# Patient Record
Sex: FEMALE | Race: WHITE | Employment: OTHER | URBAN - NONMETROPOLITAN AREA
[De-identification: names, ages, dates, MRNs, and addresses within clinical notes are randomized per-mention and may not be internally consistent; named-entity substitution may affect disease eponyms.]

---

## 2019-05-14 RX ORDER — LANOLIN ALCOHOL/MO/W.PET/CERES
400 CREAM (GRAM) TOPICAL DAILY
COMMUNITY
End: 2019-05-22

## 2019-05-14 RX ORDER — CLONAZEPAM 1 MG/1
1 TABLET ORAL 2 TIMES DAILY PRN
COMMUNITY
End: 2019-05-22

## 2019-05-14 RX ORDER — PHENOL 1.4 %
600 AEROSOL, SPRAY (ML) MUCOUS MEMBRANE DAILY
COMMUNITY
End: 2019-05-23

## 2019-05-14 RX ORDER — GABAPENTIN 600 MG/1
600 TABLET ORAL 3 TIMES DAILY
COMMUNITY
End: 2019-05-22

## 2019-05-14 RX ORDER — ASPIRIN 81 MG/1
81 TABLET ORAL DAILY
COMMUNITY

## 2019-05-14 RX ORDER — FLUOXETINE HYDROCHLORIDE 20 MG/1
20 CAPSULE ORAL DAILY
COMMUNITY
End: 2019-05-22

## 2019-05-14 RX ORDER — OMEPRAZOLE/SODIUM BICARBONATE 20MG-1.1G
CAPSULE ORAL
COMMUNITY
End: 2019-05-23

## 2019-05-15 ENCOUNTER — HOSPITAL ENCOUNTER (OUTPATIENT)
Dept: NUCLEAR MEDICINE | Age: 64
Discharge: HOME | End: 2019-05-17
Payer: MEDICARE

## 2019-05-15 ENCOUNTER — HOSPITAL ENCOUNTER (OUTPATIENT)
Dept: CT IMAGING | Age: 64
Discharge: HOME | End: 2019-05-15
Payer: MEDICARE

## 2019-05-15 DIAGNOSIS — R91.1 SOLITARY LUNG NODULE: ICD-10-CM

## 2019-05-15 LAB
GFR NON-AFRICAN AMERICAN: >60
PERFORMED ON: NORMAL
POC CREATININE: 0.6 MG/DL (ref 0.3–1.3)
POC SAMPLE TYPE: NORMAL

## 2019-05-15 PROCEDURE — A9561 TC99M OXIDRONATE: HCPCS | Performed by: INTERNAL MEDICINE

## 2019-05-15 PROCEDURE — 78306 BONE IMAGING WHOLE BODY: CPT

## 2019-05-15 PROCEDURE — 6360000004 HC RX CONTRAST MEDICATION: Performed by: INTERNAL MEDICINE

## 2019-05-15 PROCEDURE — 82565 ASSAY OF CREATININE: CPT

## 2019-05-15 PROCEDURE — 74177 CT ABD & PELVIS W/CONTRAST: CPT

## 2019-05-15 PROCEDURE — 3430000000 HC RX DIAGNOSTIC RADIOPHARMACEUTICAL: Performed by: INTERNAL MEDICINE

## 2019-05-15 RX ADMIN — IOPAMIDOL 90 ML: 755 INJECTION, SOLUTION INTRAVENOUS at 09:49

## 2019-05-15 RX ADMIN — TECHNETIUM TC 99M OXIDRONATE 20 MILLICURIE: 3.15 INJECTION, POWDER, LYOPHILIZED, FOR SOLUTION INTRAVENOUS at 13:37

## 2019-05-16 ENCOUNTER — HOSPITAL ENCOUNTER (OUTPATIENT)
Dept: GENERAL RADIOLOGY | Age: 64
Discharge: HOME | End: 2019-05-16
Payer: MEDICARE

## 2019-05-16 ENCOUNTER — HOSPITAL ENCOUNTER (OUTPATIENT)
Dept: CT IMAGING | Age: 64
Discharge: HOME | End: 2019-05-16
Payer: MEDICARE

## 2019-05-16 VITALS
HEART RATE: 78 BPM | TEMPERATURE: 98.3 F | SYSTOLIC BLOOD PRESSURE: 120 MMHG | HEIGHT: 64 IN | RESPIRATION RATE: 18 BRPM | BODY MASS INDEX: 24.24 KG/M2 | OXYGEN SATURATION: 94 % | DIASTOLIC BLOOD PRESSURE: 65 MMHG | WEIGHT: 142 LBS

## 2019-05-16 DIAGNOSIS — R91.8 LUNG MASS: ICD-10-CM

## 2019-05-16 LAB
INR BLD: 0.96 (ref 0.88–1.18)
PLATELET # BLD: 174 K/UL (ref 130–400)
PROTHROMBIN TIME: 12.2 SEC (ref 12–14.6)

## 2019-05-16 PROCEDURE — 71045 X-RAY EXAM CHEST 1 VIEW: CPT

## 2019-05-16 PROCEDURE — 88305 TISSUE EXAM BY PATHOLOGIST: CPT

## 2019-05-16 PROCEDURE — 85049 AUTOMATED PLATELET COUNT: CPT

## 2019-05-16 PROCEDURE — 88334 PATH CONSLTJ SURG CYTO XM EA: CPT

## 2019-05-16 PROCEDURE — 85610 PROTHROMBIN TIME: CPT

## 2019-05-16 PROCEDURE — 88329 PATH CONSLTJ DRG SURG: CPT

## 2019-05-16 PROCEDURE — 88341 IMHCHEM/IMCYTCHM EA ADD ANTB: CPT

## 2019-05-16 PROCEDURE — 2720000000 CT GUIDED NEEDLE PLACEMENT

## 2019-05-16 PROCEDURE — 88333 PATH CONSLTJ SURG CYTO XM 1: CPT

## 2019-05-16 PROCEDURE — 88342 IMHCHEM/IMCYTCHM 1ST ANTB: CPT

## 2019-05-16 RX ORDER — ASPIRIN 81 MG/1
81 TABLET ORAL DAILY
COMMUNITY

## 2019-05-16 RX ORDER — CITALOPRAM 20 MG/1
TABLET ORAL
COMMUNITY

## 2019-05-16 RX ORDER — ALBUTEROL SULFATE 90 UG/1
AEROSOL, METERED RESPIRATORY (INHALATION)
Status: ON HOLD | COMMUNITY
End: 2022-01-01 | Stop reason: HOSPADM

## 2019-05-16 RX ORDER — BUDESONIDE AND FORMOTEROL FUMARATE DIHYDRATE 160; 4.5 UG/1; UG/1
AEROSOL RESPIRATORY (INHALATION)
COMMUNITY

## 2019-05-16 RX ORDER — OXYCODONE AND ACETAMINOPHEN 7.5; 325 MG/1; MG/1
TABLET ORAL
COMMUNITY
End: 2019-09-12 | Stop reason: CLARIF

## 2019-05-16 RX ORDER — PHENOL 1.4 %
600 AEROSOL, SPRAY (ML) MUCOUS MEMBRANE
COMMUNITY

## 2019-05-16 RX ORDER — ERGOCALCIFEROL 1.25 MG/1
CAPSULE ORAL
COMMUNITY

## 2019-05-16 ASSESSMENT — PAIN - FUNCTIONAL ASSESSMENT: PAIN_FUNCTIONAL_ASSESSMENT: 0-10

## 2019-05-16 NOTE — FLOWSHEET NOTE
Pt has done well. No distress, has been visiting with family, eaten and been up to the [place]. Site is clear, lungs clear. DC instructions reviewed and questions answered. Escorted pt and family/friend to front entrance and dc home.

## 2019-05-16 NOTE — PROGRESS NOTES
Pt in waiting room with son and best friend, pt here for a RUL lung biopsy. Escorted pt to [facility], no complaints/distress voiced or noted. POC reviewed and questions answered, oriented to area. Hx/assessment complete and noted. Meds reviewed, no asa in over 5 days. All other meds reviewed and noted. Labs drawn and sent. Will await results and notify CT.

## 2019-05-17 ENCOUNTER — LAB REQUISITION (OUTPATIENT)
Dept: LAB | Facility: HOSPITAL | Age: 64
End: 2019-05-17

## 2019-05-17 DIAGNOSIS — Z00.00 ROUTINE GENERAL MEDICAL EXAMINATION AT A HEALTH CARE FACILITY: ICD-10-CM

## 2019-05-17 PROCEDURE — 88342 IMHCHEM/IMCYTCHM 1ST ANTB: CPT

## 2019-05-17 PROCEDURE — 88341 IMHCHEM/IMCYTCHM EA ADD ANTB: CPT

## 2019-05-20 PROBLEM — C34.91 MALIGNANT NEOPLASM OF RIGHT LUNG (HCC): Status: ACTIVE | Noted: 2019-05-20

## 2019-05-21 PROBLEM — F17.200 CURRENT EVERY DAY SMOKER: Status: ACTIVE | Noted: 2019-05-21

## 2019-05-21 PROBLEM — Z71.9 ENCOUNTER FOR CONSULTATION: Status: ACTIVE | Noted: 2019-05-21

## 2019-05-21 NOTE — PROGRESS NOTES
[facility]  MD Melissa Keyes BSN, PA-C  ____________________________________________________________               [facility]  Department of Radiation Oncology  [address] [place] [id number]  Office:  [phone]  Fax: [phone]    DATE:  05/22/2019    PATIENT:   ORVILLE Camilo 1955                                   MEDICAL RECORD #:  [id number]                                                       REASON FOR CONSULTATION:  ORVILLE Camilo is a very pleasant 64 y.o. female that has been referred to our clinic by Gold Coronel MD to discuss radiotherapy recommendations for newly diagnosed squamous Cell Carcinoma of the Lung, RUL with regional lymph node metastasis.  She reports pain in right lateral rib cage area, taking pain medication, clear productive cough.           HISTORY OF PRESENT ILLNESS  Diagnosed in March 2019 with at least stage IIIB (T4, N2, Mx, G2) Squamous Cell Carcinoma of the Lung, RUL 6.5 x 2.8 x 5.1cm located posteriorly and medially with invasion and destruction noted in the right lateral cortex/lateral aspect of the T4 vertebral body; also with mediastinal adenopathy, 1.9 cm pretracheal lymph node, 2.1 cm precarinal lymph node and a 1.9 cm subcarinal lymph node as well as right hilar adenopathy.  She follows Dr. Gold Coronel who plans for concurrent chemoradiation pending staging studies including a PET scan and brain MRI.    03/2019 - Complained of chest wall pain and after initial abnormalities were seen on a chest x-ray, a chest CT was ordered for further evaluation.     04/09/2019 - CT Chest in [facility]:  • Malignant mass in the right upper love posteriorly which crosses the azygos fissure into the azygous lobe. The mass measures 6.5 x 3.8 x 5.1 cm. The mass invades and destroys the right lateral cortex and lateral aspect of the T4 vertebral body. The mass either crosses the oblique fissure extending  into the right lower lobe or displaces the oblique fissure.   • 1.9 cm pretracheal lymph node  • 2.1 cm precarinal lymph node   • 1.9 cm subcarinal lymph node  • Mediastinal and Right hilar lymphadenopathy.    05/15/2019 - Bone scan:  • No evidence of osteoblastic disease.    05/15/2019 - CT Abdomen/Pelvis:  · No lung nodules seen.  • No acute abnormality of the abdomen or pelvis.  • Large amount of stool in the colon. No evidence of obstruction.  • A prior cholecystectomy. Dilatation of common bile duct probably due to prior cholecystectomy.  • Postsurgical changes of the lumbar spine. No focal complications.    05/16/2019 - Right lung mass, CT-guided core biopsies per :  • Moderately Differentiated Squamous Cell Carcinoma.    05/16/2019 - Appointment with :  • Referral to  for consideration of concurrent chemoradiaiton   • Ordered Morphine SR for pain   • Request PET scan and MRI Brain  • Follow up on 05/24/2019.     Patient and her son came in for initial consultation today.  Patient has been a lifelong smoker and currently continues to smoke in excess of 2 packs/day.  She denies headaches, dizziness (other than occasionally when standing up quickly), change in vision or speech, altered mental status, or focal neurological deficits.  She denies new unexplained bone pain aside from mild to moderate radiating right mid lateral rib cage area pain that waxes and wanes.  He denies knowledge of nodules, masses, worsening shortness of breath, or hemoptysis.  He does have chronic wet sounding cough.           History obtained from  PATIENT, FAMILY and CHART    PAST MEDICAL HISTORY   Past Medical History:   Diagnosis Date   • Arthritis    • Asthma    • Back pain, chronic    • Cataracts, bilateral    • Colon polyps    • COPD (chronic obstructive pulmonary disease) (CMS/HCC)    • Emphysema of lung (CMS/HCC)    • GERD (gastroesophageal reflux disease)    • Glaucoma    • Malignant neoplasm of  right lung (CMS/HCC)    • Pneumonia    • Syncope    • TIA (transient ischemic attack)    *Patient denies previous history of malignancies, chemotherapy, or radiation therapy.    PAST SURGICAL HISTORY  Past Surgical History:   Procedure Laterality Date   • APPENDECTOMY     • BACK SURGERY      x 2   • CHOLECYSTECTOMY     • HYSTERECTOMY     • INCONTINENCE SURGERY     • LUNG BIOPSY Right 05/16/2019      FAMILY HISTORY  family history includes Cancer in her brother; Colon cancer in her mother; Diabetes in her brother; Hypertension in her brother.    SOCIAL HISTORY   Social History     Tobacco Use   • Smoking status: Current Every Day Smoker     Packs/day: 2.50     Years: 20.00     Pack years: 50.00     Types: Cigarettes   • Smokeless tobacco: Never Used   Substance Use Topics   • Alcohol use: Yes     Comment: SOCIAL DRINKER.    • Drug use: Defer      ALLERGIES  Patient has no known allergies.     MEDICATIONS   Current Outpatient Medications   Medication Sig Dispense Refill   • albuterol sulfate  (90 Base) MCG/ACT inhaler Inhale 2 puffs Every 4 (Four) Hours As Needed for Wheezing.     • aspirin 81 MG EC tablet Take 81 mg by mouth Daily.     • budesonide-formoterol (SYMBICORT) 80-4.5 MCG/ACT inhaler Inhale 2 puffs 2 (Two) Times a Day.     • calcium carbonate (OS-ANABELL) 600 MG tablet Take 600 mg by mouth Daily.     • Cholecalciferol (VITAMIN D3) 91543 units capsule Take 50,000 Units by mouth Every 7 (Seven) Days.     • citalopram (CeleXA) 20 MG tablet Take 20 mg by mouth Daily.     • Morphine (MS CONTIN) 15 MG 12 hr tablet Take 15 mg by mouth 2 (Two) Times a Day.     • O2 (OXYGEN) Inhale 2 L/min 1 (One) Time.     • Omeprazole-Sodium Bicarbonate (ZEGERID)  MG capsule Take  by mouth.     • tiZANidine (ZANAFLEX) 4 MG tablet Take 4 mg by mouth Every 8 (Eight) Hours As Needed for Muscle Spasms.     • oxyCODONE-acetaminophen (PERCOCET) 7.5-325 MG per tablet Take 1 tablet by mouth Every 4 (Four) Hours As Needed.    "    No current facility-administered medications for this visit.       The following portions of the patient's history were reviewed and updated as appropriate: allergies, current medications, past family history, past medical history, past social history, past surgical history and problem list.    REVIEW OF SYSTEMS  Review of Systems   Constitutional: Positive for fatigue and unexpected weight change (decrease of 20 lbs over 6-8 months). Negative for appetite change.   HENT: Negative.    Eyes: Negative.    Respiratory: Positive for cough (productive with yellow sputum) and shortness of breath.         02@2L at HS     Cardiovascular: Positive for chest pain (right-sided).   Gastrointestinal: Positive for nausea (d/t uncontrolled pain) and vomiting.   Endocrine: Negative.    Genitourinary: Negative.    Musculoskeletal: Positive for back pain (chronic).   Skin: Negative.    Allergic/Immunologic: Negative.    Neurological: Positive for light-headedness (when standing too quickly; stable for patient). Negative for dizziness and headaches.   Hematological: Negative.    Psychiatric/Behavioral: Positive for sleep disturbance.       PHYSICAL EXAM  VITAL SIGNS:   Vitals:    05/22/19 0945   BP: 122/74   Weight: 63 kg (139 lb)   Height: 165.1 cm (65\")   PainSc:   5   PainLoc: Chest  Comment: right     General Appearance:  awake, alert, oriented, in no acute distress, cooperative. Appears stated age.   Head: Normocephalic  Eyes: Conjunctiva pink, pupils equal and reactive.   Ears:  Normal externally.  Hearing- normal to conversation  Nose/Sinuses:  Mucosa normal. No drainage or sinus tenderness.  Mouth/Throat:  Mucosa moist, no lesions; pharynx without erythema, edema or exudate.  Neck: Supple, no mass, non-tender  Back:  Symmetric, no curvature, ROM normal, no CVA tenderness   Lungs:  Normal expansion.  Mostly clear to auscultation with occasional rales.  Heart:  Heart sounds are normal.  Regular rate and rhythm without " murmur, gallop or rub.  Abdomen:  Soft, non-tender, normal bowel sounds; no bruits, organomegaly or masses.  Extremities: Warm to touch, pink, with no edema. Pulses 2+ bilaterally  Musculoskeletal: strength and sensation grossly normal; no brisk point tenderness noted with palpation of the spinous processes, however mild tenderness noted in the posterior right rib cage area around the third through sixth ribs distribution.  No masses, deformities or overlying skin changes noted.  Neurologic:  Alert and oriented, gait normal.  Cranial nerves II through XII grossly intact.  No motor or sensory deficits appreciated.  Psych exam: normal situational behavior      Skin:  Warm and moist. No suspicious lesions or rashes of concern     Performance Status: ECOG (1) Restricted in physically strenuous activity, ambulatory and able to do work of light nature    Clinical Quality Measures  -Pain Documented by Standardized Tool, FPS  Pain severity quantified; pain present, followup plan documented.5 Plan: presently taking medication.  RX prescribed from another MD, states it is ineffective but will be ok until she sees him again on Friday. (stated she signed a pain contract with them to manage her pain) but will let us know if her pain is uncontrolled and we can facilitate communication with her prescribing physician  -Advanced Care Planning Care plan discussed, no care plan provided  -Body Mass Index Screening and Follow-Up Plan Patient's Body mass index is 23.13 kg/m². BMI is above normal parameters. Recommendations include: educational material.  -Tobacco Use: Screening and Cessation Intervention Social History    Tobacco Use      Smoking status: Current Every Day Smoker        Packs/day: 2.50        Years: 20.00        Pack years: 50        Types: Cigarettes      Smokeless tobacco: Never Used   Smoking cessation information given in after visit summary.    ASSESSMENT AND PLAN  1. Primary squamous cell carcinoma of right lung  (CMS/[facility])    2. Current every day smoker    3. Cancer related pain    4. Encounter for consultation      Orders Placed This Encounter   Procedures   • Pulmonary Function Test     Standing Status:   Future     Standing Expiration Date:   5/22/2020     Scheduling Instructions:      Need prior to starting radiation     Order Specific Question:   PFT Procedures to Be Performed     Answer:   Lung Volumes     Order Specific Question:   PFT Procedures to Be Performed     Answer:   Spirometry     Order Specific Question:   PFT Procedures to Be Performed     Answer:   Spirometry Pre & Post Bronchodilator     Order Specific Question:   With:     Answer:   DLCO     RECOMMENDATIONS: ORVILLE Camilo has been referred to our office today to discuss radiotherapy recommendations. She was diagnosed in March 2019 with at least Stage IIIB (T4, N2, Mx, G2) Squamous Cell Carcinoma of the Lung, RUL 6.5 x 2.8 x 5.1 cm with invasion and destruction noted in right lateral cortex/lateral aspect of the T4 vertebral body, mediastinal adenopathy, 1.9 cm pretracheal lymph node, 2.1 cm precarinal lymph node and a 1.9 cm subcarinal lymph node as well as right hilar adenopathy.  She follows Dr. Gold Coronel who plans for concurrent chemoradiation pending staging studies.  She is being scheduled to undergo PET scan in brain MRI completion of staging and will follow-up with Dr. Mcadams on Friday.    The indications and rationale of radiation therapy according to the NCCN Guidelines to the lung has been discussed with the patient and her son today. I have extensively reviewed the risks, benefits, course, precaution and alternatives of therapy with them.  Thoracic radiation therapy side effects include but are not limited to acute skin reactions, radiation induced pulmonary fibrosis, patient induced to cardiac injury, radiation-induced nerve and spinal cord injury including catastrophic spinal cord complications, decreased pulmonary  functions/pulmonary reserve leading to worsening breathing capacity, and possible progression of disease in spite of therapy with either local or systemic failure. The last pulmonary function tests were obtained last year at another facility, we will obtain another set since she is a 2-3 ppd smoker.     I have examined the patient, reviewed the records, images and pathology reports.  I discussed with the patient today that a course of definitive treatment would include chemotherapy under the direction of Dr. Coronel and thoracic radiation therapy with a total of 6000-6600cGy if staging studies did not upstage her disease; versus palliative radiation therapy to the spine-abutting lung tumor for pain management and to maximize local tumor control in hopes of averting a spinal cord threatening event as her lung tumor has already invaded into the T4 vertebral body. Final plan will be pending completion of staging.  We will schedule follow-up after the PET scan and brain MRI to discuss results and establish a treatment plan.       The patient and her family verbalize understanding of this discussion, voice no further questions and wished to proceed with recommendations as noted. Thank you for allowing me to assist in this patients care.     I advised VI of the risks of continuing to use tobacco, and I provided her with tobacco cessation educational materials in the After Visit Summary. During this visit, I spent 3-10 minutes counseling the patient regarding tobacco cessation.    Todays appointment time was spent in counseling and coordination of care as follows: diagnosis, intent of treatment discussing radiation therapy specifics: logistics, possible and probable side effects and after effects, staging of cancer, standard of care in for this stage of this cancer and treatment options.  I saw this patient in consultation with Melissa Huynh PA-C while covering for Dr. Taco Nevarez, radiation oncologist.  Thierno  MD Brendan  05/22/2019

## 2019-05-22 ENCOUNTER — CONSULT (OUTPATIENT)
Dept: RADIATION ONCOLOGY | Facility: HOSPITAL | Age: 64
End: 2019-05-22

## 2019-05-22 ENCOUNTER — HOSPITAL ENCOUNTER (OUTPATIENT)
Dept: RADIATION ONCOLOGY | Facility: HOSPITAL | Age: 64
Setting detail: RADIATION/ONCOLOGY SERIES
End: 2019-05-22

## 2019-05-22 VITALS
SYSTOLIC BLOOD PRESSURE: 122 MMHG | WEIGHT: 139 LBS | BODY MASS INDEX: 23.16 KG/M2 | HEIGHT: 65 IN | DIASTOLIC BLOOD PRESSURE: 74 MMHG

## 2019-05-22 DIAGNOSIS — C34.91 PRIMARY SQUAMOUS CELL CARCINOMA OF RIGHT LUNG (HCC): Primary | ICD-10-CM

## 2019-05-22 DIAGNOSIS — Z71.9 ENCOUNTER FOR CONSULTATION: ICD-10-CM

## 2019-05-22 DIAGNOSIS — G89.3 CANCER RELATED PAIN: ICD-10-CM

## 2019-05-22 DIAGNOSIS — F17.200 CURRENT EVERY DAY SMOKER: ICD-10-CM

## 2019-05-22 PROCEDURE — G0463 HOSPITAL OUTPT CLINIC VISIT: HCPCS | Performed by: RADIOLOGY

## 2019-05-22 RX ORDER — TIZANIDINE 4 MG/1
4 TABLET ORAL EVERY 8 HOURS PRN
COMMUNITY

## 2019-05-22 RX ORDER — ALBUTEROL SULFATE 90 UG/1
2 AEROSOL, METERED RESPIRATORY (INHALATION) EVERY 4 HOURS PRN
COMMUNITY

## 2019-05-22 RX ORDER — CITALOPRAM 20 MG/1
20 TABLET ORAL DAILY
COMMUNITY

## 2019-05-22 RX ORDER — OXYCODONE AND ACETAMINOPHEN 7.5; 325 MG/1; MG/1
1 TABLET ORAL EVERY 4 HOURS PRN
COMMUNITY

## 2019-05-22 RX ORDER — BUDESONIDE AND FORMOTEROL FUMARATE DIHYDRATE 80; 4.5 UG/1; UG/1
2 AEROSOL RESPIRATORY (INHALATION)
COMMUNITY

## 2019-05-22 RX ORDER — MORPHINE SULFATE 15 MG/1
15 TABLET, FILM COATED, EXTENDED RELEASE ORAL 2 TIMES DAILY
COMMUNITY

## 2019-05-22 RX ORDER — CHOLECALCIFEROL (VITAMIN D3) 1250 MCG
50000 CAPSULE ORAL
COMMUNITY

## 2019-05-22 NOTE — PATIENT INSTRUCTIONS
Lung Cancer  Lung cancer occurs when abnormal cells in the lung grow out of control and form a mass (tumor). There are several types of lung cancer. The two most common types are:  · Non-small cell. In this type of lung cancer, abnormal cells are larger and grow more slowly than those of small cell lung cancer.  · Small cell. In this type of lung cancer, abnormal cells are smaller than those of non-small cell lung cancer. Small cell lung cancer gets worse faster than non-small cell lung cancer.    What are the causes?  The leading cause of lung cancer is smoking tobacco. The second leading cause is radon exposure.  What increases the risk?  · Smoking tobacco.  · Exposure to secondhand tobacco smoke.  · Exposure to radon gas.  · Exposure to asbestos.  · Exposure to arsenic in drinking water.  · Air pollution.  · Family or personal history of lung cancer.  · Lung radiation therapy.  · Being older than 65 years.  What are the signs or symptoms?  In the early stages, symptoms may not be present. As the cancer progresses, symptoms may include:  · A lasting cough, possibly with blood.  · Fatigue.  · Unexplained weight loss.  · Shortness of breath.  · Wheezing.  · Chest pain.  · Loss of appetite.    Symptoms of advanced lung cancer include:  · Hoarseness.  · Bone or joint pain.  · Weakness.  · Nail problems.  · Face or arm swelling.  · Paralysis of the face.  · Drooping eyelids.    How is this diagnosed?  Lung cancer can be identified with a physical exam and with tests such as:  · A chest X-ray.  · A CT scan.  · Blood tests.  · A biopsy.    After a diagnosis is made, you will have more tests to determine the stage of the cancer. The stages of non-small cell lung cancer are:  · Stage 0, also called carcinoma in situ. At this stage, abnormal cells are found in the inner lining of your lung or lungs.  · Stage I. At this stage, abnormal cells have grown into a tumor that is no larger than 5 cm across. The cancer has entered  the deeper lung tissue but has not yet entered the lymph nodes or other parts of the body.  · Stage II. At this stage, the tumor is 7 cm across or smaller and has entered nearby lymph nodes. Or, the tumor is 5 cm across or smaller and has invaded surrounding tissue but is not found in nearby lymph nodes. There may be more than one tumor present.  · Stage III. At this stage, the tumor may be any size. There may be more than one tumor in the lungs. The cancer cells have spread to the lymph nodes and possibly to other organs.  · Stage IV. At this stage, there are tumors in both lungs and the cancer has spread to other areas of the body.    The stages of small cell lung cancer are:  · Limited. At this stage, the cancer is found only on one side of the chest.  · Extensive. At this stage, the cancer is in the lungs and in tissues on the other side of the chest. The cancer has spread to other organs or is found in the fluid between the layers of your lungs.    How is this treated?  Depending on the type and stage of your lung cancer, you may be treated with:  · Surgery. This is done to remove a tumor.  · Radiation therapy. This treatment destroys cancer cells using X-rays or other types of radiation.  · Chemotherapy. This treatment uses medicines to destroy cancer cells.  · Targeted therapy. This treatment aims to destroy only cancer cells instead of all cells as other therapies do.    You may also have a combination of treatments.  Follow these instructions at home:  · Do not use any tobacco products. This includes cigarettes, chewing tobacco, and electronic cigarettes. If you need help quitting, ask your health care provider.  · Take medicines only as directed by your health care provider.  · Eat a healthy diet. Work with a dietitian to make sure you are getting the nutrition you need.  · Consider joining a support group or seeking counseling to help you cope with the stress of having lung cancer.  · Let your cancer  specialist (oncologist) know if you are admitted to the hospital.  · Keep all follow-up visits as directed by your health care provider. This is important.  Contact a health care provider if:  · You lose weight without trying.  · You have a persistent cough and wheezing.  · You feel short of breath.  · You tire easily.  · You experience bone or joint pain.  · You have difficulty swallowing.  · You feel hoarse or notice your voice changing.  · Your pain medicine is not helping.  Get help right away if:  · You cough up blood.  · You have new breathing problems.  · You develop chest pain.  · You develop swelling in:  ? One or both ankles or legs.  ? Your face, neck, or arms.  · You are confused.  · You experience paralysis in your face or a drooping eyelid.  This information is not intended to replace advice given to you by your health care provider. Make sure you discuss any questions you have with your health care provider.  Document Released: 03/26/2002 Document Revised: 05/25/2017 Document Reviewed: 04/23/2015  "[employer] Interactive Patient Education © 2018 "[employer] Inc.  External Beam Radiation Therapy  External beam radiation therapy is a type of radiation treatment. This type of radiation therapy can deliver radiation to a fairly large area. This is the most common type of radiation therapy for cancer. This therapy may be done to:  · Treat cancer by:  ? Destroying cancer cells. Radiation delivered during the treatment damages cancer cells. It may also damage normal cells, but normal cells have the DNA to repair themselves while cancer cells do not.  ? Helping with symptoms of your cancer.  ? Stopping the growth of any remaining cancer cells after surgery.  ? Preventing cancer cells from growing in areas that do not have cancer (prophylactic radiation therapy).  · Treat or shrink a tumor.  · Reduce pain (palliative therapy).    The amount of radiation you will receive and the length of therapy depend on your  medical condition. You should not feel the radiation being delivered or any pain during your therapy.  Let your health care provider know about:  · Any allergies you have.  · All medicines you are taking, including vitamins, herbs, eye drops, creams, and over-the-counter medicines.  · Any problems you or family members have had with anesthetic medicines.  · Any blood disorders you have.  · Any surgeries you have had.  · Any medical conditions you have.  · Whether you are pregnant or may be pregnant.  What are the risks?  Generally, this is a safe procedure. However, radiation therapy can place a person at a higher risk for a second cancer later in life.  Most people experience side effects from the therapy. Side effects depend on the amount of radiation and the part of the body that was exposed to radiation. The most common side effects include:  · Skin changes.  · Hair loss.  · Fatigue.  · Nausea and vomiting.    What happens before the procedure?  · There will be a planning session (simulation). During the session:  ? Your health care provider will plan exactly where the radiation will be delivered (treatment field).  ? You will be positioned for your therapy. The goal is to have a position that can be reproduced for each therapy session.  ? Temporary marks may be drawn on your body. Permanent marks may also be drawn on your body in order for you to be positioned the same way for each therapy session.  ? A tool that holds a body part in place (immobilization device) may be used to keep the area of treatment in the correct position.  · Follow instructions from your health care provider about eating or drinking restrictions.  · Ask your health care provider about changing or stopping your regular medicines. This is especially important if you are taking diabetes medicines or blood thinners.  What happens during the procedure?  · You will either lie on a table or sit in a chair in the position determined for your  therapy.  · You may have a heavy shield placed on you to protect tissues and organs that are not being treated.  · The radiation machine (linear accelerator) will move around you to deliver the radiation in exact doses from different angles. The machine will not touch you.  The procedure may vary among health care providers and hospitals.  What happens after the procedure?  · You may return to your normal routine including diet, activities, and medicines as told by your health care provider.  · You may want to have someone take you home from the hospital or clinic.  Summary  · External beam radiation therapy is a type of radiation treatment for cancer.  · The amount of radiation you will receive and the length of therapy depend on your medical condition.  · Most people experience side effects from the therapy. Side effects depend on the amount of radiation and the part of the body that was exposed to radiation.  This information is not intended to replace advice given to you by your health care provider. Make sure you discuss any questions you have with your health care provider.  Document Released: 05/06/2010 Document Revised: 12/18/2017 Document Reviewed: 12/18/2017  [employer] Interactive Patient Education © 2019 [employer]

## 2019-05-23 ENCOUNTER — OFFICE VISIT (OUTPATIENT)
Dept: PULMONOLOGY | Facility: CLINIC | Age: 64
End: 2019-05-23

## 2019-05-23 VITALS
HEART RATE: 93 BPM | DIASTOLIC BLOOD PRESSURE: 80 MMHG | SYSTOLIC BLOOD PRESSURE: 130 MMHG | OXYGEN SATURATION: 85 % | WEIGHT: 140.4 LBS | HEIGHT: 65 IN | BODY MASS INDEX: 23.39 KG/M2

## 2019-05-23 DIAGNOSIS — R06.02 SHORTNESS OF BREATH: ICD-10-CM

## 2019-05-23 DIAGNOSIS — C34.11 MALIGNANT NEOPLASM OF UPPER LOBE OF RIGHT LUNG (HCC): ICD-10-CM

## 2019-05-23 DIAGNOSIS — T48.6X5A: ICD-10-CM

## 2019-05-23 DIAGNOSIS — R91.8 LUNG MASS: Primary | ICD-10-CM

## 2019-05-23 PROCEDURE — 99204 OFFICE O/P NEW MOD 45 MIN: CPT | Performed by: INTERNAL MEDICINE

## 2019-05-23 NOTE — PATIENT INSTRUCTIONS
Glycopyrrolate inhalation powder  What is this medicine?  GLYCOPYRROLATE (glye koe ROXANNE anna late) is a bronchodilator. It helps open up the airways in your lungs to make it easier to breathe. This medicine is used to treat chronic obstructive pulmonary disease (COPD). Do NOT use for an acute COPD attack.  This medicine may be used for other purposes; ask your health care provider or pharmacist if you have questions.  COMMON BRAND NAME(S): George Gresham  What should I tell my health care provider before I take this medicine?  They need to know if you have any of these conditions:  -bladder problems or difficulty passing urine  -glaucoma  -heart disease or irregular heartbeat  -kidney disease  -prostate trouble  -an unusual or allergic reaction to glycopyrrolate, milk, other medicines, foods, dyes, or preservatives  -pregnant or trying to get pregnant  -breast-feeding  How should I use this medicine?  This medicine is used in a special inhaler. Do NOT swallow the capsules. Do NOT use a spacer device. Follow the directions on the prescription label. Take your medicine at regular intervals. Do not take it more often than directed. Do not stop taking except on your doctor's advice. Make sure that you are using your inhaler correctly. Ask your doctor or health care provider if you have any questions. Small pieces of the capsule may get in your mouth or throat when you breathe in your medicine. This is normal and should not hurt you.  Talk to your pediatrician regarding the use of this medicine in children. Special care may be needed.  Overdosage: If you think you have taken too much of this medicine contact a poison control center or emergency room at once.  NOTE: This medicine is only for you. Do not share this medicine with others.  What if I miss a dose?  If you miss a dose, use it as soon as you can. If it is almost time for your next dose, use only that dose and continue with your regular schedule. Do not use double  or extra doses.  What may interact with this medicine?  -aclidinium  -atropine  -antihistamines for allergy, cough and cold  -certain medicines for bladder problems like oxybutynin, tolterodine  -certain medicines for stomach problems like dicyclomine, hyoscyamine  -certain medicines for travel sickness like scopolamine  -certain medicines for Parkinson's disease like benztropine, trihexyphenidyl  -ipratropium  -tiotropium  -umeclidinium  This list may not describe all possible interactions. Give your health care provider a list of all the medicines, herbs, non-prescription drugs, or dietary supplements you use. Also tell them if you smoke, drink alcohol, or use illegal drugs. Some items may interact with your medicine.  What should I watch for while using this medicine?  Visit your doctor or health care professional for regular checks on your progress. Tell your doctor if your symptoms do not improve. Do not use more medicine than directed. If your symptoms get worse while you are using this medicine, call your doctor right away.  Do not get the this medicine in your eyes. It can cause irritation, pain, or blurred vision.  You may get dizzy. Do not drive, use machinery, or do anything that needs mental alertness until you know how this medicine affects you. Do not stand or sit up quickly, especially if you are an older patient. This reduces the risk of dizzy or fainting spells.  Clean the inhaler as directed in the patient information sheet that comes with this medicine.  What side effects may I notice from receiving this medicine?  Side effects that you should report to your doctor or health care professional as soon as possible:  -allergic reactions like skin rash or hives, swelling of the face, lips, or tongue  -breathing problems  -changes in vision  -chest pain  -fast heartbeat  -infection or flu-like symptoms  -trouble passing urine or change in the amount of urine  Side effects that usually do not require  medical attention (report to your doctor or health care professional if they continue or are bothersome):  -cough  -nausea  -sore throat  -trouble sleeping  This list may not describe all possible side effects. Call your doctor for medical advice about side effects. You may report side effects to FDA at 9-590-FDA-1189.  Where should I keep my medicine?  Keep out of the reach of children.  Store at room temperature between 20 and 25 degrees C (68 and 77 degrees F). Keep in a dry place. Protect from moisture. Keep capsules in the foil blister pack until you are ready to use in the inhaler. Do not store the capsules in the inhaler. Always use the new inhaler that comes with your new medication pack with each refill. Throw away any unused medicine after the expiration date.  NOTE: This sheet is a summary. It may not cover all possible information. If you have questions about this medicine, talk to your doctor, pharmacist, or health care provider.  © 2019 [employer]/Gold Standard (2017-01-19 10:24:45)

## 2019-05-29 ENCOUNTER — HOSPITAL ENCOUNTER (OUTPATIENT)
Dept: NUCLEAR MEDICINE | Age: 64
Discharge: HOME | End: 2019-05-31
Payer: MEDICARE

## 2019-05-29 DIAGNOSIS — C34.01 ADENOCARCINOMA OF RIGHT MAIN BRONCHUS (HCC): ICD-10-CM

## 2019-05-29 LAB
GLUCOSE BLD-MCNC: 94 MG/DL (ref 70–99)
PERFORMED ON: NORMAL

## 2019-05-29 PROCEDURE — 78815 PET IMAGE W/CT SKULL-THIGH: CPT

## 2019-05-29 PROCEDURE — A9552 F18 FDG: HCPCS | Performed by: INTERNAL MEDICINE

## 2019-05-29 PROCEDURE — 3430000000 HC RX DIAGNOSTIC RADIOPHARMACEUTICAL: Performed by: INTERNAL MEDICINE

## 2019-05-29 PROCEDURE — 82948 REAGENT STRIP/BLOOD GLUCOSE: CPT

## 2019-05-29 RX ORDER — FLUDEOXYGLUCOSE F 18 200 MCI/ML
10 INJECTION, SOLUTION INTRAVENOUS
Status: COMPLETED | OUTPATIENT
Start: 2019-05-29 | End: 2019-05-29

## 2019-05-29 RX ADMIN — FLUDEOXYGLUCOSE F 18 10 MILLICURIE: 200 INJECTION, SOLUTION INTRAVENOUS at 14:26

## 2019-05-31 ENCOUNTER — TRANSCRIBE ORDERS (OUTPATIENT)
Dept: PULMONOLOGY | Facility: CLINIC | Age: 64
End: 2019-05-31

## 2019-05-31 DIAGNOSIS — C34.91 PRIMARY SQUAMOUS CELL CARCINOMA OF RIGHT LUNG (HCC): Primary | ICD-10-CM

## 2019-05-31 DIAGNOSIS — F17.200 CURRENT EVERY DAY SMOKER: ICD-10-CM

## 2019-05-31 DIAGNOSIS — C34.11 MALIGNANT NEOPLASM OF UPPER LOBE OF RIGHT LUNG (HCC): ICD-10-CM

## 2019-05-31 DIAGNOSIS — R91.8 LUNG MASS: ICD-10-CM

## 2019-05-31 LAB
LAB AP CASE REPORT: NORMAL
PATH REPORT.FINAL DX SPEC: NORMAL

## 2019-06-07 ENCOUNTER — OFFICE VISIT (OUTPATIENT)
Dept: RADIATION ONCOLOGY | Facility: HOSPITAL | Age: 64
End: 2019-06-07

## 2019-06-07 ENCOUNTER — HOSPITAL ENCOUNTER (OUTPATIENT)
Dept: RADIATION ONCOLOGY | Facility: HOSPITAL | Age: 64
Discharge: HOME | End: 2019-06-07

## 2019-06-07 ENCOUNTER — HOSPITAL ENCOUNTER (OUTPATIENT)
Dept: INFUSION THERAPY | Age: 64
Discharge: HOME | End: 2019-06-07
Payer: MEDICARE

## 2019-06-07 VITALS
SYSTOLIC BLOOD PRESSURE: 95 MMHG | WEIGHT: 140 LBS | DIASTOLIC BLOOD PRESSURE: 57 MMHG | HEIGHT: 65 IN | BODY MASS INDEX: 23.32 KG/M2

## 2019-06-07 DIAGNOSIS — C34.11 MALIGNANT NEOPLASM OF UPPER LOBE OF RIGHT LUNG (HCC): Primary | ICD-10-CM

## 2019-06-07 DIAGNOSIS — F17.200 CURRENT EVERY DAY SMOKER: ICD-10-CM

## 2019-06-07 PROCEDURE — 77334 RADIATION TREATMENT AID(S): CPT | Performed by: RADIOLOGY

## 2019-06-07 PROCEDURE — 85025 COMPLETE CBC W/AUTO DIFF WBC: CPT

## 2019-06-07 PROCEDURE — 77290 THER RAD SIMULAJ FIELD CPLX: CPT | Performed by: RADIOLOGY

## 2019-06-07 NOTE — PATIENT INSTRUCTIONS
We will start radiation in approximately 3 weeks (about 07/01/2019)  We will call you when to start radiation  Radiation M-F, 5 days weekly for about 7 weeks

## 2019-06-07 NOTE — PROGRESS NOTES
[facility]CMD Melissa Rizzo BSN, PA-C  ____________________________________________________________  [facility]  Department of Radiation Oncology  [address] [place] [id number]  Office:  [phone]  Fax: [phone]    DATE:   06/07/2019    PATIENT:   ORVILLE Camilo   1955                                   MEDICAL RECORD #:  [id number]    Reason for Follow up Visit:  ORVILLE Camilo is a very pleasant 64 y.o. patient that returns to the clinic today for reevaluation and possible CT simulation regarding Squamous Cell Carcinoma of the Lung, RUL with regional lymph node metastasis. Patient was seen in clinic on 05/22/2019 where definitive treatment would include chemotherapy under the direction of Dr. Coronel and thoracic radiation therapy with a total of 6000-6600cGy. Final plan will be pending completion of staging.  PET Scan was ordered to establish treatment plan. PET scan completed on 05/29/2019 revealed intense abnormal metabolic activity associated with the pleural-based/right paraspinal mass within the right upper lobe, metabolically active right hilar, subcarinal and pretracheal lymph nodes suggesting metastatic lymphadenopathy given the SUV measurements, and some metabolic activity associated with the palatine tonsils bilaterally (right greater than left). Pulmonary function tests are scheduled on 06/12/2019. She continues to follow .     History of Present Illness:  Diagnosed in March 2019 with Stage III (T3, N2, Mx, G2) Squamous Cell Carcinoma of the Right Lung, RUL 6.5 x 2.8 x 5.1 cm invasion and destruction noted in right lateral cortex lateral aspect of the T4 vertebral body, mediastinal adenopathy, 1.9 cm pretracheal lymph node, 2.1 cm precarinal lymph node and a 1.9 cm subcarinal lymph node as well as right hilar adenopathy.  She follows Dr. Gold Coronel who plans for concurrent chemoradiation    03/2019 - Complaints  of chest wall pain. Chest CT was ordered for further evaluation.     04/09/2019 - CT Chest in [facility]:  • Malignant mass in the right upper lobe posteriorly which crosses the azygos fissure into the azygous lobe. The mass measures 6.5 x 3.8 x 5.1 cm. The mass invades and destroys the right lateral cortex and lateral aspect of the T4 vertebral body. The mass either crosses the oblique fissure extending into the right lower lobe or displaces the oblique fissure.   • 1.9 cm pretracheal lymph node  • 2.1 cm precarinal lymph node   • 1.9 cm subcarinal lymph node  • Mediastinal and Right hilar lymphadenopathy.    05/15/2019 - Bone scan:  • No evidence of osteoblastic disease.    05/15/2019 - CT Abdomen/Pelvis:  • No lung nodules seen.  • No acute abnormality of the abdomen or pelvis.  • Large amount of stool in the colon. No evidence of obstruction.  • A prior cholecystectomy. Dilatation of common bile duct probably due to prior cholecystectomy.  • Postsurgical changes of the lumbar spine. No focal complications.    05/16/2019 - Right lung mass, core biopsies per :  • Moderately Differentiated Squamous Cell Carcinoma.    05/16/2019 - Appointment with :  • Referral to  for consideration of concurrent chemoradiaiton   • Ordered Morphine SR for pain   • Request PET scan and MRI Brain  • Follow up on 05/24/2019.     05/22/2019 - Consult with :  • I have examined the patient, reviewed the records, images and pathology reports.  I discussed with the patient today that a course of definitive treatment would include chemotherapy under the direction of Dr. Coronel and thoracic radiation therapy with a total of 6000-6600cGy if staging studies did not upstage her disease; versus palliative radiation therapy to the spine-abutting lung tumor for pain management and to maximize local tumor control in hopes of averting a spinal cord threatening event as her lung tumor has already  invaded into the T4 vertebral body.   • Final plan will be pending completion of staging.    • We will schedule follow-up after the PET scan and brain MRI to discuss results and establish a treatment plan.      05/29/2019 - PET Scan:  • There is noted to be uptake of the radiopharmaceutical within the palatine tonsils bilaterally. The maximal uptake within the right tonsil is 6.0 SUVs.   • There is also uptake within the left palatine tonsil although less intense. This could be related to tonsillitis but direct visualization of the tonsils would be recommended, particularly given the history of tobacco abuse. The tonsils do appear to be mildly enlarged.  • The right upper lobe paraspinal mass measuring 7.2 x 4.1 cm in size demonstrates some central necrosis and intense abnormal metabolic activity. This is eroding into the adjacent vertebral body felt to be the T5 vertebral body. This measures a maximum SUV of 19.0.   • 1 cm short axis right hilar node demonstrating a maximum SUV of 8.1.   • A 13 mm short axis pretracheal node measuring a maximum SUV of 4.3 and an additional 12 mm short axis right hilar node measuring a maximum SUV of 12.3.   • A 12 mm subcarinal node measures a maximum SUV of 3.1.  • Some mild uptake is noted associated with some parenchymal scarring within the extreme right lung base measuring a maximum SUV of 1.9 and suspected to be inflammatory in nature.     History obtained from  PATIENT, FAMILY and CHART    PAST MEDICAL HISTORY   Past Medical History:   Diagnosis Date   • Arthritis    • Asthma    • Back pain, chronic    • Cataracts, bilateral    • Colon polyps    • COPD (chronic obstructive pulmonary disease) (CMS/HCC)    • Emphysema of lung (CMS/HCC)    • GERD (gastroesophageal reflux disease)    • Glaucoma    • Malignant neoplasm of right lung (CMS/HCC)    • Pneumonia    • Syncope    • TIA (transient ischemic attack)       PAST SURGICAL HISTORY  Past Surgical History:   Procedure Laterality  Date   • APPENDECTOMY     • BACK SURGERY      x 2   • CHOLECYSTECTOMY     • HYSTERECTOMY     • INCONTINENCE SURGERY     • LUNG BIOPSY Right 05/16/2019      FAMILY HISTORY  family history includes Cancer in her brother; Colon cancer in her mother; Diabetes in her brother; Hypertension in her brother.     SOCIAL HISTORY   Social History     Tobacco Use   • Smoking status: Current Every Day Smoker     Packs/day: 2.50     Years: 20.00     Pack years: 50.00     Types: Cigarettes   • Smokeless tobacco: Never Used   Substance Use Topics   • Alcohol use: Yes     Comment: SOCIAL DRINKER.    • Drug use: Defer      ALLERGIES  Patient has no known allergies.     MEDICATIONS   Current Outpatient Medications   Medication Sig Dispense Refill   • albuterol sulfate  (90 Base) MCG/ACT inhaler Inhale 2 puffs Every 4 (Four) Hours As Needed for Wheezing.     • aspirin 81 MG EC tablet Take 81 mg by mouth Daily.     • budesonide-formoterol (SYMBICORT) 80-4.5 MCG/ACT inhaler Inhale 2 puffs 2 (Two) Times a Day.     • Cholecalciferol (VITAMIN D3) 61189 units capsule Take 50,000 Units by mouth Every 7 (Seven) Days.     • citalopram (CeleXA) 20 MG tablet Take 20 mg by mouth Daily.     • Morphine (MS CONTIN) 15 MG 12 hr tablet Take 15 mg by mouth 2 (Two) Times a Day.     • oxyCODONE-acetaminophen (PERCOCET) 7.5-325 MG per tablet Take 1 tablet by mouth Every 4 (Four) Hours As Needed.     • tiZANidine (ZANAFLEX) 4 MG tablet Take 4 mg by mouth Every 8 (Eight) Hours As Needed for Muscle Spasms.     • Glycopyrrolate (SEEBRI NEOHALER) 15.6 MCG capsule Place 1 capsule into inhaler and inhale 2 (Two) Times a Day. 48 capsule 0   • O2 (OXYGEN) Inhale 2 L/min 1 (One) Time.       No current facility-administered medications for this visit.       The following portions of the patient's history were reviewed and updated as appropriate: allergies, current medications, past family history, past medical history, past social history, past surgical history  "and problem list.    REVIEW OF SYSTEMS  Review of Systems    PHYSICAL EXAM  VITAL SIGNS:   Vitals:    06/07/19 1419   BP: 95/57   Weight: 63.5 kg (140 lb)   Height: 165.1 cm (65\")   PainSc: 0-No pain      General:  Alert and oriented, no acute distress, well developed, Vitals reviewed.  Head/Neck:  Normocephalic, without obvious abnormality, atraumatic.    Nose/Sinuses:  Nares normal. Septum midline. Mucosa normal.   Mouth/Throat:  Mucosa moist, no lesions; pharynx without erythema, edema or exudate.  Neck:  supple, no mass, non-tender  Eyes: No gross abnormalities,  no scleral jaundice or erythema.    Ears: Ears appear intact with no abnormalities noted, hearing grossly normal  Chest:  Respiratory efforts are normal and unlabored, chest is clear to auscultation.   Cardiovascular: Regular rate and rhythm without murmurs, rubs, or gallops.   Abdomen:  Soft, non-tender, normal bowel sounds; no noted organomegaly or masses. no CVA tenderness   Extremities:  CORTÉS well, warm to touch, no evidence of cyanosis or edema.  Lymphatics:  No evidence of adenopathy in the cervical or supraclavicular areas.  Neurologic:  Alert and oriented, gait normal, strength and sensation grossly normal  Psych: Mood and affect are appropriate for circumstance.     Performance Status: ECOG (0) Fully active, able to carry on all predisease performance without restriction    Clinical Quality Measures  -Pain Documented  Pain severity quantified; no pain present, no followup plan required.0   -Advanced Care Planning Care plan discussed, no care plan provided  -Body Mass Index Screening and Follow-Up Plan  Patient's Body mass index is 23.3 kg/m². BMI is within normal parameters. No follow-up required..  -Tobacco Use: Screening and Cessation Intervention Social History    Tobacco Use      Smoking status: Current Every Day Smoker        Packs/day: 2.50        Years: 20.00        Pack years: 50        Types: Cigarettes      Smokeless tobacco: Never " Used   Smoking cessation information given in after visit summary.    ASSESSMENT AND PLAN   1. Malignant neoplasm of upper lobe of right lung (CMS/HCC)    2. Current every day smoker      RECOMMENDATIONS: ORVILLE Camilo returns to the clinic today for reevaluation and possible CT simulation regarding squamous Cell Carcinoma of the Lung, RUL with regional lymph node metastasis. Patient was seen in clinic on 05/22/2019 where definitive treatment would include chemotherapy under the direction of Dr. Coronel and thoracic radiation therapy with a total of 6000-6600cGy. Final plan will be pending completion of staging.  PET Scan was ordered to establish treatment plan. PET scan completed on 05/29/2019 revealed intense abnormal metabolic activity associated with the pleural-based/right paraspinal mass within the right upper lobe, metabolically active right hilar, subcarinal and pretracheal lymph nodes suggesting metastatic lymphadenopathy given the SUV measurements, and some metabolic activity associated with the palatine tonsils bilaterally (right greater than left). Pulmonary function tests are scheduled on 06/12/2019.     I anticipate a dose of 6000 - 6600 cGy in 30 - 33 treatment fractions. Side effects of radiation therapy have once again been discussed, patient verbalized understanding and she elects to proceed. We will notify her once plan is completed to begin radiation.     I advised VI of the risks of continuing to use tobacco, and I provided her with tobacco cessation educational materials in the After Visit Summary. During this visit, I spent 3-10 minutes counseling the patient regarding tobacco cessation.    Patient Instructions   We will start radiation in approximately 3 weeks (about 07/01/2019)  We will call you when to start radiation  Radiation M-F, 5 days weekly for about 7 weeks      Todays appointment time was spent in counseling and coordination of care as follows: diagnosis, intent of treatment  discussing radiation therapy specifics: logistics, possible and probable side effects and after effects, staging of cancer, standard of care in for this stage of this cancer and treatment options  Taco Nevarez III, MD  06/07/2019

## 2019-06-10 ENCOUNTER — APPOINTMENT (OUTPATIENT)
Dept: LAB | Facility: HOSPITAL | Age: 64
End: 2019-06-10

## 2019-06-10 ENCOUNTER — INFUSION (OUTPATIENT)
Dept: ONCOLOGY | Facility: HOSPITAL | Age: 64
End: 2019-06-10

## 2019-06-10 VITALS
TEMPERATURE: 98.3 F | OXYGEN SATURATION: 93 % | WEIGHT: 135 LBS | HEIGHT: 64 IN | SYSTOLIC BLOOD PRESSURE: 132 MMHG | BODY MASS INDEX: 23.05 KG/M2 | DIASTOLIC BLOOD PRESSURE: 67 MMHG | RESPIRATION RATE: 16 BRPM | HEART RATE: 92 BPM

## 2019-06-10 DIAGNOSIS — C34.11 MALIGNANT NEOPLASM OF UPPER LOBE OF RIGHT LUNG (HCC): Primary | ICD-10-CM

## 2019-06-10 LAB
ALBUMIN SERPL-MCNC: 3.8 G/DL (ref 3.5–5)
ALBUMIN/GLOB SERPL: 1.2 G/DL (ref 1.1–2.5)
ALP SERPL-CCNC: 137 U/L (ref 24–120)
ALT SERPL W P-5'-P-CCNC: 15 U/L (ref 0–54)
ANION GAP SERPL CALCULATED.3IONS-SCNC: 4 MMOL/L (ref 4–13)
AST SERPL-CCNC: 17 U/L (ref 7–45)
BILIRUB SERPL-MCNC: 0.5 MG/DL (ref 0.1–1)
BUN BLD-MCNC: 11 MG/DL (ref 5–21)
BUN/CREAT SERPL: 28.2 (ref 7–25)
CALCIUM SPEC-SCNC: 9.1 MG/DL (ref 8.4–10.4)
CHLORIDE SERPL-SCNC: 104 MMOL/L (ref 98–110)
CO2 SERPL-SCNC: 33 MMOL/L (ref 24–31)
CREAT BLD-MCNC: 0.39 MG/DL (ref 0.5–1.4)
GFR SERPL CREATININE-BSD FRML MDRD: >150 ML/MIN/1.73
GLOBULIN UR ELPH-MCNC: 3.3 GM/DL
GLUCOSE BLD-MCNC: 102 MG/DL (ref 70–100)
POTASSIUM BLD-SCNC: 3.4 MMOL/L (ref 3.5–5.3)
PROT SERPL-MCNC: 7.1 G/DL (ref 6.3–8.7)
SODIUM BLD-SCNC: 141 MMOL/L (ref 135–145)

## 2019-06-10 PROCEDURE — 25010000002 PACLITAXEL PER 30 MG: Performed by: INTERNAL MEDICINE

## 2019-06-10 PROCEDURE — 96417 CHEMO IV INFUS EACH ADDL SEQ: CPT

## 2019-06-10 PROCEDURE — 25010000002 DEXAMETHASONE SODIUM PHOSPHATE 100 MG/10ML SOLUTION: Performed by: INTERNAL MEDICINE

## 2019-06-10 PROCEDURE — 80053 COMPREHEN METABOLIC PANEL: CPT | Performed by: INTERNAL MEDICINE

## 2019-06-10 PROCEDURE — 96375 TX/PRO/DX INJ NEW DRUG ADDON: CPT

## 2019-06-10 PROCEDURE — 25010000002 DIPHENHYDRAMINE PER 50 MG: Performed by: INTERNAL MEDICINE

## 2019-06-10 PROCEDURE — 96413 CHEMO IV INFUSION 1 HR: CPT

## 2019-06-10 PROCEDURE — 96367 TX/PROPH/DG ADDL SEQ IV INF: CPT

## 2019-06-10 PROCEDURE — 25010000002 PALONOSETRON PER 25 MCG: Performed by: INTERNAL MEDICINE

## 2019-06-10 PROCEDURE — 25010000002 CARBOPLATIN PER 50 MG: Performed by: INTERNAL MEDICINE

## 2019-06-10 RX ORDER — EPINEPHRINE 0.3 MG/.3ML
0.3 INJECTION SUBCUTANEOUS AS NEEDED
Status: DISCONTINUED | OUTPATIENT
Start: 2019-06-10 | End: 2019-06-10 | Stop reason: HOSPADM

## 2019-06-10 RX ORDER — SODIUM CHLORIDE 0.9 % (FLUSH) 0.9 %
10 SYRINGE (ML) INJECTION AS NEEDED
Status: DISCONTINUED | OUTPATIENT
Start: 2019-06-10 | End: 2019-06-10 | Stop reason: HOSPADM

## 2019-06-10 RX ORDER — SODIUM CHLORIDE 9 MG/ML
250 INJECTION, SOLUTION INTRAVENOUS ONCE
Status: COMPLETED | OUTPATIENT
Start: 2019-06-10 | End: 2019-06-10

## 2019-06-10 RX ORDER — DIPHENHYDRAMINE HYDROCHLORIDE 50 MG/ML
50 INJECTION INTRAMUSCULAR; INTRAVENOUS ONCE AS NEEDED
Status: DISCONTINUED | OUTPATIENT
Start: 2019-06-10 | End: 2019-06-10 | Stop reason: HOSPADM

## 2019-06-10 RX ORDER — METHYLPREDNISOLONE SODIUM SUCCINATE 125 MG/2ML
125 INJECTION, POWDER, LYOPHILIZED, FOR SOLUTION INTRAMUSCULAR; INTRAVENOUS ONCE AS NEEDED
Status: DISCONTINUED | OUTPATIENT
Start: 2019-06-10 | End: 2019-06-10 | Stop reason: HOSPADM

## 2019-06-10 RX ORDER — SODIUM CHLORIDE 0.9 % (FLUSH) 0.9 %
10 SYRINGE (ML) INJECTION AS NEEDED
Status: CANCELLED | OUTPATIENT
Start: 2019-06-10

## 2019-06-10 RX ORDER — FAMOTIDINE 10 MG/ML
20 INJECTION, SOLUTION INTRAVENOUS ONCE
Status: COMPLETED | OUTPATIENT
Start: 2019-06-10 | End: 2019-06-10

## 2019-06-10 RX ORDER — PALONOSETRON 0.05 MG/ML
0.25 INJECTION, SOLUTION INTRAVENOUS ONCE
Status: COMPLETED | OUTPATIENT
Start: 2019-06-10 | End: 2019-06-10

## 2019-06-10 RX ADMIN — SODIUM CHLORIDE 250 ML: 9 INJECTION, SOLUTION INTRAVENOUS at 13:15

## 2019-06-10 RX ADMIN — SODIUM CHLORIDE, PRESERVATIVE FREE 10 ML: 5 INJECTION INTRAVENOUS at 16:29

## 2019-06-10 RX ADMIN — CARBOPLATIN 260 MG: 10 INJECTION, SOLUTION INTRAVENOUS at 15:30

## 2019-06-10 RX ADMIN — DIPHENHYDRAMINE HYDROCHLORIDE 25 MG: 50 INJECTION, SOLUTION INTRAMUSCULAR; INTRAVENOUS at 13:53

## 2019-06-10 RX ADMIN — PACLITAXEL 85 MG: 6 INJECTION, SOLUTION INTRAVENOUS at 14:23

## 2019-06-10 RX ADMIN — Medication 500 UNITS: at 16:30

## 2019-06-10 RX ADMIN — PALONOSETRON HYDROCHLORIDE 0.25 MG: 0.25 INJECTION, SOLUTION INTRAVENOUS at 13:31

## 2019-06-10 RX ADMIN — DEXAMETHASONE SODIUM PHOSPHATE 12 MG: 10 INJECTION, SOLUTION INTRAMUSCULAR; INTRAVENOUS at 13:33

## 2019-06-10 RX ADMIN — FAMOTIDINE 20 MG: 10 INJECTION, SOLUTION INTRAVENOUS at 13:54

## 2019-06-12 ENCOUNTER — HOSPITAL ENCOUNTER (OUTPATIENT)
Dept: PULMONOLOGY | Facility: HOSPITAL | Age: 64
Discharge: HOME | End: 2019-06-12
Admitting: RADIOLOGY

## 2019-06-12 DIAGNOSIS — C34.11 MALIGNANT NEOPLASM OF UPPER LOBE OF RIGHT LUNG (HCC): ICD-10-CM

## 2019-06-12 DIAGNOSIS — C34.91 PRIMARY SQUAMOUS CELL CARCINOMA OF RIGHT LUNG (HCC): ICD-10-CM

## 2019-06-12 DIAGNOSIS — R91.8 LUNG MASS: ICD-10-CM

## 2019-06-12 DIAGNOSIS — F17.200 CURRENT EVERY DAY SMOKER: ICD-10-CM

## 2019-06-12 PROCEDURE — 94010 BREATHING CAPACITY TEST: CPT

## 2019-06-12 PROCEDURE — 94010 BREATHING CAPACITY TEST: CPT | Performed by: INTERNAL MEDICINE

## 2019-06-14 ENCOUNTER — HOSPITAL ENCOUNTER (OUTPATIENT)
Dept: INFUSION THERAPY | Age: 64
Discharge: HOME | End: 2019-06-14
Payer: MEDICARE

## 2019-06-14 PROCEDURE — 85025 COMPLETE CBC W/AUTO DIFF WBC: CPT

## 2019-06-14 PROCEDURE — 80053 COMPREHEN METABOLIC PANEL: CPT

## 2019-06-14 PROCEDURE — 36415 COLL VENOUS BLD VENIPUNCTURE: CPT

## 2019-06-17 ENCOUNTER — INFUSION (OUTPATIENT)
Dept: ONCOLOGY | Facility: HOSPITAL | Age: 64
End: 2019-06-17

## 2019-06-17 VITALS
OXYGEN SATURATION: 94 % | DIASTOLIC BLOOD PRESSURE: 57 MMHG | WEIGHT: 136 LBS | BODY MASS INDEX: 23.22 KG/M2 | HEIGHT: 64 IN | TEMPERATURE: 98.1 F | RESPIRATION RATE: 18 BRPM | SYSTOLIC BLOOD PRESSURE: 112 MMHG | HEART RATE: 86 BPM

## 2019-06-17 DIAGNOSIS — C34.11 MALIGNANT NEOPLASM OF UPPER LOBE OF RIGHT LUNG (HCC): Primary | ICD-10-CM

## 2019-06-17 LAB
CREAT SERPL-MCNC: 0.6 MG/DL (ref 0.57–1)
CREAT SERPL-MCNC: 0.8 MG/DL (ref 0.57–1)

## 2019-06-17 PROCEDURE — 96367 TX/PROPH/DG ADDL SEQ IV INF: CPT

## 2019-06-17 PROCEDURE — 96375 TX/PRO/DX INJ NEW DRUG ADDON: CPT

## 2019-06-17 PROCEDURE — 96417 CHEMO IV INFUS EACH ADDL SEQ: CPT

## 2019-06-17 PROCEDURE — 25010000002 DEXAMETHASONE SODIUM PHOSPHATE 100 MG/10ML SOLUTION: Performed by: INTERNAL MEDICINE

## 2019-06-17 PROCEDURE — 25010000002 PALONOSETRON PER 25 MCG: Performed by: INTERNAL MEDICINE

## 2019-06-17 PROCEDURE — 25010000002 DIPHENHYDRAMINE PER 50 MG: Performed by: INTERNAL MEDICINE

## 2019-06-17 PROCEDURE — 96413 CHEMO IV INFUSION 1 HR: CPT

## 2019-06-17 PROCEDURE — 25010000002 PACLITAXEL PER 30 MG: Performed by: INTERNAL MEDICINE

## 2019-06-17 PROCEDURE — 25010000002 CARBOPLATIN PER 50 MG: Performed by: INTERNAL MEDICINE

## 2019-06-17 RX ORDER — DIPHENHYDRAMINE HYDROCHLORIDE 50 MG/ML
50 INJECTION INTRAMUSCULAR; INTRAVENOUS ONCE AS NEEDED
Status: DISCONTINUED | OUTPATIENT
Start: 2019-06-17 | End: 2019-06-18 | Stop reason: HOSPADM

## 2019-06-17 RX ORDER — SODIUM CHLORIDE 0.9 % (FLUSH) 0.9 %
10 SYRINGE (ML) INJECTION AS NEEDED
Status: CANCELLED | OUTPATIENT
Start: 2019-06-17

## 2019-06-17 RX ORDER — FAMOTIDINE 10 MG/ML
20 INJECTION, SOLUTION INTRAVENOUS ONCE
Status: COMPLETED | OUTPATIENT
Start: 2019-06-17 | End: 2019-06-17

## 2019-06-17 RX ORDER — SODIUM CHLORIDE 0.9 % (FLUSH) 0.9 %
10 SYRINGE (ML) INJECTION AS NEEDED
Status: DISCONTINUED | OUTPATIENT
Start: 2019-06-17 | End: 2019-06-18 | Stop reason: HOSPADM

## 2019-06-17 RX ORDER — PALONOSETRON 0.05 MG/ML
0.25 INJECTION, SOLUTION INTRAVENOUS ONCE
Status: COMPLETED | OUTPATIENT
Start: 2019-06-17 | End: 2019-06-17

## 2019-06-17 RX ORDER — METHYLPREDNISOLONE SODIUM SUCCINATE 125 MG/2ML
125 INJECTION, POWDER, LYOPHILIZED, FOR SOLUTION INTRAMUSCULAR; INTRAVENOUS ONCE AS NEEDED
Status: DISCONTINUED | OUTPATIENT
Start: 2019-06-17 | End: 2019-06-18 | Stop reason: HOSPADM

## 2019-06-17 RX ORDER — SODIUM CHLORIDE 9 MG/ML
250 INJECTION, SOLUTION INTRAVENOUS ONCE
Status: COMPLETED | OUTPATIENT
Start: 2019-06-17 | End: 2019-06-17

## 2019-06-17 RX ORDER — EPINEPHRINE 0.3 MG/.3ML
0.3 INJECTION SUBCUTANEOUS AS NEEDED
Status: DISCONTINUED | OUTPATIENT
Start: 2019-06-17 | End: 2019-06-18 | Stop reason: HOSPADM

## 2019-06-17 RX ADMIN — SODIUM CHLORIDE 250 ML: 9 INJECTION, SOLUTION INTRAVENOUS at 13:27

## 2019-06-17 RX ADMIN — PACLITAXEL 85 MG: 6 INJECTION, SOLUTION INTRAVENOUS at 14:25

## 2019-06-17 RX ADMIN — CARBOPLATIN 260 MG: 10 INJECTION, SOLUTION INTRAVENOUS at 15:42

## 2019-06-17 RX ADMIN — PALONOSETRON HYDROCHLORIDE 0.25 MG: 0.25 INJECTION, SOLUTION INTRAVENOUS at 13:28

## 2019-06-17 RX ADMIN — SODIUM CHLORIDE, PRESERVATIVE FREE 10 ML: 5 INJECTION INTRAVENOUS at 16:22

## 2019-06-17 RX ADMIN — DIPHENHYDRAMINE HYDROCHLORIDE 25 MG: 50 INJECTION, SOLUTION INTRAMUSCULAR; INTRAVENOUS at 13:31

## 2019-06-17 RX ADMIN — Medication 500 UNITS: at 16:22

## 2019-06-17 RX ADMIN — DEXAMETHASONE SODIUM PHOSPHATE 12 MG: 10 INJECTION, SOLUTION INTRAMUSCULAR; INTRAVENOUS at 13:53

## 2019-06-17 RX ADMIN — FAMOTIDINE 20 MG: 10 INJECTION INTRAVENOUS at 13:28

## 2019-06-17 NOTE — PROGRESS NOTES
Called to hiren creatinine level, 0.60 per IGNACIO Roth.  Told her there was a significant difference (approx. 40 mg) difference in Carbo dose; she said keep at 260 mg.  Spoke with Cindy in our pharmacy, told her creatinine level is  0.6, not 0.8 that was entered; she voiced understanding and will mix dose as 260 mg as order states.

## 2019-06-17 NOTE — PROGRESS NOTES
9543-Spoke with Gabriele PIERRE, Dr. Coronel's office, asked if patient usually runs elevated WBC/ANC; she checked and said yes.  She will fax most recent CMP, but pt. Is okay for treatment.

## 2019-06-21 ENCOUNTER — HOSPITAL ENCOUNTER (OUTPATIENT)
Dept: INFUSION THERAPY | Age: 64
Discharge: HOME | End: 2019-06-21
Payer: MEDICARE

## 2019-06-21 PROCEDURE — 85025 COMPLETE CBC W/AUTO DIFF WBC: CPT

## 2019-06-21 RX ORDER — SODIUM CHLORIDE 9 MG/ML
250 INJECTION, SOLUTION INTRAVENOUS ONCE
Status: CANCELLED | OUTPATIENT
Start: 2019-06-24

## 2019-06-21 RX ORDER — METHYLPREDNISOLONE SODIUM SUCCINATE 125 MG/2ML
125 INJECTION, POWDER, LYOPHILIZED, FOR SOLUTION INTRAMUSCULAR; INTRAVENOUS ONCE AS NEEDED
Status: CANCELLED
Start: 2019-06-24

## 2019-06-21 RX ORDER — EPINEPHRINE 0.3 MG/.3ML
0.3 INJECTION SUBCUTANEOUS AS NEEDED
Status: CANCELLED
Start: 2019-06-24

## 2019-06-21 RX ORDER — FAMOTIDINE 10 MG/ML
20 INJECTION, SOLUTION INTRAVENOUS ONCE
Status: CANCELLED | OUTPATIENT
Start: 2019-06-24

## 2019-06-21 RX ORDER — PALONOSETRON 0.05 MG/ML
0.25 INJECTION, SOLUTION INTRAVENOUS ONCE
Status: CANCELLED | OUTPATIENT
Start: 2019-06-24

## 2019-06-21 RX ORDER — DIPHENHYDRAMINE HYDROCHLORIDE 50 MG/ML
50 INJECTION INTRAMUSCULAR; INTRAVENOUS ONCE AS NEEDED
Status: CANCELLED | OUTPATIENT
Start: 2019-06-24

## 2019-06-24 ENCOUNTER — INFUSION (OUTPATIENT)
Dept: ONCOLOGY | Facility: HOSPITAL | Age: 64
End: 2019-06-24

## 2019-06-24 VITALS
HEIGHT: 64 IN | RESPIRATION RATE: 16 BRPM | BODY MASS INDEX: 23.39 KG/M2 | SYSTOLIC BLOOD PRESSURE: 92 MMHG | OXYGEN SATURATION: 93 % | DIASTOLIC BLOOD PRESSURE: 46 MMHG | HEART RATE: 85 BPM | TEMPERATURE: 98.3 F | WEIGHT: 137 LBS

## 2019-06-24 DIAGNOSIS — C34.11 MALIGNANT NEOPLASM OF UPPER LOBE OF RIGHT LUNG (HCC): Primary | ICD-10-CM

## 2019-06-24 LAB — CREAT SERPL-MCNC: 0.6 MG/DL (ref 0.57–1)

## 2019-06-24 PROCEDURE — 25010000002 PALONOSETRON PER 25 MCG: Performed by: INTERNAL MEDICINE

## 2019-06-24 PROCEDURE — 96367 TX/PROPH/DG ADDL SEQ IV INF: CPT

## 2019-06-24 PROCEDURE — 96417 CHEMO IV INFUS EACH ADDL SEQ: CPT

## 2019-06-24 PROCEDURE — 96375 TX/PRO/DX INJ NEW DRUG ADDON: CPT

## 2019-06-24 PROCEDURE — 25010000002 PACLITAXEL PER 30 MG: Performed by: INTERNAL MEDICINE

## 2019-06-24 PROCEDURE — 96413 CHEMO IV INFUSION 1 HR: CPT

## 2019-06-24 PROCEDURE — 25010000002 DIPHENHYDRAMINE PER 50 MG: Performed by: INTERNAL MEDICINE

## 2019-06-24 PROCEDURE — 25010000002 CARBOPLATIN PER 50 MG: Performed by: INTERNAL MEDICINE

## 2019-06-24 PROCEDURE — 25010000002 DEXAMETHASONE SODIUM PHOSPHATE 100 MG/10ML SOLUTION: Performed by: INTERNAL MEDICINE

## 2019-06-24 RX ORDER — SODIUM CHLORIDE 0.9 % (FLUSH) 0.9 %
10 SYRINGE (ML) INJECTION AS NEEDED
Status: CANCELLED | OUTPATIENT
Start: 2019-06-24

## 2019-06-24 RX ORDER — SODIUM CHLORIDE 9 MG/ML
250 INJECTION, SOLUTION INTRAVENOUS ONCE
Status: COMPLETED | OUTPATIENT
Start: 2019-06-24 | End: 2019-06-24

## 2019-06-24 RX ORDER — SODIUM CHLORIDE 0.9 % (FLUSH) 0.9 %
10 SYRINGE (ML) INJECTION AS NEEDED
Status: DISCONTINUED | OUTPATIENT
Start: 2019-06-24 | End: 2019-06-24 | Stop reason: HOSPADM

## 2019-06-24 RX ORDER — FAMOTIDINE 10 MG/ML
20 INJECTION, SOLUTION INTRAVENOUS ONCE
Status: COMPLETED | OUTPATIENT
Start: 2019-06-24 | End: 2019-06-24

## 2019-06-24 RX ORDER — EPINEPHRINE 0.3 MG/.3ML
0.3 INJECTION SUBCUTANEOUS AS NEEDED
Status: DISCONTINUED | OUTPATIENT
Start: 2019-06-24 | End: 2019-06-24 | Stop reason: HOSPADM

## 2019-06-24 RX ORDER — METHYLPREDNISOLONE SODIUM SUCCINATE 125 MG/2ML
125 INJECTION, POWDER, LYOPHILIZED, FOR SOLUTION INTRAMUSCULAR; INTRAVENOUS ONCE AS NEEDED
Status: DISCONTINUED | OUTPATIENT
Start: 2019-06-24 | End: 2019-06-24 | Stop reason: HOSPADM

## 2019-06-24 RX ORDER — DIPHENHYDRAMINE HYDROCHLORIDE 50 MG/ML
50 INJECTION INTRAMUSCULAR; INTRAVENOUS ONCE AS NEEDED
Status: DISCONTINUED | OUTPATIENT
Start: 2019-06-24 | End: 2019-06-24 | Stop reason: HOSPADM

## 2019-06-24 RX ORDER — PALONOSETRON 0.05 MG/ML
0.25 INJECTION, SOLUTION INTRAVENOUS ONCE
Status: COMPLETED | OUTPATIENT
Start: 2019-06-24 | End: 2019-06-24

## 2019-06-24 RX ADMIN — PACLITAXEL 85 MG: 6 INJECTION, SOLUTION INTRAVENOUS at 12:54

## 2019-06-24 RX ADMIN — SODIUM CHLORIDE, PRESERVATIVE FREE 10 ML: 5 INJECTION INTRAVENOUS at 14:43

## 2019-06-24 RX ADMIN — PALONOSETRON HYDROCHLORIDE 0.25 MG: 0.25 INJECTION, SOLUTION INTRAVENOUS at 12:31

## 2019-06-24 RX ADMIN — DEXAMETHASONE SODIUM PHOSPHATE 12 MG: 10 INJECTION, SOLUTION INTRAMUSCULAR; INTRAVENOUS at 12:31

## 2019-06-24 RX ADMIN — Medication 500 UNITS: at 14:43

## 2019-06-24 RX ADMIN — CARBOPLATIN 260 MG: 10 INJECTION, SOLUTION INTRAVENOUS at 14:00

## 2019-06-24 RX ADMIN — SODIUM CHLORIDE 250 ML: 9 INJECTION, SOLUTION INTRAVENOUS at 12:07

## 2019-06-24 RX ADMIN — FAMOTIDINE 20 MG: 10 INJECTION, SOLUTION INTRAVENOUS at 12:09

## 2019-06-24 RX ADMIN — DIPHENHYDRAMINE HYDROCHLORIDE 25 MG: 50 INJECTION, SOLUTION INTRAMUSCULAR; INTRAVENOUS at 12:07

## 2019-06-26 ENCOUNTER — HOSPITAL ENCOUNTER (OUTPATIENT)
Dept: RADIATION ONCOLOGY | Facility: HOSPITAL | Age: 64
Setting detail: RADIATION/ONCOLOGY SERIES
End: 2019-06-26

## 2019-06-27 PROCEDURE — 77300 RADIATION THERAPY DOSE PLAN: CPT | Performed by: RADIOLOGY

## 2019-06-27 PROCEDURE — 77295 3-D RADIOTHERAPY PLAN: CPT | Performed by: RADIOLOGY

## 2019-06-27 PROCEDURE — 77470 SPECIAL RADIATION TREATMENT: CPT | Performed by: RADIOLOGY

## 2019-06-27 PROCEDURE — 77334 RADIATION TREATMENT AID(S): CPT | Performed by: RADIOLOGY

## 2019-06-28 ENCOUNTER — HOSPITAL ENCOUNTER (OUTPATIENT)
Dept: INFUSION THERAPY | Age: 64
Discharge: HOME | End: 2019-06-28
Payer: MEDICARE

## 2019-06-28 PROCEDURE — 85025 COMPLETE CBC W/AUTO DIFF WBC: CPT

## 2019-07-01 ENCOUNTER — APPOINTMENT (OUTPATIENT)
Dept: ONCOLOGY | Facility: HOSPITAL | Age: 64
End: 2019-07-01

## 2019-07-01 ENCOUNTER — HOSPITAL ENCOUNTER (OUTPATIENT)
Dept: RADIATION ONCOLOGY | Facility: HOSPITAL | Age: 64
Setting detail: RADIATION/ONCOLOGY SERIES
End: 2019-07-01

## 2019-07-05 ENCOUNTER — HOSPITAL ENCOUNTER (OUTPATIENT)
Dept: INFUSION THERAPY | Age: 64
Discharge: HOME | End: 2019-07-05
Payer: MEDICARE

## 2019-07-05 PROCEDURE — 36415 COLL VENOUS BLD VENIPUNCTURE: CPT

## 2019-07-05 PROCEDURE — 85025 COMPLETE CBC W/AUTO DIFF WBC: CPT

## 2019-07-05 PROCEDURE — 80053 COMPREHEN METABOLIC PANEL: CPT

## 2019-07-05 RX ORDER — FAMOTIDINE 10 MG/ML
20 INJECTION, SOLUTION INTRAVENOUS ONCE
Status: CANCELLED | OUTPATIENT
Start: 2019-07-08

## 2019-07-05 RX ORDER — METHYLPREDNISOLONE SODIUM SUCCINATE 125 MG/2ML
125 INJECTION, POWDER, LYOPHILIZED, FOR SOLUTION INTRAMUSCULAR; INTRAVENOUS ONCE AS NEEDED
Status: CANCELLED
Start: 2019-07-08

## 2019-07-05 RX ORDER — EPINEPHRINE 0.3 MG/.3ML
0.3 INJECTION SUBCUTANEOUS AS NEEDED
Status: CANCELLED
Start: 2019-07-08

## 2019-07-05 RX ORDER — SODIUM CHLORIDE 9 MG/ML
250 INJECTION, SOLUTION INTRAVENOUS ONCE
Status: CANCELLED | OUTPATIENT
Start: 2019-07-08

## 2019-07-05 RX ORDER — PALONOSETRON 0.05 MG/ML
0.25 INJECTION, SOLUTION INTRAVENOUS ONCE
Status: CANCELLED | OUTPATIENT
Start: 2019-07-08

## 2019-07-05 RX ORDER — DIPHENHYDRAMINE HYDROCHLORIDE 50 MG/ML
50 INJECTION INTRAMUSCULAR; INTRAVENOUS ONCE AS NEEDED
Status: CANCELLED | OUTPATIENT
Start: 2019-07-08

## 2019-07-08 ENCOUNTER — HOSPITAL ENCOUNTER (OUTPATIENT)
Dept: RADIATION ONCOLOGY | Facility: HOSPITAL | Age: 64
Discharge: HOME | End: 2019-07-08

## 2019-07-08 ENCOUNTER — INFUSION (OUTPATIENT)
Dept: ONCOLOGY | Facility: HOSPITAL | Age: 64
End: 2019-07-08

## 2019-07-08 VITALS
HEIGHT: 64 IN | OXYGEN SATURATION: 90 % | TEMPERATURE: 98.1 F | RESPIRATION RATE: 18 BRPM | BODY MASS INDEX: 22.53 KG/M2 | HEART RATE: 87 BPM | SYSTOLIC BLOOD PRESSURE: 105 MMHG | WEIGHT: 132 LBS | DIASTOLIC BLOOD PRESSURE: 57 MMHG

## 2019-07-08 DIAGNOSIS — C34.11 MALIGNANT NEOPLASM OF UPPER LOBE OF RIGHT LUNG (HCC): Primary | ICD-10-CM

## 2019-07-08 LAB — CREAT SERPL-MCNC: 0.6 MG/DL (ref 0.57–1)

## 2019-07-08 PROCEDURE — 96417 CHEMO IV INFUS EACH ADDL SEQ: CPT

## 2019-07-08 PROCEDURE — 25010000002 DEXAMETHASONE SODIUM PHOSPHATE 100 MG/10ML SOLUTION: Performed by: PHYSICIAN ASSISTANT

## 2019-07-08 PROCEDURE — 25010000002 PACLITAXEL PER 30 MG: Performed by: PHYSICIAN ASSISTANT

## 2019-07-08 PROCEDURE — 96375 TX/PRO/DX INJ NEW DRUG ADDON: CPT

## 2019-07-08 PROCEDURE — 96413 CHEMO IV INFUSION 1 HR: CPT

## 2019-07-08 PROCEDURE — 25010000002 PALONOSETRON PER 25 MCG: Performed by: PHYSICIAN ASSISTANT

## 2019-07-08 PROCEDURE — 96367 TX/PROPH/DG ADDL SEQ IV INF: CPT

## 2019-07-08 PROCEDURE — 96415 CHEMO IV INFUSION ADDL HR: CPT

## 2019-07-08 PROCEDURE — 77412 RADIATION TX DELIVERY LVL 3: CPT | Performed by: RADIOLOGY

## 2019-07-08 PROCEDURE — 77280 THER RAD SIMULAJ FIELD SMPL: CPT | Performed by: RADIOLOGY

## 2019-07-08 PROCEDURE — 25010000002 CARBOPLATIN PER 50 MG: Performed by: PHYSICIAN ASSISTANT

## 2019-07-08 PROCEDURE — 25010000002 DIPHENHYDRAMINE PER 50 MG: Performed by: PHYSICIAN ASSISTANT

## 2019-07-08 RX ORDER — PALONOSETRON 0.05 MG/ML
0.25 INJECTION, SOLUTION INTRAVENOUS ONCE
Status: COMPLETED | OUTPATIENT
Start: 2019-07-08 | End: 2019-07-08

## 2019-07-08 RX ORDER — METHYLPREDNISOLONE SODIUM SUCCINATE 125 MG/2ML
125 INJECTION, POWDER, LYOPHILIZED, FOR SOLUTION INTRAMUSCULAR; INTRAVENOUS ONCE AS NEEDED
Status: DISCONTINUED | OUTPATIENT
Start: 2019-07-08 | End: 2019-07-08 | Stop reason: HOSPADM

## 2019-07-08 RX ORDER — SODIUM CHLORIDE 0.9 % (FLUSH) 0.9 %
10 SYRINGE (ML) INJECTION AS NEEDED
Status: DISCONTINUED | OUTPATIENT
Start: 2019-07-08 | End: 2019-07-08 | Stop reason: HOSPADM

## 2019-07-08 RX ORDER — EPINEPHRINE 0.3 MG/.3ML
0.3 INJECTION SUBCUTANEOUS AS NEEDED
Status: DISCONTINUED | OUTPATIENT
Start: 2019-07-08 | End: 2019-07-08 | Stop reason: HOSPADM

## 2019-07-08 RX ORDER — DIPHENHYDRAMINE HYDROCHLORIDE 50 MG/ML
50 INJECTION INTRAMUSCULAR; INTRAVENOUS ONCE AS NEEDED
Status: DISCONTINUED | OUTPATIENT
Start: 2019-07-08 | End: 2019-07-08 | Stop reason: HOSPADM

## 2019-07-08 RX ORDER — SODIUM CHLORIDE 9 MG/ML
250 INJECTION, SOLUTION INTRAVENOUS ONCE
Status: COMPLETED | OUTPATIENT
Start: 2019-07-08 | End: 2019-07-08

## 2019-07-08 RX ORDER — SODIUM CHLORIDE 0.9 % (FLUSH) 0.9 %
10 SYRINGE (ML) INJECTION AS NEEDED
Status: CANCELLED | OUTPATIENT
Start: 2019-07-08

## 2019-07-08 RX ORDER — FAMOTIDINE 10 MG/ML
20 INJECTION, SOLUTION INTRAVENOUS ONCE
Status: COMPLETED | OUTPATIENT
Start: 2019-07-08 | End: 2019-07-08

## 2019-07-08 RX ADMIN — DIPHENHYDRAMINE HYDROCHLORIDE 25 MG: 50 INJECTION, SOLUTION INTRAMUSCULAR; INTRAVENOUS at 12:58

## 2019-07-08 RX ADMIN — PACLITAXEL 85 MG: 6 INJECTION, SOLUTION INTRAVENOUS at 13:36

## 2019-07-08 RX ADMIN — PALONOSETRON HYDROCHLORIDE 0.25 MG: 0.25 INJECTION, SOLUTION INTRAVENOUS at 12:23

## 2019-07-08 RX ADMIN — FAMOTIDINE 20 MG: 10 INJECTION INTRAVENOUS at 12:58

## 2019-07-08 RX ADMIN — DEXAMETHASONE SODIUM PHOSPHATE 12 MG: 10 INJECTION, SOLUTION INTRAMUSCULAR; INTRAVENOUS at 12:18

## 2019-07-08 RX ADMIN — SODIUM CHLORIDE 250 ML: 9 INJECTION, SOLUTION INTRAVENOUS at 12:18

## 2019-07-08 RX ADMIN — Medication 500 UNITS: at 16:27

## 2019-07-08 RX ADMIN — CARBOPLATIN 260 MG: 10 INJECTION, SOLUTION INTRAVENOUS at 15:39

## 2019-07-09 ENCOUNTER — HOSPITAL ENCOUNTER (OUTPATIENT)
Dept: RADIATION ONCOLOGY | Facility: HOSPITAL | Age: 64
Discharge: HOME | End: 2019-07-09

## 2019-07-09 ENCOUNTER — DOCUMENTATION (OUTPATIENT)
Dept: ONCOLOGY | Facility: HOSPITAL | Age: 64
End: 2019-07-09

## 2019-07-09 PROCEDURE — 77412 RADIATION TX DELIVERY LVL 3: CPT | Performed by: RADIOLOGY

## 2019-07-09 PROCEDURE — 77417 THER RADIOLOGY PORT IMAGE(S): CPT | Performed by: RADIOLOGY

## 2019-07-09 NOTE — PROGRESS NOTES
Social work consult requested due to high distress level.  Patient is present for radiation and is being treated for lung cancer.  She reports that some days are better than others and she is coping the best she can.  She is also receiving chemo as well.  Her son has recently moved in with her to help with caretaking and he is here on this date.  She reports that she has difficulty sleeping in which she takes something over the counter for.  She has chronic pain and a history of back surgery.  She went to pain management prior to starting treatment.  Dr Coronel her oncologist now prescribes percocet and morphine which helps with this.  She reports that she does not have much of an appetite and has to make myself eat.  She reports that she has a good support system.  She has recently been concerned about medical bills.  Somehow her disability pay got messed up for 2 months in a row which affected her insurance coverage.  She feels that it is straightened out but she has recently gotten some medical bills as a result.  Discussed the financial counseling program available here and other foundations that are able to assist.   Patient to contact this worker with further questions or needs if necessary.

## 2019-07-10 ENCOUNTER — HOSPITAL ENCOUNTER (OUTPATIENT)
Dept: RADIATION ONCOLOGY | Facility: HOSPITAL | Age: 64
Discharge: HOME | End: 2019-07-10

## 2019-07-10 PROCEDURE — 77412 RADIATION TX DELIVERY LVL 3: CPT | Performed by: RADIOLOGY

## 2019-07-10 PROCEDURE — 77336 RADIATION PHYSICS CONSULT: CPT | Performed by: RADIOLOGY

## 2019-07-11 ENCOUNTER — HOSPITAL ENCOUNTER (OUTPATIENT)
Dept: RADIATION ONCOLOGY | Facility: HOSPITAL | Age: 64
Discharge: HOME | End: 2019-07-11

## 2019-07-11 PROCEDURE — 77412 RADIATION TX DELIVERY LVL 3: CPT | Performed by: RADIOLOGY

## 2019-07-12 ENCOUNTER — HOSPITAL ENCOUNTER (OUTPATIENT)
Dept: INFUSION THERAPY | Age: 64
Discharge: HOME | End: 2019-07-12
Payer: MEDICARE

## 2019-07-12 ENCOUNTER — HOSPITAL ENCOUNTER (OUTPATIENT)
Dept: RADIATION ONCOLOGY | Facility: HOSPITAL | Age: 64
Discharge: HOME | End: 2019-07-12

## 2019-07-12 PROCEDURE — 77412 RADIATION TX DELIVERY LVL 3: CPT | Performed by: RADIOLOGY

## 2019-07-12 PROCEDURE — 85025 COMPLETE CBC W/AUTO DIFF WBC: CPT

## 2019-07-15 ENCOUNTER — HOSPITAL ENCOUNTER (OUTPATIENT)
Dept: RADIATION ONCOLOGY | Facility: HOSPITAL | Age: 64
Discharge: HOME | End: 2019-07-15

## 2019-07-15 ENCOUNTER — APPOINTMENT (OUTPATIENT)
Dept: ONCOLOGY | Facility: HOSPITAL | Age: 64
End: 2019-07-15

## 2019-07-15 PROCEDURE — 77412 RADIATION TX DELIVERY LVL 3: CPT | Performed by: RADIOLOGY

## 2019-07-16 ENCOUNTER — HOSPITAL ENCOUNTER (OUTPATIENT)
Dept: RADIATION ONCOLOGY | Facility: HOSPITAL | Age: 64
Discharge: HOME | End: 2019-07-16

## 2019-07-16 PROCEDURE — 77417 THER RADIOLOGY PORT IMAGE(S): CPT | Performed by: RADIOLOGY

## 2019-07-16 PROCEDURE — 77412 RADIATION TX DELIVERY LVL 3: CPT | Performed by: RADIOLOGY

## 2019-07-17 ENCOUNTER — HOSPITAL ENCOUNTER (OUTPATIENT)
Dept: RADIATION ONCOLOGY | Facility: HOSPITAL | Age: 64
Discharge: HOME | End: 2019-07-17

## 2019-07-17 PROCEDURE — 77336 RADIATION PHYSICS CONSULT: CPT | Performed by: RADIOLOGY

## 2019-07-17 PROCEDURE — 77412 RADIATION TX DELIVERY LVL 3: CPT | Performed by: RADIOLOGY

## 2019-07-18 ENCOUNTER — HOSPITAL ENCOUNTER (OUTPATIENT)
Dept: RADIATION ONCOLOGY | Facility: HOSPITAL | Age: 64
Discharge: HOME | End: 2019-07-18

## 2019-07-18 PROCEDURE — 77412 RADIATION TX DELIVERY LVL 3: CPT | Performed by: RADIOLOGY

## 2019-07-19 ENCOUNTER — APPOINTMENT (OUTPATIENT)
Dept: LAB | Facility: HOSPITAL | Age: 64
End: 2019-07-19

## 2019-07-19 ENCOUNTER — HOSPITAL ENCOUNTER (OUTPATIENT)
Dept: RADIATION ONCOLOGY | Facility: HOSPITAL | Age: 64
Discharge: HOME | End: 2019-07-19

## 2019-07-19 PROCEDURE — 77412 RADIATION TX DELIVERY LVL 3: CPT | Performed by: RADIOLOGY

## 2019-07-22 ENCOUNTER — APPOINTMENT (OUTPATIENT)
Dept: LAB | Facility: HOSPITAL | Age: 64
End: 2019-07-22

## 2019-07-22 ENCOUNTER — HOSPITAL ENCOUNTER (OUTPATIENT)
Dept: RADIATION ONCOLOGY | Facility: HOSPITAL | Age: 64
Discharge: HOME | End: 2019-07-22

## 2019-07-22 PROCEDURE — 77412 RADIATION TX DELIVERY LVL 3: CPT | Performed by: RADIOLOGY

## 2019-07-23 ENCOUNTER — INFUSION (OUTPATIENT)
Dept: ONCOLOGY | Facility: HOSPITAL | Age: 64
End: 2019-07-23

## 2019-07-23 ENCOUNTER — APPOINTMENT (OUTPATIENT)
Dept: LAB | Facility: HOSPITAL | Age: 64
End: 2019-07-23

## 2019-07-23 ENCOUNTER — HOSPITAL ENCOUNTER (OUTPATIENT)
Dept: RADIATION ONCOLOGY | Facility: HOSPITAL | Age: 64
Discharge: HOME | End: 2019-07-23

## 2019-07-23 ENCOUNTER — TRANSCRIBE ORDERS (OUTPATIENT)
Dept: ADMINISTRATIVE | Facility: HOSPITAL | Age: 64
End: 2019-07-23

## 2019-07-23 VITALS
BODY MASS INDEX: 22.88 KG/M2 | WEIGHT: 134 LBS | SYSTOLIC BLOOD PRESSURE: 103 MMHG | DIASTOLIC BLOOD PRESSURE: 53 MMHG | HEART RATE: 74 BPM | HEIGHT: 64 IN | RESPIRATION RATE: 18 BRPM | OXYGEN SATURATION: 92 % | TEMPERATURE: 98.6 F

## 2019-07-23 DIAGNOSIS — C34.11 MALIGNANT NEOPLASM OF UPPER LOBE OF RIGHT LUNG (HCC): Primary | ICD-10-CM

## 2019-07-23 DIAGNOSIS — C34.90 NON-SMALL CELL LUNG CANCER, UNSPECIFIED LATERALITY (HCC): Primary | ICD-10-CM

## 2019-07-23 LAB
ALBUMIN SERPL-MCNC: 3.7 G/DL (ref 3.5–5)
ALBUMIN/GLOB SERPL: 1.2 G/DL (ref 1.1–2.5)
ALP SERPL-CCNC: 95 U/L (ref 24–120)
ALT SERPL W P-5'-P-CCNC: <15 U/L (ref 0–54)
ANION GAP SERPL CALCULATED.3IONS-SCNC: 4 MMOL/L (ref 4–13)
AST SERPL-CCNC: 17 U/L (ref 7–45)
BASOPHILS # BLD AUTO: 0 10*3/MM3 (ref 0–0.2)
BASOPHILS NFR BLD AUTO: 0 % (ref 0–2)
BILIRUB SERPL-MCNC: 0.3 MG/DL (ref 0.1–1)
BUN BLD-MCNC: 16 MG/DL (ref 5–21)
BUN/CREAT SERPL: 27.6 (ref 7–25)
CALCIUM SPEC-SCNC: 9.4 MG/DL (ref 8.4–10.4)
CHLORIDE SERPL-SCNC: 104 MMOL/L (ref 98–110)
CO2 SERPL-SCNC: 34 MMOL/L (ref 24–31)
CREAT BLD-MCNC: 0.58 MG/DL (ref 0.5–1.4)
DEPRECATED RDW RBC AUTO: 56 FL (ref 40–54)
EOSINOPHIL # BLD AUTO: 0.07 10*3/MM3 (ref 0–0.7)
EOSINOPHIL NFR BLD AUTO: 1.9 % (ref 0–4)
ERYTHROCYTE [DISTWIDTH] IN BLOOD BY AUTOMATED COUNT: 15.1 % (ref 12–15)
GFR SERPL CREATININE-BSD FRML MDRD: 104 ML/MIN/1.73
GLOBULIN UR ELPH-MCNC: 3 GM/DL
GLUCOSE BLD-MCNC: 97 MG/DL (ref 70–100)
HCT VFR BLD AUTO: 35.7 % (ref 37–47)
HGB BLD-MCNC: 11.2 G/DL (ref 12–16)
HOLD SPECIMEN: NORMAL
IMM GRANULOCYTES # BLD AUTO: 0.01 10*3/MM3 (ref 0–0.05)
IMM GRANULOCYTES NFR BLD AUTO: 0.3 % (ref 0–5)
LYMPHOCYTES # BLD AUTO: 0.99 10*3/MM3 (ref 0.72–4.86)
LYMPHOCYTES NFR BLD AUTO: 26.3 % (ref 15–45)
MCH RBC QN AUTO: 31.6 PG (ref 28–32)
MCHC RBC AUTO-ENTMCNC: 31.4 G/DL (ref 33–36)
MCV RBC AUTO: 100.8 FL (ref 82–98)
MONOCYTES # BLD AUTO: 0.3 10*3/MM3 (ref 0.19–1.3)
MONOCYTES NFR BLD AUTO: 8 % (ref 4–12)
NEUTROPHILS # BLD AUTO: 2.39 10*3/MM3 (ref 1.87–8.4)
NEUTROPHILS NFR BLD AUTO: 63.5 % (ref 39–78)
NRBC BLD AUTO-RTO: 0 /100 WBC (ref 0–0.2)
PLATELET # BLD AUTO: 145 10*3/MM3 (ref 130–400)
PMV BLD AUTO: 11.2 FL (ref 6–12)
POTASSIUM BLD-SCNC: 3.8 MMOL/L (ref 3.5–5.3)
PROT SERPL-MCNC: 6.7 G/DL (ref 6.3–8.7)
RBC # BLD AUTO: 3.54 10*6/MM3 (ref 4.2–5.4)
SODIUM BLD-SCNC: 142 MMOL/L (ref 135–145)
WBC NRBC COR # BLD: 3.76 10*3/MM3 (ref 4.8–10.8)

## 2019-07-23 PROCEDURE — 85025 COMPLETE CBC W/AUTO DIFF WBC: CPT | Performed by: INTERNAL MEDICINE

## 2019-07-23 PROCEDURE — 36415 COLL VENOUS BLD VENIPUNCTURE: CPT | Performed by: INTERNAL MEDICINE

## 2019-07-23 PROCEDURE — 80053 COMPREHEN METABOLIC PANEL: CPT | Performed by: INTERNAL MEDICINE

## 2019-07-23 PROCEDURE — 96367 TX/PROPH/DG ADDL SEQ IV INF: CPT

## 2019-07-23 PROCEDURE — 77412 RADIATION TX DELIVERY LVL 3: CPT | Performed by: RADIOLOGY

## 2019-07-23 PROCEDURE — 77417 THER RADIOLOGY PORT IMAGE(S): CPT | Performed by: RADIOLOGY

## 2019-07-23 PROCEDURE — 25010000002 CARBOPLATIN PER 50 MG: Performed by: INTERNAL MEDICINE

## 2019-07-23 PROCEDURE — 25010000002 PACLITAXEL PER 30 MG: Performed by: INTERNAL MEDICINE

## 2019-07-23 PROCEDURE — 96413 CHEMO IV INFUSION 1 HR: CPT

## 2019-07-23 PROCEDURE — 25010000002 PALONOSETRON PER 25 MCG: Performed by: INTERNAL MEDICINE

## 2019-07-23 PROCEDURE — 25010000002 DEXAMETHASONE SODIUM PHOSPHATE 100 MG/10ML SOLUTION: Performed by: INTERNAL MEDICINE

## 2019-07-23 PROCEDURE — 96375 TX/PRO/DX INJ NEW DRUG ADDON: CPT

## 2019-07-23 PROCEDURE — 25010000002 DIPHENHYDRAMINE PER 50 MG: Performed by: INTERNAL MEDICINE

## 2019-07-23 PROCEDURE — 96417 CHEMO IV INFUS EACH ADDL SEQ: CPT

## 2019-07-23 RX ORDER — METHYLPREDNISOLONE SODIUM SUCCINATE 125 MG/2ML
125 INJECTION, POWDER, LYOPHILIZED, FOR SOLUTION INTRAMUSCULAR; INTRAVENOUS ONCE AS NEEDED
Status: DISCONTINUED | OUTPATIENT
Start: 2019-07-23 | End: 2019-07-23 | Stop reason: HOSPADM

## 2019-07-23 RX ORDER — FAMOTIDINE 10 MG/ML
20 INJECTION, SOLUTION INTRAVENOUS ONCE
Status: COMPLETED | OUTPATIENT
Start: 2019-07-23 | End: 2019-07-23

## 2019-07-23 RX ORDER — SODIUM CHLORIDE 9 MG/ML
250 INJECTION, SOLUTION INTRAVENOUS ONCE
Status: COMPLETED | OUTPATIENT
Start: 2019-07-23 | End: 2019-07-23

## 2019-07-23 RX ORDER — SODIUM CHLORIDE 0.9 % (FLUSH) 0.9 %
10 SYRINGE (ML) INJECTION AS NEEDED
Status: DISCONTINUED | OUTPATIENT
Start: 2019-07-23 | End: 2019-07-23 | Stop reason: HOSPADM

## 2019-07-23 RX ORDER — PALONOSETRON 0.05 MG/ML
0.25 INJECTION, SOLUTION INTRAVENOUS ONCE
Status: COMPLETED | OUTPATIENT
Start: 2019-07-23 | End: 2019-07-23

## 2019-07-23 RX ORDER — DIPHENHYDRAMINE HYDROCHLORIDE 50 MG/ML
50 INJECTION INTRAMUSCULAR; INTRAVENOUS ONCE AS NEEDED
Status: DISCONTINUED | OUTPATIENT
Start: 2019-07-23 | End: 2019-07-23 | Stop reason: HOSPADM

## 2019-07-23 RX ORDER — EPINEPHRINE 0.3 MG/.3ML
0.3 INJECTION SUBCUTANEOUS AS NEEDED
Status: DISCONTINUED | OUTPATIENT
Start: 2019-07-23 | End: 2019-07-23 | Stop reason: HOSPADM

## 2019-07-23 RX ORDER — SODIUM CHLORIDE 0.9 % (FLUSH) 0.9 %
10 SYRINGE (ML) INJECTION AS NEEDED
Status: CANCELLED | OUTPATIENT
Start: 2019-07-23

## 2019-07-23 RX ADMIN — SODIUM CHLORIDE 250 ML: 9 INJECTION, SOLUTION INTRAVENOUS at 15:00

## 2019-07-23 RX ADMIN — CARBOPLATIN 260 MG: 10 INJECTION, SOLUTION INTRAVENOUS at 17:11

## 2019-07-23 RX ADMIN — SODIUM CHLORIDE, PRESERVATIVE FREE 10 ML: 5 INJECTION INTRAVENOUS at 17:49

## 2019-07-23 RX ADMIN — DEXAMETHASONE SODIUM PHOSPHATE 12 MG: 10 INJECTION, SOLUTION INTRAMUSCULAR; INTRAVENOUS at 15:42

## 2019-07-23 RX ADMIN — FAMOTIDINE 20 MG: 10 INJECTION INTRAVENOUS at 15:22

## 2019-07-23 RX ADMIN — PACLITAXEL 85 MG: 6 INJECTION, SOLUTION INTRAVENOUS at 16:01

## 2019-07-23 RX ADMIN — PALONOSETRON HYDROCHLORIDE 0.25 MG: 0.25 INJECTION, SOLUTION INTRAVENOUS at 15:20

## 2019-07-23 RX ADMIN — DIPHENHYDRAMINE HYDROCHLORIDE 25 MG: 50 INJECTION, SOLUTION INTRAMUSCULAR; INTRAVENOUS at 15:23

## 2019-07-23 RX ADMIN — Medication 500 UNITS: at 17:50

## 2019-07-23 NOTE — PROGRESS NOTES
[id number] Called labs to Dr. Coronel's office s/w Soila Haynes RN to fax chemo orders, stable for treatment.-Jhonny PIERRE

## 2019-07-24 ENCOUNTER — HOSPITAL ENCOUNTER (OUTPATIENT)
Dept: RADIATION ONCOLOGY | Facility: HOSPITAL | Age: 64
Discharge: HOME | End: 2019-07-24

## 2019-07-24 PROCEDURE — 77412 RADIATION TX DELIVERY LVL 3: CPT | Performed by: RADIOLOGY

## 2019-07-24 RX ORDER — LIDOCAINE HYDROCHLORIDE 20 MG/ML
5 SOLUTION OROPHARYNGEAL
Qty: 100 ML | Refills: 3 | Status: SHIPPED | OUTPATIENT
Start: 2019-07-24 | End: 2019-08-23

## 2019-07-25 ENCOUNTER — HOSPITAL ENCOUNTER (OUTPATIENT)
Dept: INFUSION THERAPY | Age: 64
Discharge: HOME | End: 2019-07-25
Payer: MEDICARE

## 2019-07-25 ENCOUNTER — HOSPITAL ENCOUNTER (OUTPATIENT)
Dept: RADIATION ONCOLOGY | Facility: HOSPITAL | Age: 64
Discharge: HOME | End: 2019-07-25

## 2019-07-25 PROCEDURE — 36415 COLL VENOUS BLD VENIPUNCTURE: CPT

## 2019-07-25 PROCEDURE — 85025 COMPLETE CBC W/AUTO DIFF WBC: CPT

## 2019-07-25 PROCEDURE — 77336 RADIATION PHYSICS CONSULT: CPT | Performed by: RADIOLOGY

## 2019-07-25 PROCEDURE — 80053 COMPREHEN METABOLIC PANEL: CPT

## 2019-07-25 PROCEDURE — 77412 RADIATION TX DELIVERY LVL 3: CPT | Performed by: RADIOLOGY

## 2019-07-27 VITALS
SYSTOLIC BLOOD PRESSURE: 120 MMHG | HEIGHT: 64 IN | WEIGHT: 134 LBS | BODY MASS INDEX: 22.88 KG/M2 | HEART RATE: 88 BPM | DIASTOLIC BLOOD PRESSURE: 60 MMHG

## 2019-07-27 DIAGNOSIS — C34.01 MALIGNANT NEOPLASM OF RIGHT MAIN BRONCHUS (HCC): ICD-10-CM

## 2019-07-29 ENCOUNTER — APPOINTMENT (OUTPATIENT)
Dept: ONCOLOGY | Facility: HOSPITAL | Age: 64
End: 2019-07-29

## 2019-07-29 ENCOUNTER — HOSPITAL ENCOUNTER (OUTPATIENT)
Dept: RADIATION ONCOLOGY | Facility: HOSPITAL | Age: 64
Discharge: HOME | End: 2019-07-29

## 2019-07-29 PROCEDURE — 77412 RADIATION TX DELIVERY LVL 3: CPT | Performed by: RADIOLOGY

## 2019-07-30 ENCOUNTER — HOSPITAL ENCOUNTER (OUTPATIENT)
Dept: RADIATION ONCOLOGY | Facility: HOSPITAL | Age: 64
Discharge: HOME | End: 2019-07-30

## 2019-07-30 ENCOUNTER — INFUSION (OUTPATIENT)
Dept: ONCOLOGY | Facility: HOSPITAL | Age: 64
End: 2019-07-30

## 2019-07-30 VITALS
DIASTOLIC BLOOD PRESSURE: 70 MMHG | RESPIRATION RATE: 18 BRPM | HEIGHT: 64 IN | BODY MASS INDEX: 22.64 KG/M2 | HEART RATE: 90 BPM | TEMPERATURE: 97.7 F | OXYGEN SATURATION: 91 % | WEIGHT: 132.6 LBS | SYSTOLIC BLOOD PRESSURE: 98 MMHG

## 2019-07-30 DIAGNOSIS — C34.11 MALIGNANT NEOPLASM OF UPPER LOBE OF RIGHT LUNG (HCC): Primary | ICD-10-CM

## 2019-07-30 PROCEDURE — 25010000002 PACLITAXEL PER 30 MG: Performed by: INTERNAL MEDICINE

## 2019-07-30 PROCEDURE — 25010000002 CARBOPLATIN PER 50 MG: Performed by: INTERNAL MEDICINE

## 2019-07-30 PROCEDURE — 25010000002 DIPHENHYDRAMINE PER 50 MG: Performed by: INTERNAL MEDICINE

## 2019-07-30 PROCEDURE — 96375 TX/PRO/DX INJ NEW DRUG ADDON: CPT

## 2019-07-30 PROCEDURE — 77412 RADIATION TX DELIVERY LVL 3: CPT | Performed by: RADIOLOGY

## 2019-07-30 PROCEDURE — 25010000002 PALONOSETRON PER 25 MCG: Performed by: INTERNAL MEDICINE

## 2019-07-30 PROCEDURE — 25010000002 DEXAMETHASONE SODIUM PHOSPHATE 100 MG/10ML SOLUTION: Performed by: INTERNAL MEDICINE

## 2019-07-30 PROCEDURE — 96417 CHEMO IV INFUS EACH ADDL SEQ: CPT

## 2019-07-30 PROCEDURE — 96413 CHEMO IV INFUSION 1 HR: CPT

## 2019-07-30 PROCEDURE — 96367 TX/PROPH/DG ADDL SEQ IV INF: CPT

## 2019-07-30 RX ORDER — PALONOSETRON 0.05 MG/ML
0.25 INJECTION, SOLUTION INTRAVENOUS ONCE
Status: COMPLETED | OUTPATIENT
Start: 2019-07-30 | End: 2019-07-30

## 2019-07-30 RX ORDER — DIPHENHYDRAMINE HYDROCHLORIDE 50 MG/ML
50 INJECTION INTRAMUSCULAR; INTRAVENOUS ONCE AS NEEDED
Status: DISCONTINUED | OUTPATIENT
Start: 2019-07-30 | End: 2019-07-30 | Stop reason: HOSPADM

## 2019-07-30 RX ORDER — FAMOTIDINE 10 MG/ML
20 INJECTION, SOLUTION INTRAVENOUS ONCE
Status: COMPLETED | OUTPATIENT
Start: 2019-07-30 | End: 2019-07-30

## 2019-07-30 RX ORDER — SODIUM CHLORIDE 9 MG/ML
250 INJECTION, SOLUTION INTRAVENOUS ONCE
Status: COMPLETED | OUTPATIENT
Start: 2019-07-30 | End: 2019-07-30

## 2019-07-30 RX ORDER — METHYLPREDNISOLONE SODIUM SUCCINATE 125 MG/2ML
125 INJECTION, POWDER, LYOPHILIZED, FOR SOLUTION INTRAMUSCULAR; INTRAVENOUS ONCE AS NEEDED
Status: DISCONTINUED | OUTPATIENT
Start: 2019-07-30 | End: 2019-07-30 | Stop reason: HOSPADM

## 2019-07-30 RX ORDER — EPINEPHRINE 0.3 MG/.3ML
0.3 INJECTION SUBCUTANEOUS AS NEEDED
Status: DISCONTINUED | OUTPATIENT
Start: 2019-07-30 | End: 2019-07-30 | Stop reason: HOSPADM

## 2019-07-30 RX ORDER — SODIUM CHLORIDE 0.9 % (FLUSH) 0.9 %
10 SYRINGE (ML) INJECTION AS NEEDED
Status: DISCONTINUED | OUTPATIENT
Start: 2019-07-30 | End: 2019-07-30 | Stop reason: HOSPADM

## 2019-07-30 RX ORDER — SODIUM CHLORIDE 0.9 % (FLUSH) 0.9 %
10 SYRINGE (ML) INJECTION AS NEEDED
Status: CANCELLED | OUTPATIENT
Start: 2019-07-30

## 2019-07-30 RX ADMIN — SODIUM CHLORIDE 250 ML: 9 INJECTION, SOLUTION INTRAVENOUS at 13:29

## 2019-07-30 RX ADMIN — DEXAMETHASONE SODIUM PHOSPHATE 12 MG: 10 INJECTION, SOLUTION INTRAMUSCULAR; INTRAVENOUS at 13:53

## 2019-07-30 RX ADMIN — Medication 500 UNITS: at 16:07

## 2019-07-30 RX ADMIN — DIPHENHYDRAMINE HYDROCHLORIDE 25 MG: 50 INJECTION, SOLUTION INTRAMUSCULAR; INTRAVENOUS at 13:30

## 2019-07-30 RX ADMIN — CARBOPLATIN 260 MG: 10 INJECTION, SOLUTION INTRAVENOUS at 15:31

## 2019-07-30 RX ADMIN — SODIUM CHLORIDE, PRESERVATIVE FREE 10 ML: 5 INJECTION INTRAVENOUS at 16:07

## 2019-07-30 RX ADMIN — PACLITAXEL 85 MG: 6 INJECTION, SOLUTION INTRAVENOUS at 14:25

## 2019-07-30 RX ADMIN — PALONOSETRON HYDROCHLORIDE 0.25 MG: 0.25 INJECTION, SOLUTION INTRAVENOUS at 13:52

## 2019-07-30 RX ADMIN — FAMOTIDINE 20 MG: 10 INJECTION INTRAVENOUS at 13:49

## 2019-07-30 NOTE — PROGRESS NOTES
1300 Called Dr. Coronel's office and spoke with Zandra PIERRE that labs with orders were drawn 7/25/19 is it ok to use those labs. Zandra report ok to use those labs and give treatment. Rosy Tanner RN

## 2019-07-31 ENCOUNTER — HOSPITAL ENCOUNTER (OUTPATIENT)
Dept: RADIATION ONCOLOGY | Facility: HOSPITAL | Age: 64
Discharge: HOME | End: 2019-07-31

## 2019-07-31 PROCEDURE — 77417 THER RADIOLOGY PORT IMAGE(S): CPT | Performed by: RADIOLOGY

## 2019-07-31 PROCEDURE — 77412 RADIATION TX DELIVERY LVL 3: CPT | Performed by: RADIOLOGY

## 2019-08-01 ENCOUNTER — HOSPITAL ENCOUNTER (OUTPATIENT)
Dept: RADIATION ONCOLOGY | Facility: HOSPITAL | Age: 64
Discharge: HOME | End: 2019-08-01

## 2019-08-01 ENCOUNTER — HOSPITAL ENCOUNTER (OUTPATIENT)
Dept: RADIATION ONCOLOGY | Facility: HOSPITAL | Age: 64
Setting detail: RADIATION/ONCOLOGY SERIES
End: 2019-08-01

## 2019-08-01 PROCEDURE — 77412 RADIATION TX DELIVERY LVL 3: CPT | Performed by: RADIOLOGY

## 2019-08-06 ENCOUNTER — INFUSION (OUTPATIENT)
Dept: ONCOLOGY | Facility: HOSPITAL | Age: 64
End: 2019-08-06

## 2019-08-06 ENCOUNTER — HOSPITAL ENCOUNTER (OUTPATIENT)
Dept: INFUSION THERAPY | Age: 64
Discharge: HOME | End: 2019-08-06
Payer: MEDICARE

## 2019-08-06 VITALS
SYSTOLIC BLOOD PRESSURE: 89 MMHG | TEMPERATURE: 98.4 F | BODY MASS INDEX: 22.2 KG/M2 | WEIGHT: 130 LBS | OXYGEN SATURATION: 96 % | DIASTOLIC BLOOD PRESSURE: 43 MMHG | HEART RATE: 90 BPM | HEIGHT: 64 IN | RESPIRATION RATE: 18 BRPM

## 2019-08-06 DIAGNOSIS — C34.01 MALIGNANT NEOPLASM OF RIGHT MAIN BRONCHUS (HCC): Primary | ICD-10-CM

## 2019-08-06 DIAGNOSIS — C34.01 MALIGNANT NEOPLASM OF RIGHT MAIN BRONCHUS (HCC): ICD-10-CM

## 2019-08-06 DIAGNOSIS — C34.11 MALIGNANT NEOPLASM OF UPPER LOBE OF RIGHT LUNG (HCC): Primary | ICD-10-CM

## 2019-08-06 LAB — CREAT SERPL-MCNC: 0.4 MG/DL (ref 0.57–1)

## 2019-08-06 PROCEDURE — 96367 TX/PROPH/DG ADDL SEQ IV INF: CPT

## 2019-08-06 PROCEDURE — 25010000002 PACLITAXEL PER 30 MG: Performed by: INTERNAL MEDICINE

## 2019-08-06 PROCEDURE — 96375 TX/PRO/DX INJ NEW DRUG ADDON: CPT

## 2019-08-06 PROCEDURE — 25010000002 DEXAMETHASONE SODIUM PHOSPHATE 100 MG/10ML SOLUTION: Performed by: INTERNAL MEDICINE

## 2019-08-06 PROCEDURE — 25010000002 DIPHENHYDRAMINE PER 50 MG: Performed by: INTERNAL MEDICINE

## 2019-08-06 PROCEDURE — 96413 CHEMO IV INFUSION 1 HR: CPT

## 2019-08-06 PROCEDURE — 25010000002 CARBOPLATIN PER 50 MG: Performed by: INTERNAL MEDICINE

## 2019-08-06 PROCEDURE — 96417 CHEMO IV INFUS EACH ADDL SEQ: CPT

## 2019-08-06 PROCEDURE — 25010000002 PALONOSETRON PER 25 MCG: Performed by: INTERNAL MEDICINE

## 2019-08-06 PROCEDURE — 85025 COMPLETE CBC W/AUTO DIFF WBC: CPT

## 2019-08-06 RX ORDER — SODIUM CHLORIDE 0.9 % (FLUSH) 0.9 %
10 SYRINGE (ML) INJECTION AS NEEDED
Status: CANCELLED | OUTPATIENT
Start: 2019-08-06

## 2019-08-06 RX ORDER — PALONOSETRON 0.05 MG/ML
0.25 INJECTION, SOLUTION INTRAVENOUS ONCE
Status: COMPLETED | OUTPATIENT
Start: 2019-08-06 | End: 2019-08-06

## 2019-08-06 RX ORDER — SODIUM CHLORIDE 0.9 % (FLUSH) 0.9 %
10 SYRINGE (ML) INJECTION AS NEEDED
Status: DISCONTINUED | OUTPATIENT
Start: 2019-08-06 | End: 2019-08-06 | Stop reason: HOSPADM

## 2019-08-06 RX ORDER — FAMOTIDINE 10 MG/ML
20 INJECTION, SOLUTION INTRAVENOUS ONCE
Status: COMPLETED | OUTPATIENT
Start: 2019-08-06 | End: 2019-08-06

## 2019-08-06 RX ORDER — METHYLPREDNISOLONE SODIUM SUCCINATE 125 MG/2ML
125 INJECTION, POWDER, LYOPHILIZED, FOR SOLUTION INTRAMUSCULAR; INTRAVENOUS ONCE AS NEEDED
Status: DISCONTINUED | OUTPATIENT
Start: 2019-08-06 | End: 2019-08-06 | Stop reason: HOSPADM

## 2019-08-06 RX ORDER — EPINEPHRINE 0.3 MG/.3ML
0.3 INJECTION SUBCUTANEOUS AS NEEDED
Status: DISCONTINUED | OUTPATIENT
Start: 2019-08-06 | End: 2019-08-06 | Stop reason: HOSPADM

## 2019-08-06 RX ORDER — DIPHENHYDRAMINE HYDROCHLORIDE 50 MG/ML
50 INJECTION INTRAMUSCULAR; INTRAVENOUS ONCE AS NEEDED
Status: DISCONTINUED | OUTPATIENT
Start: 2019-08-06 | End: 2019-08-06 | Stop reason: HOSPADM

## 2019-08-06 RX ORDER — SODIUM CHLORIDE 9 MG/ML
250 INJECTION, SOLUTION INTRAVENOUS ONCE
Status: COMPLETED | OUTPATIENT
Start: 2019-08-06 | End: 2019-08-06

## 2019-08-06 RX ADMIN — CARBOPLATIN 260 MG: 10 INJECTION, SOLUTION INTRAVENOUS at 15:06

## 2019-08-06 RX ADMIN — DIPHENHYDRAMINE HYDROCHLORIDE 25 MG: 50 INJECTION, SOLUTION INTRAMUSCULAR; INTRAVENOUS at 13:32

## 2019-08-06 RX ADMIN — SODIUM CHLORIDE, PRESERVATIVE FREE 10 ML: 5 INJECTION INTRAVENOUS at 15:41

## 2019-08-06 RX ADMIN — PALONOSETRON HYDROCHLORIDE 0.25 MG: 0.25 INJECTION, SOLUTION INTRAVENOUS at 12:53

## 2019-08-06 RX ADMIN — FAMOTIDINE 20 MG: 10 INJECTION INTRAVENOUS at 12:56

## 2019-08-06 RX ADMIN — SODIUM CHLORIDE 250 ML: 9 INJECTION, SOLUTION INTRAVENOUS at 12:50

## 2019-08-06 RX ADMIN — Medication 500 UNITS: at 15:41

## 2019-08-06 RX ADMIN — DEXAMETHASONE SODIUM PHOSPHATE 12 MG: 10 INJECTION, SOLUTION INTRAMUSCULAR; INTRAVENOUS at 13:07

## 2019-08-06 RX ADMIN — PACLITAXEL 80 MG: 6 INJECTION, SOLUTION INTRAVENOUS at 13:54

## 2019-08-06 NOTE — PROGRESS NOTES
1248 Called  Dr. King's office regarding CMP dated 07-25-19 and that with the creatinine from that date the computed Carboplatin dose was 300 mg s/w Soila Haynes RN. OK to use CMP dated 07-25-19 and use Carboplatin dose per order at 260 mg per Dr. King's orders.-Jhonny PIERRE

## 2019-08-13 ENCOUNTER — APPOINTMENT (OUTPATIENT)
Dept: ONCOLOGY | Facility: HOSPITAL | Age: 64
End: 2019-08-13

## 2019-08-13 ENCOUNTER — HOSPITAL ENCOUNTER (OUTPATIENT)
Dept: INFUSION THERAPY | Age: 64
Discharge: HOME | End: 2019-08-13
Payer: MEDICARE

## 2019-08-13 ENCOUNTER — HOSPITAL ENCOUNTER (OUTPATIENT)
Dept: RADIATION ONCOLOGY | Facility: HOSPITAL | Age: 64
Discharge: HOME | End: 2019-08-13

## 2019-08-13 DIAGNOSIS — C34.01 MALIGNANT NEOPLASM OF RIGHT MAIN BRONCHUS (HCC): ICD-10-CM

## 2019-08-13 DIAGNOSIS — C34.01 MALIGNANT NEOPLASM OF RIGHT MAIN BRONCHUS (HCC): Primary | ICD-10-CM

## 2019-08-13 PROCEDURE — 77280 THER RAD SIMULAJ FIELD SMPL: CPT | Performed by: RADIOLOGY

## 2019-08-13 PROCEDURE — 85025 COMPLETE CBC W/AUTO DIFF WBC: CPT

## 2019-08-13 PROCEDURE — 77290 THER RAD SIMULAJ FIELD CPLX: CPT | Performed by: RADIOLOGY

## 2019-08-14 ENCOUNTER — INFUSION (OUTPATIENT)
Dept: ONCOLOGY | Facility: HOSPITAL | Age: 64
End: 2019-08-14

## 2019-08-14 VITALS
DIASTOLIC BLOOD PRESSURE: 44 MMHG | TEMPERATURE: 99 F | HEIGHT: 64 IN | HEART RATE: 91 BPM | BODY MASS INDEX: 21.85 KG/M2 | SYSTOLIC BLOOD PRESSURE: 115 MMHG | RESPIRATION RATE: 16 BRPM | OXYGEN SATURATION: 91 % | WEIGHT: 128 LBS

## 2019-08-14 DIAGNOSIS — C34.11 MALIGNANT NEOPLASM OF UPPER LOBE OF RIGHT LUNG (HCC): Primary | ICD-10-CM

## 2019-08-14 LAB — CREAT SERPL-MCNC: 0.4 MG/DL (ref 0.57–1)

## 2019-08-14 PROCEDURE — 25010000002 PACLITAXEL PER 30 MG: Performed by: INTERNAL MEDICINE

## 2019-08-14 PROCEDURE — 25010000002 PALONOSETRON PER 25 MCG: Performed by: INTERNAL MEDICINE

## 2019-08-14 PROCEDURE — 96375 TX/PRO/DX INJ NEW DRUG ADDON: CPT

## 2019-08-14 PROCEDURE — 96413 CHEMO IV INFUSION 1 HR: CPT

## 2019-08-14 PROCEDURE — 96417 CHEMO IV INFUS EACH ADDL SEQ: CPT

## 2019-08-14 PROCEDURE — 25010000002 DEXAMETHASONE SODIUM PHOSPHATE 100 MG/10ML SOLUTION: Performed by: INTERNAL MEDICINE

## 2019-08-14 PROCEDURE — 96367 TX/PROPH/DG ADDL SEQ IV INF: CPT

## 2019-08-14 PROCEDURE — 25010000002 CARBOPLATIN PER 50 MG: Performed by: INTERNAL MEDICINE

## 2019-08-14 PROCEDURE — 25010000002 DIPHENHYDRAMINE PER 50 MG: Performed by: INTERNAL MEDICINE

## 2019-08-14 RX ORDER — SODIUM CHLORIDE 0.9 % (FLUSH) 0.9 %
10 SYRINGE (ML) INJECTION AS NEEDED
Status: CANCELLED | OUTPATIENT
Start: 2019-08-14

## 2019-08-14 RX ORDER — SODIUM CHLORIDE 0.9 % (FLUSH) 0.9 %
10 SYRINGE (ML) INJECTION AS NEEDED
Status: DISCONTINUED | OUTPATIENT
Start: 2019-08-14 | End: 2019-08-14 | Stop reason: HOSPADM

## 2019-08-14 RX ORDER — DIPHENHYDRAMINE HYDROCHLORIDE 50 MG/ML
50 INJECTION INTRAMUSCULAR; INTRAVENOUS ONCE AS NEEDED
Status: DISCONTINUED | OUTPATIENT
Start: 2019-08-14 | End: 2019-08-14 | Stop reason: HOSPADM

## 2019-08-14 RX ORDER — FAMOTIDINE 10 MG/ML
20 INJECTION, SOLUTION INTRAVENOUS ONCE
Status: COMPLETED | OUTPATIENT
Start: 2019-08-14 | End: 2019-08-14

## 2019-08-14 RX ORDER — SODIUM CHLORIDE 9 MG/ML
250 INJECTION, SOLUTION INTRAVENOUS ONCE
Status: COMPLETED | OUTPATIENT
Start: 2019-08-14 | End: 2019-08-14

## 2019-08-14 RX ORDER — PALONOSETRON 0.05 MG/ML
0.25 INJECTION, SOLUTION INTRAVENOUS ONCE
Status: COMPLETED | OUTPATIENT
Start: 2019-08-14 | End: 2019-08-14

## 2019-08-14 RX ORDER — METHYLPREDNISOLONE SODIUM SUCCINATE 125 MG/2ML
125 INJECTION, POWDER, LYOPHILIZED, FOR SOLUTION INTRAMUSCULAR; INTRAVENOUS ONCE AS NEEDED
Status: DISCONTINUED | OUTPATIENT
Start: 2019-08-14 | End: 2019-08-14 | Stop reason: HOSPADM

## 2019-08-14 RX ORDER — EPINEPHRINE 0.3 MG/.3ML
0.3 INJECTION SUBCUTANEOUS AS NEEDED
Status: DISCONTINUED | OUTPATIENT
Start: 2019-08-14 | End: 2019-08-14 | Stop reason: HOSPADM

## 2019-08-14 RX ADMIN — PALONOSETRON HYDROCHLORIDE 0.25 MG: 0.25 INJECTION, SOLUTION INTRAVENOUS at 11:03

## 2019-08-14 RX ADMIN — SODIUM CHLORIDE, PRESERVATIVE FREE 10 ML: 5 INJECTION INTRAVENOUS at 13:09

## 2019-08-14 RX ADMIN — PACLITAXEL 80 MG: 6 INJECTION, SOLUTION INTRAVENOUS at 11:28

## 2019-08-14 RX ADMIN — CARBOPLATIN 260 MG: 10 INJECTION, SOLUTION INTRAVENOUS at 12:31

## 2019-08-14 RX ADMIN — DIPHENHYDRAMINE HYDROCHLORIDE 25 MG: 50 INJECTION, SOLUTION INTRAMUSCULAR; INTRAVENOUS at 10:40

## 2019-08-14 RX ADMIN — Medication 500 UNITS: at 13:09

## 2019-08-14 RX ADMIN — FAMOTIDINE 20 MG: 10 INJECTION INTRAVENOUS at 11:07

## 2019-08-14 RX ADMIN — DEXAMETHASONE SODIUM PHOSPHATE 12 MG: 10 INJECTION, SOLUTION INTRAMUSCULAR; INTRAVENOUS at 11:09

## 2019-08-14 RX ADMIN — SODIUM CHLORIDE 250 ML: 9 INJECTION, SOLUTION INTRAVENOUS at 10:40

## 2019-08-14 NOTE — PROGRESS NOTES
[id number] Called and spoke with Dr. Coronel's office requesting order to be sent for treatment. They acknowledged and informed me they would send them right over.    1000 Called and spoke to Dr. Coronel's office after repeated attempts to get through on the phone line. Informed that Cecily was working on them and waiting for Dr. Coronel to sign the orders.  1013 Orders were received.  Sent to pharmacy and HIM to input and confirm the order.

## 2019-08-16 PROCEDURE — 77300 RADIATION THERAPY DOSE PLAN: CPT | Performed by: RADIOLOGY

## 2019-08-16 PROCEDURE — 77334 RADIATION TREATMENT AID(S): CPT | Performed by: RADIOLOGY

## 2019-08-16 NOTE — PROGRESS NOTES
disorder; no peripheral adenopathy. Dermatology: no skin rash, no eczema, no pruritis;   Psychiatry: no depression, no anxiety,no panic attacks, no suicide ideation; Neurology: no syncope, no seizures, no numbness or tingling of hands, no numbness or tingling of feet, no paresis;     PHYSICAL EXAM:    Vitals signs:    /60   Pulse 104   Ht 5' 4\" (1.626 m)   Wt 128 lb 12.8 oz (58.4 kg)   SpO2 (!) 81%   BMI 22.11 kg/m²     Pain scale:2  Pain Score:   6     CONSTITUTIONAL: Alert, appropriate, no acute distress, chronically ill-appearing  EYES: Non icteric, EOM intact, pupils equal round and reactive to light and accommodation. ENT: Oral mucus membranes moist, no oral pharyngeal lesions. External inspection of ears and nose are normal.   NECK: Supple, no masses. No palpable thyroid mass    CHEST/LUNGS: CTA bilaterally, normal respiratory effort   CARDIOVASCULAR: RRR, no murmurs. No lower extremity edema   ABDOMEN: soft non-tender, active bowel sounds, no hepatosplenomegaly. No palpable masses. EXTREMITIES: warm, Full ROM of all fours extremities. No focal weakness. SKIN: warm, dry with no rashes or lesions  LYMPH: No cervical, clavicular, axillary, or inguinal lymphadenopathy  NEUROLOGIC: follows commands, non focal.   PSYCH: mood and affect appropriate. Alert and oriented to time and place and person. Relevant Lab findings: Reviewed by me      Relevant Imaging studies:n/a      ASSESSMENT:    Orders Placed This Encounter   Procedures    CBC Auto Differential     Standing Status:   Future     Number of Occurrences:   1     Standing Expiration Date:   8/19/2020      VI was seen today for lung cancer.     Diagnoses and all orders for this visit:    Malignant neoplasm of right main bronchus (HCC)  -     CBC Auto Differential; Future    Chemotherapy management, encounter for    Care plan discussed with patient    Adverse effect of chemotherapy, subsequent encounter       NSCLC-SCC stage IIIB   Bone

## 2019-08-19 ENCOUNTER — HOSPITAL ENCOUNTER (OUTPATIENT)
Dept: INFUSION THERAPY | Age: 64
Discharge: HOME | End: 2019-08-19
Payer: MEDICARE

## 2019-08-19 ENCOUNTER — OFFICE VISIT (OUTPATIENT)
Dept: HEMATOLOGY | Age: 64
End: 2019-08-19
Payer: MEDICARE

## 2019-08-19 ENCOUNTER — HOSPITAL ENCOUNTER (OUTPATIENT)
Dept: RADIATION ONCOLOGY | Facility: HOSPITAL | Age: 64
Discharge: HOME | End: 2019-08-19

## 2019-08-19 VITALS
WEIGHT: 128.8 LBS | HEART RATE: 104 BPM | SYSTOLIC BLOOD PRESSURE: 104 MMHG | BODY MASS INDEX: 21.99 KG/M2 | HEIGHT: 64 IN | DIASTOLIC BLOOD PRESSURE: 60 MMHG | OXYGEN SATURATION: 81 %

## 2019-08-19 DIAGNOSIS — C34.01 MALIGNANT NEOPLASM OF RIGHT MAIN BRONCHUS (HCC): ICD-10-CM

## 2019-08-19 DIAGNOSIS — C34.01 MALIGNANT NEOPLASM OF RIGHT MAIN BRONCHUS (HCC): Primary | ICD-10-CM

## 2019-08-19 DIAGNOSIS — Z71.89 CARE PLAN DISCUSSED WITH PATIENT: ICD-10-CM

## 2019-08-19 DIAGNOSIS — Z51.11 CHEMOTHERAPY MANAGEMENT, ENCOUNTER FOR: ICD-10-CM

## 2019-08-19 DIAGNOSIS — T45.1X5D ADVERSE EFFECT OF CHEMOTHERAPY, SUBSEQUENT ENCOUNTER: ICD-10-CM

## 2019-08-19 PROCEDURE — 99211 OFF/OP EST MAY X REQ PHY/QHP: CPT

## 2019-08-19 PROCEDURE — 85025 COMPLETE CBC W/AUTO DIFF WBC: CPT

## 2019-08-19 PROCEDURE — 77412 RADIATION TX DELIVERY LVL 3: CPT | Performed by: RADIOLOGY

## 2019-08-19 PROCEDURE — 99214 OFFICE O/P EST MOD 30 MIN: CPT | Performed by: INTERNAL MEDICINE

## 2019-08-19 RX ORDER — MORPHINE SULFATE 15 MG/1
15 TABLET ORAL EVERY 6 HOURS PRN
COMMUNITY
End: 2019-09-12 | Stop reason: SDUPTHER

## 2019-08-20 ENCOUNTER — HOSPITAL ENCOUNTER (OUTPATIENT)
Dept: RADIATION ONCOLOGY | Facility: HOSPITAL | Age: 64
Discharge: HOME | End: 2019-08-20

## 2019-08-20 ENCOUNTER — APPOINTMENT (OUTPATIENT)
Dept: LAB | Facility: HOSPITAL | Age: 64
End: 2019-08-20

## 2019-08-20 ENCOUNTER — INFUSION (OUTPATIENT)
Dept: ONCOLOGY | Facility: HOSPITAL | Age: 64
End: 2019-08-20

## 2019-08-20 VITALS
HEART RATE: 90 BPM | OXYGEN SATURATION: 92 % | TEMPERATURE: 98.2 F | RESPIRATION RATE: 18 BRPM | HEIGHT: 64 IN | BODY MASS INDEX: 21.68 KG/M2 | DIASTOLIC BLOOD PRESSURE: 57 MMHG | WEIGHT: 127 LBS | SYSTOLIC BLOOD PRESSURE: 108 MMHG

## 2019-08-20 DIAGNOSIS — C34.11 MALIGNANT NEOPLASM OF UPPER LOBE OF RIGHT LUNG (HCC): Primary | ICD-10-CM

## 2019-08-20 LAB
ALBUMIN SERPL-MCNC: 3.4 G/DL
ALBUMIN SERPL-MCNC: 3.4 G/DL (ref 3.5–5)
ALBUMIN/GLOB SERPL: 1.1 G/DL (ref 1.1–2.5)
ALP BLD-CCNC: 96 U/L
ALP SERPL-CCNC: 96 U/L (ref 24–120)
ALT SERPL W P-5'-P-CCNC: <15 U/L (ref 0–54)
ALT SERPL-CCNC: 15 U/L
ANION GAP SERPL CALCULATED.3IONS-SCNC: 4 MMOL/L (ref 4–13)
ANION GAP SERPL CALCULATED.3IONS-SCNC: ABNORMAL MMOL/L
AST SERPL-CCNC: 18 U/L
AST SERPL-CCNC: 18 U/L (ref 7–45)
BILIRUB SERPL-MCNC: 0.3 MG/DL (ref 0.1–1)
BILIRUB SERPL-MCNC: 0.3 MG/DL (ref 0.1–1.4)
BUN BLD-MCNC: 11 MG/DL (ref 5–21)
BUN BLDV-MCNC: 11 MG/DL
BUN/CREAT SERPL: 26.8 (ref 7–25)
CALCIUM SERPL-MCNC: 9.1 MG/DL
CALCIUM SPEC-SCNC: 9.1 MG/DL (ref 8.4–10.4)
CHLORIDE BLD-SCNC: 102 MMOL/L
CHLORIDE SERPL-SCNC: 102 MMOL/L (ref 98–110)
CO2 SERPL-SCNC: 34 MMOL/L (ref 24–31)
CO2: 34 MMOL/L
CREAT BLD-MCNC: 0.41 MG/DL (ref 0.5–1.4)
CREAT SERPL-MCNC: 0.41 MG/DL
GFR CALCULATED: >150
GFR SERPL CREATININE-BSD FRML MDRD: >150 ML/MIN/1.73
GLOBULIN UR ELPH-MCNC: 3.1 GM/DL
GLUCOSE BLD-MCNC: 115 MG/DL
GLUCOSE BLD-MCNC: 115 MG/DL (ref 70–100)
POTASSIUM BLD-SCNC: 3.7 MMOL/L (ref 3.5–5.3)
POTASSIUM SERPL-SCNC: 3.7 MMOL/L
PROT SERPL-MCNC: 6.5 G/DL (ref 6.3–8.7)
SODIUM BLD-SCNC: 140 MMOL/L
SODIUM BLD-SCNC: 140 MMOL/L (ref 135–145)
TOTAL PROTEIN: 6.5

## 2019-08-20 PROCEDURE — 96415 CHEMO IV INFUSION ADDL HR: CPT

## 2019-08-20 PROCEDURE — 96367 TX/PROPH/DG ADDL SEQ IV INF: CPT

## 2019-08-20 PROCEDURE — 25010000002 DIPHENHYDRAMINE PER 50 MG: Performed by: INTERNAL MEDICINE

## 2019-08-20 PROCEDURE — 96375 TX/PRO/DX INJ NEW DRUG ADDON: CPT

## 2019-08-20 PROCEDURE — 25010000002 PACLITAXEL PER 30 MG: Performed by: INTERNAL MEDICINE

## 2019-08-20 PROCEDURE — 25010000002 PALONOSETRON PER 25 MCG: Performed by: INTERNAL MEDICINE

## 2019-08-20 PROCEDURE — 80053 COMPREHEN METABOLIC PANEL: CPT | Performed by: INTERNAL MEDICINE

## 2019-08-20 PROCEDURE — 25010000002 CARBOPLATIN PER 50 MG: Performed by: INTERNAL MEDICINE

## 2019-08-20 PROCEDURE — 25010000002 DEXAMETHASONE SODIUM PHOSPHATE 100 MG/10ML SOLUTION: Performed by: INTERNAL MEDICINE

## 2019-08-20 PROCEDURE — 96413 CHEMO IV INFUSION 1 HR: CPT

## 2019-08-20 PROCEDURE — 77412 RADIATION TX DELIVERY LVL 3: CPT | Performed by: RADIOLOGY

## 2019-08-20 RX ORDER — EPINEPHRINE 0.3 MG/.3ML
0.3 INJECTION SUBCUTANEOUS AS NEEDED
Status: DISCONTINUED | OUTPATIENT
Start: 2019-08-20 | End: 2019-08-20 | Stop reason: HOSPADM

## 2019-08-20 RX ORDER — HEPARIN SODIUM (PORCINE) LOCK FLUSH IV SOLN 100 UNIT/ML 100 UNIT/ML
500 SOLUTION INTRAVENOUS AS NEEDED
Status: CANCELLED | OUTPATIENT
Start: 2019-08-20

## 2019-08-20 RX ORDER — METHYLPREDNISOLONE SODIUM SUCCINATE 125 MG/2ML
125 INJECTION, POWDER, LYOPHILIZED, FOR SOLUTION INTRAMUSCULAR; INTRAVENOUS ONCE AS NEEDED
Status: DISCONTINUED | OUTPATIENT
Start: 2019-08-20 | End: 2019-08-20 | Stop reason: HOSPADM

## 2019-08-20 RX ORDER — DIPHENHYDRAMINE HYDROCHLORIDE 50 MG/ML
50 INJECTION INTRAMUSCULAR; INTRAVENOUS ONCE AS NEEDED
Status: DISCONTINUED | OUTPATIENT
Start: 2019-08-20 | End: 2019-08-20 | Stop reason: HOSPADM

## 2019-08-20 RX ORDER — SODIUM CHLORIDE 0.9 % (FLUSH) 0.9 %
10 SYRINGE (ML) INJECTION AS NEEDED
Status: DISCONTINUED | OUTPATIENT
Start: 2019-08-20 | End: 2019-08-20 | Stop reason: HOSPADM

## 2019-08-20 RX ORDER — FAMOTIDINE 10 MG/ML
20 INJECTION, SOLUTION INTRAVENOUS ONCE
Status: COMPLETED | OUTPATIENT
Start: 2019-08-20 | End: 2019-08-20

## 2019-08-20 RX ORDER — SODIUM CHLORIDE 0.9 % (FLUSH) 0.9 %
10 SYRINGE (ML) INJECTION AS NEEDED
Status: CANCELLED | OUTPATIENT
Start: 2019-08-20

## 2019-08-20 RX ORDER — PALONOSETRON 0.05 MG/ML
0.25 INJECTION, SOLUTION INTRAVENOUS ONCE
Status: COMPLETED | OUTPATIENT
Start: 2019-08-20 | End: 2019-08-20

## 2019-08-20 RX ORDER — SODIUM CHLORIDE 9 MG/ML
250 INJECTION, SOLUTION INTRAVENOUS ONCE
Status: COMPLETED | OUTPATIENT
Start: 2019-08-20 | End: 2019-08-20

## 2019-08-20 RX ADMIN — FAMOTIDINE 20 MG: 10 INJECTION INTRAVENOUS at 13:13

## 2019-08-20 RX ADMIN — DIPHENHYDRAMINE HYDROCHLORIDE 25 MG: 50 INJECTION, SOLUTION INTRAMUSCULAR; INTRAVENOUS at 13:13

## 2019-08-20 RX ADMIN — Medication 500 UNITS: at 15:16

## 2019-08-20 RX ADMIN — PACLITAXEL 80 MG: 6 INJECTION, SOLUTION INTRAVENOUS at 13:31

## 2019-08-20 RX ADMIN — SODIUM CHLORIDE 250 ML: 9 INJECTION, SOLUTION INTRAVENOUS at 12:10

## 2019-08-20 RX ADMIN — SODIUM CHLORIDE, PRESERVATIVE FREE 10 ML: 5 INJECTION INTRAVENOUS at 15:16

## 2019-08-20 RX ADMIN — DEXAMETHASONE SODIUM PHOSPHATE 12 MG: 10 INJECTION, SOLUTION INTRAMUSCULAR; INTRAVENOUS at 12:22

## 2019-08-20 RX ADMIN — PALONOSETRON HYDROCHLORIDE 0.25 MG: 0.25 INJECTION, SOLUTION INTRAVENOUS at 12:23

## 2019-08-20 RX ADMIN — CARBOPLATIN 260 MG: 10 INJECTION, SOLUTION INTRAVENOUS at 14:34

## 2019-08-20 NOTE — PROGRESS NOTES
Dose verified to be administered per IGNACIO Suazo for Carboplatin/pharmacy aware.  ROBERT Schwab RN

## 2019-08-21 ENCOUNTER — HOSPITAL ENCOUNTER (OUTPATIENT)
Dept: RADIATION ONCOLOGY | Facility: HOSPITAL | Age: 64
Discharge: HOME | End: 2019-08-21

## 2019-08-21 PROCEDURE — 77412 RADIATION TX DELIVERY LVL 3: CPT | Performed by: RADIOLOGY

## 2019-08-21 PROCEDURE — 77336 RADIATION PHYSICS CONSULT: CPT | Performed by: RADIOLOGY

## 2019-08-22 ENCOUNTER — HOSPITAL ENCOUNTER (OUTPATIENT)
Dept: RADIATION ONCOLOGY | Facility: HOSPITAL | Age: 64
Discharge: HOME | End: 2019-08-22

## 2019-08-22 PROCEDURE — 77412 RADIATION TX DELIVERY LVL 3: CPT | Performed by: RADIOLOGY

## 2019-08-22 PROCEDURE — 77417 THER RADIOLOGY PORT IMAGE(S): CPT | Performed by: RADIOLOGY

## 2019-08-23 ENCOUNTER — HOSPITAL ENCOUNTER (OUTPATIENT)
Dept: RADIATION ONCOLOGY | Facility: HOSPITAL | Age: 64
Discharge: HOME | End: 2019-08-23

## 2019-08-23 PROCEDURE — 77412 RADIATION TX DELIVERY LVL 3: CPT | Performed by: RADIOLOGY

## 2019-08-25 PROBLEM — C34.91 PRIMARY SQUAMOUS CELL CARCINOMA OF RIGHT LUNG (HCC): Status: ACTIVE | Noted: 2019-08-25

## 2019-08-25 PROBLEM — R06.02 SHORTNESS OF BREATH: Status: ACTIVE | Noted: 2019-08-25

## 2019-08-25 PROBLEM — J43.2 CENTRILOBULAR EMPHYSEMA (HCC): Status: ACTIVE | Noted: 2019-08-25

## 2019-08-25 PROBLEM — J44.9 STAGE 2 MODERATE COPD BY GOLD CLASSIFICATION (HCC): Status: ACTIVE | Noted: 2019-08-25

## 2019-08-26 ENCOUNTER — HOSPITAL ENCOUNTER (OUTPATIENT)
Dept: INFUSION THERAPY | Age: 64
Discharge: HOME | End: 2019-08-26
Payer: MEDICARE

## 2019-08-26 ENCOUNTER — HOSPITAL ENCOUNTER (OUTPATIENT)
Dept: RADIATION ONCOLOGY | Facility: HOSPITAL | Age: 64
Discharge: HOME | End: 2019-08-26

## 2019-08-26 DIAGNOSIS — C34.01 MALIGNANT NEOPLASM OF RIGHT MAIN BRONCHUS (HCC): ICD-10-CM

## 2019-08-26 DIAGNOSIS — C34.01 MALIGNANT NEOPLASM OF RIGHT MAIN BRONCHUS (HCC): Primary | ICD-10-CM

## 2019-08-26 PROCEDURE — 85025 COMPLETE CBC W/AUTO DIFF WBC: CPT

## 2019-08-26 PROCEDURE — 77412 RADIATION TX DELIVERY LVL 3: CPT | Performed by: RADIOLOGY

## 2019-08-27 ENCOUNTER — APPOINTMENT (OUTPATIENT)
Dept: ONCOLOGY | Facility: HOSPITAL | Age: 64
End: 2019-08-27

## 2019-08-28 ENCOUNTER — HOSPITAL ENCOUNTER (OUTPATIENT)
Dept: RADIATION ONCOLOGY | Facility: HOSPITAL | Age: 64
Discharge: HOME | End: 2019-08-28

## 2019-08-28 DIAGNOSIS — T45.1X5D ADVERSE EFFECT OF CHEMOTHERAPY, SUBSEQUENT ENCOUNTER: ICD-10-CM

## 2019-08-28 DIAGNOSIS — R11.0 NAUSEA: Primary | ICD-10-CM

## 2019-08-28 PROCEDURE — 77412 RADIATION TX DELIVERY LVL 3: CPT | Performed by: RADIOLOGY

## 2019-08-28 PROCEDURE — 77336 RADIATION PHYSICS CONSULT: CPT | Performed by: RADIOLOGY

## 2019-08-28 RX ORDER — PROMETHAZINE HYDROCHLORIDE 25 MG/1
25 TABLET ORAL EVERY 6 HOURS PRN
Qty: 30 TABLET | Refills: 3 | Status: SHIPPED | OUTPATIENT
Start: 2019-08-28 | End: 2021-02-25 | Stop reason: SDUPTHER

## 2019-09-03 ENCOUNTER — HOSPITAL ENCOUNTER (OUTPATIENT)
Dept: RADIATION ONCOLOGY | Facility: HOSPITAL | Age: 64
Discharge: HOME | End: 2019-09-03

## 2019-09-03 ENCOUNTER — HOSPITAL ENCOUNTER (OUTPATIENT)
Dept: INFUSION THERAPY | Age: 64
Discharge: HOME | End: 2019-09-03
Payer: MEDICARE

## 2019-09-03 ENCOUNTER — HOSPITAL ENCOUNTER (OUTPATIENT)
Dept: RADIATION ONCOLOGY | Facility: HOSPITAL | Age: 64
Setting detail: RADIATION/ONCOLOGY SERIES
End: 2019-09-03

## 2019-09-03 DIAGNOSIS — C34.01 MALIGNANT NEOPLASM OF RIGHT MAIN BRONCHUS (HCC): Primary | ICD-10-CM

## 2019-09-03 DIAGNOSIS — C34.01 MALIGNANT NEOPLASM OF RIGHT MAIN BRONCHUS (HCC): ICD-10-CM

## 2019-09-03 PROCEDURE — 85025 COMPLETE CBC W/AUTO DIFF WBC: CPT

## 2019-09-03 PROCEDURE — 77412 RADIATION TX DELIVERY LVL 3: CPT | Performed by: RADIOLOGY

## 2019-09-04 ENCOUNTER — HOSPITAL ENCOUNTER (OUTPATIENT)
Dept: RADIATION ONCOLOGY | Facility: HOSPITAL | Age: 64
Discharge: HOME | End: 2019-09-04

## 2019-09-04 PROCEDURE — 77412 RADIATION TX DELIVERY LVL 3: CPT | Performed by: RADIOLOGY

## 2019-09-04 PROCEDURE — 77417 THER RADIOLOGY PORT IMAGE(S): CPT | Performed by: RADIOLOGY

## 2019-09-05 ENCOUNTER — HOSPITAL ENCOUNTER (OUTPATIENT)
Dept: RADIATION ONCOLOGY | Facility: HOSPITAL | Age: 64
Discharge: HOME | End: 2019-09-05

## 2019-09-05 PROCEDURE — 77336 RADIATION PHYSICS CONSULT: CPT | Performed by: RADIOLOGY

## 2019-09-05 PROCEDURE — 77412 RADIATION TX DELIVERY LVL 3: CPT | Performed by: RADIOLOGY

## 2019-09-06 NOTE — PROGRESS NOTES
stiffness;   Endocrine: no polyuria, polydypsia, no cold or heat intolerence; Hematology/lymphatic: no easy brusing or bleeding, no hx of clotting disorder; no peripheral adenopathy. Dermatology: no skin rash, no eczema, no pruritis;   Psychiatry: no depression, no anxiety,no panic attacks, no suicide ideation; Neurology: no syncope, no seizures, no numbness or tingling of hands, no numbness or tingling of feet, no paresis;     PHYSICAL EXAM:    Vitals signs:    /65   Pulse 86   Ht 5' 4\" (1.626 m)   Wt 123 lb 8 oz (56 kg)   SpO2 (!) 89%   BMI 21.20 kg/m²     Pain scale:2  Pain Score:   4     CONSTITUTIONAL: Alert, appropriate, no acute distress, chronically ill-appearing  EYES: Non icteric, EOM intact, pupils equal round and reactive to light and accommodation. ENT: Oral mucus membranes moist, no oral pharyngeal lesions. External inspection of ears and nose are normal.   NECK: Supple, no masses. No palpable thyroid mass    CHEST/LUNGS: CTA bilaterally, normal respiratory effort   CARDIOVASCULAR: RRR, no murmurs. No lower extremity edema   ABDOMEN: soft non-tender, active bowel sounds, no hepatosplenomegaly. No palpable masses. EXTREMITIES: warm, Full ROM of all fours extremities. No focal weakness. SKIN: warm, dry with no rashes or lesions  LYMPH: No cervical, clavicular, axillary, or inguinal lymphadenopathy  NEUROLOGIC: follows commands, non focal.   PSYCH: mood and affect appropriate. Alert and oriented to time and place and person. Relevant Lab findings: Reviewed by me  Platelets 44,809, hemoglobin 11.9, WBC 3.4    Relevant Imaging studies:n/a      ASSESSMENT:    Orders Placed This Encounter   Procedures    CBC Auto Differential     5 part     Standing Status:   Future     Number of Occurrences:   1     Standing Expiration Date:   9/9/2020      VI was seen today for lung cancer.     Diagnoses and all orders for this visit:    Malignant neoplasm of right main bronchus (HCC)  -     CBC Auto Differential; Future    Care plan discussed with patient    Cancer-related pain    Adverse effect of chemotherapy, subsequent encounter    Chemotherapy management, encounter for       NSCLC-SCC stage IIIB   Bone scan, CT abdomen pelvis unremarkable metastatic disease. PET scan showed disease limited to the chest and mediastinum only. Therefore, stage IIIB. Regimen: Hold chemo today  Carboplatin AUC 2  Taxol 50mg/m2  concurrent RT  The patient was counseled today about diagnosis, staging, prognosis, diagnostic tests, medications, side effects and disease management. The method of counseling included verbal explanation. The patient verbalized understanding. Hold chemo today due to thrombus cytopenia. Monitoring of toxicity-she denies any neuropathy. CBC reviewed. severe thrombocytopenia   Smoke cessation-she was strongly encouraged to quit. Continue nicotine replacement. Cancer related pain-  opioid analgesics have potential for abuse and risk of fatal overdose due to respiratory depression. The use of opioid exposes individual to have the risk of addiction, abuse and misuse. Addiction can occur in individuals appropriately prescribed and at a recommended dose. Addiction also occurs if the drug is being used or abused. An individual is also at increased risk of opioid addiction if a personal or family history of substance abuse or mental illness are present. [id number] mg SR every 8 hours. Refill Percocet   Plan:  Hold chemotherapy today due to thrombocytopenia  Follow-up Dr. Adam Calabrese for RT  Continue Morphine SR 15 mg every 8 hours   Continue Percocet   RTC 3 weeks   Follow-up arranged weekly for orders  RTC in the 3 weeks  I have seen, examined and reviewed this patient medication list, appropriate labs and imaging studies. I reviewed relevant medical records and others physicians notes. I discussed the plans of care with the patient.  I answered all the questions to the patients satisfaction    The selection, dosing administration of anticancer agents in the management of associated toxicities are complex. Modifications of drug dose and schedule and the initiation of supportive care interventions are often necessary because of expected toxicities individual patient variability, prior treatment and comorbidity. The optimal delivery of anticancer agents therefore requires a healthcare delivery team experienced in the use of anticancer agents and the management of associated toxicities in patients with cancer. Follow Up:     Return in about 5 weeks (around 10/14/2019) for an josh with Dr. Bev Alcantar. 1 wk with nurse for orders     IColton am scribing for Shrvaan Méndez MD. Electronically signed by Colton Kramer on 9/9/2019 at 5:34 PM.     I, Dr Aaron Solis, personally performed the services described in this documentation as scribed by Colton Kramer MA in my presence and is both accurate and complete. Over 50% of the total visit time of 25 minutes was spent on counseling and/or coordination of care of history of lung cancer.   I personally discussed her care with Dr. J Luis Enriquez over the phone today

## 2019-09-09 ENCOUNTER — HOSPITAL ENCOUNTER (OUTPATIENT)
Dept: RADIATION ONCOLOGY | Facility: HOSPITAL | Age: 64
Discharge: HOME | End: 2019-09-09

## 2019-09-09 ENCOUNTER — HOSPITAL ENCOUNTER (OUTPATIENT)
Dept: INFUSION THERAPY | Age: 64
Discharge: HOME | End: 2019-09-09
Payer: MEDICARE

## 2019-09-09 ENCOUNTER — OFFICE VISIT (OUTPATIENT)
Dept: HEMATOLOGY | Age: 64
End: 2019-09-09
Payer: MEDICARE

## 2019-09-09 VITALS
DIASTOLIC BLOOD PRESSURE: 65 MMHG | HEIGHT: 64 IN | SYSTOLIC BLOOD PRESSURE: 110 MMHG | BODY MASS INDEX: 21.08 KG/M2 | WEIGHT: 123.5 LBS | OXYGEN SATURATION: 89 % | HEART RATE: 86 BPM

## 2019-09-09 DIAGNOSIS — C34.01 MALIGNANT NEOPLASM OF RIGHT MAIN BRONCHUS (HCC): Primary | ICD-10-CM

## 2019-09-09 DIAGNOSIS — Z51.11 CHEMOTHERAPY MANAGEMENT, ENCOUNTER FOR: ICD-10-CM

## 2019-09-09 DIAGNOSIS — G89.3 CANCER-RELATED PAIN: ICD-10-CM

## 2019-09-09 DIAGNOSIS — T45.1X5D ADVERSE EFFECT OF CHEMOTHERAPY, SUBSEQUENT ENCOUNTER: ICD-10-CM

## 2019-09-09 DIAGNOSIS — Z71.89 CARE PLAN DISCUSSED WITH PATIENT: ICD-10-CM

## 2019-09-09 DIAGNOSIS — C34.01 MALIGNANT NEOPLASM OF RIGHT MAIN BRONCHUS (HCC): ICD-10-CM

## 2019-09-09 PROCEDURE — 99214 OFFICE O/P EST MOD 30 MIN: CPT | Performed by: INTERNAL MEDICINE

## 2019-09-09 PROCEDURE — 99211 OFF/OP EST MAY X REQ PHY/QHP: CPT

## 2019-09-09 PROCEDURE — 85025 COMPLETE CBC W/AUTO DIFF WBC: CPT

## 2019-09-09 PROCEDURE — 77412 RADIATION TX DELIVERY LVL 3: CPT | Performed by: RADIOLOGY

## 2019-09-10 ENCOUNTER — APPOINTMENT (OUTPATIENT)
Dept: ONCOLOGY | Facility: HOSPITAL | Age: 64
End: 2019-09-10

## 2019-09-10 ENCOUNTER — HOSPITAL ENCOUNTER (OUTPATIENT)
Dept: RADIATION ONCOLOGY | Facility: HOSPITAL | Age: 64
Discharge: HOME | End: 2019-09-10

## 2019-09-10 PROCEDURE — 77412 RADIATION TX DELIVERY LVL 3: CPT | Performed by: RADIOLOGY

## 2019-09-11 ENCOUNTER — HOSPITAL ENCOUNTER (OUTPATIENT)
Dept: RADIATION ONCOLOGY | Facility: HOSPITAL | Age: 64
Discharge: HOME | End: 2019-09-11

## 2019-09-11 PROCEDURE — 77412 RADIATION TX DELIVERY LVL 3: CPT | Performed by: RADIOLOGY

## 2019-09-12 ENCOUNTER — HOSPITAL ENCOUNTER (OUTPATIENT)
Dept: RADIATION ONCOLOGY | Facility: HOSPITAL | Age: 64
Discharge: HOME | End: 2019-09-12

## 2019-09-12 DIAGNOSIS — C34.01 MALIGNANT NEOPLASM OF RIGHT MAIN BRONCHUS (HCC): Primary | ICD-10-CM

## 2019-09-12 PROCEDURE — 77412 RADIATION TX DELIVERY LVL 3: CPT | Performed by: RADIOLOGY

## 2019-09-12 RX ORDER — OXYCODONE AND ACETAMINOPHEN 10; 325 MG/1; MG/1
1 TABLET ORAL EVERY 6 HOURS PRN
Qty: 120 TABLET | Refills: 0 | Status: SHIPPED | OUTPATIENT
Start: 2019-09-12 | End: 2019-10-14 | Stop reason: SDUPTHER

## 2019-09-12 RX ORDER — MORPHINE SULFATE 15 MG/1
15 TABLET ORAL EVERY 8 HOURS
Qty: 90 TABLET | Refills: 0 | Status: SHIPPED | OUTPATIENT
Start: 2019-09-12 | End: 2019-10-14 | Stop reason: SDUPTHER

## 2019-09-13 ENCOUNTER — HOSPITAL ENCOUNTER (OUTPATIENT)
Dept: RADIATION ONCOLOGY | Facility: HOSPITAL | Age: 64
Discharge: HOME | End: 2019-09-13

## 2019-09-13 PROCEDURE — 77336 RADIATION PHYSICS CONSULT: CPT | Performed by: RADIOLOGY

## 2019-09-13 PROCEDURE — 77412 RADIATION TX DELIVERY LVL 3: CPT | Performed by: RADIOLOGY

## 2019-09-17 ENCOUNTER — APPOINTMENT (OUTPATIENT)
Dept: ONCOLOGY | Facility: HOSPITAL | Age: 64
End: 2019-09-17

## 2019-09-24 ENCOUNTER — APPOINTMENT (OUTPATIENT)
Dept: ONCOLOGY | Facility: HOSPITAL | Age: 64
End: 2019-09-24

## 2019-10-01 ENCOUNTER — APPOINTMENT (OUTPATIENT)
Dept: ONCOLOGY | Facility: HOSPITAL | Age: 64
End: 2019-10-01

## 2019-10-08 ENCOUNTER — APPOINTMENT (OUTPATIENT)
Dept: ONCOLOGY | Facility: HOSPITAL | Age: 64
End: 2019-10-08

## 2019-10-14 ENCOUNTER — OFFICE VISIT (OUTPATIENT)
Dept: HEMATOLOGY | Age: 64
End: 2019-10-14
Payer: MEDICARE

## 2019-10-14 ENCOUNTER — HOSPITAL ENCOUNTER (OUTPATIENT)
Dept: INFUSION THERAPY | Age: 64
Discharge: HOME | End: 2019-10-14
Payer: MEDICARE

## 2019-10-14 VITALS
SYSTOLIC BLOOD PRESSURE: 108 MMHG | HEIGHT: 64 IN | BODY MASS INDEX: 20.51 KG/M2 | OXYGEN SATURATION: 90 % | WEIGHT: 120.1 LBS | HEART RATE: 87 BPM | DIASTOLIC BLOOD PRESSURE: 66 MMHG

## 2019-10-14 DIAGNOSIS — Z71.89 CARE PLAN DISCUSSED WITH PATIENT: ICD-10-CM

## 2019-10-14 DIAGNOSIS — C34.01 MALIGNANT NEOPLASM OF RIGHT MAIN BRONCHUS (HCC): Primary | ICD-10-CM

## 2019-10-14 DIAGNOSIS — C34.01 MALIGNANT NEOPLASM OF RIGHT MAIN BRONCHUS (HCC): ICD-10-CM

## 2019-10-14 DIAGNOSIS — G89.3 CANCER-RELATED PAIN: ICD-10-CM

## 2019-10-14 DIAGNOSIS — T45.1X5D ADVERSE EFFECT OF CHEMOTHERAPY, SUBSEQUENT ENCOUNTER: ICD-10-CM

## 2019-10-14 PROCEDURE — G8400 PT W/DXA NO RESULTS DOC: HCPCS | Performed by: INTERNAL MEDICINE

## 2019-10-14 PROCEDURE — 96523 IRRIG DRUG DELIVERY DEVICE: CPT

## 2019-10-14 PROCEDURE — 6360000002 HC RX W HCPCS: Performed by: INTERNAL MEDICINE

## 2019-10-14 PROCEDURE — 4040F PNEUMOC VAC/ADMIN/RCVD: CPT | Performed by: INTERNAL MEDICINE

## 2019-10-14 PROCEDURE — 3017F COLORECTAL CA SCREEN DOC REV: CPT | Performed by: INTERNAL MEDICINE

## 2019-10-14 PROCEDURE — 1090F PRES/ABSN URINE INCON ASSESS: CPT | Performed by: INTERNAL MEDICINE

## 2019-10-14 PROCEDURE — G8484 FLU IMMUNIZE NO ADMIN: HCPCS | Performed by: INTERNAL MEDICINE

## 2019-10-14 PROCEDURE — 85025 COMPLETE CBC W/AUTO DIFF WBC: CPT

## 2019-10-14 PROCEDURE — G8427 DOCREV CUR MEDS BY ELIG CLIN: HCPCS | Performed by: INTERNAL MEDICINE

## 2019-10-14 PROCEDURE — 36415 COLL VENOUS BLD VENIPUNCTURE: CPT

## 2019-10-14 PROCEDURE — G8420 CALC BMI NORM PARAMETERS: HCPCS | Performed by: INTERNAL MEDICINE

## 2019-10-14 PROCEDURE — 4004F PT TOBACCO SCREEN RCVD TLK: CPT | Performed by: INTERNAL MEDICINE

## 2019-10-14 PROCEDURE — 80053 COMPREHEN METABOLIC PANEL: CPT

## 2019-10-14 PROCEDURE — 99214 OFFICE O/P EST MOD 30 MIN: CPT | Performed by: INTERNAL MEDICINE

## 2019-10-14 PROCEDURE — 99211 OFF/OP EST MAY X REQ PHY/QHP: CPT | Performed by: INTERNAL MEDICINE

## 2019-10-14 PROCEDURE — 1123F ACP DISCUSS/DSCN MKR DOCD: CPT | Performed by: INTERNAL MEDICINE

## 2019-10-14 RX ORDER — OXYCODONE AND ACETAMINOPHEN 7.5; 325 MG/1; MG/1
1 TABLET ORAL
COMMUNITY
End: 2019-10-14

## 2019-10-14 RX ORDER — HEPARIN SODIUM (PORCINE) LOCK FLUSH IV SOLN 100 UNIT/ML 100 UNIT/ML
500 SOLUTION INTRAVENOUS ONCE
Status: DISCONTINUED | OUTPATIENT
Start: 2019-10-14 | End: 2019-10-16 | Stop reason: HOSPADM

## 2019-10-14 RX ORDER — OXYCODONE AND ACETAMINOPHEN 10; 325 MG/1; MG/1
1 TABLET ORAL EVERY 6 HOURS PRN
Qty: 120 TABLET | Refills: 0 | Status: SHIPPED | OUTPATIENT
Start: 2019-10-14 | End: 2019-11-11 | Stop reason: SDUPTHER

## 2019-10-14 RX ORDER — MORPHINE SULFATE 15 MG/1
15 TABLET ORAL EVERY 8 HOURS
Qty: 90 TABLET | Refills: 0 | Status: SHIPPED | OUTPATIENT
Start: 2019-10-14 | End: 2019-11-11 | Stop reason: SDUPTHER

## 2019-10-14 RX ORDER — KETOROLAC TROMETHAMINE 30 MG/ML
60 INJECTION, SOLUTION INTRAMUSCULAR; INTRAVENOUS
Status: ON HOLD | COMMUNITY
End: 2021-01-01 | Stop reason: HOSPADM

## 2019-10-14 RX ORDER — MORPHINE SULFATE 15 MG/1
15 TABLET, FILM COATED, EXTENDED RELEASE ORAL
COMMUNITY
End: 2019-10-14

## 2019-10-14 RX ORDER — LIDOCAINE HYDROCHLORIDE 20 MG/ML
SOLUTION OROPHARYNGEAL
Status: ON HOLD | COMMUNITY
Start: 2019-08-07 | End: 2022-01-01 | Stop reason: HOSPADM

## 2019-10-14 RX ORDER — CHOLECALCIFEROL (VITAMIN D3) 1250 MCG
50000 CAPSULE ORAL
Status: ON HOLD | COMMUNITY
End: 2022-01-01

## 2019-10-14 RX ORDER — GLYCOPYRROLATE 15.6 UG/1
1 CAPSULE RESPIRATORY (INHALATION)
Status: ON HOLD | COMMUNITY
Start: 2019-05-23 | End: 2022-01-01 | Stop reason: HOSPADM

## 2019-10-14 RX ORDER — TIZANIDINE 4 MG/1
TABLET ORAL
Status: ON HOLD | COMMUNITY
Start: 2019-07-12 | End: 2021-01-01 | Stop reason: HOSPADM

## 2019-10-18 ENCOUNTER — HOSPITAL ENCOUNTER (OUTPATIENT)
Dept: CT IMAGING | Age: 64
Discharge: HOME | End: 2019-10-18
Payer: MEDICARE

## 2019-10-18 DIAGNOSIS — C34.01 MALIGNANT NEOPLASM OF RIGHT MAIN BRONCHUS (HCC): ICD-10-CM

## 2019-10-18 PROCEDURE — 6360000004 HC RX CONTRAST MEDICATION: Performed by: INTERNAL MEDICINE

## 2019-10-18 PROCEDURE — 74177 CT ABD & PELVIS W/CONTRAST: CPT

## 2019-10-18 PROCEDURE — 71260 CT THORAX DX C+: CPT

## 2019-10-18 RX ADMIN — IOPAMIDOL 75 ML: 755 INJECTION, SOLUTION INTRAVENOUS at 11:32

## 2019-10-30 PROBLEM — Z92.3 HISTORY OF RADIATION THERAPY: Status: ACTIVE | Noted: 2019-10-30

## 2019-10-31 ENCOUNTER — OFFICE VISIT (OUTPATIENT)
Dept: RADIATION ONCOLOGY | Facility: HOSPITAL | Age: 64
End: 2019-10-31

## 2019-10-31 ENCOUNTER — HOSPITAL ENCOUNTER (OUTPATIENT)
Dept: RADIATION ONCOLOGY | Facility: HOSPITAL | Age: 64
Setting detail: RADIATION/ONCOLOGY SERIES
End: 2019-10-31

## 2019-10-31 VITALS
WEIGHT: 124.1 LBS | SYSTOLIC BLOOD PRESSURE: 111 MMHG | DIASTOLIC BLOOD PRESSURE: 53 MMHG | BODY MASS INDEX: 21.19 KG/M2 | HEIGHT: 64 IN

## 2019-10-31 DIAGNOSIS — C34.11 MALIGNANT NEOPLASM OF UPPER LOBE OF RIGHT LUNG (HCC): Primary | ICD-10-CM

## 2019-10-31 DIAGNOSIS — Z92.3 HISTORY OF RADIATION THERAPY: ICD-10-CM

## 2019-10-31 DIAGNOSIS — J44.9 STAGE 2 MODERATE COPD BY GOLD CLASSIFICATION (HCC): ICD-10-CM

## 2019-10-31 DIAGNOSIS — F17.200 CURRENT EVERY DAY SMOKER: ICD-10-CM

## 2019-10-31 PROCEDURE — G0463 HOSPITAL OUTPT CLINIC VISIT: HCPCS | Performed by: RADIOLOGY

## 2019-11-11 DIAGNOSIS — C34.01 MALIGNANT NEOPLASM OF RIGHT MAIN BRONCHUS (HCC): ICD-10-CM

## 2019-11-11 RX ORDER — OXYCODONE AND ACETAMINOPHEN 10; 325 MG/1; MG/1
1 TABLET ORAL EVERY 6 HOURS PRN
Qty: 120 TABLET | Refills: 0 | Status: SHIPPED | OUTPATIENT
Start: 2019-11-11 | End: 2019-12-11 | Stop reason: SDUPTHER

## 2019-11-11 RX ORDER — MORPHINE SULFATE 15 MG/1
15 TABLET ORAL EVERY 8 HOURS
Qty: 90 TABLET | Refills: 0 | Status: SHIPPED | OUTPATIENT
Start: 2019-11-11 | End: 2019-12-11 | Stop reason: SDUPTHER

## 2019-11-20 ENCOUNTER — OFFICE VISIT (OUTPATIENT)
Dept: HEMATOLOGY | Age: 64
End: 2019-11-20
Payer: MEDICARE

## 2019-11-20 ENCOUNTER — HOSPITAL ENCOUNTER (OUTPATIENT)
Dept: INFUSION THERAPY | Age: 64
Discharge: HOME | End: 2019-11-20
Payer: MEDICARE

## 2019-11-20 VITALS
HEIGHT: 64 IN | HEART RATE: 84 BPM | WEIGHT: 121.5 LBS | OXYGEN SATURATION: 90 % | BODY MASS INDEX: 20.74 KG/M2 | SYSTOLIC BLOOD PRESSURE: 98 MMHG | DIASTOLIC BLOOD PRESSURE: 64 MMHG

## 2019-11-20 DIAGNOSIS — C34.91 STAGE III SQUAMOUS CELL CARCINOMA OF LUNG, RIGHT (HCC): ICD-10-CM

## 2019-11-20 DIAGNOSIS — T45.1X5D ADVERSE EFFECT OF CHEMOTHERAPY, SUBSEQUENT ENCOUNTER: ICD-10-CM

## 2019-11-20 DIAGNOSIS — T50.905S TOXICITY, LATE EFFECT, DUE TO DRUG, MEDICINE, OR BIOLOGICAL SUBSTANCE: ICD-10-CM

## 2019-11-20 DIAGNOSIS — Z71.89 CARE PLAN DISCUSSED WITH PATIENT: ICD-10-CM

## 2019-11-20 DIAGNOSIS — C34.01 MALIGNANT NEOPLASM OF RIGHT MAIN BRONCHUS (HCC): ICD-10-CM

## 2019-11-20 DIAGNOSIS — C34.01 MALIGNANT NEOPLASM OF RIGHT MAIN BRONCHUS (HCC): Primary | ICD-10-CM

## 2019-11-20 PROCEDURE — 4040F PNEUMOC VAC/ADMIN/RCVD: CPT | Performed by: INTERNAL MEDICINE

## 2019-11-20 PROCEDURE — 85025 COMPLETE CBC W/AUTO DIFF WBC: CPT

## 2019-11-20 PROCEDURE — 1123F ACP DISCUSS/DSCN MKR DOCD: CPT | Performed by: INTERNAL MEDICINE

## 2019-11-20 PROCEDURE — 1090F PRES/ABSN URINE INCON ASSESS: CPT | Performed by: INTERNAL MEDICINE

## 2019-11-20 PROCEDURE — G8420 CALC BMI NORM PARAMETERS: HCPCS | Performed by: INTERNAL MEDICINE

## 2019-11-20 PROCEDURE — 4004F PT TOBACCO SCREEN RCVD TLK: CPT | Performed by: INTERNAL MEDICINE

## 2019-11-20 PROCEDURE — 3017F COLORECTAL CA SCREEN DOC REV: CPT | Performed by: INTERNAL MEDICINE

## 2019-11-20 PROCEDURE — G8400 PT W/DXA NO RESULTS DOC: HCPCS | Performed by: INTERNAL MEDICINE

## 2019-11-20 PROCEDURE — G8484 FLU IMMUNIZE NO ADMIN: HCPCS | Performed by: INTERNAL MEDICINE

## 2019-11-20 PROCEDURE — 99211 OFF/OP EST MAY X REQ PHY/QHP: CPT

## 2019-11-20 PROCEDURE — 99214 OFFICE O/P EST MOD 30 MIN: CPT | Performed by: INTERNAL MEDICINE

## 2019-11-20 PROCEDURE — G8427 DOCREV CUR MEDS BY ELIG CLIN: HCPCS | Performed by: INTERNAL MEDICINE

## 2019-11-26 RX ORDER — EPINEPHRINE 1 MG/ML
0.3 INJECTION, SOLUTION, CONCENTRATE INTRAVENOUS PRN
Status: CANCELLED | OUTPATIENT
Start: 2019-12-04

## 2019-11-26 RX ORDER — DIPHENHYDRAMINE HYDROCHLORIDE 50 MG/ML
50 INJECTION INTRAMUSCULAR; INTRAVENOUS ONCE
Status: CANCELLED | OUTPATIENT
Start: 2019-12-04

## 2019-11-26 RX ORDER — METHYLPREDNISOLONE SODIUM SUCCINATE 125 MG/2ML
125 INJECTION, POWDER, LYOPHILIZED, FOR SOLUTION INTRAMUSCULAR; INTRAVENOUS ONCE
Status: CANCELLED | OUTPATIENT
Start: 2019-12-04

## 2019-11-26 RX ORDER — SODIUM CHLORIDE 0.9 % (FLUSH) 0.9 %
5 SYRINGE (ML) INJECTION PRN
Status: CANCELLED | OUTPATIENT
Start: 2019-12-04

## 2019-11-26 RX ORDER — HEPARIN SODIUM (PORCINE) LOCK FLUSH IV SOLN 100 UNIT/ML 100 UNIT/ML
500 SOLUTION INTRAVENOUS PRN
Status: CANCELLED | OUTPATIENT
Start: 2019-12-04

## 2019-11-26 RX ORDER — SODIUM CHLORIDE 9 MG/ML
20 INJECTION, SOLUTION INTRAVENOUS ONCE
Status: CANCELLED | OUTPATIENT
Start: 2019-12-04

## 2019-11-26 RX ORDER — SODIUM CHLORIDE 0.9 % (FLUSH) 0.9 %
10 SYRINGE (ML) INJECTION PRN
Status: CANCELLED | OUTPATIENT
Start: 2019-12-04

## 2019-11-26 RX ORDER — SODIUM CHLORIDE 9 MG/ML
INJECTION, SOLUTION INTRAVENOUS CONTINUOUS
Status: CANCELLED | OUTPATIENT
Start: 2019-12-04

## 2019-12-04 ENCOUNTER — HOSPITAL ENCOUNTER (OUTPATIENT)
Dept: INFUSION THERAPY | Age: 64
Discharge: HOME | End: 2019-12-04
Payer: MEDICARE

## 2019-12-04 VITALS
WEIGHT: 122 LBS | HEIGHT: 64 IN | BODY MASS INDEX: 20.83 KG/M2 | HEART RATE: 77 BPM | DIASTOLIC BLOOD PRESSURE: 58 MMHG | TEMPERATURE: 97.9 F | SYSTOLIC BLOOD PRESSURE: 94 MMHG | OXYGEN SATURATION: 91 %

## 2019-12-04 DIAGNOSIS — C34.01 MALIGNANT NEOPLASM OF RIGHT MAIN BRONCHUS (HCC): Primary | ICD-10-CM

## 2019-12-04 DIAGNOSIS — R29.898 FATIGUE OF LOWER EXTREMITY: ICD-10-CM

## 2019-12-04 DIAGNOSIS — R53.83 FATIGUE DUE TO TREATMENT: ICD-10-CM

## 2019-12-04 PROCEDURE — 36415 COLL VENOUS BLD VENIPUNCTURE: CPT

## 2019-12-04 PROCEDURE — 96413 CHEMO IV INFUSION 1 HR: CPT

## 2019-12-04 PROCEDURE — 85025 COMPLETE CBC W/AUTO DIFF WBC: CPT

## 2019-12-04 PROCEDURE — 2580000003 HC RX 258: Performed by: INTERNAL MEDICINE

## 2019-12-04 PROCEDURE — 84443 ASSAY THYROID STIM HORMONE: CPT

## 2019-12-04 PROCEDURE — 80053 COMPREHEN METABOLIC PANEL: CPT

## 2019-12-04 PROCEDURE — 6360000002 HC RX W HCPCS: Performed by: INTERNAL MEDICINE

## 2019-12-04 RX ORDER — HEPARIN SODIUM (PORCINE) LOCK FLUSH IV SOLN 100 UNIT/ML 100 UNIT/ML
300 SOLUTION INTRAVENOUS PRN
Status: DISCONTINUED | OUTPATIENT
Start: 2019-12-04 | End: 2019-12-05 | Stop reason: HOSPADM

## 2019-12-04 RX ORDER — SODIUM CHLORIDE 0.9 % (FLUSH) 0.9 %
10 SYRINGE (ML) INJECTION PRN
Status: DISCONTINUED | OUTPATIENT
Start: 2019-12-04 | End: 2019-12-05 | Stop reason: HOSPADM

## 2019-12-04 RX ADMIN — SODIUM CHLORIDE 550 MG: 9 INJECTION, SOLUTION INTRAVENOUS at 10:40

## 2019-12-04 RX ADMIN — HEPARIN 300 UNITS: 100 SYRINGE at 11:41

## 2019-12-04 RX ADMIN — Medication 10 ML: at 11:41

## 2019-12-11 DIAGNOSIS — G89.3 CANCER RELATED PAIN: Primary | ICD-10-CM

## 2019-12-11 DIAGNOSIS — C34.01 MALIGNANT NEOPLASM OF RIGHT MAIN BRONCHUS (HCC): ICD-10-CM

## 2019-12-11 RX ORDER — OXYCODONE AND ACETAMINOPHEN 10; 325 MG/1; MG/1
1 TABLET ORAL EVERY 6 HOURS PRN
Qty: 120 TABLET | Refills: 0 | Status: SHIPPED | OUTPATIENT
Start: 2019-12-11 | End: 2019-12-12 | Stop reason: SDUPTHER

## 2019-12-11 RX ORDER — MORPHINE SULFATE 15 MG/1
15 TABLET ORAL EVERY 8 HOURS
Qty: 90 TABLET | Refills: 0 | Status: SHIPPED | OUTPATIENT
Start: 2019-12-11 | End: 2019-12-12 | Stop reason: SDUPTHER

## 2019-12-12 DIAGNOSIS — C34.01 MALIGNANT NEOPLASM OF RIGHT MAIN BRONCHUS (HCC): ICD-10-CM

## 2019-12-12 DIAGNOSIS — G89.3 CANCER RELATED PAIN: ICD-10-CM

## 2019-12-12 RX ORDER — MORPHINE SULFATE 15 MG/1
15 TABLET ORAL EVERY 8 HOURS
Qty: 90 TABLET | Refills: 0 | Status: SHIPPED | OUTPATIENT
Start: 2019-12-12 | End: 2020-01-09 | Stop reason: CLARIF

## 2019-12-12 RX ORDER — OXYCODONE AND ACETAMINOPHEN 10; 325 MG/1; MG/1
1 TABLET ORAL EVERY 6 HOURS PRN
Qty: 120 TABLET | Refills: 0 | Status: SHIPPED | OUTPATIENT
Start: 2019-12-12 | End: 2020-01-09 | Stop reason: SDUPTHER

## 2019-12-17 RX ORDER — SODIUM CHLORIDE 9 MG/ML
20 INJECTION, SOLUTION INTRAVENOUS ONCE
Status: CANCELLED | OUTPATIENT
Start: 2019-12-18

## 2019-12-17 RX ORDER — DIPHENHYDRAMINE HYDROCHLORIDE 50 MG/ML
50 INJECTION INTRAMUSCULAR; INTRAVENOUS ONCE
Status: CANCELLED | OUTPATIENT
Start: 2019-12-18

## 2019-12-17 RX ORDER — SODIUM CHLORIDE 0.9 % (FLUSH) 0.9 %
5 SYRINGE (ML) INJECTION PRN
Status: CANCELLED | OUTPATIENT
Start: 2019-12-18

## 2019-12-17 RX ORDER — SODIUM CHLORIDE 0.9 % (FLUSH) 0.9 %
10 SYRINGE (ML) INJECTION PRN
Status: CANCELLED | OUTPATIENT
Start: 2019-12-18

## 2019-12-17 RX ORDER — SODIUM CHLORIDE 9 MG/ML
INJECTION, SOLUTION INTRAVENOUS CONTINUOUS
Status: CANCELLED | OUTPATIENT
Start: 2019-12-18

## 2019-12-17 RX ORDER — HEPARIN SODIUM (PORCINE) LOCK FLUSH IV SOLN 100 UNIT/ML 100 UNIT/ML
300 SOLUTION INTRAVENOUS PRN
Status: CANCELLED | OUTPATIENT
Start: 2019-12-18

## 2019-12-17 RX ORDER — METHYLPREDNISOLONE SODIUM SUCCINATE 125 MG/2ML
125 INJECTION, POWDER, LYOPHILIZED, FOR SOLUTION INTRAMUSCULAR; INTRAVENOUS ONCE
Status: CANCELLED | OUTPATIENT
Start: 2019-12-18

## 2019-12-17 RX ORDER — EPINEPHRINE 1 MG/ML
0.3 INJECTION, SOLUTION, CONCENTRATE INTRAVENOUS PRN
Status: CANCELLED | OUTPATIENT
Start: 2019-12-18

## 2019-12-18 ENCOUNTER — HOSPITAL ENCOUNTER (OUTPATIENT)
Dept: INFUSION THERAPY | Age: 64
Discharge: HOME | End: 2019-12-18
Payer: MEDICARE

## 2019-12-18 ENCOUNTER — OFFICE VISIT (OUTPATIENT)
Dept: HEMATOLOGY | Age: 64
End: 2019-12-18
Payer: MEDICARE

## 2019-12-18 VITALS
SYSTOLIC BLOOD PRESSURE: 108 MMHG | HEIGHT: 64 IN | BODY MASS INDEX: 20.61 KG/M2 | DIASTOLIC BLOOD PRESSURE: 60 MMHG | HEART RATE: 85 BPM | TEMPERATURE: 98.3 F | WEIGHT: 120.7 LBS

## 2019-12-18 DIAGNOSIS — R53.83 FATIGUE DUE TO TREATMENT: ICD-10-CM

## 2019-12-18 DIAGNOSIS — C34.01 MALIGNANT NEOPLASM OF RIGHT MAIN BRONCHUS (HCC): Primary | ICD-10-CM

## 2019-12-18 DIAGNOSIS — C34.01 MALIGNANT NEOPLASM OF RIGHT MAIN BRONCHUS (HCC): ICD-10-CM

## 2019-12-18 DIAGNOSIS — Z71.89 CARE PLAN DISCUSSED WITH PATIENT: ICD-10-CM

## 2019-12-18 DIAGNOSIS — Z51.12 ENCOUNTER FOR ANTINEOPLASTIC IMMUNOTHERAPY: ICD-10-CM

## 2019-12-18 PROCEDURE — 1090F PRES/ABSN URINE INCON ASSESS: CPT | Performed by: INTERNAL MEDICINE

## 2019-12-18 PROCEDURE — 2580000003 HC RX 258: Performed by: INTERNAL MEDICINE

## 2019-12-18 PROCEDURE — 1123F ACP DISCUSS/DSCN MKR DOCD: CPT | Performed by: INTERNAL MEDICINE

## 2019-12-18 PROCEDURE — G8484 FLU IMMUNIZE NO ADMIN: HCPCS | Performed by: INTERNAL MEDICINE

## 2019-12-18 PROCEDURE — 6360000002 HC RX W HCPCS: Performed by: INTERNAL MEDICINE

## 2019-12-18 PROCEDURE — G8400 PT W/DXA NO RESULTS DOC: HCPCS | Performed by: INTERNAL MEDICINE

## 2019-12-18 PROCEDURE — 96413 CHEMO IV INFUSION 1 HR: CPT

## 2019-12-18 PROCEDURE — 3017F COLORECTAL CA SCREEN DOC REV: CPT | Performed by: INTERNAL MEDICINE

## 2019-12-18 PROCEDURE — 4040F PNEUMOC VAC/ADMIN/RCVD: CPT | Performed by: INTERNAL MEDICINE

## 2019-12-18 PROCEDURE — G8420 CALC BMI NORM PARAMETERS: HCPCS | Performed by: INTERNAL MEDICINE

## 2019-12-18 PROCEDURE — 85025 COMPLETE CBC W/AUTO DIFF WBC: CPT

## 2019-12-18 PROCEDURE — 99214 OFFICE O/P EST MOD 30 MIN: CPT | Performed by: INTERNAL MEDICINE

## 2019-12-18 PROCEDURE — 4004F PT TOBACCO SCREEN RCVD TLK: CPT | Performed by: INTERNAL MEDICINE

## 2019-12-18 PROCEDURE — G8427 DOCREV CUR MEDS BY ELIG CLIN: HCPCS | Performed by: INTERNAL MEDICINE

## 2019-12-18 RX ORDER — HEPARIN SODIUM (PORCINE) LOCK FLUSH IV SOLN 100 UNIT/ML 100 UNIT/ML
300 SOLUTION INTRAVENOUS PRN
Status: DISCONTINUED | OUTPATIENT
Start: 2019-12-18 | End: 2019-12-19 | Stop reason: HOSPADM

## 2019-12-18 RX ORDER — SODIUM CHLORIDE 0.9 % (FLUSH) 0.9 %
10 SYRINGE (ML) INJECTION PRN
Status: DISCONTINUED | OUTPATIENT
Start: 2019-12-18 | End: 2019-12-19 | Stop reason: HOSPADM

## 2019-12-18 RX ADMIN — HEPARIN 300 UNITS: 100 SYRINGE at 11:22

## 2019-12-18 RX ADMIN — SODIUM CHLORIDE, PRESERVATIVE FREE 10 ML: 5 INJECTION INTRAVENOUS at 11:22

## 2019-12-18 RX ADMIN — SODIUM CHLORIDE 550 MG: 9 INJECTION, SOLUTION INTRAVENOUS at 10:16

## 2020-01-09 RX ORDER — MORPHINE SULFATE 15 MG/1
15 TABLET ORAL EVERY 8 HOURS
Qty: 90 TABLET | Refills: 0 | OUTPATIENT
Start: 2020-01-09 | End: 2020-02-08

## 2020-01-09 RX ORDER — OXYCODONE AND ACETAMINOPHEN 10; 325 MG/1; MG/1
1 TABLET ORAL EVERY 6 HOURS PRN
Qty: 120 TABLET | Refills: 0 | Status: SHIPPED | OUTPATIENT
Start: 2020-01-09 | End: 2020-02-05 | Stop reason: SDUPTHER

## 2020-01-09 RX ORDER — OXYCODONE AND ACETAMINOPHEN 10; 325 MG/1; MG/1
1 TABLET ORAL EVERY 6 HOURS PRN
Qty: 120 TABLET | Refills: 0 | OUTPATIENT
Start: 2020-01-09 | End: 2020-02-08

## 2020-01-15 ENCOUNTER — OFFICE VISIT (OUTPATIENT)
Dept: HEMATOLOGY | Age: 65
End: 2020-01-15
Payer: MEDICARE

## 2020-01-15 ENCOUNTER — HOSPITAL ENCOUNTER (OUTPATIENT)
Dept: INFUSION THERAPY | Age: 65
Discharge: HOME | End: 2020-01-15
Payer: MEDICARE

## 2020-01-15 VITALS
DIASTOLIC BLOOD PRESSURE: 66 MMHG | OXYGEN SATURATION: 95 % | SYSTOLIC BLOOD PRESSURE: 102 MMHG | WEIGHT: 121.5 LBS | TEMPERATURE: 98.2 F | HEIGHT: 64 IN | HEART RATE: 79 BPM | BODY MASS INDEX: 20.74 KG/M2 | RESPIRATION RATE: 16 BRPM

## 2020-01-15 DIAGNOSIS — C34.01 MALIGNANT NEOPLASM OF RIGHT MAIN BRONCHUS (HCC): Primary | ICD-10-CM

## 2020-01-15 DIAGNOSIS — R53.83 FATIGUE DUE TO TREATMENT: ICD-10-CM

## 2020-01-15 PROCEDURE — 85025 COMPLETE CBC W/AUTO DIFF WBC: CPT

## 2020-01-15 PROCEDURE — 4040F PNEUMOC VAC/ADMIN/RCVD: CPT | Performed by: INTERNAL MEDICINE

## 2020-01-15 PROCEDURE — G8484 FLU IMMUNIZE NO ADMIN: HCPCS | Performed by: INTERNAL MEDICINE

## 2020-01-15 PROCEDURE — G8427 DOCREV CUR MEDS BY ELIG CLIN: HCPCS | Performed by: INTERNAL MEDICINE

## 2020-01-15 PROCEDURE — G8420 CALC BMI NORM PARAMETERS: HCPCS | Performed by: INTERNAL MEDICINE

## 2020-01-15 PROCEDURE — 4004F PT TOBACCO SCREEN RCVD TLK: CPT | Performed by: INTERNAL MEDICINE

## 2020-01-15 PROCEDURE — G8400 PT W/DXA NO RESULTS DOC: HCPCS | Performed by: INTERNAL MEDICINE

## 2020-01-15 PROCEDURE — 6360000002 HC RX W HCPCS: Performed by: INTERNAL MEDICINE

## 2020-01-15 PROCEDURE — 1090F PRES/ABSN URINE INCON ASSESS: CPT | Performed by: INTERNAL MEDICINE

## 2020-01-15 PROCEDURE — 99214 OFFICE O/P EST MOD 30 MIN: CPT | Performed by: INTERNAL MEDICINE

## 2020-01-15 PROCEDURE — 96413 CHEMO IV INFUSION 1 HR: CPT

## 2020-01-15 PROCEDURE — 1123F ACP DISCUSS/DSCN MKR DOCD: CPT | Performed by: INTERNAL MEDICINE

## 2020-01-15 PROCEDURE — 2580000003 HC RX 258: Performed by: INTERNAL MEDICINE

## 2020-01-15 PROCEDURE — 3017F COLORECTAL CA SCREEN DOC REV: CPT | Performed by: INTERNAL MEDICINE

## 2020-01-15 RX ORDER — SODIUM CHLORIDE 0.9 % (FLUSH) 0.9 %
10 SYRINGE (ML) INJECTION PRN
Status: DISCONTINUED | OUTPATIENT
Start: 2020-01-15 | End: 2020-01-16 | Stop reason: HOSPADM

## 2020-01-15 RX ORDER — METHYLPREDNISOLONE SODIUM SUCCINATE 125 MG/2ML
125 INJECTION, POWDER, LYOPHILIZED, FOR SOLUTION INTRAMUSCULAR; INTRAVENOUS ONCE
Status: CANCELLED | OUTPATIENT
Start: 2020-01-15

## 2020-01-15 RX ORDER — SODIUM CHLORIDE 9 MG/ML
20 INJECTION, SOLUTION INTRAVENOUS ONCE
Status: CANCELLED | OUTPATIENT
Start: 2020-01-15

## 2020-01-15 RX ORDER — SODIUM CHLORIDE 9 MG/ML
INJECTION, SOLUTION INTRAVENOUS CONTINUOUS
Status: CANCELLED | OUTPATIENT
Start: 2020-01-15

## 2020-01-15 RX ORDER — DIPHENHYDRAMINE HYDROCHLORIDE 50 MG/ML
50 INJECTION INTRAMUSCULAR; INTRAVENOUS ONCE
Status: CANCELLED | OUTPATIENT
Start: 2020-01-15

## 2020-01-15 RX ORDER — HEPARIN SODIUM (PORCINE) LOCK FLUSH IV SOLN 100 UNIT/ML 100 UNIT/ML
300 SOLUTION INTRAVENOUS PRN
Status: DISCONTINUED | OUTPATIENT
Start: 2020-01-15 | End: 2020-01-16 | Stop reason: HOSPADM

## 2020-01-15 RX ORDER — SODIUM CHLORIDE 0.9 % (FLUSH) 0.9 %
5 SYRINGE (ML) INJECTION PRN
Status: CANCELLED | OUTPATIENT
Start: 2020-01-15

## 2020-01-15 RX ORDER — EPINEPHRINE 1 MG/ML
0.3 INJECTION, SOLUTION, CONCENTRATE INTRAVENOUS PRN
Status: CANCELLED | OUTPATIENT
Start: 2020-01-15

## 2020-01-15 RX ORDER — SODIUM CHLORIDE 0.9 % (FLUSH) 0.9 %
10 SYRINGE (ML) INJECTION PRN
Status: CANCELLED | OUTPATIENT
Start: 2020-01-15

## 2020-01-15 RX ORDER — HEPARIN SODIUM (PORCINE) LOCK FLUSH IV SOLN 100 UNIT/ML 100 UNIT/ML
300 SOLUTION INTRAVENOUS PRN
Status: CANCELLED | OUTPATIENT
Start: 2020-01-15

## 2020-01-15 RX ADMIN — SODIUM CHLORIDE, PRESERVATIVE FREE 300 UNITS: 5 INJECTION INTRAVENOUS at 11:01

## 2020-01-15 RX ADMIN — SODIUM CHLORIDE 550 MG: 9 INJECTION, SOLUTION INTRAVENOUS at 09:49

## 2020-01-15 RX ADMIN — SODIUM CHLORIDE, PRESERVATIVE FREE 10 ML: 5 INJECTION INTRAVENOUS at 11:00

## 2020-01-15 NOTE — PROGRESS NOTES
(SEEBRI NEOHALER) 15.6 MCG CAPS Inhale 1 capsule into the lungs      ketorolac (TORADOL) 60 MG/2ML injection 60 mg      lidocaine viscous hcl (XYLOCAINE) 2 % SOLN solution       vitamin D (ERGOCALCIFEROL) 42777 units CAPS capsule cholecalciferol (vitamin D3) 50,000 unit capsule   Take 1 capsule every week by oral route.  calcium carbonate 600 MG TABS tablet Take 600 mg by mouth      citalopram (CELEXA) 20 MG tablet citalopram 20 mg tablet   Take 1 tablet every day by oral route. No current facility-administered medications for this visit. Facility-Administered Medications Ordered in Other Visits   Medication Dose Route Frequency Provider Last Rate Last Dose    sodium chloride flush 0.9 % injection 10 mL  10 mL Intravenous PRN Lucille Brooks MD   10 mL at 01/15/20 1100    heparin flush 100 UNIT/ML injection 300 Units  300 Units Intracatheter PRN Lucille Brooks MD   300 Units at 01/15/20 1101        REVIEW OF SYSTEMS:    Constitutional: no fever, no night sweats,  fatigue;   HEENT: no blurring of vision, no double vision, no hearing difficulty, no tinnitus,no ulceration, no dental caries, no dysphagia  Lungs: no cough, no shortness of breath, no wheeze;   CVS: no palpitation, no chest pain, no shortness of breath;  GI: no abdominal pain, no nausea , no vomiting, no constipation;   MARIA VICTORIA: no dysuria, frequency and urgency, no hematuria, no kidney stones;   Musculoskeletal: no joint pain, swelling , stiffness;   Endocrine: no polyuria, polydypsia, no cold or heat intolerence; Hematology/lymphatic: no easy brusing or bleeding, no hx of clotting disorder; no peripheral adenopathy. Dermatology: no skin rash, no eczema, no pruritis;   Psychiatry: no depression, no anxiety,no panic attacks, no suicide ideation;    Neurology: no syncope, no seizures, no numbness or tingling of hands, no numbness or tingling of feet, no paresis;     PHYSICAL EXAM:    Vitals signs:    /66   Pulse 79 Temp 98.2 °F (36.8 °C)   Resp 16   Ht 5' 4\" (1.626 m)   Wt 121 lb 8 oz (55.1 kg)   SpO2 95%   BMI 20.86 kg/m²     Pain scale:2        CONSTITUTIONAL: Alert, appropriate, no acute distress, chronically ill-appearing  EYES: Non icteric, EOM intact, pupils equal round and reactive to light and accommodation. ENT: Oral mucus membranes moist, no oral pharyngeal lesions. External inspection of ears and nose are normal.   NECK: Supple, no masses. No palpable thyroid mass    CHEST/LUNGS: CTA bilaterally, normal respiratory effort   CARDIOVASCULAR: RRR, no murmurs. No lower extremity edema   ABDOMEN: soft non-tender, active bowel sounds, no hepatosplenomegaly. No palpable masses. EXTREMITIES: warm, Full ROM of all fours extremities. No focal weakness. SKIN: warm, dry with no rashes or lesions  LYMPH: No cervical, clavicular, axillary, or inguinal lymphadenopathy  NEUROLOGIC: follows commands, non focal.   PSYCH: mood and affect appropriate. Alert and oriented to time and place and person. Relevant Lab findings: Reviewed by me  GT  WBC-6.54  HGB-12.1  PLT-181,000  Neut-4.88      Relevant Imaging studies:  No results found. ASSESSMENT:    Orders Placed This Encounter   Procedures    Comprehensive Metabolic Panel     Standing Status:   Future     Standing Expiration Date:   1/15/2021    TSH without Reflex     Standing Status:   Future     Standing Expiration Date:   1/15/2021      Vi was seen today for lung cancer. 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   PET scan showed disease limited to the chest and mediastinum only. Therefore, stage IIIB. Status post completion of chemoradiation on 8/20/2019. CT chest abdomen pelvis showed stable to improved right lung lesion. Therefore recommended durvalumab every 2 weeks x1 year. The patient was counseled today about diagnosis, staging, prognosis, diagnostic tests, medications, side effects and disease management. The method of counseling included verbal explanation. The patient verbalized understanding. Repeat CT chest with contrast February 2020. Monitoring of toxicity-she denies any neuropathy. Smoke cessation-she was again strongly encouraged to quit. Cancer related pain-  opioid analgesics have potential for abuse and risk of fatal overdose due to respiratory depression. The use of opioid exposes individual to have the risk of addiction, abuse and misuse. Addiction can occur in individuals appropriately prescribed and at a recommended dose. Addiction also occurs if the drug is being used or abused. An individual is also at increased risk of opioid addiction if a personal or family history of substance abuse or mental illness are present. [id number] mg SR every 8 hours. Plan:  Continue Morphine SR 15 mg every 8 hours   Continue Percocet   CMP and TSH  Continue durvalumab in 2 weeks every 2 weeks x 1 year treatment  RTC in the 4 weeks with MD    I have seen, examined and reviewed this patient medication list, appropriate labs and imaging studies. I reviewed relevant medical records and others physicians notes. I discussed the plans of care with the patient. I answered all the questions to the patients satisfaction    The selection, dosing administration of anticancer agents in the management of associated toxicities are complex. Modifications of drug dose and schedule and the initiation of supportive care interventions are often necessary because of expected toxicities individual patient variability, prior treatment and comorbidity.  The optimal delivery of anticancer agents therefore requires a healthcare delivery team experienced in the use of anticancer agents and the management of associated toxicities in patients with cancer. Follow Up:     Return in about 3 weeks (around 2/5/2020) for Treatment Visit and see Dr. Christine Leonard in [address] [place] [address]. Data Michael Portillo am scribing for Toyin Dennis MD. Electronically signed by Hadley Portillo on 1/15/2020 at 5:34 PM.     I, Dr Vanessa Doyle, personally performed the services described in this documentation as scribed by Hadley Portillo MA in my presence and is both accurate and complete. Over 50% of the total visit time of 25 minutes was spent on counseling and/or coordination of care of history of lung cancer.   I personally discussed her care with Dr. Tien Zuniga over the phone today

## 2020-01-31 NOTE — PROGRESS NOTES
Vi [facility]   1955 2/5/2020     Chief Complaint   Patient presents with    Lung Cancer      Interval history/history of present illness:  Diagnosis   NSCLC, [facility] March 2019   [id number], stage IIIIB  Treatment summary  6/10/2019- 8/20/2019-carboplatin/Taxol weekly with concurrent radiation   12/4/2019- durvalumab x1 year      Interval history  The patient is a pleasant 75years old female who has a history of stage IIIb non-small cell lung cancer, squamous cell subtype. She has received induction chemoradiation. She had stable scans after completion of treatment. Therefore she was recommended 1 year of durvalumab. Unfortunately, continues to smoke. She denies any new respiratory symptoms but does report stable shortness of breath at exertion which is stable. She denies hemoptysis. She has ongoing fatigue. She denies any weight loss. She denies any skin rash. She denies any diarrhea. Cancer history  Ms Devorah Jones was first seen by me on 5/3/2019 for further workup of a lung mass. She has complains of chest wall pain. Further workup revealed a lung mass. 4/9/2019-CT of the chest with contrast showed a malignant mass in the right upper lobe measures 6.5 x 2.8 x 5.1 cm. The mass invades and destroys the right lateral cortex and lateral aspect of the T4 vertebral body. Mediastinal adenopathy. Specifically, 1.9 cm pretracheal lymph node. 2.1 cm precarinal lymph node. 1.9 cm subcarina lymph node. Right hilar adenopathy. 5/3/2019-she was first seen by me.   5/15/2019-bone scan and CT abdomen pelvis was unremarkable metastatic disease. 5/16/[id number] biopsy consistent non-small cell lung cancer, squamous cell carcinoma subtype. 5/29/2019-PET scan showed intense and metabolic activity associated with pleural-based right paraspinal mass within the right upper lobe. It invades into the adjacent thoracic vertebral body. Right hilar, subcarina and pretracheal lymphadenopathy.  No evidence of muscle Day Smoker    Smokeless tobacco: Never Used   Substance and Sexual Activity    Alcohol use: Not Currently    Drug use: Never    Sexual activity: Not on file   Lifestyle    Physical activity:     Days per week: Not on file     Minutes per session: Not on file    Stress: Not on file   Relationships    Social connections:     Talks on phone: Not on file     Gets together: Not on file     Attends [employer] service: Not on file     Active member of club or organization: Not on file     Attends meetings of clubs or organizations: Not on file     Relationship status: Not on file    Intimate partner violence:     Fear of current or ex partner: Not on file     Emotionally abused: Not on file     Physically abused: Not on file     Forced sexual activity: Not on file   Other Topics Concern    Not on file   Social History Narrative    Not on file        Family history:   Family History   Problem Relation Age of Onset    Colon Cancer Mother     High Blood Pressure Brother     Diabetes Brother         Current Outpatient Medications   Medication Sig Dispense Refill    Morphine Sulfate ER 15 MG TBEA Take 15 mg by mouth every 8 hours for 30 days. 90 each 0    Morphine Sulfate ER 15 MG TBEA Take 15 mg by mouth every 8 hours for 30 days. 90 each 0    oxyCODONE-acetaminophen (PERCOCET)  MG per tablet Take 1 tablet by mouth every 6 hours as needed for Pain for up to 30 days. Intended supply: 30 days 120 tablet 0    tiZANidine (ZANAFLEX) 4 MG tablet       Cholecalciferol (VITAMIN D3) 19416 units CAPS Take 50,000 Units by mouth      Glycopyrrolate (SEEBRI NEOHALER) 15.6 MCG CAPS Inhale 1 capsule into the lungs      ketorolac (TORADOL) 60 MG/2ML injection 60 mg      lidocaine viscous hcl (XYLOCAINE) 2 % SOLN solution       vitamin D (ERGOCALCIFEROL) 87480 units CAPS capsule cholecalciferol (vitamin D3) 50,000 unit capsule   Take 1 capsule every week by oral route.       OXYGEN Inhale 2 L/min into the lungs      potential for abuse and risk of fatal overdose due to respiratory depression. The use of opioid exposes individual to have the risk of addiction, abuse and misuse. Addiction can occur in individuals appropriately prescribed and at a recommended dose. Addiction also occurs if the drug is being used or abused. An individual is also at increased risk of opioid addiction if a personal or family history of substance abuse or mental illness are present. [id number] mg SR every 8 hours. Plan:  Continue Morphine SR 15 mg every 8 hours   Continue Percocet   CMP and TSH  Continue durvalumab in 2 weeks every 2 weeks x 1 year treatment  RTC in the 4 weeks with MD    I have seen, examined and reviewed this patient medication list, appropriate labs and imaging studies. I reviewed relevant medical records and others physicians notes. I discussed the plans of care with the patient. I answered all the questions to the patients satisfaction    The selection, dosing administration of anticancer agents in the management of associated toxicities are complex. Modifications of drug dose and schedule and the initiation of supportive care interventions are often necessary because of expected toxicities individual patient variability, prior treatment and comorbidity. The optimal delivery of anticancer agents therefore requires a healthcare delivery team experienced in the use of anticancer agents and the management of associated toxicities in patients with cancer. Follow Up:     Return in about 2 weeks (around 2/19/2020) for Treatment Visit and see Dr. Shell Mccarthy in [address] [place] [address]. Data Kofi Yee am scribing for Anna Porras MD. Electronically signed by Dylon Yee on 2/5/2020 at 5:34 PM.     I, Dr Isela Crooks, personally performed the services described in this documentation as scribed by Dylon Yee MA in my presence and is both accurate and complete.     Over 50% of the total visit time of 25 minutes was spent on counseling and/or coordination of care of history of lung cancer.   I personally discussed her care with Dr. Alexandra Condon over the phone today

## 2020-02-05 ENCOUNTER — OFFICE VISIT (OUTPATIENT)
Dept: HEMATOLOGY | Age: 65
End: 2020-02-05
Payer: MEDICARE

## 2020-02-05 ENCOUNTER — HOSPITAL ENCOUNTER (OUTPATIENT)
Dept: INFUSION THERAPY | Age: 65
Discharge: HOME | End: 2020-02-05
Payer: MEDICARE

## 2020-02-05 VITALS
DIASTOLIC BLOOD PRESSURE: 68 MMHG | SYSTOLIC BLOOD PRESSURE: 114 MMHG | OXYGEN SATURATION: 91 % | HEIGHT: 64 IN | TEMPERATURE: 96.9 F | BODY MASS INDEX: 20.66 KG/M2 | HEART RATE: 96 BPM | WEIGHT: 121 LBS

## 2020-02-05 DIAGNOSIS — R53.83 FATIGUE DUE TO TREATMENT: ICD-10-CM

## 2020-02-05 DIAGNOSIS — C34.01 MALIGNANT NEOPLASM OF RIGHT MAIN BRONCHUS (HCC): Primary | ICD-10-CM

## 2020-02-05 PROCEDURE — 99214 OFFICE O/P EST MOD 30 MIN: CPT | Performed by: INTERNAL MEDICINE

## 2020-02-05 PROCEDURE — 4004F PT TOBACCO SCREEN RCVD TLK: CPT | Performed by: INTERNAL MEDICINE

## 2020-02-05 PROCEDURE — G8400 PT W/DXA NO RESULTS DOC: HCPCS | Performed by: INTERNAL MEDICINE

## 2020-02-05 PROCEDURE — 3017F COLORECTAL CA SCREEN DOC REV: CPT | Performed by: INTERNAL MEDICINE

## 2020-02-05 PROCEDURE — 84443 ASSAY THYROID STIM HORMONE: CPT

## 2020-02-05 PROCEDURE — 85025 COMPLETE CBC W/AUTO DIFF WBC: CPT

## 2020-02-05 PROCEDURE — 80053 COMPREHEN METABOLIC PANEL: CPT

## 2020-02-05 PROCEDURE — G8420 CALC BMI NORM PARAMETERS: HCPCS | Performed by: INTERNAL MEDICINE

## 2020-02-05 PROCEDURE — 4040F PNEUMOC VAC/ADMIN/RCVD: CPT | Performed by: INTERNAL MEDICINE

## 2020-02-05 PROCEDURE — 36415 COLL VENOUS BLD VENIPUNCTURE: CPT

## 2020-02-05 PROCEDURE — 1090F PRES/ABSN URINE INCON ASSESS: CPT | Performed by: INTERNAL MEDICINE

## 2020-02-05 PROCEDURE — 1123F ACP DISCUSS/DSCN MKR DOCD: CPT | Performed by: INTERNAL MEDICINE

## 2020-02-05 PROCEDURE — 6360000002 HC RX W HCPCS: Performed by: INTERNAL MEDICINE

## 2020-02-05 PROCEDURE — G8484 FLU IMMUNIZE NO ADMIN: HCPCS | Performed by: INTERNAL MEDICINE

## 2020-02-05 PROCEDURE — 2580000003 HC RX 258: Performed by: INTERNAL MEDICINE

## 2020-02-05 PROCEDURE — 96413 CHEMO IV INFUSION 1 HR: CPT

## 2020-02-05 PROCEDURE — G8427 DOCREV CUR MEDS BY ELIG CLIN: HCPCS | Performed by: INTERNAL MEDICINE

## 2020-02-05 RX ORDER — HEPARIN SODIUM (PORCINE) LOCK FLUSH IV SOLN 100 UNIT/ML 100 UNIT/ML
300 SOLUTION INTRAVENOUS PRN
Status: DISCONTINUED | OUTPATIENT
Start: 2020-02-05 | End: 2020-02-07 | Stop reason: HOSPADM

## 2020-02-05 RX ORDER — SODIUM CHLORIDE 0.9 % (FLUSH) 0.9 %
5 SYRINGE (ML) INJECTION PRN
Status: CANCELLED | OUTPATIENT
Start: 2020-02-05

## 2020-02-05 RX ORDER — DIPHENHYDRAMINE HYDROCHLORIDE 50 MG/ML
50 INJECTION INTRAMUSCULAR; INTRAVENOUS ONCE
Status: CANCELLED | OUTPATIENT
Start: 2020-02-05

## 2020-02-05 RX ORDER — SODIUM CHLORIDE 9 MG/ML
INJECTION, SOLUTION INTRAVENOUS CONTINUOUS
Status: CANCELLED | OUTPATIENT
Start: 2020-02-05

## 2020-02-05 RX ORDER — OXYCODONE AND ACETAMINOPHEN 10; 325 MG/1; MG/1
1 TABLET ORAL EVERY 6 HOURS PRN
Qty: 120 TABLET | Refills: 0 | Status: SHIPPED | OUTPATIENT
Start: 2020-02-05 | End: 2020-03-10 | Stop reason: SDUPTHER

## 2020-02-05 RX ORDER — METHYLPREDNISOLONE SODIUM SUCCINATE 125 MG/2ML
125 INJECTION, POWDER, LYOPHILIZED, FOR SOLUTION INTRAMUSCULAR; INTRAVENOUS ONCE
Status: CANCELLED | OUTPATIENT
Start: 2020-02-05

## 2020-02-05 RX ORDER — HEPARIN SODIUM (PORCINE) LOCK FLUSH IV SOLN 100 UNIT/ML 100 UNIT/ML
300 SOLUTION INTRAVENOUS PRN
Status: CANCELLED
Start: 2020-02-05

## 2020-02-05 RX ORDER — EPINEPHRINE 1 MG/ML
0.3 INJECTION, SOLUTION, CONCENTRATE INTRAVENOUS PRN
Status: CANCELLED | OUTPATIENT
Start: 2020-02-05

## 2020-02-05 RX ORDER — SODIUM CHLORIDE 0.9 % (FLUSH) 0.9 %
10 SYRINGE (ML) INJECTION PRN
Status: CANCELLED | OUTPATIENT
Start: 2020-02-05

## 2020-02-05 RX ORDER — SODIUM CHLORIDE 9 MG/ML
20 INJECTION, SOLUTION INTRAVENOUS ONCE
Status: CANCELLED | OUTPATIENT
Start: 2020-02-05

## 2020-02-05 RX ORDER — SODIUM CHLORIDE 0.9 % (FLUSH) 0.9 %
10 SYRINGE (ML) INJECTION PRN
Status: DISCONTINUED | OUTPATIENT
Start: 2020-02-05 | End: 2020-02-06 | Stop reason: HOSPADM

## 2020-02-05 RX ORDER — OXYCODONE AND ACETAMINOPHEN 10; 325 MG/1; MG/1
1 TABLET ORAL EVERY 6 HOURS PRN
Qty: 120 TABLET | Refills: 0 | Status: SHIPPED | OUTPATIENT
Start: 2020-02-05 | End: 2020-02-05

## 2020-02-05 RX ADMIN — HEPARIN 300 UNITS: 100 SYRINGE at 12:33

## 2020-02-05 RX ADMIN — SODIUM CHLORIDE 550 MG: 9 INJECTION, SOLUTION INTRAVENOUS at 11:19

## 2020-02-05 RX ADMIN — SODIUM CHLORIDE, PRESERVATIVE FREE 10 ML: 5 INJECTION INTRAVENOUS at 12:33

## 2020-02-19 ENCOUNTER — HOSPITAL ENCOUNTER (OUTPATIENT)
Dept: CT IMAGING | Age: 65
Discharge: HOME | End: 2020-02-19
Payer: MEDICARE

## 2020-02-19 ENCOUNTER — OFFICE VISIT (OUTPATIENT)
Dept: HEMATOLOGY | Age: 65
End: 2020-02-19
Payer: MEDICARE

## 2020-02-19 ENCOUNTER — HOSPITAL ENCOUNTER (OUTPATIENT)
Dept: INFUSION THERAPY | Age: 65
Discharge: HOME | End: 2020-02-19
Payer: MEDICARE

## 2020-02-19 VITALS
HEART RATE: 77 BPM | SYSTOLIC BLOOD PRESSURE: 110 MMHG | HEIGHT: 64 IN | WEIGHT: 124 LBS | DIASTOLIC BLOOD PRESSURE: 78 MMHG | BODY MASS INDEX: 21.17 KG/M2 | TEMPERATURE: 98.3 F | OXYGEN SATURATION: 92 %

## 2020-02-19 DIAGNOSIS — Z51.11 CHEMOTHERAPY MANAGEMENT, ENCOUNTER FOR: ICD-10-CM

## 2020-02-19 DIAGNOSIS — C34.01 MALIGNANT NEOPLASM OF RIGHT MAIN BRONCHUS (HCC): Primary | ICD-10-CM

## 2020-02-19 DIAGNOSIS — R68.89 OTHER GENERAL SYMPTOMS AND SIGNS: ICD-10-CM

## 2020-02-19 DIAGNOSIS — Z51.12 ENCOUNTER FOR ANTINEOPLASTIC IMMUNOTHERAPY: ICD-10-CM

## 2020-02-19 DIAGNOSIS — T45.1X5D ADVERSE EFFECT OF CHEMOTHERAPY, SUBSEQUENT ENCOUNTER: ICD-10-CM

## 2020-02-19 PROCEDURE — 80053 COMPREHEN METABOLIC PANEL: CPT

## 2020-02-19 PROCEDURE — 96401 CHEMO ANTI-NEOPL SQ/IM: CPT

## 2020-02-19 PROCEDURE — G8427 DOCREV CUR MEDS BY ELIG CLIN: HCPCS | Performed by: INTERNAL MEDICINE

## 2020-02-19 PROCEDURE — 6360000002 HC RX W HCPCS: Performed by: INTERNAL MEDICINE

## 2020-02-19 PROCEDURE — 3017F COLORECTAL CA SCREEN DOC REV: CPT | Performed by: INTERNAL MEDICINE

## 2020-02-19 PROCEDURE — G8484 FLU IMMUNIZE NO ADMIN: HCPCS | Performed by: INTERNAL MEDICINE

## 2020-02-19 PROCEDURE — 74177 CT ABD & PELVIS W/CONTRAST: CPT

## 2020-02-19 PROCEDURE — 84443 ASSAY THYROID STIM HORMONE: CPT

## 2020-02-19 PROCEDURE — 6360000004 HC RX CONTRAST MEDICATION: Performed by: INTERNAL MEDICINE

## 2020-02-19 PROCEDURE — 99214 OFFICE O/P EST MOD 30 MIN: CPT | Performed by: INTERNAL MEDICINE

## 2020-02-19 PROCEDURE — G8420 CALC BMI NORM PARAMETERS: HCPCS | Performed by: INTERNAL MEDICINE

## 2020-02-19 PROCEDURE — 4040F PNEUMOC VAC/ADMIN/RCVD: CPT | Performed by: INTERNAL MEDICINE

## 2020-02-19 PROCEDURE — 1090F PRES/ABSN URINE INCON ASSESS: CPT | Performed by: INTERNAL MEDICINE

## 2020-02-19 PROCEDURE — G8400 PT W/DXA NO RESULTS DOC: HCPCS | Performed by: INTERNAL MEDICINE

## 2020-02-19 PROCEDURE — 36415 COLL VENOUS BLD VENIPUNCTURE: CPT

## 2020-02-19 PROCEDURE — 71260 CT THORAX DX C+: CPT

## 2020-02-19 PROCEDURE — 4004F PT TOBACCO SCREEN RCVD TLK: CPT | Performed by: INTERNAL MEDICINE

## 2020-02-19 PROCEDURE — 1123F ACP DISCUSS/DSCN MKR DOCD: CPT | Performed by: INTERNAL MEDICINE

## 2020-02-19 PROCEDURE — 2580000003 HC RX 258: Performed by: INTERNAL MEDICINE

## 2020-02-19 PROCEDURE — 85025 COMPLETE CBC W/AUTO DIFF WBC: CPT

## 2020-02-19 RX ORDER — SODIUM CHLORIDE 0.9 % (FLUSH) 0.9 %
10 SYRINGE (ML) INJECTION PRN
Status: DISCONTINUED | OUTPATIENT
Start: 2020-02-19 | End: 2020-02-20 | Stop reason: HOSPADM

## 2020-02-19 RX ORDER — METHYLPREDNISOLONE SODIUM SUCCINATE 125 MG/2ML
125 INJECTION, POWDER, LYOPHILIZED, FOR SOLUTION INTRAMUSCULAR; INTRAVENOUS ONCE
Status: CANCELLED | OUTPATIENT
Start: 2020-02-19

## 2020-02-19 RX ORDER — HEPARIN SODIUM (PORCINE) LOCK FLUSH IV SOLN 100 UNIT/ML 100 UNIT/ML
500 SOLUTION INTRAVENOUS PRN
Status: DISCONTINUED | OUTPATIENT
Start: 2020-02-19 | End: 2020-02-21 | Stop reason: HOSPADM

## 2020-02-19 RX ORDER — HEPARIN SODIUM (PORCINE) LOCK FLUSH IV SOLN 100 UNIT/ML 100 UNIT/ML
500 SOLUTION INTRAVENOUS PRN
Status: CANCELLED
Start: 2020-02-19

## 2020-02-19 RX ORDER — SODIUM CHLORIDE 9 MG/ML
INJECTION, SOLUTION INTRAVENOUS CONTINUOUS
Status: CANCELLED | OUTPATIENT
Start: 2020-02-19

## 2020-02-19 RX ORDER — DIPHENHYDRAMINE HYDROCHLORIDE 50 MG/ML
50 INJECTION INTRAMUSCULAR; INTRAVENOUS ONCE
Status: CANCELLED | OUTPATIENT
Start: 2020-02-19

## 2020-02-19 RX ORDER — SODIUM CHLORIDE 9 MG/ML
20 INJECTION, SOLUTION INTRAVENOUS ONCE
Status: CANCELLED | OUTPATIENT
Start: 2020-02-19

## 2020-02-19 RX ORDER — EPINEPHRINE 1 MG/ML
0.3 INJECTION, SOLUTION, CONCENTRATE INTRAVENOUS PRN
Status: CANCELLED | OUTPATIENT
Start: 2020-02-19

## 2020-02-19 RX ORDER — SODIUM CHLORIDE 0.9 % (FLUSH) 0.9 %
5 SYRINGE (ML) INJECTION PRN
Status: CANCELLED | OUTPATIENT
Start: 2020-02-19

## 2020-02-19 RX ORDER — SODIUM CHLORIDE 0.9 % (FLUSH) 0.9 %
10 SYRINGE (ML) INJECTION PRN
Status: CANCELLED | OUTPATIENT
Start: 2020-02-19

## 2020-02-19 RX ADMIN — IOPAMIDOL 75 ML: 755 INJECTION, SOLUTION INTRAVENOUS at 09:34

## 2020-02-19 RX ADMIN — SODIUM CHLORIDE, PRESERVATIVE FREE 10 ML: 5 INJECTION INTRAVENOUS at 13:16

## 2020-02-19 RX ADMIN — HEPARIN 500 UNITS: 100 SYRINGE at 13:16

## 2020-02-19 RX ADMIN — SODIUM CHLORIDE 550 MG: 9 INJECTION, SOLUTION INTRAVENOUS at 12:09

## 2020-02-19 NOTE — PROGRESS NOTES
Aidee Henderson Probus   1955 2/19/2020     Chief Complaint   Patient presents with    Lung Cancer      Interval history/history of present illness:  Diagnosis   NSCLC, [facility] March 2019   [id number], stage IIIIB  Treatment summary  6/10/2019- 8/20/2019-carboplatin/Taxol weekly with concurrent radiation   12/4/2019- durvalumab x1 year      Interval history  The patient is a pleasant 77years old female who has a history of stage IIIb non-small cell lung cancer, squamous cell subtype. She has received a combined modality therapy with chemoradiation with carboplatin Taxol weekly. She had CT scans performed after completion of treatment that showed stable disease. Therefore she was recommended 1 year of adjuvant durvalumab. Unfortunately, the patient continues to smoke. She is not interested in quitting at this time. She reports short of breath at exertion and chronic fatigue. She does report that she feels more fatigued after her treatment. She denies any diarrhea. She denies any weight loss. She denies any skin rash. Cancer history  Ms Tien Kennedy was first seen by me on 5/3/2019 for further workup of a lung mass. She has complains of chest wall pain. Further workup revealed a lung mass. 4/9/2019-CT of the chest with contrast showed a malignant mass in the right upper lobe measures 6.5 x 2.8 x 5.1 cm. The mass invades and destroys the right lateral cortex and lateral aspect of the T4 vertebral body. Mediastinal adenopathy. Specifically, 1.9 cm pretracheal lymph node. 2.1 cm precarinal lymph node. 1.9 cm subcarina lymph node. Right hilar adenopathy. 5/3/2019-she was first seen by me.   5/15/2019-bone scan and CT abdomen pelvis was unremarkable metastatic disease. 5/16/[id number] biopsy consistent non-small cell lung cancer, squamous cell carcinoma subtype. 5/29/2019-PET scan showed intense and metabolic activity associated with pleural-based right paraspinal mass within the right upper lobe.  It invades into the adjacent thoracic vertebral body. Right hilar, subcarina and pretracheal lymphadenopathy. No evidence of muscle skeletal or intra-abdominal disease. 2019-recommended chemoradiation with carboplatin/Taxol weekly with concurrent radiation. 10/14/2019- CT chest abdomen pelvis showed a cavitary lesion measuring 3.3 x 4.6 x 3.4 cm which appears to be decreased in size. Underlying lung emphysema. There is a new notable sclerosis within the right lateral aspect of T4 and T5 vertebral bodies at the site of the posterior mediastinal invasion by the right lung malignancy. CT of the abdomen pelvis showed tiny hypodense in the liver which are too small to characterize but similar to prior exam.  Attention to follow-up. Stable nodule in the left adrenal gland too small to characterize. 2020-CT chest abdomen pelvis showed slight decrease in size of the right upper lobe cavitary lesion. The lesion now measures 2.6 x 4.5 x 3.9 cm (previously 3.3 x 4.6 x 3.4 cm). Subtle cortical erosion of the right side of the centrum at T4/T5. No evidence of metastatic disease in the abdomen. Stable left adrenal nodule.       ECO    Treatment related toxicity: Fatigue    Past medical history:  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     COPD (chronic obstructive pulmonary disease) (HCC)     Depression     Emphysema (subcutaneous) (surgical) resulting from a procedure     History of lung biopsy 2019    Malignant neoplasm of right main bronchus ([facility] [address] 2019    NSCLC, SCC [id number] stage IIIb    Syncope     TIA (transient ischemic attack)         Past surgical history:  Past Surgical History:   Procedure Laterality Date    APPENDECTOMY      BACK SURGERY      times two    BLADDER SUSPENSION      CHOLECYSTECTOMY      HYSTERECTOMY      INCONTINENCE SURGERY          Social history:  Social History     Socioeconomic History    Marital status:      Spouse name: Not on file    Number of children: Not on file    Years of education: Not on file    Highest education level: Not on file   Occupational History    Not on file   Social Needs    Financial resource strain: Not on file    Food insecurity:     Worry: Not on file     Inability: Not on file    Transportation needs:     Medical: Not on file     Non-medical: Not on file   Tobacco Use    Smoking status: Current Every Day Smoker    Smokeless tobacco: Never Used   Substance and Sexual Activity    Alcohol use: Not Currently    Drug use: Never    Sexual activity: Not on file   Lifestyle    Physical activity:     Days per week: Not on file     Minutes per session: Not on file    Stress: Not on file   Relationships    Social connections:     Talks on phone: Not on file     Gets together: Not on file     Attends Druze service: Not on file     Active member of club or organization: Not on file     Attends meetings of clubs or organizations: Not on file     Relationship status: Not on file    Intimate partner violence:     Fear of current or ex partner: Not on file     Emotionally abused: Not on file     Physically abused: Not on file     Forced sexual activity: Not on file   Other Topics Concern    Not on file   Social History Narrative    Not on file        Family history:   Family History   Problem Relation Age of Onset    Colon Cancer Mother     High Blood Pressure Brother     Diabetes Brother         Current Outpatient Medications   Medication Sig Dispense Refill    Morphine Sulfate ER 15 MG TBEA Take 15 mg by mouth every 8 hours for 30 days. 90 each 0    Morphine Sulfate ER 15 MG TBEA Take 15 mg by mouth every 8 hours for 30 days. 90 each 0    oxyCODONE-acetaminophen (PERCOCET)  MG per tablet Take 1 tablet by mouth every 6 hours as needed for Pain for up to 30 days.  Intended supply: 30 days 120 tablet 0    tiZANidine (ZANAFLEX) 4 MG tablet       Cholecalciferol (VITAMIN D3) 17043 units CAPS Take 50,000 Units by mouth      Glycopyrrolate (SEEBRI NEOHALER) 15.6 MCG CAPS Inhale 1 capsule into the lungs      ketorolac (TORADOL) 60 MG/2ML injection 60 mg      lidocaine viscous hcl (XYLOCAINE) 2 % SOLN solution       vitamin D (ERGOCALCIFEROL) 67293 units CAPS capsule cholecalciferol (vitamin D3) 50,000 unit capsule   Take 1 capsule every week by oral route.  OXYGEN Inhale 2 L/min into the lungs      budesonide-formoterol (SYMBICORT) 160-4.5 MCG/ACT AERO Symbicort 160 mcg-4.5 mcg/actuation HFA aerosol inhaler   Inhale 2 puffs twice a day by inhalation route.  albuterol sulfate HFA (PROAIR HFA) 108 (90 Base) MCG/ACT inhaler ProAir HFA 90 mcg/actuation aerosol inhaler   Inhale 2 puffs every 4 hours by inhalation route as needed.  aspirin EC 81 MG EC tablet Take 81 mg by mouth      calcium carbonate 600 MG TABS tablet Take 600 mg by mouth      citalopram (CELEXA) 20 MG tablet citalopram 20 mg tablet   Take 1 tablet every day by oral route. No current facility-administered medications for this visit.       Facility-Administered Medications Ordered in Other Visits   Medication Dose Route Frequency Provider Last Rate Last Dose    heparin flush 100 UNIT/ML injection 500 Units  500 Units Intracatheter PRN Jena Rudd MD   500 Units at 02/19/20 1316    sodium chloride flush 0.9 % injection 10 mL  10 mL Intravenous PRN Jena Rudd MD   10 mL at 02/19/20 1316        REVIEW OF SYSTEMS:    Constitutional: no fever, no night sweats,  fatigue;   HEENT: no blurring of vision, no double vision, no hearing difficulty, no tinnitus,no ulceration, no dental caries, no dysphagia  Lungs: no cough,  shortness of breath, no wheeze;   CVS: no palpitation, no chest pain, shortness of breath;  GI: no abdominal pain, no nausea , no vomiting, no constipation;   MARIA VICTORIA: no dysuria, frequency and urgency, no hematuria, no kidney stones; Musculoskeletal: no joint pain, swelling , stiffness;   Endocrine: no polyuria, polydypsia, no cold or heat intolerence; Hematology/lymphatic: no easy brusing or bleeding, no hx of clotting disorder; no peripheral adenopathy. Dermatology: no skin rash, no eczema, no pruritis;   Psychiatry: no depression, no anxiety,no panic attacks, no suicide ideation; Neurology: no syncope, no seizures, no numbness or tingling of hands, no numbness or tingling of feet, no paresis;     PHYSICAL EXAM:    Vitals signs:    /78   Pulse 77   Temp 98.3 °F (36.8 °C)   Ht 5' 4\" (1.626 m)   Wt 124 lb (56.2 kg)   SpO2 92%   BMI 21.28 kg/m²     Pain scale:2        CONSTITUTIONAL: Alert, appropriate, no acute distress, chronically ill-appearing  EYES: Non icteric, EOM intact, pupils equal round and reactive to light and accommodation. ENT: Oral mucus membranes moist, no oral pharyngeal lesions. External inspection of ears and nose are normal.  O2 nasal cannula 2 L/min  NECK: Supple, no masses. No palpable thyroid mass    CHEST/LUNGS: CTA bilaterally, normal respiratory effort   CARDIOVASCULAR: RRR, no murmurs. No lower extremity edema   ABDOMEN: soft non-tender, active bowel sounds, no hepatosplenomegaly. No palpable masses. EXTREMITIES: warm, Full ROM of all fours extremities. No focal weakness. SKIN: warm, dry with no rashes or lesions  LYMPH: No cervical, clavicular, axillary, or inguinal lymphadenopathy  NEUROLOGIC: follows commands, non focal.   PSYCH: mood and affect appropriate. Alert and oriented to time and place and person.     Relevant Lab findings: Reviewed by me  CBC: 2/19/2020  WBC- 6.53  HGB- 11.5  PLT- 115  Neut- 5.30        Relevant Imaging studies:  Ct Chest W Contrast    Result Date: 2/19/2020  EXAMINATION: CT CHEST W CONTRAST 2/19/2020 9:43 AM HISTORY: CT CHEST W CONTRAST 2/19/2020 7:40 AM HISTORY: C34.01 COMPARISON: October 18, 2019 DLP: 192 mGy cm TECHNIQUE: Serial helical tomographic images of the instrumented fusion L3-S1 4. Vascular calcifications present in the abdominal aorta and iliac arteries. Signed by Dr Awais Adame on 2/19/2020 10:01 AM      ASSESSMENT:    Orders Placed This Encounter   Procedures    TSH without Reflex     Standing Status:   Future     Number of Occurrences:   1     Standing Expiration Date:   2/19/2021    Comprehensive Metabolic Panel     Standing Status:   Future     Number of Occurrences:   1     Standing Expiration Date:   2/19/2021      Vi was seen today for lung cancer. Diagnoses and all orders for this visit:    Malignant neoplasm of right main bronchus (HCC)  -     TSH without Reflex; Future  -     Comprehensive Metabolic Panel; Future    Adverse effect of chemotherapy, subsequent encounter  -     TSH without Reflex; Future  -     Comprehensive Metabolic Panel; Future    Encounter for antineoplastic immunotherapy  -     TSH without Reflex; Future  -     Comprehensive Metabolic Panel; Future    Chemotherapy management, encounter for  -     TSH without Reflex; Future  -     Comprehensive Metabolic Panel; Future    Care plan discussed with patient    Other general symptoms and signs   -     TSH without Reflex; Future       NSCLC-SCC stage IIIB   Bone scan, CT abdomen pelvis unremarkable metastatic disease. PET scan showed disease limited to the chest and mediastinum only. Therefore, stage IIIB. Status post completion of chemoradiation on 8/20/2019. The patient was counseled today about diagnosis, staging, prognosis, diagnostic tests, medications, side effects and disease management. The method of counseling included verbal explanation. The patient verbalized understanding. Repeat CT chest with contrast February 2020 showed overall stable to improved disease. Continue treatment with durvalumab to complete 1 year. Monitoring of toxicity-she denies any neuropathy. Chronic fatigue. Smoke cessation-she was again strongly encouraged to quit.    Cancer related pain-  opioid analgesics have potential for abuse and risk of fatal overdose due to respiratory depression. The use of opioid exposes individual to have the risk of addiction, abuse and misuse. Addiction can occur in individuals appropriately prescribed and at a recommended dose. Addiction also occurs if the drug is being used or abused. An individual is also at increased risk of opioid addiction if a personal or family history of substance abuse or mental illness are present. [id number] mg SR every 8 hours. Plan:  Continue Morphine SR 15 mg every 8 hours   Continue Percocet   CMP and TSH  Continue durvalumab in 2 weeks every 2 weeks x 1 year treatment  RTC in the 6 weeks with MD    I have seen, examined and reviewed this patient medication list, appropriate labs and imaging studies. I reviewed relevant medical records and others physicians notes. I discussed the plans of care with the patient. I answered all the questions to the patients satisfaction    The selection, dosing administration of anticancer agents in the management of associated toxicities are complex. Modifications of drug dose and schedule and the initiation of supportive care interventions are often necessary because of expected toxicities individual patient variability, prior treatment and comorbidity. The optimal delivery of anticancer agents therefore requires a healthcare delivery team experienced in the use of anticancer agents and the management of associated toxicities in patients with cancer. Follow Up:     Return in about 6 weeks (around 4/1/2020) for treatment and see Dr. Pierre Jones. Return every 2 weeks for treatment      ISadaf am scribing for Eladio Trevino MD. Electronically signed by Remedios Sagastume LPN on 5/22/3346 at 0:21 PM    I, Dr Darin Almonte, personally performed the services described in this documentation as scribed by Celia Madden MA in my presence and is both accurate and complete.     Over

## 2020-02-27 ENCOUNTER — HOSPITAL ENCOUNTER (OUTPATIENT)
Dept: RADIATION ONCOLOGY | Facility: HOSPITAL | Age: 65
Setting detail: RADIATION/ONCOLOGY SERIES
End: 2020-02-27

## 2020-03-04 ENCOUNTER — HOSPITAL ENCOUNTER (OUTPATIENT)
Dept: INFUSION THERAPY | Age: 65
Discharge: HOME | End: 2020-03-04
Payer: MEDICARE

## 2020-03-04 VITALS
DIASTOLIC BLOOD PRESSURE: 58 MMHG | SYSTOLIC BLOOD PRESSURE: 88 MMHG | HEART RATE: 83 BPM | HEIGHT: 64 IN | WEIGHT: 123.2 LBS | BODY MASS INDEX: 21.03 KG/M2 | OXYGEN SATURATION: 91 %

## 2020-03-04 DIAGNOSIS — C34.01 MALIGNANT NEOPLASM OF RIGHT MAIN BRONCHUS (HCC): Primary | ICD-10-CM

## 2020-03-04 PROCEDURE — 6360000002 HC RX W HCPCS: Performed by: INTERNAL MEDICINE

## 2020-03-04 PROCEDURE — 96413 CHEMO IV INFUSION 1 HR: CPT

## 2020-03-04 PROCEDURE — 85025 COMPLETE CBC W/AUTO DIFF WBC: CPT

## 2020-03-04 PROCEDURE — 2580000003 HC RX 258: Performed by: INTERNAL MEDICINE

## 2020-03-04 RX ORDER — SODIUM CHLORIDE 9 MG/ML
20 INJECTION, SOLUTION INTRAVENOUS ONCE
Status: CANCELLED | OUTPATIENT
Start: 2020-03-04

## 2020-03-04 RX ORDER — DIPHENHYDRAMINE HYDROCHLORIDE 50 MG/ML
50 INJECTION INTRAMUSCULAR; INTRAVENOUS ONCE
Status: CANCELLED | OUTPATIENT
Start: 2020-03-04

## 2020-03-04 RX ORDER — SODIUM CHLORIDE 0.9 % (FLUSH) 0.9 %
5 SYRINGE (ML) INJECTION PRN
Status: CANCELLED | OUTPATIENT
Start: 2020-03-04

## 2020-03-04 RX ORDER — EPINEPHRINE 1 MG/ML
0.3 INJECTION, SOLUTION, CONCENTRATE INTRAVENOUS PRN
Status: CANCELLED | OUTPATIENT
Start: 2020-03-04

## 2020-03-04 RX ORDER — HEPARIN SODIUM (PORCINE) LOCK FLUSH IV SOLN 100 UNIT/ML 100 UNIT/ML
500 SOLUTION INTRAVENOUS PRN
Status: DISCONTINUED | OUTPATIENT
Start: 2020-03-04 | End: 2020-03-06 | Stop reason: HOSPADM

## 2020-03-04 RX ORDER — SODIUM CHLORIDE 0.9 % (FLUSH) 0.9 %
10 SYRINGE (ML) INJECTION PRN
Status: CANCELLED | OUTPATIENT
Start: 2020-03-04

## 2020-03-04 RX ORDER — SODIUM CHLORIDE 9 MG/ML
INJECTION, SOLUTION INTRAVENOUS CONTINUOUS
Status: CANCELLED | OUTPATIENT
Start: 2020-03-04

## 2020-03-04 RX ORDER — SODIUM CHLORIDE 0.9 % (FLUSH) 0.9 %
10 SYRINGE (ML) INJECTION PRN
Status: DISCONTINUED | OUTPATIENT
Start: 2020-03-04 | End: 2020-03-05 | Stop reason: HOSPADM

## 2020-03-04 RX ORDER — HEPARIN SODIUM (PORCINE) LOCK FLUSH IV SOLN 100 UNIT/ML 100 UNIT/ML
500 SOLUTION INTRAVENOUS PRN
Status: CANCELLED
Start: 2020-03-04

## 2020-03-04 RX ORDER — METHYLPREDNISOLONE SODIUM SUCCINATE 125 MG/2ML
125 INJECTION, POWDER, LYOPHILIZED, FOR SOLUTION INTRAMUSCULAR; INTRAVENOUS ONCE
Status: CANCELLED | OUTPATIENT
Start: 2020-03-04

## 2020-03-04 RX ADMIN — SODIUM CHLORIDE 550 MG: 9 INJECTION, SOLUTION INTRAVENOUS at 12:35

## 2020-03-04 RX ADMIN — SODIUM CHLORIDE, PRESERVATIVE FREE 10 ML: 5 INJECTION INTRAVENOUS at 13:48

## 2020-03-04 RX ADMIN — HEPARIN 500 UNITS: 100 SYRINGE at 13:48

## 2020-03-10 RX ORDER — OXYCODONE AND ACETAMINOPHEN 10; 325 MG/1; MG/1
1 TABLET ORAL EVERY 6 HOURS PRN
Qty: 120 TABLET | Refills: 0 | Status: SHIPPED | OUTPATIENT
Start: 2020-03-10 | End: 2020-04-07 | Stop reason: SDUPTHER

## 2020-04-01 ENCOUNTER — OFFICE VISIT (OUTPATIENT)
Dept: HEMATOLOGY | Age: 65
End: 2020-04-01
Payer: MEDICARE

## 2020-04-01 ENCOUNTER — HOSPITAL ENCOUNTER (OUTPATIENT)
Dept: INFUSION THERAPY | Age: 65
Discharge: HOME | End: 2020-04-01
Payer: MEDICARE

## 2020-04-01 VITALS
SYSTOLIC BLOOD PRESSURE: 116 MMHG | BODY MASS INDEX: 20.32 KG/M2 | OXYGEN SATURATION: 94 % | DIASTOLIC BLOOD PRESSURE: 82 MMHG | WEIGHT: 119 LBS | HEART RATE: 94 BPM | TEMPERATURE: 97.7 F | HEIGHT: 64 IN

## 2020-04-01 DIAGNOSIS — R53.83 FATIGUE DUE TO TREATMENT: ICD-10-CM

## 2020-04-01 DIAGNOSIS — C34.01 MALIGNANT NEOPLASM OF RIGHT MAIN BRONCHUS (HCC): Primary | ICD-10-CM

## 2020-04-01 PROCEDURE — 4040F PNEUMOC VAC/ADMIN/RCVD: CPT | Performed by: INTERNAL MEDICINE

## 2020-04-01 PROCEDURE — 1090F PRES/ABSN URINE INCON ASSESS: CPT | Performed by: INTERNAL MEDICINE

## 2020-04-01 PROCEDURE — 85025 COMPLETE CBC W/AUTO DIFF WBC: CPT

## 2020-04-01 PROCEDURE — 3017F COLORECTAL CA SCREEN DOC REV: CPT | Performed by: INTERNAL MEDICINE

## 2020-04-01 PROCEDURE — 84443 ASSAY THYROID STIM HORMONE: CPT

## 2020-04-01 PROCEDURE — 2580000003 HC RX 258: Performed by: INTERNAL MEDICINE

## 2020-04-01 PROCEDURE — 80053 COMPREHEN METABOLIC PANEL: CPT

## 2020-04-01 PROCEDURE — 96413 CHEMO IV INFUSION 1 HR: CPT

## 2020-04-01 PROCEDURE — G8428 CUR MEDS NOT DOCUMENT: HCPCS | Performed by: INTERNAL MEDICINE

## 2020-04-01 PROCEDURE — 1123F ACP DISCUSS/DSCN MKR DOCD: CPT | Performed by: INTERNAL MEDICINE

## 2020-04-01 PROCEDURE — 36415 COLL VENOUS BLD VENIPUNCTURE: CPT

## 2020-04-01 PROCEDURE — 99214 OFFICE O/P EST MOD 30 MIN: CPT | Performed by: INTERNAL MEDICINE

## 2020-04-01 PROCEDURE — G8400 PT W/DXA NO RESULTS DOC: HCPCS | Performed by: INTERNAL MEDICINE

## 2020-04-01 PROCEDURE — 4004F PT TOBACCO SCREEN RCVD TLK: CPT | Performed by: INTERNAL MEDICINE

## 2020-04-01 PROCEDURE — 6360000002 HC RX W HCPCS: Performed by: INTERNAL MEDICINE

## 2020-04-01 PROCEDURE — G8420 CALC BMI NORM PARAMETERS: HCPCS | Performed by: INTERNAL MEDICINE

## 2020-04-01 RX ORDER — HEPARIN SODIUM (PORCINE) LOCK FLUSH IV SOLN 100 UNIT/ML 100 UNIT/ML
500 SOLUTION INTRAVENOUS PRN
Status: CANCELLED
Start: 2020-04-01

## 2020-04-01 RX ORDER — METHYLPREDNISOLONE SODIUM SUCCINATE 125 MG/2ML
125 INJECTION, POWDER, LYOPHILIZED, FOR SOLUTION INTRAMUSCULAR; INTRAVENOUS ONCE
Status: CANCELLED | OUTPATIENT
Start: 2020-04-01

## 2020-04-01 RX ORDER — SODIUM CHLORIDE 0.9 % (FLUSH) 0.9 %
10 SYRINGE (ML) INJECTION PRN
Status: DISCONTINUED | OUTPATIENT
Start: 2020-04-01 | End: 2020-04-02 | Stop reason: HOSPADM

## 2020-04-01 RX ORDER — SODIUM CHLORIDE 9 MG/ML
20 INJECTION, SOLUTION INTRAVENOUS ONCE
Status: CANCELLED | OUTPATIENT
Start: 2020-04-01

## 2020-04-01 RX ORDER — HEPARIN SODIUM (PORCINE) LOCK FLUSH IV SOLN 100 UNIT/ML 100 UNIT/ML
500 SOLUTION INTRAVENOUS PRN
Status: DISCONTINUED | OUTPATIENT
Start: 2020-04-01 | End: 2020-04-03 | Stop reason: HOSPADM

## 2020-04-01 RX ORDER — EPINEPHRINE 1 MG/ML
0.3 INJECTION, SOLUTION, CONCENTRATE INTRAVENOUS PRN
Status: CANCELLED | OUTPATIENT
Start: 2020-04-01

## 2020-04-01 RX ORDER — SODIUM CHLORIDE 0.9 % (FLUSH) 0.9 %
5 SYRINGE (ML) INJECTION PRN
Status: CANCELLED | OUTPATIENT
Start: 2020-04-01

## 2020-04-01 RX ORDER — SODIUM CHLORIDE 9 MG/ML
20 INJECTION, SOLUTION INTRAVENOUS ONCE
Status: DISCONTINUED | OUTPATIENT
Start: 2020-04-01 | End: 2020-04-03 | Stop reason: HOSPADM

## 2020-04-01 RX ORDER — SODIUM CHLORIDE 0.9 % (FLUSH) 0.9 %
10 SYRINGE (ML) INJECTION PRN
Status: CANCELLED | OUTPATIENT
Start: 2020-04-01

## 2020-04-01 RX ORDER — DIPHENHYDRAMINE HYDROCHLORIDE 50 MG/ML
50 INJECTION INTRAMUSCULAR; INTRAVENOUS ONCE
Status: CANCELLED | OUTPATIENT
Start: 2020-04-01

## 2020-04-01 RX ORDER — SODIUM CHLORIDE 9 MG/ML
INJECTION, SOLUTION INTRAVENOUS CONTINUOUS
Status: CANCELLED | OUTPATIENT
Start: 2020-04-01

## 2020-04-01 RX ORDER — SODIUM CHLORIDE 0.9 % (FLUSH) 0.9 %
5 SYRINGE (ML) INJECTION PRN
Status: DISCONTINUED | OUTPATIENT
Start: 2020-04-01 | End: 2020-04-02 | Stop reason: HOSPADM

## 2020-04-01 RX ADMIN — SODIUM CHLORIDE 550 MG: 9 INJECTION, SOLUTION INTRAVENOUS at 12:50

## 2020-04-01 RX ADMIN — HEPARIN 500 UNITS: 100 SYRINGE at 14:50

## 2020-04-01 RX ADMIN — SODIUM CHLORIDE, PRESERVATIVE FREE 10 ML: 5 INJECTION INTRAVENOUS at 13:55

## 2020-04-01 ASSESSMENT — PAIN DESCRIPTION - LOCATION: LOCATION: BACK

## 2020-04-01 ASSESSMENT — PAIN SCALES - GENERAL: PAINLEVEL_OUTOF10: 5

## 2020-04-01 ASSESSMENT — PAIN DESCRIPTION - DIRECTION: RADIATING_TOWARDS: RIGHT SIDE

## 2020-04-07 RX ORDER — OXYCODONE AND ACETAMINOPHEN 10; 325 MG/1; MG/1
1 TABLET ORAL EVERY 6 HOURS PRN
Qty: 120 TABLET | Refills: 0 | Status: SHIPPED | OUTPATIENT
Start: 2020-04-07 | End: 2020-05-11 | Stop reason: SDUPTHER

## 2020-04-14 ENCOUNTER — TELEPHONE (OUTPATIENT)
Dept: INFUSION THERAPY | Age: 65
End: 2020-04-14

## 2020-04-14 NOTE — TELEPHONE ENCOUNTER
Vi called requesting to cancel chemo apt for tomorrow (04/05/2020) due to Covid-19,  She wishes to resume at the end of April. Marnell Sever.  Nestor Rai

## 2020-04-29 ENCOUNTER — HOSPITAL ENCOUNTER (OUTPATIENT)
Dept: GENERAL RADIOLOGY | Age: 65
Discharge: HOME | End: 2020-04-29
Payer: MEDICARE

## 2020-04-29 ENCOUNTER — HOSPITAL ENCOUNTER (OUTPATIENT)
Dept: INFUSION THERAPY | Age: 65
Discharge: HOME | End: 2020-04-29
Payer: MEDICARE

## 2020-04-29 VITALS
WEIGHT: 125 LBS | OXYGEN SATURATION: 92 % | BODY MASS INDEX: 21.34 KG/M2 | HEIGHT: 64 IN | DIASTOLIC BLOOD PRESSURE: 58 MMHG | SYSTOLIC BLOOD PRESSURE: 88 MMHG | TEMPERATURE: 99.1 F | HEART RATE: 75 BPM

## 2020-04-29 DIAGNOSIS — C34.01 MALIGNANT NEOPLASM OF RIGHT MAIN BRONCHUS (HCC): Primary | ICD-10-CM

## 2020-04-29 PROCEDURE — 6360000002 HC RX W HCPCS: Performed by: INTERNAL MEDICINE

## 2020-04-29 PROCEDURE — 2580000003 HC RX 258: Performed by: INTERNAL MEDICINE

## 2020-04-29 PROCEDURE — 85025 COMPLETE CBC W/AUTO DIFF WBC: CPT

## 2020-04-29 PROCEDURE — 96413 CHEMO IV INFUSION 1 HR: CPT

## 2020-04-29 PROCEDURE — 71046 X-RAY EXAM CHEST 2 VIEWS: CPT

## 2020-04-29 RX ORDER — EPINEPHRINE 1 MG/ML
0.3 INJECTION, SOLUTION, CONCENTRATE INTRAVENOUS PRN
Status: CANCELLED | OUTPATIENT
Start: 2020-04-29

## 2020-04-29 RX ORDER — HEPARIN SODIUM (PORCINE) LOCK FLUSH IV SOLN 100 UNIT/ML 100 UNIT/ML
500 SOLUTION INTRAVENOUS PRN
Status: CANCELLED
Start: 2020-04-29

## 2020-04-29 RX ORDER — SODIUM CHLORIDE 9 MG/ML
INJECTION, SOLUTION INTRAVENOUS CONTINUOUS
Status: CANCELLED | OUTPATIENT
Start: 2020-04-29

## 2020-04-29 RX ORDER — SODIUM CHLORIDE 0.9 % (FLUSH) 0.9 %
5 SYRINGE (ML) INJECTION PRN
Status: CANCELLED | OUTPATIENT
Start: 2020-04-29

## 2020-04-29 RX ORDER — DIPHENHYDRAMINE HYDROCHLORIDE 50 MG/ML
50 INJECTION INTRAMUSCULAR; INTRAVENOUS ONCE
Status: CANCELLED | OUTPATIENT
Start: 2020-04-29

## 2020-04-29 RX ORDER — HEPARIN SODIUM (PORCINE) LOCK FLUSH IV SOLN 100 UNIT/ML 100 UNIT/ML
500 SOLUTION INTRAVENOUS PRN
Status: DISCONTINUED | OUTPATIENT
Start: 2020-04-29 | End: 2020-05-01 | Stop reason: HOSPADM

## 2020-04-29 RX ORDER — METHYLPREDNISOLONE SODIUM SUCCINATE 125 MG/2ML
125 INJECTION, POWDER, LYOPHILIZED, FOR SOLUTION INTRAMUSCULAR; INTRAVENOUS ONCE
Status: CANCELLED | OUTPATIENT
Start: 2020-04-29

## 2020-04-29 RX ORDER — SODIUM CHLORIDE 0.9 % (FLUSH) 0.9 %
10 SYRINGE (ML) INJECTION PRN
Status: DISCONTINUED | OUTPATIENT
Start: 2020-04-29 | End: 2020-04-30 | Stop reason: HOSPADM

## 2020-04-29 RX ORDER — SODIUM CHLORIDE 9 MG/ML
20 INJECTION, SOLUTION INTRAVENOUS ONCE
Status: CANCELLED | OUTPATIENT
Start: 2020-04-29

## 2020-04-29 RX ORDER — SODIUM CHLORIDE 0.9 % (FLUSH) 0.9 %
10 SYRINGE (ML) INJECTION PRN
Status: CANCELLED | OUTPATIENT
Start: 2020-04-29

## 2020-04-29 RX ADMIN — SODIUM CHLORIDE, PRESERVATIVE FREE 10 ML: 5 INJECTION INTRAVENOUS at 11:52

## 2020-04-29 RX ADMIN — SODIUM CHLORIDE 550 MG: 9 INJECTION, SOLUTION INTRAVENOUS at 10:45

## 2020-04-29 RX ADMIN — HEPARIN 500 UNITS: 100 SYRINGE at 11:52

## 2020-04-29 ASSESSMENT — PAIN SCALES - GENERAL: PAINLEVEL_OUTOF10: 0

## 2020-04-29 NOTE — PROGRESS NOTES
Discussed hypoxia and hypotension with Dr. Josefa Allen. Ok to administer chemotherapy and will order CXR on her way home.

## 2020-05-11 RX ORDER — OXYCODONE AND ACETAMINOPHEN 10; 325 MG/1; MG/1
1 TABLET ORAL EVERY 6 HOURS PRN
Qty: 120 TABLET | Refills: 0 | Status: SHIPPED | OUTPATIENT
Start: 2020-05-11 | End: 2020-06-10 | Stop reason: SDUPTHER

## 2020-05-12 NOTE — PROGRESS NOTES
Vi Trudi [facility] Probus   1955 5/13/2020     Chief Complaint   Patient presents with    Treatment     Malignant neoplasm of right main bronchus      Interval history/history of present illness:  Diagnosis   NSCLC, [facility] March 2019   [id number], stage IIIIB  Treatment summary  6/10/2019- 8/20/2019-carboplatin/Taxol weekly with concurrent radiation   12/4/2019- durvalumab x1 year    Interval history  Patient is a very pleasant 77years old female who has a diagnosis of stage IIIb non-small cell lung cancer, squamous cell subtype. She received initial treatment with carboplatin/Taxol weekly followed by concurrent radiation. She completed concurrent chemoradiation and is currently receiving durvalumab to complete 1 year of treatment. She has been doing well. Latest scan showed continued disease response. She has been tolerated with complaints of mild fatigue and generalized itching that has improved somewhat with skin care. She has dyspnea on exertion. Unfortunately, she continues to smoke. No other new complaints. Cancer history  Ms Ekaterina Porras was first seen by me on 5/3/2019 for further workup of a lung mass. She has complains of chest wall pain. Further workup revealed a lung mass. 4/9/2019-CT of the chest with contrast showed a malignant mass in the right upper lobe measures 6.5 x 2.8 x 5.1 cm. The mass invades and destroys the right lateral cortex and lateral aspect of the T4 vertebral body. Mediastinal adenopathy. Specifically, 1.9 cm pretracheal lymph node. 2.1 cm precarinal lymph node. 1.9 cm subcarina lymph node. Right hilar adenopathy. 5/3/2019-she was first seen by me.   5/15/2019-bone scan and CT abdomen pelvis was unremarkable metastatic disease. 5/16/[id number] biopsy consistent non-small cell lung cancer, squamous cell carcinoma subtype. 5/29/2019-PET scan showed intense and metabolic activity associated with pleural-based right paraspinal mass within the right upper lobe.  It invades into the adjacent thoracic vertebral body. Right hilar, subcarina and pretracheal lymphadenopathy. No evidence of muscle skeletal or intra-abdominal disease. 2019-recommended chemoradiation with carboplatin/Taxol weekly with concurrent radiation. 10/14/2019- CT chest abdomen pelvis showed a cavitary lesion measuring 3.3 x 4.6 x 3.4 cm which appears to be decreased in size. Underlying lung emphysema. There is a new notable sclerosis within the right lateral aspect of T4 and T5 vertebral bodies at the site of the posterior mediastinal invasion by the right lung malignancy. CT of the abdomen pelvis showed tiny hypodense in the liver which are too small to characterize but similar to prior exam.  Attention to follow-up. Stable nodule in the left adrenal gland too small to characterize. 2019- started on treatment durvalumab every 2 weeks to complete 1 year. 2020-CT chest abdomen pelvis showed slight decrease in size of the right upper lobe cavitary lesion. The lesion now measures 2.6 x 4.5 x 3.9 cm (previously 3.3 x 4.6 x 3.4 cm). Subtle cortical erosion of the right side of the centrum at T4/T5. No evidence of metastatic disease in the abdomen. Stable left adrenal nodule.     ECO    Treatment related toxicity: Fatigue    Past medical history:  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     COPD (chronic obstructive pulmonary disease) (HCC)     Depression     Emphysema (subcutaneous) (surgical) resulting from a procedure     History of lung biopsy 2019    Malignant neoplasm of right main bronchus ([address] 2019    NSCLC, SCC [id number] stage IIIb    Syncope     TIA (transient ischemic attack)         Past surgical history:  Past Surgical History:   Procedure Laterality Date    APPENDECTOMY      BACK SURGERY      times two    BLADDER SUSPENSION      CHOLECYSTECTOMY      HYSTERECTOMY      INCONTINENCE SURGERY Social history:  Social History     Socioeconomic History    Marital status:      Spouse name: Not on file    Number of children: Not on file    Years of education: Not on file    Highest education level: Not on file   Occupational History    Not on file   Social Needs    Financial resource strain: Not on file    Food insecurity     Worry: Not on file     Inability: Not on file    Transportation needs     Medical: Not on file     Non-medical: Not on file   Tobacco Use    Smoking status: Current Every Day Smoker    Smokeless tobacco: Never Used   Substance and Sexual Activity    Alcohol use: Not Currently    Drug use: Never    Sexual activity: Not on file   Lifestyle    Physical activity     Days per week: Not on file     Minutes per session: Not on file    Stress: Not on file   Relationships    Social connections     Talks on phone: Not on file     Gets together: Not on file     Attends [employer] service: Not on file     Active member of club or organization: Not on file     Attends meetings of clubs or organizations: Not on file     Relationship status: Not on file    Intimate partner violence     Fear of current or ex partner: Not on file     Emotionally abused: Not on file     Physically abused: Not on file     Forced sexual activity: Not on file   Other Topics Concern    Not on file   Social History Narrative    Not on file        Family history:   Family History   Problem Relation Age of Onset    Colon Cancer Mother     High Blood Pressure Brother     Diabetes Brother         Current Outpatient Medications   Medication Sig Dispense Refill    Morphine Sulfate ER 15 MG TBEA Take 15 mg by mouth every 8 hours for 30 days. 90 each 0    oxyCODONE-acetaminophen (PERCOCET)  MG per tablet Take 1 tablet by mouth every 6 hours as needed for Pain for up to 30 days.  Intended supply: 30 days 120 tablet 0    tiZANidine (ZANAFLEX) 4 MG tablet       Cholecalciferol (VITAMIN D3) 04965 units CAPS Take 50,000 Units by mouth      Glycopyrrolate (SEEBRI NEOHALER) 15.6 MCG CAPS Inhale 1 capsule into the lungs      ketorolac (TORADOL) 60 MG/2ML injection 60 mg      lidocaine viscous hcl (XYLOCAINE) 2 % SOLN solution       vitamin D (ERGOCALCIFEROL) 41885 units CAPS capsule cholecalciferol (vitamin D3) 50,000 unit capsule   Take 1 capsule every week by oral route.  OXYGEN Inhale 2 L/min into the lungs      budesonide-formoterol (SYMBICORT) 160-4.5 MCG/ACT AERO Symbicort 160 mcg-4.5 mcg/actuation HFA aerosol inhaler   Inhale 2 puffs twice a day by inhalation route.  albuterol sulfate HFA (PROAIR HFA) 108 (90 Base) MCG/ACT inhaler ProAir HFA 90 mcg/actuation aerosol inhaler   Inhale 2 puffs every 4 hours by inhalation route as needed.  aspirin EC 81 MG EC tablet Take 81 mg by mouth      calcium carbonate 600 MG TABS tablet Take 600 mg by mouth      citalopram (CELEXA) 20 MG tablet citalopram 20 mg tablet   Take 1 tablet every day by oral route. No current facility-administered medications for this visit.       Facility-Administered Medications Ordered in Other Visits   Medication Dose Route Frequency Provider Last Rate Last Dose    0.9 % sodium chloride infusion  20 mL/hr Intravenous Once Eulogio Ibrahim MD 20 mL/hr at 05/13/20 1028 20 mL/hr at 05/13/20 1028    durvalumab (IMFINZI) 550 mg in sodium chloride 0.9 % 250 mL chemo IVPB  10 mg/kg (Treatment Plan Recorded) Intravenous Once Eulogio Ibrahim  mL/hr at 05/13/20 1101 550 mg at 05/13/20 1101    heparin flush 100 UNIT/ML injection 500 Units  500 Units Intracatheter PRN Eulogio Ibrahim MD        sodium chloride flush 0.9 % injection 10 mL  10 mL Intravenous PRN Eulogio Ibrahim MD            REVIEW OF SYSTEMS:    Constitutional: no fever, no night sweats,  fatigue;   HEENT: no blurring of vision, no double vision, no hearing difficulty, no tinnitus,no ulceration, no dental caries, no dysphagia  Lungs: no cough,  shortness of breath, no wheeze;   CVS: no palpitation, no chest pain, shortness of breath;  GI: no abdominal pain, no nausea , no vomiting, no constipation;   MARIA VICTORIA: no dysuria, frequency and urgency, no hematuria, no kidney stones;   Musculoskeletal: no joint pain, swelling , stiffness;   Endocrine: no polyuria, polydypsia, no cold or heat intolerence; Hematology/lymphatic: no easy brusing or bleeding, no hx of clotting disorder; no peripheral adenopathy. Dermatology: no skin rash, no eczema, generalized pruritis;   Psychiatry: no depression, no anxiety,no panic attacks, no suicide ideation; Neurology: no syncope, no seizures, no numbness or tingling of hands, no numbness or tingling of feet, no paresis;     PHYSICAL EXAM:    Vitals signs:    BP 98/62   Pulse 79   Temp 98.6 °F (37 °C)   Ht 5' 4\" (1.626 m)   Wt 121 lb (54.9 kg)   SpO2 93%   BMI 20.77 kg/m²     Pain scale:2      CONSTITUTIONAL: Alert, appropriate, no acute distress, chronically ill-appearing  EYES: Non icteric, EOM intact, pupils equal round and reactive to light and accommodation. ENT: Oral mucus membranes moist, no oral pharyngeal lesions. External inspection of ears and nose are normal.  O2 nasal cannula 2 L/min  NECK: Supple, no masses. No palpable thyroid mass    CHEST/LUNGS: CTA bilaterally, normal respiratory effort   CARDIOVASCULAR: RRR, no murmurs. No lower extremity edema   ABDOMEN: soft non-tender, active bowel sounds, no hepatosplenomegaly. No palpable masses. EXTREMITIES: warm, Full ROM of all fours extremities. No focal weakness. SKIN: warm, dry, with no rashes or lesions  LYMPH: No cervical, clavicular, axillary, or inguinal lymphadenopathy  NEUROLOGIC: follows commands, non focal.   PSYCH: mood and affect appropriate. Alert and oriented to time and place and person.     Relevant Lab findings: Reviewed by me  CBC:5/13/2020  WBC-6.19  HGB-12.1  PLT-167,000  Neut-4.47      Relevant Imaging

## 2020-05-13 ENCOUNTER — OFFICE VISIT (OUTPATIENT)
Dept: HEMATOLOGY | Age: 65
End: 2020-05-13
Payer: MEDICARE

## 2020-05-13 ENCOUNTER — HOSPITAL ENCOUNTER (OUTPATIENT)
Dept: INFUSION THERAPY | Age: 65
Discharge: HOME | End: 2020-05-13
Payer: MEDICARE

## 2020-05-13 VITALS
WEIGHT: 121 LBS | BODY MASS INDEX: 20.66 KG/M2 | OXYGEN SATURATION: 93 % | SYSTOLIC BLOOD PRESSURE: 98 MMHG | HEART RATE: 79 BPM | DIASTOLIC BLOOD PRESSURE: 62 MMHG | TEMPERATURE: 98.6 F | HEIGHT: 64 IN

## 2020-05-13 DIAGNOSIS — Z51.12 ENCOUNTER FOR ANTINEOPLASTIC IMMUNOTHERAPY: ICD-10-CM

## 2020-05-13 DIAGNOSIS — R53.83 FATIGUE DUE TO TREATMENT: ICD-10-CM

## 2020-05-13 DIAGNOSIS — C34.01 MALIGNANT NEOPLASM OF RIGHT MAIN BRONCHUS (HCC): Primary | ICD-10-CM

## 2020-05-13 LAB
ALBUMIN SERPL-MCNC: 3.5 G/DL (ref 3.5–5.2)
ALP BLD-CCNC: 118 U/L (ref 35–104)
ALT SERPL-CCNC: 11 U/L (ref 9–52)
ANION GAP SERPL CALCULATED.3IONS-SCNC: 9 MMOL/L (ref 7–19)
AST SERPL-CCNC: 17 U/L (ref 14–36)
BASOPHILS ABSOLUTE: 0.02 K/UL (ref 0.01–0.08)
BASOPHILS RELATIVE PERCENT: 0.3 % (ref 0.1–1.2)
BILIRUB SERPL-MCNC: 0.3 MG/DL (ref 0.2–1.3)
BUN BLDV-MCNC: 20 MG/DL (ref 7–17)
CALCIUM SERPL-MCNC: 8.8 MG/DL (ref 8.4–10.2)
CHLORIDE BLD-SCNC: 102 MMOL/L (ref 98–111)
CO2: 30 MMOL/L (ref 22–29)
CREAT SERPL-MCNC: 0.5 MG/DL (ref 0.5–1)
EOSINOPHILS ABSOLUTE: 0.16 K/UL (ref 0.04–0.54)
EOSINOPHILS RELATIVE PERCENT: 2.6 % (ref 0.7–7)
GFR NON-AFRICAN AMERICAN: >60
GLOBULIN: 2.9 G/DL
GLUCOSE BLD-MCNC: 113 MG/DL (ref 74–106)
HCT VFR BLD CALC: 38.6 % (ref 34.1–44.9)
HEMOGLOBIN: 12.1 G/DL (ref 11.2–15.7)
LYMPHOCYTES ABSOLUTE: 1.11 K/UL (ref 1.18–3.74)
LYMPHOCYTES RELATIVE PERCENT: 17.9 % (ref 19.3–53.1)
MCH RBC QN AUTO: 31 PG (ref 25.6–32.2)
MCHC RBC AUTO-ENTMCNC: 31.3 G/DL (ref 32.3–35.5)
MCV RBC AUTO: 99 FL (ref 79.4–94.8)
MONOCYTES ABSOLUTE: 0.43 K/UL (ref 0.24–0.82)
MONOCYTES RELATIVE PERCENT: 6.9 % (ref 4.7–12.5)
NEUTROPHILS ABSOLUTE: 4.47 K/UL (ref 1.56–6.13)
NEUTROPHILS RELATIVE PERCENT: 72.3 % (ref 34–71.1)
PDW BLD-RTO: 15 % (ref 11.7–14.4)
PLATELET # BLD: 167 K/UL (ref 182–369)
PMV BLD AUTO: 10.4 FL (ref 7.4–10.4)
POTASSIUM SERPL-SCNC: 4.4 MMOL/L (ref 3.5–5.1)
RBC # BLD: 3.9 M/UL (ref 3.93–5.22)
SODIUM BLD-SCNC: 141 MMOL/L (ref 137–145)
TOTAL PROTEIN: 6.5 G/DL (ref 6.3–8.2)
TSH SERPL DL<=0.05 MIU/L-ACNC: 1.64 UIU/ML (ref 0.47–4.68)
WBC # BLD: 6.19 K/UL (ref 3.98–10.04)

## 2020-05-13 PROCEDURE — 80053 COMPREHEN METABOLIC PANEL: CPT

## 2020-05-13 PROCEDURE — G8427 DOCREV CUR MEDS BY ELIG CLIN: HCPCS | Performed by: INTERNAL MEDICINE

## 2020-05-13 PROCEDURE — G8400 PT W/DXA NO RESULTS DOC: HCPCS | Performed by: INTERNAL MEDICINE

## 2020-05-13 PROCEDURE — G8420 CALC BMI NORM PARAMETERS: HCPCS | Performed by: INTERNAL MEDICINE

## 2020-05-13 PROCEDURE — 96413 CHEMO IV INFUSION 1 HR: CPT

## 2020-05-13 PROCEDURE — 2580000003 HC RX 258: Performed by: INTERNAL MEDICINE

## 2020-05-13 PROCEDURE — 1123F ACP DISCUSS/DSCN MKR DOCD: CPT | Performed by: INTERNAL MEDICINE

## 2020-05-13 PROCEDURE — 84443 ASSAY THYROID STIM HORMONE: CPT

## 2020-05-13 PROCEDURE — 1090F PRES/ABSN URINE INCON ASSESS: CPT | Performed by: INTERNAL MEDICINE

## 2020-05-13 PROCEDURE — 4004F PT TOBACCO SCREEN RCVD TLK: CPT | Performed by: INTERNAL MEDICINE

## 2020-05-13 PROCEDURE — 99214 OFFICE O/P EST MOD 30 MIN: CPT | Performed by: INTERNAL MEDICINE

## 2020-05-13 PROCEDURE — 3017F COLORECTAL CA SCREEN DOC REV: CPT | Performed by: INTERNAL MEDICINE

## 2020-05-13 PROCEDURE — 4040F PNEUMOC VAC/ADMIN/RCVD: CPT | Performed by: INTERNAL MEDICINE

## 2020-05-13 PROCEDURE — 85025 COMPLETE CBC W/AUTO DIFF WBC: CPT

## 2020-05-13 PROCEDURE — 36415 COLL VENOUS BLD VENIPUNCTURE: CPT

## 2020-05-13 PROCEDURE — 6360000002 HC RX W HCPCS: Performed by: INTERNAL MEDICINE

## 2020-05-13 RX ORDER — SODIUM CHLORIDE 0.9 % (FLUSH) 0.9 %
5 SYRINGE (ML) INJECTION PRN
Status: CANCELLED | OUTPATIENT
Start: 2020-05-13

## 2020-05-13 RX ORDER — METHYLPREDNISOLONE SODIUM SUCCINATE 125 MG/2ML
125 INJECTION, POWDER, LYOPHILIZED, FOR SOLUTION INTRAMUSCULAR; INTRAVENOUS ONCE
Status: CANCELLED | OUTPATIENT
Start: 2020-05-13

## 2020-05-13 RX ORDER — EPINEPHRINE 1 MG/ML
0.3 INJECTION, SOLUTION, CONCENTRATE INTRAVENOUS PRN
Status: CANCELLED | OUTPATIENT
Start: 2020-05-13

## 2020-05-13 RX ORDER — SODIUM CHLORIDE 0.9 % (FLUSH) 0.9 %
10 SYRINGE (ML) INJECTION PRN
Status: CANCELLED | OUTPATIENT
Start: 2020-05-13

## 2020-05-13 RX ORDER — DIPHENHYDRAMINE HYDROCHLORIDE 50 MG/ML
50 INJECTION INTRAMUSCULAR; INTRAVENOUS ONCE
Status: CANCELLED | OUTPATIENT
Start: 2020-05-13

## 2020-05-13 RX ORDER — SODIUM CHLORIDE 9 MG/ML
20 INJECTION, SOLUTION INTRAVENOUS ONCE
Status: COMPLETED | OUTPATIENT
Start: 2020-05-13 | End: 2020-05-13

## 2020-05-13 RX ORDER — SODIUM CHLORIDE 9 MG/ML
INJECTION, SOLUTION INTRAVENOUS CONTINUOUS
Status: CANCELLED | OUTPATIENT
Start: 2020-05-13

## 2020-05-13 RX ORDER — HEPARIN SODIUM (PORCINE) LOCK FLUSH IV SOLN 100 UNIT/ML 100 UNIT/ML
500 SOLUTION INTRAVENOUS PRN
Status: DISCONTINUED | OUTPATIENT
Start: 2020-05-13 | End: 2020-05-15 | Stop reason: HOSPADM

## 2020-05-13 RX ORDER — HEPARIN SODIUM (PORCINE) LOCK FLUSH IV SOLN 100 UNIT/ML 100 UNIT/ML
500 SOLUTION INTRAVENOUS PRN
Status: CANCELLED
Start: 2020-05-13

## 2020-05-13 RX ORDER — SODIUM CHLORIDE 0.9 % (FLUSH) 0.9 %
10 SYRINGE (ML) INJECTION PRN
Status: DISCONTINUED | OUTPATIENT
Start: 2020-05-13 | End: 2020-05-14 | Stop reason: HOSPADM

## 2020-05-13 RX ADMIN — SODIUM CHLORIDE 20 ML/HR: 9 INJECTION, SOLUTION INTRAVENOUS at 10:28

## 2020-05-13 RX ADMIN — SODIUM CHLORIDE 550 MG: 9 INJECTION, SOLUTION INTRAVENOUS at 11:01

## 2020-05-13 RX ADMIN — SODIUM CHLORIDE, PRESERVATIVE FREE 10 ML: 5 INJECTION INTRAVENOUS at 12:06

## 2020-05-13 RX ADMIN — HEPARIN 500 UNITS: 100 SYRINGE at 12:06

## 2020-05-13 ASSESSMENT — PAIN DESCRIPTION - LOCATION: LOCATION: BACK

## 2020-05-13 ASSESSMENT — PAIN SCALES - GENERAL: PAINLEVEL_OUTOF10: 5

## 2020-05-27 ENCOUNTER — OFFICE VISIT (OUTPATIENT)
Dept: HEMATOLOGY | Age: 65
End: 2020-05-27
Payer: MEDICARE

## 2020-05-27 ENCOUNTER — HOSPITAL ENCOUNTER (OUTPATIENT)
Dept: INFUSION THERAPY | Age: 65
Discharge: HOME | End: 2020-05-27
Payer: MEDICARE

## 2020-05-27 VITALS
WEIGHT: 125 LBS | DIASTOLIC BLOOD PRESSURE: 60 MMHG | SYSTOLIC BLOOD PRESSURE: 95 MMHG | BODY MASS INDEX: 21.34 KG/M2 | HEART RATE: 92 BPM | HEIGHT: 64 IN | OXYGEN SATURATION: 90 % | TEMPERATURE: 98.4 F

## 2020-05-27 DIAGNOSIS — C34.01 MALIGNANT NEOPLASM OF RIGHT MAIN BRONCHUS (HCC): Primary | ICD-10-CM

## 2020-05-27 LAB
ALBUMIN SERPL-MCNC: 3.6 G/DL (ref 3.5–5.2)
ALP BLD-CCNC: 103 U/L (ref 35–104)
ALT SERPL-CCNC: 10 U/L (ref 9–52)
ANION GAP SERPL CALCULATED.3IONS-SCNC: 8 MMOL/L (ref 7–19)
AST SERPL-CCNC: 20 U/L (ref 14–36)
BASOPHILS ABSOLUTE: 0.01 K/UL (ref 0.01–0.08)
BASOPHILS RELATIVE PERCENT: 0.2 % (ref 0.1–1.2)
BILIRUB SERPL-MCNC: 0.3 MG/DL (ref 0.2–1.3)
BUN BLDV-MCNC: 13 MG/DL (ref 7–17)
CALCIUM SERPL-MCNC: 8.7 MG/DL (ref 8.4–10.2)
CHLORIDE BLD-SCNC: 102 MMOL/L (ref 98–111)
CO2: 32 MMOL/L (ref 22–29)
CREAT SERPL-MCNC: <0.5 MG/DL (ref 0.5–1)
EOSINOPHILS ABSOLUTE: 0.13 K/UL (ref 0.04–0.54)
EOSINOPHILS RELATIVE PERCENT: 2.4 % (ref 0.7–7)
GFR NON-AFRICAN AMERICAN: >60
GLOBULIN: 2.7 G/DL
GLUCOSE BLD-MCNC: 98 MG/DL (ref 74–106)
HCT VFR BLD CALC: 37 % (ref 34.1–44.9)
HEMOGLOBIN: 11.9 G/DL (ref 11.2–15.7)
LYMPHOCYTES ABSOLUTE: 0.83 K/UL (ref 1.18–3.74)
LYMPHOCYTES RELATIVE PERCENT: 15.3 % (ref 19.3–53.1)
MCH RBC QN AUTO: 32.2 PG (ref 25.6–32.2)
MCHC RBC AUTO-ENTMCNC: 32.2 G/DL (ref 32.3–35.5)
MCV RBC AUTO: 100 FL (ref 79.4–94.8)
MONOCYTES ABSOLUTE: 0.41 K/UL (ref 0.24–0.82)
MONOCYTES RELATIVE PERCENT: 7.6 % (ref 4.7–12.5)
NEUTROPHILS ABSOLUTE: 4.05 K/UL (ref 1.56–6.13)
NEUTROPHILS RELATIVE PERCENT: 74.5 % (ref 34–71.1)
PDW BLD-RTO: 15.7 % (ref 11.7–14.4)
PLATELET # BLD: 134 K/UL (ref 182–369)
PMV BLD AUTO: 10.3 FL (ref 7.4–10.4)
POTASSIUM SERPL-SCNC: 4.4 MMOL/L (ref 3.5–5.1)
RBC # BLD: 3.7 M/UL (ref 3.93–5.22)
SODIUM BLD-SCNC: 142 MMOL/L (ref 137–145)
TOTAL PROTEIN: 6.3 G/DL (ref 6.3–8.2)
WBC # BLD: 5.43 K/UL (ref 3.98–10.04)

## 2020-05-27 PROCEDURE — 2580000003 HC RX 258: Performed by: INTERNAL MEDICINE

## 2020-05-27 PROCEDURE — 99214 OFFICE O/P EST MOD 30 MIN: CPT | Performed by: INTERNAL MEDICINE

## 2020-05-27 PROCEDURE — 96413 CHEMO IV INFUSION 1 HR: CPT

## 2020-05-27 PROCEDURE — 4004F PT TOBACCO SCREEN RCVD TLK: CPT | Performed by: INTERNAL MEDICINE

## 2020-05-27 PROCEDURE — G8400 PT W/DXA NO RESULTS DOC: HCPCS | Performed by: INTERNAL MEDICINE

## 2020-05-27 PROCEDURE — 4040F PNEUMOC VAC/ADMIN/RCVD: CPT | Performed by: INTERNAL MEDICINE

## 2020-05-27 PROCEDURE — 6360000002 HC RX W HCPCS: Performed by: INTERNAL MEDICINE

## 2020-05-27 PROCEDURE — 3023F SPIROM DOC REV: CPT | Performed by: INTERNAL MEDICINE

## 2020-05-27 PROCEDURE — G8420 CALC BMI NORM PARAMETERS: HCPCS | Performed by: INTERNAL MEDICINE

## 2020-05-27 PROCEDURE — 1090F PRES/ABSN URINE INCON ASSESS: CPT | Performed by: INTERNAL MEDICINE

## 2020-05-27 PROCEDURE — 80053 COMPREHEN METABOLIC PANEL: CPT

## 2020-05-27 PROCEDURE — G8926 SPIRO NO PERF OR DOC: HCPCS | Performed by: INTERNAL MEDICINE

## 2020-05-27 PROCEDURE — 1123F ACP DISCUSS/DSCN MKR DOCD: CPT | Performed by: INTERNAL MEDICINE

## 2020-05-27 PROCEDURE — G8427 DOCREV CUR MEDS BY ELIG CLIN: HCPCS | Performed by: INTERNAL MEDICINE

## 2020-05-27 PROCEDURE — 85025 COMPLETE CBC W/AUTO DIFF WBC: CPT

## 2020-05-27 PROCEDURE — 3017F COLORECTAL CA SCREEN DOC REV: CPT | Performed by: INTERNAL MEDICINE

## 2020-05-27 RX ORDER — SODIUM CHLORIDE 9 MG/ML
20 INJECTION, SOLUTION INTRAVENOUS ONCE
Status: CANCELLED | OUTPATIENT
Start: 2020-05-27

## 2020-05-27 RX ORDER — HEPARIN SODIUM (PORCINE) LOCK FLUSH IV SOLN 100 UNIT/ML 100 UNIT/ML
500 SOLUTION INTRAVENOUS PRN
Status: DISCONTINUED | OUTPATIENT
Start: 2020-05-27 | End: 2020-05-29 | Stop reason: HOSPADM

## 2020-05-27 RX ORDER — SODIUM CHLORIDE 9 MG/ML
INJECTION, SOLUTION INTRAVENOUS CONTINUOUS
Status: CANCELLED | OUTPATIENT
Start: 2020-05-27

## 2020-05-27 RX ORDER — HEPARIN SODIUM (PORCINE) LOCK FLUSH IV SOLN 100 UNIT/ML 100 UNIT/ML
500 SOLUTION INTRAVENOUS PRN
Status: CANCELLED
Start: 2020-05-27

## 2020-05-27 RX ORDER — SODIUM CHLORIDE 0.9 % (FLUSH) 0.9 %
10 SYRINGE (ML) INJECTION PRN
Status: CANCELLED | OUTPATIENT
Start: 2020-05-27

## 2020-05-27 RX ORDER — DIPHENHYDRAMINE HYDROCHLORIDE 50 MG/ML
50 INJECTION INTRAMUSCULAR; INTRAVENOUS ONCE
Status: CANCELLED | OUTPATIENT
Start: 2020-05-27

## 2020-05-27 RX ORDER — EPINEPHRINE 1 MG/ML
0.3 INJECTION, SOLUTION, CONCENTRATE INTRAVENOUS PRN
Status: CANCELLED | OUTPATIENT
Start: 2020-05-27

## 2020-05-27 RX ORDER — METHYLPREDNISOLONE SODIUM SUCCINATE 125 MG/2ML
125 INJECTION, POWDER, LYOPHILIZED, FOR SOLUTION INTRAMUSCULAR; INTRAVENOUS ONCE
Status: CANCELLED | OUTPATIENT
Start: 2020-05-27

## 2020-05-27 RX ORDER — SODIUM CHLORIDE 0.9 % (FLUSH) 0.9 %
10 SYRINGE (ML) INJECTION PRN
Status: DISCONTINUED | OUTPATIENT
Start: 2020-05-27 | End: 2020-05-28 | Stop reason: HOSPADM

## 2020-05-27 RX ORDER — SODIUM CHLORIDE 0.9 % (FLUSH) 0.9 %
5 SYRINGE (ML) INJECTION PRN
Status: CANCELLED | OUTPATIENT
Start: 2020-05-27

## 2020-05-27 RX ADMIN — SODIUM CHLORIDE 550 MG: 9 INJECTION, SOLUTION INTRAVENOUS at 12:53

## 2020-05-27 RX ADMIN — HEPARIN 500 UNITS: 100 SYRINGE at 14:05

## 2020-05-27 RX ADMIN — SODIUM CHLORIDE, PRESERVATIVE FREE 10 ML: 5 INJECTION INTRAVENOUS at 14:05

## 2020-05-27 ASSESSMENT — PAIN SCALES - GENERAL: PAINLEVEL_OUTOF10: 5

## 2020-05-27 ASSESSMENT — PAIN DESCRIPTION - LOCATION: LOCATION: BACK

## 2020-05-27 NOTE — PROGRESS NOTES
resulting from a procedure     History of lung biopsy 05/16/2019    Malignant neoplasm of right main bronchus ([facility] [address] 03/2019    NSCLC, SCC [id number] stage IIIb    Syncope     TIA (transient ischemic attack)         Past surgical history:  Past Surgical History:   Procedure Laterality Date    APPENDECTOMY      BACK SURGERY      times two    BLADDER SUSPENSION      CHOLECYSTECTOMY      HYSTERECTOMY      INCONTINENCE SURGERY          Social history:  Social History     Socioeconomic History    Marital status:       Spouse name: Not on file    Number of children: Not on file    Years of education: Not on file    Highest education level: Not on file   Occupational History    Not on file   Social Needs    Financial resource strain: Not on file    Food insecurity     Worry: Not on file     Inability: Not on file    Transportation needs     Medical: Not on file     Non-medical: Not on file   Tobacco Use    Smoking status: Current Every Day Smoker    Smokeless tobacco: Never Used   Substance and Sexual Activity    Alcohol use: Not Currently    Drug use: Never    Sexual activity: Not on file   Lifestyle    Physical activity     Days per week: Not on file     Minutes per session: Not on file    Stress: Not on file   Relationships    Social connections     Talks on phone: Not on file     Gets together: Not on file     Attends Yazdanism service: Not on file     Active member of club or organization: Not on file     Attends meetings of clubs or organizations: Not on file     Relationship status: Not on file    Intimate partner violence     Fear of current or ex partner: Not on file     Emotionally abused: Not on file     Physically abused: Not on file     Forced sexual activity: Not on file   Other Topics Concern    Not on file   Social History Narrative    Not on file        Family history:   Family History   Problem Relation Age of Onset    Colon Cancer Mother     High Blood Pressure Brother axillary, or inguinal lymphadenopathy  NEUROLOGIC: follows commands, non focal.   PSYCH: mood and affect appropriate. Alert and oriented to time and place and person. Relevant Lab findings: Reviewed by me  QDG:3/78/6145  WBC-5.43  HGB-11.9  PLT-134,000  Neut-4.05    5/13/2020  BUN-20  Creatinine-0.5  Alk Phos-118  ALT-11  AST-17  TSH-1.640      Relevant Imaging studies:  Xr Chest Standard     Result Date: 4/29/2020  A cavitation or a necrotic mass in the right upper lobe. This was noted in the previous CT scan of the chest dated 2/19/2020. An area of consolidation in the right upper lobe may represent inflammatory or infectious process. Ill-defined nodule in the left lower lobe may represent a nipple shadow or a pulmonary nodule. This could be further evaluated. Chronic inflammatory changes in the remaining lungs bilaterally. Signed by Dr John Seen on 4/29/2020 12:33 PM      ASSESSMENT:    Orders Placed This Encounter   Procedures    CT Chest W Contrast     Standing Status:   Future     Standing Expiration Date:   5/27/2021    Comprehensive Metabolic Panel     Standing Status:   Future     Standing Expiration Date:   5/27/2021      Vi was seen today for treatment. Diagnoses and all orders for this visit:    Malignant neoplasm of right main bronchus ([address]  -     Comprehensive Metabolic Panel; Future  -     CT Chest W Contrast; Future    Cancer related pain    Care plan discussed with patient    Encounter for antineoplastic immunotherapy    Other fatigue    Pulmonary emphysema, unspecified emphysema type ([address]    Dependence on supplemental oxygen       NSCLC-SCC stage IIIB   Bone scan, CT abdomen pelvis unremarkable metastatic disease. PET scan showed disease limited to the chest and mediastinum only. Therefore, stage IIIB. Status post completion of chemoradiation on 8/20/2019.   The patient was counseled today about diagnosis, staging, prognosis, diagnostic tests, medications, side effects and disease management. The method of counseling included verbal explanation. The patient verbalized understanding. Repeat CT chest with contrast February 2020 showed overall stable to improved disease. Continue treatment with durvalumab to complete 1 year through December 2020. Repeat CT chest June 2020. Monitoring of toxicity-she denies any neuropathy. Chronic fatigue. Smoke cessation-she was again strongly encouraged to quit. Cancer related pain-opioid analgesics have potential for abuse and risk of fatal overdose due to respiratory depression. The use of opioid exposes individual to have the risk of addiction, abuse and misuse. Addiction can occur in individuals appropriately prescribed and at a recommended dose. Addiction also occurs if the drug is being used or abused. An individual is also at increased risk of opioid addiction if a personal or family history of substance abuse or mental illness are present. [id number] mg SR every 8 hours. Generalized pruritus-secondary to dry skin and likely immunotherapy. Continue Benadryl. Plan:  Continue Morphine SR 15 mg every 8 hours   Continue Percocet   CMP and TSH  CT chest before next visit  OTC Benadryl for pruritus. Continue durvalumab in 2 weeks every 2 weeks x 1 year treatment through December 2020  RTC in the 2 weeks with MD      I have seen, examined and reviewed this patient medication list, appropriate labs and imaging studies. I reviewed relevant medical records and others physicians notes. I discussed the plans of care with the patient. I answered all the questions to the patients satisfaction. The selection, dosing administration of anticancer agents in the management of associated toxicities are complex. Modifications of drug dose and schedule and the initiation of supportive care interventions are often necessary because of expected toxicities individual patient variability, prior treatment and comorbidity.  The optimal delivery of anticancer

## 2020-06-10 ENCOUNTER — HOSPITAL ENCOUNTER (OUTPATIENT)
Dept: INFUSION THERAPY | Age: 65
End: 2020-06-10
Payer: MEDICARE

## 2020-06-10 RX ORDER — OXYCODONE AND ACETAMINOPHEN 10; 325 MG/1; MG/1
1 TABLET ORAL EVERY 6 HOURS PRN
Qty: 120 TABLET | Refills: 0 | Status: SHIPPED | OUTPATIENT
Start: 2020-06-10 | End: 2020-07-08 | Stop reason: SDUPTHER

## 2020-06-16 NOTE — PROGRESS NOTES
lung biopsy 05/16/2019    Malignant neoplasm of right main bronchus ([address] 03/2019    NSCLC, SCC [id number] stage IIIb    Syncope     TIA (transient ischemic attack)         Past surgical history:  Past Surgical History:   Procedure Laterality Date    APPENDECTOMY      BACK SURGERY      times two    BLADDER SUSPENSION      CHOLECYSTECTOMY      HYSTERECTOMY      INCONTINENCE SURGERY          Social history:  Social History     Socioeconomic History    Marital status:       Spouse name: Not on file    Number of children: Not on file    Years of education: Not on file    Highest education level: Not on file   Occupational History    Not on file   Social Needs    Financial resource strain: Not on file    Food insecurity     Worry: Not on file     Inability: Not on file    Transportation needs     Medical: Not on file     Non-medical: Not on file   Tobacco Use    Smoking status: Current Every Day Smoker    Smokeless tobacco: Never Used   Substance and Sexual Activity    Alcohol use: Not Currently    Drug use: Never    Sexual activity: Not on file   Lifestyle    Physical activity     Days per week: Not on file     Minutes per session: Not on file    Stress: Not on file   Relationships    Social connections     Talks on phone: Not on file     Gets together: Not on file     Attends [employer] service: Not on file     Active member of club or organization: Not on file     Attends meetings of clubs or organizations: Not on file     Relationship status: Not on file    Intimate partner violence     Fear of current or ex partner: Not on file     Emotionally abused: Not on file     Physically abused: Not on file     Forced sexual activity: Not on file   Other Topics Concern    Not on file   Social History Narrative    Not on file        Family history:   Family History   Problem Relation Age of Onset    Colon Cancer Mother     High Blood Pressure Brother     Diabetes Brother         Current Outpatient Medications   Medication Sig Dispense Refill    Morphine Sulfate ER 15 MG TBEA Take 15 mg by mouth every 8 hours for 30 days. 90 each 0    oxyCODONE-acetaminophen (PERCOCET)  MG per tablet Take 1 tablet by mouth every 6 hours as needed for Pain for up to 30 days. Intended supply: 30 days 120 tablet 0    tiZANidine (ZANAFLEX) 4 MG tablet       Cholecalciferol (VITAMIN D3) 63966 units CAPS Take 50,000 Units by mouth      Glycopyrrolate (SEEBRI NEOHALER) 15.6 MCG CAPS Inhale 1 capsule into the lungs      ketorolac (TORADOL) 60 MG/2ML injection 60 mg      lidocaine viscous hcl (XYLOCAINE) 2 % SOLN solution       vitamin D (ERGOCALCIFEROL) 85234 units CAPS capsule cholecalciferol (vitamin D3) 50,000 unit capsule   Take 1 capsule every week by oral route.  OXYGEN Inhale 2 L/min into the lungs      budesonide-formoterol (SYMBICORT) 160-4.5 MCG/ACT AERO Symbicort 160 mcg-4.5 mcg/actuation HFA aerosol inhaler   Inhale 2 puffs twice a day by inhalation route.  albuterol sulfate HFA (PROAIR HFA) 108 (90 Base) MCG/ACT inhaler ProAir HFA 90 mcg/actuation aerosol inhaler   Inhale 2 puffs every 4 hours by inhalation route as needed.  aspirin EC 81 MG EC tablet Take 81 mg by mouth      calcium carbonate 600 MG TABS tablet Take 600 mg by mouth      citalopram (CELEXA) 20 MG tablet citalopram 20 mg tablet   Take 1 tablet every day by oral route. No current facility-administered medications for this visit.          REVIEW OF SYSTEMS:    Constitutional: no fever, no night sweats,  fatigue;   HEENT: no blurring of vision, no double vision, no hearing difficulty, no tinnitus,no ulceration, no dental caries, no dysphagia  Lungs: no cough,  shortness of breath, no wheeze;   CVS: no palpitation, no chest pain, shortness of breath;  GI: no abdominal pain, no nausea , no vomiting, no constipation;   MARIA VICTORIA: no dysuria, frequency and urgency, no hematuria, no kidney stones;   Musculoskeletal: no medication list, appropriate labs and imaging studies. I reviewed relevant medical records and others physicians notes. I discussed the plans of care with the patient. I answered all the questions to the patients satisfaction. The selection, dosing administration of anticancer agents in the management of associated toxicities are complex. Modifications of drug dose and schedule and the initiation of supportive care interventions are often necessary because of expected toxicities individual patient variability, prior treatment and comorbidity. The optimal delivery of anticancer agents therefore requires a healthcare delivery team experienced in the use of anticancer agents and the management of associated toxicities in patients with cancer. Follow Up:     No follow-ups on file. Data Gemma Herrera MA am scribing for Mor Terry MD. Electronically signed by Leonor Wise 6/17/2020 at 2:16 PM    I, Dr Julia Gay, personally performed the services described in this documentation as scribed by Ching Schrader MA in my presence and is both accurate and complete. Over 50% of the total visit time of 25 minutes was spent on counseling and/or coordination of care of history of lung cancer.  I personally discussed her care with Dr. Sourav Whitehead over the phone today

## 2020-06-17 ENCOUNTER — HOSPITAL ENCOUNTER (OUTPATIENT)
Dept: INFUSION THERAPY | Age: 65
Discharge: HOME | End: 2020-06-17
Payer: MEDICARE

## 2020-06-17 ENCOUNTER — OFFICE VISIT (OUTPATIENT)
Dept: HEMATOLOGY | Age: 65
End: 2020-06-17
Payer: MEDICARE

## 2020-06-17 VITALS
TEMPERATURE: 98.1 F | OXYGEN SATURATION: 93 % | WEIGHT: 122.5 LBS | RESPIRATION RATE: 20 BRPM | DIASTOLIC BLOOD PRESSURE: 60 MMHG | BODY MASS INDEX: 21.03 KG/M2 | SYSTOLIC BLOOD PRESSURE: 120 MMHG | HEART RATE: 88 BPM

## 2020-06-17 DIAGNOSIS — C34.01 MALIGNANT NEOPLASM OF RIGHT MAIN BRONCHUS (HCC): Primary | ICD-10-CM

## 2020-06-17 LAB
ALBUMIN SERPL-MCNC: 3.7 G/DL (ref 3.5–5.2)
ALP BLD-CCNC: 137 U/L (ref 35–104)
ALT SERPL-CCNC: 12 U/L (ref 9–52)
ANION GAP SERPL CALCULATED.3IONS-SCNC: 5 MMOL/L (ref 7–19)
AST SERPL-CCNC: 21 U/L (ref 14–36)
BASOPHILS ABSOLUTE: 0.01 K/UL (ref 0.01–0.08)
BASOPHILS RELATIVE PERCENT: 0.2 % (ref 0.1–1.2)
BILIRUB SERPL-MCNC: 0.4 MG/DL (ref 0.2–1.3)
BUN BLDV-MCNC: 12 MG/DL (ref 7–17)
CALCIUM SERPL-MCNC: 8.9 MG/DL (ref 8.4–10.2)
CHLORIDE BLD-SCNC: 101 MMOL/L (ref 98–111)
CO2: 35 MMOL/L (ref 22–29)
CREAT SERPL-MCNC: <0.5 MG/DL (ref 0.5–1)
EOSINOPHILS ABSOLUTE: 0.14 K/UL (ref 0.04–0.54)
EOSINOPHILS RELATIVE PERCENT: 2.2 % (ref 0.7–7)
GFR NON-AFRICAN AMERICAN: >60
GLOBULIN: 3 G/DL
GLUCOSE BLD-MCNC: 89 MG/DL (ref 74–106)
HCT VFR BLD CALC: 39.2 % (ref 34.1–44.9)
HEMOGLOBIN: 12.5 G/DL (ref 11.2–15.7)
LYMPHOCYTES ABSOLUTE: 1.4 K/UL (ref 1.18–3.74)
LYMPHOCYTES RELATIVE PERCENT: 22.4 % (ref 19.3–53.1)
MCH RBC QN AUTO: 31.8 PG (ref 25.6–32.2)
MCHC RBC AUTO-ENTMCNC: 31.9 G/DL (ref 32.3–35.5)
MCV RBC AUTO: 99.7 FL (ref 79.4–94.8)
MONOCYTES ABSOLUTE: 0.51 K/UL (ref 0.24–0.82)
MONOCYTES RELATIVE PERCENT: 8.2 % (ref 4.7–12.5)
NEUTROPHILS ABSOLUTE: 4.18 K/UL (ref 1.56–6.13)
NEUTROPHILS RELATIVE PERCENT: 67 % (ref 34–71.1)
PDW BLD-RTO: 16.5 % (ref 11.7–14.4)
PLATELET # BLD: 189 K/UL (ref 182–369)
PMV BLD AUTO: 10.6 FL (ref 7.4–10.4)
POTASSIUM SERPL-SCNC: 4.2 MMOL/L (ref 3.5–5.1)
RBC # BLD: 3.93 M/UL (ref 3.93–5.22)
SODIUM BLD-SCNC: 141 MMOL/L (ref 137–145)
TOTAL PROTEIN: 6.7 G/DL (ref 6.3–8.2)
WBC # BLD: 6.24 K/UL (ref 3.98–10.04)

## 2020-06-17 PROCEDURE — 96413 CHEMO IV INFUSION 1 HR: CPT

## 2020-06-17 PROCEDURE — 4040F PNEUMOC VAC/ADMIN/RCVD: CPT | Performed by: INTERNAL MEDICINE

## 2020-06-17 PROCEDURE — 4004F PT TOBACCO SCREEN RCVD TLK: CPT | Performed by: INTERNAL MEDICINE

## 2020-06-17 PROCEDURE — 3017F COLORECTAL CA SCREEN DOC REV: CPT | Performed by: INTERNAL MEDICINE

## 2020-06-17 PROCEDURE — 3023F SPIROM DOC REV: CPT | Performed by: INTERNAL MEDICINE

## 2020-06-17 PROCEDURE — 2580000003 HC RX 258: Performed by: INTERNAL MEDICINE

## 2020-06-17 PROCEDURE — 99214 OFFICE O/P EST MOD 30 MIN: CPT | Performed by: INTERNAL MEDICINE

## 2020-06-17 PROCEDURE — 6360000002 HC RX W HCPCS: Performed by: INTERNAL MEDICINE

## 2020-06-17 PROCEDURE — G8926 SPIRO NO PERF OR DOC: HCPCS | Performed by: INTERNAL MEDICINE

## 2020-06-17 PROCEDURE — G8420 CALC BMI NORM PARAMETERS: HCPCS | Performed by: INTERNAL MEDICINE

## 2020-06-17 PROCEDURE — 85025 COMPLETE CBC W/AUTO DIFF WBC: CPT

## 2020-06-17 PROCEDURE — 1090F PRES/ABSN URINE INCON ASSESS: CPT | Performed by: INTERNAL MEDICINE

## 2020-06-17 PROCEDURE — G8400 PT W/DXA NO RESULTS DOC: HCPCS | Performed by: INTERNAL MEDICINE

## 2020-06-17 PROCEDURE — 1123F ACP DISCUSS/DSCN MKR DOCD: CPT | Performed by: INTERNAL MEDICINE

## 2020-06-17 PROCEDURE — 80053 COMPREHEN METABOLIC PANEL: CPT

## 2020-06-17 PROCEDURE — G8427 DOCREV CUR MEDS BY ELIG CLIN: HCPCS | Performed by: INTERNAL MEDICINE

## 2020-06-17 RX ORDER — HEPARIN SODIUM (PORCINE) LOCK FLUSH IV SOLN 100 UNIT/ML 100 UNIT/ML
500 SOLUTION INTRAVENOUS PRN
Status: DISCONTINUED | OUTPATIENT
Start: 2020-06-17 | End: 2020-06-19 | Stop reason: HOSPADM

## 2020-06-17 RX ORDER — DIPHENHYDRAMINE HYDROCHLORIDE 50 MG/ML
50 INJECTION INTRAMUSCULAR; INTRAVENOUS ONCE
Status: CANCELLED | OUTPATIENT
Start: 2020-06-17

## 2020-06-17 RX ORDER — SODIUM CHLORIDE 0.9 % (FLUSH) 0.9 %
5 SYRINGE (ML) INJECTION PRN
Status: CANCELLED | OUTPATIENT
Start: 2020-06-17

## 2020-06-17 RX ORDER — HEPARIN SODIUM (PORCINE) LOCK FLUSH IV SOLN 100 UNIT/ML 100 UNIT/ML
500 SOLUTION INTRAVENOUS PRN
Status: CANCELLED
Start: 2020-06-17

## 2020-06-17 RX ORDER — SODIUM CHLORIDE 9 MG/ML
INJECTION, SOLUTION INTRAVENOUS CONTINUOUS
Status: CANCELLED | OUTPATIENT
Start: 2020-06-17

## 2020-06-17 RX ORDER — SODIUM CHLORIDE 0.9 % (FLUSH) 0.9 %
10 SYRINGE (ML) INJECTION PRN
Status: DISCONTINUED | OUTPATIENT
Start: 2020-06-17 | End: 2020-06-18 | Stop reason: HOSPADM

## 2020-06-17 RX ORDER — EPINEPHRINE 1 MG/ML
0.3 INJECTION, SOLUTION, CONCENTRATE INTRAVENOUS PRN
Status: CANCELLED | OUTPATIENT
Start: 2020-06-17

## 2020-06-17 RX ORDER — METHYLPREDNISOLONE SODIUM SUCCINATE 125 MG/2ML
125 INJECTION, POWDER, LYOPHILIZED, FOR SOLUTION INTRAMUSCULAR; INTRAVENOUS ONCE
Status: CANCELLED | OUTPATIENT
Start: 2020-06-17

## 2020-06-17 RX ORDER — SODIUM CHLORIDE 0.9 % (FLUSH) 0.9 %
10 SYRINGE (ML) INJECTION PRN
Status: CANCELLED | OUTPATIENT
Start: 2020-06-17

## 2020-06-17 RX ORDER — SODIUM CHLORIDE 9 MG/ML
20 INJECTION, SOLUTION INTRAVENOUS ONCE
Status: CANCELLED | OUTPATIENT
Start: 2020-06-17

## 2020-06-17 RX ADMIN — SODIUM CHLORIDE 550 MG: 9 INJECTION, SOLUTION INTRAVENOUS at 15:13

## 2020-06-26 ENCOUNTER — HOSPITAL ENCOUNTER (OUTPATIENT)
Dept: CT IMAGING | Age: 65
Discharge: HOME | End: 2020-06-26
Payer: MEDICARE

## 2020-06-26 PROCEDURE — 6360000004 HC RX CONTRAST MEDICATION: Performed by: INTERNAL MEDICINE

## 2020-06-26 PROCEDURE — 74177 CT ABD & PELVIS W/CONTRAST: CPT

## 2020-06-26 PROCEDURE — 71260 CT THORAX DX C+: CPT

## 2020-06-26 RX ADMIN — IOPAMIDOL 75 ML: 755 INJECTION, SOLUTION INTRAVENOUS at 13:16

## 2020-06-30 NOTE — PROGRESS NOTES
Aidee Conner Probus   1955 7/1/2020     Chief Complaint   Patient presents with    Lung Cancer      Interval history/history of present illness:  Diagnosis   NSCLC, [facility] March 2019   [id number], stage IIIIB  Treatment summary  6/10/2019- 8/20/2019-carboplatin/Taxol weekly with concurrent radiation   12/4/2019- durvalumab x1 year    Interval history  Patient is a very pleasant 77years old female who has a diagnosis of stage IIIb non-small cell lung cancer, squamous cell subtype. She received initial treatment with carboplatin/Taxol weekly followed by concurrent radiation. She completed concurrent chemoradiation and is currently receiving durvalumab to complete 1 year of treatment. She has been doing well, except for complains of persistent fatigue. She has severe COPD and needs O2 supplement. She had a repeat CT scan and is here to discuss results. She denies any skin rash, diarrhea or worsening shortness of breath. She does have complaints of shortness of breath on mild exertion and persistent fatigue. Cancer history  Ms Raul Strickland was first seen by me on 5/3/2019 for further workup of a lung mass. She has complains of chest wall pain. Further workup revealed a lung mass. 4/9/2019-CT of the chest with contrast showed a malignant mass in the right upper lobe measures 6.5 x 2.8 x 5.1 cm. The mass invades and destroys the right lateral cortex and lateral aspect of the T4 vertebral body. Mediastinal adenopathy. Specifically, 1.9 cm pretracheal lymph node. 2.1 cm precarinal lymph node. 1.9 cm subcarina lymph node. Right hilar adenopathy. 5/3/2019-she was first seen by me.   5/15/2019-bone scan and CT abdomen pelvis was unremarkable metastatic disease. 5/16/[id number] biopsy consistent non-small cell lung cancer, squamous cell carcinoma subtype. 5/29/2019-PET scan showed intense and metabolic activity associated with pleural-based right paraspinal mass within the right upper lobe.  It invades into the adjacent thoracic vertebral body. Right hilar, subcarina and pretracheal lymphadenopathy. No evidence of muscle skeletal or intra-abdominal disease. 6/7/2019-recommended chemoradiation with carboplatin/Taxol weekly with concurrent radiation. 10/14/2019- CT chest abdomen pelvis showed a cavitary lesion measuring 3.3 x 4.6 x 3.4 cm which appears to be decreased in size. Underlying lung emphysema. There is a new notable sclerosis within the right lateral aspect of T4 and T5 vertebral bodies at the site of the posterior mediastinal invasion by the right lung malignancy. CT of the abdomen pelvis showed tiny hypodense in the liver which are too small to characterize but similar to prior exam.  Attention to follow-up. Stable nodule in the left adrenal gland too small to characterize. 12/4/2019- started on treatment durvalumab every 2 weeks to complete 1 year. 2/19/2020-CT chest abdomen pelvis showed slight decrease in size of the right upper lobe cavitary lesion. The lesion now measures 2.6 x 4.5 x 3.9 cm (previously 3.3 x 4.6 x 3.4 cm). Subtle cortical erosion of the right side of the centrum at T4/T5. No evidence of metastatic disease in the abdomen. Stable left adrenal nodule. 4/29/2020 Xr Chest Standard A cavitation or a necrotic mass in the right upper lobe. This was noted in the previous CT scan of the chest dated 2/19/2020. An area of consolidation in the right upper lobe may represent inflammatory or infectious process. Ill-defined nodule in the left lower lobe may represent a nipple shadow or a pulmonary nodule. This could be further evaluated. Chronic inflammatory changes in the remaining lungs bilaterally. 6/26/2020 Ct Chest W Contrast There is a pleural-based cavitary lesion within the right upper lobe posteriorly and medially. When measuring the lesion in the same manner as the previous study I do not feel there has been any significant progression from a previous CT of 2/19/2020.  There is some increased consolidation within the medial right upper lobe likely representing post therapy change. There is a new small nodule more inferiorly within the right upper lobe as described above. This may also represent consolidation post therapy but as it is a more isolated finding I feel it could potentially represent an early recurrence. This would warrant follow-up on subsequent imaging. These findings are superimposed upon changes of centrilobular emphysema. There is stable scarring within the right posterior lung base. There is bronchial wall thickening within both lungs suggesting chronic bronchitis. Coronary calcifications are present. Borderline enlargement of the pulmonary arteries suggesting pulmonary arterial hypertension. Mild constipation within the upper abdomen. Ct Abdomen Pelvis W Iv Contrast   No evidence of metastatic disease in the abdomen or pelvis. Stable biliary dilatation status post cholecystectomy which may represent reservoir phenomenon. Moderate to large volume stool in the colon, correlate for constipation. 2020- discussed at length the CAT scans with the patient and radiology. No clear-cut evidence of disease progression. Therefore will continue treatment.       ECO    Treatment related toxicity: Fatigue    Past medical history:  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     COPD (chronic obstructive pulmonary disease) (HCC)     Depression     Emphysema (subcutaneous) (surgical) resulting from a procedure     History of lung biopsy 2019    Malignant neoplasm of right main bronchus ([facility] [address] 2019    NSCLC, SCC [id number] stage IIIb    Syncope     TIA (transient ischemic attack)         Past surgical history:  Past Surgical History:   Procedure Laterality Date    APPENDECTOMY      BACK SURGERY      times two    BLADDER SUSPENSION      CHOLECYSTECTOMY      HYSTERECTOMY      INCONTINENCE SURGERY 09431 units CAPS Take 50,000 Units by mouth      Glycopyrrolate (SEEBRI NEOHALER) 15.6 MCG CAPS Inhale 1 capsule into the lungs      ketorolac (TORADOL) 60 MG/2ML injection 60 mg      lidocaine viscous hcl (XYLOCAINE) 2 % SOLN solution       vitamin D (ERGOCALCIFEROL) 80464 units CAPS capsule cholecalciferol (vitamin D3) 50,000 unit capsule   Take 1 capsule every week by oral route.  OXYGEN Inhale 2 L/min into the lungs      budesonide-formoterol (SYMBICORT) 160-4.5 MCG/ACT AERO Symbicort 160 mcg-4.5 mcg/actuation HFA aerosol inhaler   Inhale 2 puffs twice a day by inhalation route.  albuterol sulfate HFA (PROAIR HFA) 108 (90 Base) MCG/ACT inhaler ProAir HFA 90 mcg/actuation aerosol inhaler   Inhale 2 puffs every 4 hours by inhalation route as needed.  aspirin EC 81 MG EC tablet Take 81 mg by mouth      calcium carbonate 600 MG TABS tablet Take 600 mg by mouth      citalopram (CELEXA) 20 MG tablet citalopram 20 mg tablet   Take 1 tablet every day by oral route. No current facility-administered medications for this visit.       Facility-Administered Medications Ordered in Other Visits   Medication Dose Route Frequency Provider Last Rate Last Dose    sodium chloride flush 0.9 % injection 5 mL  5 mL Intravenous PRN Silvia Pfeiffer MD        sodium chloride flush 0.9 % injection 10 mL  10 mL Intravenous PRN Silvia Pfeiffer MD   10 mL at 07/01/20 1459    heparin flush 100 UNIT/ML injection 500 Units  500 Units Intracatheter PRN Silvia Pfeiffer MD   500 Units at 07/01/20 1459          REVIEW OF SYSTEMS:    Constitutional: no fever, no night sweats, fatigue;   HEENT: no blurring of vision, no double vision, no hearing difficulty, no tinnitus,no ulceration, no dysphagia  Lungs: no cough, shortness of breath, wheeze;   CVS: no palpitation, no chest pain, shortness of breath;  GI: no abdominal pain, no nausea , no vomiting, no constipation;   MARIA VICTORIA: no dysuria, frequency and urgency, no hematuria, no kidney stones;   Musculoskeletal: no joint pain, swelling , stiffness;   Endocrine: no polyuria, polydypsia, no cold or heat intolerence; Hematology/lymphatic: no easy brusing or bleeding, no hx of clotting disorder; no peripheral adenopathy. Dermatology: no skin rash, no eczema, no pruritis;   Psychiatry: no depression, no anxiety,no panic attacks, no suicide ideation; Neurology: no syncope, no seizures, no numbness or tingling of hands, no numbness or tingling of feet, no paresis;     PHYSICAL EXAM:    Vitals signs:    /62   Pulse 77   Temp 97.4 °F (36.3 °C)   Resp 18   Wt 126 lb 11.2 oz (57.5 kg)   SpO2 90%   BMI 21.75 kg/m²     Pain scale:2      CONSTITUTIONAL: Alert, appropriate, no acute distress, chronically ill-appearing  EYES: Non icteric, EOM intact, pupils equal round and reactive to light and accommodation. ENT: Oral mucus membranes moist, no oral pharyngeal lesions. External inspection of ears and nose are normal.  O2 nasal cannula 2 L/min  NECK: Supple, no masses. No palpable thyroid mass    CHEST/LUNGS: CTA bilaterally, normal respiratory effort   CARDIOVASCULAR: RRR, no murmurs. No lower extremity edema   ABDOMEN: soft non-tender, active bowel sounds, no hepatosplenomegaly. No palpable masses. EXTREMITIES: warm, Full ROM of all fours extremities. No focal weakness. SKIN: warm, dry, with no rashes or lesions  LYMPH: No cervical, clavicular, axillary, or inguinal lymphadenopathy  NEUROLOGIC: follows commands, non focal.   PSYCH: mood and affect appropriate. Alert and oriented to time and place and person. Relevant Lab findings: Reviewed by me  LBP:7/4/2491  WBC-5.55  HGB-12.4  PLT-148,000  Neut-3.98    6/17/2020  Alk Phos-137      Relevant Imaging studies:  Ct Chest W Contrast    Result Date: 6/26/2020  1. There is a pleural-based cavitary lesion within the right upper lobe posteriorly and medially.  When measuring the lesion in the same manner as the previous study I do not feel there has been any significant progression from a previous CT of 2/19/2020. There is some increased consolidation within the medial right upper lobe likely representing post therapy change. There is a new small nodule more inferiorly within the right upper lobe as described above. This may also represent consolidation post therapy but as it is a more isolated finding I feel it could potentially represent an early recurrence. This would warrant follow-up on subsequent imaging. These findings are superimposed upon changes of centrilobular emphysema. There is stable scarring within the right posterior lung base. There is bronchial wall thickening within both lungs suggesting chronic bronchitis. . 2. Coronary calcifications are present. 3. Borderline enlargement of the pulmonary arteries suggesting pulmonary arterial hypertension. 4. Mild constipation within the upper abdomen. Signed by Dr Senthil Hubbard on 6/26/2020 1:37 PM    Ct Abdomen Pelvis W Iv Contrast     Result Date: 6/26/2020  1. No evidence of metastatic disease in the abdomen or pelvis. 2.  Stable biliary dilatation status post cholecystectomy which may represent reservoir phenomenon. 3.  Moderate to large volume stool in the colon, correlate for constipation. Signed by Dr John Simpson on 6/26/2020 1:24 PM    ASSESSMENT:    Orders Placed This Encounter   Procedures    TSH without Reflex     Standing Status:   Future     Standing Expiration Date:   7/1/2021      Vi was seen today for lung cancer. Diagnoses and all orders for this visit:    Malignant neoplasm of right main bronchus [facility])    Care plan discussed with patient    Chemotherapy management, encounter for    Cancer-related pain    Encounter for antineoplastic immunotherapy    Fatigue due to treatment  -     TSH without Reflex; Future       NSCLC-SCC stage IIIB   Bone scan, CT abdomen pelvis unremarkable metastatic disease.   PET scan showed disease limited to the chest and mediastinum only. Therefore, stage IIIB. Status post completion of chemoradiation on 8/20/2019. The patient was counseled today about diagnosis, staging, prognosis, diagnostic tests, medications, side effects and disease management. The method of counseling included verbal explanation. The patient verbalized understanding. Repeat CT chest with contrast February 2020 showed overall stable to improved disease. Continue treatment with durvalumab to complete 1 year through December 2020. Repeat CT chest June 2020 showed overall stable disease. Monitoring of toxicity-she denies any neuropathy. Chronic fatigue. Smoke cessation-she was again strongly encouraged to quit. Cancer related pain-opioid analgesics have potential for abuse and risk of fatal overdose due to respiratory depression. The use of opioid exposes individual to have the risk of addiction, abuse and misuse. Addiction can occur in individuals appropriately prescribed and at a recommended dose. Addiction also occurs if the drug is being used or abused. An individual is also at increased risk of opioid addiction if a personal or family history of substance abuse or mental illness are present. Thyhsdon35 mg SR every 8 hours. Generalized pruritus-secondary to dry skin and likely immunotherapy. Continue Benadryl. Plan:  Continue Morphine SR 15 mg every 8 hours   Continue Percocet   CMP and TSH  CT chest September 2020  Continue durvalumab in 2 weeks every 2 weeks x 1 year treatment through December 2020. RTC in the 4 weeks with MD      I have seen, examined and reviewed this patient medication list, appropriate labs and imaging studies. I reviewed relevant medical records and others physicians notes. I discussed the plans of care with the patient. I answered all the questions to the patients satisfaction. The selection, dosing administration of anticancer agents in the management of associated toxicities are complex. Modifications of drug dose and schedule and the initiation of supportive care interventions are often necessary because of expected toxicities individual patient variability, prior treatment and comorbidity. The optimal delivery of anticancer agents therefore requires a healthcare delivery team experienced in the use of anticancer agents and the management of associated toxicities in patients with cancer. Follow Up:     Return in about 6 weeks (around 8/12/2020) for Treatment Visit and see Dr. Zay Jiang in [address] [place] [address].   [facility] for [address] [place] [address] every 2 wks     IMariana MA am scribing for Harry Roa MD. Electronically signed by Leonor Salas 7/1/2020 at 3:08 PM    I, Dr Guerline Moreno, personally performed the services described in this documentation as scribed by Mariana Chu MA in my presence and is both accurate and complete. Over 50% of the total visit time of 25 minutes was spent on counseling and/or coordination of care of history of lung cancer.  I personally discussed her care with Dr. Rosita Choudhary over the phone today

## 2020-07-01 ENCOUNTER — HOSPITAL ENCOUNTER (OUTPATIENT)
Dept: INFUSION THERAPY | Age: 65
Discharge: HOME | End: 2020-07-01
Payer: MEDICARE

## 2020-07-01 ENCOUNTER — OFFICE VISIT (OUTPATIENT)
Dept: HEMATOLOGY | Age: 65
End: 2020-07-01
Payer: MEDICARE

## 2020-07-01 VITALS
BODY MASS INDEX: 21.75 KG/M2 | OXYGEN SATURATION: 90 % | SYSTOLIC BLOOD PRESSURE: 114 MMHG | RESPIRATION RATE: 18 BRPM | WEIGHT: 126.7 LBS | TEMPERATURE: 97.4 F | DIASTOLIC BLOOD PRESSURE: 62 MMHG | HEART RATE: 77 BPM

## 2020-07-01 DIAGNOSIS — C34.01 MALIGNANT NEOPLASM OF RIGHT MAIN BRONCHUS (HCC): Primary | ICD-10-CM

## 2020-07-01 DIAGNOSIS — R53.83 FATIGUE DUE TO TREATMENT: ICD-10-CM

## 2020-07-01 DIAGNOSIS — Z51.11 CHEMOTHERAPY MANAGEMENT, ENCOUNTER FOR: ICD-10-CM

## 2020-07-01 LAB
ALBUMIN SERPL-MCNC: 3.6 G/DL (ref 3.5–5.2)
ALP BLD-CCNC: 107 U/L (ref 35–104)
ALT SERPL-CCNC: 10 U/L (ref 9–52)
ANION GAP SERPL CALCULATED.3IONS-SCNC: 4 MMOL/L (ref 7–19)
AST SERPL-CCNC: 23 U/L (ref 14–36)
BASOPHILS ABSOLUTE: 0.01 K/UL (ref 0.01–0.08)
BASOPHILS RELATIVE PERCENT: 0.2 % (ref 0.1–1.2)
BILIRUB SERPL-MCNC: 0.4 MG/DL (ref 0.2–1.3)
BUN BLDV-MCNC: 14 MG/DL (ref 7–17)
CALCIUM SERPL-MCNC: 9.3 MG/DL (ref 8.4–10.2)
CHLORIDE BLD-SCNC: 104 MMOL/L (ref 98–111)
CO2: 34 MMOL/L (ref 22–29)
CREAT SERPL-MCNC: <0.5 MG/DL (ref 0.5–1)
EOSINOPHILS ABSOLUTE: 0.08 K/UL (ref 0.04–0.54)
EOSINOPHILS RELATIVE PERCENT: 1.4 % (ref 0.7–7)
GFR NON-AFRICAN AMERICAN: >60
GLOBULIN: 3.4 G/DL
GLUCOSE BLD-MCNC: 95 MG/DL (ref 74–106)
HCT VFR BLD CALC: 37.3 % (ref 34.1–44.9)
HEMOGLOBIN: 12.4 G/DL (ref 11.2–15.7)
LYMPHOCYTES ABSOLUTE: 1.13 K/UL (ref 1.18–3.74)
LYMPHOCYTES RELATIVE PERCENT: 20.4 % (ref 19.3–53.1)
MCH RBC QN AUTO: 33.2 PG (ref 25.6–32.2)
MCHC RBC AUTO-ENTMCNC: 33.2 G/DL (ref 32.3–35.5)
MCV RBC AUTO: 100 FL (ref 79.4–94.8)
MONOCYTES ABSOLUTE: 0.35 K/UL (ref 0.24–0.82)
MONOCYTES RELATIVE PERCENT: 6.3 % (ref 4.7–12.5)
NEUTROPHILS ABSOLUTE: 3.98 K/UL (ref 1.56–6.13)
NEUTROPHILS RELATIVE PERCENT: 71.7 % (ref 34–71.1)
PDW BLD-RTO: 15.8 % (ref 11.7–14.4)
PLATELET # BLD: 148 K/UL (ref 182–369)
PMV BLD AUTO: 10 FL (ref 7.4–10.4)
POTASSIUM SERPL-SCNC: 3.9 MMOL/L (ref 3.5–5.1)
RBC # BLD: 3.73 M/UL (ref 3.93–5.22)
SODIUM BLD-SCNC: 142 MMOL/L (ref 137–145)
TOTAL PROTEIN: 7 G/DL (ref 6.3–8.2)
TSH SERPL DL<=0.05 MIU/L-ACNC: 1.13 UIU/ML (ref 0.47–4.68)
WBC # BLD: 5.55 K/UL (ref 3.98–10.04)

## 2020-07-01 PROCEDURE — 1090F PRES/ABSN URINE INCON ASSESS: CPT | Performed by: INTERNAL MEDICINE

## 2020-07-01 PROCEDURE — G8420 CALC BMI NORM PARAMETERS: HCPCS | Performed by: INTERNAL MEDICINE

## 2020-07-01 PROCEDURE — 1123F ACP DISCUSS/DSCN MKR DOCD: CPT | Performed by: INTERNAL MEDICINE

## 2020-07-01 PROCEDURE — 96417 CHEMO IV INFUS EACH ADDL SEQ: CPT

## 2020-07-01 PROCEDURE — 4040F PNEUMOC VAC/ADMIN/RCVD: CPT | Performed by: INTERNAL MEDICINE

## 2020-07-01 PROCEDURE — 3017F COLORECTAL CA SCREEN DOC REV: CPT | Performed by: INTERNAL MEDICINE

## 2020-07-01 PROCEDURE — 4004F PT TOBACCO SCREEN RCVD TLK: CPT | Performed by: INTERNAL MEDICINE

## 2020-07-01 PROCEDURE — 2580000003 HC RX 258: Performed by: INTERNAL MEDICINE

## 2020-07-01 PROCEDURE — G8428 CUR MEDS NOT DOCUMENT: HCPCS | Performed by: INTERNAL MEDICINE

## 2020-07-01 PROCEDURE — 96413 CHEMO IV INFUSION 1 HR: CPT

## 2020-07-01 PROCEDURE — 6360000002 HC RX W HCPCS: Performed by: INTERNAL MEDICINE

## 2020-07-01 PROCEDURE — 85025 COMPLETE CBC W/AUTO DIFF WBC: CPT

## 2020-07-01 PROCEDURE — 80053 COMPREHEN METABOLIC PANEL: CPT

## 2020-07-01 PROCEDURE — G8400 PT W/DXA NO RESULTS DOC: HCPCS | Performed by: INTERNAL MEDICINE

## 2020-07-01 PROCEDURE — 84443 ASSAY THYROID STIM HORMONE: CPT

## 2020-07-01 PROCEDURE — 99214 OFFICE O/P EST MOD 30 MIN: CPT | Performed by: INTERNAL MEDICINE

## 2020-07-01 RX ORDER — SODIUM CHLORIDE 0.9 % (FLUSH) 0.9 %
10 SYRINGE (ML) INJECTION PRN
Status: DISCONTINUED | OUTPATIENT
Start: 2020-07-01 | End: 2020-07-02 | Stop reason: HOSPADM

## 2020-07-01 RX ORDER — SODIUM CHLORIDE 9 MG/ML
INJECTION, SOLUTION INTRAVENOUS CONTINUOUS
Status: CANCELLED | OUTPATIENT
Start: 2020-07-01

## 2020-07-01 RX ORDER — SODIUM CHLORIDE 0.9 % (FLUSH) 0.9 %
10 SYRINGE (ML) INJECTION PRN
Status: CANCELLED | OUTPATIENT
Start: 2020-07-01

## 2020-07-01 RX ORDER — HEPARIN SODIUM (PORCINE) LOCK FLUSH IV SOLN 100 UNIT/ML 100 UNIT/ML
500 SOLUTION INTRAVENOUS PRN
Status: DISCONTINUED | OUTPATIENT
Start: 2020-07-01 | End: 2020-07-03 | Stop reason: HOSPADM

## 2020-07-01 RX ORDER — DIPHENHYDRAMINE HYDROCHLORIDE 50 MG/ML
50 INJECTION INTRAMUSCULAR; INTRAVENOUS ONCE
Status: CANCELLED | OUTPATIENT
Start: 2020-07-01

## 2020-07-01 RX ORDER — METHYLPREDNISOLONE SODIUM SUCCINATE 125 MG/2ML
125 INJECTION, POWDER, LYOPHILIZED, FOR SOLUTION INTRAMUSCULAR; INTRAVENOUS ONCE
Status: CANCELLED | OUTPATIENT
Start: 2020-07-01

## 2020-07-01 RX ORDER — SODIUM CHLORIDE 9 MG/ML
20 INJECTION, SOLUTION INTRAVENOUS ONCE
Status: CANCELLED | OUTPATIENT
Start: 2020-07-01

## 2020-07-01 RX ORDER — SODIUM CHLORIDE 0.9 % (FLUSH) 0.9 %
5 SYRINGE (ML) INJECTION PRN
Status: CANCELLED | OUTPATIENT
Start: 2020-07-01

## 2020-07-01 RX ORDER — HEPARIN SODIUM (PORCINE) LOCK FLUSH IV SOLN 100 UNIT/ML 100 UNIT/ML
500 SOLUTION INTRAVENOUS PRN
Status: CANCELLED
Start: 2020-07-01

## 2020-07-01 RX ORDER — EPINEPHRINE 1 MG/ML
0.3 INJECTION, SOLUTION, CONCENTRATE INTRAVENOUS PRN
Status: CANCELLED | OUTPATIENT
Start: 2020-07-01

## 2020-07-01 RX ORDER — SODIUM CHLORIDE 0.9 % (FLUSH) 0.9 %
5 SYRINGE (ML) INJECTION PRN
Status: DISCONTINUED | OUTPATIENT
Start: 2020-07-01 | End: 2020-07-02 | Stop reason: HOSPADM

## 2020-07-01 RX ADMIN — HEPARIN 500 UNITS: 100 SYRINGE at 14:59

## 2020-07-01 RX ADMIN — SODIUM CHLORIDE 550 MG: 9 INJECTION, SOLUTION INTRAVENOUS at 13:56

## 2020-07-01 RX ADMIN — SODIUM CHLORIDE, PRESERVATIVE FREE 10 ML: 5 INJECTION INTRAVENOUS at 14:59

## 2020-07-08 RX ORDER — OXYCODONE AND ACETAMINOPHEN 10; 325 MG/1; MG/1
1 TABLET ORAL EVERY 6 HOURS PRN
Qty: 120 TABLET | Refills: 0 | Status: SHIPPED | OUTPATIENT
Start: 2020-07-08 | End: 2020-08-03 | Stop reason: SDUPTHER

## 2020-07-28 RX ORDER — EPINEPHRINE 1 MG/ML
0.3 INJECTION, SOLUTION, CONCENTRATE INTRAVENOUS PRN
Status: CANCELLED | OUTPATIENT
Start: 2020-07-29

## 2020-07-28 RX ORDER — SODIUM CHLORIDE 9 MG/ML
20 INJECTION, SOLUTION INTRAVENOUS ONCE
Status: CANCELLED | OUTPATIENT
Start: 2020-07-29

## 2020-07-28 RX ORDER — SODIUM CHLORIDE 9 MG/ML
INJECTION, SOLUTION INTRAVENOUS CONTINUOUS
Status: CANCELLED | OUTPATIENT
Start: 2020-07-29

## 2020-07-28 RX ORDER — METHYLPREDNISOLONE SODIUM SUCCINATE 125 MG/2ML
125 INJECTION, POWDER, LYOPHILIZED, FOR SOLUTION INTRAMUSCULAR; INTRAVENOUS ONCE
Status: CANCELLED | OUTPATIENT
Start: 2020-07-29

## 2020-07-28 RX ORDER — SODIUM CHLORIDE 0.9 % (FLUSH) 0.9 %
10 SYRINGE (ML) INJECTION PRN
Status: CANCELLED | OUTPATIENT
Start: 2020-07-29

## 2020-07-28 RX ORDER — SODIUM CHLORIDE 0.9 % (FLUSH) 0.9 %
5 SYRINGE (ML) INJECTION PRN
Status: CANCELLED | OUTPATIENT
Start: 2020-07-29

## 2020-07-28 RX ORDER — HEPARIN SODIUM (PORCINE) LOCK FLUSH IV SOLN 100 UNIT/ML 100 UNIT/ML
500 SOLUTION INTRAVENOUS PRN
Status: CANCELLED
Start: 2020-07-29

## 2020-07-28 RX ORDER — DIPHENHYDRAMINE HYDROCHLORIDE 50 MG/ML
50 INJECTION INTRAMUSCULAR; INTRAVENOUS ONCE
Status: CANCELLED | OUTPATIENT
Start: 2020-07-29

## 2020-07-29 ENCOUNTER — HOSPITAL ENCOUNTER (OUTPATIENT)
Dept: INFUSION THERAPY | Age: 65
Discharge: HOME | End: 2020-07-29
Payer: MEDICARE

## 2020-07-29 VITALS
WEIGHT: 126 LBS | SYSTOLIC BLOOD PRESSURE: 100 MMHG | TEMPERATURE: 98.8 F | DIASTOLIC BLOOD PRESSURE: 70 MMHG | OXYGEN SATURATION: 90 % | HEART RATE: 83 BPM | HEIGHT: 64 IN | BODY MASS INDEX: 21.51 KG/M2

## 2020-07-29 DIAGNOSIS — C34.01 MALIGNANT NEOPLASM OF RIGHT MAIN BRONCHUS (HCC): Primary | ICD-10-CM

## 2020-07-29 DIAGNOSIS — R53.83 FATIGUE DUE TO TREATMENT: ICD-10-CM

## 2020-07-29 LAB
ALBUMIN SERPL-MCNC: 3.8 G/DL (ref 3.5–5.2)
ALP BLD-CCNC: 119 U/L (ref 35–104)
ALT SERPL-CCNC: 12 U/L (ref 9–52)
ANION GAP SERPL CALCULATED.3IONS-SCNC: 2 MMOL/L (ref 7–19)
AST SERPL-CCNC: 29 U/L (ref 14–36)
BASOPHILS ABSOLUTE: 0.01 K/UL (ref 0.01–0.08)
BASOPHILS RELATIVE PERCENT: 0.1 % (ref 0.1–1.2)
BILIRUB SERPL-MCNC: 0.3 MG/DL (ref 0.2–1.3)
BUN BLDV-MCNC: 15 MG/DL (ref 7–17)
CALCIUM SERPL-MCNC: 9.3 MG/DL (ref 8.4–10.2)
CHLORIDE BLD-SCNC: 103 MMOL/L (ref 98–111)
CO2: 35 MMOL/L (ref 22–29)
CREAT SERPL-MCNC: <0.5 MG/DL (ref 0.5–1)
EOSINOPHILS ABSOLUTE: 0.07 K/UL (ref 0.04–0.54)
EOSINOPHILS RELATIVE PERCENT: 1 % (ref 0.7–7)
GFR NON-AFRICAN AMERICAN: >60
GLOBULIN: 3.2 G/DL
GLUCOSE BLD-MCNC: 109 MG/DL (ref 74–106)
HCT VFR BLD CALC: 39.3 % (ref 34.1–44.9)
HEMOGLOBIN: 12.6 G/DL (ref 11.2–15.7)
LYMPHOCYTES ABSOLUTE: 1.24 K/UL (ref 1.18–3.74)
LYMPHOCYTES RELATIVE PERCENT: 18.5 % (ref 19.3–53.1)
MCH RBC QN AUTO: 32.6 PG (ref 25.6–32.2)
MCHC RBC AUTO-ENTMCNC: 32.1 G/DL (ref 32.3–35.5)
MCV RBC AUTO: 101.8 FL (ref 79.4–94.8)
MONOCYTES ABSOLUTE: 0.39 K/UL (ref 0.24–0.82)
MONOCYTES RELATIVE PERCENT: 5.8 % (ref 4.7–12.5)
NEUTROPHILS ABSOLUTE: 5 K/UL (ref 1.56–6.13)
NEUTROPHILS RELATIVE PERCENT: 74.6 % (ref 34–71.1)
PDW BLD-RTO: 14.9 % (ref 11.7–14.4)
PLATELET # BLD: 149 K/UL (ref 182–369)
PMV BLD AUTO: 11.2 FL (ref 7.4–10.4)
POTASSIUM SERPL-SCNC: 3.6 MMOL/L (ref 3.5–5.1)
RBC # BLD: 3.86 M/UL (ref 3.93–5.22)
SODIUM BLD-SCNC: 140 MMOL/L (ref 137–145)
TOTAL PROTEIN: 7 G/DL (ref 6.3–8.2)
TSH SERPL DL<=0.05 MIU/L-ACNC: 0.24 UIU/ML (ref 0.47–4.68)
WBC # BLD: 6.71 K/UL (ref 3.98–10.04)

## 2020-07-29 PROCEDURE — 6360000002 HC RX W HCPCS: Performed by: NURSE PRACTITIONER

## 2020-07-29 PROCEDURE — 96413 CHEMO IV INFUSION 1 HR: CPT

## 2020-07-29 PROCEDURE — 84443 ASSAY THYROID STIM HORMONE: CPT

## 2020-07-29 PROCEDURE — 2580000003 HC RX 258: Performed by: NURSE PRACTITIONER

## 2020-07-29 PROCEDURE — 85025 COMPLETE CBC W/AUTO DIFF WBC: CPT

## 2020-07-29 PROCEDURE — 80053 COMPREHEN METABOLIC PANEL: CPT

## 2020-07-29 RX ORDER — SODIUM CHLORIDE 0.9 % (FLUSH) 0.9 %
10 SYRINGE (ML) INJECTION PRN
Status: DISCONTINUED | OUTPATIENT
Start: 2020-07-29 | End: 2020-07-30 | Stop reason: HOSPADM

## 2020-07-29 RX ORDER — HEPARIN SODIUM (PORCINE) LOCK FLUSH IV SOLN 100 UNIT/ML 100 UNIT/ML
500 SOLUTION INTRAVENOUS PRN
Status: DISCONTINUED | OUTPATIENT
Start: 2020-07-29 | End: 2020-07-31 | Stop reason: HOSPADM

## 2020-07-29 RX ADMIN — SODIUM CHLORIDE, PRESERVATIVE FREE 10 ML: 5 INJECTION INTRAVENOUS at 12:15

## 2020-07-29 RX ADMIN — SODIUM CHLORIDE 550 MG: 9 INJECTION, SOLUTION INTRAVENOUS at 11:09

## 2020-07-29 RX ADMIN — HEPARIN 500 UNITS: 100 SYRINGE at 12:15

## 2020-07-29 ASSESSMENT — PAIN SCALES - GENERAL: PAINLEVEL_OUTOF10: 0

## 2020-07-29 NOTE — PROGRESS NOTES
Vi is c/o trouble swallowing at times and food gets caught in her throat. Per Dr. Bobo Rey, I will put in an order for a barium swallow to be done before her next tx.

## 2020-08-03 RX ORDER — OXYCODONE AND ACETAMINOPHEN 10; 325 MG/1; MG/1
1 TABLET ORAL EVERY 6 HOURS PRN
Qty: 120 TABLET | Refills: 0 | Status: SHIPPED | OUTPATIENT
Start: 2020-08-03 | End: 2020-09-01 | Stop reason: SDUPTHER

## 2020-08-04 ENCOUNTER — HOSPITAL ENCOUNTER (OUTPATIENT)
Dept: GENERAL RADIOLOGY | Age: 65
Discharge: HOME | End: 2020-08-04
Payer: MEDICARE

## 2020-08-04 PROCEDURE — 74220 X-RAY XM ESOPHAGUS 1CNTRST: CPT

## 2020-08-04 NOTE — PROGRESS NOTES
pleural-based right paraspinal mass within the right upper lobe. It invades into the adjacent thoracic vertebral body. Right hilar, subcarina and pretracheal lymphadenopathy. No evidence of muscle skeletal or intra-abdominal disease. 6/7/2019-recommended chemoradiation with carboplatin/Taxol weekly with concurrent radiation. 10/14/2019- CT chest abdomen pelvis showed a cavitary lesion measuring 3.3 x 4.6 x 3.4 cm which appears to be decreased in size. Underlying lung emphysema. There is a new notable sclerosis within the right lateral aspect of T4 and T5 vertebral bodies at the site of the posterior mediastinal invasion by the right lung malignancy. CT of the abdomen pelvis showed tiny hypodense in the liver which are too small to characterize but similar to prior exam.  Attention to follow-up. Stable nodule in the left adrenal gland too small to characterize. 12/4/2019- started on treatment durvalumab every 2 weeks to complete 1 year. 2/19/2020-CT chest abdomen pelvis showed slight decrease in size of the right upper lobe cavitary lesion. The lesion now measures 2.6 x 4.5 x 3.9 cm (previously 3.3 x 4.6 x 3.4 cm). Subtle cortical erosion of the right side of the centrum at T4/T5. No evidence of metastatic disease in the abdomen. Stable left adrenal nodule. 4/29/2020 Xr Chest Standard A cavitation or a necrotic mass in the right upper lobe. This was noted in the previous CT scan of the chest dated 2/19/2020. An area of consolidation in the right upper lobe may represent inflammatory or infectious process. Ill-defined nodule in the left lower lobe may represent a nipple shadow or a pulmonary nodule. This could be further evaluated. Chronic inflammatory changes in the remaining lungs bilaterally. 6/26/2020 Ct Chest W Contrast There is a pleural-based cavitary lesion within the right upper lobe posteriorly and medially.  When measuring the lesion in the same manner as the previous study I do not feel there SUSPENSION      CHOLECYSTECTOMY      HYSTERECTOMY      INCONTINENCE SURGERY          Social history:  Social History     Socioeconomic History    Marital status:      Spouse name: Not on file    Number of children: Not on file    Years of education: Not on file    Highest education level: Not on file   Occupational History    Not on file   Social Needs    Financial resource strain: Not on file    Food insecurity     Worry: Not on file     Inability: Not on file    Transportation needs     Medical: Not on file     Non-medical: Not on file   Tobacco Use    Smoking status: Current Every Day Smoker    Smokeless tobacco: Never Used   Substance and Sexual Activity    Alcohol use: Not Currently    Drug use: Never    Sexual activity: Not on file   Lifestyle    Physical activity     Days per week: Not on file     Minutes per session: Not on file    Stress: Not on file   Relationships    Social connections     Talks on phone: Not on file     Gets together: Not on file     Attends Protestant service: Not on file     Active member of club or organization: Not on file     Attends meetings of clubs or organizations: Not on file     Relationship status: Not on file    Intimate partner violence     Fear of current or ex partner: Not on file     Emotionally abused: Not on file     Physically abused: Not on file     Forced sexual activity: Not on file   Other Topics Concern    Not on file   Social History Narrative    Not on file        Family history:   Family History   Problem Relation Age of Onset    Colon Cancer Mother     High Blood Pressure Brother     Diabetes Brother         Current Outpatient Medications   Medication Sig Dispense Refill    Morphine Sulfate ER 15 MG TBEA Take 15 mg by mouth every 8 hours for 30 days. 90 each 0    oxyCODONE-acetaminophen (PERCOCET)  MG per tablet Take 1 tablet by mouth every 6 hours as needed for Pain for up to 30 days.  Intended supply: 30 days 120 tablet 0    tiZANidine (ZANAFLEX) 4 MG tablet       Cholecalciferol (VITAMIN D3) 75851 units CAPS Take 50,000 Units by mouth      Glycopyrrolate (SEEBRI NEOHALER) 15.6 MCG CAPS Inhale 1 capsule into the lungs      ketorolac (TORADOL) 60 MG/2ML injection 60 mg      lidocaine viscous hcl (XYLOCAINE) 2 % SOLN solution       vitamin D (ERGOCALCIFEROL) 60908 units CAPS capsule cholecalciferol (vitamin D3) 50,000 unit capsule   Take 1 capsule every week by oral route.  OXYGEN Inhale 2 L/min into the lungs      budesonide-formoterol (SYMBICORT) 160-4.5 MCG/ACT AERO Symbicort 160 mcg-4.5 mcg/actuation HFA aerosol inhaler   Inhale 2 puffs twice a day by inhalation route.  albuterol sulfate HFA (PROAIR HFA) 108 (90 Base) MCG/ACT inhaler ProAir HFA 90 mcg/actuation aerosol inhaler   Inhale 2 puffs every 4 hours by inhalation route as needed.  aspirin EC 81 MG EC tablet Take 81 mg by mouth      calcium carbonate 600 MG TABS tablet Take 600 mg by mouth      citalopram (CELEXA) 20 MG tablet citalopram 20 mg tablet   Take 1 tablet every day by oral route. No current facility-administered medications for this visit.       Facility-Administered Medications Ordered in Other Visits   Medication Dose Route Frequency Provider Last Rate Last Dose    durvalumab (IMFINZI) 550 mg in sodium chloride 0.9 % 250 mL chemo IVPB  10 mg/kg (Treatment Plan Recorded) Intravenous Once Crystal MD Janina        heparin flush 100 UNIT/ML injection 500 Units  500 Units Intracatheter PRN Harry MD Janina        sodium chloride flush 0.9 % injection 10 mL  10 mL Intravenous PRN Crystal MD Janina              REVIEW OF SYSTEMS:    Constitutional: no fever, no night sweats,  fatigue;   HEENT: no blurring of vision, no double vision, no hearing difficulty, no tinnitus,no ulceration, no dysphagia  Lungs: cough, shortness of breath, wheeze;   CVS: no palpitation, no chest pain, shortness of breath;  GI: no abdominal pain, no nausea , no vomiting, no constipation;   MARIA VICTORIA: no dysuria, frequency and urgency, no hematuria, no kidney stones;   Musculoskeletal: no joint pain, swelling , stiffness;   Endocrine: no polyuria, polydypsia, no cold or heat intolerence; Hematology/lymphatic: no easy brusing or bleeding, no hx of clotting disorder; no peripheral adenopathy. Dermatology: no skin rash, no eczema, no pruritis;   Psychiatry: no depression, no anxiety,no panic attacks, no suicide ideation; Neurology: no syncope, no seizures, no numbness or tingling of hands, no numbness or tingling of feet, no paresis;     PHYSICAL EXAM:    Vitals signs:    /60   Pulse 76   Temp 98.6 °F (37 °C)   Ht 5' 4\" (1.626 m)   Wt 127 lb (57.6 kg)   SpO2 91%   BMI 21.80 kg/m²     Pain scale:2  Pain Score:   0 - No pain   CONSTITUTIONAL: Alert, appropriate, no acute distress,   EYES: Non icteric, EOM intact, pupils equal round and reactive to light and accommodation. ENT: Oral mucus membranes moist, no oral pharyngeal lesions. External inspection of ears and nose are normal.  O2 nasal cannula 2 L/min  NECK: Supple, no masses. No palpable thyroid mass    CHEST/LUNGS: CTA bilaterally, normal respiratory effort   CARDIOVASCULAR: RRR, no murmurs. No lower extremity edema   ABDOMEN: soft non-tender, active bowel sounds, no hepatosplenomegaly. No palpable masses. EXTREMITIES: warm, Full ROM of all fours extremities. No focal weakness. SKIN: warm, dry, with no rashes or lesions  LYMPH: No cervical, clavicular, axillary, or inguinal lymphadenopathy  NEUROLOGIC: follows commands, non focal.   PSYCH: mood and affect appropriate. Alert and oriented to time and place and person.     Relevant Lab findings: Reviewed by me  CBC:8/12/2020  WBC-7.57  HGB-11.9  PLT-153,000  Neut-6.11    7/29/2020  CO2- 35 (H)  BUN- 15  Creat- <0.5  Alk Phos- 119 (H)  ALT- 12  AST- 29  TSH- 0.239 (L)    Relevant Imaging studies:  Fl Esophagram    Result Date: 8/4/2020  A normal esophagram. Fluoroscopy time: 2 minutes 19 seconds. Total dose: [id number]. Signed by Dr Stephanie Schilling on 8/4/2020 11:25 AM    ASSESSMENT:    No orders of the defined types were placed in this encounter. Vi was seen today for lung cancer. Diagnoses and all orders for this visit:    Malignant neoplasm of right main bronchus [facility])    Care plan discussed with patient    Fatigue due to treatment       NSCLC-SCC stage IIIB   Bone scan, CT abdomen pelvis unremarkable metastatic disease. PET scan showed disease limited to the chest and mediastinum only. Therefore, stage IIIB. Status post completion of chemoradiation on 8/20/2019. The patient was counseled today about diagnosis, staging, prognosis, diagnostic tests, medications, side effects and disease management. The method of counseling included verbal explanation. The patient verbalized understanding. Repeat CT chest with contrast February 2020 showed overall stable to improved disease. Continue treatment with durvalumab to complete 1 year through December 2020. Repeat CT chest June 2020 showed overall stable disease. Monitoring of toxicity-she denies any neuropathy. Chronic fatigue. Smoke cessation-she was again strongly encouraged to quit. Cancer related pain-opioid analgesics have potential for abuse and risk of fatal overdose due to respiratory depression. The use of opioid exposes individual to have the risk of addiction, abuse and misuse. Addiction can occur in individuals appropriately prescribed and at a recommended dose. Addiction also occurs if the drug is being used or abused. An individual is also at increased risk of opioid addiction if a personal or family history of substance abuse or mental illness are present. [id number] mg SR every 8 hours. Generalized pruritus-secondary to dry skin and likely immunotherapy. Continue Benadryl.     Plan:  Continue Morphine SR 15 mg every 8 hours   Continue Percocet   CMP, TSH today  CT chest September 2020  Continue durvalumab in 2 weeks every 2 weeks x 1 year treatment through December 2020. RTC in the 6 weeks with MD after scans       I have seen, examined and reviewed this patient medication list, appropriate labs and imaging studies. I reviewed relevant medical records and others physicians notes. I discussed the plans of care with the patient. I answered all the questions to the patients satisfaction. The selection, dosing administration of anticancer agents in the management of associated toxicities are complex. Modifications of drug dose and schedule and the initiation of supportive care interventions are often necessary because of expected toxicities individual patient variability, prior treatment and comorbidity. The optimal delivery of anticancer agents therefore requires a healthcare delivery team experienced in the use of anticancer agents and the management of associated toxicities in patients with cancer. Follow Up:     No follow-ups on file. Data Windthorstnenita Marcial MA am scribing for Reuben Perry MD. Electronically signed by Leonor Christian 8/12/2020 at 12:01 PM    I, Dr Michael Frey, personally performed the services described in this documentation as scribed by Derrick Jennings MA in my presence and is both accurate and complete. Over 50% of the total visit time of 25 minutes was spent on counseling and/or coordination of care of history of lung cancer.  I personally discussed her care with Dr. Aakash Rivas over the phone today

## 2020-08-12 ENCOUNTER — OFFICE VISIT (OUTPATIENT)
Dept: HEMATOLOGY | Age: 65
End: 2020-08-12
Payer: MEDICARE

## 2020-08-12 ENCOUNTER — HOSPITAL ENCOUNTER (OUTPATIENT)
Dept: INFUSION THERAPY | Age: 65
Discharge: HOME | End: 2020-08-12
Payer: MEDICARE

## 2020-08-12 VITALS
OXYGEN SATURATION: 91 % | HEIGHT: 64 IN | SYSTOLIC BLOOD PRESSURE: 104 MMHG | HEART RATE: 76 BPM | TEMPERATURE: 98.6 F | BODY MASS INDEX: 21.68 KG/M2 | DIASTOLIC BLOOD PRESSURE: 60 MMHG | WEIGHT: 127 LBS

## 2020-08-12 VITALS — OXYGEN SATURATION: 98 %

## 2020-08-12 DIAGNOSIS — C34.01 MALIGNANT NEOPLASM OF RIGHT MAIN BRONCHUS (HCC): Primary | ICD-10-CM

## 2020-08-12 DIAGNOSIS — R53.83 FATIGUE DUE TO TREATMENT: ICD-10-CM

## 2020-08-12 LAB
ALBUMIN SERPL-MCNC: 3.5 G/DL (ref 3.5–5.2)
ALP BLD-CCNC: 99 U/L (ref 35–104)
ALT SERPL-CCNC: 12 U/L (ref 9–52)
ANION GAP SERPL CALCULATED.3IONS-SCNC: 1 MMOL/L (ref 7–19)
AST SERPL-CCNC: 20 U/L (ref 14–36)
BASOPHILS ABSOLUTE: 0.01 K/UL (ref 0.01–0.08)
BASOPHILS RELATIVE PERCENT: 0.1 % (ref 0.1–1.2)
BILIRUB SERPL-MCNC: 0.3 MG/DL (ref 0.2–1.3)
BUN BLDV-MCNC: 12 MG/DL (ref 7–17)
CALCIUM SERPL-MCNC: 8.7 MG/DL (ref 8.4–10.2)
CHLORIDE BLD-SCNC: 102 MMOL/L (ref 98–111)
CO2: 37 MMOL/L (ref 22–29)
CREAT SERPL-MCNC: <0.5 MG/DL (ref 0.5–1)
EOSINOPHILS ABSOLUTE: 0.11 K/UL (ref 0.04–0.54)
EOSINOPHILS RELATIVE PERCENT: 1.5 % (ref 0.7–7)
GFR NON-AFRICAN AMERICAN: >60
GLOBULIN: 3 G/DL
GLUCOSE BLD-MCNC: 106 MG/DL (ref 74–106)
HCT VFR BLD CALC: 37.9 % (ref 34.1–44.9)
HEMOGLOBIN: 11.9 G/DL (ref 11.2–15.7)
LYMPHOCYTES ABSOLUTE: 0.96 K/UL (ref 1.18–3.74)
LYMPHOCYTES RELATIVE PERCENT: 12.7 % (ref 19.3–53.1)
MCH RBC QN AUTO: 32.8 PG (ref 25.6–32.2)
MCHC RBC AUTO-ENTMCNC: 31.4 G/DL (ref 32.3–35.5)
MCV RBC AUTO: 104.4 FL (ref 79.4–94.8)
MONOCYTES ABSOLUTE: 0.38 K/UL (ref 0.24–0.82)
MONOCYTES RELATIVE PERCENT: 5 % (ref 4.7–12.5)
NEUTROPHILS ABSOLUTE: 6.11 K/UL (ref 1.56–6.13)
NEUTROPHILS RELATIVE PERCENT: 80.7 % (ref 34–71.1)
PDW BLD-RTO: 14.3 % (ref 11.7–14.4)
PLATELET # BLD: 153 K/UL (ref 182–369)
PMV BLD AUTO: 11.1 FL (ref 7.4–10.4)
POTASSIUM SERPL-SCNC: 3.8 MMOL/L (ref 3.5–5.1)
RBC # BLD: 3.63 M/UL (ref 3.93–5.22)
SODIUM BLD-SCNC: 140 MMOL/L (ref 137–145)
TOTAL PROTEIN: 6.5 G/DL (ref 6.3–8.2)
TSH SERPL DL<=0.05 MIU/L-ACNC: 0.45 UIU/ML (ref 0.47–4.68)
WBC # BLD: 7.57 K/UL (ref 3.98–10.04)

## 2020-08-12 PROCEDURE — 3017F COLORECTAL CA SCREEN DOC REV: CPT | Performed by: INTERNAL MEDICINE

## 2020-08-12 PROCEDURE — 96413 CHEMO IV INFUSION 1 HR: CPT

## 2020-08-12 PROCEDURE — 99214 OFFICE O/P EST MOD 30 MIN: CPT | Performed by: INTERNAL MEDICINE

## 2020-08-12 PROCEDURE — 85025 COMPLETE CBC W/AUTO DIFF WBC: CPT

## 2020-08-12 PROCEDURE — G8427 DOCREV CUR MEDS BY ELIG CLIN: HCPCS | Performed by: INTERNAL MEDICINE

## 2020-08-12 PROCEDURE — 4040F PNEUMOC VAC/ADMIN/RCVD: CPT | Performed by: INTERNAL MEDICINE

## 2020-08-12 PROCEDURE — 80053 COMPREHEN METABOLIC PANEL: CPT

## 2020-08-12 PROCEDURE — 2580000003 HC RX 258: Performed by: INTERNAL MEDICINE

## 2020-08-12 PROCEDURE — 84443 ASSAY THYROID STIM HORMONE: CPT

## 2020-08-12 PROCEDURE — 6360000002 HC RX W HCPCS: Performed by: INTERNAL MEDICINE

## 2020-08-12 PROCEDURE — 1090F PRES/ABSN URINE INCON ASSESS: CPT | Performed by: INTERNAL MEDICINE

## 2020-08-12 PROCEDURE — G8400 PT W/DXA NO RESULTS DOC: HCPCS | Performed by: INTERNAL MEDICINE

## 2020-08-12 PROCEDURE — G8420 CALC BMI NORM PARAMETERS: HCPCS | Performed by: INTERNAL MEDICINE

## 2020-08-12 PROCEDURE — 4004F PT TOBACCO SCREEN RCVD TLK: CPT | Performed by: INTERNAL MEDICINE

## 2020-08-12 PROCEDURE — 1123F ACP DISCUSS/DSCN MKR DOCD: CPT | Performed by: INTERNAL MEDICINE

## 2020-08-12 RX ORDER — SODIUM CHLORIDE 9 MG/ML
INJECTION, SOLUTION INTRAVENOUS CONTINUOUS
Status: CANCELLED | OUTPATIENT
Start: 2020-08-12

## 2020-08-12 RX ORDER — SODIUM CHLORIDE 0.9 % (FLUSH) 0.9 %
10 SYRINGE (ML) INJECTION PRN
Status: CANCELLED | OUTPATIENT
Start: 2020-08-12

## 2020-08-12 RX ORDER — HEPARIN SODIUM (PORCINE) LOCK FLUSH IV SOLN 100 UNIT/ML 100 UNIT/ML
500 SOLUTION INTRAVENOUS PRN
Status: DISCONTINUED | OUTPATIENT
Start: 2020-08-12 | End: 2020-08-14 | Stop reason: HOSPADM

## 2020-08-12 RX ORDER — SODIUM CHLORIDE 0.9 % (FLUSH) 0.9 %
10 SYRINGE (ML) INJECTION PRN
Status: DISCONTINUED | OUTPATIENT
Start: 2020-08-12 | End: 2020-08-13 | Stop reason: HOSPADM

## 2020-08-12 RX ORDER — DIPHENHYDRAMINE HYDROCHLORIDE 50 MG/ML
50 INJECTION INTRAMUSCULAR; INTRAVENOUS ONCE
Status: CANCELLED | OUTPATIENT
Start: 2020-08-12

## 2020-08-12 RX ORDER — EPINEPHRINE 1 MG/ML
0.3 INJECTION, SOLUTION, CONCENTRATE INTRAVENOUS PRN
Status: CANCELLED | OUTPATIENT
Start: 2020-08-12

## 2020-08-12 RX ORDER — HEPARIN SODIUM (PORCINE) LOCK FLUSH IV SOLN 100 UNIT/ML 100 UNIT/ML
500 SOLUTION INTRAVENOUS PRN
Status: CANCELLED
Start: 2020-08-12

## 2020-08-12 RX ORDER — SODIUM CHLORIDE 9 MG/ML
20 INJECTION, SOLUTION INTRAVENOUS ONCE
Status: CANCELLED | OUTPATIENT
Start: 2020-08-12

## 2020-08-12 RX ORDER — SODIUM CHLORIDE 0.9 % (FLUSH) 0.9 %
5 SYRINGE (ML) INJECTION PRN
Status: CANCELLED | OUTPATIENT
Start: 2020-08-12

## 2020-08-12 RX ORDER — METHYLPREDNISOLONE SODIUM SUCCINATE 125 MG/2ML
125 INJECTION, POWDER, LYOPHILIZED, FOR SOLUTION INTRAMUSCULAR; INTRAVENOUS ONCE
Status: CANCELLED | OUTPATIENT
Start: 2020-08-12

## 2020-08-12 RX ADMIN — HEPARIN 500 UNITS: 100 SYRINGE at 13:06

## 2020-08-12 RX ADMIN — SODIUM CHLORIDE, PRESERVATIVE FREE 10 ML: 5 INJECTION INTRAVENOUS at 13:06

## 2020-08-12 RX ADMIN — SODIUM CHLORIDE 550 MG: 9 INJECTION, SOLUTION INTRAVENOUS at 12:02

## 2020-08-26 ENCOUNTER — HOSPITAL ENCOUNTER (OUTPATIENT)
Dept: INFUSION THERAPY | Age: 65
Discharge: HOME | End: 2020-08-26
Payer: MEDICARE

## 2020-08-26 VITALS
BODY MASS INDEX: 21.51 KG/M2 | OXYGEN SATURATION: 90 % | DIASTOLIC BLOOD PRESSURE: 72 MMHG | TEMPERATURE: 98.4 F | WEIGHT: 126 LBS | HEIGHT: 64 IN | HEART RATE: 81 BPM | SYSTOLIC BLOOD PRESSURE: 120 MMHG

## 2020-08-26 DIAGNOSIS — R53.83 FATIGUE DUE TO TREATMENT: ICD-10-CM

## 2020-08-26 DIAGNOSIS — C34.01 MALIGNANT NEOPLASM OF RIGHT MAIN BRONCHUS (HCC): Primary | ICD-10-CM

## 2020-08-26 LAB
ALBUMIN SERPL-MCNC: 3.6 G/DL (ref 3.5–5.2)
ALP BLD-CCNC: 107 U/L (ref 35–104)
ALT SERPL-CCNC: 12 U/L (ref 9–52)
ANION GAP SERPL CALCULATED.3IONS-SCNC: 7 MMOL/L (ref 7–19)
AST SERPL-CCNC: 20 U/L (ref 14–36)
BASOPHILS ABSOLUTE: 0.02 K/UL (ref 0.01–0.08)
BASOPHILS RELATIVE PERCENT: 0.3 % (ref 0.1–1.2)
BILIRUB SERPL-MCNC: 0.4 MG/DL (ref 0.2–1.3)
BUN BLDV-MCNC: 14 MG/DL (ref 7–17)
CALCIUM SERPL-MCNC: 8.8 MG/DL (ref 8.4–10.2)
CHLORIDE BLD-SCNC: 102 MMOL/L (ref 98–111)
CO2: 34 MMOL/L (ref 22–29)
CREAT SERPL-MCNC: <0.5 MG/DL (ref 0.5–1)
EOSINOPHILS ABSOLUTE: 0.1 K/UL (ref 0.04–0.54)
EOSINOPHILS RELATIVE PERCENT: 1.7 % (ref 0.7–7)
GFR NON-AFRICAN AMERICAN: >60
GLOBULIN: 3.1 G/DL
GLUCOSE BLD-MCNC: 121 MG/DL (ref 74–106)
HCT VFR BLD CALC: 38.3 % (ref 34.1–44.9)
HEMOGLOBIN: 12.2 G/DL (ref 11.2–15.7)
LYMPHOCYTES ABSOLUTE: 1.02 K/UL (ref 1.18–3.74)
LYMPHOCYTES RELATIVE PERCENT: 17.3 % (ref 19.3–53.1)
MCH RBC QN AUTO: 32.8 PG (ref 25.6–32.2)
MCHC RBC AUTO-ENTMCNC: 31.9 G/DL (ref 32.3–35.5)
MCV RBC AUTO: 103 FL (ref 79.4–94.8)
MONOCYTES ABSOLUTE: 0.41 K/UL (ref 0.24–0.82)
MONOCYTES RELATIVE PERCENT: 6.9 % (ref 4.7–12.5)
NEUTROPHILS ABSOLUTE: 4.35 K/UL (ref 1.56–6.13)
NEUTROPHILS RELATIVE PERCENT: 73.8 % (ref 34–71.1)
PDW BLD-RTO: 14.2 % (ref 11.7–14.4)
PLATELET # BLD: 146 K/UL (ref 182–369)
PMV BLD AUTO: 10.7 FL (ref 7.4–10.4)
POTASSIUM SERPL-SCNC: 3.8 MMOL/L (ref 3.5–5.1)
RBC # BLD: 3.72 M/UL (ref 3.93–5.22)
SODIUM BLD-SCNC: 143 MMOL/L (ref 137–145)
TOTAL PROTEIN: 6.6 G/DL (ref 6.3–8.2)
TSH SERPL DL<=0.05 MIU/L-ACNC: 0.35 UIU/ML (ref 0.47–4.68)
WBC # BLD: 5.9 K/UL (ref 3.98–10.04)

## 2020-08-26 PROCEDURE — 85025 COMPLETE CBC W/AUTO DIFF WBC: CPT

## 2020-08-26 PROCEDURE — 84443 ASSAY THYROID STIM HORMONE: CPT

## 2020-08-26 PROCEDURE — 96413 CHEMO IV INFUSION 1 HR: CPT

## 2020-08-26 PROCEDURE — 6360000002 HC RX W HCPCS: Performed by: INTERNAL MEDICINE

## 2020-08-26 PROCEDURE — 2580000003 HC RX 258: Performed by: INTERNAL MEDICINE

## 2020-08-26 PROCEDURE — 80053 COMPREHEN METABOLIC PANEL: CPT

## 2020-08-26 RX ORDER — SODIUM CHLORIDE 0.9 % (FLUSH) 0.9 %
5 SYRINGE (ML) INJECTION PRN
Status: CANCELLED | OUTPATIENT
Start: 2020-08-26

## 2020-08-26 RX ORDER — EPINEPHRINE 1 MG/ML
0.3 INJECTION, SOLUTION, CONCENTRATE INTRAVENOUS PRN
Status: CANCELLED | OUTPATIENT
Start: 2020-08-26

## 2020-08-26 RX ORDER — DIPHENHYDRAMINE HYDROCHLORIDE 50 MG/ML
50 INJECTION INTRAMUSCULAR; INTRAVENOUS ONCE
Status: CANCELLED | OUTPATIENT
Start: 2020-08-26

## 2020-08-26 RX ORDER — HEPARIN SODIUM (PORCINE) LOCK FLUSH IV SOLN 100 UNIT/ML 100 UNIT/ML
500 SOLUTION INTRAVENOUS PRN
Status: CANCELLED
Start: 2020-08-26

## 2020-08-26 RX ORDER — SODIUM CHLORIDE 9 MG/ML
20 INJECTION, SOLUTION INTRAVENOUS ONCE
Status: CANCELLED | OUTPATIENT
Start: 2020-08-26

## 2020-08-26 RX ORDER — HEPARIN SODIUM (PORCINE) LOCK FLUSH IV SOLN 100 UNIT/ML 100 UNIT/ML
500 SOLUTION INTRAVENOUS PRN
Status: DISCONTINUED | OUTPATIENT
Start: 2020-08-26 | End: 2020-08-28 | Stop reason: HOSPADM

## 2020-08-26 RX ORDER — SODIUM CHLORIDE 0.9 % (FLUSH) 0.9 %
10 SYRINGE (ML) INJECTION PRN
Status: CANCELLED | OUTPATIENT
Start: 2020-08-26

## 2020-08-26 RX ORDER — SODIUM CHLORIDE 0.9 % (FLUSH) 0.9 %
10 SYRINGE (ML) INJECTION PRN
Status: DISCONTINUED | OUTPATIENT
Start: 2020-08-26 | End: 2020-08-27 | Stop reason: HOSPADM

## 2020-08-26 RX ORDER — SODIUM CHLORIDE 9 MG/ML
INJECTION, SOLUTION INTRAVENOUS CONTINUOUS
Status: CANCELLED | OUTPATIENT
Start: 2020-08-26

## 2020-08-26 RX ORDER — METHYLPREDNISOLONE SODIUM SUCCINATE 125 MG/2ML
125 INJECTION, POWDER, LYOPHILIZED, FOR SOLUTION INTRAMUSCULAR; INTRAVENOUS ONCE
Status: CANCELLED | OUTPATIENT
Start: 2020-08-26

## 2020-08-26 RX ADMIN — HEPARIN 500 UNITS: 100 SYRINGE at 13:15

## 2020-08-26 RX ADMIN — SODIUM CHLORIDE 550 MG: 9 INJECTION, SOLUTION INTRAVENOUS at 12:09

## 2020-08-26 RX ADMIN — SODIUM CHLORIDE, PRESERVATIVE FREE 10 ML: 5 INJECTION INTRAVENOUS at 13:15

## 2020-08-26 ASSESSMENT — PAIN SCALES - GENERAL: PAINLEVEL_OUTOF10: 0

## 2020-09-01 RX ORDER — OXYCODONE AND ACETAMINOPHEN 10; 325 MG/1; MG/1
1 TABLET ORAL EVERY 6 HOURS PRN
Qty: 120 TABLET | Refills: 0 | Status: SHIPPED | OUTPATIENT
Start: 2020-09-01 | End: 2020-10-06 | Stop reason: SDUPTHER

## 2020-09-02 ENCOUNTER — CASE MANAGEMENT (OUTPATIENT)
Dept: HEMATOLOGY | Age: 65
End: 2020-09-02

## 2020-09-02 NOTE — PROGRESS NOTES
Paul with [facility] at [facility] and Me to schedule a shipment for the patients Imfinzi. She stated the patient had been conditionally approved and now they are needing the patients Medicaid denial letter. I am going to call the patient to see if she has this.

## 2020-09-04 ENCOUNTER — TELEPHONE (OUTPATIENT)
Dept: INFUSION THERAPY | Age: 65
End: 2020-09-04

## 2020-09-08 ENCOUNTER — TELEPHONE (OUTPATIENT)
Dept: INFUSION THERAPY | Age: 65
End: 2020-09-08

## 2020-09-08 NOTE — TELEPHONE ENCOUNTER
I called to see if Vi had taken a stool sample to [facility] for c-diff. She states that the diarrhea has subsided and she is feeling better. She will f/u next week for her next tx.

## 2020-09-16 ENCOUNTER — OFFICE VISIT (OUTPATIENT)
Dept: HEMATOLOGY | Age: 65
End: 2020-09-16
Payer: MEDICARE

## 2020-09-16 ENCOUNTER — HOSPITAL ENCOUNTER (OUTPATIENT)
Dept: CT IMAGING | Age: 65
Discharge: HOME | End: 2020-09-16
Payer: MEDICARE

## 2020-09-16 ENCOUNTER — HOSPITAL ENCOUNTER (OUTPATIENT)
Dept: INFUSION THERAPY | Age: 65
Discharge: HOME | End: 2020-09-16
Payer: MEDICARE

## 2020-09-16 VITALS
DIASTOLIC BLOOD PRESSURE: 50 MMHG | OXYGEN SATURATION: 93 % | TEMPERATURE: 97.8 F | HEART RATE: 99 BPM | SYSTOLIC BLOOD PRESSURE: 98 MMHG | BODY MASS INDEX: 21.75 KG/M2 | WEIGHT: 126.7 LBS

## 2020-09-16 VITALS
OXYGEN SATURATION: 93 % | SYSTOLIC BLOOD PRESSURE: 98 MMHG | WEIGHT: 126 LBS | BODY MASS INDEX: 21.63 KG/M2 | HEART RATE: 99 BPM | DIASTOLIC BLOOD PRESSURE: 50 MMHG | TEMPERATURE: 97.8 F

## 2020-09-16 DIAGNOSIS — C34.01 MALIGNANT NEOPLASM OF RIGHT MAIN BRONCHUS (HCC): Primary | ICD-10-CM

## 2020-09-16 DIAGNOSIS — R53.83 FATIGUE DUE TO TREATMENT: ICD-10-CM

## 2020-09-16 LAB
ALBUMIN SERPL-MCNC: 3.4 G/DL (ref 3.5–5.2)
ALP BLD-CCNC: 112 U/L (ref 35–104)
ALT SERPL-CCNC: 12 U/L (ref 9–52)
ANION GAP SERPL CALCULATED.3IONS-SCNC: 3 MMOL/L (ref 7–19)
AST SERPL-CCNC: 21 U/L (ref 14–36)
BASOPHILS ABSOLUTE: 0.01 K/UL (ref 0.01–0.08)
BASOPHILS RELATIVE PERCENT: 0.1 % (ref 0.1–1.2)
BILIRUB SERPL-MCNC: 0.4 MG/DL (ref 0.2–1.3)
BUN BLDV-MCNC: 13 MG/DL (ref 7–17)
CALCIUM SERPL-MCNC: 8.7 MG/DL (ref 8.4–10.2)
CHLORIDE BLD-SCNC: 102 MMOL/L (ref 98–111)
CO2: 38 MMOL/L (ref 22–29)
CREAT SERPL-MCNC: <0.5 MG/DL (ref 0.5–1)
EOSINOPHILS ABSOLUTE: 0.04 K/UL (ref 0.04–0.54)
EOSINOPHILS RELATIVE PERCENT: 0.6 % (ref 0.7–7)
GFR NON-AFRICAN AMERICAN: >60
GLOBULIN: 3.2 G/DL
GLUCOSE BLD-MCNC: 101 MG/DL (ref 74–106)
HCT VFR BLD CALC: 37.6 % (ref 34.1–44.9)
HEMOGLOBIN: 11.9 G/DL (ref 11.2–15.7)
LYMPHOCYTES ABSOLUTE: 0.65 K/UL (ref 1.18–3.74)
LYMPHOCYTES RELATIVE PERCENT: 9.2 % (ref 19.3–53.1)
MCH RBC QN AUTO: 33 PG (ref 25.6–32.2)
MCHC RBC AUTO-ENTMCNC: 31.6 G/DL (ref 32.3–35.5)
MCV RBC AUTO: 104.2 FL (ref 79.4–94.8)
MONOCYTES ABSOLUTE: 0.41 K/UL (ref 0.24–0.82)
MONOCYTES RELATIVE PERCENT: 5.8 % (ref 4.7–12.5)
NEUTROPHILS ABSOLUTE: 5.99 K/UL (ref 1.56–6.13)
NEUTROPHILS RELATIVE PERCENT: 84.3 % (ref 34–71.1)
PDW BLD-RTO: 14.2 % (ref 11.7–14.4)
PLATELET # BLD: 141 K/UL (ref 182–369)
PMV BLD AUTO: 10.9 FL (ref 7.4–10.4)
POTASSIUM SERPL-SCNC: 3.9 MMOL/L (ref 3.5–5.1)
RBC # BLD: 3.61 M/UL (ref 3.93–5.22)
SODIUM BLD-SCNC: 143 MMOL/L (ref 137–145)
TOTAL PROTEIN: 6.6 G/DL (ref 6.3–8.2)
TSH SERPL DL<=0.05 MIU/L-ACNC: 0.32 UIU/ML (ref 0.47–4.68)
WBC # BLD: 7.1 K/UL (ref 3.98–10.04)

## 2020-09-16 PROCEDURE — 96413 CHEMO IV INFUSION 1 HR: CPT

## 2020-09-16 PROCEDURE — 3017F COLORECTAL CA SCREEN DOC REV: CPT | Performed by: INTERNAL MEDICINE

## 2020-09-16 PROCEDURE — 99214 OFFICE O/P EST MOD 30 MIN: CPT | Performed by: INTERNAL MEDICINE

## 2020-09-16 PROCEDURE — 6360000004 HC RX CONTRAST MEDICATION: Performed by: INTERNAL MEDICINE

## 2020-09-16 PROCEDURE — 4004F PT TOBACCO SCREEN RCVD TLK: CPT | Performed by: INTERNAL MEDICINE

## 2020-09-16 PROCEDURE — G8926 SPIRO NO PERF OR DOC: HCPCS | Performed by: INTERNAL MEDICINE

## 2020-09-16 PROCEDURE — G8420 CALC BMI NORM PARAMETERS: HCPCS | Performed by: INTERNAL MEDICINE

## 2020-09-16 PROCEDURE — 85025 COMPLETE CBC W/AUTO DIFF WBC: CPT

## 2020-09-16 PROCEDURE — 6360000002 HC RX W HCPCS: Performed by: INTERNAL MEDICINE

## 2020-09-16 PROCEDURE — G8428 CUR MEDS NOT DOCUMENT: HCPCS | Performed by: INTERNAL MEDICINE

## 2020-09-16 PROCEDURE — 1090F PRES/ABSN URINE INCON ASSESS: CPT | Performed by: INTERNAL MEDICINE

## 2020-09-16 PROCEDURE — 3023F SPIROM DOC REV: CPT | Performed by: INTERNAL MEDICINE

## 2020-09-16 PROCEDURE — 71270 CT THORAX DX C-/C+: CPT

## 2020-09-16 PROCEDURE — 4040F PNEUMOC VAC/ADMIN/RCVD: CPT | Performed by: INTERNAL MEDICINE

## 2020-09-16 PROCEDURE — G8400 PT W/DXA NO RESULTS DOC: HCPCS | Performed by: INTERNAL MEDICINE

## 2020-09-16 PROCEDURE — 80053 COMPREHEN METABOLIC PANEL: CPT

## 2020-09-16 PROCEDURE — 2580000003 HC RX 258: Performed by: INTERNAL MEDICINE

## 2020-09-16 PROCEDURE — 84443 ASSAY THYROID STIM HORMONE: CPT

## 2020-09-16 PROCEDURE — 1123F ACP DISCUSS/DSCN MKR DOCD: CPT | Performed by: INTERNAL MEDICINE

## 2020-09-16 RX ORDER — DIPHENHYDRAMINE HYDROCHLORIDE 50 MG/ML
50 INJECTION INTRAMUSCULAR; INTRAVENOUS ONCE
Status: CANCELLED | OUTPATIENT
Start: 2020-09-16

## 2020-09-16 RX ORDER — SODIUM CHLORIDE 0.9 % (FLUSH) 0.9 %
5 SYRINGE (ML) INJECTION PRN
Status: CANCELLED | OUTPATIENT
Start: 2020-09-16

## 2020-09-16 RX ORDER — HEPARIN SODIUM (PORCINE) LOCK FLUSH IV SOLN 100 UNIT/ML 100 UNIT/ML
500 SOLUTION INTRAVENOUS PRN
Status: DISCONTINUED | OUTPATIENT
Start: 2020-09-16 | End: 2020-09-18 | Stop reason: HOSPADM

## 2020-09-16 RX ORDER — EPINEPHRINE 1 MG/ML
0.3 INJECTION, SOLUTION, CONCENTRATE INTRAVENOUS PRN
Status: CANCELLED | OUTPATIENT
Start: 2020-09-16

## 2020-09-16 RX ORDER — HEPARIN SODIUM (PORCINE) LOCK FLUSH IV SOLN 100 UNIT/ML 100 UNIT/ML
500 SOLUTION INTRAVENOUS PRN
Status: CANCELLED
Start: 2020-09-16

## 2020-09-16 RX ORDER — SODIUM CHLORIDE 9 MG/ML
20 INJECTION, SOLUTION INTRAVENOUS ONCE
Status: CANCELLED | OUTPATIENT
Start: 2020-09-16

## 2020-09-16 RX ORDER — SODIUM CHLORIDE 9 MG/ML
INJECTION, SOLUTION INTRAVENOUS CONTINUOUS
Status: CANCELLED | OUTPATIENT
Start: 2020-09-16

## 2020-09-16 RX ORDER — SODIUM CHLORIDE 0.9 % (FLUSH) 0.9 %
10 SYRINGE (ML) INJECTION PRN
Status: CANCELLED | OUTPATIENT
Start: 2020-09-16

## 2020-09-16 RX ORDER — METHYLPREDNISOLONE SODIUM SUCCINATE 125 MG/2ML
125 INJECTION, POWDER, LYOPHILIZED, FOR SOLUTION INTRAMUSCULAR; INTRAVENOUS ONCE
Status: CANCELLED | OUTPATIENT
Start: 2020-09-16

## 2020-09-16 RX ORDER — SODIUM CHLORIDE 0.9 % (FLUSH) 0.9 %
10 SYRINGE (ML) INJECTION PRN
Status: DISCONTINUED | OUTPATIENT
Start: 2020-09-16 | End: 2020-09-17 | Stop reason: HOSPADM

## 2020-09-16 RX ORDER — SODIUM CHLORIDE 0.9 % (FLUSH) 0.9 %
5 SYRINGE (ML) INJECTION PRN
Status: DISCONTINUED | OUTPATIENT
Start: 2020-09-16 | End: 2020-09-17 | Stop reason: HOSPADM

## 2020-09-16 RX ADMIN — SODIUM CHLORIDE 550 MG: 9 INJECTION, SOLUTION INTRAVENOUS at 14:13

## 2020-09-16 RX ADMIN — HEPARIN 500 UNITS: 100 SYRINGE at 15:21

## 2020-09-16 RX ADMIN — IOPAMIDOL 60 ML: 755 INJECTION, SOLUTION INTRAVENOUS at 10:58

## 2020-09-16 RX ADMIN — SODIUM CHLORIDE, PRESERVATIVE FREE 10 ML: 5 INJECTION INTRAVENOUS at 15:21

## 2020-09-16 ASSESSMENT — PAIN SCALES - GENERAL
PAINLEVEL_OUTOF10: 0
PAINLEVEL_OUTOF10: 0

## 2020-09-16 NOTE — PROGRESS NOTES
Vi Elle Cates   1955 9/16/2020     Chief Complaint   Patient presents with    Cancer      Interval history/history of present illness:  Diagnosis   NSCLC, [facility] March 2019   [id number], stage IIIIB  Treatment summary  6/10/2019- 8/20/2019-carboplatin/Taxol weekly with concurrent radiation   12/4/2019- durvalumab x1 year    Interval history  Patient is a very pleasant 77years old female who has a diagnosis of stage IIIb non-small cell lung cancer, squamous cell subtype. She received initial treatment with carboplatin/Taxol weekly followed by concurrent radiation. She completed concurrent chemoradiation and is currently receiving durvalumab to complete 1 year of treatment. She has been doing well, except for complains of persistent fatigue. She has severe COPD and needs O2 supplement. She denies any skin rash, diarrhea or worsening shortness of breath. She does have complaints of shortness of breath on mild exertion and persistent fatigue. She also had complaints of dysphasia on and off. She had a CT chest performed recently. She continues to struggle with COPD. Cancer history  Ms Chace Sanchez was first seen by me on 5/3/2019 for further workup of a lung mass. She has complains of chest wall pain. Further workup revealed a lung mass. 4/9/2019-CT of the chest with contrast showed a malignant mass in the right upper lobe measures 6.5 x 2.8 x 5.1 cm. The mass invades and destroys the right lateral cortex and lateral aspect of the T4 vertebral body. Mediastinal adenopathy. Specifically, 1.9 cm pretracheal lymph node. 2.1 cm precarinal lymph node. 1.9 cm subcarina lymph node. Right hilar adenopathy. 5/3/2019-she was first seen by me.   5/15/2019-bone scan and CT abdomen pelvis was unremarkable metastatic disease. 5/16/20193872-BC-efbtiw biopsy consistent non-small cell lung cancer, squamous cell carcinoma subtype.    5/29/2019-PET scan showed intense and metabolic activity associated with pleural-based right paraspinal mass within the right upper lobe. It invades into the adjacent thoracic vertebral body. Right hilar, subcarina and pretracheal lymphadenopathy. No evidence of muscle skeletal or intra-abdominal disease. 6/7/2019-recommended chemoradiation with carboplatin/Taxol weekly with concurrent radiation. 10/14/2019- CT chest abdomen pelvis showed a cavitary lesion measuring 3.3 x 4.6 x 3.4 cm which appears to be decreased in size. Underlying lung emphysema. There is a new notable sclerosis within the right lateral aspect of T4 and T5 vertebral bodies at the site of the posterior mediastinal invasion by the right lung malignancy. CT of the abdomen pelvis showed tiny hypodense in the liver which are too small to characterize but similar to prior exam.  Attention to follow-up. Stable nodule in the left adrenal gland too small to characterize. 12/4/2019- started on treatment durvalumab every 2 weeks to complete 1 year. 2/19/2020-CT chest abdomen pelvis showed slight decrease in size of the right upper lobe cavitary lesion. The lesion now measures 2.6 x 4.5 x 3.9 cm (previously 3.3 x 4.6 x 3.4 cm). Subtle cortical erosion of the right side of the centrum at T4/T5. No evidence of metastatic disease in the abdomen. Stable left adrenal nodule. 4/29/2020 Xr Chest Standard A cavitation or a necrotic mass in the right upper lobe. This was noted in the previous CT scan of the chest dated 2/19/2020. An area of consolidation in the right upper lobe may represent inflammatory or infectious process. Ill-defined nodule in the left lower lobe may represent a nipple shadow or a pulmonary nodule. This could be further evaluated. Chronic inflammatory changes in the remaining lungs bilaterally. 6/26/2020 Ct Chest W Contrast There is a pleural-based cavitary lesion within the right upper lobe posteriorly and medially.  When measuring the lesion in the same manner as the previous study I do not feel there has been any significant progression from a previous CT of 2020. There is some increased consolidation within the medial right upper lobe likely representing post therapy change. There is a new small nodule more inferiorly within the right upper lobe as described above. This may also represent consolidation post therapy but as it is a more isolated finding I feel it could potentially represent an early recurrence. This would warrant follow-up on subsequent imaging. These findings are superimposed upon changes of centrilobular emphysema. There is stable scarring within the right posterior lung base. There is bronchial wall thickening within both lungs suggesting chronic bronchitis. Coronary calcifications are present. Borderline enlargement of the pulmonary arteries suggesting pulmonary arterial hypertension. Mild constipation within the upper abdomen. Ct Abdomen Pelvis W Iv Contrast   No evidence of metastatic disease in the abdomen or pelvis. Stable biliary dilatation status post cholecystectomy which may represent reservoir phenomenon. Moderate to large volume stool in the colon, correlate for constipation. 2020- discussed at length the CAT scans with the patient and radiology. No clear-cut evidence of disease progression. Therefore will continue treatment. 20 CT Chest: Redemonstration of a cavitary lesion in the posterior right upper lobe with surrounding posttreatment changes, not significantly changed from the most recent comparison exam. New small noncalcified right lower lobe nodule for which attention on follow-up is recommended. Emphysema and pulmonary fibrosis. Pulmonary arterial hypertension.  Atherosclerosis of the aorta and coronary arteries      ECO    Treatment related toxicity: Fatigue    Past medical history:  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     COPD (chronic obstructive pulmonary disease) (HCC)     Depression Mother     High Blood Pressure Brother     Diabetes Brother         Current Outpatient Medications   Medication Sig Dispense Refill    Morphine Sulfate ER 15 MG TBEA Take 15 mg by mouth every 8 hours for 30 days. 90 each 0    oxyCODONE-acetaminophen (PERCOCET)  MG per tablet Take 1 tablet by mouth every 6 hours as needed for Pain for up to 30 days. Intended supply: 30 days 120 tablet 0    tiZANidine (ZANAFLEX) 4 MG tablet       Cholecalciferol (VITAMIN D3) 40853 units CAPS Take 50,000 Units by mouth      Glycopyrrolate (SEEBRI NEOHALER) 15.6 MCG CAPS Inhale 1 capsule into the lungs      ketorolac (TORADOL) 60 MG/2ML injection 60 mg      lidocaine viscous hcl (XYLOCAINE) 2 % SOLN solution       vitamin D (ERGOCALCIFEROL) 71395 units CAPS capsule cholecalciferol (vitamin D3) 50,000 unit capsule   Take 1 capsule every week by oral route.  OXYGEN Inhale 2 L/min into the lungs      budesonide-formoterol (SYMBICORT) 160-4.5 MCG/ACT AERO Symbicort 160 mcg-4.5 mcg/actuation HFA aerosol inhaler   Inhale 2 puffs twice a day by inhalation route.  albuterol sulfate HFA (PROAIR HFA) 108 (90 Base) MCG/ACT inhaler ProAir HFA 90 mcg/actuation aerosol inhaler   Inhale 2 puffs every 4 hours by inhalation route as needed.  aspirin EC 81 MG EC tablet Take 81 mg by mouth      calcium carbonate 600 MG TABS tablet Take 600 mg by mouth      citalopram (CELEXA) 20 MG tablet citalopram 20 mg tablet   Take 1 tablet every day by oral route. No current facility-administered medications for this visit.       Facility-Administered Medications Ordered in Other Visits   Medication Dose Route Frequency Provider Last Rate Last Dose    sodium chloride flush 0.9 % injection 5 mL  5 mL Intravenous PRN Aroldo Mcclendon MD        sodium chloride flush 0.9 % injection 10 mL  10 mL Intravenous PRN Aroldo Mcclendon MD   10 mL at 09/16/20 1521    heparin flush 100 UNIT/ML injection 500 Units  500 Units Intracatheter KELLI Pinto MD   500 Units at 09/16/20 1521          REVIEW OF SYSTEMS:    Constitutional: no fever, no night sweats,  fatigue;   HEENT: no blurring of vision, no double vision, no hearing difficulty, no tinnitus,no ulceration, no dysphagia  Lungs: Chronic productive cough, shortness of breath, wheeze;   CVS: no palpitation, no chest pain, chronically stable shortness of breath;  GI: no abdominal pain, no nausea , no vomiting, no constipation;   MARIA VICTORIA: no dysuria, frequency and urgency, no hematuria, no kidney stones;   Musculoskeletal: no joint pain, swelling , stiffness;   Endocrine: no polyuria, polydypsia, no cold or heat intolerence; Hematology/lymphatic: no easy brusing or bleeding, no hx of clotting disorder; no peripheral adenopathy. Dermatology: no skin rash, no eczema, no pruritis;   Psychiatry: no depression, no anxiety,no panic attacks, no suicide ideation; Neurology: no syncope, no seizures, no numbness or tingling of hands, no numbness or tingling of feet, no paresis;     PHYSICAL EXAM:    Vitals signs:    BP (!) 98/50   Pulse 99   Temp 97.8 °F (36.6 °C)   Wt 126 lb (57.2 kg)   SpO2 93%   BMI 21.63 kg/m²     Pain scale:2      CONSTITUTIONAL: Alert, appropriate, no acute distress, cachectic appearing  EYES: Non icteric, EOM intact, pupils equal round and reactive to light and accommodation. ENT: Oral mucus membranes moist, no oral pharyngeal lesions. External inspection of ears and nose are normal.  O2 nasal cannula 2 L/min  NECK: Supple, no masses. No palpable thyroid mass    CHEST/LUNGS: CTA bilaterally, normal respiratory effort   CARDIOVASCULAR: RRR, no murmurs. No lower extremity edema   ABDOMEN: soft non-tender, active bowel sounds, no hepatosplenomegaly. No palpable masses. EXTREMITIES: warm, Full ROM of all fours extremities. No focal weakness.   SKIN: warm, dry, with no rashes or lesions  LYMPH: No cervical, clavicular, axillary, or inguinal lymphadenopathy  NEUROLOGIC: follows commands, non focal.   PSYCH: mood and affect appropriate. Alert and oriented to time and place and person. Relevant Lab findings: Reviewed by me  Lab Results   Component Value Date    WBC 7.10 09/16/2020    HGB 11.9 09/16/2020    HCT 37.6 09/16/2020    .2 (H) 09/16/2020     (L) 09/16/2020     Lab Results   Component Value Date    NEUTROABS 5.99 09/16/2020     Lab Results   Component Value Date     09/16/2020    K 3.9 09/16/2020     09/16/2020    CO2 38 (H) 09/16/2020    BUN 13 09/16/2020    CREATININE <0.5 09/16/2020    GLUCOSE 101 09/16/2020    CALCIUM 8.7 09/16/2020    PROT 6.6 09/16/2020    LABALBU 3.4 (L) 09/16/2020    BILITOT 0.4 09/16/2020    ALKPHOS 112 (H) 09/16/2020    AST 21 09/16/2020    ALT 12 09/16/2020    LABGLOM >60 09/16/2020    GFRAA 114 05/01/2020    AGRATIO 1.4 05/01/2020    GLOB 3.2 09/16/2020       TSH 0.318 (L)      Relevant Imaging studies:  9/16/20 CT Chest: Redemonstration of a cavitary lesion in the posterior right upper lobe with surrounding posttreatment changes, not significantly changed from the most recent comparison exam. New small noncalcified right lower lobe nodule for which attention on follow-up is recommended. Emphysema and pulmonary fibrosis. Pulmonary arterial hypertension. Atherosclerosis of the aorta and coronary arteries    ASSESSMENT:    No orders of the defined types were placed in this encounter. Vi was seen today for cancer. Diagnoses and all orders for this visit:    Malignant neoplasm of right main bronchus [facility])    Care plan discussed with patient    Fatigue due to treatment    Chronic obstructive pulmonary disease, unspecified COPD type ([facility] [address]    Right lower lobe pulmonary nodule       NSCLC-SCC stage IIIB   Bone scan, CT abdomen pelvis unremarkable metastatic disease. PET scan showed disease limited to the chest and mediastinum only. Therefore, stage IIIB.   Status post completion of chemoradiation on 8/20/2019. The patient was counseled today about diagnosis, staging, prognosis, diagnostic tests, medications, side effects and disease management. The method of counseling included verbal explanation. The patient verbalized understanding. Repeat CT chest with contrast February 2020 showed overall stable to improved disease. Continue treatment with durvalumab to complete 1 year through December 2020. Repeat CT chest June 2020 showed overall stable disease. 9/16/20 CT Chest: Redemonstration of a cavitary lesion in the posterior right upper lobe with surrounding posttreatment changes, not significantly changed from the most recent comparison exam. New small noncalcified right lower lobe nodule for which attention on follow-up is recommended. Emphysema and pulmonary fibrosis. Pulmonary arterial hypertension. Atherosclerosis of the aorta and coronary arteries    Continue adjuvant Imfinzi. Monitoring of toxicity-she denies any neuropathy. Chronic fatigue. Smoke cessation-she was again strongly encouraged to quit. Cancer related pain-opioid analgesics have potential for abuse and risk of fatal overdose due to respiratory depression. The use of opioid exposes individual to have the risk of addiction, abuse and misuse. Addiction can occur in individuals appropriately prescribed and at a recommended dose. Addiction also occurs if the drug is being used or abused. An individual is also at increased risk of opioid addiction if a personal or family history of substance abuse or mental illness are present. [id number] mg SR every 8 hours. Generalized pruritus-secondary to dry skin and likely immunotherapy. Continue Benadryl. Right lower lobe noncalcified pulmonary nodule-attention to follow-up.     Plan:  Continue Morphine SR 15 mg every 8 hours   Continue Percocet   CMP, TSH today  CT chest January 2021  Continue durvalumab in 2 weeks every 2 weeks x 1 year treatment through December

## 2020-09-23 ENCOUNTER — HOSPITAL ENCOUNTER (OUTPATIENT)
Dept: INFUSION THERAPY | Age: 65
End: 2020-09-23
Payer: MEDICARE

## 2020-09-30 ENCOUNTER — HOSPITAL ENCOUNTER (OUTPATIENT)
Dept: INFUSION THERAPY | Age: 65
Discharge: HOME | End: 2020-09-30
Payer: MEDICARE

## 2020-09-30 VITALS
BODY MASS INDEX: 21.17 KG/M2 | SYSTOLIC BLOOD PRESSURE: 96 MMHG | HEART RATE: 96 BPM | DIASTOLIC BLOOD PRESSURE: 56 MMHG | HEIGHT: 64 IN | WEIGHT: 124 LBS | OXYGEN SATURATION: 96 % | TEMPERATURE: 98.4 F

## 2020-09-30 DIAGNOSIS — R53.83 FATIGUE DUE TO TREATMENT: ICD-10-CM

## 2020-09-30 DIAGNOSIS — C34.01 MALIGNANT NEOPLASM OF RIGHT MAIN BRONCHUS (HCC): Primary | ICD-10-CM

## 2020-09-30 LAB
ALBUMIN SERPL-MCNC: 3.9 G/DL (ref 3.5–5.2)
ALP BLD-CCNC: 105 U/L (ref 35–104)
ALT SERPL-CCNC: 11 U/L (ref 9–52)
ANION GAP SERPL CALCULATED.3IONS-SCNC: 5 MMOL/L (ref 7–19)
AST SERPL-CCNC: 33 U/L (ref 14–36)
BASOPHILS ABSOLUTE: 0.02 K/UL (ref 0.01–0.08)
BASOPHILS RELATIVE PERCENT: 0.3 % (ref 0.1–1.2)
BILIRUB SERPL-MCNC: 0.4 MG/DL (ref 0.2–1.3)
BUN BLDV-MCNC: 16 MG/DL (ref 7–17)
CALCIUM SERPL-MCNC: 9 MG/DL (ref 8.4–10.2)
CHLORIDE BLD-SCNC: 103 MMOL/L (ref 98–111)
CO2: 36 MMOL/L (ref 22–29)
CREAT SERPL-MCNC: <0.5 MG/DL (ref 0.5–1)
EOSINOPHILS ABSOLUTE: 0.16 K/UL (ref 0.04–0.54)
EOSINOPHILS RELATIVE PERCENT: 2.4 % (ref 0.7–7)
GFR NON-AFRICAN AMERICAN: >60
GLOBULIN: 3 G/DL
GLUCOSE BLD-MCNC: 97 MG/DL (ref 74–106)
HCT VFR BLD CALC: 40.2 % (ref 34.1–44.9)
HEMOGLOBIN: 12.5 G/DL (ref 11.2–15.7)
LYMPHOCYTES ABSOLUTE: 1.16 K/UL (ref 1.18–3.74)
LYMPHOCYTES RELATIVE PERCENT: 17.7 % (ref 19.3–53.1)
MCH RBC QN AUTO: 32.4 PG (ref 25.6–32.2)
MCHC RBC AUTO-ENTMCNC: 31.1 G/DL (ref 32.3–35.5)
MCV RBC AUTO: 104.1 FL (ref 79.4–94.8)
MONOCYTES ABSOLUTE: 0.43 K/UL (ref 0.24–0.82)
MONOCYTES RELATIVE PERCENT: 6.6 % (ref 4.7–12.5)
NEUTROPHILS ABSOLUTE: 4.79 K/UL (ref 1.56–6.13)
NEUTROPHILS RELATIVE PERCENT: 73 % (ref 34–71.1)
PDW BLD-RTO: 13.8 % (ref 11.7–14.4)
PLATELET # BLD: 134 K/UL (ref 182–369)
PMV BLD AUTO: 11 FL (ref 7.4–10.4)
POTASSIUM SERPL-SCNC: 4.2 MMOL/L (ref 3.5–5.1)
RBC # BLD: 3.86 M/UL (ref 3.93–5.22)
SODIUM BLD-SCNC: 144 MMOL/L (ref 137–145)
TOTAL PROTEIN: 6.9 G/DL (ref 6.3–8.2)
TSH SERPL DL<=0.05 MIU/L-ACNC: 1.5 UIU/ML (ref 0.47–4.68)
WBC # BLD: 6.56 K/UL (ref 3.98–10.04)

## 2020-09-30 PROCEDURE — 6360000002 HC RX W HCPCS: Performed by: INTERNAL MEDICINE

## 2020-09-30 PROCEDURE — 2580000003 HC RX 258: Performed by: INTERNAL MEDICINE

## 2020-09-30 PROCEDURE — 80053 COMPREHEN METABOLIC PANEL: CPT

## 2020-09-30 PROCEDURE — 85025 COMPLETE CBC W/AUTO DIFF WBC: CPT

## 2020-09-30 PROCEDURE — 84443 ASSAY THYROID STIM HORMONE: CPT

## 2020-09-30 PROCEDURE — 96413 CHEMO IV INFUSION 1 HR: CPT

## 2020-09-30 RX ORDER — DIPHENHYDRAMINE HYDROCHLORIDE 50 MG/ML
50 INJECTION INTRAMUSCULAR; INTRAVENOUS ONCE
Status: CANCELLED | OUTPATIENT
Start: 2020-09-30

## 2020-09-30 RX ORDER — METHYLPREDNISOLONE SODIUM SUCCINATE 125 MG/2ML
125 INJECTION, POWDER, LYOPHILIZED, FOR SOLUTION INTRAMUSCULAR; INTRAVENOUS ONCE
Status: CANCELLED | OUTPATIENT
Start: 2020-09-30

## 2020-09-30 RX ORDER — SODIUM CHLORIDE 9 MG/ML
INJECTION, SOLUTION INTRAVENOUS CONTINUOUS
Status: CANCELLED | OUTPATIENT
Start: 2020-09-30

## 2020-09-30 RX ORDER — SODIUM CHLORIDE 0.9 % (FLUSH) 0.9 %
10 SYRINGE (ML) INJECTION PRN
Status: CANCELLED | OUTPATIENT
Start: 2020-09-30

## 2020-09-30 RX ORDER — HEPARIN SODIUM (PORCINE) LOCK FLUSH IV SOLN 100 UNIT/ML 100 UNIT/ML
500 SOLUTION INTRAVENOUS PRN
Status: CANCELLED
Start: 2020-09-30

## 2020-09-30 RX ORDER — SODIUM CHLORIDE 0.9 % (FLUSH) 0.9 %
10 SYRINGE (ML) INJECTION PRN
Status: DISCONTINUED | OUTPATIENT
Start: 2020-09-30 | End: 2020-10-01 | Stop reason: HOSPADM

## 2020-09-30 RX ORDER — HEPARIN SODIUM (PORCINE) LOCK FLUSH IV SOLN 100 UNIT/ML 100 UNIT/ML
500 SOLUTION INTRAVENOUS PRN
Status: DISCONTINUED | OUTPATIENT
Start: 2020-09-30 | End: 2020-10-02 | Stop reason: HOSPADM

## 2020-09-30 RX ORDER — SODIUM CHLORIDE 0.9 % (FLUSH) 0.9 %
5 SYRINGE (ML) INJECTION PRN
Status: CANCELLED | OUTPATIENT
Start: 2020-09-30

## 2020-09-30 RX ORDER — SODIUM CHLORIDE 9 MG/ML
20 INJECTION, SOLUTION INTRAVENOUS ONCE
Status: CANCELLED | OUTPATIENT
Start: 2020-09-30

## 2020-09-30 RX ORDER — EPINEPHRINE 1 MG/ML
0.3 INJECTION, SOLUTION, CONCENTRATE INTRAVENOUS PRN
Status: CANCELLED | OUTPATIENT
Start: 2020-09-30

## 2020-09-30 RX ADMIN — SODIUM CHLORIDE, PRESERVATIVE FREE 10 ML: 5 INJECTION INTRAVENOUS at 12:22

## 2020-09-30 RX ADMIN — HEPARIN 500 UNITS: 100 SYRINGE at 12:22

## 2020-09-30 RX ADMIN — SODIUM CHLORIDE 550 MG: 9 INJECTION, SOLUTION INTRAVENOUS at 11:16

## 2020-09-30 ASSESSMENT — PAIN SCALES - GENERAL: PAINLEVEL_OUTOF10: 0

## 2020-10-06 RX ORDER — OXYCODONE AND ACETAMINOPHEN 10; 325 MG/1; MG/1
1 TABLET ORAL EVERY 6 HOURS PRN
Qty: 120 TABLET | Refills: 0 | Status: SHIPPED | OUTPATIENT
Start: 2020-10-06 | End: 2020-11-03 | Stop reason: SDUPTHER

## 2020-10-27 NOTE — PROGRESS NOTES
Vi Chester Cates   1955  10/28/2020     Chief Complaint   Patient presents with    Lung Cancer      Interval history/history of present illness:  Diagnosis   NSCLC, [facility] March 2019   [id number], stage IIIIB  Treatment summary  6/10/2019- 8/20/2019-carboplatin/Taxol weekly with concurrent radiation   12/4/2019- durvalumab x1 year    Interval history  Patient is a very pleasant 77years old female who has a diagnosis of stage IIIb non-small cell lung cancer, squamous cell subtype. She received initial treatment with carboplatin/Taxol weekly followed by concurrent radiation. She completed concurrent chemoradiation and is currently receiving durvalumab to complete 1 year of treatment. She has severe COPD and needs O2 supplement. She denies any skin rash, diarrhea or worsening shortness of breath. She has complaints of increased confusion and worsening headaches over the last few weeks. She also feels that her legs will give out sometimes. Cancer history  Ms Dang Friend was first seen by me on 5/3/2019 for further workup of a lung mass. She has complains of chest wall pain. Further workup revealed a lung mass. 4/9/2019-CT of the chest with contrast showed a malignant mass in the right upper lobe measures 6.5 x 2.8 x 5.1 cm. The mass invades and destroys the right lateral cortex and lateral aspect of the T4 vertebral body. Mediastinal adenopathy. Specifically, 1.9 cm pretracheal lymph node. 2.1 cm precarinal lymph node. 1.9 cm subcarina lymph node. Right hilar adenopathy. 5/3/2019-she was first seen by me.   5/15/2019-bone scan and CT abdomen pelvis was unremarkable metastatic disease. [id number] biopsy consistent non-small cell lung cancer, squamous cell carcinoma subtype. 5/29/2019-PET scan showed intense and metabolic activity associated with pleural-based right paraspinal mass within the right upper lobe. It invades into the adjacent thoracic vertebral body.  Right hilar, subcarina and likely representing post therapy change. There is a new small nodule more inferiorly within the right upper lobe as described above. This may also represent consolidation post therapy but as it is a more isolated finding I feel it could potentially represent an early recurrence. This would warrant follow-up on subsequent imaging. These findings are superimposed upon changes of centrilobular emphysema. There is stable scarring within the right posterior lung base. There is bronchial wall thickening within both lungs suggesting chronic bronchitis. Coronary calcifications are present. Borderline enlargement of the pulmonary arteries suggesting pulmonary arterial hypertension. Mild constipation within the upper abdomen. Ct Abdomen Pelvis W Iv Contrast   No evidence of metastatic disease in the abdomen or pelvis. Stable biliary dilatation status post cholecystectomy which may represent reservoir phenomenon. Moderate to large volume stool in the colon, correlate for constipation. 2020- discussed at length the CAT scans with the patient and radiology. No clear-cut evidence of disease progression. Therefore will continue treatment. 20 CT Chest: Redemonstration of a cavitary lesion in the posterior right upper lobe with surrounding posttreatment changes, not significantly changed from the most recent comparison exam. New small noncalcified right lower lobe nodule for which attention on follow-up is recommended. Emphysema and pulmonary fibrosis. Pulmonary arterial hypertension.  Atherosclerosis of the aorta and coronary arteries      ECO    Treatment related toxicity: Fatigue    Past medical history:  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     COPD (chronic obstructive pulmonary disease) (HCC)     Depression     Emphysema (subcutaneous) (surgical) resulting from a procedure     History of lung biopsy 2019    Malignant neoplasm Refill    Morphine Sulfate ER 15 MG TBEA Take 15 mg by mouth every 8 hours for 30 days. 90 each 0    oxyCODONE-acetaminophen (PERCOCET)  MG per tablet Take 1 tablet by mouth every 6 hours as needed for Pain for up to 30 days. Intended supply: 30 days 120 tablet 0    tiZANidine (ZANAFLEX) 4 MG tablet       Cholecalciferol (VITAMIN D3) 74592 units CAPS Take 50,000 Units by mouth      Glycopyrrolate (SEEBRI NEOHALER) 15.6 MCG CAPS Inhale 1 capsule into the lungs      ketorolac (TORADOL) 60 MG/2ML injection 60 mg      lidocaine viscous hcl (XYLOCAINE) 2 % SOLN solution       vitamin D (ERGOCALCIFEROL) 62033 units CAPS capsule cholecalciferol (vitamin D3) 50,000 unit capsule   Take 1 capsule every week by oral route.  OXYGEN Inhale 2 L/min into the lungs      budesonide-formoterol (SYMBICORT) 160-4.5 MCG/ACT AERO Symbicort 160 mcg-4.5 mcg/actuation HFA aerosol inhaler   Inhale 2 puffs twice a day by inhalation route.  albuterol sulfate HFA (PROAIR HFA) 108 (90 Base) MCG/ACT inhaler ProAir HFA 90 mcg/actuation aerosol inhaler   Inhale 2 puffs every 4 hours by inhalation route as needed.  aspirin EC 81 MG EC tablet Take 81 mg by mouth      calcium carbonate 600 MG TABS tablet Take 600 mg by mouth      citalopram (CELEXA) 20 MG tablet citalopram 20 mg tablet   Take 1 tablet every day by oral route. No current facility-administered medications for this visit.       Facility-Administered Medications Ordered in Other Visits   Medication Dose Route Frequency Provider Last Rate Last Dose    durvalumab (IMFINZI) 550 mg in sodium chloride 0.9 % 250 mL chemo IVPB  10 mg/kg (Treatment Plan Recorded) Intravenous Once Pilo Terrazas MD        sodium chloride flush 0.9 % injection 10 mL  10 mL Intravenous PRN Pilo Terrazas MD        heparin flush 100 UNIT/ML injection 500 Units  500 Units Intracatheter PRN Pilo Terrazas MD              REVIEW OF SYSTEMS:    Constitutional: no fever, no night sweats,  fatigue;   HEENT: no blurring of vision, no double vision, no hearing difficulty, no tinnitus,no ulceration, no dysphagia  Lungs: Chronic productive cough, shortness of breath, wheeze;   CVS: no palpitation, no chest pain, chronically stable shortness of breath;  GI: no abdominal pain, no nausea , no vomiting, no constipation;   MARIA VICTORIA: no dysuria, frequency and urgency, no hematuria, no kidney stones;   Musculoskeletal: Leg weakness, no joint pain, swelling , stiffness;   Endocrine: no polyuria, polydypsia, no cold or heat intolerence; Hematology/lymphatic: no easy brusing or bleeding, no hx of clotting disorder; no peripheral adenopathy. Dermatology: no skin rash, no eczema, no pruritis;   Psychiatry: no depression, no anxiety,no panic attacks, no suicide ideation; Neurology: no syncope, no seizures, no numbness or tingling of hands, no numbness or tingling of feet, no paresis; new onset/worsening headaches    PHYSICAL EXAM:    Vitals signs:    /84   Pulse 91   Temp 97.3 °F (36.3 °C)   Ht 5' 4\" (1.626 m)   Wt 126 lb (57.2 kg)   BMI 21.63 kg/m²     Pain scale:2  Pain Score:   0 - No pain   CONSTITUTIONAL: Alert, appropriate, no acute distress, cachectic appearing  EYES: Non icteric, EOM intact, pupils equal round and reactive to light and accommodation. ENT: Oral mucus membranes moist, no oral pharyngeal lesions. External inspection of ears and nose are normal.  O2 nasal cannula 2 L/min  NECK: Supple, no masses. No palpable thyroid mass    CHEST/LUNGS: CTA bilaterally, normal respiratory effort   CARDIOVASCULAR: RRR, no murmurs. No lower extremity edema   ABDOMEN: soft non-tender, active bowel sounds, no hepatosplenomegaly. No palpable masses. EXTREMITIES: warm, Full ROM of all fours extremities. No focal weakness.   SKIN: warm, dry, with no rashes or lesions  LYMPH: No cervical, clavicular, axillary, or inguinal lymphadenopathy  NEUROLOGIC: follows commands, non focal. PSYCH: mood and affect appropriate. Alert and oriented to time and place and person. Relevant Lab findings: Reviewed by me    9/30/20 TSH 1.5      Relevant Imaging studies:  9/16/20 CT Chest: Redemonstration of a cavitary lesion in the posterior right upper lobe with surrounding posttreatment changes, not significantly changed from the most recent comparison exam. New small noncalcified right lower lobe nodule for which attention on follow-up is recommended. Emphysema and pulmonary fibrosis. Pulmonary arterial hypertension. Atherosclerosis of the aorta and coronary arteries    ASSESSMENT:    Orders Placed This Encounter   Procedures    MRI BRAIN W WO CONTRAST     Standing Status:   Future     Standing Expiration Date:   10/28/2021      Vi was seen today for lung cancer. Diagnoses and all orders for this visit:    Nonintractable headache, unspecified chronicity pattern, unspecified headache type  -     MRI BRAIN W WO CONTRAST; Future    Confusion  -     MRI BRAIN W WO CONTRAST; Future    Malignant neoplasm of right main bronchus [facility])  -     MRI BRAIN W WO CONTRAST; Future    Bad headache    Encounter for antineoplastic immunotherapy    Care plan discussed with patient       NSCLC-SCC stage IIIB   Bone scan, CT abdomen pelvis unremarkable metastatic disease. PET scan showed disease limited to the chest and mediastinum only. Therefore, stage IIIB. Status post completion of chemoradiation on 8/20/2019.  9/16/20 CT Chest: Redemonstration of a cavitary lesion in the posterior right upper lobe with surrounding posttreatment changes, not significantly changed from the most recent comparison exam. New small noncalcified right lower lobe nodule for which attention on follow-up is recommended. Emphysema and pulmonary fibrosis. Pulmonary arterial hypertension. Atherosclerosis of the aorta and coronary arteries    Continue adjuvant Imfinzi. Monitoring of toxicity-she denies any neuropathy.   Chronic fatigue. Smoke cessation-she was again strongly encouraged to quit. Cancer related pain-opioid analgesics have potential for abuse and risk of fatal overdose due to respiratory depression. The use of opioid exposes individual to have the risk of addiction, abuse and misuse. Addiction can occur in individuals appropriately prescribed and at a recommended dose. Addiction also occurs if the drug is being used or abused. An individual is also at increased risk of opioid addiction if a personal or family history of substance abuse or mental illness are present. [id number] mg SR every 8 hours. Generalized pruritus-secondary to dry skin and likely immunotherapy. Continue Benadryl. Right lower lobe noncalcified pulmonary nodule-attention to follow-up. New onset/worsening headaches-MRI brain tomorrow. Plan:  Continue Morphine SR 15 mg every 8 hours   Continue Percocet   CMP, TSH today  CT chest January 2021  Continue durvalumab in 2 weeks every 2 weeks x 1 year treatment through December 2020. RTC in the 6 weeks with MD after scans   Brain MRI tomorrow. I have seen, examined and reviewed this patient medication list, appropriate labs and imaging studies. I reviewed relevant medical records and others physicians notes. I discussed the plans of care with the patient. I answered all the questions to the patients satisfaction. The selection, dosing administration of anticancer agents in the management of associated toxicities are complex. Modifications of drug dose and schedule and the initiation of supportive care interventions are often necessary because of expected toxicities individual patient variability, prior treatment and comorbidity. The optimal delivery of anticancer agents therefore requires a healthcare delivery team experienced in the use of anticancer agents and the management of associated toxicities in patients with cancer.     Follow Up:     Return in about 6 weeks (around

## 2020-10-28 ENCOUNTER — OFFICE VISIT (OUTPATIENT)
Dept: HEMATOLOGY | Age: 65
End: 2020-10-28
Payer: MEDICARE

## 2020-10-28 ENCOUNTER — HOSPITAL ENCOUNTER (OUTPATIENT)
Dept: INFUSION THERAPY | Age: 65
Discharge: HOME | End: 2020-10-28
Payer: MEDICARE

## 2020-10-28 VITALS
HEART RATE: 91 BPM | DIASTOLIC BLOOD PRESSURE: 84 MMHG | WEIGHT: 126 LBS | BODY MASS INDEX: 21.51 KG/M2 | HEIGHT: 64 IN | SYSTOLIC BLOOD PRESSURE: 118 MMHG | TEMPERATURE: 97.3 F

## 2020-10-28 DIAGNOSIS — C34.01 MALIGNANT NEOPLASM OF RIGHT MAIN BRONCHUS (HCC): Primary | ICD-10-CM

## 2020-10-28 DIAGNOSIS — R53.83 FATIGUE DUE TO TREATMENT: ICD-10-CM

## 2020-10-28 LAB
ALBUMIN SERPL-MCNC: 3.8 G/DL (ref 3.5–5.2)
ALP BLD-CCNC: 95 U/L (ref 35–104)
ALT SERPL-CCNC: 12 U/L (ref 9–52)
ANION GAP SERPL CALCULATED.3IONS-SCNC: 8 MMOL/L (ref 7–19)
AST SERPL-CCNC: 24 U/L (ref 14–36)
BASOPHILS ABSOLUTE: 0.02 K/UL (ref 0.01–0.08)
BASOPHILS RELATIVE PERCENT: 0.3 % (ref 0.1–1.2)
BILIRUB SERPL-MCNC: <0.2 MG/DL (ref 0.2–1.3)
BUN BLDV-MCNC: 13 MG/DL (ref 7–17)
CALCIUM SERPL-MCNC: 8.6 MG/DL (ref 8.4–10.2)
CHLORIDE BLD-SCNC: 103 MMOL/L (ref 98–111)
CO2: 34 MMOL/L (ref 22–29)
CREAT SERPL-MCNC: <0.5 MG/DL (ref 0.5–1)
EOSINOPHILS ABSOLUTE: 0.12 K/UL (ref 0.04–0.54)
EOSINOPHILS RELATIVE PERCENT: 1.7 % (ref 0.7–7)
GFR NON-AFRICAN AMERICAN: >60
GLOBULIN: 3 G/DL
GLUCOSE BLD-MCNC: 107 MG/DL (ref 74–106)
HCT VFR BLD CALC: 38.5 % (ref 34.1–44.9)
HEMOGLOBIN: 12.8 G/DL (ref 11.2–15.7)
LYMPHOCYTES ABSOLUTE: 1.31 K/UL (ref 1.18–3.74)
LYMPHOCYTES RELATIVE PERCENT: 18.7 % (ref 19.3–53.1)
MCH RBC QN AUTO: 33.6 PG (ref 25.6–32.2)
MCHC RBC AUTO-ENTMCNC: 33.2 G/DL (ref 32.3–35.5)
MCV RBC AUTO: 101 FL (ref 79.4–94.8)
MONOCYTES ABSOLUTE: 0.57 K/UL (ref 0.24–0.82)
MONOCYTES RELATIVE PERCENT: 8.2 % (ref 4.7–12.5)
NEUTROPHILS ABSOLUTE: 4.97 K/UL (ref 1.56–6.13)
NEUTROPHILS RELATIVE PERCENT: 71.1 % (ref 34–71.1)
PDW BLD-RTO: 14.5 % (ref 11.7–14.4)
PLATELET # BLD: 163 K/UL (ref 182–369)
PMV BLD AUTO: 10.8 FL (ref 7.4–10.4)
POTASSIUM SERPL-SCNC: 3.7 MMOL/L (ref 3.5–5.1)
RBC # BLD: 3.81 M/UL (ref 3.93–5.22)
SODIUM BLD-SCNC: 145 MMOL/L (ref 137–145)
TOTAL PROTEIN: 6.8 G/DL (ref 6.3–8.2)
TSH SERPL DL<=0.05 MIU/L-ACNC: 0.44 UIU/ML (ref 0.47–4.68)
WBC # BLD: 6.99 K/UL (ref 3.98–10.04)

## 2020-10-28 PROCEDURE — 4040F PNEUMOC VAC/ADMIN/RCVD: CPT | Performed by: INTERNAL MEDICINE

## 2020-10-28 PROCEDURE — 2580000003 HC RX 258: Performed by: INTERNAL MEDICINE

## 2020-10-28 PROCEDURE — G8420 CALC BMI NORM PARAMETERS: HCPCS | Performed by: INTERNAL MEDICINE

## 2020-10-28 PROCEDURE — 85025 COMPLETE CBC W/AUTO DIFF WBC: CPT

## 2020-10-28 PROCEDURE — 84443 ASSAY THYROID STIM HORMONE: CPT

## 2020-10-28 PROCEDURE — G8400 PT W/DXA NO RESULTS DOC: HCPCS | Performed by: INTERNAL MEDICINE

## 2020-10-28 PROCEDURE — G8427 DOCREV CUR MEDS BY ELIG CLIN: HCPCS | Performed by: INTERNAL MEDICINE

## 2020-10-28 PROCEDURE — G8484 FLU IMMUNIZE NO ADMIN: HCPCS | Performed by: INTERNAL MEDICINE

## 2020-10-28 PROCEDURE — 3017F COLORECTAL CA SCREEN DOC REV: CPT | Performed by: INTERNAL MEDICINE

## 2020-10-28 PROCEDURE — 96413 CHEMO IV INFUSION 1 HR: CPT

## 2020-10-28 PROCEDURE — 1090F PRES/ABSN URINE INCON ASSESS: CPT | Performed by: INTERNAL MEDICINE

## 2020-10-28 PROCEDURE — 4004F PT TOBACCO SCREEN RCVD TLK: CPT | Performed by: INTERNAL MEDICINE

## 2020-10-28 PROCEDURE — 99214 OFFICE O/P EST MOD 30 MIN: CPT | Performed by: INTERNAL MEDICINE

## 2020-10-28 PROCEDURE — 6360000002 HC RX W HCPCS: Performed by: INTERNAL MEDICINE

## 2020-10-28 PROCEDURE — 1123F ACP DISCUSS/DSCN MKR DOCD: CPT | Performed by: INTERNAL MEDICINE

## 2020-10-28 PROCEDURE — 80053 COMPREHEN METABOLIC PANEL: CPT

## 2020-10-28 RX ORDER — HEPARIN SODIUM (PORCINE) LOCK FLUSH IV SOLN 100 UNIT/ML 100 UNIT/ML
500 SOLUTION INTRAVENOUS PRN
Status: DISCONTINUED | OUTPATIENT
Start: 2020-10-28 | End: 2020-10-30 | Stop reason: HOSPADM

## 2020-10-28 RX ORDER — SODIUM CHLORIDE 9 MG/ML
INJECTION, SOLUTION INTRAVENOUS CONTINUOUS
Status: CANCELLED | OUTPATIENT
Start: 2020-10-28

## 2020-10-28 RX ORDER — HEPARIN SODIUM (PORCINE) LOCK FLUSH IV SOLN 100 UNIT/ML 100 UNIT/ML
500 SOLUTION INTRAVENOUS PRN
Status: CANCELLED
Start: 2020-10-28

## 2020-10-28 RX ORDER — SODIUM CHLORIDE 0.9 % (FLUSH) 0.9 %
10 SYRINGE (ML) INJECTION PRN
Status: CANCELLED | OUTPATIENT
Start: 2020-10-28

## 2020-10-28 RX ORDER — SODIUM CHLORIDE 0.9 % (FLUSH) 0.9 %
10 SYRINGE (ML) INJECTION PRN
Status: DISCONTINUED | OUTPATIENT
Start: 2020-10-28 | End: 2020-10-29 | Stop reason: HOSPADM

## 2020-10-28 RX ORDER — DIPHENHYDRAMINE HYDROCHLORIDE 50 MG/ML
50 INJECTION INTRAMUSCULAR; INTRAVENOUS ONCE
Status: CANCELLED | OUTPATIENT
Start: 2020-10-28

## 2020-10-28 RX ORDER — METHYLPREDNISOLONE SODIUM SUCCINATE 125 MG/2ML
125 INJECTION, POWDER, LYOPHILIZED, FOR SOLUTION INTRAMUSCULAR; INTRAVENOUS ONCE
Status: CANCELLED | OUTPATIENT
Start: 2020-10-28

## 2020-10-28 RX ORDER — EPINEPHRINE 1 MG/ML
0.3 INJECTION, SOLUTION, CONCENTRATE INTRAVENOUS PRN
Status: CANCELLED | OUTPATIENT
Start: 2020-10-28

## 2020-10-28 RX ORDER — SODIUM CHLORIDE 9 MG/ML
20 INJECTION, SOLUTION INTRAVENOUS ONCE
Status: CANCELLED | OUTPATIENT
Start: 2020-10-28

## 2020-10-28 RX ORDER — SODIUM CHLORIDE 0.9 % (FLUSH) 0.9 %
5 SYRINGE (ML) INJECTION PRN
Status: CANCELLED | OUTPATIENT
Start: 2020-10-28

## 2020-10-28 RX ADMIN — SODIUM CHLORIDE, PRESERVATIVE FREE 10 ML: 5 INJECTION INTRAVENOUS at 13:11

## 2020-10-28 RX ADMIN — HEPARIN 500 UNITS: 100 SYRINGE at 13:11

## 2020-10-28 RX ADMIN — SODIUM CHLORIDE 550 MG: 9 INJECTION, SOLUTION INTRAVENOUS at 12:05

## 2020-10-29 ENCOUNTER — HOSPITAL ENCOUNTER (OUTPATIENT)
Dept: MRI IMAGING | Age: 65
Discharge: HOME | End: 2020-10-29
Payer: MEDICARE

## 2020-10-29 PROCEDURE — 70553 MRI BRAIN STEM W/O & W/DYE: CPT

## 2020-10-29 PROCEDURE — 6360000004 HC RX CONTRAST MEDICATION: Performed by: INTERNAL MEDICINE

## 2020-10-29 PROCEDURE — A9577 INJ MULTIHANCE: HCPCS | Performed by: INTERNAL MEDICINE

## 2020-10-29 RX ADMIN — GADOBENATE DIMEGLUMINE 12 ML: 529 INJECTION, SOLUTION INTRAVENOUS at 11:32

## 2020-11-05 RX ORDER — OXYCODONE AND ACETAMINOPHEN 10; 325 MG/1; MG/1
1 TABLET ORAL EVERY 6 HOURS PRN
Qty: 120 TABLET | Refills: 0 | Status: SHIPPED | OUTPATIENT
Start: 2020-11-05 | End: 2020-12-02 | Stop reason: SDUPTHER

## 2020-11-11 ENCOUNTER — HOSPITAL ENCOUNTER (OUTPATIENT)
Dept: INFUSION THERAPY | Age: 65
Discharge: HOME | End: 2020-11-11
Payer: MEDICARE

## 2020-11-11 ENCOUNTER — TELEPHONE (OUTPATIENT)
Dept: HEMATOLOGY | Age: 65
End: 2020-11-11

## 2020-11-25 ENCOUNTER — HOSPITAL ENCOUNTER (OUTPATIENT)
Dept: INFUSION THERAPY | Age: 65
Discharge: HOME | End: 2020-11-25
Payer: MEDICARE

## 2020-11-25 VITALS
WEIGHT: 127.9 LBS | OXYGEN SATURATION: 98 % | SYSTOLIC BLOOD PRESSURE: 130 MMHG | TEMPERATURE: 98.4 F | BODY MASS INDEX: 21.95 KG/M2 | HEART RATE: 84 BPM | DIASTOLIC BLOOD PRESSURE: 70 MMHG

## 2020-11-25 DIAGNOSIS — C34.01 MALIGNANT NEOPLASM OF RIGHT MAIN BRONCHUS (HCC): Primary | ICD-10-CM

## 2020-11-25 DIAGNOSIS — R53.83 FATIGUE DUE TO TREATMENT: ICD-10-CM

## 2020-11-25 LAB
ALBUMIN SERPL-MCNC: 3.3 G/DL (ref 3.5–5.2)
ALP BLD-CCNC: 115 U/L (ref 35–104)
ALT SERPL-CCNC: 10 U/L (ref 9–52)
ANION GAP SERPL CALCULATED.3IONS-SCNC: 9 MMOL/L (ref 7–19)
AST SERPL-CCNC: 16 U/L (ref 14–36)
BASOPHILS ABSOLUTE: 0.01 K/UL (ref 0.01–0.08)
BASOPHILS RELATIVE PERCENT: 0.1 % (ref 0.1–1.2)
BILIRUB SERPL-MCNC: 0.4 MG/DL (ref 0.2–1.3)
BUN BLDV-MCNC: 9 MG/DL (ref 7–17)
CALCIUM SERPL-MCNC: 8.6 MG/DL (ref 8.4–10.2)
CHLORIDE BLD-SCNC: 100 MMOL/L (ref 98–111)
CO2: 34 MMOL/L (ref 22–29)
CREAT SERPL-MCNC: <0.5 MG/DL (ref 0.5–1)
EOSINOPHILS ABSOLUTE: 0.02 K/UL (ref 0.04–0.54)
EOSINOPHILS RELATIVE PERCENT: 0.3 % (ref 0.7–7)
GFR NON-AFRICAN AMERICAN: >60
GLOBULIN: 3 G/DL
GLUCOSE BLD-MCNC: 131 MG/DL (ref 74–106)
HCT VFR BLD CALC: 38.5 % (ref 34.1–44.9)
HEMOGLOBIN: 12.3 G/DL (ref 11.2–15.7)
LYMPHOCYTES ABSOLUTE: 0.59 K/UL (ref 1.18–3.74)
LYMPHOCYTES RELATIVE PERCENT: 7.6 % (ref 19.3–53.1)
MCH RBC QN AUTO: 33.2 PG (ref 25.6–32.2)
MCHC RBC AUTO-ENTMCNC: 31.9 G/DL (ref 32.3–35.5)
MCV RBC AUTO: 103.8 FL (ref 79.4–94.8)
MONOCYTES ABSOLUTE: 0.4 K/UL (ref 0.24–0.82)
MONOCYTES RELATIVE PERCENT: 5.1 % (ref 4.7–12.5)
NEUTROPHILS ABSOLUTE: 6.79 K/UL (ref 1.56–6.13)
NEUTROPHILS RELATIVE PERCENT: 86.9 % (ref 34–71.1)
PDW BLD-RTO: 14.3 % (ref 11.7–14.4)
PLATELET # BLD: 132 K/UL (ref 182–369)
PMV BLD AUTO: 11 FL (ref 7.4–10.4)
POTASSIUM SERPL-SCNC: 3.4 MMOL/L (ref 3.5–5.1)
RBC # BLD: 3.71 M/UL (ref 3.93–5.22)
SODIUM BLD-SCNC: 143 MMOL/L (ref 137–145)
TOTAL PROTEIN: 6.3 G/DL (ref 6.3–8.2)
TSH SERPL DL<=0.05 MIU/L-ACNC: 0.47 UIU/ML (ref 0.47–4.68)
WBC # BLD: 7.81 K/UL (ref 3.98–10.04)

## 2020-11-25 PROCEDURE — 80053 COMPREHEN METABOLIC PANEL: CPT

## 2020-11-25 PROCEDURE — 96413 CHEMO IV INFUSION 1 HR: CPT

## 2020-11-25 PROCEDURE — 85025 COMPLETE CBC W/AUTO DIFF WBC: CPT

## 2020-11-25 PROCEDURE — 2580000003 HC RX 258: Performed by: INTERNAL MEDICINE

## 2020-11-25 PROCEDURE — 6360000002 HC RX W HCPCS: Performed by: INTERNAL MEDICINE

## 2020-11-25 PROCEDURE — 84443 ASSAY THYROID STIM HORMONE: CPT

## 2020-11-25 RX ORDER — SODIUM CHLORIDE 9 MG/ML
20 INJECTION, SOLUTION INTRAVENOUS ONCE
Status: CANCELLED | OUTPATIENT
Start: 2020-11-25

## 2020-11-25 RX ORDER — HEPARIN SODIUM (PORCINE) LOCK FLUSH IV SOLN 100 UNIT/ML 100 UNIT/ML
500 SOLUTION INTRAVENOUS PRN
Status: CANCELLED
Start: 2020-11-25

## 2020-11-25 RX ORDER — EPINEPHRINE 1 MG/ML
0.3 INJECTION, SOLUTION, CONCENTRATE INTRAVENOUS PRN
Status: CANCELLED | OUTPATIENT
Start: 2020-11-25

## 2020-11-25 RX ORDER — SODIUM CHLORIDE 0.9 % (FLUSH) 0.9 %
10 SYRINGE (ML) INJECTION PRN
Status: CANCELLED | OUTPATIENT
Start: 2020-11-25

## 2020-11-25 RX ORDER — DIPHENHYDRAMINE HYDROCHLORIDE 50 MG/ML
50 INJECTION INTRAMUSCULAR; INTRAVENOUS ONCE
Status: CANCELLED | OUTPATIENT
Start: 2020-11-25

## 2020-11-25 RX ORDER — SODIUM CHLORIDE 0.9 % (FLUSH) 0.9 %
5 SYRINGE (ML) INJECTION PRN
Status: CANCELLED | OUTPATIENT
Start: 2020-11-25

## 2020-11-25 RX ORDER — HEPARIN SODIUM (PORCINE) LOCK FLUSH IV SOLN 100 UNIT/ML 100 UNIT/ML
500 SOLUTION INTRAVENOUS PRN
Status: DISCONTINUED | OUTPATIENT
Start: 2020-11-25 | End: 2020-11-27 | Stop reason: HOSPADM

## 2020-11-25 RX ORDER — METHYLPREDNISOLONE SODIUM SUCCINATE 125 MG/2ML
125 INJECTION, POWDER, LYOPHILIZED, FOR SOLUTION INTRAMUSCULAR; INTRAVENOUS ONCE
Status: CANCELLED | OUTPATIENT
Start: 2020-11-25

## 2020-11-25 RX ORDER — SODIUM CHLORIDE 9 MG/ML
INJECTION, SOLUTION INTRAVENOUS CONTINUOUS
Status: CANCELLED | OUTPATIENT
Start: 2020-11-25

## 2020-11-25 RX ORDER — SODIUM CHLORIDE 0.9 % (FLUSH) 0.9 %
10 SYRINGE (ML) INJECTION PRN
Status: DISCONTINUED | OUTPATIENT
Start: 2020-11-25 | End: 2020-11-26 | Stop reason: HOSPADM

## 2020-11-25 RX ADMIN — HEPARIN 500 UNITS: 100 SYRINGE at 12:53

## 2020-11-25 RX ADMIN — SODIUM CHLORIDE 550 MG: 9 INJECTION, SOLUTION INTRAVENOUS at 11:49

## 2020-11-25 RX ADMIN — SODIUM CHLORIDE, PRESERVATIVE FREE 10 ML: 5 INJECTION INTRAVENOUS at 12:53

## 2020-12-02 RX ORDER — OXYCODONE AND ACETAMINOPHEN 10; 325 MG/1; MG/1
1 TABLET ORAL EVERY 6 HOURS PRN
Qty: 120 TABLET | Refills: 0 | Status: SHIPPED | OUTPATIENT
Start: 2020-12-02 | End: 2020-12-30 | Stop reason: SDUPTHER

## 2020-12-08 NOTE — PROGRESS NOTES
Vi Jennifer Olmedo Probus   1955 12/9/2020     Chief Complaint   Patient presents with    Cancer      Interval history/history of present illness:  Diagnosis:  NSCLC, [facility] March 2019   [id number], stage IIIIB  Treatment summary   6/10/2019- 8/20/2019-carboplatin/Taxol weekly with concurrent radiation   12/4/2019- durvalumab x1 year    Interval history  Patient is a very pleasant 77years old female who has a diagnosis of stage IIIb non-small cell lung cancer, squamous cell subtype. She received initial treatment with carboplatin/Taxol weekly followed by concurrent radiation. She completed concurrent chemoradiation and is currently receiving durvalumab to complete 1 year of treatment. She has severe COPD and needs O2 supplement. She denies any skin rash, diarrhea or worsening shortness of breath. She complains of persistent fatigue. Cancer history  Ms Chanelle Mariee was first seen by me on 5/3/2019 for further workup of a lung mass. She has complains of chest wall pain. Further workup revealed a lung mass. 4/9/2019-CT of the chest with contrast showed a malignant mass in the right upper lobe measures 6.5 x 2.8 x 5.1 cm. The mass invades and destroys the right lateral cortex and lateral aspect of the T4 vertebral body. Mediastinal adenopathy. Specifically, 1.9 cm pretracheal lymph node. 2.1 cm precarinal lymph node. 1.9 cm subcarina lymph node. Right hilar adenopathy. 5/3/2019-she was first seen by me.   5/15/2019-bone scan and CT abdomen pelvis was unremarkable metastatic disease. 5/16/[id number]-TF-tpvnht biopsy consistent non-small cell lung cancer, squamous cell carcinoma subtype. 5/29/2019-PET scan showed intense and metabolic activity associated with pleural-based right paraspinal mass within the right upper lobe. It invades into the adjacent thoracic vertebral body. Right hilar, subcarina and pretracheal lymphadenopathy. No evidence of muscle skeletal or intra-abdominal disease.    2/4/5844-GERALD chemoradiation with carboplatin/Taxol weekly with concurrent radiation. 10/14/2019- CT chest abdomen pelvis showed a cavitary lesion measuring 3.3 x 4.6 x 3.4 cm which appears to be decreased in size. Underlying lung emphysema. There is a new notable sclerosis within the right lateral aspect of T4 and T5 vertebral bodies at the site of the posterior mediastinal invasion by the right lung malignancy. CT of the abdomen pelvis showed tiny hypodense in the liver which are too small to characterize but similar to prior exam.  Attention to follow-up. Stable nodule in the left adrenal gland too small to characterize. 12/4/2019- started on treatment durvalumab every 2 weeks to complete 1 year. 2/19/2020-CT chest abdomen pelvis showed slight decrease in size of the right upper lobe cavitary lesion. The lesion now measures 2.6 x 4.5 x 3.9 cm (previously 3.3 x 4.6 x 3.4 cm). Subtle cortical erosion of the right side of the centrum at T4/T5. No evidence of metastatic disease in the abdomen. Stable left adrenal nodule. 4/29/2020 Xr Chest Standard A cavitation or a necrotic mass in the right upper lobe. This was noted in the previous CT scan of the chest dated 2/19/2020. An area of consolidation in the right upper lobe may represent inflammatory or infectious process. Ill-defined nodule in the left lower lobe may represent a nipple shadow or a pulmonary nodule. This could be further evaluated. Chronic inflammatory changes in the remaining lungs bilaterally. 6/26/2020 Ct Chest W Contrast There is a pleural-based cavitary lesion within the right upper lobe posteriorly and medially. When measuring the lesion in the same manner as the previous study I do not feel there has been any significant progression from a previous CT of 2/19/2020. There is some increased consolidation within the medial right upper lobe likely representing post therapy change.  There is a new small nodule more inferiorly within the right upper lobe as described above. This may also represent consolidation post therapy but as it is a more isolated finding I feel it could potentially represent an early recurrence. This would warrant follow-up on subsequent imaging. These findings are superimposed upon changes of centrilobular emphysema. There is stable scarring within the right posterior lung base. There is bronchial wall thickening within both lungs suggesting chronic bronchitis. Coronary calcifications are present. Borderline enlargement of the pulmonary arteries suggesting pulmonary arterial hypertension. Mild constipation within the upper abdomen. Ct Abdomen Pelvis W Iv Contrast   No evidence of metastatic disease in the abdomen or pelvis. Stable biliary dilatation status post cholecystectomy which may represent reservoir phenomenon. Moderate to large volume stool in the colon, correlate for constipation. 2020- discussed at length the CAT scans with the patient and radiology. No clear-cut evidence of disease progression. Therefore will continue treatment. 20 CT Chest: Redemonstration of a cavitary lesion in the posterior right upper lobe with surrounding posttreatment changes, not significantly changed from the most recent comparison exam. New small noncalcified right lower lobe nodule for which attention on follow-up is recommended. Emphysema and pulmonary fibrosis. Pulmonary arterial hypertension. Atherosclerosis of the aorta and coronary arteries. 10/29/20 MRI brain:  No acute intracranial abnormality. No evidence of intracranial neoplastic/metastatic disease. Chronic white matter ischemic changes. Chronic maxillary and ethmoid sinusitis. ECO    Treatment related toxicity: Fatigue.      Past medical history:  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     COPD (chronic obstructive pulmonary disease) (HCC)     Depression     Emphysema (subcutaneous) (surgical) resulting from a procedure     History of lung biopsy 05/16/2019    Malignant neoplasm of right main bronchus ([facility] [address] 03/2019    NSCLC, SCC [id number] stage IIIb    Syncope     TIA (transient ischemic attack)         Past surgical history:  Past Surgical History:   Procedure Laterality Date    APPENDECTOMY      BACK SURGERY      times two    BLADDER SUSPENSION      CHOLECYSTECTOMY      HYSTERECTOMY      INCONTINENCE SURGERY          Social history:  Social History     Socioeconomic History    Marital status:       Spouse name: Not on file    Number of children: Not on file    Years of education: Not on file    Highest education level: Not on file   Occupational History    Not on file   Social Needs    Financial resource strain: Not on file    Food insecurity     Worry: Not on file     Inability: Not on file    Transportation needs     Medical: Not on file     Non-medical: Not on file   Tobacco Use    Smoking status: Current Every Day Smoker    Smokeless tobacco: Never Used   Substance and Sexual Activity    Alcohol use: Not Currently    Drug use: Never    Sexual activity: Not on file   Lifestyle    Physical activity     Days per week: Not on file     Minutes per session: Not on file    Stress: Not on file   Relationships    Social connections     Talks on phone: Not on file     Gets together: Not on file     Attends [employer] service: Not on file     Active member of club or organization: Not on file     Attends meetings of clubs or organizations: Not on file     Relationship status: Not on file    Intimate partner violence     Fear of current or ex partner: Not on file     Emotionally abused: Not on file     Physically abused: Not on file     Forced sexual activity: Not on file   Other Topics Concern    Not on file   Social History Narrative    Not on file        Family history:   Family History   Problem Relation Age of Onset    Colon Cancer Mother     High Blood Pressure Brother  Diabetes Brother         Current Outpatient Medications   Medication Sig Dispense Refill    Morphine Sulfate ER 15 MG TBEA Take 15 mg by mouth every 8 hours for 30 days. 90 each 0    oxyCODONE-acetaminophen (PERCOCET)  MG per tablet Take 1 tablet by mouth every 6 hours as needed for Pain for up to 30 days. Intended supply: 30 days 120 tablet 0    tiZANidine (ZANAFLEX) 4 MG tablet       Cholecalciferol (VITAMIN D3) 39666 units CAPS Take 50,000 Units by mouth      Glycopyrrolate (SEEBRI NEOHALER) 15.6 MCG CAPS Inhale 1 capsule into the lungs      ketorolac (TORADOL) 60 MG/2ML injection 60 mg      lidocaine viscous hcl (XYLOCAINE) 2 % SOLN solution       vitamin D (ERGOCALCIFEROL) 88823 units CAPS capsule cholecalciferol (vitamin D3) 50,000 unit capsule   Take 1 capsule every week by oral route.  OXYGEN Inhale 2 L/min into the lungs      budesonide-formoterol (SYMBICORT) 160-4.5 MCG/ACT AERO Symbicort 160 mcg-4.5 mcg/actuation HFA aerosol inhaler   Inhale 2 puffs twice a day by inhalation route.  albuterol sulfate HFA (PROAIR HFA) 108 (90 Base) MCG/ACT inhaler ProAir HFA 90 mcg/actuation aerosol inhaler   Inhale 2 puffs every 4 hours by inhalation route as needed.  aspirin EC 81 MG EC tablet Take 81 mg by mouth      calcium carbonate 600 MG TABS tablet Take 600 mg by mouth      citalopram (CELEXA) 20 MG tablet citalopram 20 mg tablet   Take 1 tablet every day by oral route. No current facility-administered medications for this visit.       Facility-Administered Medications Ordered in Other Visits   Medication Dose Route Frequency Provider Last Rate Last Dose    sodium chloride flush 0.9 % injection 10 mL  10 mL Intravenous PRN Dione Otoole MD   10 mL at 12/09/20 1311    heparin flush 100 UNIT/ML injection 500 Units  500 Units Intracatheter PRN Dione Otoole MD   500 Units at 12/09/20 1311          REVIEW OF SYSTEMS:    Constitutional: no fever, no night sweats, fatigue;   HEENT: no blurring of vision, no double vision, no hearing difficulty, no tinnitus,no ulceration, no dysphagia  Lungs: Chronic productive cough, shortness of breath, wheeze;   CVS: no palpitation, no chest pain, chronically stable shortness of breath;  GI: no abdominal pain, no nausea , no vomiting, no constipation;   MARIA VICTORIA: no dysuria, frequency and urgency, no hematuria, no kidney stones;   Musculoskeletal: Leg weakness, no joint pain, swelling , stiffness;   Endocrine: no polyuria, polydypsia, no cold or heat intolerence; Hematology/lymphatic: no easy brusing or bleeding, no hx of clotting disorder; no peripheral adenopathy. Dermatology: no skin rash, no eczema, no pruritis;   Psychiatry: no depression, no anxiety,no panic attacks, no suicide ideation; Neurology: no syncope, no seizures, no numbness or tingling of hands, no numbness or tingling of feet, no paresis; new onset/worsening headaches    PHYSICAL EXAM:    Vitals signs:  /64   Pulse 94   Temp 97.5 °F (36.4 °C)   Ht 5' 4\" (1.626 m)   Wt 123 lb (55.8 kg)   BMI 21.11 kg/m²     Pain scale:2  Pain Score:   0 - No pain   CONSTITUTIONAL: Alert, appropriate, no acute distress, cachectic appearing  EYES: Non icteric, EOM intact, pupils equal round and reactive to light and accommodation. ENT: Oral mucus membranes moist, no oral pharyngeal lesions. External inspection of ears and nose are normal.  O2 nasal cannula 2 L/min  NECK: Supple, no masses. No palpable thyroid mass    CHEST/LUNGS: CTA bilaterally, normal respiratory effort   CARDIOVASCULAR: RRR, no murmurs. No lower extremity edema   ABDOMEN: soft non-tender, active bowel sounds, no hepatosplenomegaly. No palpable masses. EXTREMITIES: warm, Full ROM of all fours extremities. No focal weakness.   SKIN: warm, dry, with no rashes or lesions  LYMPH: No cervical, clavicular, axillary, or inguinal lymphadenopathy  NEUROLOGIC: follows commands, non focal.   PSYCH: mood and affect appropriate. Alert and oriented to time and place and person. Relevant Lab findings: Reviewed by me  11/25/20 TSH 0.474    Relevant Imaging studies:    10/29/20 MRI brain:  No acute intracranial abnormality. No evidence of intracranial neoplastic/metastatic disease. Chronic white matter ischemic changes. Chronic maxillary and ethmoid sinusitis. ASSESSMENT:    Orders Placed This Encounter   Procedures    CT CHEST W CONTRAST     Standing Status:   Future     Standing Expiration Date:   12/9/2021     Scheduling Instructions:      Sched in March at [address]     Order Specific Question:   Reason for exam:     Answer:   completed tx for lung cancer      Vi was seen today for cancer. Diagnoses and all orders for this visit:    Malignant neoplasm of right main bronchus ([facility] [address]  -     CT CHEST W CONTRAST; Future    Care plan discussed with patient    Chronic obstructive pulmonary disease, unspecified COPD type ([address]    Cancer-related pain       NSCLC-SCC stage IIIB   Bone scan, CT abdomen pelvis unremarkable metastatic disease. PET scan showed disease limited to the chest and mediastinum only. Therefore, stage IIIB. Status post completion of chemoradiation on 8/20/2019.  10/29/2020-brain MRI was unremarkable  Continue adjuvant Imfinzi monthly. Monitoring of toxicity-she denies any neuropathy. Chronic fatigue. 11  Smoke cessation-she was again strongly encouraged to quit. Cancer related pain-opioid analgesics have potential for abuse and risk of fatal overdose due to respiratory depression. The use of opioid exposes individual to have the risk of addiction, abuse and misuse. Addiction can occur in individuals appropriately prescribed and at a recommended dose. Addiction also occurs if the drug is being used or abused. An individual is also at increased risk of opioid addiction if a personal or family history of substance abuse or mental illness are present. [id number] mg SR every 8 hours.     Generalized pruritus-secondary to dry skin and likely immunotherapy. Continue Benadryl. Right lower lobe noncalcified pulmonary nodule-attention to follow-up. New onset/worsening headaches-MRI brain negative Oct 2020    Plan:  Continue Morphine SR 15 mg every 8 hours   Continue Percocet   CMP, TSH today  CT chest March 2021  Continue durvalumab. RTC 2 week for fixed dose x 2 additional doses  RTC in the 6 weeks with MD        I have seen, examined and reviewed this patient medication list, appropriate labs and imaging studies. I reviewed relevant medical records and others physicians notes. I discussed the plans of care with the patient. I answered all the questions to the patients satisfaction. The selection, dosing administration of anticancer agents in the management of associated toxicities are complex. Modifications of drug dose and schedule and the initiation of supportive care interventions are often necessary because of expected toxicities individual patient variability, prior treatment and comorbidity. The optimal delivery of anticancer agents therefore requires a healthcare delivery team experienced in the use of anticancer agents and the management of associated toxicities in patients with cancer. Follow Up:     Return in about 3 months (around 3/9/2021) for Appointment with Dr. Tho [facility]. CT chest in March F/U after CT in [place]     I, Sukhwinder Mireles RN am scribing for Pilo Terrazas MD. Electronically signed by Sukhwinder Mireles RN on 12/9/2020 at 6:20 PM    I, Dr Nathanael Zhao, personally performed the services described in this documentation as scribed by Sukhwinder Mireles RN  in my presence and is both accurate and complete. Over 50% of the total visit time of 25 minutes was spent on counseling and/or coordination of care of history of lung cancer.  I

## 2020-12-09 ENCOUNTER — OFFICE VISIT (OUTPATIENT)
Dept: HEMATOLOGY | Age: 65
End: 2020-12-09
Payer: MEDICARE

## 2020-12-09 ENCOUNTER — HOSPITAL ENCOUNTER (OUTPATIENT)
Dept: INFUSION THERAPY | Age: 65
Discharge: HOME | End: 2020-12-09
Payer: MEDICARE

## 2020-12-09 VITALS
BODY MASS INDEX: 21 KG/M2 | WEIGHT: 123 LBS | HEART RATE: 94 BPM | TEMPERATURE: 97.5 F | DIASTOLIC BLOOD PRESSURE: 64 MMHG | HEIGHT: 64 IN | SYSTOLIC BLOOD PRESSURE: 112 MMHG

## 2020-12-09 DIAGNOSIS — R53.83 FATIGUE DUE TO TREATMENT: ICD-10-CM

## 2020-12-09 DIAGNOSIS — C34.01 MALIGNANT NEOPLASM OF RIGHT MAIN BRONCHUS (HCC): Primary | ICD-10-CM

## 2020-12-09 LAB
ALBUMIN SERPL-MCNC: 3.3 G/DL (ref 3.5–5.2)
ALP BLD-CCNC: 126 U/L (ref 35–104)
ALT SERPL-CCNC: 10 U/L (ref 9–52)
ANION GAP SERPL CALCULATED.3IONS-SCNC: 7 MMOL/L (ref 7–19)
AST SERPL-CCNC: 21 U/L (ref 14–36)
BASOPHILS ABSOLUTE: 0.02 K/UL (ref 0.01–0.08)
BASOPHILS RELATIVE PERCENT: 0.2 % (ref 0.1–1.2)
BILIRUB SERPL-MCNC: 0.3 MG/DL (ref 0.2–1.3)
BUN BLDV-MCNC: 18 MG/DL (ref 7–17)
CALCIUM SERPL-MCNC: 9.2 MG/DL (ref 8.4–10.2)
CHLORIDE BLD-SCNC: 101 MMOL/L (ref 98–111)
CO2: 36 MMOL/L (ref 22–29)
CREAT SERPL-MCNC: <0.5 MG/DL (ref 0.5–1)
EOSINOPHILS ABSOLUTE: 0.08 K/UL (ref 0.04–0.54)
EOSINOPHILS RELATIVE PERCENT: 1 % (ref 0.7–7)
GFR NON-AFRICAN AMERICAN: >60
GLOBULIN: 3.4 G/DL
GLUCOSE BLD-MCNC: 112 MG/DL (ref 74–106)
HCT VFR BLD CALC: 38.8 % (ref 34.1–44.9)
HEMOGLOBIN: 12.2 G/DL (ref 11.2–15.7)
LYMPHOCYTES ABSOLUTE: 0.98 K/UL (ref 1.18–3.74)
LYMPHOCYTES RELATIVE PERCENT: 12.1 % (ref 19.3–53.1)
MCH RBC QN AUTO: 32.3 PG (ref 25.6–32.2)
MCHC RBC AUTO-ENTMCNC: 31.4 G/DL (ref 32.3–35.5)
MCV RBC AUTO: 102.6 FL (ref 79.4–94.8)
MONOCYTES ABSOLUTE: 0.55 K/UL (ref 0.24–0.82)
MONOCYTES RELATIVE PERCENT: 6.8 % (ref 4.7–12.5)
NEUTROPHILS ABSOLUTE: 6.49 K/UL (ref 1.56–6.13)
NEUTROPHILS RELATIVE PERCENT: 79.9 % (ref 34–71.1)
PDW BLD-RTO: 14.1 % (ref 11.7–14.4)
PLATELET # BLD: 224 K/UL (ref 182–369)
PMV BLD AUTO: 10.6 FL (ref 7.4–10.4)
POTASSIUM SERPL-SCNC: 4 MMOL/L (ref 3.5–5.1)
RBC # BLD: 3.78 M/UL (ref 3.93–5.22)
SODIUM BLD-SCNC: 144 MMOL/L (ref 137–145)
TOTAL PROTEIN: 6.7 G/DL (ref 6.3–8.2)
TSH SERPL DL<=0.05 MIU/L-ACNC: 0.25 UIU/ML (ref 0.47–4.68)
WBC # BLD: 8.12 K/UL (ref 3.98–10.04)

## 2020-12-09 PROCEDURE — G8427 DOCREV CUR MEDS BY ELIG CLIN: HCPCS | Performed by: INTERNAL MEDICINE

## 2020-12-09 PROCEDURE — 96413 CHEMO IV INFUSION 1 HR: CPT

## 2020-12-09 PROCEDURE — 1123F ACP DISCUSS/DSCN MKR DOCD: CPT | Performed by: INTERNAL MEDICINE

## 2020-12-09 PROCEDURE — 99214 OFFICE O/P EST MOD 30 MIN: CPT | Performed by: INTERNAL MEDICINE

## 2020-12-09 PROCEDURE — G8484 FLU IMMUNIZE NO ADMIN: HCPCS | Performed by: INTERNAL MEDICINE

## 2020-12-09 PROCEDURE — G8420 CALC BMI NORM PARAMETERS: HCPCS | Performed by: INTERNAL MEDICINE

## 2020-12-09 PROCEDURE — 4004F PT TOBACCO SCREEN RCVD TLK: CPT | Performed by: INTERNAL MEDICINE

## 2020-12-09 PROCEDURE — 3017F COLORECTAL CA SCREEN DOC REV: CPT | Performed by: INTERNAL MEDICINE

## 2020-12-09 PROCEDURE — 80053 COMPREHEN METABOLIC PANEL: CPT

## 2020-12-09 PROCEDURE — G8926 SPIRO NO PERF OR DOC: HCPCS | Performed by: INTERNAL MEDICINE

## 2020-12-09 PROCEDURE — 84443 ASSAY THYROID STIM HORMONE: CPT

## 2020-12-09 PROCEDURE — 4040F PNEUMOC VAC/ADMIN/RCVD: CPT | Performed by: INTERNAL MEDICINE

## 2020-12-09 PROCEDURE — 1090F PRES/ABSN URINE INCON ASSESS: CPT | Performed by: INTERNAL MEDICINE

## 2020-12-09 PROCEDURE — 85025 COMPLETE CBC W/AUTO DIFF WBC: CPT

## 2020-12-09 PROCEDURE — 6360000002 HC RX W HCPCS: Performed by: INTERNAL MEDICINE

## 2020-12-09 PROCEDURE — 2580000003 HC RX 258: Performed by: INTERNAL MEDICINE

## 2020-12-09 PROCEDURE — G8400 PT W/DXA NO RESULTS DOC: HCPCS | Performed by: INTERNAL MEDICINE

## 2020-12-09 PROCEDURE — 3023F SPIROM DOC REV: CPT | Performed by: INTERNAL MEDICINE

## 2020-12-09 RX ORDER — HEPARIN SODIUM (PORCINE) LOCK FLUSH IV SOLN 100 UNIT/ML 100 UNIT/ML
500 SOLUTION INTRAVENOUS PRN
Status: CANCELLED
Start: 2020-12-09

## 2020-12-09 RX ORDER — SODIUM CHLORIDE 9 MG/ML
20 INJECTION, SOLUTION INTRAVENOUS ONCE
Status: CANCELLED | OUTPATIENT
Start: 2020-12-09

## 2020-12-09 RX ORDER — SODIUM CHLORIDE 0.9 % (FLUSH) 0.9 %
10 SYRINGE (ML) INJECTION PRN
Status: CANCELLED | OUTPATIENT
Start: 2020-12-09

## 2020-12-09 RX ORDER — SODIUM CHLORIDE 9 MG/ML
INJECTION, SOLUTION INTRAVENOUS CONTINUOUS
Status: CANCELLED | OUTPATIENT
Start: 2020-12-09

## 2020-12-09 RX ORDER — SODIUM CHLORIDE 0.9 % (FLUSH) 0.9 %
10 SYRINGE (ML) INJECTION PRN
Status: DISCONTINUED | OUTPATIENT
Start: 2020-12-09 | End: 2020-12-10 | Stop reason: HOSPADM

## 2020-12-09 RX ORDER — DIPHENHYDRAMINE HYDROCHLORIDE 50 MG/ML
50 INJECTION INTRAMUSCULAR; INTRAVENOUS ONCE
Status: CANCELLED | OUTPATIENT
Start: 2020-12-09

## 2020-12-09 RX ORDER — SODIUM CHLORIDE 0.9 % (FLUSH) 0.9 %
5 SYRINGE (ML) INJECTION PRN
Status: CANCELLED | OUTPATIENT
Start: 2020-12-09

## 2020-12-09 RX ORDER — METHYLPREDNISOLONE SODIUM SUCCINATE 125 MG/2ML
125 INJECTION, POWDER, LYOPHILIZED, FOR SOLUTION INTRAMUSCULAR; INTRAVENOUS ONCE
Status: CANCELLED | OUTPATIENT
Start: 2020-12-09

## 2020-12-09 RX ORDER — HEPARIN SODIUM (PORCINE) LOCK FLUSH IV SOLN 100 UNIT/ML 100 UNIT/ML
500 SOLUTION INTRAVENOUS PRN
Status: DISCONTINUED | OUTPATIENT
Start: 2020-12-09 | End: 2020-12-11 | Stop reason: HOSPADM

## 2020-12-09 RX ORDER — EPINEPHRINE 1 MG/ML
0.3 INJECTION, SOLUTION, CONCENTRATE INTRAVENOUS PRN
Status: CANCELLED | OUTPATIENT
Start: 2020-12-09

## 2020-12-09 RX ADMIN — HEPARIN 500 UNITS: 100 SYRINGE at 13:11

## 2020-12-09 RX ADMIN — SODIUM CHLORIDE, PRESERVATIVE FREE 10 ML: 5 INJECTION INTRAVENOUS at 13:11

## 2020-12-09 RX ADMIN — SODIUM CHLORIDE 550 MG: 9 INJECTION, SOLUTION INTRAVENOUS at 12:05

## 2020-12-21 ENCOUNTER — TELEPHONE (OUTPATIENT)
Dept: HEMATOLOGY | Age: 65
End: 2020-12-21

## 2020-12-21 NOTE — TELEPHONE ENCOUNTER
I called Vi and asked if she was able to apply for Medicaid. She said she hasnt. She says she has the number and will call today. If she doesn't qualify for Medicaid I requested that she bring me the denial letter so I can get her Durvalumab for free from [employer]. She agreed. I will check back with her next week.

## 2020-12-31 RX ORDER — OXYCODONE AND ACETAMINOPHEN 10; 325 MG/1; MG/1
1 TABLET ORAL EVERY 6 HOURS PRN
Qty: 120 TABLET | Refills: 0 | Status: SHIPPED | OUTPATIENT
Start: 2020-12-31 | End: 2021-02-02 | Stop reason: SDUPTHER

## 2021-01-01 ENCOUNTER — APPOINTMENT (OUTPATIENT)
Dept: MRI IMAGING | Age: 66
DRG: 064 | End: 2021-01-01
Payer: MEDICARE

## 2021-01-01 ENCOUNTER — HOSPITAL ENCOUNTER (INPATIENT)
Age: 66
LOS: 3 days | Discharge: HOME HEALTH | DRG: 193 | End: 2021-12-12
Attending: EMERGENCY MEDICINE | Admitting: INTERNAL MEDICINE
Payer: MEDICARE

## 2021-01-01 ENCOUNTER — CLINICAL DOCUMENTATION (OUTPATIENT)
Dept: HEMATOLOGY | Age: 66
End: 2021-01-01

## 2021-01-01 ENCOUNTER — APPOINTMENT (OUTPATIENT)
Dept: GENERAL RADIOLOGY | Age: 66
DRG: 193 | End: 2021-01-01
Payer: MEDICARE

## 2021-01-01 ENCOUNTER — OFFICE VISIT (OUTPATIENT)
Dept: HEMATOLOGY | Age: 66
End: 2021-01-01
Payer: MEDICARE

## 2021-01-01 ENCOUNTER — CARE COORDINATION (OUTPATIENT)
Dept: CASE MANAGEMENT | Age: 66
End: 2021-01-01

## 2021-01-01 ENCOUNTER — APPOINTMENT (OUTPATIENT)
Dept: CT IMAGING | Age: 66
DRG: 064 | End: 2021-01-01
Payer: MEDICARE

## 2021-01-01 ENCOUNTER — OFFICE VISIT (OUTPATIENT)
Dept: PULMONOLOGY | Age: 66
End: 2021-01-01
Payer: MEDICARE

## 2021-01-01 ENCOUNTER — TELEPHONE (OUTPATIENT)
Dept: HEMATOLOGY | Age: 66
End: 2021-01-01

## 2021-01-01 ENCOUNTER — APPOINTMENT (OUTPATIENT)
Dept: GENERAL RADIOLOGY | Age: 66
DRG: 064 | End: 2021-01-01
Payer: MEDICARE

## 2021-01-01 ENCOUNTER — HOSPITAL ENCOUNTER (INPATIENT)
Age: 66
LOS: 25 days | Discharge: HOME HEALTH | DRG: 064 | End: 2021-12-07
Attending: EMERGENCY MEDICINE | Admitting: INTERNAL MEDICINE
Payer: MEDICARE

## 2021-01-01 ENCOUNTER — HOSPITAL ENCOUNTER (OUTPATIENT)
Dept: CT IMAGING | Age: 66
Discharge: HOME | End: 2021-08-23
Payer: MEDICARE

## 2021-01-01 VITALS
HEART RATE: 93 BPM | SYSTOLIC BLOOD PRESSURE: 102 MMHG | DIASTOLIC BLOOD PRESSURE: 51 MMHG | OXYGEN SATURATION: 90 % | TEMPERATURE: 98.4 F | RESPIRATION RATE: 20 BRPM

## 2021-01-01 VITALS
TEMPERATURE: 97.9 F | DIASTOLIC BLOOD PRESSURE: 68 MMHG | SYSTOLIC BLOOD PRESSURE: 98 MMHG | HEIGHT: 65 IN | OXYGEN SATURATION: 95 % | HEART RATE: 87 BPM | BODY MASS INDEX: 18.13 KG/M2 | WEIGHT: 108.8 LBS

## 2021-01-01 VITALS
SYSTOLIC BLOOD PRESSURE: 98 MMHG | BODY MASS INDEX: 19.16 KG/M2 | OXYGEN SATURATION: 97 % | HEIGHT: 65 IN | WEIGHT: 115 LBS | TEMPERATURE: 97.4 F | DIASTOLIC BLOOD PRESSURE: 60 MMHG | HEART RATE: 66 BPM

## 2021-01-01 VITALS
DIASTOLIC BLOOD PRESSURE: 61 MMHG | OXYGEN SATURATION: 92 % | RESPIRATION RATE: 20 BRPM | TEMPERATURE: 98.7 F | HEIGHT: 65 IN | WEIGHT: 99 LBS | SYSTOLIC BLOOD PRESSURE: 91 MMHG | BODY MASS INDEX: 16.5 KG/M2 | HEART RATE: 80 BPM

## 2021-01-01 DIAGNOSIS — J96.02 ACUTE RESPIRATORY FAILURE WITH HYPOXIA AND HYPERCAPNIA (HCC): ICD-10-CM

## 2021-01-01 DIAGNOSIS — R06.2 WHEEZING: ICD-10-CM

## 2021-01-01 DIAGNOSIS — R41.82 ALTERED MENTAL STATUS, UNSPECIFIED ALTERED MENTAL STATUS TYPE: Primary | ICD-10-CM

## 2021-01-01 DIAGNOSIS — G89.3 CANCER RELATED PAIN: ICD-10-CM

## 2021-01-01 DIAGNOSIS — C34.01 MALIGNANT NEOPLASM OF RIGHT MAIN BRONCHUS (HCC): ICD-10-CM

## 2021-01-01 DIAGNOSIS — J96.01 ACUTE RESPIRATORY FAILURE WITH HYPOXIA AND HYPERCAPNIA (HCC): ICD-10-CM

## 2021-01-01 DIAGNOSIS — G89.3 CANCER RELATED PAIN: Primary | ICD-10-CM

## 2021-01-01 DIAGNOSIS — J18.9 MULTIFOCAL PNEUMONIA: ICD-10-CM

## 2021-01-01 DIAGNOSIS — J44.9 CHRONIC OBSTRUCTIVE PULMONARY DISEASE, UNSPECIFIED COPD TYPE (HCC): ICD-10-CM

## 2021-01-01 DIAGNOSIS — R53.1 ACUTE RIGHT-SIDED WEAKNESS: ICD-10-CM

## 2021-01-01 DIAGNOSIS — J43.2 CENTRILOBULAR EMPHYSEMA (HCC): ICD-10-CM

## 2021-01-01 DIAGNOSIS — J96.22 ACUTE ON CHRONIC RESPIRATORY FAILURE WITH HYPOXIA AND HYPERCAPNIA (HCC): Primary | ICD-10-CM

## 2021-01-01 DIAGNOSIS — B99.9 ENCEPHALOPATHY DUE TO INFECTION: ICD-10-CM

## 2021-01-01 DIAGNOSIS — J43.9 PULMONARY EMPHYSEMA, UNSPECIFIED EMPHYSEMA TYPE (HCC): ICD-10-CM

## 2021-01-01 DIAGNOSIS — J96.11 CHRONIC RESPIRATORY FAILURE WITH HYPOXIA (HCC): Primary | ICD-10-CM

## 2021-01-01 DIAGNOSIS — C34.91 PRIMARY SQUAMOUS CELL CARCINOMA OF RIGHT LUNG (HCC): ICD-10-CM

## 2021-01-01 DIAGNOSIS — R06.09 DOE (DYSPNEA ON EXERTION): ICD-10-CM

## 2021-01-01 DIAGNOSIS — Z71.89 CARE PLAN DISCUSSED WITH PATIENT: ICD-10-CM

## 2021-01-01 DIAGNOSIS — G93.49 ENCEPHALOPATHY DUE TO INFECTION: ICD-10-CM

## 2021-01-01 DIAGNOSIS — J96.21 ACUTE ON CHRONIC RESPIRATORY FAILURE WITH HYPOXIA AND HYPERCAPNIA (HCC): Primary | ICD-10-CM

## 2021-01-01 DIAGNOSIS — C34.01 MALIGNANT NEOPLASM OF RIGHT MAIN BRONCHUS (HCC): Primary | ICD-10-CM

## 2021-01-01 LAB
ADRENOCORTICOTROPIC HORMONE: 59.8 PG/ML (ref 7.2–63.3)
ALBUMIN SERPL-MCNC: 2.2 G/DL (ref 3.5–5.2)
ALBUMIN SERPL-MCNC: 2.3 G/DL (ref 3.5–5.2)
ALBUMIN SERPL-MCNC: 2.4 G/DL (ref 3.5–5.2)
ALBUMIN SERPL-MCNC: 2.4 G/DL (ref 3.5–5.2)
ALBUMIN SERPL-MCNC: 2.5 G/DL (ref 3.5–5.2)
ALBUMIN SERPL-MCNC: 2.6 G/DL (ref 3.5–5.2)
ALBUMIN SERPL-MCNC: 2.7 G/DL (ref 3.5–5.2)
ALBUMIN SERPL-MCNC: 2.8 G/DL (ref 3.5–5.2)
ALBUMIN SERPL-MCNC: 2.8 G/DL (ref 3.5–5.2)
ALBUMIN SERPL-MCNC: 2.9 G/DL (ref 3.5–5.2)
ALBUMIN SERPL-MCNC: 2.9 G/DL (ref 3.5–5.2)
ALBUMIN SERPL-MCNC: 3 G/DL (ref 3.5–5.2)
ALBUMIN SERPL-MCNC: 3.1 G/DL (ref 3.5–5.2)
ALBUMIN SERPL-MCNC: 3.2 G/DL (ref 3.5–5.2)
ALBUMIN SERPL-MCNC: 3.3 G/DL (ref 3.5–5.2)
ALP BLD-CCNC: 109 U/L (ref 35–104)
ALP BLD-CCNC: 120 U/L (ref 35–104)
ALP BLD-CCNC: 54 U/L (ref 35–104)
ALP BLD-CCNC: 56 U/L (ref 35–104)
ALP BLD-CCNC: 57 U/L (ref 35–104)
ALP BLD-CCNC: 58 U/L (ref 35–104)
ALP BLD-CCNC: 59 U/L (ref 35–104)
ALP BLD-CCNC: 60 U/L (ref 35–104)
ALP BLD-CCNC: 61 U/L (ref 35–104)
ALP BLD-CCNC: 63 U/L (ref 35–104)
ALP BLD-CCNC: 65 U/L (ref 35–104)
ALP BLD-CCNC: 66 U/L (ref 35–104)
ALP BLD-CCNC: 68 U/L (ref 35–104)
ALP BLD-CCNC: 69 U/L (ref 35–104)
ALP BLD-CCNC: 69 U/L (ref 35–104)
ALP BLD-CCNC: 70 U/L (ref 35–104)
ALP BLD-CCNC: 72 U/L (ref 35–104)
ALP BLD-CCNC: 72 U/L (ref 35–104)
ALP BLD-CCNC: 76 U/L (ref 35–104)
ALP BLD-CCNC: 77 U/L (ref 35–104)
ALP BLD-CCNC: 79 U/L (ref 35–104)
ALP BLD-CCNC: 80 U/L (ref 35–104)
ALP BLD-CCNC: 81 U/L (ref 35–104)
ALP BLD-CCNC: 81 U/L (ref 35–104)
ALP BLD-CCNC: 94 U/L (ref 35–104)
ALT SERPL-CCNC: 10 U/L (ref 5–33)
ALT SERPL-CCNC: 14 U/L (ref 5–33)
ALT SERPL-CCNC: 15 U/L (ref 5–33)
ALT SERPL-CCNC: 16 U/L (ref 5–33)
ALT SERPL-CCNC: 17 U/L (ref 5–33)
ALT SERPL-CCNC: 17 U/L (ref 5–33)
ALT SERPL-CCNC: 18 U/L (ref 5–33)
ALT SERPL-CCNC: 19 U/L (ref 5–33)
ALT SERPL-CCNC: 19 U/L (ref 5–33)
ALT SERPL-CCNC: 21 U/L (ref 5–33)
ALT SERPL-CCNC: 21 U/L (ref 5–33)
ALT SERPL-CCNC: 22 U/L (ref 5–33)
ALT SERPL-CCNC: 22 U/L (ref 5–33)
ALT SERPL-CCNC: 28 U/L (ref 5–33)
ALT SERPL-CCNC: 6 U/L (ref 5–33)
ALT SERPL-CCNC: 7 U/L (ref 5–33)
ALT SERPL-CCNC: 7 U/L (ref 5–33)
ALT SERPL-CCNC: 8 U/L (ref 5–33)
ALT SERPL-CCNC: 8 U/L (ref 5–33)
ALT SERPL-CCNC: 9 U/L (ref 5–33)
ANION GAP SERPL CALCULATED.3IONS-SCNC: 10 MMOL/L (ref 7–19)
ANION GAP SERPL CALCULATED.3IONS-SCNC: 12 MMOL/L (ref 7–19)
ANION GAP SERPL CALCULATED.3IONS-SCNC: 13 MMOL/L (ref 7–19)
ANION GAP SERPL CALCULATED.3IONS-SCNC: 13 MMOL/L (ref 7–19)
ANION GAP SERPL CALCULATED.3IONS-SCNC: 3 MMOL/L (ref 7–19)
ANION GAP SERPL CALCULATED.3IONS-SCNC: 4 MMOL/L (ref 7–19)
ANION GAP SERPL CALCULATED.3IONS-SCNC: 6 MMOL/L (ref 7–19)
ANION GAP SERPL CALCULATED.3IONS-SCNC: 7 MMOL/L (ref 7–19)
ANION GAP SERPL CALCULATED.3IONS-SCNC: 8 MMOL/L (ref 7–19)
ANION GAP SERPL CALCULATED.3IONS-SCNC: 9 MMOL/L (ref 7–19)
ANISOCYTOSIS: ABNORMAL
AST SERPL-CCNC: 11 U/L (ref 5–32)
AST SERPL-CCNC: 11 U/L (ref 5–32)
AST SERPL-CCNC: 13 U/L (ref 5–32)
AST SERPL-CCNC: 14 U/L (ref 5–32)
AST SERPL-CCNC: 15 U/L (ref 5–32)
AST SERPL-CCNC: 16 U/L (ref 5–32)
AST SERPL-CCNC: 17 U/L (ref 5–32)
AST SERPL-CCNC: 17 U/L (ref 5–32)
AST SERPL-CCNC: 20 U/L (ref 5–32)
AST SERPL-CCNC: 22 U/L (ref 5–32)
AST SERPL-CCNC: 22 U/L (ref 5–32)
AST SERPL-CCNC: 8 U/L (ref 5–32)
ATYPICAL LYMPHOCYTE RELATIVE PERCENT: 1 % (ref 0–8)
ATYPICAL LYMPHOCYTE RELATIVE PERCENT: 3 % (ref 0–8)
BACTERIA: NEGATIVE /HPF
BACTERIA: NEGATIVE /HPF
BANDED NEUTROPHILS RELATIVE PERCENT: 1 % (ref 0–5)
BANDED NEUTROPHILS RELATIVE PERCENT: 18 % (ref 0–5)
BANDED NEUTROPHILS RELATIVE PERCENT: 30 % (ref 0–5)
BANDED NEUTROPHILS RELATIVE PERCENT: 6 % (ref 0–5)
BASE EXCESS ARTERIAL: 15.6 MMOL/L (ref -2–2)
BASE EXCESS ARTERIAL: 16.8 MMOL/L (ref -2–2)
BASE EXCESS ARTERIAL: 19.5 MMOL/L (ref -2–2)
BASE EXCESS ARTERIAL: 8.1 MMOL/L (ref -2–2)
BASE EXCESS ARTERIAL: 9.4 MMOL/L (ref -2–2)
BASOPHILIC STIPPLING: ABNORMAL
BASOPHILIC STIPPLING: ABNORMAL
BASOPHILS ABSOLUTE: 0 K/UL (ref 0–0.2)
BASOPHILS ABSOLUTE: 0.1 K/UL (ref 0–0.2)
BASOPHILS RELATIVE PERCENT: 0 % (ref 0–1)
BASOPHILS RELATIVE PERCENT: 0.1 % (ref 0–1)
BASOPHILS RELATIVE PERCENT: 0.2 % (ref 0–1)
BASOPHILS RELATIVE PERCENT: 0.2 % (ref 0–1)
BASOPHILS RELATIVE PERCENT: 0.3 % (ref 0–1)
BASOPHILS RELATIVE PERCENT: 0.4 % (ref 0–1)
BASOPHILS RELATIVE PERCENT: 0.5 % (ref 0–1)
BASOPHILS RELATIVE PERCENT: 0.6 % (ref 0–1)
BASOPHILS RELATIVE PERCENT: 0.6 % (ref 0–1)
BILIRUB SERPL-MCNC: 0.3 MG/DL (ref 0.2–1.2)
BILIRUB SERPL-MCNC: 0.4 MG/DL (ref 0.2–1.2)
BILIRUB SERPL-MCNC: 0.5 MG/DL (ref 0.2–1.2)
BILIRUB SERPL-MCNC: 0.6 MG/DL (ref 0.2–1.2)
BILIRUB SERPL-MCNC: 0.6 MG/DL (ref 0.2–1.2)
BILIRUB SERPL-MCNC: <0.2 MG/DL (ref 0.2–1.2)
BILIRUBIN URINE: ABNORMAL
BILIRUBIN URINE: NEGATIVE
BLOOD CULTURE, ROUTINE: NORMAL
BLOOD, URINE: ABNORMAL
BLOOD, URINE: NEGATIVE
BUN BLDV-MCNC: 10 MG/DL (ref 8–23)
BUN BLDV-MCNC: 10 MG/DL (ref 8–23)
BUN BLDV-MCNC: 11 MG/DL (ref 8–23)
BUN BLDV-MCNC: 11 MG/DL (ref 8–23)
BUN BLDV-MCNC: 12 MG/DL (ref 8–23)
BUN BLDV-MCNC: 12 MG/DL (ref 8–23)
BUN BLDV-MCNC: 13 MG/DL (ref 8–23)
BUN BLDV-MCNC: 14 MG/DL (ref 8–23)
BUN BLDV-MCNC: 15 MG/DL (ref 8–23)
BUN BLDV-MCNC: 16 MG/DL (ref 8–23)
BUN BLDV-MCNC: 16 MG/DL (ref 8–23)
BUN BLDV-MCNC: 17 MG/DL (ref 8–23)
BUN BLDV-MCNC: 17 MG/DL (ref 8–23)
BUN BLDV-MCNC: 18 MG/DL (ref 8–23)
BUN BLDV-MCNC: 18 MG/DL (ref 8–23)
BUN BLDV-MCNC: 19 MG/DL (ref 8–23)
BUN BLDV-MCNC: 22 MG/DL (ref 8–23)
BUN BLDV-MCNC: 23 MG/DL (ref 8–23)
BUN BLDV-MCNC: 23 MG/DL (ref 8–23)
BUN BLDV-MCNC: 24 MG/DL (ref 8–23)
BUN BLDV-MCNC: 25 MG/DL (ref 8–23)
BUN BLDV-MCNC: 30 MG/DL (ref 8–23)
BUN BLDV-MCNC: 6 MG/DL (ref 8–23)
BUN BLDV-MCNC: 6 MG/DL (ref 8–23)
BUN BLDV-MCNC: 7 MG/DL (ref 8–23)
BUN BLDV-MCNC: 8 MG/DL (ref 8–23)
BUN BLDV-MCNC: 9 MG/DL (ref 8–23)
CALCIUM SERPL-MCNC: 8 MG/DL (ref 8.8–10.2)
CALCIUM SERPL-MCNC: 8.2 MG/DL (ref 8.8–10.2)
CALCIUM SERPL-MCNC: 8.3 MG/DL (ref 8.8–10.2)
CALCIUM SERPL-MCNC: 8.4 MG/DL (ref 8.8–10.2)
CALCIUM SERPL-MCNC: 8.5 MG/DL (ref 8.8–10.2)
CALCIUM SERPL-MCNC: 8.6 MG/DL (ref 8.8–10.2)
CALCIUM SERPL-MCNC: 8.7 MG/DL (ref 8.8–10.2)
CALCIUM SERPL-MCNC: 8.7 MG/DL (ref 8.8–10.2)
CALCIUM SERPL-MCNC: 8.8 MG/DL (ref 8.8–10.2)
CALCIUM SERPL-MCNC: 8.8 MG/DL (ref 8.8–10.2)
CALCIUM SERPL-MCNC: 9 MG/DL (ref 8.8–10.2)
CALCIUM SERPL-MCNC: 9.1 MG/DL (ref 8.8–10.2)
CALCIUM SERPL-MCNC: 9.2 MG/DL (ref 8.8–10.2)
CALCIUM SERPL-MCNC: 9.6 MG/DL (ref 8.8–10.2)
CARBOXYHEMOGLOBIN ARTERIAL: 2 % (ref 0–5)
CARBOXYHEMOGLOBIN ARTERIAL: 2.1 % (ref 0–5)
CARBOXYHEMOGLOBIN ARTERIAL: 2.1 % (ref 0–5)
CARBOXYHEMOGLOBIN ARTERIAL: 2.6 % (ref 0–5)
CARBOXYHEMOGLOBIN ARTERIAL: 4.8 % (ref 0–5)
CHLORIDE BLD-SCNC: 100 MMOL/L (ref 98–111)
CHLORIDE BLD-SCNC: 101 MMOL/L (ref 98–111)
CHLORIDE BLD-SCNC: 102 MMOL/L (ref 98–111)
CHLORIDE BLD-SCNC: 103 MMOL/L (ref 98–111)
CHLORIDE BLD-SCNC: 103 MMOL/L (ref 98–111)
CHLORIDE BLD-SCNC: 104 MMOL/L (ref 98–111)
CHLORIDE BLD-SCNC: 104 MMOL/L (ref 98–111)
CHLORIDE BLD-SCNC: 105 MMOL/L (ref 98–111)
CHLORIDE BLD-SCNC: 98 MMOL/L (ref 98–111)
CHLORIDE BLD-SCNC: 99 MMOL/L (ref 98–111)
CHOLESTEROL, TOTAL: 114 MG/DL (ref 160–199)
CLARITY: CLEAR
CLARITY: CLEAR
CO2: 28 MMOL/L (ref 22–29)
CO2: 29 MMOL/L (ref 22–29)
CO2: 30 MMOL/L (ref 22–29)
CO2: 30 MMOL/L (ref 22–29)
CO2: 31 MMOL/L (ref 22–29)
CO2: 31 MMOL/L (ref 22–29)
CO2: 32 MMOL/L (ref 22–29)
CO2: 33 MMOL/L (ref 22–29)
CO2: 34 MMOL/L (ref 22–29)
CO2: 35 MMOL/L (ref 22–29)
CO2: 36 MMOL/L (ref 22–29)
CO2: 38 MMOL/L (ref 22–29)
CO2: 38 MMOL/L (ref 22–29)
CO2: 39 MMOL/L (ref 22–29)
CO2: 40 MMOL/L (ref 22–29)
COLOR: ABNORMAL
COLOR: YELLOW
CORTISOL TOTAL: 15.5 UG/DL
CREAT SERPL-MCNC: 0.2 MG/DL (ref 0.5–0.9)
CREAT SERPL-MCNC: 0.4 MG/DL (ref 0.5–0.9)
CREAT SERPL-MCNC: 0.5 MG/DL (ref 0.5–0.9)
CRYSTALS, UA: ABNORMAL /HPF
CRYSTALS, UA: ABNORMAL /HPF
CULTURE, BLOOD 2: NORMAL
CULTURE, RESPIRATORY: ABNORMAL
EKG P AXIS: 84 DEGREES
EKG P AXIS: NORMAL DEGREES
EKG P-R INTERVAL: 148 MS
EKG P-R INTERVAL: NORMAL MS
EKG Q-T INTERVAL: 270 MS
EKG Q-T INTERVAL: 366 MS
EKG QRS DURATION: 98 MS
EKG QRS DURATION: 98 MS
EKG QTC CALCULATION (BAZETT): 378 MS
EKG QTC CALCULATION (BAZETT): 444 MS
EKG T AXIS: 29 DEGREES
EKG T AXIS: 65 DEGREES
EOSINOPHILS ABSOLUTE: 0 K/UL (ref 0–0.6)
EOSINOPHILS ABSOLUTE: 0.1 K/UL (ref 0–0.6)
EOSINOPHILS ABSOLUTE: 0.2 K/UL (ref 0–0.6)
EOSINOPHILS RELATIVE PERCENT: 0 % (ref 0–5)
EOSINOPHILS RELATIVE PERCENT: 0.1 % (ref 0–5)
EOSINOPHILS RELATIVE PERCENT: 0.2 % (ref 0–5)
EOSINOPHILS RELATIVE PERCENT: 0.5 % (ref 0–5)
EOSINOPHILS RELATIVE PERCENT: 0.7 % (ref 0–5)
EOSINOPHILS RELATIVE PERCENT: 0.8 % (ref 0–5)
EOSINOPHILS RELATIVE PERCENT: 0.9 % (ref 0–5)
EOSINOPHILS RELATIVE PERCENT: 1 % (ref 0–5)
EOSINOPHILS RELATIVE PERCENT: 1.3 % (ref 0–5)
EOSINOPHILS RELATIVE PERCENT: 1.5 % (ref 0–5)
EOSINOPHILS RELATIVE PERCENT: 1.6 % (ref 0–5)
EOSINOPHILS RELATIVE PERCENT: 1.7 % (ref 0–5)
EOSINOPHILS RELATIVE PERCENT: 1.7 % (ref 0–5)
EOSINOPHILS RELATIVE PERCENT: 1.9 % (ref 0–5)
EOSINOPHILS RELATIVE PERCENT: 1.9 % (ref 0–5)
EOSINOPHILS RELATIVE PERCENT: 2 % (ref 0–5)
EOSINOPHILS RELATIVE PERCENT: 2.1 % (ref 0–5)
EOSINOPHILS RELATIVE PERCENT: 2.4 % (ref 0–5)
EPITHELIAL CELLS, UA: 0 /HPF (ref 0–5)
EPITHELIAL CELLS, UA: 2 /HPF (ref 0–5)
FERRITIN: 231 NG/ML (ref 13–150)
FOLATE: 3.5 NG/ML (ref 4.8–37.3)
GFR AFRICAN AMERICAN: >59
GFR NON-AFRICAN AMERICAN: >60
GLUCOSE BLD-MCNC: 105 MG/DL (ref 74–109)
GLUCOSE BLD-MCNC: 107 MG/DL (ref 74–109)
GLUCOSE BLD-MCNC: 109 MG/DL (ref 74–109)
GLUCOSE BLD-MCNC: 110 MG/DL (ref 74–109)
GLUCOSE BLD-MCNC: 111 MG/DL (ref 74–109)
GLUCOSE BLD-MCNC: 112 MG/DL (ref 74–109)
GLUCOSE BLD-MCNC: 113 MG/DL (ref 74–109)
GLUCOSE BLD-MCNC: 116 MG/DL (ref 74–109)
GLUCOSE BLD-MCNC: 116 MG/DL (ref 74–109)
GLUCOSE BLD-MCNC: 125 MG/DL (ref 74–109)
GLUCOSE BLD-MCNC: 129 MG/DL (ref 74–109)
GLUCOSE BLD-MCNC: 132 MG/DL (ref 74–109)
GLUCOSE BLD-MCNC: 133 MG/DL (ref 74–109)
GLUCOSE BLD-MCNC: 133 MG/DL (ref 74–109)
GLUCOSE BLD-MCNC: 137 MG/DL (ref 74–109)
GLUCOSE BLD-MCNC: 138 MG/DL (ref 74–109)
GLUCOSE BLD-MCNC: 83 MG/DL (ref 70–99)
GLUCOSE BLD-MCNC: 86 MG/DL (ref 74–109)
GLUCOSE BLD-MCNC: 87 MG/DL (ref 74–109)
GLUCOSE BLD-MCNC: 88 MG/DL (ref 74–109)
GLUCOSE BLD-MCNC: 93 MG/DL (ref 74–109)
GLUCOSE BLD-MCNC: 94 MG/DL (ref 74–109)
GLUCOSE BLD-MCNC: 96 MG/DL (ref 74–109)
GLUCOSE BLD-MCNC: 97 MG/DL (ref 74–109)
GLUCOSE BLD-MCNC: 97 MG/DL (ref 74–109)
GLUCOSE BLD-MCNC: 98 MG/DL (ref 74–109)
GLUCOSE URINE: NEGATIVE MG/DL
GLUCOSE URINE: NEGATIVE MG/DL
GRAM STAIN RESULT: ABNORMAL
HAPTOGLOBIN: 329 MG/DL (ref 30–200)
HBA1C MFR BLD: 5.8 % (ref 4–6)
HCO3 ARTERIAL: 36.4 MMOL/L (ref 22–26)
HCO3 ARTERIAL: 37.4 MMOL/L (ref 22–26)
HCO3 ARTERIAL: 42.2 MMOL/L (ref 22–26)
HCO3 ARTERIAL: 42.4 MMOL/L (ref 22–26)
HCO3 ARTERIAL: 48 MMOL/L (ref 22–26)
HCT VFR BLD CALC: 27.8 % (ref 37–47)
HCT VFR BLD CALC: 28.4 % (ref 37–47)
HCT VFR BLD CALC: 28.5 % (ref 37–47)
HCT VFR BLD CALC: 28.9 % (ref 37–47)
HCT VFR BLD CALC: 28.9 % (ref 37–47)
HCT VFR BLD CALC: 29.3 % (ref 37–47)
HCT VFR BLD CALC: 29.6 % (ref 37–47)
HCT VFR BLD CALC: 29.9 % (ref 37–47)
HCT VFR BLD CALC: 30.2 % (ref 37–47)
HCT VFR BLD CALC: 30.8 % (ref 37–47)
HCT VFR BLD CALC: 31 % (ref 37–47)
HCT VFR BLD CALC: 31 % (ref 37–47)
HCT VFR BLD CALC: 31.1 % (ref 37–47)
HCT VFR BLD CALC: 31.2 % (ref 37–47)
HCT VFR BLD CALC: 31.4 % (ref 37–47)
HCT VFR BLD CALC: 32.1 % (ref 37–47)
HCT VFR BLD CALC: 32.4 % (ref 37–47)
HCT VFR BLD CALC: 33.4 % (ref 37–47)
HCT VFR BLD CALC: 33.7 % (ref 37–47)
HCT VFR BLD CALC: 35.2 % (ref 37–47)
HCT VFR BLD CALC: 35.7 % (ref 37–47)
HCT VFR BLD CALC: 36.3 % (ref 37–47)
HCT VFR BLD CALC: 36.6 % (ref 37–47)
HCT VFR BLD CALC: 40.5 % (ref 37–47)
HCT VFR BLD CALC: 42.3 % (ref 37–47)
HCT VFR BLD CALC: 46 % (ref 37–47)
HDLC SERPL-MCNC: 58 MG/DL (ref 65–121)
HEMOGLOBIN, ART, EXTENDED: 10.5 G/DL (ref 12–16)
HEMOGLOBIN, ART, EXTENDED: 12.4 G/DL (ref 12–16)
HEMOGLOBIN, ART, EXTENDED: 9 G/DL (ref 12–16)
HEMOGLOBIN, ART, EXTENDED: 9.1 G/DL (ref 12–16)
HEMOGLOBIN, ART, EXTENDED: 9.6 G/DL (ref 12–16)
HEMOGLOBIN: 10.2 G/DL (ref 12–16)
HEMOGLOBIN: 10.2 G/DL (ref 12–16)
HEMOGLOBIN: 10.3 G/DL (ref 12–16)
HEMOGLOBIN: 10.6 G/DL (ref 12–16)
HEMOGLOBIN: 11.6 G/DL (ref 12–16)
HEMOGLOBIN: 12.4 G/DL (ref 12–16)
HEMOGLOBIN: 13.3 G/DL (ref 12–16)
HEMOGLOBIN: 8 G/DL (ref 12–16)
HEMOGLOBIN: 8.2 G/DL (ref 12–16)
HEMOGLOBIN: 8.4 G/DL (ref 12–16)
HEMOGLOBIN: 8.5 G/DL (ref 12–16)
HEMOGLOBIN: 8.6 G/DL (ref 12–16)
HEMOGLOBIN: 8.7 G/DL (ref 12–16)
HEMOGLOBIN: 8.7 G/DL (ref 12–16)
HEMOGLOBIN: 8.8 G/DL (ref 12–16)
HEMOGLOBIN: 8.9 G/DL (ref 12–16)
HEMOGLOBIN: 9 G/DL (ref 12–16)
HEMOGLOBIN: 9.2 G/DL (ref 12–16)
HEMOGLOBIN: 9.2 G/DL (ref 12–16)
HEMOGLOBIN: 9.3 G/DL (ref 12–16)
HEMOGLOBIN: 9.3 G/DL (ref 12–16)
HEMOGLOBIN: 9.5 G/DL (ref 12–16)
HEMOGLOBIN: 9.7 G/DL (ref 12–16)
HYALINE CASTS: 2 /HPF (ref 0–8)
HYALINE CASTS: 6 /HPF (ref 0–8)
HYPOCHROMIA: ABNORMAL
IMMATURE GRANULOCYTES #: 0 K/UL
IMMATURE GRANULOCYTES #: 0.1 K/UL
INR BLD: 1.13 (ref 0.88–1.18)
INR BLD: 1.18 (ref 0.88–1.18)
IRON SATURATION: 15 % (ref 14–50)
IRON: 32 UG/DL (ref 37–145)
KETONES, URINE: =>160 MG/DL
KETONES, URINE: NEGATIVE MG/DL
LACTIC ACID: 1.6 MMOL/L (ref 0.5–1.9)
LDL CHOLESTEROL CALCULATED: 45 MG/DL
LEUKOCYTE ESTERASE, URINE: ABNORMAL
LEUKOCYTE ESTERASE, URINE: NEGATIVE
LV EF: 58 %
LVEF MODALITY: NORMAL
LYMPHOCYTES ABSOLUTE: 0.4 K/UL (ref 1.1–4.5)
LYMPHOCYTES ABSOLUTE: 0.4 K/UL (ref 1.1–4.5)
LYMPHOCYTES ABSOLUTE: 0.5 K/UL (ref 1.1–4.5)
LYMPHOCYTES ABSOLUTE: 0.6 K/UL (ref 1.1–4.5)
LYMPHOCYTES ABSOLUTE: 0.7 K/UL (ref 1.1–4.5)
LYMPHOCYTES ABSOLUTE: 0.8 K/UL (ref 1.1–4.5)
LYMPHOCYTES ABSOLUTE: 0.9 K/UL (ref 1.1–4.5)
LYMPHOCYTES ABSOLUTE: 1 K/UL (ref 1.1–4.5)
LYMPHOCYTES ABSOLUTE: 1.1 K/UL (ref 1.1–4.5)
LYMPHOCYTES ABSOLUTE: 1.2 K/UL (ref 1.1–4.5)
LYMPHOCYTES ABSOLUTE: 1.2 K/UL (ref 1.1–4.5)
LYMPHOCYTES ABSOLUTE: 1.3 K/UL (ref 1.1–4.5)
LYMPHOCYTES ABSOLUTE: 1.3 K/UL (ref 1.1–4.5)
LYMPHOCYTES RELATIVE PERCENT: 10 % (ref 20–40)
LYMPHOCYTES RELATIVE PERCENT: 11.5 % (ref 20–40)
LYMPHOCYTES RELATIVE PERCENT: 12.8 % (ref 20–40)
LYMPHOCYTES RELATIVE PERCENT: 14.8 % (ref 20–40)
LYMPHOCYTES RELATIVE PERCENT: 15.8 % (ref 20–40)
LYMPHOCYTES RELATIVE PERCENT: 16 % (ref 20–40)
LYMPHOCYTES RELATIVE PERCENT: 16.5 % (ref 20–40)
LYMPHOCYTES RELATIVE PERCENT: 18 % (ref 20–40)
LYMPHOCYTES RELATIVE PERCENT: 2.2 % (ref 20–40)
LYMPHOCYTES RELATIVE PERCENT: 3 % (ref 20–40)
LYMPHOCYTES RELATIVE PERCENT: 3.2 % (ref 20–40)
LYMPHOCYTES RELATIVE PERCENT: 4 % (ref 20–40)
LYMPHOCYTES RELATIVE PERCENT: 5 % (ref 20–40)
LYMPHOCYTES RELATIVE PERCENT: 5.4 % (ref 20–40)
LYMPHOCYTES RELATIVE PERCENT: 5.7 % (ref 20–40)
LYMPHOCYTES RELATIVE PERCENT: 6 % (ref 20–40)
LYMPHOCYTES RELATIVE PERCENT: 6.3 % (ref 20–40)
LYMPHOCYTES RELATIVE PERCENT: 6.5 % (ref 20–40)
LYMPHOCYTES RELATIVE PERCENT: 6.6 % (ref 20–40)
LYMPHOCYTES RELATIVE PERCENT: 6.8 % (ref 20–40)
LYMPHOCYTES RELATIVE PERCENT: 7.1 % (ref 20–40)
LYMPHOCYTES RELATIVE PERCENT: 7.6 % (ref 20–40)
LYMPHOCYTES RELATIVE PERCENT: 7.8 % (ref 20–40)
LYMPHOCYTES RELATIVE PERCENT: 7.9 % (ref 20–40)
LYMPHOCYTES RELATIVE PERCENT: 8.5 % (ref 20–40)
LYMPHOCYTES RELATIVE PERCENT: 9.4 % (ref 20–40)
MACROCYTES: ABNORMAL
MAGNESIUM: 1.8 MG/DL (ref 1.6–2.4)
MAGNESIUM: 1.8 MG/DL (ref 1.6–2.4)
MAGNESIUM: 1.9 MG/DL (ref 1.6–2.4)
MAGNESIUM: 2 MG/DL (ref 1.6–2.4)
MAGNESIUM: 2.1 MG/DL (ref 1.6–2.4)
MAGNESIUM: 2.2 MG/DL (ref 1.6–2.4)
MAGNESIUM: 2.2 MG/DL (ref 1.6–2.4)
MAGNESIUM: 2.4 MG/DL (ref 1.6–2.4)
MCH RBC QN AUTO: 29.6 PG (ref 27–31)
MCH RBC QN AUTO: 29.7 PG (ref 27–31)
MCH RBC QN AUTO: 29.7 PG (ref 27–31)
MCH RBC QN AUTO: 29.8 PG (ref 27–31)
MCH RBC QN AUTO: 30 PG (ref 27–31)
MCH RBC QN AUTO: 30.1 PG (ref 27–31)
MCH RBC QN AUTO: 30.2 PG (ref 27–31)
MCH RBC QN AUTO: 30.2 PG (ref 27–31)
MCH RBC QN AUTO: 30.3 PG (ref 27–31)
MCH RBC QN AUTO: 30.3 PG (ref 27–31)
MCH RBC QN AUTO: 30.4 PG (ref 27–31)
MCH RBC QN AUTO: 30.4 PG (ref 27–31)
MCH RBC QN AUTO: 30.6 PG (ref 27–31)
MCH RBC QN AUTO: 30.7 PG (ref 27–31)
MCH RBC QN AUTO: 30.7 PG (ref 27–31)
MCH RBC QN AUTO: 30.9 PG (ref 27–31)
MCH RBC QN AUTO: 31.2 PG (ref 27–31)
MCHC RBC AUTO-ENTMCNC: 27.6 G/DL (ref 33–37)
MCHC RBC AUTO-ENTMCNC: 28.1 G/DL (ref 33–37)
MCHC RBC AUTO-ENTMCNC: 28.2 G/DL (ref 33–37)
MCHC RBC AUTO-ENTMCNC: 28.4 G/DL (ref 33–37)
MCHC RBC AUTO-ENTMCNC: 28.6 G/DL (ref 33–37)
MCHC RBC AUTO-ENTMCNC: 28.7 G/DL (ref 33–37)
MCHC RBC AUTO-ENTMCNC: 28.9 G/DL (ref 33–37)
MCHC RBC AUTO-ENTMCNC: 29 G/DL (ref 33–37)
MCHC RBC AUTO-ENTMCNC: 29.3 G/DL (ref 33–37)
MCHC RBC AUTO-ENTMCNC: 29.3 G/DL (ref 33–37)
MCHC RBC AUTO-ENTMCNC: 29.4 G/DL (ref 33–37)
MCHC RBC AUTO-ENTMCNC: 29.5 G/DL (ref 33–37)
MCHC RBC AUTO-ENTMCNC: 29.5 G/DL (ref 33–37)
MCHC RBC AUTO-ENTMCNC: 29.6 G/DL (ref 33–37)
MCHC RBC AUTO-ENTMCNC: 29.7 G/DL (ref 33–37)
MCHC RBC AUTO-ENTMCNC: 29.8 G/DL (ref 33–37)
MCHC RBC AUTO-ENTMCNC: 29.8 G/DL (ref 33–37)
MCHC RBC AUTO-ENTMCNC: 29.9 G/DL (ref 33–37)
MCHC RBC AUTO-ENTMCNC: 30 G/DL (ref 33–37)
MCV RBC AUTO: 100.3 FL (ref 81–99)
MCV RBC AUTO: 101 FL (ref 81–99)
MCV RBC AUTO: 101.3 FL (ref 81–99)
MCV RBC AUTO: 101.6 FL (ref 81–99)
MCV RBC AUTO: 102 FL (ref 81–99)
MCV RBC AUTO: 102.1 FL (ref 81–99)
MCV RBC AUTO: 102.4 FL (ref 81–99)
MCV RBC AUTO: 102.4 FL (ref 81–99)
MCV RBC AUTO: 102.5 FL (ref 81–99)
MCV RBC AUTO: 102.7 FL (ref 81–99)
MCV RBC AUTO: 102.8 FL (ref 81–99)
MCV RBC AUTO: 103 FL (ref 81–99)
MCV RBC AUTO: 103.2 FL (ref 81–99)
MCV RBC AUTO: 103.3 FL (ref 81–99)
MCV RBC AUTO: 104 FL (ref 81–99)
MCV RBC AUTO: 104.1 FL (ref 81–99)
MCV RBC AUTO: 104.6 FL (ref 81–99)
MCV RBC AUTO: 104.8 FL (ref 81–99)
MCV RBC AUTO: 104.9 FL (ref 81–99)
MCV RBC AUTO: 105.2 FL (ref 81–99)
MCV RBC AUTO: 105.7 FL (ref 81–99)
MCV RBC AUTO: 105.9 FL (ref 81–99)
MCV RBC AUTO: 106.1 FL (ref 81–99)
MCV RBC AUTO: 106.1 FL (ref 81–99)
MCV RBC AUTO: 106.2 FL (ref 81–99)
MCV RBC AUTO: 106.4 FL (ref 81–99)
MCV RBC AUTO: 107.7 FL (ref 81–99)
MCV RBC AUTO: 108 FL (ref 81–99)
MCV RBC AUTO: 108.7 FL (ref 81–99)
METAMYELOCYTES RELATIVE PERCENT: 5 %
METHEMOGLOBIN ARTERIAL: 0.7 %
METHEMOGLOBIN ARTERIAL: 0.9 %
METHEMOGLOBIN ARTERIAL: 1 %
METHEMOGLOBIN ARTERIAL: 1.3 %
METHEMOGLOBIN ARTERIAL: 1.4 %
MONOCYTES ABSOLUTE: 0.1 K/UL (ref 0–0.9)
MONOCYTES ABSOLUTE: 0.1 K/UL (ref 0–0.9)
MONOCYTES ABSOLUTE: 0.3 K/UL (ref 0–0.9)
MONOCYTES ABSOLUTE: 0.4 K/UL (ref 0–0.9)
MONOCYTES ABSOLUTE: 0.4 K/UL (ref 0–0.9)
MONOCYTES ABSOLUTE: 0.5 K/UL (ref 0–0.9)
MONOCYTES ABSOLUTE: 0.6 K/UL (ref 0–0.9)
MONOCYTES ABSOLUTE: 0.7 K/UL (ref 0–0.9)
MONOCYTES ABSOLUTE: 0.8 K/UL (ref 0–0.9)
MONOCYTES ABSOLUTE: 0.9 K/UL (ref 0–0.9)
MONOCYTES RELATIVE PERCENT: 1 % (ref 0–10)
MONOCYTES RELATIVE PERCENT: 1 % (ref 0–10)
MONOCYTES RELATIVE PERCENT: 2 % (ref 0–10)
MONOCYTES RELATIVE PERCENT: 2 % (ref 0–10)
MONOCYTES RELATIVE PERCENT: 2.7 % (ref 0–10)
MONOCYTES RELATIVE PERCENT: 3.1 % (ref 0–10)
MONOCYTES RELATIVE PERCENT: 4 % (ref 0–10)
MONOCYTES RELATIVE PERCENT: 4.9 % (ref 0–10)
MONOCYTES RELATIVE PERCENT: 5.1 % (ref 0–10)
MONOCYTES RELATIVE PERCENT: 5.1 % (ref 0–10)
MONOCYTES RELATIVE PERCENT: 5.5 % (ref 0–10)
MONOCYTES RELATIVE PERCENT: 5.6 % (ref 0–10)
MONOCYTES RELATIVE PERCENT: 5.7 % (ref 0–10)
MONOCYTES RELATIVE PERCENT: 5.8 % (ref 0–10)
MONOCYTES RELATIVE PERCENT: 6.2 % (ref 0–10)
MONOCYTES RELATIVE PERCENT: 6.3 % (ref 0–10)
MONOCYTES RELATIVE PERCENT: 6.4 % (ref 0–10)
MONOCYTES RELATIVE PERCENT: 6.6 % (ref 0–10)
MONOCYTES RELATIVE PERCENT: 6.8 % (ref 0–10)
MONOCYTES RELATIVE PERCENT: 7.4 % (ref 0–10)
MONOCYTES RELATIVE PERCENT: 7.6 % (ref 0–10)
MONOCYTES RELATIVE PERCENT: 7.6 % (ref 0–10)
MONOCYTES RELATIVE PERCENT: 7.7 % (ref 0–10)
MONOCYTES RELATIVE PERCENT: 7.7 % (ref 0–10)
MONOCYTES RELATIVE PERCENT: 7.8 % (ref 0–10)
MONOCYTES RELATIVE PERCENT: 7.9 % (ref 0–10)
MRSA SCREEN RT-PCR: NOT DETECTED
MYELOCYTE PERCENT: 3 %
NEUTROPHILS ABSOLUTE: 10 K/UL (ref 1.5–7.5)
NEUTROPHILS ABSOLUTE: 10.4 K/UL (ref 1.5–7.5)
NEUTROPHILS ABSOLUTE: 11.2 K/UL (ref 1.5–7.5)
NEUTROPHILS ABSOLUTE: 11.4 K/UL (ref 1.5–7.5)
NEUTROPHILS ABSOLUTE: 11.5 K/UL (ref 1.5–7.5)
NEUTROPHILS ABSOLUTE: 12.8 K/UL (ref 1.5–7.5)
NEUTROPHILS ABSOLUTE: 14.9 K/UL (ref 1.5–7.5)
NEUTROPHILS ABSOLUTE: 16.1 K/UL (ref 1.5–7.5)
NEUTROPHILS ABSOLUTE: 4.6 K/UL (ref 1.5–7.5)
NEUTROPHILS ABSOLUTE: 4.8 K/UL (ref 1.5–7.5)
NEUTROPHILS ABSOLUTE: 4.8 K/UL (ref 1.5–7.5)
NEUTROPHILS ABSOLUTE: 5 K/UL (ref 1.5–7.5)
NEUTROPHILS ABSOLUTE: 5.9 K/UL (ref 1.5–7.5)
NEUTROPHILS ABSOLUTE: 6.2 K/UL (ref 1.5–7.5)
NEUTROPHILS ABSOLUTE: 6.4 K/UL (ref 1.5–7.5)
NEUTROPHILS ABSOLUTE: 6.5 K/UL (ref 1.5–7.5)
NEUTROPHILS ABSOLUTE: 7 K/UL (ref 1.5–7.5)
NEUTROPHILS ABSOLUTE: 7.1 K/UL (ref 1.5–7.5)
NEUTROPHILS ABSOLUTE: 7.6 K/UL (ref 1.5–7.5)
NEUTROPHILS ABSOLUTE: 7.7 K/UL (ref 1.5–7.5)
NEUTROPHILS ABSOLUTE: 7.8 K/UL (ref 1.5–7.5)
NEUTROPHILS ABSOLUTE: 7.9 K/UL (ref 1.5–7.5)
NEUTROPHILS ABSOLUTE: 8.2 K/UL (ref 1.5–7.5)
NEUTROPHILS ABSOLUTE: 8.2 K/UL (ref 1.5–7.5)
NEUTROPHILS ABSOLUTE: 8.4 K/UL (ref 1.5–7.5)
NEUTROPHILS ABSOLUTE: 9.5 K/UL (ref 1.5–7.5)
NEUTROPHILS ABSOLUTE: 9.8 K/UL (ref 1.5–7.5)
NEUTROPHILS ABSOLUTE: 9.8 K/UL (ref 1.5–7.5)
NEUTROPHILS RELATIVE PERCENT: 54 % (ref 50–65)
NEUTROPHILS RELATIVE PERCENT: 71.8 % (ref 50–65)
NEUTROPHILS RELATIVE PERCENT: 73.1 % (ref 50–65)
NEUTROPHILS RELATIVE PERCENT: 74.2 % (ref 50–65)
NEUTROPHILS RELATIVE PERCENT: 74.7 % (ref 50–65)
NEUTROPHILS RELATIVE PERCENT: 76.3 % (ref 50–65)
NEUTROPHILS RELATIVE PERCENT: 77 % (ref 50–65)
NEUTROPHILS RELATIVE PERCENT: 79.3 % (ref 50–65)
NEUTROPHILS RELATIVE PERCENT: 79.7 % (ref 50–65)
NEUTROPHILS RELATIVE PERCENT: 81.6 % (ref 50–65)
NEUTROPHILS RELATIVE PERCENT: 82.2 % (ref 50–65)
NEUTROPHILS RELATIVE PERCENT: 82.5 % (ref 50–65)
NEUTROPHILS RELATIVE PERCENT: 82.7 % (ref 50–65)
NEUTROPHILS RELATIVE PERCENT: 84.2 % (ref 50–65)
NEUTROPHILS RELATIVE PERCENT: 84.3 % (ref 50–65)
NEUTROPHILS RELATIVE PERCENT: 85.2 % (ref 50–65)
NEUTROPHILS RELATIVE PERCENT: 85.5 % (ref 50–65)
NEUTROPHILS RELATIVE PERCENT: 85.5 % (ref 50–65)
NEUTROPHILS RELATIVE PERCENT: 85.6 % (ref 50–65)
NEUTROPHILS RELATIVE PERCENT: 85.9 % (ref 50–65)
NEUTROPHILS RELATIVE PERCENT: 86.5 % (ref 50–65)
NEUTROPHILS RELATIVE PERCENT: 86.7 % (ref 50–65)
NEUTROPHILS RELATIVE PERCENT: 87 % (ref 50–65)
NEUTROPHILS RELATIVE PERCENT: 88 % (ref 50–65)
NEUTROPHILS RELATIVE PERCENT: 88.6 % (ref 50–65)
NEUTROPHILS RELATIVE PERCENT: 91 % (ref 50–65)
NEUTROPHILS RELATIVE PERCENT: 93.5 % (ref 50–65)
NEUTROPHILS RELATIVE PERCENT: 95.2 % (ref 50–65)
NITRITE, URINE: NEGATIVE
NITRITE, URINE: NEGATIVE
O2 CONTENT ARTERIAL: 11.8 ML/DL
O2 CONTENT ARTERIAL: 11.9 ML/DL
O2 CONTENT ARTERIAL: 13 ML/DL
O2 CONTENT ARTERIAL: 13.8 ML/DL
O2 CONTENT ARTERIAL: 15.8 ML/DL
O2 SAT, ARTERIAL: 90.3 %
O2 SAT, ARTERIAL: 92.4 %
O2 SAT, ARTERIAL: 92.8 %
O2 SAT, ARTERIAL: 93.1 %
O2 SAT, ARTERIAL: 95.5 %
O2 THERAPY: ABNORMAL
OCCULT BLOOD QC: ABNORMAL
OCCULT BLOOD SCREENING: ABNORMAL
ORGANISM: ABNORMAL
PCO2 ARTERIAL: 57 MMHG (ref 35–45)
PCO2 ARTERIAL: 65 MMHG (ref 35–45)
PCO2 ARTERIAL: 69 MMHG (ref 35–45)
PCO2 ARTERIAL: 71 MMHG (ref 35–45)
PCO2 ARTERIAL: 83 MMHG (ref 35–45)
PDW BLD-RTO: 16.2 % (ref 11.5–14.5)
PDW BLD-RTO: 16.3 % (ref 11.5–14.5)
PDW BLD-RTO: 16.4 % (ref 11.5–14.5)
PDW BLD-RTO: 16.5 % (ref 11.5–14.5)
PDW BLD-RTO: 16.6 % (ref 11.5–14.5)
PDW BLD-RTO: 16.7 % (ref 11.5–14.5)
PDW BLD-RTO: 16.8 % (ref 11.5–14.5)
PDW BLD-RTO: 16.8 % (ref 11.5–14.5)
PDW BLD-RTO: 16.9 % (ref 11.5–14.5)
PDW BLD-RTO: 16.9 % (ref 11.5–14.5)
PDW BLD-RTO: 17 % (ref 11.5–14.5)
PDW BLD-RTO: 17 % (ref 11.5–14.5)
PDW BLD-RTO: 17.1 % (ref 11.5–14.5)
PDW BLD-RTO: 17.1 % (ref 11.5–14.5)
PDW BLD-RTO: 17.2 % (ref 11.5–14.5)
PDW BLD-RTO: 17.2 % (ref 11.5–14.5)
PDW BLD-RTO: 17.3 % (ref 11.5–14.5)
PDW BLD-RTO: 17.4 % (ref 11.5–14.5)
PDW BLD-RTO: 17.5 % (ref 11.5–14.5)
PDW BLD-RTO: 17.5 % (ref 11.5–14.5)
PDW BLD-RTO: 17.7 % (ref 11.5–14.5)
PDW BLD-RTO: 17.8 % (ref 11.5–14.5)
PDW BLD-RTO: 17.9 % (ref 11.5–14.5)
PDW BLD-RTO: 17.9 % (ref 11.5–14.5)
PERFORMED ON: NORMAL
PH ARTERIAL: 7.33 (ref 7.35–7.45)
PH ARTERIAL: 7.33 (ref 7.35–7.45)
PH ARTERIAL: 7.37 (ref 7.35–7.45)
PH ARTERIAL: 7.42 (ref 7.35–7.45)
PH ARTERIAL: 7.48 (ref 7.35–7.45)
PH UA: 5.5 (ref 5–8)
PH UA: 6 (ref 5–8)
PHOSPHORUS: 2.2 MG/DL (ref 2.5–4.5)
PHOSPHORUS: 2.4 MG/DL (ref 2.5–4.5)
PLATELET # BLD: 130 K/UL (ref 130–400)
PLATELET # BLD: 137 K/UL (ref 130–400)
PLATELET # BLD: 149 K/UL (ref 130–400)
PLATELET # BLD: 152 K/UL (ref 130–400)
PLATELET # BLD: 155 K/UL (ref 130–400)
PLATELET # BLD: 156 K/UL (ref 130–400)
PLATELET # BLD: 167 K/UL (ref 130–400)
PLATELET # BLD: 168 K/UL (ref 130–400)
PLATELET # BLD: 168 K/UL (ref 130–400)
PLATELET # BLD: 173 K/UL (ref 130–400)
PLATELET # BLD: 174 K/UL (ref 130–400)
PLATELET # BLD: 183 K/UL (ref 130–400)
PLATELET # BLD: 186 K/UL (ref 130–400)
PLATELET # BLD: 194 K/UL (ref 130–400)
PLATELET # BLD: 197 K/UL (ref 130–400)
PLATELET # BLD: 217 K/UL (ref 130–400)
PLATELET # BLD: 234 K/UL (ref 130–400)
PLATELET # BLD: 235 K/UL (ref 130–400)
PLATELET # BLD: 238 K/UL (ref 130–400)
PLATELET # BLD: 247 K/UL (ref 130–400)
PLATELET # BLD: 264 K/UL (ref 130–400)
PLATELET # BLD: 275 K/UL (ref 130–400)
PLATELET # BLD: 280 K/UL (ref 130–400)
PLATELET # BLD: 283 K/UL (ref 130–400)
PLATELET # BLD: 293 K/UL (ref 130–400)
PLATELET # BLD: 329 K/UL (ref 130–400)
PLATELET # BLD: 336 K/UL (ref 130–400)
PLATELET # BLD: 337 K/UL (ref 130–400)
PLATELET # BLD: 346 K/UL (ref 130–400)
PLATELET SLIDE REVIEW: ADEQUATE
PMV BLD AUTO: 11.1 FL (ref 9.4–12.3)
PMV BLD AUTO: 11.2 FL (ref 9.4–12.3)
PMV BLD AUTO: 11.2 FL (ref 9.4–12.3)
PMV BLD AUTO: 11.3 FL (ref 9.4–12.3)
PMV BLD AUTO: 11.3 FL (ref 9.4–12.3)
PMV BLD AUTO: 11.5 FL (ref 9.4–12.3)
PMV BLD AUTO: 11.5 FL (ref 9.4–12.3)
PMV BLD AUTO: 11.6 FL (ref 9.4–12.3)
PMV BLD AUTO: 11.7 FL (ref 9.4–12.3)
PMV BLD AUTO: 11.8 FL (ref 9.4–12.3)
PMV BLD AUTO: 11.9 FL (ref 9.4–12.3)
PMV BLD AUTO: 11.9 FL (ref 9.4–12.3)
PMV BLD AUTO: 12.1 FL (ref 9.4–12.3)
PMV BLD AUTO: 12.1 FL (ref 9.4–12.3)
PMV BLD AUTO: 12.2 FL (ref 9.4–12.3)
PMV BLD AUTO: 12.2 FL (ref 9.4–12.3)
PMV BLD AUTO: 12.5 FL (ref 9.4–12.3)
PMV BLD AUTO: 12.6 FL (ref 9.4–12.3)
PMV BLD AUTO: 12.6 FL (ref 9.4–12.3)
PMV BLD AUTO: 12.7 FL (ref 9.4–12.3)
PMV BLD AUTO: 12.8 FL (ref 9.4–12.3)
PO2 ARTERIAL: 65 MMHG (ref 80–100)
PO2 ARTERIAL: 69 MMHG (ref 80–100)
PO2 ARTERIAL: 70 MMHG (ref 80–100)
PO2 ARTERIAL: 77 MMHG (ref 80–100)
PO2 ARTERIAL: 87 MMHG (ref 80–100)
POIKILOCYTES: ABNORMAL
POIKILOCYTES: ABNORMAL
POLYCHROMASIA: ABNORMAL
POTASSIUM REFLEX MAGNESIUM: 2.8 MMOL/L (ref 3.5–5)
POTASSIUM REFLEX MAGNESIUM: 3.3 MMOL/L (ref 3.5–5)
POTASSIUM REFLEX MAGNESIUM: 3.4 MMOL/L (ref 3.5–5)
POTASSIUM REFLEX MAGNESIUM: 3.4 MMOL/L (ref 3.5–5)
POTASSIUM REFLEX MAGNESIUM: 3.7 MMOL/L (ref 3.5–5)
POTASSIUM REFLEX MAGNESIUM: 3.7 MMOL/L (ref 3.5–5)
POTASSIUM REFLEX MAGNESIUM: 3.9 MMOL/L (ref 3.5–5)
POTASSIUM REFLEX MAGNESIUM: 4.3 MMOL/L (ref 3.5–5)
POTASSIUM SERPL-SCNC: 3.3 MMOL/L (ref 3.5–5)
POTASSIUM SERPL-SCNC: 3.3 MMOL/L (ref 3.5–5)
POTASSIUM SERPL-SCNC: 3.5 MMOL/L (ref 3.5–5)
POTASSIUM SERPL-SCNC: 3.6 MMOL/L (ref 3.5–5)
POTASSIUM SERPL-SCNC: 3.6 MMOL/L (ref 3.5–5)
POTASSIUM SERPL-SCNC: 3.7 MMOL/L (ref 3.5–5)
POTASSIUM SERPL-SCNC: 3.8 MMOL/L (ref 3.5–5)
POTASSIUM SERPL-SCNC: 3.9 MMOL/L (ref 3.5–5)
POTASSIUM SERPL-SCNC: 4 MMOL/L (ref 3.5–5)
POTASSIUM SERPL-SCNC: 4.1 MMOL/L (ref 3.5–5)
POTASSIUM SERPL-SCNC: 4.2 MMOL/L (ref 3.5–5)
POTASSIUM, WHOLE BLOOD: 3
POTASSIUM, WHOLE BLOOD: 3.2
POTASSIUM, WHOLE BLOOD: 3.4
POTASSIUM, WHOLE BLOOD: 3.6
POTASSIUM, WHOLE BLOOD: 3.6
PRO-BNP: 275 PG/ML (ref 0–900)
PROTEIN UA: 100 MG/DL
PROTEIN UA: 30 MG/DL
PROTHROMBIN TIME: 14.7 SEC (ref 12–14.6)
PROTHROMBIN TIME: 15.2 SEC (ref 12–14.6)
RBC # BLD: 2.65 M/UL (ref 4.2–5.4)
RBC # BLD: 2.7 M/UL (ref 4.2–5.4)
RBC # BLD: 2.78 M/UL (ref 4.2–5.4)
RBC # BLD: 2.79 M/UL (ref 4.2–5.4)
RBC # BLD: 2.8 M/UL (ref 4.2–5.4)
RBC # BLD: 2.83 M/UL (ref 4.2–5.4)
RBC # BLD: 2.84 M/UL (ref 4.2–5.4)
RBC # BLD: 2.85 M/UL (ref 4.2–5.4)
RBC # BLD: 2.86 M/UL (ref 4.2–5.4)
RBC # BLD: 2.86 M/UL (ref 4.2–5.4)
RBC # BLD: 2.92 M/UL (ref 4.2–5.4)
RBC # BLD: 2.96 M/UL (ref 4.2–5.4)
RBC # BLD: 2.98 M/UL (ref 4.2–5.4)
RBC # BLD: 3 M/UL (ref 4.2–5.4)
RBC # BLD: 3.01 M/UL (ref 4.2–5.4)
RBC # BLD: 3.02 M/UL (ref 4.2–5.4)
RBC # BLD: 3.05 M/UL (ref 4.2–5.4)
RBC # BLD: 3.06 M/UL (ref 4.2–5.4)
RBC # BLD: 3.1 M/UL (ref 4.2–5.4)
RBC # BLD: 3.1 M/UL (ref 4.2–5.4)
RBC # BLD: 3.14 M/UL (ref 4.2–5.4)
RBC # BLD: 3.16 M/UL (ref 4.2–5.4)
RBC # BLD: 3.33 M/UL (ref 4.2–5.4)
RBC # BLD: 3.36 M/UL (ref 4.2–5.4)
RBC # BLD: 3.37 M/UL (ref 4.2–5.4)
RBC # BLD: 3.45 M/UL (ref 4.2–5.4)
RBC # BLD: 3.76 M/UL (ref 4.2–5.4)
RBC # BLD: 4.12 M/UL (ref 4.2–5.4)
RBC # BLD: 4.39 M/UL (ref 4.2–5.4)
RBC UA: 1 /HPF (ref 0–4)
RBC UA: 299 /HPF (ref 0–4)
RETICULOCYTE ABSOLUTE COUNT: 0.08 M/UL (ref 0.03–0.12)
RETICULOCYTE COUNT PCT: 2.76 % (ref 0.5–1.5)
SARS-COV-2, NAAT: NOT DETECTED
SARS-COV-2, NAAT: NOT DETECTED
SLIDE REVIEW: ABNORMAL
SODIUM BLD-SCNC: 140 MMOL/L (ref 136–145)
SODIUM BLD-SCNC: 141 MMOL/L (ref 136–145)
SODIUM BLD-SCNC: 142 MMOL/L (ref 136–145)
SODIUM BLD-SCNC: 143 MMOL/L (ref 136–145)
SODIUM BLD-SCNC: 144 MMOL/L (ref 136–145)
SODIUM BLD-SCNC: 145 MMOL/L (ref 136–145)
SODIUM BLD-SCNC: 146 MMOL/L (ref 136–145)
SODIUM BLD-SCNC: 147 MMOL/L (ref 136–145)
SODIUM BLD-SCNC: 148 MMOL/L (ref 136–145)
SODIUM BLD-SCNC: 152 MMOL/L (ref 136–145)
SODIUM BLD-SCNC: 153 MMOL/L (ref 136–145)
SPECIFIC GRAVITY UA: 1.03 (ref 1–1.03)
SPECIFIC GRAVITY UA: 1.04 (ref 1–1.03)
STOMATOCYTES: ABNORMAL
TOTAL IRON BINDING CAPACITY: 214 UG/DL (ref 250–400)
TOTAL PROTEIN: 4.5 G/DL (ref 6.6–8.7)
TOTAL PROTEIN: 4.5 G/DL (ref 6.6–8.7)
TOTAL PROTEIN: 4.6 G/DL (ref 6.6–8.7)
TOTAL PROTEIN: 4.6 G/DL (ref 6.6–8.7)
TOTAL PROTEIN: 5 G/DL (ref 6.6–8.7)
TOTAL PROTEIN: 5.1 G/DL (ref 6.6–8.7)
TOTAL PROTEIN: 5.2 G/DL (ref 6.6–8.7)
TOTAL PROTEIN: 5.4 G/DL (ref 6.6–8.7)
TOTAL PROTEIN: 5.5 G/DL (ref 6.6–8.7)
TOTAL PROTEIN: 5.7 G/DL (ref 6.6–8.7)
TOTAL PROTEIN: 5.7 G/DL (ref 6.6–8.7)
TOTAL PROTEIN: 5.8 G/DL (ref 6.6–8.7)
TOTAL PROTEIN: 5.9 G/DL (ref 6.6–8.7)
TOTAL PROTEIN: 6 G/DL (ref 6.6–8.7)
TOTAL PROTEIN: 6.1 G/DL (ref 6.6–8.7)
TOTAL PROTEIN: 6.1 G/DL (ref 6.6–8.7)
TOTAL PROTEIN: 6.2 G/DL (ref 6.6–8.7)
TOTAL PROTEIN: 6.3 G/DL (ref 6.6–8.7)
TOTAL PROTEIN: 6.4 G/DL (ref 6.6–8.7)
TRIGL SERPL-MCNC: 55 MG/DL (ref 0–149)
TROPONIN: 0.05 NG/ML (ref 0–0.03)
TSH REFLEX FT4: 0.27 UIU/ML (ref 0.35–5.5)
TSH SERPL DL<=0.05 MIU/L-ACNC: 0.39 UIU/ML (ref 0.27–4.2)
UROBILINOGEN, URINE: 1 E.U./DL
UROBILINOGEN, URINE: 1 E.U./DL
VACUOLATED NEUTROPHILS: ABNORMAL
VANCOMYCIN RANDOM: <4 UG/ML
VANCOMYCIN TROUGH: 4 UG/ML (ref 10–20)
VITAMIN B-12: 511 PG/ML (ref 211–946)
VITAMIN B-12: 530 PG/ML (ref 211–946)
WBC # BLD: 10.3 K/UL (ref 4.8–10.8)
WBC # BLD: 10.4 K/UL (ref 4.8–10.8)
WBC # BLD: 11.1 K/UL (ref 4.8–10.8)
WBC # BLD: 11.2 K/UL (ref 4.8–10.8)
WBC # BLD: 11.2 K/UL (ref 4.8–10.8)
WBC # BLD: 11.4 K/UL (ref 4.8–10.8)
WBC # BLD: 11.5 K/UL (ref 4.8–10.8)
WBC # BLD: 12 K/UL (ref 4.8–10.8)
WBC # BLD: 12.4 K/UL (ref 4.8–10.8)
WBC # BLD: 13.3 K/UL (ref 4.8–10.8)
WBC # BLD: 14.4 K/UL (ref 4.8–10.8)
WBC # BLD: 15.6 K/UL (ref 4.8–10.8)
WBC # BLD: 15.7 K/UL (ref 4.8–10.8)
WBC # BLD: 16.9 K/UL (ref 4.8–10.8)
WBC # BLD: 6.2 K/UL (ref 4.8–10.8)
WBC # BLD: 6.2 K/UL (ref 4.8–10.8)
WBC # BLD: 6.6 K/UL (ref 4.8–10.8)
WBC # BLD: 6.9 K/UL (ref 4.8–10.8)
WBC # BLD: 7.5 K/UL (ref 4.8–10.8)
WBC # BLD: 7.6 K/UL (ref 4.8–10.8)
WBC # BLD: 7.9 K/UL (ref 4.8–10.8)
WBC # BLD: 8 K/UL (ref 4.8–10.8)
WBC # BLD: 8.2 K/UL (ref 4.8–10.8)
WBC # BLD: 8.5 K/UL (ref 4.8–10.8)
WBC # BLD: 8.5 K/UL (ref 4.8–10.8)
WBC # BLD: 9.1 K/UL (ref 4.8–10.8)
WBC # BLD: 9.2 K/UL (ref 4.8–10.8)
WBC # BLD: 9.6 K/UL (ref 4.8–10.8)
WBC # BLD: 9.8 K/UL (ref 4.8–10.8)
WBC UA: 0 /HPF (ref 0–5)
WBC UA: 3 /HPF (ref 0–5)

## 2021-01-01 PROCEDURE — 6370000000 HC RX 637 (ALT 250 FOR IP): Performed by: PHYSICIAN ASSISTANT

## 2021-01-01 PROCEDURE — 97530 THERAPEUTIC ACTIVITIES: CPT

## 2021-01-01 PROCEDURE — 2580000003 HC RX 258: Performed by: INTERNAL MEDICINE

## 2021-01-01 PROCEDURE — 83550 IRON BINDING TEST: CPT

## 2021-01-01 PROCEDURE — G8400 PT W/DXA NO RESULTS DOC: HCPCS | Performed by: INTERNAL MEDICINE

## 2021-01-01 PROCEDURE — 95816 EEG AWAKE AND DROWSY: CPT | Performed by: PSYCHIATRY & NEUROLOGY

## 2021-01-01 PROCEDURE — 99233 SBSQ HOSP IP/OBS HIGH 50: CPT | Performed by: PSYCHIATRY & NEUROLOGY

## 2021-01-01 PROCEDURE — 85025 COMPLETE CBC W/AUTO DIFF WBC: CPT

## 2021-01-01 PROCEDURE — 82728 ASSAY OF FERRITIN: CPT

## 2021-01-01 PROCEDURE — G8926 SPIRO NO PERF OR DOC: HCPCS | Performed by: INTERNAL MEDICINE

## 2021-01-01 PROCEDURE — 2700000000 HC OXYGEN THERAPY PER DAY

## 2021-01-01 PROCEDURE — 83735 ASSAY OF MAGNESIUM: CPT

## 2021-01-01 PROCEDURE — 6360000004 HC RX CONTRAST MEDICATION: Performed by: PSYCHIATRY & NEUROLOGY

## 2021-01-01 PROCEDURE — 84443 ASSAY THYROID STIM HORMONE: CPT

## 2021-01-01 PROCEDURE — 6370000000 HC RX 637 (ALT 250 FOR IP): Performed by: INTERNAL MEDICINE

## 2021-01-01 PROCEDURE — 80053 COMPREHEN METABOLIC PANEL: CPT

## 2021-01-01 PROCEDURE — 6360000002 HC RX W HCPCS: Performed by: INTERNAL MEDICINE

## 2021-01-01 PROCEDURE — 99232 SBSQ HOSP IP/OBS MODERATE 35: CPT | Performed by: PSYCHIATRY & NEUROLOGY

## 2021-01-01 PROCEDURE — 83605 ASSAY OF LACTIC ACID: CPT

## 2021-01-01 PROCEDURE — 6370000000 HC RX 637 (ALT 250 FOR IP): Performed by: NURSE PRACTITIONER

## 2021-01-01 PROCEDURE — 2100000000 HC CCU R&B

## 2021-01-01 PROCEDURE — G8420 CALC BMI NORM PARAMETERS: HCPCS | Performed by: INTERNAL MEDICINE

## 2021-01-01 PROCEDURE — 87070 CULTURE OTHR SPECIMN AEROBIC: CPT

## 2021-01-01 PROCEDURE — 2500000003 HC RX 250 WO HCPCS: Performed by: HOSPITALIST

## 2021-01-01 PROCEDURE — 6360000002 HC RX W HCPCS: Performed by: PSYCHIATRY & NEUROLOGY

## 2021-01-01 PROCEDURE — 99232 SBSQ HOSP IP/OBS MODERATE 35: CPT | Performed by: PHYSICIAN ASSISTANT

## 2021-01-01 PROCEDURE — 6360000002 HC RX W HCPCS

## 2021-01-01 PROCEDURE — 36592 COLLECT BLOOD FROM PICC: CPT

## 2021-01-01 PROCEDURE — 6370000000 HC RX 637 (ALT 250 FOR IP): Performed by: SPECIALIST

## 2021-01-01 PROCEDURE — 6360000002 HC RX W HCPCS: Performed by: NURSE PRACTITIONER

## 2021-01-01 PROCEDURE — 87635 SARS-COV-2 COVID-19 AMP PRB: CPT

## 2021-01-01 PROCEDURE — 6370000000 HC RX 637 (ALT 250 FOR IP): Performed by: HOSPITALIST

## 2021-01-01 PROCEDURE — 82803 BLOOD GASES ANY COMBINATION: CPT

## 2021-01-01 PROCEDURE — 97116 GAIT TRAINING THERAPY: CPT

## 2021-01-01 PROCEDURE — 84484 ASSAY OF TROPONIN QUANT: CPT

## 2021-01-01 PROCEDURE — 94660 CPAP INITIATION&MGMT: CPT

## 2021-01-01 PROCEDURE — 87040 BLOOD CULTURE FOR BACTERIA: CPT

## 2021-01-01 PROCEDURE — 80048 BASIC METABOLIC PNL TOTAL CA: CPT

## 2021-01-01 PROCEDURE — 99231 SBSQ HOSP IP/OBS SF/LOW 25: CPT | Performed by: PHYSICIAN ASSISTANT

## 2021-01-01 PROCEDURE — 5A1955Z RESPIRATORY VENTILATION, GREATER THAN 96 CONSECUTIVE HOURS: ICD-10-PCS | Performed by: INTERNAL MEDICINE

## 2021-01-01 PROCEDURE — 94003 VENT MGMT INPAT SUBQ DAY: CPT

## 2021-01-01 PROCEDURE — 99291 CRITICAL CARE FIRST HOUR: CPT | Performed by: INTERNAL MEDICINE

## 2021-01-01 PROCEDURE — 97165 OT EVAL LOW COMPLEX 30 MIN: CPT

## 2021-01-01 PROCEDURE — 84132 ASSAY OF SERUM POTASSIUM: CPT

## 2021-01-01 PROCEDURE — 0BH17EZ INSERTION OF ENDOTRACHEAL AIRWAY INTO TRACHEA, VIA NATURAL OR ARTIFICIAL OPENING: ICD-10-PCS | Performed by: INTERNAL MEDICINE

## 2021-01-01 PROCEDURE — 71045 X-RAY EXAM CHEST 1 VIEW: CPT

## 2021-01-01 PROCEDURE — 97110 THERAPEUTIC EXERCISES: CPT

## 2021-01-01 PROCEDURE — 99284 EMERGENCY DEPT VISIT MOD MDM: CPT

## 2021-01-01 PROCEDURE — 94640 AIRWAY INHALATION TREATMENT: CPT

## 2021-01-01 PROCEDURE — 2580000003 HC RX 258: Performed by: HOSPITALIST

## 2021-01-01 PROCEDURE — 2580000003 HC RX 258: Performed by: NURSE PRACTITIONER

## 2021-01-01 PROCEDURE — 0B9C8ZX DRAINAGE OF RIGHT UPPER LUNG LOBE, VIA NATURAL OR ARTIFICIAL OPENING ENDOSCOPIC, DIAGNOSTIC: ICD-10-PCS | Performed by: INTERNAL MEDICINE

## 2021-01-01 PROCEDURE — 3023F SPIROM DOC REV: CPT | Performed by: INTERNAL MEDICINE

## 2021-01-01 PROCEDURE — 36415 COLL VENOUS BLD VENIPUNCTURE: CPT

## 2021-01-01 PROCEDURE — 1210000000 HC MED SURG R&B

## 2021-01-01 PROCEDURE — 96374 THER/PROPH/DIAG INJ IV PUSH: CPT

## 2021-01-01 PROCEDURE — 82270 OCCULT BLOOD FECES: CPT

## 2021-01-01 PROCEDURE — 82533 TOTAL CORTISOL: CPT

## 2021-01-01 PROCEDURE — 94761 N-INVAS EAR/PLS OXIMETRY MLT: CPT

## 2021-01-01 PROCEDURE — 6360000004 HC RX CONTRAST MEDICATION: Performed by: INTERNAL MEDICINE

## 2021-01-01 PROCEDURE — 80202 ASSAY OF VANCOMYCIN: CPT

## 2021-01-01 PROCEDURE — 6360000002 HC RX W HCPCS: Performed by: HOSPITALIST

## 2021-01-01 PROCEDURE — 2140000000 HC CCU INTERMEDIATE R&B

## 2021-01-01 PROCEDURE — 36591 DRAW BLOOD OFF VENOUS DEVICE: CPT

## 2021-01-01 PROCEDURE — 99232 SBSQ HOSP IP/OBS MODERATE 35: CPT | Performed by: NURSE PRACTITIONER

## 2021-01-01 PROCEDURE — G8427 DOCREV CUR MEDS BY ELIG CLIN: HCPCS | Performed by: INTERNAL MEDICINE

## 2021-01-01 PROCEDURE — 6370000000 HC RX 637 (ALT 250 FOR IP): Performed by: EMERGENCY MEDICINE

## 2021-01-01 PROCEDURE — 4004F PT TOBACCO SCREEN RCVD TLK: CPT | Performed by: INTERNAL MEDICINE

## 2021-01-01 PROCEDURE — 1123F ACP DISCUSS/DSCN MKR DOCD: CPT | Performed by: INTERNAL MEDICINE

## 2021-01-01 PROCEDURE — 70496 CT ANGIOGRAPHY HEAD: CPT

## 2021-01-01 PROCEDURE — 70498 CT ANGIOGRAPHY NECK: CPT

## 2021-01-01 PROCEDURE — 36600 WITHDRAWAL OF ARTERIAL BLOOD: CPT

## 2021-01-01 PROCEDURE — C8929 TTE W OR WO FOL WCON,DOPPLER: HCPCS

## 2021-01-01 PROCEDURE — 95816 EEG AWAKE AND DROWSY: CPT

## 2021-01-01 PROCEDURE — 85027 COMPLETE CBC AUTOMATED: CPT

## 2021-01-01 PROCEDURE — 81001 URINALYSIS AUTO W/SCOPE: CPT

## 2021-01-01 PROCEDURE — 2580000003 HC RX 258: Performed by: EMERGENCY MEDICINE

## 2021-01-01 PROCEDURE — 6360000004 HC RX CONTRAST MEDICATION: Performed by: EMERGENCY MEDICINE

## 2021-01-01 PROCEDURE — G8419 CALC BMI OUT NRM PARAM NOF/U: HCPCS | Performed by: INTERNAL MEDICINE

## 2021-01-01 PROCEDURE — 80061 LIPID PANEL: CPT

## 2021-01-01 PROCEDURE — 93880 EXTRACRANIAL BILAT STUDY: CPT

## 2021-01-01 PROCEDURE — 97161 PT EVAL LOW COMPLEX 20 MIN: CPT

## 2021-01-01 PROCEDURE — 99223 1ST HOSP IP/OBS HIGH 75: CPT | Performed by: NURSE PRACTITIONER

## 2021-01-01 PROCEDURE — 83880 ASSAY OF NATRIURETIC PEPTIDE: CPT

## 2021-01-01 PROCEDURE — 4040F PNEUMOC VAC/ADMIN/RCVD: CPT | Performed by: INTERNAL MEDICINE

## 2021-01-01 PROCEDURE — 87205 SMEAR GRAM STAIN: CPT

## 2021-01-01 PROCEDURE — 83540 ASSAY OF IRON: CPT

## 2021-01-01 PROCEDURE — 99214 OFFICE O/P EST MOD 30 MIN: CPT | Performed by: INTERNAL MEDICINE

## 2021-01-01 PROCEDURE — 2000000000 HC ICU R&B

## 2021-01-01 PROCEDURE — 93005 ELECTROCARDIOGRAM TRACING: CPT | Performed by: EMERGENCY MEDICINE

## 2021-01-01 PROCEDURE — 85610 PROTHROMBIN TIME: CPT

## 2021-01-01 PROCEDURE — G8484 FLU IMMUNIZE NO ADMIN: HCPCS | Performed by: INTERNAL MEDICINE

## 2021-01-01 PROCEDURE — 82947 ASSAY GLUCOSE BLOOD QUANT: CPT

## 2021-01-01 PROCEDURE — 99222 1ST HOSP IP/OBS MODERATE 55: CPT | Performed by: INTERNAL MEDICINE

## 2021-01-01 PROCEDURE — 97535 SELF CARE MNGMENT TRAINING: CPT

## 2021-01-01 PROCEDURE — 83036 HEMOGLOBIN GLYCOSYLATED A1C: CPT

## 2021-01-01 PROCEDURE — 51702 INSERT TEMP BLADDER CATH: CPT

## 2021-01-01 PROCEDURE — 1090F PRES/ABSN URINE INCON ASSESS: CPT | Performed by: INTERNAL MEDICINE

## 2021-01-01 PROCEDURE — 94002 VENT MGMT INPAT INIT DAY: CPT

## 2021-01-01 PROCEDURE — 3017F COLORECTAL CA SCREEN DOC REV: CPT | Performed by: INTERNAL MEDICINE

## 2021-01-01 PROCEDURE — 71260 CT THORAX DX C+: CPT

## 2021-01-01 PROCEDURE — 99223 1ST HOSP IP/OBS HIGH 75: CPT | Performed by: PSYCHIATRY & NEUROLOGY

## 2021-01-01 PROCEDURE — 84100 ASSAY OF PHOSPHORUS: CPT

## 2021-01-01 PROCEDURE — 70450 CT HEAD/BRAIN W/O DYE: CPT

## 2021-01-01 PROCEDURE — 6360000002 HC RX W HCPCS: Performed by: EMERGENCY MEDICINE

## 2021-01-01 PROCEDURE — 92610 EVALUATE SWALLOWING FUNCTION: CPT

## 2021-01-01 PROCEDURE — 31624 DX BRONCHOSCOPE/LAVAGE: CPT | Performed by: INTERNAL MEDICINE

## 2021-01-01 PROCEDURE — 99213 OFFICE O/P EST LOW 20 MIN: CPT | Performed by: INTERNAL MEDICINE

## 2021-01-01 PROCEDURE — 92526 ORAL FUNCTION THERAPY: CPT

## 2021-01-01 PROCEDURE — 1111F DSCHRG MED/CURRENT MED MERGE: CPT | Performed by: INTERNAL MEDICINE

## 2021-01-01 PROCEDURE — 82746 ASSAY OF FOLIC ACID SERUM: CPT

## 2021-01-01 PROCEDURE — 96375 TX/PRO/DX INJ NEW DRUG ADDON: CPT

## 2021-01-01 PROCEDURE — 70553 MRI BRAIN STEM W/O & W/DYE: CPT

## 2021-01-01 PROCEDURE — 87186 SC STD MICRODIL/AGAR DIL: CPT

## 2021-01-01 PROCEDURE — 82607 VITAMIN B-12: CPT

## 2021-01-01 PROCEDURE — 83010 ASSAY OF HAPTOGLOBIN QUANT: CPT

## 2021-01-01 PROCEDURE — 93010 ELECTROCARDIOGRAM REPORT: CPT | Performed by: INTERNAL MEDICINE

## 2021-01-01 PROCEDURE — 99232 SBSQ HOSP IP/OBS MODERATE 35: CPT | Performed by: INTERNAL MEDICINE

## 2021-01-01 PROCEDURE — 1036F TOBACCO NON-USER: CPT | Performed by: INTERNAL MEDICINE

## 2021-01-01 PROCEDURE — 82024 ASSAY OF ACTH: CPT

## 2021-01-01 PROCEDURE — 99233 SBSQ HOSP IP/OBS HIGH 50: CPT | Performed by: INTERNAL MEDICINE

## 2021-01-01 PROCEDURE — 71275 CT ANGIOGRAPHY CHEST: CPT

## 2021-01-01 PROCEDURE — 85045 AUTOMATED RETICULOCYTE COUNT: CPT

## 2021-01-01 PROCEDURE — A9577 INJ MULTIHANCE: HCPCS | Performed by: PSYCHIATRY & NEUROLOGY

## 2021-01-01 PROCEDURE — 99233 SBSQ HOSP IP/OBS HIGH 50: CPT | Performed by: NURSE PRACTITIONER

## 2021-01-01 PROCEDURE — 87641 MR-STAPH DNA AMP PROBE: CPT

## 2021-01-01 RX ORDER — FAMOTIDINE 20 MG/1
20 TABLET, FILM COATED ORAL DAILY
Status: DISCONTINUED | OUTPATIENT
Start: 2021-01-01 | End: 2021-01-01 | Stop reason: HOSPADM

## 2021-01-01 RX ORDER — POTASSIUM CHLORIDE 20 MEQ/1
40 TABLET, EXTENDED RELEASE ORAL PRN
Status: DISCONTINUED | OUTPATIENT
Start: 2021-01-01 | End: 2021-01-01 | Stop reason: HOSPADM

## 2021-01-01 RX ORDER — ACETAMINOPHEN 325 MG/1
650 TABLET ORAL EVERY 6 HOURS PRN
Status: DISCONTINUED | OUTPATIENT
Start: 2021-01-01 | End: 2021-01-01 | Stop reason: HOSPADM

## 2021-01-01 RX ORDER — MIDODRINE HYDROCHLORIDE 2.5 MG/1
2.5 TABLET ORAL
Status: DISCONTINUED | OUTPATIENT
Start: 2021-01-01 | End: 2021-01-01

## 2021-01-01 RX ORDER — OXYCODONE AND ACETAMINOPHEN 10; 325 MG/1; MG/1
1 TABLET ORAL EVERY 6 HOURS PRN
Qty: 120 TABLET | Refills: 0 | Status: SHIPPED | OUTPATIENT
Start: 2021-01-01 | End: 2021-01-01 | Stop reason: SDUPTHER

## 2021-01-01 RX ORDER — CYANOCOBALAMIN 1000 UG/ML
1000 INJECTION INTRAMUSCULAR; SUBCUTANEOUS ONCE
Status: COMPLETED | OUTPATIENT
Start: 2021-01-01 | End: 2021-01-01

## 2021-01-01 RX ORDER — DOXYCYCLINE HYCLATE 100 MG
100 TABLET ORAL 2 TIMES DAILY
Qty: 14 TABLET | Refills: 0 | Status: SHIPPED | OUTPATIENT
Start: 2021-01-01 | End: 2021-01-01

## 2021-01-01 RX ORDER — OXYCODONE AND ACETAMINOPHEN 10; 325 MG/1; MG/1
1 TABLET ORAL EVERY 6 HOURS PRN
Qty: 120 TABLET | Refills: 0 | Status: ON HOLD
Start: 2021-01-01 | End: 2021-01-01 | Stop reason: HOSPADM

## 2021-01-01 RX ORDER — SODIUM CHLORIDE, SODIUM LACTATE, POTASSIUM CHLORIDE, CALCIUM CHLORIDE 600; 310; 30; 20 MG/100ML; MG/100ML; MG/100ML; MG/100ML
INJECTION, SOLUTION INTRAVENOUS CONTINUOUS
Status: ACTIVE | OUTPATIENT
Start: 2021-01-01 | End: 2021-01-01

## 2021-01-01 RX ORDER — POTASSIUM CHLORIDE 7.45 MG/ML
10 INJECTION INTRAVENOUS PRN
Status: DISCONTINUED | OUTPATIENT
Start: 2021-01-01 | End: 2021-01-01 | Stop reason: HOSPADM

## 2021-01-01 RX ORDER — ASPIRIN 81 MG/1
81 TABLET ORAL DAILY
Status: DISCONTINUED | OUTPATIENT
Start: 2021-01-01 | End: 2021-01-01 | Stop reason: HOSPADM

## 2021-01-01 RX ORDER — ACETAMINOPHEN 650 MG/1
650 SUPPOSITORY RECTAL EVERY 6 HOURS PRN
Status: DISCONTINUED | OUTPATIENT
Start: 2021-01-01 | End: 2021-01-01 | Stop reason: HOSPADM

## 2021-01-01 RX ORDER — ATORVASTATIN CALCIUM 40 MG/1
40 TABLET, FILM COATED ORAL NIGHTLY
Qty: 30 TABLET | Refills: 3 | Status: SHIPPED | OUTPATIENT
Start: 2021-01-01

## 2021-01-01 RX ORDER — PREDNISONE 20 MG/1
40 TABLET ORAL DAILY
Status: DISCONTINUED | OUTPATIENT
Start: 2021-01-01 | End: 2021-01-01 | Stop reason: HOSPADM

## 2021-01-01 RX ORDER — MIDODRINE HYDROCHLORIDE 2.5 MG/1
2.5 TABLET ORAL ONCE
Status: COMPLETED | OUTPATIENT
Start: 2021-01-01 | End: 2021-01-01

## 2021-01-01 RX ORDER — OXYCODONE AND ACETAMINOPHEN 10; 325 MG/1; MG/1
1 TABLET ORAL EVERY 6 HOURS PRN
Status: DISCONTINUED | OUTPATIENT
Start: 2021-01-01 | End: 2021-01-01 | Stop reason: HOSPADM

## 2021-01-01 RX ORDER — IPRATROPIUM BROMIDE AND ALBUTEROL SULFATE 2.5; .5 MG/3ML; MG/3ML
1 SOLUTION RESPIRATORY (INHALATION) EVERY 6 HOURS
Status: DISCONTINUED | OUTPATIENT
Start: 2021-01-01 | End: 2021-01-01

## 2021-01-01 RX ORDER — OXYCODONE AND ACETAMINOPHEN 10; 325 MG/1; MG/1
1 TABLET ORAL EVERY 4 HOURS PRN
Qty: 120 TABLET | Refills: 0 | Status: SHIPPED | OUTPATIENT
Start: 2021-01-01 | End: 2021-01-01 | Stop reason: SDUPTHER

## 2021-01-01 RX ORDER — ASPIRIN 81 MG/1
81 TABLET ORAL DAILY
Status: DISCONTINUED | OUTPATIENT
Start: 2021-01-01 | End: 2021-01-01 | Stop reason: SDUPTHER

## 2021-01-01 RX ORDER — 0.9 % SODIUM CHLORIDE 0.9 %
500 INTRAVENOUS SOLUTION INTRAVENOUS ONCE
Status: COMPLETED | OUTPATIENT
Start: 2021-01-01 | End: 2021-01-01

## 2021-01-01 RX ORDER — GAUZE BANDAGE 2" X 2"
100 BANDAGE TOPICAL DAILY
Status: DISCONTINUED | OUTPATIENT
Start: 2021-01-01 | End: 2021-01-01 | Stop reason: HOSPADM

## 2021-01-01 RX ORDER — SODIUM CHLORIDE 0.9 % (FLUSH) 0.9 %
5-40 SYRINGE (ML) INJECTION PRN
Status: DISCONTINUED | OUTPATIENT
Start: 2021-01-01 | End: 2021-01-01 | Stop reason: HOSPADM

## 2021-01-01 RX ORDER — CHLORHEXIDINE GLUCONATE 0.12 MG/ML
15 RINSE ORAL 2 TIMES DAILY
Status: DISCONTINUED | OUTPATIENT
Start: 2021-01-01 | End: 2021-01-01 | Stop reason: HOSPADM

## 2021-01-01 RX ORDER — MORPHINE SULFATE 15 MG/1
15 TABLET, FILM COATED, EXTENDED RELEASE ORAL EVERY 12 HOURS SCHEDULED
Status: DISCONTINUED | OUTPATIENT
Start: 2021-01-01 | End: 2021-01-01 | Stop reason: HOSPADM

## 2021-01-01 RX ORDER — ONDANSETRON 4 MG/1
4 TABLET, ORALLY DISINTEGRATING ORAL EVERY 8 HOURS PRN
Status: DISCONTINUED | OUTPATIENT
Start: 2021-01-01 | End: 2021-01-01 | Stop reason: HOSPADM

## 2021-01-01 RX ORDER — OXYCODONE AND ACETAMINOPHEN 10; 325 MG/1; MG/1
1 TABLET ORAL EVERY 4 HOURS PRN
Qty: 180 TABLET | Refills: 0 | Status: SHIPPED | OUTPATIENT
Start: 2021-01-01 | End: 2022-01-01 | Stop reason: SDUPTHER

## 2021-01-01 RX ORDER — LORAZEPAM 2 MG/ML
0.5 INJECTION INTRAMUSCULAR ONCE
Status: COMPLETED | OUTPATIENT
Start: 2021-01-01 | End: 2021-01-01

## 2021-01-01 RX ORDER — SODIUM CHLORIDE, SODIUM LACTATE, POTASSIUM CHLORIDE, CALCIUM CHLORIDE 600; 310; 30; 20 MG/100ML; MG/100ML; MG/100ML; MG/100ML
INJECTION, SOLUTION INTRAVENOUS CONTINUOUS
Status: DISCONTINUED | OUTPATIENT
Start: 2021-01-01 | End: 2021-01-01

## 2021-01-01 RX ORDER — PHENOL 1.4 %
600 AEROSOL, SPRAY (ML) MUCOUS MEMBRANE DAILY
Status: DISCONTINUED | OUTPATIENT
Start: 2021-01-01 | End: 2021-01-01 | Stop reason: HOSPADM

## 2021-01-01 RX ORDER — FENTANYL CITRATE 50 UG/ML
12.5 INJECTION, SOLUTION INTRAMUSCULAR; INTRAVENOUS ONCE
Status: COMPLETED | OUTPATIENT
Start: 2021-01-01 | End: 2021-01-01

## 2021-01-01 RX ORDER — NICOTINE 21 MG/24HR
1 PATCH, TRANSDERMAL 24 HOURS TRANSDERMAL DAILY
Status: DISCONTINUED | OUTPATIENT
Start: 2021-01-01 | End: 2021-01-01 | Stop reason: HOSPADM

## 2021-01-01 RX ORDER — METHYLPREDNISOLONE 4 MG/1
TABLET ORAL
Qty: 1 KIT | Refills: 0 | Status: ON HOLD
Start: 2021-01-01 | End: 2022-01-01 | Stop reason: HOSPADM

## 2021-01-01 RX ORDER — ERGOCALCIFEROL 1.25 MG/1
50000 CAPSULE ORAL WEEKLY
Status: DISCONTINUED | OUTPATIENT
Start: 2021-01-01 | End: 2021-01-01 | Stop reason: HOSPADM

## 2021-01-01 RX ORDER — IBUPROFEN 400 MG/1
400 TABLET ORAL EVERY 6 HOURS PRN
Status: DISCONTINUED | OUTPATIENT
Start: 2021-01-01 | End: 2021-01-01 | Stop reason: HOSPADM

## 2021-01-01 RX ORDER — BUDESONIDE 0.5 MG/2ML
0.5 INHALANT ORAL 2 TIMES DAILY
Status: DISCONTINUED | OUTPATIENT
Start: 2021-01-01 | End: 2021-01-01 | Stop reason: HOSPADM

## 2021-01-01 RX ORDER — LEVOFLOXACIN 5 MG/ML
500 INJECTION, SOLUTION INTRAVENOUS EVERY 24 HOURS
Status: DISCONTINUED | OUTPATIENT
Start: 2021-01-01 | End: 2021-01-01 | Stop reason: DRUGHIGH

## 2021-01-01 RX ORDER — LABETALOL HYDROCHLORIDE 5 MG/ML
10 INJECTION, SOLUTION INTRAVENOUS EVERY 10 MIN PRN
Status: DISCONTINUED | OUTPATIENT
Start: 2021-01-01 | End: 2021-01-01 | Stop reason: HOSPADM

## 2021-01-01 RX ORDER — PROPOFOL 10 MG/ML
INJECTION, EMULSION INTRAVENOUS
Status: COMPLETED
Start: 2021-01-01 | End: 2021-01-01

## 2021-01-01 RX ORDER — ARFORMOTEROL TARTRATE 15 UG/2ML
15 SOLUTION RESPIRATORY (INHALATION) 2 TIMES DAILY
Status: DISCONTINUED | OUTPATIENT
Start: 2021-01-01 | End: 2021-01-01 | Stop reason: HOSPADM

## 2021-01-01 RX ORDER — MIDODRINE HYDROCHLORIDE 10 MG/1
10 TABLET ORAL
Status: DISCONTINUED | OUTPATIENT
Start: 2021-01-01 | End: 2021-01-01 | Stop reason: HOSPADM

## 2021-01-01 RX ORDER — SODIUM CHLORIDE 9 MG/ML
INJECTION, SOLUTION INTRAVENOUS CONTINUOUS
Status: DISCONTINUED | OUTPATIENT
Start: 2021-01-01 | End: 2021-01-01

## 2021-01-01 RX ORDER — MORPHINE SULFATE 15 MG/1
15 TABLET ORAL EVERY 6 HOURS PRN
Status: ON HOLD | COMMUNITY
End: 2021-01-01 | Stop reason: HOSPADM

## 2021-01-01 RX ORDER — SODIUM CHLORIDE 0.9 % (FLUSH) 0.9 %
5-40 SYRINGE (ML) INJECTION EVERY 12 HOURS SCHEDULED
Status: DISCONTINUED | OUTPATIENT
Start: 2021-01-01 | End: 2021-01-01 | Stop reason: HOSPADM

## 2021-01-01 RX ORDER — CITALOPRAM 20 MG/1
20 TABLET ORAL DAILY
Status: DISCONTINUED | OUTPATIENT
Start: 2021-01-01 | End: 2021-01-01 | Stop reason: HOSPADM

## 2021-01-01 RX ORDER — PROPOFOL 10 MG/ML
5-50 INJECTION, EMULSION INTRAVENOUS
Status: DISCONTINUED | OUTPATIENT
Start: 2021-01-01 | End: 2021-01-01

## 2021-01-01 RX ORDER — MORPHINE SULFATE 2 MG/ML
2 INJECTION, SOLUTION INTRAMUSCULAR; INTRAVENOUS
Status: DISCONTINUED | OUTPATIENT
Start: 2021-01-01 | End: 2021-01-01 | Stop reason: HOSPADM

## 2021-01-01 RX ORDER — MIDODRINE HYDROCHLORIDE 5 MG/1
5 TABLET ORAL
Status: DISCONTINUED | OUTPATIENT
Start: 2021-01-01 | End: 2021-01-01

## 2021-01-01 RX ORDER — IPRATROPIUM BROMIDE AND ALBUTEROL SULFATE 2.5; .5 MG/3ML; MG/3ML
1 SOLUTION RESPIRATORY (INHALATION)
Status: DISCONTINUED | OUTPATIENT
Start: 2021-01-01 | End: 2021-01-01 | Stop reason: HOSPADM

## 2021-01-01 RX ORDER — THIAMINE HYDROCHLORIDE 100 MG/ML
100 INJECTION, SOLUTION INTRAMUSCULAR; INTRAVENOUS DAILY
Status: DISCONTINUED | OUTPATIENT
Start: 2021-01-01 | End: 2021-01-01 | Stop reason: HOSPADM

## 2021-01-01 RX ORDER — ALBUTEROL SULFATE 90 UG/1
1 AEROSOL, METERED RESPIRATORY (INHALATION) 4 TIMES DAILY
Status: DISCONTINUED | OUTPATIENT
Start: 2021-01-01 | End: 2021-01-01

## 2021-01-01 RX ORDER — FOLIC ACID 1 MG/1
1 TABLET ORAL DAILY
Qty: 30 TABLET | Refills: 0 | Status: SHIPPED | OUTPATIENT
Start: 2021-01-01 | End: 2022-01-01

## 2021-01-01 RX ORDER — FUROSEMIDE 10 MG/ML
40 INJECTION INTRAMUSCULAR; INTRAVENOUS ONCE
Status: COMPLETED | OUTPATIENT
Start: 2021-01-01 | End: 2021-01-01

## 2021-01-01 RX ORDER — NOREPINEPHRINE BIT/0.9 % NACL 16MG/250ML
.01-3.3 INFUSION BOTTLE (ML) INTRAVENOUS CONTINUOUS
Status: DISCONTINUED | OUTPATIENT
Start: 2021-01-01 | End: 2021-01-01

## 2021-01-01 RX ORDER — MORPHINE SULFATE 15 MG/1
15 TABLET, FILM COATED, EXTENDED RELEASE ORAL EVERY 8 HOURS
Status: DISCONTINUED | OUTPATIENT
Start: 2021-01-01 | End: 2021-01-01

## 2021-01-01 RX ORDER — BUDESONIDE AND FORMOTEROL FUMARATE DIHYDRATE 160; 4.5 UG/1; UG/1
2 AEROSOL RESPIRATORY (INHALATION) 2 TIMES DAILY
Status: DISCONTINUED | OUTPATIENT
Start: 2021-01-01 | End: 2021-01-01 | Stop reason: CLARIF

## 2021-01-01 RX ORDER — SODIUM CHLORIDE 9 MG/ML
25 INJECTION, SOLUTION INTRAVENOUS PRN
Status: DISCONTINUED | OUTPATIENT
Start: 2021-01-01 | End: 2021-01-01 | Stop reason: HOSPADM

## 2021-01-01 RX ORDER — OXYCODONE AND ACETAMINOPHEN 10; 325 MG/1; MG/1
1 TABLET ORAL EVERY 6 HOURS PRN
Qty: 120 TABLET | Refills: 0 | Status: SHIPPED
Start: 2021-01-01 | End: 2021-01-01 | Stop reason: DRUGHIGH

## 2021-01-01 RX ORDER — IPRATROPIUM BROMIDE AND ALBUTEROL SULFATE 2.5; .5 MG/3ML; MG/3ML
1 SOLUTION RESPIRATORY (INHALATION) ONCE
Status: COMPLETED | OUTPATIENT
Start: 2021-01-01 | End: 2021-01-01

## 2021-01-01 RX ORDER — ATORVASTATIN CALCIUM 40 MG/1
40 TABLET, FILM COATED ORAL NIGHTLY
Status: DISCONTINUED | OUTPATIENT
Start: 2021-01-01 | End: 2021-01-01 | Stop reason: HOSPADM

## 2021-01-01 RX ORDER — ONDANSETRON 2 MG/ML
4 INJECTION INTRAMUSCULAR; INTRAVENOUS EVERY 6 HOURS PRN
Status: DISCONTINUED | OUTPATIENT
Start: 2021-01-01 | End: 2021-01-01 | Stop reason: HOSPADM

## 2021-01-01 RX ORDER — METHYLPREDNISOLONE SODIUM SUCCINATE 40 MG/ML
40 INJECTION, POWDER, LYOPHILIZED, FOR SOLUTION INTRAMUSCULAR; INTRAVENOUS EVERY 6 HOURS
Status: COMPLETED | OUTPATIENT
Start: 2021-01-01 | End: 2021-01-01

## 2021-01-01 RX ORDER — LEVOFLOXACIN 5 MG/ML
750 INJECTION, SOLUTION INTRAVENOUS EVERY 24 HOURS
Status: DISCONTINUED | OUTPATIENT
Start: 2021-01-01 | End: 2021-01-01 | Stop reason: HOSPADM

## 2021-01-01 RX ORDER — FOLIC ACID 1 MG/1
1 TABLET ORAL DAILY
Status: DISCONTINUED | OUTPATIENT
Start: 2021-01-01 | End: 2021-01-01 | Stop reason: HOSPADM

## 2021-01-01 RX ORDER — POLYETHYLENE GLYCOL 3350 17 G/17G
17 POWDER, FOR SOLUTION ORAL DAILY PRN
Status: DISCONTINUED | OUTPATIENT
Start: 2021-01-01 | End: 2021-01-01 | Stop reason: HOSPADM

## 2021-01-01 RX ORDER — IPRATROPIUM BROMIDE AND ALBUTEROL SULFATE 2.5; .5 MG/3ML; MG/3ML
1 SOLUTION RESPIRATORY (INHALATION) 4 TIMES DAILY
Status: DISCONTINUED | OUTPATIENT
Start: 2021-01-01 | End: 2021-01-01

## 2021-01-01 RX ORDER — ASPIRIN 300 MG/1
300 SUPPOSITORY RECTAL DAILY
Status: DISCONTINUED | OUTPATIENT
Start: 2021-01-01 | End: 2021-01-01 | Stop reason: HOSPADM

## 2021-01-01 RX ORDER — LEVOFLOXACIN 750 MG/1
750 TABLET ORAL DAILY
Qty: 2 TABLET | Refills: 0 | Status: SHIPPED | OUTPATIENT
Start: 2021-01-01 | End: 2021-01-01

## 2021-01-01 RX ORDER — NICOTINE 21 MG/24HR
1 PATCH, TRANSDERMAL 24 HOURS TRANSDERMAL DAILY
Qty: 30 PATCH | Refills: 3 | Status: SHIPPED | OUTPATIENT
Start: 2021-01-01

## 2021-01-01 RX ORDER — MIDODRINE HYDROCHLORIDE 10 MG/1
10 TABLET ORAL
Qty: 90 TABLET | Refills: 2 | Status: SHIPPED | OUTPATIENT
Start: 2021-01-01

## 2021-01-01 RX ORDER — ALBUTEROL SULFATE 90 UG/1
2 AEROSOL, METERED RESPIRATORY (INHALATION) 4 TIMES DAILY PRN
Qty: 18 G | Refills: 5 | Status: SHIPPED | OUTPATIENT
Start: 2021-01-01

## 2021-01-01 RX ORDER — THIAMINE MONONITRATE (VIT B1) 100 MG
100 TABLET ORAL DAILY
Qty: 30 TABLET | Refills: 3 | Status: SHIPPED | OUTPATIENT
Start: 2021-01-01

## 2021-01-01 RX ORDER — OXYCODONE HYDROCHLORIDE AND ACETAMINOPHEN 5; 325 MG/1; MG/1
1 TABLET ORAL EVERY 6 HOURS PRN
Status: DISCONTINUED | OUTPATIENT
Start: 2021-01-01 | End: 2021-01-01 | Stop reason: HOSPADM

## 2021-01-01 RX ORDER — DEXTROSE AND POTASSIUM CHLORIDE 5; .15 G/100ML; G/100ML
SOLUTION INTRAVENOUS CONTINUOUS
Status: DISCONTINUED | OUTPATIENT
Start: 2021-01-01 | End: 2021-01-01 | Stop reason: HOSPADM

## 2021-01-01 RX ORDER — OXYCODONE AND ACETAMINOPHEN 10; 325 MG/1; MG/1
1 TABLET ORAL EVERY 6 HOURS PRN
COMMUNITY
End: 2021-01-01 | Stop reason: SDUPTHER

## 2021-01-01 RX ORDER — ACETYLCYSTEINE 200 MG/ML
600 SOLUTION ORAL; RESPIRATORY (INHALATION) EVERY 6 HOURS
Status: COMPLETED | OUTPATIENT
Start: 2021-01-01 | End: 2021-01-01

## 2021-01-01 RX ADMIN — PIPERACILLIN AND TAZOBACTAM 3375 MG: 3; .375 INJECTION, POWDER, LYOPHILIZED, FOR SOLUTION INTRAVENOUS at 12:48

## 2021-01-01 RX ADMIN — MORPHINE SULFATE 2 MG: 2 INJECTION, SOLUTION INTRAMUSCULAR; INTRAVENOUS at 20:13

## 2021-01-01 RX ADMIN — MIDODRINE HYDROCHLORIDE 5 MG: 5 TABLET ORAL at 17:00

## 2021-01-01 RX ADMIN — PIPERACILLIN AND TAZOBACTAM 3375 MG: 3; .375 INJECTION, POWDER, LYOPHILIZED, FOR SOLUTION INTRAVENOUS at 12:00

## 2021-01-01 RX ADMIN — CITALOPRAM HYDROBROMIDE 20 MG: 20 TABLET, FILM COATED ORAL at 07:55

## 2021-01-01 RX ADMIN — ASPIRIN 81 MG: 81 TABLET, COATED ORAL at 09:49

## 2021-01-01 RX ADMIN — CHLORHEXIDINE GLUCONATE 15 ML: 1.2 RINSE ORAL at 22:59

## 2021-01-01 RX ADMIN — IPRATROPIUM BROMIDE AND ALBUTEROL SULFATE 1 AMPULE: .5; 2.5 SOLUTION RESPIRATORY (INHALATION) at 14:53

## 2021-01-01 RX ADMIN — CHLORHEXIDINE GLUCONATE 15 ML: 1.2 RINSE ORAL at 09:26

## 2021-01-01 RX ADMIN — MIDODRINE HYDROCHLORIDE 5 MG: 5 TABLET ORAL at 12:00

## 2021-01-01 RX ADMIN — APIXABAN 5 MG: 5 TABLET, FILM COATED ORAL at 19:35

## 2021-01-01 RX ADMIN — ALBUTEROL SULFATE 1 PUFF: 90 AEROSOL, METERED RESPIRATORY (INHALATION) at 17:31

## 2021-01-01 RX ADMIN — MIDODRINE HYDROCHLORIDE 10 MG: 10 TABLET ORAL at 18:30

## 2021-01-01 RX ADMIN — ASPIRIN 81 MG: 81 TABLET, COATED ORAL at 10:00

## 2021-01-01 RX ADMIN — CITALOPRAM HYDROBROMIDE 20 MG: 20 TABLET, FILM COATED ORAL at 11:59

## 2021-01-01 RX ADMIN — MORPHINE SULFATE 2 MG: 2 INJECTION, SOLUTION INTRAMUSCULAR; INTRAVENOUS at 19:43

## 2021-01-01 RX ADMIN — CHLORHEXIDINE GLUCONATE 15 ML: 1.2 RINSE ORAL at 09:07

## 2021-01-01 RX ADMIN — MORPHINE SULFATE 2 MG: 2 INJECTION, SOLUTION INTRAMUSCULAR; INTRAVENOUS at 21:56

## 2021-01-01 RX ADMIN — ASPIRIN 81 MG: 81 TABLET, COATED ORAL at 09:23

## 2021-01-01 RX ADMIN — PIPERACILLIN AND TAZOBACTAM 3375 MG: 3; .375 INJECTION, POWDER, LYOPHILIZED, FOR SOLUTION INTRAVENOUS at 20:37

## 2021-01-01 RX ADMIN — CHLORHEXIDINE GLUCONATE 15 ML: 1.2 RINSE ORAL at 22:00

## 2021-01-01 RX ADMIN — IPRATROPIUM BROMIDE AND ALBUTEROL SULFATE 1 AMPULE: .5; 2.5 SOLUTION RESPIRATORY (INHALATION) at 11:26

## 2021-01-01 RX ADMIN — OXYCODONE HYDROCHLORIDE AND ACETAMINOPHEN 1 TABLET: 5; 325 TABLET ORAL at 08:46

## 2021-01-01 RX ADMIN — MIDODRINE HYDROCHLORIDE 5 MG: 5 TABLET ORAL at 17:52

## 2021-01-01 RX ADMIN — PIPERACILLIN AND TAZOBACTAM 3375 MG: 3; .375 INJECTION, POWDER, LYOPHILIZED, FOR SOLUTION INTRAVENOUS at 09:24

## 2021-01-01 RX ADMIN — PROPOFOL 40 MCG/KG/MIN: 10 INJECTION, EMULSION INTRAVENOUS at 15:26

## 2021-01-01 RX ADMIN — SODIUM CHLORIDE: 9 INJECTION, SOLUTION INTRAVENOUS at 03:29

## 2021-01-01 RX ADMIN — MORPHINE SULFATE 2 MG: 2 INJECTION, SOLUTION INTRAMUSCULAR; INTRAVENOUS at 20:44

## 2021-01-01 RX ADMIN — Medication 2 MCG/MIN: at 03:30

## 2021-01-01 RX ADMIN — MORPHINE SULFATE 2 MG: 2 INJECTION, SOLUTION INTRAMUSCULAR; INTRAVENOUS at 20:15

## 2021-01-01 RX ADMIN — PIPERACILLIN AND TAZOBACTAM 3375 MG: 3; .375 INJECTION, POWDER, LYOPHILIZED, FOR SOLUTION INTRAVENOUS at 19:46

## 2021-01-01 RX ADMIN — MIDODRINE HYDROCHLORIDE 10 MG: 10 TABLET ORAL at 12:54

## 2021-01-01 RX ADMIN — MORPHINE SULFATE 2 MG: 2 INJECTION, SOLUTION INTRAMUSCULAR; INTRAVENOUS at 15:44

## 2021-01-01 RX ADMIN — IPRATROPIUM BROMIDE AND ALBUTEROL SULFATE 1 AMPULE: .5; 2.5 SOLUTION RESPIRATORY (INHALATION) at 18:25

## 2021-01-01 RX ADMIN — POTASSIUM CHLORIDE 10 MEQ: 7.46 INJECTION, SOLUTION INTRAVENOUS at 07:53

## 2021-01-01 RX ADMIN — IPRATROPIUM BROMIDE AND ALBUTEROL SULFATE 1 AMPULE: .5; 2.5 SOLUTION RESPIRATORY (INHALATION) at 07:19

## 2021-01-01 RX ADMIN — PIPERACILLIN AND TAZOBACTAM 3375 MG: 3; .375 INJECTION, POWDER, LYOPHILIZED, FOR SOLUTION INTRAVENOUS at 23:11

## 2021-01-01 RX ADMIN — VANCOMYCIN HYDROCHLORIDE 1250 MG: 10 INJECTION, POWDER, LYOPHILIZED, FOR SOLUTION INTRAVENOUS at 21:39

## 2021-01-01 RX ADMIN — OXYCODONE AND ACETAMINOPHEN 1 TABLET: 10; 325 TABLET ORAL at 20:33

## 2021-01-01 RX ADMIN — THIAMINE HYDROCHLORIDE 100 MG: 100 INJECTION, SOLUTION INTRAMUSCULAR; INTRAVENOUS at 09:20

## 2021-01-01 RX ADMIN — IPRATROPIUM BROMIDE AND ALBUTEROL SULFATE 1 AMPULE: .5; 2.5 SOLUTION RESPIRATORY (INHALATION) at 10:47

## 2021-01-01 RX ADMIN — POTASSIUM CHLORIDE 10 MEQ: 7.46 INJECTION, SOLUTION INTRAVENOUS at 05:52

## 2021-01-01 RX ADMIN — FENTANYL CITRATE 12.5 MCG: 50 INJECTION INTRAMUSCULAR; INTRAVENOUS at 22:56

## 2021-01-01 RX ADMIN — PROPOFOL 20 MCG/KG/MIN: 10 INJECTION, EMULSION INTRAVENOUS at 11:35

## 2021-01-01 RX ADMIN — IPRATROPIUM BROMIDE AND ALBUTEROL SULFATE 1 AMPULE: .5; 2.5 SOLUTION RESPIRATORY (INHALATION) at 19:09

## 2021-01-01 RX ADMIN — CHLORHEXIDINE GLUCONATE 15 ML: 1.2 RINSE ORAL at 08:57

## 2021-01-01 RX ADMIN — THIAMINE HYDROCHLORIDE 100 MG: 100 INJECTION, SOLUTION INTRAMUSCULAR; INTRAVENOUS at 08:46

## 2021-01-01 RX ADMIN — ASPIRIN 81 MG: 81 TABLET, COATED ORAL at 08:57

## 2021-01-01 RX ADMIN — IPRATROPIUM BROMIDE AND ALBUTEROL SULFATE 1 AMPULE: .5; 2.5 SOLUTION RESPIRATORY (INHALATION) at 06:31

## 2021-01-01 RX ADMIN — ATORVASTATIN CALCIUM 40 MG: 40 TABLET, FILM COATED ORAL at 20:52

## 2021-01-01 RX ADMIN — ATORVASTATIN CALCIUM 40 MG: 40 TABLET, FILM COATED ORAL at 19:19

## 2021-01-01 RX ADMIN — IPRATROPIUM BROMIDE AND ALBUTEROL SULFATE 1 AMPULE: .5; 2.5 SOLUTION RESPIRATORY (INHALATION) at 18:33

## 2021-01-01 RX ADMIN — POTASSIUM PHOSPHATE, MONOBASIC AND POTASSIUM PHOSPHATE, DIBASIC 30 MMOL: 224; 236 INJECTION, SOLUTION, CONCENTRATE INTRAVENOUS at 12:55

## 2021-01-01 RX ADMIN — BUDESONIDE 500 MCG: 0.5 SUSPENSION RESPIRATORY (INHALATION) at 19:37

## 2021-01-01 RX ADMIN — IPRATROPIUM BROMIDE AND ALBUTEROL SULFATE 1 AMPULE: .5; 2.5 SOLUTION RESPIRATORY (INHALATION) at 01:25

## 2021-01-01 RX ADMIN — ENOXAPARIN SODIUM 40 MG: 100 INJECTION SUBCUTANEOUS at 19:43

## 2021-01-01 RX ADMIN — ONDANSETRON 4 MG: 2 INJECTION INTRAMUSCULAR; INTRAVENOUS at 07:58

## 2021-01-01 RX ADMIN — PIPERACILLIN AND TAZOBACTAM 3375 MG: 3; .375 INJECTION, POWDER, LYOPHILIZED, FOR SOLUTION INTRAVENOUS at 11:59

## 2021-01-01 RX ADMIN — ENOXAPARIN SODIUM 40 MG: 100 INJECTION SUBCUTANEOUS at 21:32

## 2021-01-01 RX ADMIN — OXYCODONE HYDROCHLORIDE AND ACETAMINOPHEN 1 TABLET: 5; 325 TABLET ORAL at 11:59

## 2021-01-01 RX ADMIN — IPRATROPIUM BROMIDE AND ALBUTEROL SULFATE 1 AMPULE: .5; 2.5 SOLUTION RESPIRATORY (INHALATION) at 20:55

## 2021-01-01 RX ADMIN — IPRATROPIUM BROMIDE AND ALBUTEROL SULFATE 1 AMPULE: .5; 2.5 SOLUTION RESPIRATORY (INHALATION) at 18:19

## 2021-01-01 RX ADMIN — IPRATROPIUM BROMIDE AND ALBUTEROL SULFATE 1 AMPULE: .5; 2.5 SOLUTION RESPIRATORY (INHALATION) at 18:21

## 2021-01-01 RX ADMIN — CHLORHEXIDINE GLUCONATE 15 ML: 1.2 RINSE ORAL at 20:25

## 2021-01-01 RX ADMIN — CITALOPRAM HYDROBROMIDE 20 MG: 20 TABLET, FILM COATED ORAL at 11:36

## 2021-01-01 RX ADMIN — PIPERACILLIN AND TAZOBACTAM 3375 MG: 3; .375 INJECTION, POWDER, LYOPHILIZED, FOR SOLUTION INTRAVENOUS at 13:23

## 2021-01-01 RX ADMIN — FAMOTIDINE 20 MG: 20 TABLET, FILM COATED ORAL at 08:19

## 2021-01-01 RX ADMIN — ENOXAPARIN SODIUM 40 MG: 100 INJECTION SUBCUTANEOUS at 20:24

## 2021-01-01 RX ADMIN — PIPERACILLIN AND TAZOBACTAM 3375 MG: 3; .375 INJECTION, POWDER, LYOPHILIZED, FOR SOLUTION INTRAVENOUS at 20:00

## 2021-01-01 RX ADMIN — PIPERACILLIN AND TAZOBACTAM 3375 MG: 3; .375 INJECTION, POWDER, LYOPHILIZED, FOR SOLUTION INTRAVENOUS at 13:00

## 2021-01-01 RX ADMIN — CHLORHEXIDINE GLUCONATE 15 ML: 1.2 RINSE ORAL at 19:51

## 2021-01-01 RX ADMIN — IPRATROPIUM BROMIDE AND ALBUTEROL SULFATE 1 AMPULE: .5; 2.5 SOLUTION RESPIRATORY (INHALATION) at 15:12

## 2021-01-01 RX ADMIN — IPRATROPIUM BROMIDE AND ALBUTEROL SULFATE 1 AMPULE: .5; 2.5 SOLUTION RESPIRATORY (INHALATION) at 14:41

## 2021-01-01 RX ADMIN — THIAMINE HYDROCHLORIDE 100 MG: 100 INJECTION, SOLUTION INTRAMUSCULAR; INTRAVENOUS at 09:55

## 2021-01-01 RX ADMIN — ASPIRIN 300 MG: 300 SUPPOSITORY RECTAL at 07:17

## 2021-01-01 RX ADMIN — GADOBENATE DIMEGLUMINE 9 ML: 529 INJECTION, SOLUTION INTRAVENOUS at 16:08

## 2021-01-01 RX ADMIN — CHLORHEXIDINE GLUCONATE 15 ML: 1.2 RINSE ORAL at 20:03

## 2021-01-01 RX ADMIN — LEVOFLOXACIN 750 MG: 5 INJECTION, SOLUTION INTRAVENOUS at 01:18

## 2021-01-01 RX ADMIN — CITALOPRAM HYDROBROMIDE 20 MG: 20 TABLET, FILM COATED ORAL at 09:01

## 2021-01-01 RX ADMIN — CITALOPRAM HYDROBROMIDE 20 MG: 20 TABLET, FILM COATED ORAL at 07:56

## 2021-01-01 RX ADMIN — ACETYLCYSTEINE 600 MG: 200 SOLUTION ORAL; RESPIRATORY (INHALATION) at 18:22

## 2021-01-01 RX ADMIN — CHLORHEXIDINE GLUCONATE 15 ML: 1.2 RINSE ORAL at 10:30

## 2021-01-01 RX ADMIN — PIPERACILLIN AND TAZOBACTAM 3375 MG: 3; .375 INJECTION, POWDER, LYOPHILIZED, FOR SOLUTION INTRAVENOUS at 03:48

## 2021-01-01 RX ADMIN — ASPIRIN 81 MG: 81 TABLET, COATED ORAL at 09:44

## 2021-01-01 RX ADMIN — MIDODRINE HYDROCHLORIDE 2.5 MG: 2.5 TABLET ORAL at 11:30

## 2021-01-01 RX ADMIN — IPRATROPIUM BROMIDE AND ALBUTEROL SULFATE 1 AMPULE: .5; 2.5 SOLUTION RESPIRATORY (INHALATION) at 18:22

## 2021-01-01 RX ADMIN — MORPHINE SULFATE 2 MG: 2 INJECTION, SOLUTION INTRAMUSCULAR; INTRAVENOUS at 01:16

## 2021-01-01 RX ADMIN — ATORVASTATIN CALCIUM 40 MG: 40 TABLET, FILM COATED ORAL at 19:46

## 2021-01-01 RX ADMIN — PIPERACILLIN AND TAZOBACTAM 3375 MG: 3; .375 INJECTION, POWDER, LYOPHILIZED, FOR SOLUTION INTRAVENOUS at 04:06

## 2021-01-01 RX ADMIN — SODIUM CHLORIDE, POTASSIUM CHLORIDE, SODIUM LACTATE AND CALCIUM CHLORIDE: 600; 310; 30; 20 INJECTION, SOLUTION INTRAVENOUS at 10:32

## 2021-01-01 RX ADMIN — PERFLUTREN 1.65 MG: 6.52 INJECTION, SUSPENSION INTRAVENOUS at 13:30

## 2021-01-01 RX ADMIN — CITALOPRAM HYDROBROMIDE 20 MG: 20 TABLET, FILM COATED ORAL at 09:42

## 2021-01-01 RX ADMIN — PROPOFOL 20 MCG/KG/MIN: 10 INJECTION, EMULSION INTRAVENOUS at 05:06

## 2021-01-01 RX ADMIN — IPRATROPIUM BROMIDE AND ALBUTEROL SULFATE 1 AMPULE: .5; 2.5 SOLUTION RESPIRATORY (INHALATION) at 14:29

## 2021-01-01 RX ADMIN — ALBUTEROL SULFATE 1 PUFF: 90 AEROSOL, METERED RESPIRATORY (INHALATION) at 19:59

## 2021-01-01 RX ADMIN — THIAMINE HYDROCHLORIDE 100 MG: 100 INJECTION, SOLUTION INTRAMUSCULAR; INTRAVENOUS at 09:42

## 2021-01-01 RX ADMIN — PIPERACILLIN AND TAZOBACTAM 3375 MG: 3; .375 INJECTION, POWDER, LYOPHILIZED, FOR SOLUTION INTRAVENOUS at 14:38

## 2021-01-01 RX ADMIN — SODIUM CHLORIDE, POTASSIUM CHLORIDE, SODIUM LACTATE AND CALCIUM CHLORIDE: 600; 310; 30; 20 INJECTION, SOLUTION INTRAVENOUS at 12:48

## 2021-01-01 RX ADMIN — APIXABAN 5 MG: 5 TABLET, FILM COATED ORAL at 19:19

## 2021-01-01 RX ADMIN — IPRATROPIUM BROMIDE AND ALBUTEROL SULFATE 1 AMPULE: .5; 2.5 SOLUTION RESPIRATORY (INHALATION) at 07:05

## 2021-01-01 RX ADMIN — BUDESONIDE 500 MCG: 0.5 SUSPENSION RESPIRATORY (INHALATION) at 07:20

## 2021-01-01 RX ADMIN — MIDODRINE HYDROCHLORIDE 10 MG: 10 TABLET ORAL at 09:00

## 2021-01-01 RX ADMIN — ATORVASTATIN CALCIUM 40 MG: 40 TABLET, FILM COATED ORAL at 20:07

## 2021-01-01 RX ADMIN — MIDODRINE HYDROCHLORIDE 10 MG: 10 TABLET ORAL at 08:57

## 2021-01-01 RX ADMIN — PROPOFOL 30 MCG/KG/MIN: 10 INJECTION, EMULSION INTRAVENOUS at 19:43

## 2021-01-01 RX ADMIN — OXYCODONE HYDROCHLORIDE AND ACETAMINOPHEN 1 TABLET: 5; 325 TABLET ORAL at 10:19

## 2021-01-01 RX ADMIN — THIAMINE HYDROCHLORIDE 100 MG: 100 INJECTION, SOLUTION INTRAMUSCULAR; INTRAVENOUS at 10:29

## 2021-01-01 RX ADMIN — MIDODRINE HYDROCHLORIDE 2.5 MG: 2.5 TABLET ORAL at 13:23

## 2021-01-01 RX ADMIN — ASPIRIN 81 MG: 81 TABLET, COATED ORAL at 08:19

## 2021-01-01 RX ADMIN — IPRATROPIUM BROMIDE AND ALBUTEROL SULFATE 1 AMPULE: .5; 2.5 SOLUTION RESPIRATORY (INHALATION) at 19:30

## 2021-01-01 RX ADMIN — MIDODRINE HYDROCHLORIDE 5 MG: 5 TABLET ORAL at 08:00

## 2021-01-01 RX ADMIN — OXYCODONE HYDROCHLORIDE AND ACETAMINOPHEN 1 TABLET: 5; 325 TABLET ORAL at 16:53

## 2021-01-01 RX ADMIN — OXYCODONE HYDROCHLORIDE AND ACETAMINOPHEN 1 TABLET: 5; 325 TABLET ORAL at 19:54

## 2021-01-01 RX ADMIN — PIPERACILLIN AND TAZOBACTAM 3375 MG: 3; .375 INJECTION, POWDER, LYOPHILIZED, FOR SOLUTION INTRAVENOUS at 03:23

## 2021-01-01 RX ADMIN — ASPIRIN 300 MG: 300 SUPPOSITORY RECTAL at 08:54

## 2021-01-01 RX ADMIN — OXYCODONE AND ACETAMINOPHEN 1 TABLET: 10; 325 TABLET ORAL at 14:52

## 2021-01-01 RX ADMIN — MORPHINE SULFATE 2 MG: 2 INJECTION, SOLUTION INTRAMUSCULAR; INTRAVENOUS at 12:41

## 2021-01-01 RX ADMIN — SODIUM CHLORIDE, POTASSIUM CHLORIDE, SODIUM LACTATE AND CALCIUM CHLORIDE: 600; 310; 30; 20 INJECTION, SOLUTION INTRAVENOUS at 20:00

## 2021-01-01 RX ADMIN — MIDODRINE HYDROCHLORIDE 2.5 MG: 2.5 TABLET ORAL at 11:59

## 2021-01-01 RX ADMIN — APIXABAN 5 MG: 5 TABLET, FILM COATED ORAL at 09:00

## 2021-01-01 RX ADMIN — MIDODRINE HYDROCHLORIDE 10 MG: 10 TABLET ORAL at 13:04

## 2021-01-01 RX ADMIN — MORPHINE SULFATE 2 MG: 2 INJECTION, SOLUTION INTRAMUSCULAR; INTRAVENOUS at 21:32

## 2021-01-01 RX ADMIN — THIAMINE HYDROCHLORIDE 100 MG: 100 INJECTION, SOLUTION INTRAMUSCULAR; INTRAVENOUS at 07:55

## 2021-01-01 RX ADMIN — APIXABAN 5 MG: 5 TABLET, FILM COATED ORAL at 19:58

## 2021-01-01 RX ADMIN — THIAMINE HYDROCHLORIDE 100 MG: 100 INJECTION, SOLUTION INTRAMUSCULAR; INTRAVENOUS at 08:19

## 2021-01-01 RX ADMIN — ALBUTEROL SULFATE 1 PUFF: 90 AEROSOL, METERED RESPIRATORY (INHALATION) at 09:23

## 2021-01-01 RX ADMIN — IPRATROPIUM BROMIDE AND ALBUTEROL SULFATE 1 AMPULE: .5; 2.5 SOLUTION RESPIRATORY (INHALATION) at 15:17

## 2021-01-01 RX ADMIN — CITALOPRAM HYDROBROMIDE 20 MG: 20 TABLET, FILM COATED ORAL at 08:22

## 2021-01-01 RX ADMIN — PIPERACILLIN AND TAZOBACTAM 3375 MG: 3; .375 INJECTION, POWDER, LYOPHILIZED, FOR SOLUTION INTRAVENOUS at 04:36

## 2021-01-01 RX ADMIN — THIAMINE HYDROCHLORIDE 100 MG: 100 INJECTION, SOLUTION INTRAMUSCULAR; INTRAVENOUS at 08:54

## 2021-01-01 RX ADMIN — MIDODRINE HYDROCHLORIDE 2.5 MG: 2.5 TABLET ORAL at 17:54

## 2021-01-01 RX ADMIN — ENOXAPARIN SODIUM 40 MG: 100 INJECTION SUBCUTANEOUS at 19:59

## 2021-01-01 RX ADMIN — ATORVASTATIN CALCIUM 40 MG: 40 TABLET, FILM COATED ORAL at 21:18

## 2021-01-01 RX ADMIN — CHLORHEXIDINE GLUCONATE 15 ML: 1.2 RINSE ORAL at 21:43

## 2021-01-01 RX ADMIN — MORPHINE SULFATE 2 MG: 2 INJECTION, SOLUTION INTRAMUSCULAR; INTRAVENOUS at 21:19

## 2021-01-01 RX ADMIN — MIDODRINE HYDROCHLORIDE 10 MG: 10 TABLET ORAL at 17:30

## 2021-01-01 RX ADMIN — FAMOTIDINE 20 MG: 20 TABLET, FILM COATED ORAL at 08:06

## 2021-01-01 RX ADMIN — POTASSIUM BICARBONATE 40 MEQ: 782 TABLET, EFFERVESCENT ORAL at 04:30

## 2021-01-01 RX ADMIN — CHLORHEXIDINE GLUCONATE 15 ML: 1.2 RINSE ORAL at 09:00

## 2021-01-01 RX ADMIN — MIDODRINE HYDROCHLORIDE 10 MG: 10 TABLET ORAL at 08:45

## 2021-01-01 RX ADMIN — IPRATROPIUM BROMIDE AND ALBUTEROL SULFATE 1 AMPULE: .5; 2.5 SOLUTION RESPIRATORY (INHALATION) at 14:30

## 2021-01-01 RX ADMIN — THIAMINE HYDROCHLORIDE 100 MG: 100 INJECTION, SOLUTION INTRAMUSCULAR; INTRAVENOUS at 11:00

## 2021-01-01 RX ADMIN — PIPERACILLIN AND TAZOBACTAM 3375 MG: 3; .375 INJECTION, POWDER, LYOPHILIZED, FOR SOLUTION INTRAVENOUS at 21:01

## 2021-01-01 RX ADMIN — ASPIRIN 81 MG: 81 TABLET, COATED ORAL at 11:36

## 2021-01-01 RX ADMIN — IPRATROPIUM BROMIDE AND ALBUTEROL SULFATE 1 AMPULE: .5; 2.5 SOLUTION RESPIRATORY (INHALATION) at 18:42

## 2021-01-01 RX ADMIN — AZITHROMYCIN MONOHYDRATE 500 MG: 500 INJECTION, POWDER, LYOPHILIZED, FOR SOLUTION INTRAVENOUS at 14:41

## 2021-01-01 RX ADMIN — MIDODRINE HYDROCHLORIDE 2.5 MG: 2.5 TABLET ORAL at 09:42

## 2021-01-01 RX ADMIN — MIDODRINE HYDROCHLORIDE 5 MG: 5 TABLET ORAL at 20:34

## 2021-01-01 RX ADMIN — PROPOFOL 25 MCG/KG/MIN: 10 INJECTION, EMULSION INTRAVENOUS at 14:32

## 2021-01-01 RX ADMIN — IPRATROPIUM BROMIDE AND ALBUTEROL SULFATE 1 AMPULE: .5; 2.5 SOLUTION RESPIRATORY (INHALATION) at 07:07

## 2021-01-01 RX ADMIN — ASPIRIN 81 MG: 81 TABLET, COATED ORAL at 10:31

## 2021-01-01 RX ADMIN — ASPIRIN 81 MG: 81 TABLET, COATED ORAL at 09:06

## 2021-01-01 RX ADMIN — METHYLPREDNISOLONE SODIUM SUCCINATE 40 MG: 40 INJECTION, POWDER, FOR SOLUTION INTRAMUSCULAR; INTRAVENOUS at 14:16

## 2021-01-01 RX ADMIN — ASPIRIN 81 MG: 81 TABLET, COATED ORAL at 07:55

## 2021-01-01 RX ADMIN — THIAMINE HYDROCHLORIDE 100 MG: 100 INJECTION, SOLUTION INTRAMUSCULAR; INTRAVENOUS at 09:26

## 2021-01-01 RX ADMIN — PROPOFOL 35 MCG/KG/MIN: 10 INJECTION, EMULSION INTRAVENOUS at 03:22

## 2021-01-01 RX ADMIN — ALBUTEROL SULFATE 1 PUFF: 90 AEROSOL, METERED RESPIRATORY (INHALATION) at 20:24

## 2021-01-01 RX ADMIN — PIPERACILLIN AND TAZOBACTAM 3375 MG: 3; .375 INJECTION, POWDER, LYOPHILIZED, FOR SOLUTION INTRAVENOUS at 11:49

## 2021-01-01 RX ADMIN — CITALOPRAM HYDROBROMIDE 20 MG: 20 TABLET, FILM COATED ORAL at 10:00

## 2021-01-01 RX ADMIN — MORPHINE SULFATE 2 MG: 2 INJECTION, SOLUTION INTRAMUSCULAR; INTRAVENOUS at 14:57

## 2021-01-01 RX ADMIN — METHYLPREDNISOLONE SODIUM SUCCINATE 40 MG: 40 INJECTION, POWDER, FOR SOLUTION INTRAMUSCULAR; INTRAVENOUS at 00:57

## 2021-01-01 RX ADMIN — PIPERACILLIN AND TAZOBACTAM 3375 MG: 3; .375 INJECTION, POWDER, LYOPHILIZED, FOR SOLUTION INTRAVENOUS at 20:24

## 2021-01-01 RX ADMIN — OXYCODONE HYDROCHLORIDE AND ACETAMINOPHEN 1 TABLET: 5; 325 TABLET ORAL at 14:38

## 2021-01-01 RX ADMIN — IPRATROPIUM BROMIDE AND ALBUTEROL SULFATE 1 AMPULE: .5; 2.5 SOLUTION RESPIRATORY (INHALATION) at 07:33

## 2021-01-01 RX ADMIN — IPRATROPIUM BROMIDE AND ALBUTEROL SULFATE 1 AMPULE: .5; 2.5 SOLUTION RESPIRATORY (INHALATION) at 14:50

## 2021-01-01 RX ADMIN — IPRATROPIUM BROMIDE AND ALBUTEROL SULFATE 1 AMPULE: .5; 2.5 SOLUTION RESPIRATORY (INHALATION) at 04:36

## 2021-01-01 RX ADMIN — SODIUM CHLORIDE, POTASSIUM CHLORIDE, SODIUM LACTATE AND CALCIUM CHLORIDE: 600; 310; 30; 20 INJECTION, SOLUTION INTRAVENOUS at 03:54

## 2021-01-01 RX ADMIN — ATORVASTATIN CALCIUM 40 MG: 40 TABLET, FILM COATED ORAL at 21:24

## 2021-01-01 RX ADMIN — ATORVASTATIN CALCIUM 40 MG: 40 TABLET, FILM COATED ORAL at 00:56

## 2021-01-01 RX ADMIN — OXYCODONE HYDROCHLORIDE AND ACETAMINOPHEN 1 TABLET: 5; 325 TABLET ORAL at 09:55

## 2021-01-01 RX ADMIN — MIDODRINE HYDROCHLORIDE 2.5 MG: 2.5 TABLET ORAL at 16:43

## 2021-01-01 RX ADMIN — PROPOFOL 25 MCG/KG/MIN: 10 INJECTION, EMULSION INTRAVENOUS at 23:56

## 2021-01-01 RX ADMIN — CITALOPRAM HYDROBROMIDE 20 MG: 20 TABLET ORAL at 09:00

## 2021-01-01 RX ADMIN — ENOXAPARIN SODIUM 50 MG: 100 INJECTION SUBCUTANEOUS at 20:37

## 2021-01-01 RX ADMIN — IPRATROPIUM BROMIDE AND ALBUTEROL SULFATE 1 AMPULE: .5; 2.5 SOLUTION RESPIRATORY (INHALATION) at 10:20

## 2021-01-01 RX ADMIN — MIDODRINE HYDROCHLORIDE 5 MG: 5 TABLET ORAL at 07:53

## 2021-01-01 RX ADMIN — SODIUM CHLORIDE, POTASSIUM CHLORIDE, SODIUM LACTATE AND CALCIUM CHLORIDE: 600; 310; 30; 20 INJECTION, SOLUTION INTRAVENOUS at 14:23

## 2021-01-01 RX ADMIN — MIDODRINE HYDROCHLORIDE 10 MG: 10 TABLET ORAL at 14:38

## 2021-01-01 RX ADMIN — CITALOPRAM HYDROBROMIDE 20 MG: 20 TABLET, FILM COATED ORAL at 09:49

## 2021-01-01 RX ADMIN — PROPOFOL 20 MCG/KG/MIN: 10 INJECTION, EMULSION INTRAVENOUS at 20:37

## 2021-01-01 RX ADMIN — SODIUM CHLORIDE, POTASSIUM CHLORIDE, SODIUM LACTATE AND CALCIUM CHLORIDE: 600; 310; 30; 20 INJECTION, SOLUTION INTRAVENOUS at 14:09

## 2021-01-01 RX ADMIN — ASPIRIN 81 MG: 81 TABLET, COATED ORAL at 08:46

## 2021-01-01 RX ADMIN — CITALOPRAM HYDROBROMIDE 20 MG: 20 TABLET, FILM COATED ORAL at 08:06

## 2021-01-01 RX ADMIN — MORPHINE SULFATE 2 MG: 2 INJECTION, SOLUTION INTRAMUSCULAR; INTRAVENOUS at 15:51

## 2021-01-01 RX ADMIN — PIPERACILLIN AND TAZOBACTAM 3375 MG: 3; .375 INJECTION, POWDER, LYOPHILIZED, FOR SOLUTION INTRAVENOUS at 04:15

## 2021-01-01 RX ADMIN — ATORVASTATIN CALCIUM 40 MG: 40 TABLET, FILM COATED ORAL at 20:17

## 2021-01-01 RX ADMIN — THIAMINE HYDROCHLORIDE 100 MG: 100 INJECTION, SOLUTION INTRAMUSCULAR; INTRAVENOUS at 08:57

## 2021-01-01 RX ADMIN — IOPAMIDOL 90 ML: 755 INJECTION, SOLUTION INTRAVENOUS at 11:35

## 2021-01-01 RX ADMIN — APIXABAN 5 MG: 5 TABLET, FILM COATED ORAL at 09:42

## 2021-01-01 RX ADMIN — MIDODRINE HYDROCHLORIDE 5 MG: 5 TABLET ORAL at 09:00

## 2021-01-01 RX ADMIN — MIDODRINE HYDROCHLORIDE 10 MG: 10 TABLET ORAL at 09:44

## 2021-01-01 RX ADMIN — PIPERACILLIN AND TAZOBACTAM 3375 MG: 3; .375 INJECTION, POWDER, LYOPHILIZED, FOR SOLUTION INTRAVENOUS at 04:05

## 2021-01-01 RX ADMIN — OXYCODONE AND ACETAMINOPHEN 1 TABLET: 10; 325 TABLET ORAL at 10:31

## 2021-01-01 RX ADMIN — Medication 100 MG: at 10:31

## 2021-01-01 RX ADMIN — CITALOPRAM HYDROBROMIDE 20 MG: 20 TABLET, FILM COATED ORAL at 08:25

## 2021-01-01 RX ADMIN — ACETYLCYSTEINE 600 MG: 200 SOLUTION ORAL; RESPIRATORY (INHALATION) at 15:12

## 2021-01-01 RX ADMIN — FAMOTIDINE 20 MG: 20 TABLET, FILM COATED ORAL at 10:00

## 2021-01-01 RX ADMIN — SODIUM CHLORIDE, POTASSIUM CHLORIDE, SODIUM LACTATE AND CALCIUM CHLORIDE 75 ML/HR: 600; 310; 30; 20 INJECTION, SOLUTION INTRAVENOUS at 01:38

## 2021-01-01 RX ADMIN — IPRATROPIUM BROMIDE AND ALBUTEROL SULFATE 1 AMPULE: .5; 2.5 SOLUTION RESPIRATORY (INHALATION) at 06:46

## 2021-01-01 RX ADMIN — Medication 100 MG: at 09:00

## 2021-01-01 RX ADMIN — IPRATROPIUM BROMIDE AND ALBUTEROL SULFATE 1 AMPULE: .5; 2.5 SOLUTION RESPIRATORY (INHALATION) at 06:15

## 2021-01-01 RX ADMIN — SODIUM CHLORIDE 500 ML: 9 INJECTION, SOLUTION INTRAVENOUS at 02:20

## 2021-01-01 RX ADMIN — ASPIRIN 81 MG: 81 TABLET, COATED ORAL at 10:30

## 2021-01-01 RX ADMIN — ALBUTEROL SULFATE 1 PUFF: 90 AEROSOL, METERED RESPIRATORY (INHALATION) at 08:10

## 2021-01-01 RX ADMIN — FAMOTIDINE 20 MG: 20 TABLET, FILM COATED ORAL at 10:30

## 2021-01-01 RX ADMIN — IPRATROPIUM BROMIDE AND ALBUTEROL SULFATE 1 AMPULE: .5; 2.5 SOLUTION RESPIRATORY (INHALATION) at 14:19

## 2021-01-01 RX ADMIN — MIDODRINE HYDROCHLORIDE 10 MG: 10 TABLET ORAL at 15:49

## 2021-01-01 RX ADMIN — METHYLPREDNISOLONE SODIUM SUCCINATE 40 MG: 40 INJECTION, POWDER, FOR SOLUTION INTRAMUSCULAR; INTRAVENOUS at 18:30

## 2021-01-01 RX ADMIN — PIPERACILLIN AND TAZOBACTAM 3375 MG: 3; .375 INJECTION, POWDER, LYOPHILIZED, FOR SOLUTION INTRAVENOUS at 21:30

## 2021-01-01 RX ADMIN — ACETYLCYSTEINE 600 MG: 200 SOLUTION ORAL; RESPIRATORY (INHALATION) at 02:10

## 2021-01-01 RX ADMIN — MIDODRINE HYDROCHLORIDE 2.5 MG: 2.5 TABLET ORAL at 13:00

## 2021-01-01 RX ADMIN — PIPERACILLIN AND TAZOBACTAM 3375 MG: 3; .375 INJECTION, POWDER, LYOPHILIZED, FOR SOLUTION INTRAVENOUS at 19:54

## 2021-01-01 RX ADMIN — IPRATROPIUM BROMIDE AND ALBUTEROL SULFATE 1 AMPULE: .5; 2.5 SOLUTION RESPIRATORY (INHALATION) at 18:32

## 2021-01-01 RX ADMIN — PIPERACILLIN AND TAZOBACTAM 3375 MG: 3; .375 INJECTION, POWDER, LYOPHILIZED, FOR SOLUTION INTRAVENOUS at 19:28

## 2021-01-01 RX ADMIN — BUDESONIDE 500 MCG: 0.5 SUSPENSION RESPIRATORY (INHALATION) at 19:42

## 2021-01-01 RX ADMIN — PIPERACILLIN AND TAZOBACTAM 3375 MG: 3; .375 INJECTION, POWDER, LYOPHILIZED, FOR SOLUTION INTRAVENOUS at 12:13

## 2021-01-01 RX ADMIN — PROPOFOL 30 MCG/KG/MIN: 10 INJECTION, EMULSION INTRAVENOUS at 11:17

## 2021-01-01 RX ADMIN — PIPERACILLIN AND TAZOBACTAM 3375 MG: 3; .375 INJECTION, POWDER, LYOPHILIZED, FOR SOLUTION INTRAVENOUS at 20:14

## 2021-01-01 RX ADMIN — MIDODRINE HYDROCHLORIDE 5 MG: 5 TABLET ORAL at 14:20

## 2021-01-01 RX ADMIN — MORPHINE SULFATE 2 MG: 2 INJECTION, SOLUTION INTRAMUSCULAR; INTRAVENOUS at 03:46

## 2021-01-01 RX ADMIN — PROPOFOL 30 MCG/KG/MIN: 10 INJECTION, EMULSION INTRAVENOUS at 09:41

## 2021-01-01 RX ADMIN — CHLORHEXIDINE GLUCONATE 15 ML: 1.2 RINSE ORAL at 20:32

## 2021-01-01 RX ADMIN — MIDODRINE HYDROCHLORIDE 2.5 MG: 2.5 TABLET ORAL at 10:30

## 2021-01-01 RX ADMIN — APIXABAN 5 MG: 5 TABLET, FILM COATED ORAL at 21:24

## 2021-01-01 RX ADMIN — SODIUM CHLORIDE, POTASSIUM CHLORIDE, SODIUM LACTATE AND CALCIUM CHLORIDE: 600; 310; 30; 20 INJECTION, SOLUTION INTRAVENOUS at 21:45

## 2021-01-01 RX ADMIN — PROPOFOL 20 MCG/KG/MIN: 10 INJECTION, EMULSION INTRAVENOUS at 04:39

## 2021-01-01 RX ADMIN — PROPOFOL 30 MCG/KG/MIN: 10 INJECTION, EMULSION INTRAVENOUS at 03:00

## 2021-01-01 RX ADMIN — POTASSIUM CHLORIDE 10 MEQ: 7.46 INJECTION, SOLUTION INTRAVENOUS at 09:35

## 2021-01-01 RX ADMIN — PIPERACILLIN AND TAZOBACTAM 3375 MG: 3; .375 INJECTION, POWDER, LYOPHILIZED, FOR SOLUTION INTRAVENOUS at 05:00

## 2021-01-01 RX ADMIN — PREDNISONE 40 MG: 20 TABLET ORAL at 09:00

## 2021-01-01 RX ADMIN — IPRATROPIUM BROMIDE AND ALBUTEROL SULFATE 1 AMPULE: .5; 2.5 SOLUTION RESPIRATORY (INHALATION) at 18:26

## 2021-01-01 RX ADMIN — IPRATROPIUM BROMIDE AND ALBUTEROL SULFATE 1 AMPULE: .5; 2.5 SOLUTION RESPIRATORY (INHALATION) at 06:45

## 2021-01-01 RX ADMIN — ATORVASTATIN CALCIUM 40 MG: 40 TABLET, FILM COATED ORAL at 20:24

## 2021-01-01 RX ADMIN — ATORVASTATIN CALCIUM 40 MG: 40 TABLET, FILM COATED ORAL at 19:35

## 2021-01-01 RX ADMIN — FAMOTIDINE 20 MG: 20 TABLET, FILM COATED ORAL at 08:26

## 2021-01-01 RX ADMIN — FENTANYL CITRATE 12.5 MCG: 50 INJECTION INTRAMUSCULAR; INTRAVENOUS at 19:34

## 2021-01-01 RX ADMIN — CEFTRIAXONE 1000 MG: 1 INJECTION, POWDER, FOR SOLUTION INTRAMUSCULAR; INTRAVENOUS at 14:39

## 2021-01-01 RX ADMIN — FAMOTIDINE 20 MG: 20 TABLET, FILM COATED ORAL at 07:55

## 2021-01-01 RX ADMIN — PROPOFOL 50 MCG/KG/MIN: 10 INJECTION, EMULSION INTRAVENOUS at 10:04

## 2021-01-01 RX ADMIN — PIPERACILLIN AND TAZOBACTAM 3375 MG: 3; .375 INJECTION, POWDER, LYOPHILIZED, FOR SOLUTION INTRAVENOUS at 09:42

## 2021-01-01 RX ADMIN — MIDODRINE HYDROCHLORIDE 2.5 MG: 2.5 TABLET ORAL at 17:19

## 2021-01-01 RX ADMIN — MORPHINE SULFATE 2 MG: 2 INJECTION, SOLUTION INTRAMUSCULAR; INTRAVENOUS at 03:33

## 2021-01-01 RX ADMIN — THIAMINE HYDROCHLORIDE 100 MG: 100 INJECTION, SOLUTION INTRAMUSCULAR; INTRAVENOUS at 08:06

## 2021-01-01 RX ADMIN — OXYCODONE HYDROCHLORIDE AND ACETAMINOPHEN 1 TABLET: 5; 325 TABLET ORAL at 21:24

## 2021-01-01 RX ADMIN — POTASSIUM BICARBONATE 40 MEQ: 782 TABLET, EFFERVESCENT ORAL at 05:02

## 2021-01-01 RX ADMIN — MIDODRINE HYDROCHLORIDE 5 MG: 5 TABLET ORAL at 08:06

## 2021-01-01 RX ADMIN — OXYCODONE HYDROCHLORIDE AND ACETAMINOPHEN 1 TABLET: 5; 325 TABLET ORAL at 08:57

## 2021-01-01 RX ADMIN — PIPERACILLIN AND TAZOBACTAM 3375 MG: 3; .375 INJECTION, POWDER, LYOPHILIZED, FOR SOLUTION INTRAVENOUS at 19:34

## 2021-01-01 RX ADMIN — MORPHINE SULFATE 2 MG: 2 INJECTION, SOLUTION INTRAMUSCULAR; INTRAVENOUS at 01:11

## 2021-01-01 RX ADMIN — PROPOFOL 50 MCG/KG/MIN: 10 INJECTION, EMULSION INTRAVENOUS at 04:15

## 2021-01-01 RX ADMIN — ALBUTEROL SULFATE 1 PUFF: 90 AEROSOL, METERED RESPIRATORY (INHALATION) at 12:44

## 2021-01-01 RX ADMIN — ENOXAPARIN SODIUM 50 MG: 100 INJECTION SUBCUTANEOUS at 08:25

## 2021-01-01 RX ADMIN — CHLORHEXIDINE GLUCONATE 15 ML: 1.2 RINSE ORAL at 12:18

## 2021-01-01 RX ADMIN — IPRATROPIUM BROMIDE AND ALBUTEROL SULFATE 1 AMPULE: .5; 2.5 SOLUTION RESPIRATORY (INHALATION) at 10:40

## 2021-01-01 RX ADMIN — IOPAMIDOL 90 ML: 755 INJECTION, SOLUTION INTRAVENOUS at 14:47

## 2021-01-01 RX ADMIN — CITALOPRAM HYDROBROMIDE 20 MG: 20 TABLET, FILM COATED ORAL at 09:24

## 2021-01-01 RX ADMIN — OXYCODONE HYDROCHLORIDE AND ACETAMINOPHEN 1 TABLET: 5; 325 TABLET ORAL at 15:27

## 2021-01-01 RX ADMIN — ASPIRIN 81 MG: 81 TABLET, COATED ORAL at 09:01

## 2021-01-01 RX ADMIN — CHLORHEXIDINE GLUCONATE 15 ML: 1.2 RINSE ORAL at 07:57

## 2021-01-01 RX ADMIN — THIAMINE HYDROCHLORIDE 100 MG: 100 INJECTION, SOLUTION INTRAMUSCULAR; INTRAVENOUS at 10:16

## 2021-01-01 RX ADMIN — FOLIC ACID 1 MG: 1 TABLET ORAL at 09:00

## 2021-01-01 RX ADMIN — IPRATROPIUM BROMIDE AND ALBUTEROL SULFATE 1 AMPULE: .5; 2.5 SOLUTION RESPIRATORY (INHALATION) at 15:14

## 2021-01-01 RX ADMIN — POTASSIUM CHLORIDE 10 MEQ: 7.46 INJECTION, SOLUTION INTRAVENOUS at 04:02

## 2021-01-01 RX ADMIN — MORPHINE SULFATE 2 MG: 2 INJECTION, SOLUTION INTRAMUSCULAR; INTRAVENOUS at 08:25

## 2021-01-01 RX ADMIN — MORPHINE SULFATE 2 MG: 2 INJECTION, SOLUTION INTRAMUSCULAR; INTRAVENOUS at 07:56

## 2021-01-01 RX ADMIN — CITALOPRAM HYDROBROMIDE 20 MG: 20 TABLET, FILM COATED ORAL at 09:06

## 2021-01-01 RX ADMIN — IPRATROPIUM BROMIDE AND ALBUTEROL SULFATE 1 AMPULE: .5; 2.5 SOLUTION RESPIRATORY (INHALATION) at 01:36

## 2021-01-01 RX ADMIN — VANCOMYCIN HYDROCHLORIDE 750 MG: 750 INJECTION, POWDER, LYOPHILIZED, FOR SOLUTION INTRAVENOUS at 10:27

## 2021-01-01 RX ADMIN — APIXABAN 5 MG: 5 TABLET, FILM COATED ORAL at 08:46

## 2021-01-01 RX ADMIN — MORPHINE SULFATE 2 MG: 2 INJECTION, SOLUTION INTRAMUSCULAR; INTRAVENOUS at 07:58

## 2021-01-01 RX ADMIN — IPRATROPIUM BROMIDE AND ALBUTEROL SULFATE 1 AMPULE: .5; 2.5 SOLUTION RESPIRATORY (INHALATION) at 07:52

## 2021-01-01 RX ADMIN — CHLORHEXIDINE GLUCONATE 15 ML: 1.2 RINSE ORAL at 08:30

## 2021-01-01 RX ADMIN — FAMOTIDINE 20 MG: 20 TABLET, FILM COATED ORAL at 08:57

## 2021-01-01 RX ADMIN — THIAMINE HYDROCHLORIDE 100 MG: 100 INJECTION, SOLUTION INTRAMUSCULAR; INTRAVENOUS at 08:21

## 2021-01-01 RX ADMIN — MORPHINE SULFATE 2 MG: 2 INJECTION, SOLUTION INTRAMUSCULAR; INTRAVENOUS at 08:19

## 2021-01-01 RX ADMIN — BUDESONIDE 500 MCG: 0.5 SUSPENSION RESPIRATORY (INHALATION) at 08:03

## 2021-01-01 RX ADMIN — IPRATROPIUM BROMIDE AND ALBUTEROL SULFATE 1 AMPULE: .5; 2.5 SOLUTION RESPIRATORY (INHALATION) at 18:48

## 2021-01-01 RX ADMIN — VANCOMYCIN HYDROCHLORIDE 750 MG: 750 INJECTION, POWDER, LYOPHILIZED, FOR SOLUTION INTRAVENOUS at 21:01

## 2021-01-01 RX ADMIN — PROPOFOL 25 MCG/KG/MIN: 10 INJECTION, EMULSION INTRAVENOUS at 01:15

## 2021-01-01 RX ADMIN — MIDODRINE HYDROCHLORIDE 5 MG: 5 TABLET ORAL at 11:59

## 2021-01-01 RX ADMIN — MORPHINE SULFATE 2 MG: 2 INJECTION, SOLUTION INTRAMUSCULAR; INTRAVENOUS at 18:26

## 2021-01-01 RX ADMIN — IPRATROPIUM BROMIDE AND ALBUTEROL SULFATE 1 AMPULE: .5; 2.5 SOLUTION RESPIRATORY (INHALATION) at 18:20

## 2021-01-01 RX ADMIN — APIXABAN 5 MG: 5 TABLET, FILM COATED ORAL at 09:49

## 2021-01-01 RX ADMIN — SODIUM CHLORIDE, PRESERVATIVE FREE 10 ML: 5 INJECTION INTRAVENOUS at 08:59

## 2021-01-01 RX ADMIN — MORPHINE SULFATE 2 MG: 2 INJECTION, SOLUTION INTRAMUSCULAR; INTRAVENOUS at 23:55

## 2021-01-01 RX ADMIN — THIAMINE HYDROCHLORIDE 100 MG: 100 INJECTION, SOLUTION INTRAMUSCULAR; INTRAVENOUS at 09:43

## 2021-01-01 RX ADMIN — PROPOFOL 30 MCG/KG/MIN: 10 INJECTION, EMULSION INTRAVENOUS at 10:39

## 2021-01-01 RX ADMIN — ATORVASTATIN CALCIUM 40 MG: 40 TABLET, FILM COATED ORAL at 19:58

## 2021-01-01 RX ADMIN — ACETAMINOPHEN 650 MG: 325 TABLET ORAL at 00:56

## 2021-01-01 RX ADMIN — APIXABAN 5 MG: 5 TABLET, FILM COATED ORAL at 08:57

## 2021-01-01 RX ADMIN — PIPERACILLIN AND TAZOBACTAM 3375 MG: 3; .375 INJECTION, POWDER, LYOPHILIZED, FOR SOLUTION INTRAVENOUS at 04:30

## 2021-01-01 RX ADMIN — ACETYLCYSTEINE 600 MG: 200 SOLUTION ORAL; RESPIRATORY (INHALATION) at 18:26

## 2021-01-01 RX ADMIN — POTASSIUM BICARBONATE 40 MEQ: 782 TABLET, EFFERVESCENT ORAL at 18:30

## 2021-01-01 RX ADMIN — MIDODRINE HYDROCHLORIDE 10 MG: 10 TABLET ORAL at 20:14

## 2021-01-01 RX ADMIN — IPRATROPIUM BROMIDE AND ALBUTEROL SULFATE 1 AMPULE: .5; 2.5 SOLUTION RESPIRATORY (INHALATION) at 06:21

## 2021-01-01 RX ADMIN — APIXABAN 5 MG: 5 TABLET, FILM COATED ORAL at 20:14

## 2021-01-01 RX ADMIN — PROPOFOL 20 MCG/KG/MIN: 10 INJECTION, EMULSION INTRAVENOUS at 06:49

## 2021-01-01 RX ADMIN — ATORVASTATIN CALCIUM 40 MG: 40 TABLET, FILM COATED ORAL at 20:37

## 2021-01-01 RX ADMIN — OXYCODONE HYDROCHLORIDE AND ACETAMINOPHEN 1 TABLET: 5; 325 TABLET ORAL at 03:13

## 2021-01-01 RX ADMIN — FAMOTIDINE 20 MG: 20 TABLET, FILM COATED ORAL at 09:24

## 2021-01-01 RX ADMIN — IPRATROPIUM BROMIDE AND ALBUTEROL SULFATE 1 AMPULE: .5; 2.5 SOLUTION RESPIRATORY (INHALATION) at 06:11

## 2021-01-01 RX ADMIN — FAMOTIDINE 20 MG: 20 TABLET, FILM COATED ORAL at 09:49

## 2021-01-01 RX ADMIN — CHLORHEXIDINE GLUCONATE 15 ML: 1.2 RINSE ORAL at 20:47

## 2021-01-01 RX ADMIN — FAMOTIDINE 20 MG: 20 TABLET, FILM COATED ORAL at 14:30

## 2021-01-01 RX ADMIN — FAMOTIDINE 20 MG: 20 TABLET, FILM COATED ORAL at 08:46

## 2021-01-01 RX ADMIN — CITALOPRAM HYDROBROMIDE 20 MG: 20 TABLET ORAL at 10:31

## 2021-01-01 RX ADMIN — CHLORHEXIDINE GLUCONATE 15 ML: 1.2 RINSE ORAL at 09:44

## 2021-01-01 RX ADMIN — MORPHINE SULFATE 2 MG: 2 INJECTION, SOLUTION INTRAMUSCULAR; INTRAVENOUS at 00:44

## 2021-01-01 RX ADMIN — MIDODRINE HYDROCHLORIDE 10 MG: 10 TABLET ORAL at 09:53

## 2021-01-01 RX ADMIN — ARFORMOTEROL TARTRATE 15 MCG: 15 SOLUTION RESPIRATORY (INHALATION) at 19:42

## 2021-01-01 RX ADMIN — MIDODRINE HYDROCHLORIDE 10 MG: 10 TABLET ORAL at 12:58

## 2021-01-01 RX ADMIN — MORPHINE SULFATE 2 MG: 2 INJECTION, SOLUTION INTRAMUSCULAR; INTRAVENOUS at 10:54

## 2021-01-01 RX ADMIN — POTASSIUM CHLORIDE 10 MEQ: 7.46 INJECTION, SOLUTION INTRAVENOUS at 13:12

## 2021-01-01 RX ADMIN — FAMOTIDINE 20 MG: 20 TABLET, FILM COATED ORAL at 09:42

## 2021-01-01 RX ADMIN — APIXABAN 5 MG: 5 TABLET, FILM COATED ORAL at 09:01

## 2021-01-01 RX ADMIN — CITALOPRAM HYDROBROMIDE 20 MG: 20 TABLET, FILM COATED ORAL at 08:46

## 2021-01-01 RX ADMIN — DOXYCYCLINE 100 MG: 100 INJECTION, POWDER, LYOPHILIZED, FOR SOLUTION INTRAVENOUS at 12:55

## 2021-01-01 RX ADMIN — CITALOPRAM HYDROBROMIDE 20 MG: 20 TABLET, FILM COATED ORAL at 09:44

## 2021-01-01 RX ADMIN — APIXABAN 5 MG: 5 TABLET, FILM COATED ORAL at 00:56

## 2021-01-01 RX ADMIN — ENOXAPARIN SODIUM 40 MG: 100 INJECTION SUBCUTANEOUS at 20:01

## 2021-01-01 RX ADMIN — VANCOMYCIN HYDROCHLORIDE 750 MG: 750 INJECTION, POWDER, LYOPHILIZED, FOR SOLUTION INTRAVENOUS at 05:09

## 2021-01-01 RX ADMIN — PROPOFOL 45 MCG/KG/MIN: 10 INJECTION, EMULSION INTRAVENOUS at 18:49

## 2021-01-01 RX ADMIN — ARFORMOTEROL TARTRATE 15 MCG: 15 SOLUTION RESPIRATORY (INHALATION) at 19:37

## 2021-01-01 RX ADMIN — MORPHINE SULFATE 2 MG: 2 INJECTION, SOLUTION INTRAMUSCULAR; INTRAVENOUS at 19:49

## 2021-01-01 RX ADMIN — MIDODRINE HYDROCHLORIDE 10 MG: 10 TABLET ORAL at 14:20

## 2021-01-01 RX ADMIN — APIXABAN 5 MG: 5 TABLET, FILM COATED ORAL at 13:20

## 2021-01-01 RX ADMIN — THIAMINE HYDROCHLORIDE 100 MG: 100 INJECTION, SOLUTION INTRAMUSCULAR; INTRAVENOUS at 09:48

## 2021-01-01 RX ADMIN — MORPHINE SULFATE 2 MG: 2 INJECTION, SOLUTION INTRAMUSCULAR; INTRAVENOUS at 02:45

## 2021-01-01 RX ADMIN — SODIUM CHLORIDE, POTASSIUM CHLORIDE, SODIUM LACTATE AND CALCIUM CHLORIDE: 600; 310; 30; 20 INJECTION, SOLUTION INTRAVENOUS at 18:23

## 2021-01-01 RX ADMIN — PIPERACILLIN AND TAZOBACTAM 3375 MG: 3; .375 INJECTION, POWDER, LYOPHILIZED, FOR SOLUTION INTRAVENOUS at 04:40

## 2021-01-01 RX ADMIN — CITALOPRAM HYDROBROMIDE 20 MG: 20 TABLET, FILM COATED ORAL at 10:30

## 2021-01-01 RX ADMIN — CHLORHEXIDINE GLUCONATE 15 ML: 1.2 RINSE ORAL at 08:35

## 2021-01-01 RX ADMIN — MORPHINE SULFATE 2 MG: 2 INJECTION, SOLUTION INTRAMUSCULAR; INTRAVENOUS at 06:01

## 2021-01-01 RX ADMIN — PIPERACILLIN AND TAZOBACTAM 3375 MG: 3; .375 INJECTION, POWDER, LYOPHILIZED, FOR SOLUTION INTRAVENOUS at 04:18

## 2021-01-01 RX ADMIN — ATORVASTATIN CALCIUM 40 MG: 40 TABLET, FILM COATED ORAL at 20:11

## 2021-01-01 RX ADMIN — CHLORHEXIDINE GLUCONATE 15 ML: 1.2 RINSE ORAL at 10:18

## 2021-01-01 RX ADMIN — ASPIRIN 81 MG: 81 TABLET, COATED ORAL at 08:06

## 2021-01-01 RX ADMIN — ENOXAPARIN SODIUM 50 MG: 100 INJECTION SUBCUTANEOUS at 08:22

## 2021-01-01 RX ADMIN — CHLORHEXIDINE GLUCONATE 15 ML: 1.2 RINSE ORAL at 08:44

## 2021-01-01 RX ADMIN — MORPHINE SULFATE 2 MG: 2 INJECTION, SOLUTION INTRAMUSCULAR; INTRAVENOUS at 19:14

## 2021-01-01 RX ADMIN — IPRATROPIUM BROMIDE AND ALBUTEROL SULFATE 1 AMPULE: .5; 2.5 SOLUTION RESPIRATORY (INHALATION) at 14:34

## 2021-01-01 RX ADMIN — PIPERACILLIN AND TAZOBACTAM 3375 MG: 3; .375 INJECTION, POWDER, LYOPHILIZED, FOR SOLUTION INTRAVENOUS at 03:58

## 2021-01-01 RX ADMIN — THIAMINE HYDROCHLORIDE 100 MG: 100 INJECTION, SOLUTION INTRAMUSCULAR; INTRAVENOUS at 08:25

## 2021-01-01 RX ADMIN — APIXABAN 5 MG: 5 TABLET, FILM COATED ORAL at 10:30

## 2021-01-01 RX ADMIN — THIAMINE HYDROCHLORIDE 100 MG: 100 INJECTION, SOLUTION INTRAMUSCULAR; INTRAVENOUS at 09:45

## 2021-01-01 RX ADMIN — ATORVASTATIN CALCIUM 40 MG: 40 TABLET, FILM COATED ORAL at 21:42

## 2021-01-01 RX ADMIN — PROPOFOL 20 MCG/KG/MIN: 10 INJECTION, EMULSION INTRAVENOUS at 09:27

## 2021-01-01 RX ADMIN — IPRATROPIUM BROMIDE AND ALBUTEROL SULFATE 1 AMPULE: .5; 2.5 SOLUTION RESPIRATORY (INHALATION) at 07:22

## 2021-01-01 RX ADMIN — PROPOFOL 25 MCG/KG/MIN: 10 INJECTION, EMULSION INTRAVENOUS at 21:16

## 2021-01-01 RX ADMIN — CHLORHEXIDINE GLUCONATE 15 ML: 1.2 RINSE ORAL at 20:52

## 2021-01-01 RX ADMIN — CHLORHEXIDINE GLUCONATE 15 ML: 1.2 RINSE ORAL at 10:02

## 2021-01-01 RX ADMIN — ATORVASTATIN CALCIUM 40 MG: 40 TABLET, FILM COATED ORAL at 20:13

## 2021-01-01 RX ADMIN — CHLORHEXIDINE GLUCONATE 15 ML: 1.2 RINSE ORAL at 21:30

## 2021-01-01 RX ADMIN — PROPOFOL 20 MCG/KG/MIN: 10 INJECTION, EMULSION INTRAVENOUS at 19:30

## 2021-01-01 RX ADMIN — IPRATROPIUM BROMIDE AND ALBUTEROL SULFATE 1 AMPULE: .5; 2.5 SOLUTION RESPIRATORY (INHALATION) at 02:14

## 2021-01-01 RX ADMIN — PROPOFOL 30 MCG/KG/MIN: 10 INJECTION, EMULSION INTRAVENOUS at 22:15

## 2021-01-01 RX ADMIN — PROPOFOL 20 MCG/KG/MIN: 10 INJECTION, EMULSION INTRAVENOUS at 10:53

## 2021-01-01 RX ADMIN — APIXABAN 5 MG: 5 TABLET, FILM COATED ORAL at 07:55

## 2021-01-01 RX ADMIN — ALBUTEROL SULFATE 1 PUFF: 90 AEROSOL, METERED RESPIRATORY (INHALATION) at 20:07

## 2021-01-01 RX ADMIN — PROPOFOL 25 MCG/KG/MIN: 10 INJECTION, EMULSION INTRAVENOUS at 09:24

## 2021-01-01 RX ADMIN — IOPAMIDOL 60 ML: 755 INJECTION, SOLUTION INTRAVENOUS at 11:55

## 2021-01-01 RX ADMIN — ASPIRIN 81 MG: 81 TABLET, COATED ORAL at 08:25

## 2021-01-01 RX ADMIN — IPRATROPIUM BROMIDE AND ALBUTEROL SULFATE 1 AMPULE: .5; 2.5 SOLUTION RESPIRATORY (INHALATION) at 06:25

## 2021-01-01 RX ADMIN — APIXABAN 5 MG: 5 TABLET, FILM COATED ORAL at 20:33

## 2021-01-01 RX ADMIN — OXYCODONE AND ACETAMINOPHEN 1 TABLET: 10; 325 TABLET ORAL at 00:06

## 2021-01-01 RX ADMIN — IPRATROPIUM BROMIDE AND ALBUTEROL SULFATE 1 AMPULE: .5; 2.5 SOLUTION RESPIRATORY (INHALATION) at 14:52

## 2021-01-01 RX ADMIN — OXYCODONE HYDROCHLORIDE AND ACETAMINOPHEN 1 TABLET: 5; 325 TABLET ORAL at 22:44

## 2021-01-01 RX ADMIN — OXYCODONE AND ACETAMINOPHEN 1 TABLET: 10; 325 TABLET ORAL at 18:11

## 2021-01-01 RX ADMIN — SODIUM CHLORIDE: 9 INJECTION, SOLUTION INTRAVENOUS at 05:11

## 2021-01-01 RX ADMIN — ENOXAPARIN SODIUM 40 MG: 100 INJECTION SUBCUTANEOUS at 20:07

## 2021-01-01 RX ADMIN — IPRATROPIUM BROMIDE AND ALBUTEROL SULFATE 1 AMPULE: .5; 2.5 SOLUTION RESPIRATORY (INHALATION) at 14:35

## 2021-01-01 RX ADMIN — PIPERACILLIN AND TAZOBACTAM 3375 MG: 3; .375 INJECTION, POWDER, LYOPHILIZED, FOR SOLUTION INTRAVENOUS at 04:54

## 2021-01-01 RX ADMIN — OXYCODONE HYDROCHLORIDE AND ACETAMINOPHEN 1 TABLET: 5; 325 TABLET ORAL at 16:39

## 2021-01-01 RX ADMIN — IPRATROPIUM BROMIDE AND ALBUTEROL SULFATE 1 AMPULE: .5; 2.5 SOLUTION RESPIRATORY (INHALATION) at 15:11

## 2021-01-01 RX ADMIN — ASPIRIN 81 MG: 81 TABLET, COATED ORAL at 09:24

## 2021-01-01 RX ADMIN — CHLORHEXIDINE GLUCONATE 15 ML: 1.2 RINSE ORAL at 20:11

## 2021-01-01 RX ADMIN — ASPIRIN 81 MG: 81 TABLET, COATED ORAL at 09:42

## 2021-01-01 RX ADMIN — MORPHINE SULFATE 2 MG: 2 INJECTION, SOLUTION INTRAMUSCULAR; INTRAVENOUS at 13:00

## 2021-01-01 RX ADMIN — IPRATROPIUM BROMIDE AND ALBUTEROL SULFATE 1 AMPULE: .5; 2.5 SOLUTION RESPIRATORY (INHALATION) at 10:10

## 2021-01-01 RX ADMIN — IPRATROPIUM BROMIDE AND ALBUTEROL SULFATE 1 AMPULE: .5; 2.5 SOLUTION RESPIRATORY (INHALATION) at 14:16

## 2021-01-01 RX ADMIN — OXYCODONE AND ACETAMINOPHEN 1 TABLET: 10; 325 TABLET ORAL at 09:00

## 2021-01-01 RX ADMIN — MORPHINE SULFATE 2 MG: 2 INJECTION, SOLUTION INTRAMUSCULAR; INTRAVENOUS at 00:09

## 2021-01-01 RX ADMIN — OXYCODONE HYDROCHLORIDE AND ACETAMINOPHEN 1 TABLET: 5; 325 TABLET ORAL at 19:19

## 2021-01-01 RX ADMIN — CHLORHEXIDINE GLUCONATE 15 ML: 1.2 RINSE ORAL at 09:28

## 2021-01-01 RX ADMIN — IPRATROPIUM BROMIDE AND ALBUTEROL SULFATE 1 AMPULE: .5; 2.5 SOLUTION RESPIRATORY (INHALATION) at 03:05

## 2021-01-01 RX ADMIN — ATORVASTATIN CALCIUM 40 MG: 40 TABLET, FILM COATED ORAL at 20:39

## 2021-01-01 RX ADMIN — POTASSIUM CHLORIDE 10 MEQ: 7.46 INJECTION, SOLUTION INTRAVENOUS at 11:10

## 2021-01-01 RX ADMIN — MIDODRINE HYDROCHLORIDE 10 MG: 10 TABLET ORAL at 18:21

## 2021-01-01 RX ADMIN — APIXABAN 5 MG: 5 TABLET, FILM COATED ORAL at 21:30

## 2021-01-01 RX ADMIN — LORAZEPAM 0.5 MG: 2 INJECTION INTRAMUSCULAR; INTRAVENOUS at 02:27

## 2021-01-01 RX ADMIN — PROPOFOL 20 MCG/KG/MIN: 10 INJECTION, EMULSION INTRAVENOUS at 15:18

## 2021-01-01 RX ADMIN — ALBUTEROL SULFATE 1 PUFF: 90 AEROSOL, METERED RESPIRATORY (INHALATION) at 20:04

## 2021-01-01 RX ADMIN — ARFORMOTEROL TARTRATE 15 MCG: 15 SOLUTION RESPIRATORY (INHALATION) at 06:15

## 2021-01-01 RX ADMIN — CHLORHEXIDINE GLUCONATE 15 ML: 1.2 RINSE ORAL at 21:02

## 2021-01-01 RX ADMIN — THIAMINE HYDROCHLORIDE 100 MG: 100 INJECTION, SOLUTION INTRAMUSCULAR; INTRAVENOUS at 09:01

## 2021-01-01 RX ADMIN — IPRATROPIUM BROMIDE AND ALBUTEROL SULFATE 1 AMPULE: .5; 2.5 SOLUTION RESPIRATORY (INHALATION) at 18:39

## 2021-01-01 RX ADMIN — ACETYLCYSTEINE 600 MG: 200 SOLUTION ORAL; RESPIRATORY (INHALATION) at 14:27

## 2021-01-01 RX ADMIN — SODIUM CHLORIDE, POTASSIUM CHLORIDE, SODIUM LACTATE AND CALCIUM CHLORIDE: 600; 310; 30; 20 INJECTION, SOLUTION INTRAVENOUS at 00:36

## 2021-01-01 RX ADMIN — MIDODRINE HYDROCHLORIDE 5 MG: 5 TABLET ORAL at 17:59

## 2021-01-01 RX ADMIN — FUROSEMIDE 40 MG: 10 INJECTION, SOLUTION INTRAMUSCULAR; INTRAVENOUS at 11:49

## 2021-01-01 RX ADMIN — THIAMINE HYDROCHLORIDE 100 MG: 100 INJECTION, SOLUTION INTRAMUSCULAR; INTRAVENOUS at 12:00

## 2021-01-01 RX ADMIN — OXYCODONE HYDROCHLORIDE AND ACETAMINOPHEN 1 TABLET: 5; 325 TABLET ORAL at 22:04

## 2021-01-01 RX ADMIN — IPRATROPIUM BROMIDE AND ALBUTEROL SULFATE 1 AMPULE: .5; 2.5 SOLUTION RESPIRATORY (INHALATION) at 06:08

## 2021-01-01 RX ADMIN — ATORVASTATIN CALCIUM 40 MG: 40 TABLET, FILM COATED ORAL at 20:46

## 2021-01-01 RX ADMIN — ATORVASTATIN CALCIUM 40 MG: 40 TABLET, FILM COATED ORAL at 20:00

## 2021-01-01 RX ADMIN — Medication 3 MCG/MIN: at 08:11

## 2021-01-01 RX ADMIN — CYANOCOBALAMIN 1000 MCG: 1000 INJECTION, SOLUTION INTRAMUSCULAR; SUBCUTANEOUS at 09:00

## 2021-01-01 RX ADMIN — BUDESONIDE 500 MCG: 0.5 SUSPENSION RESPIRATORY (INHALATION) at 06:15

## 2021-01-01 RX ADMIN — MIDODRINE HYDROCHLORIDE 10 MG: 10 TABLET ORAL at 18:24

## 2021-01-01 RX ADMIN — CITALOPRAM HYDROBROMIDE 20 MG: 20 TABLET, FILM COATED ORAL at 10:16

## 2021-01-01 RX ADMIN — FAMOTIDINE 20 MG: 20 TABLET, FILM COATED ORAL at 07:56

## 2021-01-01 RX ADMIN — FAMOTIDINE 20 MG: 20 TABLET, FILM COATED ORAL at 08:21

## 2021-01-01 RX ADMIN — PIPERACILLIN AND TAZOBACTAM 3375 MG: 3; .375 INJECTION, POWDER, LYOPHILIZED, FOR SOLUTION INTRAVENOUS at 20:10

## 2021-01-01 RX ADMIN — APIXABAN 5 MG: 5 TABLET, FILM COATED ORAL at 10:31

## 2021-01-01 RX ADMIN — MIDODRINE HYDROCHLORIDE 10 MG: 10 TABLET ORAL at 16:23

## 2021-01-01 RX ADMIN — OXYCODONE HYDROCHLORIDE AND ACETAMINOPHEN 1 TABLET: 5; 325 TABLET ORAL at 02:28

## 2021-01-01 RX ADMIN — FAMOTIDINE 20 MG: 20 TABLET, FILM COATED ORAL at 09:26

## 2021-01-01 RX ADMIN — IPRATROPIUM BROMIDE AND ALBUTEROL SULFATE 1 AMPULE: .5; 2.5 SOLUTION RESPIRATORY (INHALATION) at 06:41

## 2021-01-01 RX ADMIN — METHYLPREDNISOLONE SODIUM SUCCINATE 40 MG: 40 INJECTION, POWDER, FOR SOLUTION INTRAMUSCULAR; INTRAVENOUS at 01:35

## 2021-01-01 RX ADMIN — APIXABAN 5 MG: 5 TABLET, FILM COATED ORAL at 20:52

## 2021-01-01 RX ADMIN — CHLORHEXIDINE GLUCONATE 15 ML: 1.2 RINSE ORAL at 21:19

## 2021-01-01 RX ADMIN — Medication 8 MCG/MIN: at 22:41

## 2021-01-01 RX ADMIN — APIXABAN 5 MG: 5 TABLET, FILM COATED ORAL at 21:00

## 2021-01-01 RX ADMIN — FAMOTIDINE 20 MG: 20 TABLET, FILM COATED ORAL at 12:00

## 2021-01-01 RX ADMIN — PIPERACILLIN AND TAZOBACTAM 3375 MG: 3; .375 INJECTION, POWDER, LYOPHILIZED, FOR SOLUTION INTRAVENOUS at 20:46

## 2021-01-01 RX ADMIN — IPRATROPIUM BROMIDE AND ALBUTEROL SULFATE 1 AMPULE: .5; 2.5 SOLUTION RESPIRATORY (INHALATION) at 07:26

## 2021-01-01 RX ADMIN — APIXABAN 5 MG: 5 TABLET, FILM COATED ORAL at 20:30

## 2021-01-01 RX ADMIN — SODIUM CHLORIDE, PRESERVATIVE FREE 10 ML: 5 INJECTION INTRAVENOUS at 17:16

## 2021-01-01 RX ADMIN — APIXABAN 5 MG: 5 TABLET, FILM COATED ORAL at 08:06

## 2021-01-01 RX ADMIN — ACETYLCYSTEINE 600 MG: 200 SOLUTION ORAL; RESPIRATORY (INHALATION) at 06:46

## 2021-01-01 RX ADMIN — OXYCODONE HYDROCHLORIDE AND ACETAMINOPHEN 1 TABLET: 5; 325 TABLET ORAL at 16:25

## 2021-01-01 RX ADMIN — APIXABAN 5 MG: 5 TABLET, FILM COATED ORAL at 20:00

## 2021-01-01 RX ADMIN — FAMOTIDINE 20 MG: 20 TABLET, FILM COATED ORAL at 09:01

## 2021-01-01 RX ADMIN — METHYLPREDNISOLONE SODIUM SUCCINATE 40 MG: 40 INJECTION, POWDER, FOR SOLUTION INTRAMUSCULAR; INTRAVENOUS at 18:08

## 2021-01-01 RX ADMIN — CHLORHEXIDINE GLUCONATE 15 ML: 1.2 RINSE ORAL at 20:38

## 2021-01-01 RX ADMIN — IPRATROPIUM BROMIDE AND ALBUTEROL SULFATE 1 AMPULE: .5; 2.5 SOLUTION RESPIRATORY (INHALATION) at 02:17

## 2021-01-01 RX ADMIN — MORPHINE SULFATE 2 MG: 2 INJECTION, SOLUTION INTRAMUSCULAR; INTRAVENOUS at 07:57

## 2021-01-01 RX ADMIN — IPRATROPIUM BROMIDE AND ALBUTEROL SULFATE 1 AMPULE: .5; 2.5 SOLUTION RESPIRATORY (INHALATION) at 02:10

## 2021-01-01 RX ADMIN — ASPIRIN 81 MG: 81 TABLET, COATED ORAL at 09:00

## 2021-01-01 RX ADMIN — MIDODRINE HYDROCHLORIDE 10 MG: 10 TABLET ORAL at 18:09

## 2021-01-01 RX ADMIN — CITALOPRAM HYDROBROMIDE 20 MG: 20 TABLET, FILM COATED ORAL at 08:19

## 2021-01-01 RX ADMIN — IPRATROPIUM BROMIDE AND ALBUTEROL SULFATE 1 AMPULE: .5; 2.5 SOLUTION RESPIRATORY (INHALATION) at 18:58

## 2021-01-01 RX ADMIN — THIAMINE HYDROCHLORIDE 100 MG: 100 INJECTION, SOLUTION INTRAMUSCULAR; INTRAVENOUS at 07:25

## 2021-01-01 RX ADMIN — ASPIRIN 81 MG: 81 TABLET, COATED ORAL at 07:53

## 2021-01-01 RX ADMIN — MIDODRINE HYDROCHLORIDE 5 MG: 5 TABLET ORAL at 17:29

## 2021-01-01 RX ADMIN — MIDODRINE HYDROCHLORIDE 10 MG: 10 TABLET ORAL at 14:16

## 2021-01-01 RX ADMIN — CHLORHEXIDINE GLUCONATE 15 ML: 1.2 RINSE ORAL at 07:56

## 2021-01-01 RX ADMIN — IPRATROPIUM BROMIDE AND ALBUTEROL SULFATE 1 AMPULE: .5; 2.5 SOLUTION RESPIRATORY (INHALATION) at 19:27

## 2021-01-01 RX ADMIN — OXYCODONE HYDROCHLORIDE AND ACETAMINOPHEN 1 TABLET: 5; 325 TABLET ORAL at 03:29

## 2021-01-01 RX ADMIN — ENOXAPARIN SODIUM 50 MG: 100 INJECTION SUBCUTANEOUS at 20:18

## 2021-01-01 RX ADMIN — ASPIRIN 300 MG: 300 SUPPOSITORY RECTAL at 21:32

## 2021-01-01 RX ADMIN — METHYLPREDNISOLONE SODIUM SUCCINATE 40 MG: 40 INJECTION, POWDER, FOR SOLUTION INTRAMUSCULAR; INTRAVENOUS at 06:32

## 2021-01-01 RX ADMIN — POTASSIUM CHLORIDE AND DEXTROSE MONOHYDRATE: 150; 5 INJECTION, SOLUTION INTRAVENOUS at 18:08

## 2021-01-01 RX ADMIN — ASPIRIN 81 MG: 81 TABLET, COATED ORAL at 09:55

## 2021-01-01 RX ADMIN — ACETYLCYSTEINE 600 MG: 200 SOLUTION ORAL; RESPIRATORY (INHALATION) at 06:41

## 2021-01-01 RX ADMIN — IPRATROPIUM BROMIDE AND ALBUTEROL SULFATE 1 AMPULE: .5; 2.5 SOLUTION RESPIRATORY (INHALATION) at 14:15

## 2021-01-01 RX ADMIN — CITALOPRAM HYDROBROMIDE 20 MG: 20 TABLET, FILM COATED ORAL at 08:57

## 2021-01-01 RX ADMIN — ASPIRIN 81 MG: 81 TABLET, COATED ORAL at 11:59

## 2021-01-01 RX ADMIN — PROPOFOL 20 MCG/KG/MIN: 10 INJECTION, EMULSION INTRAVENOUS at 04:36

## 2021-01-01 RX ADMIN — OXYCODONE HYDROCHLORIDE AND ACETAMINOPHEN 1 TABLET: 5; 325 TABLET ORAL at 10:30

## 2021-01-01 RX ADMIN — IPRATROPIUM BROMIDE AND ALBUTEROL SULFATE 1 AMPULE: .5; 2.5 SOLUTION RESPIRATORY (INHALATION) at 10:42

## 2021-01-01 RX ADMIN — CITALOPRAM HYDROBROMIDE 20 MG: 20 TABLET, FILM COATED ORAL at 09:55

## 2021-01-01 RX ADMIN — OXYCODONE HYDROCHLORIDE AND ACETAMINOPHEN 1 TABLET: 5; 325 TABLET ORAL at 09:44

## 2021-01-01 RX ADMIN — ATORVASTATIN CALCIUM 40 MG: 40 TABLET, FILM COATED ORAL at 20:01

## 2021-01-01 RX ADMIN — ENOXAPARIN SODIUM 50 MG: 100 INJECTION SUBCUTANEOUS at 21:18

## 2021-01-01 RX ADMIN — PIPERACILLIN AND TAZOBACTAM 3375 MG: 3; .375 INJECTION, POWDER, LYOPHILIZED, FOR SOLUTION INTRAVENOUS at 12:32

## 2021-01-01 RX ADMIN — IPRATROPIUM BROMIDE AND ALBUTEROL SULFATE 1 AMPULE: .5; 2.5 SOLUTION RESPIRATORY (INHALATION) at 14:27

## 2021-01-01 RX ADMIN — IPRATROPIUM BROMIDE AND ALBUTEROL SULFATE 1 AMPULE: .5; 2.5 SOLUTION RESPIRATORY (INHALATION) at 18:08

## 2021-01-01 RX ADMIN — MORPHINE SULFATE 2 MG: 2 INJECTION, SOLUTION INTRAMUSCULAR; INTRAVENOUS at 14:08

## 2021-01-01 RX ADMIN — ATORVASTATIN CALCIUM 40 MG: 40 TABLET, FILM COATED ORAL at 20:33

## 2021-01-01 RX ADMIN — IPRATROPIUM BROMIDE AND ALBUTEROL SULFATE 1 AMPULE: .5; 2.5 SOLUTION RESPIRATORY (INHALATION) at 14:28

## 2021-01-01 RX ADMIN — POTASSIUM BICARBONATE 40 MEQ: 782 TABLET, EFFERVESCENT ORAL at 04:24

## 2021-01-01 RX ADMIN — ARFORMOTEROL TARTRATE 15 MCG: 15 SOLUTION RESPIRATORY (INHALATION) at 07:20

## 2021-01-01 RX ADMIN — PROPOFOL 30 MCG/KG/MIN: 10 INJECTION, EMULSION INTRAVENOUS at 22:40

## 2021-01-01 RX ADMIN — MORPHINE SULFATE 2 MG: 2 INJECTION, SOLUTION INTRAMUSCULAR; INTRAVENOUS at 15:33

## 2021-01-01 RX ADMIN — IPRATROPIUM BROMIDE AND ALBUTEROL SULFATE 1 AMPULE: .5; 2.5 SOLUTION RESPIRATORY (INHALATION) at 01:11

## 2021-01-01 RX ADMIN — OXYCODONE HYDROCHLORIDE AND ACETAMINOPHEN 1 TABLET: 5; 325 TABLET ORAL at 15:49

## 2021-01-01 RX ADMIN — ACETYLCYSTEINE 600 MG: 200 SOLUTION ORAL; RESPIRATORY (INHALATION) at 02:14

## 2021-01-01 RX ADMIN — OXYCODONE HYDROCHLORIDE AND ACETAMINOPHEN 1 TABLET: 5; 325 TABLET ORAL at 14:20

## 2021-01-01 RX ADMIN — SODIUM CHLORIDE, PRESERVATIVE FREE 10 ML: 5 INJECTION INTRAVENOUS at 10:31

## 2021-01-01 RX ADMIN — IPRATROPIUM BROMIDE AND ALBUTEROL SULFATE 1 AMPULE: .5; 2.5 SOLUTION RESPIRATORY (INHALATION) at 14:40

## 2021-01-01 RX ADMIN — MORPHINE SULFATE 2 MG: 2 INJECTION, SOLUTION INTRAMUSCULAR; INTRAVENOUS at 02:31

## 2021-01-01 RX ADMIN — MIDODRINE HYDROCHLORIDE 10 MG: 10 TABLET ORAL at 10:31

## 2021-01-01 RX ADMIN — MIDODRINE HYDROCHLORIDE 10 MG: 10 TABLET ORAL at 16:39

## 2021-01-01 RX ADMIN — APIXABAN 5 MG: 5 TABLET, FILM COATED ORAL at 09:24

## 2021-01-01 RX ADMIN — OXYCODONE HYDROCHLORIDE AND ACETAMINOPHEN 1 TABLET: 5; 325 TABLET ORAL at 20:39

## 2021-01-01 RX ADMIN — THIAMINE HYDROCHLORIDE 100 MG: 100 INJECTION, SOLUTION INTRAMUSCULAR; INTRAVENOUS at 10:00

## 2021-01-01 RX ADMIN — CHLORHEXIDINE GLUCONATE 15 ML: 1.2 RINSE ORAL at 09:45

## 2021-01-01 RX ADMIN — PIPERACILLIN AND TAZOBACTAM 3375 MG: 3; .375 INJECTION, POWDER, LYOPHILIZED, FOR SOLUTION INTRAVENOUS at 19:58

## 2021-01-01 RX ADMIN — THIAMINE HYDROCHLORIDE 100 MG: 100 INJECTION, SOLUTION INTRAMUSCULAR; INTRAVENOUS at 07:53

## 2021-01-01 RX ADMIN — PROPOFOL 20 MCG/KG/MIN: 10 INJECTION, EMULSION INTRAVENOUS at 15:16

## 2021-01-01 RX ADMIN — ATORVASTATIN CALCIUM 40 MG: 40 TABLET, FILM COATED ORAL at 21:30

## 2021-01-01 RX ADMIN — CHLORHEXIDINE GLUCONATE 15 ML: 1.2 RINSE ORAL at 09:50

## 2021-01-01 RX ADMIN — ARFORMOTEROL TARTRATE 15 MCG: 15 SOLUTION RESPIRATORY (INHALATION) at 08:03

## 2021-01-01 RX ADMIN — MIDODRINE HYDROCHLORIDE 10 MG: 10 TABLET ORAL at 10:16

## 2021-01-01 RX ADMIN — PROPOFOL 25 MCG/KG/MIN: 10 INJECTION, EMULSION INTRAVENOUS at 01:01

## 2021-01-01 RX ADMIN — THIAMINE HYDROCHLORIDE 100 MG: 100 INJECTION, SOLUTION INTRAMUSCULAR; INTRAVENOUS at 07:56

## 2021-01-01 RX ADMIN — ATORVASTATIN CALCIUM 40 MG: 40 TABLET, FILM COATED ORAL at 20:14

## 2021-01-01 RX ADMIN — FAMOTIDINE 20 MG: 20 TABLET, FILM COATED ORAL at 10:16

## 2021-01-01 RX ADMIN — MORPHINE SULFATE 2 MG: 2 INJECTION, SOLUTION INTRAMUSCULAR; INTRAVENOUS at 09:44

## 2021-01-01 RX ADMIN — APIXABAN 5 MG: 5 TABLET, FILM COATED ORAL at 19:46

## 2021-01-01 RX ADMIN — LEVOFLOXACIN 750 MG: 5 INJECTION, SOLUTION INTRAVENOUS at 01:23

## 2021-01-01 RX ADMIN — APIXABAN 5 MG: 5 TABLET, FILM COATED ORAL at 09:46

## 2021-01-01 RX ADMIN — IPRATROPIUM BROMIDE AND ALBUTEROL SULFATE 1 AMPULE: .5; 2.5 SOLUTION RESPIRATORY (INHALATION) at 07:37

## 2021-01-01 RX ADMIN — MIDODRINE HYDROCHLORIDE 10 MG: 10 TABLET ORAL at 11:00

## 2021-01-01 RX ADMIN — MIDODRINE HYDROCHLORIDE 10 MG: 10 TABLET ORAL at 12:29

## 2021-01-01 RX ADMIN — ASPIRIN 81 MG: 81 TABLET, COATED ORAL at 07:56

## 2021-01-01 RX ADMIN — MORPHINE SULFATE 2 MG: 2 INJECTION, SOLUTION INTRAMUSCULAR; INTRAVENOUS at 22:46

## 2021-01-01 RX ADMIN — PIPERACILLIN AND TAZOBACTAM 3375 MG: 3; .375 INJECTION, POWDER, LYOPHILIZED, FOR SOLUTION INTRAVENOUS at 14:21

## 2021-01-01 RX ADMIN — SODIUM CHLORIDE, PRESERVATIVE FREE 10 ML: 5 INJECTION INTRAVENOUS at 20:32

## 2021-01-01 RX ADMIN — ASPIRIN 81 MG: 81 TABLET, COATED ORAL at 08:21

## 2021-01-01 RX ADMIN — ASPIRIN 81 MG: 81 TABLET, COATED ORAL at 10:16

## 2021-01-01 RX ADMIN — FAMOTIDINE 20 MG: 20 TABLET, FILM COATED ORAL at 09:05

## 2021-01-01 RX ADMIN — PIPERACILLIN AND TAZOBACTAM 3375 MG: 3; .375 INJECTION, POWDER, LYOPHILIZED, FOR SOLUTION INTRAVENOUS at 20:13

## 2021-01-01 RX ADMIN — APIXABAN 5 MG: 5 TABLET, FILM COATED ORAL at 20:46

## 2021-01-01 RX ADMIN — SODIUM CHLORIDE, POTASSIUM CHLORIDE, SODIUM LACTATE AND CALCIUM CHLORIDE: 600; 310; 30; 20 INJECTION, SOLUTION INTRAVENOUS at 12:35

## 2021-01-01 RX ADMIN — IPRATROPIUM BROMIDE AND ALBUTEROL SULFATE 1 AMPULE: .5; 2.5 SOLUTION RESPIRATORY (INHALATION) at 11:05

## 2021-01-01 RX ADMIN — ENOXAPARIN SODIUM 40 MG: 100 INJECTION SUBCUTANEOUS at 19:23

## 2021-01-01 RX ADMIN — IPRATROPIUM BROMIDE AND ALBUTEROL SULFATE 1 AMPULE: .5; 2.5 SOLUTION RESPIRATORY (INHALATION) at 00:22

## 2021-01-01 RX ADMIN — OXYCODONE AND ACETAMINOPHEN 1 TABLET: 10; 325 TABLET ORAL at 14:16

## 2021-01-01 RX ADMIN — IPRATROPIUM BROMIDE AND ALBUTEROL SULFATE 1 AMPULE: .5; 2.5 SOLUTION RESPIRATORY (INHALATION) at 18:15

## 2021-01-01 RX ADMIN — APIXABAN 5 MG: 5 TABLET, FILM COATED ORAL at 10:16

## 2021-01-01 RX ADMIN — IPRATROPIUM BROMIDE AND ALBUTEROL SULFATE 1 AMPULE: .5; 2.5 SOLUTION RESPIRATORY (INHALATION) at 02:07

## 2021-01-01 RX ADMIN — MORPHINE SULFATE 2 MG: 2 INJECTION, SOLUTION INTRAMUSCULAR; INTRAVENOUS at 20:00

## 2021-01-01 RX ADMIN — APIXABAN 5 MG: 5 TABLET, FILM COATED ORAL at 20:39

## 2021-01-01 RX ADMIN — CHLORHEXIDINE GLUCONATE 15 ML: 1.2 RINSE ORAL at 17:30

## 2021-01-01 RX ADMIN — MORPHINE SULFATE 2 MG: 2 INJECTION, SOLUTION INTRAMUSCULAR; INTRAVENOUS at 11:11

## 2021-01-01 RX ADMIN — PIPERACILLIN AND TAZOBACTAM 3375 MG: 3; .375 INJECTION, POWDER, LYOPHILIZED, FOR SOLUTION INTRAVENOUS at 04:10

## 2021-01-01 RX ADMIN — IPRATROPIUM BROMIDE AND ALBUTEROL SULFATE 1 AMPULE: .5; 2.5 SOLUTION RESPIRATORY (INHALATION) at 11:50

## 2021-01-01 RX ADMIN — MORPHINE SULFATE 2 MG: 2 INJECTION, SOLUTION INTRAMUSCULAR; INTRAVENOUS at 11:55

## 2021-01-01 RX ADMIN — IPRATROPIUM BROMIDE AND ALBUTEROL SULFATE 1 AMPULE: .5; 2.5 SOLUTION RESPIRATORY (INHALATION) at 11:00

## 2021-01-01 RX ADMIN — IPRATROPIUM BROMIDE AND ALBUTEROL SULFATE 1 AMPULE: .5; 2.5 SOLUTION RESPIRATORY (INHALATION) at 14:44

## 2021-01-01 RX ADMIN — PIPERACILLIN AND TAZOBACTAM 3375 MG: 3; .375 INJECTION, POWDER, LYOPHILIZED, FOR SOLUTION INTRAVENOUS at 12:12

## 2021-01-01 RX ADMIN — THIAMINE HYDROCHLORIDE 100 MG: 100 INJECTION, SOLUTION INTRAMUSCULAR; INTRAVENOUS at 09:06

## 2021-01-01 RX ADMIN — CITALOPRAM HYDROBROMIDE 20 MG: 20 TABLET, FILM COATED ORAL at 07:53

## 2021-01-01 RX ADMIN — ENOXAPARIN SODIUM 50 MG: 100 INJECTION SUBCUTANEOUS at 08:18

## 2021-01-01 RX ADMIN — FAMOTIDINE 20 MG: 20 TABLET, FILM COATED ORAL at 07:53

## 2021-01-01 RX ADMIN — ATORVASTATIN CALCIUM 40 MG: 40 TABLET, FILM COATED ORAL at 19:59

## 2021-01-01 RX ADMIN — IPRATROPIUM BROMIDE AND ALBUTEROL SULFATE 1 AMPULE: .5; 2.5 SOLUTION RESPIRATORY (INHALATION) at 04:28

## 2021-01-01 RX ADMIN — IPRATROPIUM BROMIDE AND ALBUTEROL SULFATE 1 AMPULE: .5; 2.5 SOLUTION RESPIRATORY (INHALATION) at 07:36

## 2021-01-01 RX ADMIN — PIPERACILLIN AND TAZOBACTAM 3375 MG: 3; .375 INJECTION, POWDER, LYOPHILIZED, FOR SOLUTION INTRAVENOUS at 12:40

## 2021-01-01 RX ADMIN — METHYLPREDNISOLONE SODIUM SUCCINATE 40 MG: 40 INJECTION, POWDER, FOR SOLUTION INTRAMUSCULAR; INTRAVENOUS at 10:30

## 2021-01-01 RX ADMIN — MIDODRINE HYDROCHLORIDE 5 MG: 5 TABLET ORAL at 07:56

## 2021-01-01 RX ADMIN — FAMOTIDINE 20 MG: 20 TABLET, FILM COATED ORAL at 09:44

## 2021-01-01 RX ADMIN — METHYLPREDNISOLONE SODIUM SUCCINATE 40 MG: 40 INJECTION, POWDER, FOR SOLUTION INTRAMUSCULAR; INTRAVENOUS at 12:58

## 2021-01-01 RX ADMIN — ATORVASTATIN CALCIUM 40 MG: 40 TABLET, FILM COATED ORAL at 19:55

## 2021-01-01 RX ADMIN — ASPIRIN 81 MG: 81 TABLET, COATED ORAL at 11:07

## 2021-01-01 RX ADMIN — IPRATROPIUM BROMIDE AND ALBUTEROL SULFATE 1 AMPULE: .5; 2.5 SOLUTION RESPIRATORY (INHALATION) at 11:43

## 2021-01-01 RX ADMIN — ATORVASTATIN CALCIUM 40 MG: 40 TABLET, FILM COATED ORAL at 21:02

## 2021-01-01 ASSESSMENT — PAIN SCALES - GENERAL
PAINLEVEL_OUTOF10: 0
PAINLEVEL_OUTOF10: 0
PAINLEVEL_OUTOF10: 6
PAINLEVEL_OUTOF10: 0
PAINLEVEL_OUTOF10: 6
PAINLEVEL_OUTOF10: 0
PAINLEVEL_OUTOF10: 0
PAINLEVEL_OUTOF10: 1
PAINLEVEL_OUTOF10: 0
PAINLEVEL_OUTOF10: 5
PAINLEVEL_OUTOF10: 0
PAINLEVEL_OUTOF10: 0
PAINLEVEL_OUTOF10: 4
PAINLEVEL_OUTOF10: 5
PAINLEVEL_OUTOF10: 7
PAINLEVEL_OUTOF10: 0
PAINLEVEL_OUTOF10: 6
PAINLEVEL_OUTOF10: 6
PAINLEVEL_OUTOF10: 1
PAINLEVEL_OUTOF10: 7
PAINLEVEL_OUTOF10: 7
PAINLEVEL_OUTOF10: 0
PAINLEVEL_OUTOF10: 0
PAINLEVEL_OUTOF10: 7
PAINLEVEL_OUTOF10: 0
PAINLEVEL_OUTOF10: 4
PAINLEVEL_OUTOF10: 6
PAINLEVEL_OUTOF10: 7
PAINLEVEL_OUTOF10: 5
PAINLEVEL_OUTOF10: 5
PAINLEVEL_OUTOF10: 0
PAINLEVEL_OUTOF10: 5
PAINLEVEL_OUTOF10: 3
PAINLEVEL_OUTOF10: 5
PAINLEVEL_OUTOF10: 5
PAINLEVEL_OUTOF10: 1
PAINLEVEL_OUTOF10: 5
PAINLEVEL_OUTOF10: 0
PAINLEVEL_OUTOF10: 0
PAINLEVEL_OUTOF10: 7
PAINLEVEL_OUTOF10: 4
PAINLEVEL_OUTOF10: 4
PAINLEVEL_OUTOF10: 2
PAINLEVEL_OUTOF10: 7
PAINLEVEL_OUTOF10: 5
PAINLEVEL_OUTOF10: 0
PAINLEVEL_OUTOF10: 3
PAINLEVEL_OUTOF10: 5
PAINLEVEL_OUTOF10: 0
PAINLEVEL_OUTOF10: 0
PAINLEVEL_OUTOF10: 6
PAINLEVEL_OUTOF10: 6
PAINLEVEL_OUTOF10: 7
PAINLEVEL_OUTOF10: 0
PAINLEVEL_OUTOF10: 8
PAINLEVEL_OUTOF10: 6
PAINLEVEL_OUTOF10: 0
PAINLEVEL_OUTOF10: 7
PAINLEVEL_OUTOF10: 0
PAINLEVEL_OUTOF10: 0
PAINLEVEL_OUTOF10: 5
PAINLEVEL_OUTOF10: 0
PAINLEVEL_OUTOF10: 3
PAINLEVEL_OUTOF10: 0
PAINLEVEL_OUTOF10: 0
PAINLEVEL_OUTOF10: 5
PAINLEVEL_OUTOF10: 0
PAINLEVEL_OUTOF10: 4
PAINLEVEL_OUTOF10: 2
PAINLEVEL_OUTOF10: 0
PAINLEVEL_OUTOF10: 6
PAINLEVEL_OUTOF10: 4
PAINLEVEL_OUTOF10: 0
PAINLEVEL_OUTOF10: 6
PAINLEVEL_OUTOF10: 2
PAINLEVEL_OUTOF10: 0
PAINLEVEL_OUTOF10: 5
PAINLEVEL_OUTOF10: 0
PAINLEVEL_OUTOF10: 7
PAINLEVEL_OUTOF10: 7
PAINLEVEL_OUTOF10: 5
PAINLEVEL_OUTOF10: 5
PAINLEVEL_OUTOF10: 0
PAINLEVEL_OUTOF10: 4
PAINLEVEL_OUTOF10: 0
PAINLEVEL_OUTOF10: 0
PAINLEVEL_OUTOF10: 7
PAINLEVEL_OUTOF10: 7
PAINLEVEL_OUTOF10: 3
PAINLEVEL_OUTOF10: 0
PAINLEVEL_OUTOF10: 5
PAINLEVEL_OUTOF10: 0
PAINLEVEL_OUTOF10: 7
PAINLEVEL_OUTOF10: 1
PAINLEVEL_OUTOF10: 0
PAINLEVEL_OUTOF10: 8
PAINLEVEL_OUTOF10: 3
PAINLEVEL_OUTOF10: 0
PAINLEVEL_OUTOF10: 2
PAINLEVEL_OUTOF10: 0
PAINLEVEL_OUTOF10: 4
PAINLEVEL_OUTOF10: 0
PAINLEVEL_OUTOF10: 8
PAINLEVEL_OUTOF10: 3
PAINLEVEL_OUTOF10: 0
PAINLEVEL_OUTOF10: 3
PAINLEVEL_OUTOF10: 0
PAINLEVEL_OUTOF10: 0
PAINLEVEL_OUTOF10: 1
PAINLEVEL_OUTOF10: 0
PAINLEVEL_OUTOF10: 7
PAINLEVEL_OUTOF10: 0
PAINLEVEL_OUTOF10: 7
PAINLEVEL_OUTOF10: 0
PAINLEVEL_OUTOF10: 4
PAINLEVEL_OUTOF10: 1
PAINLEVEL_OUTOF10: 7
PAINLEVEL_OUTOF10: 4
PAINLEVEL_OUTOF10: 0
PAINLEVEL_OUTOF10: 5
PAINLEVEL_OUTOF10: 0
PAINLEVEL_OUTOF10: 7
PAINLEVEL_OUTOF10: 5
PAINLEVEL_OUTOF10: 7
PAINLEVEL_OUTOF10: 0
PAINLEVEL_OUTOF10: 5
PAINLEVEL_OUTOF10: 5
PAINLEVEL_OUTOF10: 7
PAINLEVEL_OUTOF10: 0
PAINLEVEL_OUTOF10: 10
PAINLEVEL_OUTOF10: 0
PAINLEVEL_OUTOF10: 7
PAINLEVEL_OUTOF10: 0
PAINLEVEL_OUTOF10: 7
PAINLEVEL_OUTOF10: 7
PAINLEVEL_OUTOF10: 0
PAINLEVEL_OUTOF10: 4
PAINLEVEL_OUTOF10: 0
PAINLEVEL_OUTOF10: 7
PAINLEVEL_OUTOF10: 7
PAINLEVEL_OUTOF10: 0
PAINLEVEL_OUTOF10: 0
PAINLEVEL_OUTOF10: 7
PAINLEVEL_OUTOF10: 0
PAINLEVEL_OUTOF10: 0
PAINLEVEL_OUTOF10: 6
PAINLEVEL_OUTOF10: 6
PAINLEVEL_OUTOF10: 0
PAINLEVEL_OUTOF10: 5
PAINLEVEL_OUTOF10: 0
PAINLEVEL_OUTOF10: 0
PAINLEVEL_OUTOF10: 5
PAINLEVEL_OUTOF10: 6
PAINLEVEL_OUTOF10: 5
PAINLEVEL_OUTOF10: 7
PAINLEVEL_OUTOF10: 0
PAINLEVEL_OUTOF10: 7
PAINLEVEL_OUTOF10: 0
PAINLEVEL_OUTOF10: 3
PAINLEVEL_OUTOF10: 0
PAINLEVEL_OUTOF10: 3
PAINLEVEL_OUTOF10: 5
PAINLEVEL_OUTOF10: 1

## 2021-01-01 ASSESSMENT — PULMONARY FUNCTION TESTS
PIF_VALUE: 29
PIF_VALUE: 40
PIF_VALUE: 27
PIF_VALUE: 26
PIF_VALUE: 29
PIF_VALUE: 26
PIF_VALUE: 25
PIF_VALUE: 44
PIF_VALUE: 21
PIF_VALUE: 30
PIF_VALUE: 29
PIF_VALUE: 24
PIF_VALUE: 38
PIF_VALUE: 25
PIF_VALUE: 31
PIF_VALUE: 27
PIF_VALUE: 25
PIF_VALUE: 26
PIF_VALUE: 23
PIF_VALUE: 23
PIF_VALUE: 25
PIF_VALUE: 39
PIF_VALUE: 38
PIF_VALUE: 21
PIF_VALUE: 43
PIF_VALUE: 26
PIF_VALUE: 25
PIF_VALUE: 27
PIF_VALUE: 30
PIF_VALUE: 32
PIF_VALUE: 27
PIF_VALUE: 32
PIF_VALUE: 25
PIF_VALUE: 31
PIF_VALUE: 8.9
PIF_VALUE: 29
PIF_VALUE: 23
PIF_VALUE: 23
PIF_VALUE: 29
PIF_VALUE: 25
PIF_VALUE: 28
PIF_VALUE: 28
PIF_VALUE: 39
PIF_VALUE: 26
PIF_VALUE: 41
PIF_VALUE: 25
PIF_VALUE: 33
PIF_VALUE: 24
PIF_VALUE: 26
PIF_VALUE: 21
PIF_VALUE: 28
PIF_VALUE: 30
PIF_VALUE: 28
PIF_VALUE: 27
PIF_VALUE: 27
PIF_VALUE: 34
PIF_VALUE: 21
PIF_VALUE: 26
PIF_VALUE: 25
PIF_VALUE: 25
PIF_VALUE: 28
PIF_VALUE: 30
PIF_VALUE: 20
PIF_VALUE: 21
PIF_VALUE: 22
PIF_VALUE: 30
PIF_VALUE: 32
PIF_VALUE: 21
PIF_VALUE: 30
PIF_VALUE: 27
PIF_VALUE: 24
PIF_VALUE: 29
PIF_VALUE: 29
PIF_VALUE: 22
PIF_VALUE: 26
PIF_VALUE: 26
PIF_VALUE: 30
PIF_VALUE: 37
PIF_VALUE: 27
PIF_VALUE: 35
PIF_VALUE: 25
PIF_VALUE: 25
PIF_VALUE: 31
PIF_VALUE: 28
PIF_VALUE: 25
PIF_VALUE: 28
PIF_VALUE: 24
PIF_VALUE: 27
PIF_VALUE: 24
PIF_VALUE: 32
PIF_VALUE: 27
PIF_VALUE: 25
PIF_VALUE: 23
PIF_VALUE: 28
PIF_VALUE: 24
PIF_VALUE: 23
PIF_VALUE: 60
PIF_VALUE: 25
PIF_VALUE: 25
PIF_VALUE: 47
PIF_VALUE: 25
PIF_VALUE: 45
PIF_VALUE: 26
PIF_VALUE: 23
PIF_VALUE: 23
PIF_VALUE: 24
PIF_VALUE: 29
PIF_VALUE: 22
PIF_VALUE: 26
PIF_VALUE: 23
PIF_VALUE: 38
PIF_VALUE: 34
PIF_VALUE: 22
PIF_VALUE: 28
PIF_VALUE: 23
PIF_VALUE: 40
PIF_VALUE: 24
PIF_VALUE: 28
PIF_VALUE: 27
PIF_VALUE: 26
PIF_VALUE: 27
PIF_VALUE: 30
PIF_VALUE: 26
PIF_VALUE: 24
PIF_VALUE: 27
PIF_VALUE: 26
PIF_VALUE: 26
PIF_VALUE: 25
PIF_VALUE: 23
PIF_VALUE: 33
PIF_VALUE: 30
PIF_VALUE: 24
PIF_VALUE: 27
PIF_VALUE: 23
PIF_VALUE: 24
PIF_VALUE: 30
PIF_VALUE: 23
PIF_VALUE: 28
PIF_VALUE: 26
PIF_VALUE: 21
PIF_VALUE: 26
PIF_VALUE: 37
PIF_VALUE: 25
PIF_VALUE: 22
PIF_VALUE: 21
PIF_VALUE: 23
PIF_VALUE: 29
PIF_VALUE: 26
PIF_VALUE: 49
PIF_VALUE: 29
PIF_VALUE: 44
PIF_VALUE: 40
PIF_VALUE: 23
PIF_VALUE: 25
PIF_VALUE: 25
PIF_VALUE: 34
PIF_VALUE: 26
PIF_VALUE: 30
PIF_VALUE: 23
PIF_VALUE: 25
PIF_VALUE: 33
PIF_VALUE: 33
PIF_VALUE: 24
PIF_VALUE: 27
PIF_VALUE: 24
PIF_VALUE: 29
PIF_VALUE: 29
PIF_VALUE: 22
PIF_VALUE: 34
PIF_VALUE: 34
PIF_VALUE: 24
PIF_VALUE: 11
PIF_VALUE: 37
PIF_VALUE: 26
PIF_VALUE: 32
PIF_VALUE: 33
PIF_VALUE: 24
PIF_VALUE: 24
PIF_VALUE: 22
PIF_VALUE: 26
PIF_VALUE: 25
PIF_VALUE: 37
PIF_VALUE: 27
PIF_VALUE: 31
PIF_VALUE: 24
PIF_VALUE: 25
PIF_VALUE: 29
PIF_VALUE: 23
PIF_VALUE: 23
PIF_VALUE: 25
PIF_VALUE: 30
PIF_VALUE: 24
PIF_VALUE: 28
PIF_VALUE: 29
PIF_VALUE: 26
PIF_VALUE: 22
PIF_VALUE: 28
PIF_VALUE: 25
PIF_VALUE: 28
PIF_VALUE: 27
PIF_VALUE: 24
PIF_VALUE: 27
PIF_VALUE: 29
PIF_VALUE: 25
PIF_VALUE: 23
PIF_VALUE: 24
PIF_VALUE: 23
PIF_VALUE: 28
PIF_VALUE: 30
PIF_VALUE: 32
PIF_VALUE: 31
PIF_VALUE: 31
PIF_VALUE: 22
PIF_VALUE: 33
PIF_VALUE: 42
PIF_VALUE: 25
PIF_VALUE: 27
PIF_VALUE: 29
PIF_VALUE: 27
PIF_VALUE: 27
PIF_VALUE: 21
PIF_VALUE: 31
PIF_VALUE: 26
PIF_VALUE: 25
PIF_VALUE: 26
PIF_VALUE: 34
PIF_VALUE: 35
PIF_VALUE: 27
PIF_VALUE: 29
PIF_VALUE: 27
PIF_VALUE: 26
PIF_VALUE: 26
PIF_VALUE: 37
PIF_VALUE: 29
PIF_VALUE: 28
PIF_VALUE: 23
PIF_VALUE: 26
PIF_VALUE: 25
PIF_VALUE: 24
PIF_VALUE: 24
PIF_VALUE: 22
PIF_VALUE: 34
PIF_VALUE: 29
PIF_VALUE: 23
PIF_VALUE: 37
PIF_VALUE: 24
PIF_VALUE: 25
PIF_VALUE: 22
PIF_VALUE: 26
PIF_VALUE: 26
PIF_VALUE: 24
PIF_VALUE: 36
PIF_VALUE: 21
PIF_VALUE: 28
PIF_VALUE: 28
PIF_VALUE: 32
PIF_VALUE: 30
PIF_VALUE: 38
PIF_VALUE: 27
PIF_VALUE: 34
PIF_VALUE: 35
PIF_VALUE: 50
PIF_VALUE: 21
PIF_VALUE: 26
PIF_VALUE: 26
PIF_VALUE: 25
PIF_VALUE: 28
PIF_VALUE: 32
PIF_VALUE: 27
PIF_VALUE: 29
PIF_VALUE: 29
PIF_VALUE: 34
PIF_VALUE: 27
PIF_VALUE: 32
PIF_VALUE: 24
PIF_VALUE: 26
PIF_VALUE: 24
PIF_VALUE: 30
PIF_VALUE: 25
PIF_VALUE: 24
PIF_VALUE: 24
PIF_VALUE: 25
PIF_VALUE: 39
PIF_VALUE: 27
PIF_VALUE: 30
PIF_VALUE: 29
PIF_VALUE: 37
PIF_VALUE: 30
PIF_VALUE: 42
PIF_VALUE: 30
PIF_VALUE: 27
PIF_VALUE: 21
PIF_VALUE: 28
PIF_VALUE: 28
PIF_VALUE: 30
PIF_VALUE: 27
PIF_VALUE: 22
PIF_VALUE: 25
PIF_VALUE: 26
PIF_VALUE: 31
PIF_VALUE: 27
PIF_VALUE: 22
PIF_VALUE: 26
PIF_VALUE: 23
PIF_VALUE: 25
PIF_VALUE: 44
PIF_VALUE: 21
PIF_VALUE: 21
PIF_VALUE: 25
PIF_VALUE: 34
PIF_VALUE: 30
PIF_VALUE: 21
PIF_VALUE: 30
PIF_VALUE: 29
PIF_VALUE: 26
PIF_VALUE: 26
PIF_VALUE: 24
PIF_VALUE: 21
PIF_VALUE: 25
PIF_VALUE: 21
PIF_VALUE: 27
PIF_VALUE: 25
PIF_VALUE: 26
PIF_VALUE: 24
PIF_VALUE: 25
PIF_VALUE: 32
PIF_VALUE: 25
PIF_VALUE: 26
PIF_VALUE: 28
PIF_VALUE: 26
PIF_VALUE: 28
PIF_VALUE: 31
PIF_VALUE: 28
PIF_VALUE: 35
PIF_VALUE: 23
PIF_VALUE: 26
PIF_VALUE: 22
PIF_VALUE: 26
PIF_VALUE: 30
PIF_VALUE: 27
PIF_VALUE: 30
PIF_VALUE: 35
PIF_VALUE: 22
PIF_VALUE: 24
PIF_VALUE: 25
PIF_VALUE: 31
PIF_VALUE: 23
PIF_VALUE: 28
PIF_VALUE: 24
PIF_VALUE: 27
PIF_VALUE: 34
PIF_VALUE: 23
PIF_VALUE: 31
PIF_VALUE: 26
PIF_VALUE: 27
PIF_VALUE: 25
PIF_VALUE: 23
PIF_VALUE: 27
PIF_VALUE: 24
PIF_VALUE: 21
PIF_VALUE: 25
PIF_VALUE: 30
PIF_VALUE: 24
PIF_VALUE: 25
PIF_VALUE: 23
PIF_VALUE: 24
PIF_VALUE: 25
PIF_VALUE: 27
PIF_VALUE: 27
PIF_VALUE: 31
PIF_VALUE: 26
PIF_VALUE: 21
PIF_VALUE: 25
PIF_VALUE: 21
PIF_VALUE: 27
PIF_VALUE: 27
PIF_VALUE: 24
PIF_VALUE: 43
PIF_VALUE: 29
PIF_VALUE: 24
PIF_VALUE: 25
PIF_VALUE: 37
PIF_VALUE: 28
PIF_VALUE: 21
PIF_VALUE: 27
PIF_VALUE: 34
PIF_VALUE: 29
PIF_VALUE: 33
PIF_VALUE: 35
PIF_VALUE: 23
PIF_VALUE: 10
PIF_VALUE: 30
PIF_VALUE: 35
PIF_VALUE: 28
PIF_VALUE: 30
PIF_VALUE: 27
PIF_VALUE: 26
PIF_VALUE: 25
PIF_VALUE: 35
PIF_VALUE: 31
PIF_VALUE: 23
PIF_VALUE: 31
PIF_VALUE: 22
PIF_VALUE: 26
PIF_VALUE: 22
PIF_VALUE: 26
PIF_VALUE: 33
PIF_VALUE: 29
PIF_VALUE: 34
PIF_VALUE: 22
PIF_VALUE: 24
PIF_VALUE: 23
PIF_VALUE: 26
PIF_VALUE: 8.3
PIF_VALUE: 32
PIF_VALUE: 26
PIF_VALUE: 29
PIF_VALUE: 22
PIF_VALUE: 26
PIF_VALUE: 24
PIF_VALUE: 27
PIF_VALUE: 27
PIF_VALUE: 32
PIF_VALUE: 26
PIF_VALUE: 35
PIF_VALUE: 30
PIF_VALUE: 27
PIF_VALUE: 33
PIF_VALUE: 23
PIF_VALUE: 40
PIF_VALUE: 31
PIF_VALUE: 20
PIF_VALUE: 28
PIF_VALUE: 22
PIF_VALUE: 32
PIF_VALUE: 29
PIF_VALUE: 25
PIF_VALUE: 26
PIF_VALUE: 35
PIF_VALUE: 40
PIF_VALUE: 28
PIF_VALUE: 32
PIF_VALUE: 29
PIF_VALUE: 49
PIF_VALUE: 27
PIF_VALUE: 46
PIF_VALUE: 23
PIF_VALUE: 23
PIF_VALUE: 25
PIF_VALUE: 26
PIF_VALUE: 25
PIF_VALUE: 26
PIF_VALUE: 28
PIF_VALUE: 24
PIF_VALUE: 24
PIF_VALUE: 23
PIF_VALUE: 25
PIF_VALUE: 23
PIF_VALUE: 28
PIF_VALUE: 28
PIF_VALUE: 24
PIF_VALUE: 27
PIF_VALUE: 24
PIF_VALUE: 101
PIF_VALUE: 30
PIF_VALUE: 23
PIF_VALUE: 23
PIF_VALUE: 26
PIF_VALUE: 32
PIF_VALUE: 26
PIF_VALUE: 29
PIF_VALUE: 25
PIF_VALUE: 24
PIF_VALUE: 20
PIF_VALUE: 25
PIF_VALUE: 33
PIF_VALUE: 22
PIF_VALUE: 25
PIF_VALUE: 22
PIF_VALUE: 24
PIF_VALUE: 30
PIF_VALUE: 38
PIF_VALUE: 31
PIF_VALUE: 22
PIF_VALUE: 28
PIF_VALUE: 29
PIF_VALUE: 27
PIF_VALUE: 27
PIF_VALUE: 22
PIF_VALUE: 26
PIF_VALUE: 29
PIF_VALUE: 35
PIF_VALUE: 26
PIF_VALUE: 23
PIF_VALUE: 40
PIF_VALUE: 28
PIF_VALUE: 23
PIF_VALUE: 25
PIF_VALUE: 26
PIF_VALUE: 24
PIF_VALUE: 26
PIF_VALUE: 25
PIF_VALUE: 21
PIF_VALUE: 26
PIF_VALUE: 36
PIF_VALUE: 36
PIF_VALUE: 26
PIF_VALUE: 27
PIF_VALUE: 25
PIF_VALUE: 30
PIF_VALUE: 22
PIF_VALUE: 28
PIF_VALUE: 38

## 2021-01-01 ASSESSMENT — PAIN DESCRIPTION - DESCRIPTORS
DESCRIPTORS: ACHING;DISCOMFORT
DESCRIPTORS: ACHING
DESCRIPTORS: DISCOMFORT
DESCRIPTORS: ACHING
DESCRIPTORS: ACHING;DISCOMFORT
DESCRIPTORS: ACHING
DESCRIPTORS: POUNDING;ACHING
DESCRIPTORS: POUNDING;ACHING
DESCRIPTORS: ACHING
DESCRIPTORS: ACHING;DISCOMFORT
DESCRIPTORS: DISCOMFORT
DESCRIPTORS: ACHING
DESCRIPTORS: DISCOMFORT
DESCRIPTORS: ACHING

## 2021-01-01 ASSESSMENT — PAIN DESCRIPTION - PAIN TYPE
TYPE: CHRONIC PAIN
TYPE: CHRONIC PAIN
TYPE: ACUTE PAIN
TYPE: CHRONIC PAIN
TYPE: ACUTE PAIN
TYPE: CHRONIC PAIN
TYPE: CHRONIC PAIN
TYPE: ACUTE PAIN
TYPE: CHRONIC PAIN
TYPE: ACUTE PAIN
TYPE: ACUTE PAIN
TYPE: CHRONIC PAIN
TYPE: ACUTE PAIN
TYPE: CHRONIC PAIN

## 2021-01-01 ASSESSMENT — PAIN DESCRIPTION - PROGRESSION
CLINICAL_PROGRESSION: GRADUALLY IMPROVING
CLINICAL_PROGRESSION: NOT CHANGED

## 2021-01-01 ASSESSMENT — PAIN DESCRIPTION - FREQUENCY
FREQUENCY: CONTINUOUS
FREQUENCY: INTERMITTENT
FREQUENCY: CONTINUOUS
FREQUENCY: INTERMITTENT
FREQUENCY: CONTINUOUS
FREQUENCY: INTERMITTENT
FREQUENCY: CONTINUOUS
FREQUENCY: INTERMITTENT
FREQUENCY: CONTINUOUS

## 2021-01-01 ASSESSMENT — ENCOUNTER SYMPTOMS
VOICE CHANGE: 0
SORE THROAT: 0
BACK PAIN: 0
COUGH: 1
ABDOMINAL DISTENTION: 0
COUGH: 1
APNEA: 0
RHINORRHEA: 0
DIARRHEA: 0
ABDOMINAL PAIN: 0
BLOOD IN STOOL: 0
CONSTIPATION: 0
ABDOMINAL PAIN: 0
SHORTNESS OF BREATH: 1
EYE REDNESS: 0
EYE PAIN: 0
SHORTNESS OF BREATH: 1
DIARRHEA: 0
RHINORRHEA: 0
ABDOMINAL PAIN: 0
CHEST TIGHTNESS: 0
VOMITING: 0
COUGH: 0
TROUBLE SWALLOWING: 0
ABDOMINAL DISTENTION: 0
SINUS PAIN: 0
NAUSEA: 0
ANAL BLEEDING: 0
WHEEZING: 1
VOMITING: 0
WHEEZING: 0
SHORTNESS OF BREATH: 1

## 2021-01-01 ASSESSMENT — PAIN DESCRIPTION - ORIENTATION
ORIENTATION: RIGHT
ORIENTATION: LOWER
ORIENTATION: MID;LOWER
ORIENTATION: LOWER

## 2021-01-01 ASSESSMENT — PAIN DESCRIPTION - ONSET
ONSET: GRADUAL
ONSET: ON-GOING
ONSET: ON-GOING
ONSET: GRADUAL
ONSET: ON-GOING
ONSET: GRADUAL

## 2021-01-01 ASSESSMENT — PAIN SCALES - WONG BAKER
WONGBAKER_NUMERICALRESPONSE: 2
WONGBAKER_NUMERICALRESPONSE: 2
WONGBAKER_NUMERICALRESPONSE: 0
WONGBAKER_NUMERICALRESPONSE: 0

## 2021-01-01 ASSESSMENT — PAIN DESCRIPTION - LOCATION
LOCATION: BACK
LOCATION: GENERALIZED
LOCATION: BACK
LOCATION: BACK;GENERALIZED
LOCATION: HEAD
LOCATION: BACK;GENERALIZED
LOCATION: BACK
LOCATION: BACK
LOCATION: HEAD
LOCATION: BACK;GENERALIZED
LOCATION: GENERALIZED
LOCATION: BACK
LOCATION: GENERALIZED
LOCATION: BACK
LOCATION: BACK
LOCATION: GENERALIZED
LOCATION: BACK
LOCATION: BACK;GENERALIZED
LOCATION: BACK;GENERALIZED
LOCATION: CHEST
LOCATION: BACK;GENERALIZED
LOCATION: GENERALIZED
LOCATION: BACK
LOCATION: BACK

## 2021-01-01 ASSESSMENT — PAIN - FUNCTIONAL ASSESSMENT
PAIN_FUNCTIONAL_ASSESSMENT: ACTIVITIES ARE NOT PREVENTED

## 2021-01-06 ENCOUNTER — TELEPHONE (OUTPATIENT)
Dept: HEMATOLOGY | Age: 66
End: 2021-01-06

## 2021-01-06 NOTE — TELEPHONE ENCOUNTER
Tried calling patient to reschedule missed appt, but phone not working. [address] [place] [address], [place]. He asked if we could call tomorrow as he is out of town.

## 2021-02-02 DIAGNOSIS — G89.3 CANCER RELATED PAIN: ICD-10-CM

## 2021-02-02 DIAGNOSIS — C34.01 MALIGNANT NEOPLASM OF RIGHT MAIN BRONCHUS (HCC): ICD-10-CM

## 2021-02-02 RX ORDER — OXYCODONE AND ACETAMINOPHEN 10; 325 MG/1; MG/1
1 TABLET ORAL EVERY 6 HOURS PRN
Qty: 120 TABLET | Refills: 0 | Status: SHIPPED | OUTPATIENT
Start: 2021-02-02 | End: 2021-03-03 | Stop reason: SDUPTHER

## 2021-02-03 ENCOUNTER — HOSPITAL ENCOUNTER (OUTPATIENT)
Dept: INFUSION THERAPY | Age: 66
End: 2021-02-03
Payer: MEDICARE

## 2021-02-09 ENCOUNTER — HOSPITAL ENCOUNTER (OUTPATIENT)
Dept: INFUSION THERAPY | Age: 66
End: 2021-02-09
Payer: MEDICARE

## 2021-02-15 ENCOUNTER — TELEPHONE (OUTPATIENT)
Dept: INFUSION THERAPY | Age: 66
End: 2021-02-15

## 2021-02-25 ENCOUNTER — HOSPITAL ENCOUNTER (OUTPATIENT)
Dept: INFUSION THERAPY | Age: 66
Discharge: HOME | End: 2021-02-25
Payer: MEDICARE

## 2021-02-25 VITALS
TEMPERATURE: 97.5 F | RESPIRATION RATE: 18 BRPM | DIASTOLIC BLOOD PRESSURE: 73 MMHG | HEART RATE: 95 BPM | HEIGHT: 64 IN | BODY MASS INDEX: 20.14 KG/M2 | OXYGEN SATURATION: 92 % | SYSTOLIC BLOOD PRESSURE: 119 MMHG | WEIGHT: 118 LBS

## 2021-02-25 DIAGNOSIS — R11.0 NAUSEA: ICD-10-CM

## 2021-02-25 DIAGNOSIS — C34.01 MALIGNANT NEOPLASM OF RIGHT MAIN BRONCHUS (HCC): Primary | ICD-10-CM

## 2021-02-25 DIAGNOSIS — R53.83 FATIGUE DUE TO TREATMENT: ICD-10-CM

## 2021-02-25 DIAGNOSIS — T45.1X5D ADVERSE EFFECT OF CHEMOTHERAPY, SUBSEQUENT ENCOUNTER: ICD-10-CM

## 2021-02-25 LAB
ALBUMIN SERPL-MCNC: 3.5 G/DL (ref 3.5–5.2)
ALP BLD-CCNC: 143 U/L (ref 35–104)
ALT SERPL-CCNC: 7 U/L (ref 9–52)
ANION GAP SERPL CALCULATED.3IONS-SCNC: 5 MMOL/L (ref 7–19)
AST SERPL-CCNC: 21 U/L (ref 14–36)
BASOPHILS ABSOLUTE: 0.03 K/UL (ref 0.01–0.08)
BASOPHILS RELATIVE PERCENT: 0.4 % (ref 0.1–1.2)
BILIRUB SERPL-MCNC: 0.3 MG/DL (ref 0.2–1.3)
BUN BLDV-MCNC: 11 MG/DL (ref 7–17)
CALCIUM SERPL-MCNC: 8.6 MG/DL (ref 8.4–10.2)
CHLORIDE BLD-SCNC: 105 MMOL/L (ref 98–111)
CO2: 36 MMOL/L (ref 22–29)
CREAT SERPL-MCNC: <0.5 MG/DL (ref 0.5–1)
EOSINOPHILS ABSOLUTE: 0.1 K/UL (ref 0.04–0.54)
EOSINOPHILS RELATIVE PERCENT: 1.3 % (ref 0.7–7)
GFR NON-AFRICAN AMERICAN: >60
GLOBULIN: 3.5 G/DL
GLUCOSE BLD-MCNC: 91 MG/DL (ref 74–106)
HCT VFR BLD CALC: 39.7 % (ref 34.1–44.9)
HEMOGLOBIN: 12.5 G/DL (ref 11.2–15.7)
LYMPHOCYTES ABSOLUTE: 1.07 K/UL (ref 1.18–3.74)
LYMPHOCYTES RELATIVE PERCENT: 14 % (ref 19.3–53.1)
MCH RBC QN AUTO: 32.3 PG (ref 25.6–32.2)
MCHC RBC AUTO-ENTMCNC: 31.5 G/DL (ref 32.3–35.5)
MCV RBC AUTO: 102.6 FL (ref 79.4–94.8)
MONOCYTES ABSOLUTE: 0.48 K/UL (ref 0.24–0.82)
MONOCYTES RELATIVE PERCENT: 6.3 % (ref 4.7–12.5)
NEUTROPHILS ABSOLUTE: 5.99 K/UL (ref 1.56–6.13)
NEUTROPHILS RELATIVE PERCENT: 78 % (ref 34–71.1)
PDW BLD-RTO: 16.7 % (ref 11.7–14.4)
PLATELET # BLD: 175 K/UL (ref 182–369)
PMV BLD AUTO: 11.1 FL (ref 7.4–10.4)
POTASSIUM SERPL-SCNC: 3.8 MMOL/L (ref 3.5–5.1)
RBC # BLD: 3.87 M/UL (ref 3.93–5.22)
SODIUM BLD-SCNC: 146 MMOL/L (ref 137–145)
TOTAL PROTEIN: 7 G/DL (ref 6.3–8.2)
TSH SERPL DL<=0.05 MIU/L-ACNC: 0.15 UIU/ML (ref 0.47–4.68)
WBC # BLD: 7.67 K/UL (ref 3.98–10.04)

## 2021-02-25 PROCEDURE — 85025 COMPLETE CBC W/AUTO DIFF WBC: CPT

## 2021-02-25 PROCEDURE — 2580000003 HC RX 258: Performed by: INTERNAL MEDICINE

## 2021-02-25 PROCEDURE — 84443 ASSAY THYROID STIM HORMONE: CPT

## 2021-02-25 PROCEDURE — 80053 COMPREHEN METABOLIC PANEL: CPT

## 2021-02-25 PROCEDURE — 96413 CHEMO IV INFUSION 1 HR: CPT

## 2021-02-25 PROCEDURE — 6360000002 HC RX W HCPCS: Performed by: INTERNAL MEDICINE

## 2021-02-25 RX ORDER — DIPHENHYDRAMINE HYDROCHLORIDE 50 MG/ML
50 INJECTION INTRAMUSCULAR; INTRAVENOUS ONCE
Status: CANCELLED | OUTPATIENT
Start: 2021-02-25 | End: 2021-02-25

## 2021-02-25 RX ORDER — PROMETHAZINE HYDROCHLORIDE 25 MG/1
25 TABLET ORAL EVERY 6 HOURS PRN
Qty: 30 TABLET | Refills: 3 | Status: SHIPPED | OUTPATIENT
Start: 2021-02-25 | End: 2021-03-27

## 2021-02-25 RX ORDER — HEPARIN SODIUM (PORCINE) LOCK FLUSH IV SOLN 100 UNIT/ML 100 UNIT/ML
500 SOLUTION INTRAVENOUS PRN
Status: CANCELLED
Start: 2021-02-25

## 2021-02-25 RX ORDER — SODIUM CHLORIDE 9 MG/ML
20 INJECTION, SOLUTION INTRAVENOUS ONCE
Status: CANCELLED | OUTPATIENT
Start: 2021-02-25 | End: 2021-02-25

## 2021-02-25 RX ORDER — SODIUM CHLORIDE 0.9 % (FLUSH) 0.9 %
10 SYRINGE (ML) INJECTION PRN
Status: DISCONTINUED | OUTPATIENT
Start: 2021-02-25 | End: 2021-02-26 | Stop reason: HOSPADM

## 2021-02-25 RX ORDER — HEPARIN SODIUM (PORCINE) LOCK FLUSH IV SOLN 100 UNIT/ML 100 UNIT/ML
500 SOLUTION INTRAVENOUS PRN
Status: DISCONTINUED | OUTPATIENT
Start: 2021-02-25 | End: 2021-02-27 | Stop reason: HOSPADM

## 2021-02-25 RX ORDER — SODIUM CHLORIDE 9 MG/ML
INJECTION, SOLUTION INTRAVENOUS CONTINUOUS
Status: CANCELLED | OUTPATIENT
Start: 2021-02-25

## 2021-02-25 RX ORDER — SODIUM CHLORIDE 0.9 % (FLUSH) 0.9 %
10 SYRINGE (ML) INJECTION PRN
Status: CANCELLED | OUTPATIENT
Start: 2021-02-25

## 2021-02-25 RX ORDER — EPINEPHRINE 1 MG/ML
0.3 INJECTION, SOLUTION, CONCENTRATE INTRAVENOUS PRN
Status: CANCELLED | OUTPATIENT
Start: 2021-02-25

## 2021-02-25 RX ORDER — METHYLPREDNISOLONE SODIUM SUCCINATE 125 MG/2ML
125 INJECTION, POWDER, LYOPHILIZED, FOR SOLUTION INTRAMUSCULAR; INTRAVENOUS ONCE
Status: CANCELLED | OUTPATIENT
Start: 2021-02-25 | End: 2021-02-25

## 2021-02-25 RX ORDER — SODIUM CHLORIDE 0.9 % (FLUSH) 0.9 %
5 SYRINGE (ML) INJECTION PRN
Status: CANCELLED | OUTPATIENT
Start: 2021-02-25

## 2021-02-25 RX ADMIN — SODIUM CHLORIDE, PRESERVATIVE FREE 10 ML: 5 INJECTION INTRAVENOUS at 12:33

## 2021-02-25 RX ADMIN — HEPARIN 500 UNITS: 100 SYRINGE at 12:33

## 2021-02-25 RX ADMIN — SODIUM CHLORIDE 1500 MG: 9 INJECTION, SOLUTION INTRAVENOUS at 11:28

## 2021-03-03 DIAGNOSIS — G89.3 CANCER RELATED PAIN: ICD-10-CM

## 2021-03-03 DIAGNOSIS — C34.01 MALIGNANT NEOPLASM OF RIGHT MAIN BRONCHUS (HCC): ICD-10-CM

## 2021-03-03 RX ORDER — OXYCODONE AND ACETAMINOPHEN 10; 325 MG/1; MG/1
1 TABLET ORAL EVERY 6 HOURS PRN
Qty: 120 TABLET | Refills: 0 | Status: SHIPPED | OUTPATIENT
Start: 2021-03-03 | End: 2021-03-30 | Stop reason: SDUPTHER

## 2021-03-30 DIAGNOSIS — C34.01 MALIGNANT NEOPLASM OF RIGHT MAIN BRONCHUS (HCC): ICD-10-CM

## 2021-03-30 DIAGNOSIS — G89.3 CANCER RELATED PAIN: ICD-10-CM

## 2021-03-30 RX ORDER — OXYCODONE AND ACETAMINOPHEN 10; 325 MG/1; MG/1
1 TABLET ORAL EVERY 6 HOURS PRN
Qty: 120 TABLET | Refills: 0 | Status: SHIPPED | OUTPATIENT
Start: 2021-04-02 | End: 2021-04-29 | Stop reason: SDUPTHER

## 2021-04-07 ENCOUNTER — HOSPITAL ENCOUNTER (OUTPATIENT)
Dept: CT IMAGING | Age: 66
Discharge: HOME | End: 2021-04-07
Payer: MEDICARE

## 2021-04-07 DIAGNOSIS — C34.01 MALIGNANT NEOPLASM OF RIGHT MAIN BRONCHUS (HCC): ICD-10-CM

## 2021-04-07 PROCEDURE — 6360000004 HC RX CONTRAST MEDICATION: Performed by: INTERNAL MEDICINE

## 2021-04-07 PROCEDURE — 71260 CT THORAX DX C+: CPT

## 2021-04-07 RX ADMIN — IOPAMIDOL 60 ML: 755 INJECTION, SOLUTION INTRAVENOUS at 11:20

## 2021-04-27 ENCOUNTER — OFFICE VISIT (OUTPATIENT)
Dept: HEMATOLOGY | Age: 66
End: 2021-04-27
Payer: MEDICARE

## 2021-04-27 ENCOUNTER — TELEPHONE (OUTPATIENT)
Dept: HEMATOLOGY | Age: 66
End: 2021-04-27

## 2021-04-27 VITALS
HEIGHT: 65 IN | HEART RATE: 100 BPM | DIASTOLIC BLOOD PRESSURE: 62 MMHG | TEMPERATURE: 98 F | OXYGEN SATURATION: 90 % | BODY MASS INDEX: 19.99 KG/M2 | WEIGHT: 120 LBS | SYSTOLIC BLOOD PRESSURE: 98 MMHG

## 2021-04-27 DIAGNOSIS — J44.9 CHRONIC OBSTRUCTIVE PULMONARY DISEASE, UNSPECIFIED COPD TYPE (HCC): ICD-10-CM

## 2021-04-27 DIAGNOSIS — C34.01 MALIGNANT NEOPLASM OF RIGHT MAIN BRONCHUS (HCC): Primary | ICD-10-CM

## 2021-04-27 DIAGNOSIS — Z71.89 CARE PLAN DISCUSSED WITH PATIENT: ICD-10-CM

## 2021-04-27 PROCEDURE — 3017F COLORECTAL CA SCREEN DOC REV: CPT | Performed by: INTERNAL MEDICINE

## 2021-04-27 PROCEDURE — 99213 OFFICE O/P EST LOW 20 MIN: CPT | Performed by: INTERNAL MEDICINE

## 2021-04-27 PROCEDURE — 4004F PT TOBACCO SCREEN RCVD TLK: CPT | Performed by: INTERNAL MEDICINE

## 2021-04-27 PROCEDURE — 4040F PNEUMOC VAC/ADMIN/RCVD: CPT | Performed by: INTERNAL MEDICINE

## 2021-04-27 PROCEDURE — 1123F ACP DISCUSS/DSCN MKR DOCD: CPT | Performed by: INTERNAL MEDICINE

## 2021-04-27 PROCEDURE — G8400 PT W/DXA NO RESULTS DOC: HCPCS | Performed by: INTERNAL MEDICINE

## 2021-04-27 PROCEDURE — G8427 DOCREV CUR MEDS BY ELIG CLIN: HCPCS | Performed by: INTERNAL MEDICINE

## 2021-04-27 PROCEDURE — 3023F SPIROM DOC REV: CPT | Performed by: INTERNAL MEDICINE

## 2021-04-27 PROCEDURE — 1090F PRES/ABSN URINE INCON ASSESS: CPT | Performed by: INTERNAL MEDICINE

## 2021-04-27 PROCEDURE — G8926 SPIRO NO PERF OR DOC: HCPCS | Performed by: INTERNAL MEDICINE

## 2021-04-27 PROCEDURE — G8420 CALC BMI NORM PARAMETERS: HCPCS | Performed by: INTERNAL MEDICINE

## 2021-04-27 NOTE — TELEPHONE ENCOUNTER
Tried to call patient to let her know of Ct appointment at [facility] on 08/23/21 @ 10 am. Patient did not have a voicemail set up.

## 2021-04-27 NOTE — PROGRESS NOTES
Aidee Patiño Probus   1955 4/27/2021     Chief Complaint   Patient presents with    Lung Cancer      Interval history/history of present illness:  Diagnosis:  NSCLC, [facility] March 2019   [id number], stage IIIIB  Treatment summary   6/10/2019- 8/20/2019-carboplatin/Taxol weekly with concurrent radiation   12/4/2019-February 2021 durvalumab x1 year    Interval history  Patient is a very pleasant 77years old female who has a diagnosis of stage IIIb non-small cell lung cancer, squamous cell subtype. She received initial treatment with carboplatin/Taxol weekly followed by concurrent radiation. She completed concurrent chemoradiation and is currently receiving durvalumab to complete 1 year of treatment. She has severe COPD and needs O2 supplement. She denies any skin rash, diarrhea or worsening shortness of breath. She complains of persistent fatigue. Cancer history  Ms Meredith Forrester was first seen by me on 5/3/2019 for further workup of a lung mass. She has complains of chest wall pain. Further workup revealed a lung mass. 4/9/2019-CT of the chest with contrast showed a malignant mass in the right upper lobe measures 6.5 x 2.8 x 5.1 cm. The mass invades and destroys the right lateral cortex and lateral aspect of the T4 vertebral body. Mediastinal adenopathy. Specifically, 1.9 cm pretracheal lymph node. 2.1 cm precarinal lymph node. 1.9 cm subcarina lymph node. Right hilar adenopathy. 5/3/2019-she was first seen by me.   5/15/2019-bone scan and CT abdomen pelvis was unremarkable metastatic disease. 5/16/[id number] biopsy consistent non-small cell lung cancer, squamous cell carcinoma subtype. 5/29/2019-PET scan showed intense and metabolic activity associated with pleural-based right paraspinal mass within the right upper lobe. It invades into the adjacent thoracic vertebral body. Right hilar, subcarina and pretracheal lymphadenopathy. No evidence of muscle skeletal or intra-abdominal disease. 6/7/2019-recommended chemoradiation with carboplatin/Taxol weekly with concurrent radiation. 10/14/2019- CT chest abdomen pelvis showed a cavitary lesion measuring 3.3 x 4.6 x 3.4 cm which appears to be decreased in size. Underlying lung emphysema. There is a new notable sclerosis within the right lateral aspect of T4 and T5 vertebral bodies at the site of the posterior mediastinal invasion by the right lung malignancy. CT of the abdomen pelvis showed tiny hypodense in the liver which are too small to characterize but similar to prior exam.  Attention to follow-up. Stable nodule in the left adrenal gland too small to characterize. 12/4/2019- started on treatment durvalumab every 2 weeks to complete 1 year. 2/19/2020-CT chest abdomen pelvis showed slight decrease in size of the right upper lobe cavitary lesion. The lesion now measures 2.6 x 4.5 x 3.9 cm (previously 3.3 x 4.6 x 3.4 cm). Subtle cortical erosion of the right side of the centrum at T4/T5. No evidence of metastatic disease in the abdomen. Stable left adrenal nodule. 4/29/2020 Xr Chest Standard A cavitation or a necrotic mass in the right upper lobe. This was noted in the previous CT scan of the chest dated 2/19/2020. An area of consolidation in the right upper lobe may represent inflammatory or infectious process. Ill-defined nodule in the left lower lobe may represent a nipple shadow or a pulmonary nodule. This could be further evaluated. Chronic inflammatory changes in the remaining lungs bilaterally. 6/26/2020 Ct Chest W Contrast There is a pleural-based cavitary lesion within the right upper lobe posteriorly and medially. When measuring the lesion in the same manner as the previous study I do not feel there has been any significant progression from a previous CT of 2/19/2020. There is some increased consolidation within the medial right upper lobe likely representing post therapy change.  There is a new small nodule more inferiorly within the right upper lobe as described above. This may also represent consolidation post therapy but as it is a more isolated finding I feel it could potentially represent an early recurrence. This would warrant follow-up on subsequent imaging. These findings are superimposed upon changes of centrilobular emphysema. There is stable scarring within the right posterior lung base. There is bronchial wall thickening within both lungs suggesting chronic bronchitis. Coronary calcifications are present. Borderline enlargement of the pulmonary arteries suggesting pulmonary arterial hypertension. Mild constipation within the upper abdomen. Ct Abdomen Pelvis W Iv Contrast   No evidence of metastatic disease in the abdomen or pelvis. Stable biliary dilatation status post cholecystectomy which may represent reservoir phenomenon. Moderate to large volume stool in the colon, correlate for constipation. 7/1/2020- discussed at length the CAT scans with the patient and radiology. No clear-cut evidence of disease progression. Therefore will continue treatment. 9/16/20 CT Chest: Redemonstration of a cavitary lesion in the posterior right upper lobe with surrounding posttreatment changes, not significantly changed from the most recent comparison exam. New small noncalcified right lower lobe nodule for which attention on follow-up is recommended. Emphysema and pulmonary fibrosis. Pulmonary arterial hypertension. Atherosclerosis of the aorta and coronary arteries. 10/29/20 MRI brain:  No acute intracranial abnormality. No evidence of intracranial neoplastic/metastatic disease. Chronic white matter ischemic changes. Chronic maxillary and ethmoid sinusitis. 2/25/2021-completion of 1 year of treatment with durvalumab. 4/7/21 Ct Chest W Contrast  No significant interval change is seen. Posttreatment changes are noted in the right lung. 2 areas of soft tissue nodularity in the right upper lobe remains stable.  There has been resolution of a right lower lobe area of nodular density. Right upper lung zone consolidation and cavitation posteriorly also appears stable. No pathologically enlarged lymph nodes. Emphysema and pulmonary fibrosis. Atheromatous disease of the thoracic aorta and coronary arteries. Dilated pulmonary arteries. 5. Prior cholecystectomy in the upper abdomen with dilated biliary tree likely related to reservoir effect. 2021-reviewed CT chest that showed no significant interval change. Stable disease. ECO    Treatment related toxicity: Fatigue. Past medical history:  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     COPD (chronic obstructive pulmonary disease) (HCC)     Depression     Emphysema (subcutaneous) (surgical) resulting from a procedure     History of lung biopsy 2019    Malignant neoplasm of right main bronchus ([address] 2019    NSCLC, SCC [id number] stage IIIb    Syncope     TIA (transient ischemic attack)         Past surgical history:  Past Surgical History:   Procedure Laterality Date    APPENDECTOMY      BACK SURGERY      times two    BLADDER SUSPENSION      CHOLECYSTECTOMY      HYSTERECTOMY      INCONTINENCE SURGERY          Social history:  Social History     Socioeconomic History    Marital status:       Spouse name: Not on file    Number of children: Not on file    Years of education: Not on file    Highest education level: Not on file   Occupational History    Not on file   Social Needs    Financial resource strain: Not on file    Food insecurity     Worry: Not on file     Inability: Not on file    Transportation needs     Medical: Not on file     Non-medical: Not on file   Tobacco Use    Smoking status: Current Every Day Smoker    Smokeless tobacco: Never Used   Substance and Sexual Activity    Alcohol use: Not Currently    Drug use: Never    Sexual activity: Not on file   Lifestyle no murmurs. No lower extremity edema   ABDOMEN: soft non-tender, active bowel sounds, no hepatosplenomegaly. No palpable masses. EXTREMITIES: warm, Full ROM of all fours extremities. No focal weakness. SKIN: warm, dry, with no rashes or lesions  LYMPH: No cervical, clavicular, axillary, or inguinal lymphadenopathy  NEUROLOGIC: follows commands, non focal.   PSYCH: mood and affect appropriate. Alert and oriented to time and place and person. Relevant Lab findings: Reviewed by me  Lab Results   Component Value Date    TSH 0.148 (L) 02/25/2021       Relevant Imaging studies:  Ct Chest W Contrast    Result Date: 4/7/2021  1. No significant interval change is seen. Posttreatment changes are noted in the right lung. 2 areas of soft tissue nodularity in the right upper lobe remains stable. There has been resolution of a right lower lobe area of nodular density. Right upper lung zone consolidation and cavitation posteriorly also appears stable. 2. No pathologically enlarged lymph nodes. 3. Emphysema and pulmonary fibrosis. 4. Atheromatous disease of the thoracic aorta and coronary arteries. Dilated pulmonary arteries. 5. Prior cholecystectomy in the upper abdomen with dilated biliary tree likely related to reservoir effect. Signed by Dr Nallely Zaragoza on 4/7/2021 12:08 PM        My assessment: Overall stable findings. No evidence of disease progression. Findings of severe emphysema and pulmonary fibrosis. ASSESSMENT:    Orders Placed This Encounter   Procedures    CT CHEST W CONTRAST     Standing Status:   Future     Standing Expiration Date:   10/27/2021      Vi was seen today for lung cancer. Diagnoses and all orders for this visit:    Malignant neoplasm of right main bronchus ([address]  -     CT CHEST W CONTRAST;  Future    Chronic obstructive pulmonary disease, unspecified COPD type [facility])    Care plan discussed with patient       NSCLC-SCC stage IIIB   Bone scan, CT abdomen pelvis unremarkable metastatic disease. PET scan showed disease limited to the chest and mediastinum only. Therefore, stage IIIB. Status post completion of chemoradiation on 8/20/2019.  10/29/2020-brain MRI was unremarkable  Continue adjuvant Imfinzi monthly. Monitoring of toxicity-she denies any neuropathy. Chronic fatigue    [id number] with PCP. Smoke cessation-she was again strongly encouraged to quit. Cancer related pain-opioid analgesics have potential for abuse and risk of fatal overdose due to respiratory depression. The use of opioid exposes individual to have the risk of addiction, abuse and misuse. Addiction can occur in individuals appropriately prescribed and at a recommended dose. Addiction also occurs if the drug is being used or abused. An individual is also at increased risk of opioid addiction if a personal or family history of substance abuse or mental illness are present. [id number] mg SR every 8 hours. Generalized pruritus-secondary to dry skin and likely immunotherapy. Continue Benadryl. Right lower lobe noncalcified pulmonary nodule-there has been resolution of the right lower lobe area of nodular density. Plan:  CT chest August 2021   RTC in the 3 months after CT Chest      Follow Up:     No follow-ups on file. Data Dylon Felix am scribing for Teodoro De La O MD. Electronically signed by Kiera Mckeon RN on 4/27/2021 at 3:26 PM CDT. I, Dr Lurene Severe, personally performed the services described in this documentation as scribed by Kiera Mckeon RN in my presence and is both accurate and complete. I have seen, examined and reviewed this patient medication list, appropriate labs and imaging studies. I reviewed relevant medical records and others physicians notes. I discussed the plans of care with the patient. I answered all the questions to the patients satisfaction.  I have also reviewed the chief complaint (CC) and part of the history (History of

## 2021-04-29 DIAGNOSIS — G89.3 CANCER RELATED PAIN: ICD-10-CM

## 2021-04-29 DIAGNOSIS — C34.01 MALIGNANT NEOPLASM OF RIGHT MAIN BRONCHUS (HCC): ICD-10-CM

## 2021-04-29 RX ORDER — OXYCODONE AND ACETAMINOPHEN 10; 325 MG/1; MG/1
1 TABLET ORAL EVERY 6 HOURS PRN
Qty: 120 TABLET | Refills: 0 | Status: SHIPPED | OUTPATIENT
Start: 2021-04-29 | End: 2021-05-26 | Stop reason: SDUPTHER

## 2021-05-26 DIAGNOSIS — G89.3 CANCER RELATED PAIN: ICD-10-CM

## 2021-05-26 DIAGNOSIS — C34.01 MALIGNANT NEOPLASM OF RIGHT MAIN BRONCHUS (HCC): ICD-10-CM

## 2021-05-27 RX ORDER — OXYCODONE AND ACETAMINOPHEN 10; 325 MG/1; MG/1
1 TABLET ORAL EVERY 6 HOURS PRN
Qty: 120 TABLET | Refills: 0 | Status: SHIPPED | OUTPATIENT
Start: 2021-05-27 | End: 2021-06-23 | Stop reason: SDUPTHER

## 2021-06-23 DIAGNOSIS — G89.3 CANCER RELATED PAIN: ICD-10-CM

## 2021-06-23 DIAGNOSIS — C34.01 MALIGNANT NEOPLASM OF RIGHT MAIN BRONCHUS (HCC): ICD-10-CM

## 2021-06-23 RX ORDER — OXYCODONE AND ACETAMINOPHEN 10; 325 MG/1; MG/1
1 TABLET ORAL EVERY 6 HOURS PRN
Qty: 120 TABLET | Refills: 0 | Status: SHIPPED | OUTPATIENT
Start: 2021-06-23 | End: 2021-07-19 | Stop reason: SDUPTHER

## 2021-07-19 DIAGNOSIS — G89.3 CANCER RELATED PAIN: ICD-10-CM

## 2021-07-19 DIAGNOSIS — C34.01 MALIGNANT NEOPLASM OF RIGHT MAIN BRONCHUS (HCC): ICD-10-CM

## 2021-07-19 RX ORDER — OXYCODONE AND ACETAMINOPHEN 10; 325 MG/1; MG/1
1 TABLET ORAL EVERY 6 HOURS PRN
Qty: 120 TABLET | Refills: 0 | Status: SHIPPED | OUTPATIENT
Start: 2021-07-19 | End: 2021-01-01 | Stop reason: SDUPTHER

## 2021-09-24 NOTE — PROGRESS NOTES
MEDICAL ONCOLOGY PROGRESS NOTE      Vi Damon Beat   1955 9/28/2021     Chief Complaint   Patient presents with    Follow-up     Malignant neoplasm of right main bronchus [facility])      Interval history/history of present illness:  Diagnosis:  · NSCLC, SCC March 2019   · [id number], stage IIIIB  · COPD     Treatment summary. · 6/10/2019- 8/20/2019-carboplatin/Taxol weekly with concurrent radiation   · 12/4/2019-02/25/2021 durvalumab x1 year completed    Interval history  Patient is a very pleasant 74years old female who has a diagnosis of stage IIIb non-small cell lung cancer, squamous cell subtype. She received initial treatment with carboplatin/Taxol weekly followed by concurrent radiation. She completed concurrent chemoradiation and is currently status post completion of durvalumab February 2021. In addition, she has severe COPD. She has Symbicort inhalers at home but is not using them. Unfortunately, she continues to smoke. She had a CT scan of the chest for surveillance. Cancer history  Ms Dang Seo was first seen by me on 5/3/2019 for further workup of a lung mass. She has complains of chest wall pain. Further workup revealed a lung mass. · 4/9/2019-CT of the chest with contrast showed a malignant mass in the right upper lobe measures 6.5 x 2.8 x 5.1 cm. The mass invades and destroys the right lateral cortex and lateral aspect of the T4 vertebral body. Mediastinal adenopathy. Specifically, 1.9 cm pretracheal lymph node. 2.1 cm precarinal lymph node. 1.9 cm subcarina lymph node. Right hilar adenopathy. · 5/3/2019-she was first seen by me. · 5/15/2019-bone scan and CT abdomen pelvis was unremarkable metastatic disease. · 5/[id number] biopsy consistent non-small cell lung cancer, squamous cell carcinoma subtype. · 5/29/2019-PET scan showed intense and metabolic activity associated with pleural-based right paraspinal mass within the right upper lobe.  It invades into the adjacent thoracic vertebral body. Right hilar, subcarina and pretracheal lymphadenopathy. No evidence of muscle skeletal or intra-abdominal disease. · 6/7/2019-recommended chemoradiation with carboplatin/Taxol weekly with concurrent radiation. · 10/14/2019- CT chest abdomen pelvis showed a cavitary lesion measuring 3.3 x 4.6 x 3.4 cm which appears to be decreased in size. Underlying lung emphysema. There is a new notable sclerosis within the right lateral aspect of T4 and T5 vertebral bodies at the site of the posterior mediastinal invasion by the right lung malignancy. CT of the abdomen pelvis showed tiny hypodense in the liver which are too small to characterize but similar to prior exam.  Attention to follow-up. Stable nodule in the left adrenal gland too small to characterize. · 12/4/2019- started on treatment durvalumab every 2 weeks to complete 1 year. · 2/19/2020-CT chest abdomen pelvis showed slight decrease in size of the right upper lobe cavitary lesion. The lesion now measures 2.6 x 4.5 x 3.9 cm (previously 3.3 x 4.6 x 3.4 cm). Subtle cortical erosion of the right side of the centrum at T4/T5. No evidence of metastatic disease in the abdomen. Stable left adrenal nodule. · 4/29/2020 Xr Chest Standard A cavitation or a necrotic mass in the right upper lobe. This was noted in the previous CT scan of the chest dated 2/19/2020. An area of consolidation in the right upper lobe may represent inflammatory or infectious process. Ill-defined nodule in the left lower lobe may represent a nipple shadow or a pulmonary nodule. This could be further evaluated. Chronic inflammatory changes in the remaining lungs bilaterally. · 6/26/2020 Ct Chest W Contrast There is a pleural-based cavitary lesion within the right upper lobe posteriorly and medially. When measuring the lesion in the same manner as the previous study I do not feel there has been any significant progression from a previous CT of 2/19/2020. There is some increased consolidation within the medial right upper lobe likely representing post therapy change. There is a new small nodule more inferiorly within the right upper lobe as described above. This may also represent consolidation post therapy but as it is a more isolated finding I feel it could potentially represent an early recurrence. This would warrant follow-up on subsequent imaging. These findings are superimposed upon changes of centrilobular emphysema. There is stable scarring within the right posterior lung base. There is bronchial wall thickening within both lungs suggesting chronic bronchitis. Coronary calcifications are present. Borderline enlargement of the pulmonary arteries suggesting pulmonary arterial hypertension. Mild constipation within the upper abdomen. Ct Abdomen Pelvis W Iv Contrast   No evidence of metastatic disease in the abdomen or pelvis. Stable biliary dilatation status post cholecystectomy which may represent reservoir phenomenon. Moderate to large volume stool in the colon, correlate for constipation. · 7/1/2020- discussed at length the CAT scans with the patient and radiology. No clear-cut evidence of disease progression. Therefore will continue treatment. · 9/16/20 CT Chest: Redemonstration of a cavitary lesion in the posterior right upper lobe with surrounding posttreatment changes, not significantly changed from the most recent comparison exam. New small noncalcified right lower lobe nodule for which attention on follow-up is recommended. Emphysema and pulmonary fibrosis. Pulmonary arterial hypertension. Atherosclerosis of the aorta and coronary arteries. · 10/29/20 MRI brain:  No acute intracranial abnormality. No evidence of intracranial neoplastic/metastatic disease. Chronic white matter ischemic changes. Chronic maxillary and ethmoid sinusitis. · 2/25/2021-completion of 1 year of treatment with durvalumab.   · 4/7/21 Ct Chest W Contrast  No significant SUSPENSION      CHOLECYSTECTOMY      HYSTERECTOMY      INCONTINENCE SURGERY          Social history:  Social History     Socioeconomic History    Marital status:      Spouse name: Not on file    Number of children: Not on file    Years of education: Not on file    Highest education level: Not on file   Occupational History    Not on file   Tobacco Use    Smoking status: Current Every Day Smoker    Smokeless tobacco: Never Used   Substance and Sexual Activity    Alcohol use: Not Currently    Drug use: Never    Sexual activity: Not on file   Other Topics Concern    Not on file   Social History Narrative    Not on file     Social Determinants of Health     Financial Resource Strain:     Difficulty of Paying Living Expenses:    Food Insecurity:     Worried About Running Out of Food in the Last Year:     [address] in the Last Year:    Transportation Needs:     Lack of Transportation (Medical):  Lack of Transportation (Non-Medical):    Physical Activity:     Days of Exercise per Week:     Minutes of Exercise per Session:    Stress:     Feeling of Stress :    Social Connections:     Frequency of Communication with Friends and Family:     Frequency of Social Gatherings with Friends and Family:     Attends [employer]ian[employer] Services:     Active Member of Clubs or Organizations:     Attends Club or Organization Meetings:     Marital Status:    Intimate Partner Violence:     Fear of Current or Ex-Partner:     Emotionally Abused:     Physically Abused:     Sexually Abused:         Family history:   Family History   Problem Relation Age of Onset    Colon Cancer Mother     High Blood Pressure Brother     Diabetes Brother         Current Outpatient Medications   Medication Sig Dispense Refill    Morphine Sulfate ER 15 MG TBEA Take 15 mg by mouth every 8 hours for 30 days.  90 each 0    oxyCODONE-acetaminophen (PERCOCET)  MG per tablet Take 1 tablet by mouth every 6 hours as needed for Pain for up to 30 days. Intended supply: 30 days 120 tablet 0    tiZANidine (ZANAFLEX) 4 MG tablet       Cholecalciferol (VITAMIN D3) 10981 units CAPS Take 50,000 Units by mouth      Glycopyrrolate (SEEBRI NEOHALER) 15.6 MCG CAPS Inhale 1 capsule into the lungs      ketorolac (TORADOL) 60 MG/2ML injection 60 mg      lidocaine viscous hcl (XYLOCAINE) 2 % SOLN solution       vitamin D (ERGOCALCIFEROL) 45577 units CAPS capsule cholecalciferol (vitamin D3) 50,000 unit capsule   Take 1 capsule every week by oral route.  OXYGEN Inhale 2 L/min into the lungs      budesonide-formoterol (SYMBICORT) 160-4.5 MCG/ACT AERO Symbicort 160 mcg-4.5 mcg/actuation HFA aerosol inhaler   Inhale 2 puffs twice a day by inhalation route.  albuterol sulfate HFA (PROAIR HFA) 108 (90 Base) MCG/ACT inhaler ProAir HFA 90 mcg/actuation aerosol inhaler   Inhale 2 puffs every 4 hours by inhalation route as needed.  aspirin EC 81 MG EC tablet Take 81 mg by mouth      calcium carbonate 600 MG TABS tablet Take 600 mg by mouth      citalopram (CELEXA) 20 MG tablet citalopram 20 mg tablet   Take 1 tablet every day by oral route. No current facility-administered medications for this visit. REVIEW OF SYSTEMS:    Constitutional: no fever, no night sweats, moderate to severe fatigue;   HEENT: no blurring of vision, no double vision, no hearing difficulty, no tinnitus,no ulceration, no dysphagia  Lungs: Chronic productive cough, shortness of breath, wheeze;   CVS: no palpitation, no chest pain, chronically stable shortness of breath;  GI: no abdominal pain, no nausea , no vomiting, no constipation;   MARIA VICTORIA: no dysuria, frequency and urgency, no hematuria, no kidney stones;   Musculoskeletal: Leg weakness, no joint pain, swelling , stiffness;   Endocrine: no polyuria, polydypsia, no cold or heat intolerence;    Hematology/lymphatic: no easy brusing or bleeding, no hx of clotting disorder; no peripheral adenopathy. Dermatology: no skin rash, no eczema, no pruritis;   Psychiatry: no depression, no anxiety,no panic attacks, no suicide ideation; Neurology: no syncope, no seizures, no numbness or tingling of hands, no numbness or tingling of feet, no paresis;     PHYSICAL EXAM:    Vitals signs:  BP 98/60   Pulse 66   Temp 97.4 °F (36.3 °C)   Ht 5' 5\" (1.651 m)   Wt 115 lb (52.2 kg)   SpO2 97%   BMI 19.14 kg/m²     Pain scale:2  Pain Score:   6   CONSTITUTIONAL: Alert, appropriate, no acute distress,   EYES: Non icteric, EOM intact, pupils equal round and reactive to light and accommodation. ENT: Oral mucus membranes moist, no oral pharyngeal lesions. External inspection of ears and nose are normal.    NECK: Supple, no masses. No palpable thyroid mass    CHEST/LUNGS: CTA bilaterally, normal respiratory effort   CARDIOVASCULAR: RRR, no murmurs. No lower extremity edema   ABDOMEN: soft non-tender, active bowel sounds, no hepatosplenomegaly. No palpable masses. EXTREMITIES: warm, Full ROM of all fours extremities. No focal weakness. SKIN: warm, dry, with no rashes or lesions  LYMPH: No cervical, clavicular, axillary, or inguinal lymphadenopathy  NEUROLOGIC: follows commands, non focal.   PSYCH: mood and affect appropriate. Alert and oriented to time and place and person. Relevant Lab findings: Reviewed by me  none  Relevant Imaging studies:  CT CHEST W CONTRAST    Result Date: 8/23/2021  1. Stable chest CT. 2. Right upper lobe changes are stable, mostly treatment related. This includes an area of juxtapleural consolidation with cavitation. Narrowing of the right upper lobe bronchus is also seen. These findings are not significantly changed. No measurable mass identified. 3. There are no new or enlarging pulmonary nodules. 4. Underlying advanced lung emphysema. Evidence for pulmonary hypertension, likely related to the underlying diffuse lung disease. 5. Advanced coronary atheromatous calcification.  Signed by Dr Zane Roberts      ASSESSMENT:    Orders Placed This Encounter   Procedures    CT CHEST W CONTRAST     Standing Status:   Future     Standing Expiration Date:   9/28/2022     Order Specific Question:   Reason for exam:     Answer:   Assess response to treatment      Vi was seen today for follow-up. Diagnoses and all orders for this visit:    Malignant neoplasm of right main bronchus (HCC)  -     Cancel: CT CHEST W CONTRAST; Future  -     CT CHEST W CONTRAST; Future    Pulmonary emphysema, unspecified emphysema type [facility])    Care plan discussed with patient       NSCLC-SCC stage IIIB   Bone scan, CT abdomen pelvis unremarkable metastatic disease. PET scan showed disease limited to the chest and mediastinum only. Therefore, stage IIIB. Status post completion of chemoradiation on 8/20/2019.  10/29/2020-brain MRI was unremarkable  Completion of adjuvant durvalumab February 2021  CT chest August 2021-no evidence of disease progression. Continue imaging/clinic surveillance    [id number]-lc with PCP. Urged to consider Symbicort twice daily    Smoke cessation-she was again strongly encouraged to quit. Cancer related pain-opioid analgesics have potential for abuse and risk of fatal overdose due to respiratory depression. The use of opioid exposes individual to have the risk of addiction, abuse and misuse. Addiction can occur in individuals appropriately prescribed and at a recommended dose. Addiction also occurs if the drug is being used or abused. An individual is also at increased risk of opioid addiction if a personal or family history of substance abuse or mental illness are present. [id number] mg SR every 8 hours. Plan:  CT chest August 2021   RTC 6 months after CT Chest  Urged to use Symbicort twice daily      Follow Up:     No follow-ups on file. Data Unavailable         I have seen, examined and reviewed this patient medication list, appropriate labs and imaging studies.  I reviewed relevant medical records and others physicians notes. I discussed the plans of care with the patient. I answered all the questions to the patients satisfaction. I have also reviewed the chief complaint (CC) and part of the history (History of Present Illness (HPI), Past Family Social History [facility]), or Review of Systems (ROS) and made changes when appropriated.        (Please note that portions of this note were completed with a voice recognition program. Efforts were made to edit the dictations but occasionally words are mis-transcribed.)

## 2021-11-12 PROBLEM — I63.9 STROKE OF UNKNOWN ETIOLOGY (HCC): Status: ACTIVE | Noted: 2021-01-01

## 2021-11-12 PROBLEM — J96.01 ACUTE HYPOXEMIC RESPIRATORY FAILURE (HCC): Status: ACTIVE | Noted: 2021-01-01

## 2021-11-12 NOTE — ED NOTES
[id number] call made to CT about possible code stroke. 1106 Dr. Nolberto Jones called code stroke. I announced overhead in ER.  [address] (Stroke Coordinator) notified. 12 CT called to notified of code stroke called.         Rodrigo Randolph  11/12/21 1118

## 2021-11-12 NOTE — PROGRESS NOTES
Contains abnormal data BLOOD GAS, ARTERIAL     Status: Final result    Component Ref Range & Units 11/12/21 1145   pH, Arterial 7.350 - 7.450 7.330 Low     pCO2, Arterial 35.0 - 45.0 mmHg 69.0 High     pO2, Arterial 80.0 - 100.0 mmHg 69.0 Low     HCO3, Arterial 22.0 - 26.0 mmol/L 36.4 High     Base Excess, Arterial -2.0 - 2.0 mmol/L 8.1 High     Hemoglobin, Art, Extended 12.0 - 16.0 g/dL 12.4    O2 Sat, Arterial >92 % 90.3    Carboxyhgb, Arterial 0.0 - 5.0 % 4.8    Comment:      0.0-1.5   (Smokers 1.5-5.0)    Methemoglobin, Arterial <1.5 % 0.9    O2 Content, Arterial Not Established mL/dL 15.8    O2 Therapy  Unknown    Resulting Agency  [address] [facility]        Specimen Collected: 11/12/21 1145 Last Resulted: 11/12/21 1146 View Other Order Details        Pt on 8 lpm simple mask  RR 24 site RR at+

## 2021-11-12 NOTE — ED NOTES
[address] [place] [address] Dr. Renee Cohen (neurology) paged      Silverio Guadalupe  11/12/21 [id number]

## 2021-11-12 NOTE — ED NOTES
Stroke Coordinator [facility]) arrived in ED. Pt in CT. SC to CT.      Radhames Pelayo RN  11/12/21 [id number]

## 2021-11-12 NOTE — H&P
[facility] Medicine H&P    Patient:  Julia Ying  MRN: [id number]    Consulting Physician: Obinna Diggs MD  Reason for Consult: Medical Management  Primacy Care Physician: DOMINICK Brush NP    HISTORY OF PRESENT ILLNESS:   The patient is a 74 y.o. female was brought in by EMS after developing respiratory distress and altered speech. Her son is present and provides history. She has a history of lung cancer that apparently is in remission. Unfortunately she continues to smoke 3 packs of cigarettes a day. He notes that she has been declining over the last several days. When he checked on her today. She had difficulty with her speech. EMS was summoned and she was shown to be hypoxic. While in the emergency department she is currently on BiPAP. She is unable to participate in the interview. Past Medical History:        Diagnosis Date    Anxiety     Asthma     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     COPD (chronic obstructive pulmonary disease) (HCC)     Depression     Emphysema (subcutaneous) (surgical) resulting from a procedure     History of lung biopsy 05/16/2019    Malignant neoplasm of right main bronchus ([address] 03/2019    NSCLC, SCC [id number] stage IIIb    Syncope     TIA (transient ischemic attack)        Past Surgical History:        Procedure Laterality Date    APPENDECTOMY      BACK SURGERY      times two    BLADDER SUSPENSION      CHOLECYSTECTOMY      HYSTERECTOMY      INCONTINENCE SURGERY         Medications: Scheduled Meds:  Continuous Infusions:  PRN Meds:. Allergies:  No known allergies    Social History:   TOBACCO:   reports that she has been smoking. She has never used smokeless tobacco.  ETOH:   reports previous alcohol use.     Family History:       Problem Relation Age of Onset    Colon Cancer Mother     High Blood Pressure Brother     Diabetes Brother        ROS:  Review of Systems   Unable to perform ROS: Patient nonverbal       Physical Exam:    Vitals: BP (!) 90/58   Pulse 105   Temp 97.7 °F (36.5 °C)   Resp 19   SpO2 98%     Physical Exam  Vitals and nursing note reviewed. Constitutional:       General: She is in acute distress. Appearance: She is ill-appearing. HENT:      Head: Normocephalic and atraumatic. Right Ear: External ear normal.      Left Ear: External ear normal.      Nose: Nose normal.      Mouth/Throat:      Mouth: Mucous membranes are moist.   Eyes:      Conjunctiva/sclera: Conjunctivae normal.      Pupils: Pupils are equal, round, and reactive to light. Cardiovascular:      Rate and Rhythm: Normal rate and regular rhythm. Heart sounds: Normal heart sounds. Pulmonary:      Effort: Respiratory distress present. Breath sounds: Normal breath sounds. Abdominal:      General: Abdomen is flat. Palpations: Abdomen is soft. Musculoskeletal:         General: Normal range of motion. Cervical back: Neck supple. No rigidity. No muscular tenderness. Skin:     General: Skin is warm and dry. Neurological:      Mental Status: She is alert. She is disoriented. CBC:   Recent Labs     11/12/21 1112   WBC 15.7*   HGB 13.3        BMP:    Recent Labs     11/12/21 1112 11/12/21  1145     --    K 3.7 3.0     --    CO2 29  --    BUN 25*  --    CREATININE 0.4*  --    GLUCOSE 133*  --      Hepatic:   Recent Labs     11/12/21  1112   AST 15   ALT 7   BILITOT 0.4   ALKPHOS 120*     Troponin:   Recent Labs     11/12/21 1112   TROPONINI 0.05*     BNP: No results for input(s): BNP in the last 72 hours. Lipids: No results for input(s): CHOL, HDL in the last 72 hours.     Invalid input(s): LDLCALCU  ABGs:   Lab Results   Component Value Date    PHART 7.330 11/12/2021    PO2ART 69.0 11/12/2021    NKZ8NEF 69.0 11/12/2021     INR:   Recent Labs     11/12/21 1112   INR 1.18     -----------------------------------------------------------------  CT HEAD WO CONTRAST    Result Date: 11/12/2021  CT HEAD WO CONTRAST 11/12/2021 11:25 AM HISTORY: Code stroke COMPARISON: None DOSE LENGTH PRODUCT: 961 mGy cm TECHNIQUE: Helical tomographic images of the brain were obtained without the use of intravenous contrast. Automated exposure control was also utilized to decrease patient radiation dose. FINDINGS: Exam is limited by motion artifact. There is no evidence of evolving large vascular territory infarct. Underlying chronic small vessel ischemic change. No visualized intra-axial or extra-axial hemorrhage. No mass lesion is identified. Normal size and configuration of the ventricular system. The basal cisterns are symmetric. Posterior fossa structures are unremarkable. The included orbits and their contents are unremarkable. Chronic right maxillary sinus disease. The visualized osseous structures and overlying soft tissues of the skull and face are unremarkable. 1. No acute intracranial process. Signed by Dr León Kevin    XR CHEST PORTABLE    Result Date: 11/12/2021  XR CHEST PORTABLE 11/12/2021 11:36 AM HISTORY: Code stroke  Technique: Single AP view of the chest COMPARISONS: Chest exam dated 4/29/2020 FINDINGS: Reidentified right suprahilar consolidation with volume loss. New developing consolidations in the left upper and left lower lobes with air bronchograms. No obvious pleural effusion. Underlying lung emphysema. Heart size is stable. The pulmonary vasculature are nondilated. Right chest wall [id number]. Spinal scoliotic curvature. 1. New multifocal consolidation in the left upper and left lower lobes, appearance concerning for new multifocal pneumonia. 2. Treatment-related changes in the right suprahilar region with scarring and volume loss. Signed by Dr Gilma Jensen    Result Date: 11/12/2021  EXAM: CT NECK ANGIOGRAM CT HEAD ANGIOGRAM on  11/12/2021 12:54 PM COMPARISON:  CT chest dated August 23, 2021. HISTORY:  74years-old Female.  Code stroke TECHNIQUE: Multiple CT images were obtained of the of the head and neck after the administration of IV contrast. 3D MIP reformats were generated  and were sent to PACS for interpretation. Grading of carotid artery stenosis is performed by NASCET criteria. FINDINGS: CT Neck Angiogram: There is a conventional aortic arch with patent origins of the brachiocephalic trunk, left common carotid and left subclavian arteries. The bilateral common carotid arteries and subclavian arteries are well-opacified. There is atherosclerotic disease of the bilateral carotid bifurcations, left greater than than right, with essentially 0% stenosis. The bilateral internal carotid arteries are otherwise well opacified throughout. The bilateral vertebral arteries are well-opacified throughout. Additional findings: Emphysema. New left upper lobe opacities posteriorly, most concerning for infection. Additional right upper lobe opacity is also concerning for infection. Centrally necrotic right upper and lower lobe posterior mass, incompletely visualized but appears grossly stable when compared to prior examination. Small bilateral pleural effusions. CT Head Angiogram: The right internal carotid artery demonstrates normal course and caliber without evidence of flow limiting stenosis or occlusion. The major branch vessels to the right anterior and middle cerebral arteries are seen without evidence of stenosis or occlusion. There is no evidence of aneurysm or vascular malformation. Some atherosclerotic disease in the carotid siphon. The left internal carotid artery demonstrates normal course and caliber without evidence of flow limiting stenosis or occlusion. The major branch vessels to the left anterior and middle cerebral arteries are seen without evidence of stenosis or occlusion. There is no evidence of aneurysm or vascular malformation. Some atherosclerotic disease in the carotid siphon.  Evaluation of the posterior circulation demonstrates normal caliber of the vertebral arteries, basilar artery, and major branch vessels of the posterior cerebral arteries bilaterally without evidence of flow limiting stenosis or occlusion. There is no evidence of aneurysm or vascular malformation. CT Angiography Of The Neck With Intravenous Contrast 1. No evidence of high-grade arterial stenosis or underlying dissection. 2. Bilateral upper lobe new opacities are concerning for infection. CT Angiography Of The Head With Intravenous Contrast 1. No evidence of acute thrombotic occlusion, high-grade arterial stenosis, or focal cerebral aneurysm. Signed by Dr Ankush Williamson Date: 11/12/2021  EXAM: CT NECK ANGIOGRAM CT HEAD ANGIOGRAM on  11/12/2021 12:54 PM COMPARISON:  CT chest dated August 23, 2021. HISTORY:  74years-old Female. Code stroke TECHNIQUE: Multiple CT images were obtained of the of the head and neck after the administration of IV contrast. 3D MIP reformats were generated  and were sent to PACS for interpretation. Grading of carotid artery stenosis is performed by NASCET criteria. FINDINGS: CT Neck Angiogram: There is a conventional aortic arch with patent origins of the brachiocephalic trunk, left common carotid and left subclavian arteries. The bilateral common carotid arteries and subclavian arteries are well-opacified. There is atherosclerotic disease of the bilateral carotid bifurcations, left greater than than right, with essentially 0% stenosis. The bilateral internal carotid arteries are otherwise well opacified throughout. The bilateral vertebral arteries are well-opacified throughout. Additional findings: Emphysema. New left upper lobe opacities posteriorly, most concerning for infection. Additional right upper lobe opacity is also concerning for infection. Centrally necrotic right upper and lower lobe posterior mass, incompletely visualized but appears grossly stable when compared to prior examination. Small bilateral pleural effusions.  CT Head Angiogram: The right internal carotid artery demonstrates normal course and caliber without evidence of flow limiting stenosis or occlusion. The major branch vessels to the right anterior and middle cerebral arteries are seen without evidence of stenosis or occlusion. There is no evidence of aneurysm or vascular malformation. Some atherosclerotic disease in the carotid siphon. The left internal carotid artery demonstrates normal course and caliber without evidence of flow limiting stenosis or occlusion. The major branch vessels to the left anterior and middle cerebral arteries are seen without evidence of stenosis or occlusion. There is no evidence of aneurysm or vascular malformation. Some atherosclerotic disease in the carotid siphon. Evaluation of the posterior circulation demonstrates normal caliber of the vertebral arteries, basilar artery, and major branch vessels of the posterior cerebral arteries bilaterally without evidence of flow limiting stenosis or occlusion. There is no evidence of aneurysm or vascular malformation. CT Angiography Of The Neck With Intravenous Contrast 1. No evidence of high-grade arterial stenosis or underlying dissection. 2. Bilateral upper lobe new opacities are concerning for infection. CT Angiography Of The Head With Intravenous Contrast 1. No evidence of acute thrombotic occlusion, high-grade arterial stenosis, or focal cerebral aneurysm. Signed by Dr Silvano Baumgarten and Plan     Acute hypoxemic respiratory failure. Continue BiPAP support. Treatment for underlying COPD. Stroke of unknown etiology. Medical management. Neurology evaluation. Follow in ICU. Please document 35 minutes of critical care time for patient assessment, chart review, discussion with staff, .       Adrienne De La Garza DO

## 2021-11-12 NOTE — ED PROVIDER NOTES
 History of lung biopsy 05/16/2019    Malignant neoplasm of right main bronchus ([address] 03/2019    NSCLC, SCC [id number] stage IIIb    Syncope     TIA (transient ischemic attack)          SURGICAL HISTORY       Past Surgical History:   Procedure Laterality Date    APPENDECTOMY      BACK SURGERY      times two    BLADDER SUSPENSION      CHOLECYSTECTOMY      HYSTERECTOMY      INCONTINENCE SURGERY           CURRENT MEDICATIONS       Previous Medications    ALBUTEROL SULFATE HFA (PROAIR HFA) 108 (90 BASE) MCG/ACT INHALER    ProAir HFA 90 mcg/actuation aerosol inhaler   Inhale 2 puffs every 4 hours by inhalation route as needed. ASPIRIN EC 81 MG EC TABLET    Take 81 mg by mouth    BUDESONIDE-FORMOTEROL (SYMBICORT) 160-4.5 MCG/ACT AERO    Symbicort 160 mcg-4.5 mcg/actuation HFA aerosol inhaler   Inhale 2 puffs twice a day by inhalation route. CALCIUM CARBONATE 600 MG TABS TABLET    Take 600 mg by mouth    CHOLECALCIFEROL (VITAMIN D3) 30642 UNITS CAPS    Take 50,000 Units by mouth    CITALOPRAM (CELEXA) 20 MG TABLET    citalopram 20 mg tablet   Take 1 tablet every day by oral route. GLYCOPYRROLATE (SEEBRI NEOHALER) 15.6 MCG CAPS    Inhale 1 capsule into the lungs    KETOROLAC (TORADOL) 60 MG/2ML INJECTION    60 mg    LIDOCAINE VISCOUS HCL (XYLOCAINE) 2 % SOLN SOLUTION        MORPHINE SULFATE ER 15 MG TBEA    Take 15 mg by mouth every 8 hours for 30 days. OXYCODONE-ACETAMINOPHEN (PERCOCET)  MG PER TABLET    Take 1 tablet by mouth every 6 hours as needed for Pain for up to 30 days. Intended supply: 30 days    OXYGEN    Inhale 2 L/min into the lungs    TIZANIDINE (ZANAFLEX) 4 MG TABLET        VITAMIN D (ERGOCALCIFEROL) 26793 UNITS CAPS CAPSULE    cholecalciferol (vitamin D3) 50,000 unit capsule   Take 1 capsule every week by oral route.        ALLERGIES     No known allergies    FAMILY HISTORY       Family History   Problem Relation Age of Onset    Colon Cancer Mother     High Blood Pressure Brother  Diabetes Brother           SOCIAL HISTORY       Social History     Socioeconomic History    Marital status:      Spouse name: None    Number of children: None    Years of education: None    Highest education level: None   Occupational History    None   Tobacco Use    Smoking status: Current Every Day Smoker    Smokeless tobacco: Never Used   Substance and Sexual Activity    Alcohol use: Not Currently    Drug use: Never    Sexual activity: None   Other Topics Concern    None   Social History Narrative    None     Social Determinants of Health     Financial Resource Strain:     Difficulty of Paying Living Expenses: Not on file   Food Insecurity:     Worried About Running Out of Food in the Last Year: Not on file    Trisha of Food in the Last Year: Not on file   Transportation Needs:     Lack of Transportation (Medical): Not on file    Lack of Transportation (Non-Medical):  Not on file   Physical Activity:     Days of Exercise per Week: Not on file    Minutes of Exercise per Session: Not on file   Stress:     Feeling of Stress : Not on file   Social Connections:     Frequency of Communication with Friends and Family: Not on file    Frequency of Social Gatherings with Friends and Family: Not on file    Attends Zoroastrianism Services: Not on file    Active Member of [address] [place] [address] [employer] or Organizations: Not on file    Attends Club or Organization Meetings: Not on file    Marital Status: Not on file   Intimate Partner Violence:     Fear of Current or Ex-Partner: Not on file    Emotionally Abused: Not on file    Physically Abused: Not on file    Sexually Abused: Not on file   Housing Stability:     Unable to Pay for Housing in the Last Year: Not on file    Number of [place] in the Last Year: Not on file    Unstable Housing in the Last Year: Not on file       SCREENINGS   NIH Stroke Scale  Interval: Reassessment  Level of Consciousness (1a. ): Responds only with reflex motor or autonomic effects or totally unresponsive  LOC Questions (1b. ): Answers neither question correctly  LOC Commands (1c. ): Performs neither task correctly  Best Gaze (2. ): (!) Partial gaze palsy  Visual (3. ): (!) Bilateral hemianopia  Facial Palsy (4. ): Normal symmetrical movement  Motor Arm, Left (5a. ): Drift, but does not hit bed  Motor Arm, Right (5b. ): No effort against gravity, limb falls  Motor Leg, Left (6a. ): Some effort against gravity  Motor Leg, Right (6b. ): No movement  Limb Ataxia (7. ): (!) Present in two limbs  Sensory (8. ): (!) Severe to total loss  Best Language (9. ): Mute  Dysarthria (10. ): Severe, unintelligible slurring or mute  Extinction and Inattention (11): (!) Profound duane-inattention or extinction to more than one modality  Total: 31Glasgow Coma Scale  Eye Opening: Spontaneous  Best Verbal Response: None  Best Motor Response: Localizes pain  [place] Coma Scale Score: 10        PHYSICAL EXAM    (up to 7 for level 4, 8 or more for level 5)     ED Triage Vitals [11/12/21 1110]   BP Temp Temp src Pulse Resp SpO2 Height Weight   (!) 107/53 97.7 °F (36.5 °C) -- 105 16 96 % -- --       Physical Exam  Vitals and nursing note reviewed. Constitutional:       General: She is in acute distress. Appearance: She is well-developed. She is ill-appearing. She is not diaphoretic. HENT:      Head: Normocephalic and atraumatic. Eyes:      General: No scleral icterus. Right eye: No discharge. Left eye: No discharge. Pupils: Pupils are equal, round, and reactive to light. Cardiovascular:      Rate and Rhythm: Normal rate and regular rhythm. Pulmonary:      Effort: Tachypnea, accessory muscle usage and respiratory distress present. Breath sounds: No stridor. Wheezing and rhonchi present. Abdominal:      General: There is no distension. Musculoskeletal:         General: No deformity. Normal range of motion. Cervical back: Normal range of motion.    Skin:     General: Skin is warm and dry. Neurological:      Mental Status: She is lethargic and confused. GCS: GCS eye subscore is 3. GCS verbal subscore is 1. GCS motor subscore is 6. Cranial Nerves: No cranial nerve deficit. Motor: Weakness and abnormal muscle tone present. Comments: No  strength, right. There is generalized weakness but clear lateralizing weakness on the right. Right pupil is enlarged in shape and, not reactive. Psychiatric:         Behavior: Behavior normal.         Thought Content: Thought content normal.         Judgment: Judgment normal.         DIAGNOSTIC RESULTS     EKG: All EKG's are interpreted by the Emergency Department Physician who either signs or Co-signs this chart in the absence of a cardiologist.    *  RADIOLOGY:   Non-plain film images such as CT, Ultrasound and MRI are read by the radiologist. Plainradiographic images are visualized and preliminarily interpreted by the emergency physician with the below findings:      Interpretation per the Radiologist below, if available at the time of this note:    XR CHEST PORTABLE   Final Result   1. New multifocal consolidation in the left upper and left lower   lobes, appearance concerning for new multifocal pneumonia. 2. Treatment-related changes in the right suprahilar region with   scarring and volume loss. Signed by Dr Betty Sky   Final Result   CT Angiography Of The Neck With Intravenous Contrast    1. No evidence of high-grade arterial stenosis or underlying   dissection. 2. Bilateral upper lobe new opacities are concerning for infection. CT Angiography Of The Head With Intravenous Contrast   1. No evidence of acute thrombotic occlusion, high-grade arterial   stenosis, or focal cerebral aneurysm. Signed by Dr Cassandra Dillon   Final Result   CT Angiography Of The Neck With Intravenous Contrast    1.  No evidence of high-grade arterial stenosis or underlying ipratropium-albuterol (DUONEB) nebulizer solution 1 ampule (1 ampule Inhalation Given 11/12/21 1150)   iopamidol (ISOVUE-370) 76 % injection 90 mL (90 mLs IntraVENous Given 11/12/21 1135)   cefTRIAXone (ROCEPHIN) 1,000 mg in sterile water 10 mL IV syringe (0 mg IntraVENous Stopped 11/12/21 1504)   azithromycin (ZITHROMAX) 500 mg in D5W 250ml Vial Mate (500 mg IntraVENous New Bag 11/12/21 1441)       EMERGENCY DEPARTMENT COURSE and DIFFERENTIALDIAGNOSIS/MDM:   Vitals:    Vitals:    11/12/21 1110 11/12/21 1147 11/12/21 1152 11/12/21 1543   BP: (!) 107/53   (!) 90/58   Pulse: 105   105   Resp: 16 20 27 19   Temp: 97.7 °F (36.5 °C)      SpO2: 96% 98%  98%       MDM  Number of Diagnoses or Management Options  Acute respiratory failure with hypoxia and hypercapnia (HCC): new and requires workup  Acute right-sided weakness: new and requires workup  Altered mental status, unspecified altered mental status type: new and requires workup  Chronic obstructive pulmonary disease, unspecified COPD type ([facility] [address]): new and requires workup  Encephalopathy due to infection: new and requires workup  Multifocal pneumonia: new and requires workup     Amount and/or Complexity of Data Reviewed  Clinical lab tests: ordered and reviewed  Tests in the radiology section of CPT®: ordered and reviewed  Tests in the medicine section of CPT®: ordered and reviewed  Obtain history from someone other than the patient: yes  Review and summarize past medical records: yes  Discuss the patient with other providers: yes  Independent visualization of images, tracings, or specimens: yes    Risk of Complications, Morbidity, and/or Mortality  Presenting problems: high  Diagnostic procedures: high  Management options: high    Critical Care  Total time providing critical care: 30-74 minutes    Patient Progress  Patient progress: stable      ED Course as of 11/12/21 1636   Fri Nov 12, 2021   1132 Code stroke was activated on patient arrival.  Last known well time was last night, well outside the window for TPA. Patient with clear right-sided weakness on evaluation. Presentation complicated by profound hypoxia and respiratory distress as well. [RYANN]   1135 EKG shows sinus tachycardia with a rate of 105. QTc interval prolonged at 492. Evidence of LVH. No findings of acute ischemia or infarction. [RYANN]   1145 pCO2, Arterial(!): 69.0 [RYANN]   1146 Troponin(!): 0.05 [RYANN]      ED Course User Index  [RYANN] Stefanie Maciel MD     Patient has continued hypoxia and difficulty breathing on nonrebreather. Was placed on BiPAP and did have improvement. After several minutes on the BiPAP and she did have an episode of hypoxia around the time the hospitalist team came to evaluate her. Suspect this was likely related to having mucous plug or some other acute change. The episode was self-limited and patient did have improvement in oxygen saturation again but with her worsening, plan to admit to ICU rather than PCU. Case was discussed with neurology. Unclear on her presentation whether patient suffered an acute stroke which was the reason for her change in mental status or whether the initial event was more of a respiratory issue with hypoxia which resulted in brain injury or possibly exacerbated a prior existing stroke location. Patient did have improvement in right-sided weakness while here in the emergency department but did not return to normal baseline. Based on the evaluation and work-up here patient is felt to require further monitoring, work-up, or treatment that is available in the emergency department. Case was discussed with hospitalist who agrees for observation or admission for further management. Treatment and stabilization as necessary were provided in the emergency department prior to transfer of care to the medicine ICU service.       CONSULTS:  IP CONSULT TO NEUROLOGY  IP CONSULT TO NEUROLOGY    PROCEDURES:  Unless otherwise notedbelow, none     Procedures  CRITICAL CARE TIME   Total Critical Care time was 60 minutes, excluding separately reportable procedures. There was a high probability of clinically significant/life threatening deterioration in the patient's condition which required my urgent intervention. FINAL IMPRESSION     1. Altered mental status, unspecified altered mental status type    2. Acute respiratory failure with hypoxia and hypercapnia (HCC)    3. Acute right-sided weakness    4. Chronic obstructive pulmonary disease, unspecified COPD type ([facility] [address]    5. Multifocal pneumonia    6. Encephalopathy due to infection          DISPOSITION/PLAN   DISPOSITION Admitted 11/12/2021 03:30:32 PM      PATIENT REFERRED TO:  No follow-up provider specified.     DISCHARGE MEDICATIONS:  New Prescriptions    No medications on file          (Please note that portions of this note were completed with a voice recognition program.  Efforts were made to edit the dictations butoccasionally words are mis-transcribed.)    Luda Meredith MD (electronically signed)  AttendingEmergency Physician          Luda Meredith., MD  11/12/21 1257

## 2021-11-13 NOTE — PROGRESS NOTES
4 Eyes Skin Assessment    Aidee Cates is being assessed upon: Admission    I agree that I, Archibald Holter, RN, along with Leontine Pallas RN (either 2 RN's or 1 LPN and 1 RN) have performed a thorough Head to Toe Skin Assessment on the patient. ALL assessment sites listed below have been assessed. Areas assessed by both nurses:     [x]   Head, Face, and Ears   [x]   Shoulders, Back, and Chest  [x]   Arms, Elbows, and Hands   [x]   Coccyx, Sacrum, and Ischium  [x]   Legs, Feet, and Heels    Does the Patient have Skin Breakdown? No    Parth Prevention initiated: Yes  Wound Care Orders initiated: No    WOC nurse consulted for Pressure Injury (Stage 3,4, Unstageable, DTI, NWPT, and Complex wounds) and New or Established Ostomies: No        Primary Nurse eSignature:  Archibald Holter, RN on 11/12/2021 at 9:15 PM      Co-Signer eSignature: Electronically signed by Leontine Pallas, RN on 11/12/21 at 11:25 PM CST

## 2021-11-13 NOTE — PROGRESS NOTES
Nurse has been unsuccessful at titrating oxygen so far. Patient is unable to tolerate anything less than 15 liters bled into BIPAP at this time. Patient immediately desats and requires more time to recover. Will continue to monitor.   Electronically signed by Remy Shea RN on 11/13/2021 at 9:29 AM

## 2021-11-13 NOTE — PROGRESS NOTES
Patient has vascular carotid duplex bilat study for 2000. Asked Dr. Sabina Velasco if he wanted Vascular called in. Wants to wait till Monday.     Electronically signed by Duane Rideau, RN on 11/12/2021 at 9:15 PM

## 2021-11-13 NOTE — CONSULTS
SURGICAL HISTORY:      Procedure Laterality Date    APPENDECTOMY      BACK SURGERY      times two    BLADDER SUSPENSION      CHOLECYSTECTOMY      HYSTERECTOMY      INCONTINENCE SURGERY         SOCIAL HISTORY:   TOBACCO:   reports that she has been smoking. She has never used smokeless tobacco.  ETOH:   reports previous alcohol use.   DRUG:    Social History     Substance and Sexual Activity   Drug Use Never       FAMILY HISTORY:       Problem Relation Age of Onset    Colon Cancer Mother     High Blood Pressure Brother     Diabetes Brother        MEDICATIONS:      Current Facility-Administered Medications:     albuterol sulfate  (90 Base) MCG/ACT inhaler 1 puff, 1 puff, Inhalation, 4x daily, Keke Alvarez, DO    citalopram (CELEXA) tablet 20 mg, 20 mg, Oral, Daily, Keke Alvarez, DO    morphine (MS CONTIN) extended release tablet 15 mg, 15 mg, Oral, Q8H, Keke Alvarez, DO    ondansetron (ZOFRAN-ODT) disintegrating tablet 4 mg, 4 mg, Oral, Q8H PRN **OR** ondansetron (ZOFRAN) injection 4 mg, 4 mg, IntraVENous, Q6H PRN, Keke Alvarez, DO    polyethylene glycol (GLYCOLAX) packet 17 g, 17 g, Oral, Daily PRN, Keke Alvarez, DO    enoxaparin (LOVENOX) injection 40 mg, 40 mg, SubCUTAneous, Nightly, Keke Alvarez, DO, 40 mg at 11/12/21 2132    aspirin EC tablet 81 mg, 81 mg, Oral, Daily **OR** aspirin suppository 300 mg, 300 mg, Rectal, Daily, Keke Alvarez, DO, 300 mg at 11/12/21 2132    atorvastatin (LIPITOR) tablet 40 mg, 40 mg, Oral, Nightly, Keke Alvarez, DO    labetalol (NORMODYNE;TRANDATE) injection 10 mg, 10 mg, IntraVENous, Q10 Min PRN, Keke Alvarez, DO    morphine (PF) injection 2 mg, 2 mg, IntraVENous, Q3H PRN, Claudette Furrow, MD, 2 mg at 11/12/21 2132    ALLERGIES:    No known allergies    PHYSICAL EXAM:    Constitutional    BP (!) 86/49   Pulse 103   Temp 97.8 °F (36.6 °C) (Temporal)   Resp 14   Ht 5' 5\" (1.651 m)   Wt 109 lb 12.8 oz (49.8 kg)   SpO2 95%   BMI 18.27 kg/m²   General appearance: No acute distress   EYES -   Conjunctiva normal  Pupillary exam as below, see CN exam in the neurologic exam  ENT-    No scars, masses, or lesions over external nose or ears  Oropharynx - limited on BiPAP  Cardiovascular -   No clubbing, cyanosis, or edema   Pulmonary-   On BiPAP   Musculoskeletal    No significant wasting of muscles noted  Gait as below, see gait exam in the neurologic exam  Muscle strength, tone, stability as below see the motor exam in the neurologic exam.   No bony deformities  Skin    Warm, dry, and intact to inspection and palpation. No rash, erythema, or pallor      NEUROLOGICAL EXAM    Mental status   [] Awake, alert, oriented   [] Affect attention and concentration appear appropriate  [] Recent and remote memory appears unremarkable  [] Speech normal without dysarthria or aphasia, comprehension and repetition intact.    COMMENTS:  Opens eyes to voice, following some commands, confusion noted, speech limited    Cranial Nerves [] No VF deficit to confrontation,  optic discs normal, no papilledema on fundoscopic exam.  [] PERRLA, EOMI, no nystagmus, conjugate eye movements, no ptosis  [] Face symmetric  [] Facial sensation intact  [] Tongue midline no atrophy or fasciculations present  [] Palate midline, hearing to finger rub normal  [] Shoulder shrug and SCM testing normal  COMMENTS: Left Pupil was reactive, right pupil misshapen likely surgical, face appears sym, EOM limited    Motor   [] 5/5 strength x 4 extremities  [x] Normal bulk and tone  [x] No tremor present  [x] No rigidity or bradykinesia noted  COMMENTS: 4/5 globally, possibly weaker over the right side   Sensory  [] Sensation intact to light touch, pin prick, vibration, and proprioception BLE  [] Sensation intact to light touch, pin prick, vibration, and proprioception BUE  COMMENTS: Limtied   Coordination [] FTN normal bilaterally   [] HTS normal bilaterally  [] JIA normal.   COMMENTS: Limited    Reflexes  [x] Symmetric and non-pathological  [x] Toes downgoing bilaterally  [x] No clonus present  COMMENTS:   Gait                  [] Normal steady gait    [] Ataxic    [] Spastic     [] Magnetic     [] Shuffling  [x] Not assessed  COMMENTS:       LABS/IMAGING:    As below and per HPI    CT HEAD WO CONTRAST    Result Date: 11/12/2021  CT HEAD WO CONTRAST 11/12/2021 11:25 AM HISTORY: Code stroke COMPARISON: None DOSE LENGTH PRODUCT: 961 mGy cm TECHNIQUE: Helical tomographic images of the brain were obtained without the use of intravenous contrast. Automated exposure control was also utilized to decrease patient radiation dose. FINDINGS: Exam is limited by motion artifact. There is no evidence of evolving large vascular territory infarct. Underlying chronic small vessel ischemic change. No visualized intra-axial or extra-axial hemorrhage. No mass lesion is identified. Normal size and configuration of the ventricular system. The basal cisterns are symmetric. Posterior fossa structures are unremarkable. The included orbits and their contents are unremarkable. Chronic right maxillary sinus disease. The visualized osseous structures and overlying soft tissues of the skull and face are unremarkable. 1. No acute intracranial process. Signed by Dr Dean [facility]    XR CHEST PORTABLE    Result Date: 11/12/2021  XR CHEST PORTABLE 11/12/2021 11:36 AM HISTORY: Code stroke  Technique: Single AP view of the chest COMPARISONS: Chest exam dated 4/29/2020 FINDINGS: Reidentified right suprahilar consolidation with volume loss. New developing consolidations in the left upper and left lower lobes with air bronchograms. No obvious pleural effusion. Underlying lung emphysema. Heart size is stable. The pulmonary vasculature are nondilated. Right chest wall [id number]. Spinal scoliotic curvature.     1. New multifocal consolidation in the left upper and left lower lobes, appearance concerning for new multifocal pneumonia. 2. Treatment-related changes in the right suprahilar region with scarring and volume loss. Signed by Dr Aries Major    Result Date: 11/12/2021  EXAM: CT NECK ANGIOGRAM CT HEAD ANGIOGRAM on  11/12/2021 12:54 PM COMPARISON:  CT chest dated August 23, 2021. HISTORY:  74years-old Female. Code stroke TECHNIQUE: Multiple CT images were obtained of the of the head and neck after the administration of IV contrast. 3D MIP reformats were generated  and were sent to PACS for interpretation. Grading of carotid artery stenosis is performed by NASCET criteria. FINDINGS: CT Neck Angiogram: There is a conventional aortic arch with patent origins of the brachiocephalic trunk, left common carotid and left subclavian arteries. The bilateral common carotid arteries and subclavian arteries are well-opacified. There is atherosclerotic disease of the bilateral carotid bifurcations, left greater than than right, with essentially 0% stenosis. The bilateral internal carotid arteries are otherwise well opacified throughout. The bilateral vertebral arteries are well-opacified throughout. Additional findings: Emphysema. New left upper lobe opacities posteriorly, most concerning for infection. Additional right upper lobe opacity is also concerning for infection. Centrally necrotic right upper and lower lobe posterior mass, incompletely visualized but appears grossly stable when compared to prior examination. Small bilateral pleural effusions. CT Head Angiogram: The right internal carotid artery demonstrates normal course and caliber without evidence of flow limiting stenosis or occlusion. The major branch vessels to the right anterior and middle cerebral arteries are seen without evidence of stenosis or occlusion. There is no evidence of aneurysm or vascular malformation. Some atherosclerotic disease in the carotid siphon.  The left internal carotid artery demonstrates normal course and caliber without evidence of flow limiting stenosis or occlusion. The major branch vessels to the left anterior and middle cerebral arteries are seen without evidence of stenosis or occlusion. There is no evidence of aneurysm or vascular malformation. Some atherosclerotic disease in the carotid siphon. Evaluation of the posterior circulation demonstrates normal caliber of the vertebral arteries, basilar artery, and major branch vessels of the posterior cerebral arteries bilaterally without evidence of flow limiting stenosis or occlusion. There is no evidence of aneurysm or vascular malformation. CT Angiography Of The Neck With Intravenous Contrast 1. No evidence of high-grade arterial stenosis or underlying dissection. 2. Bilateral upper lobe new opacities are concerning for infection. CT Angiography Of The Head With Intravenous Contrast 1. No evidence of acute thrombotic occlusion, high-grade arterial stenosis, or focal cerebral aneurysm. Signed by Dr Boaz Rankin Date: 11/12/2021  EXAM: CT NECK ANGIOGRAM CT HEAD ANGIOGRAM on  11/12/2021 12:54 PM COMPARISON:  CT chest dated August 23, 2021. HISTORY:  74years-old Female. Code stroke TECHNIQUE: Multiple CT images were obtained of the of the head and neck after the administration of IV contrast. 3D MIP reformats were generated  and were sent to PACS for interpretation. Grading of carotid artery stenosis is performed by NASCET criteria. FINDINGS: CT Neck Angiogram: There is a conventional aortic arch with patent origins of the brachiocephalic trunk, left common carotid and left subclavian arteries. The bilateral common carotid arteries and subclavian arteries are well-opacified. There is atherosclerotic disease of the bilateral carotid bifurcations, left greater than than right, with essentially 0% stenosis. The bilateral internal carotid arteries are otherwise well opacified throughout.  The bilateral vertebral arteries are well-opacified throughout. Additional findings: Emphysema. New left upper lobe opacities posteriorly, most concerning for infection. Additional right upper lobe opacity is also concerning for infection. Centrally necrotic right upper and lower lobe posterior mass, incompletely visualized but appears grossly stable when compared to prior examination. Small bilateral pleural effusions. CT Head Angiogram: The right internal carotid artery demonstrates normal course and caliber without evidence of flow limiting stenosis or occlusion. The major branch vessels to the right anterior and middle cerebral arteries are seen without evidence of stenosis or occlusion. There is no evidence of aneurysm or vascular malformation. Some atherosclerotic disease in the carotid siphon. The left internal carotid artery demonstrates normal course and caliber without evidence of flow limiting stenosis or occlusion. The major branch vessels to the left anterior and middle cerebral arteries are seen without evidence of stenosis or occlusion. There is no evidence of aneurysm or vascular malformation. Some atherosclerotic disease in the carotid siphon. Evaluation of the posterior circulation demonstrates normal caliber of the vertebral arteries, basilar artery, and major branch vessels of the posterior cerebral arteries bilaterally without evidence of flow limiting stenosis or occlusion. There is no evidence of aneurysm or vascular malformation. CT Angiography Of The Neck With Intravenous Contrast 1. No evidence of high-grade arterial stenosis or underlying dissection. 2. Bilateral upper lobe new opacities are concerning for infection. CT Angiography Of The Head With Intravenous Contrast 1. No evidence of acute thrombotic occlusion, high-grade arterial stenosis, or focal cerebral aneurysm.  Signed by Dr Mikhail Chandra     11/12/21  1112   WBC 15.7*   HGB 13.3        Recent Labs     11/12/21  1112 11/12/21  1145     --    K 3.7 3.0     --    CO2 29  --    BUN 25*  --    CREATININE 0.4*  --    GLUCOSE 133*  --      Recent Labs     11/12/21  1112   AST 15   ALT 7   BILITOT 0.4   ALKPHOS 120*     Recent Labs     11/12/21  1112   INR 1.18         ASSESSMENT:  74 y.o. admitted with AMS, hypoxia, pneumonia, lung cancer history, possible right sided weakness, slurred speech, worrisome for new stroke. CT head, CTAs negative. PLAN:  1. MRI brain  2. Tele, ECHO, EEG  3. Additional labs  4. Continue addressing medical issues, pneumonia, respiratory failure on BiPAP  5. ASA, statin, permissive hypertension   6.  PT, OT, ST  7. DVT proph  8. Limit sedating medications, supplement thiamine      Please feel free to call with any questions. [phone] (cell phone).     Aviva Mulligan DO  Board Certified Neurology

## 2021-11-13 NOTE — PROGRESS NOTES
CCU RN reported that patient desats to 74 when BiPAP is removed. Will return at a later date to attempt evaluation.           Electronically Signed By:  Sobia Carter M.S., CCC-SLP  11/13/2021,9:30 AM.

## 2021-11-13 NOTE — PROGRESS NOTES
Physical Therapy    Spoke with nurse who states pt not appropriate for therapy assessment at this time. Will cont to follow.     Electronically signed by Camille Garcia PT on 11/13/2021 at 12:51 PM

## 2021-11-13 NOTE — PROGRESS NOTES
[facility] Neurology Progress Note      Patient:   Lawrence Cabral  MR#:    [id number]   Room:    5173/794-27   YOB: 1955  Date of Progress Note: 11/13/2021  Time of Note                           12:16 PM  Consulting Physician:  Kim Vázquez DO  Attending Physician:  Glenn Ceballos DO      INTERVAL HISTORY:  Doing about the same overnight, is slightly more responsive today. REVIEW OF SYSTEMS:  Limited given BiPAP, AMS. PHYSICAL EXAM:    Constitutional    BP (!) 97/54   Pulse 101   Temp 98.3 °F (36.8 °C) (Temporal)   Resp 17   Ht 5' 5\" (1.651 m)   Wt 109 lb 12.8 oz (49.8 kg)   SpO2 100%   BMI 18.27 kg/m²   General appearance: No acute distress   EYES -   Conjunctiva normal  Pupillary exam as below, see CN exam in the neurologic exam  ENT-    No scars, masses, or lesions over external nose or ears  Oropharynx - limited on BiPAP  Cardiovascular -   No clubbing, cyanosis, or edema   Pulmonary-   On BiPAP   Musculoskeletal    No significant wasting of muscles noted  Gait as below, see gait exam in the neurologic exam  Muscle strength, tone, stability as below see the motor exam in the neurologic exam.   No bony deformities  Skin    Warm, dry, and intact to inspection and palpation. No rash, erythema, or pallor        NEUROLOGICAL EXAM     Mental status    []? Awake, alert, oriented   []? Affect attention and concentration appear appropriate  []? Recent and remote memory appears unremarkable  []? Speech normal without dysarthria or aphasia, comprehension and repetition intact. COMMENTS:  Opens eyes to voice, following some commands, confusion noted, speech limited    Cranial Nerves []? No VF deficit to confrontation,  optic discs normal, no papilledema on fundoscopic exam.  []? PERRLA, EOMI, no nystagmus, conjugate eye movements, no ptosis  []? Face symmetric  []? Facial sensation intact  []? Tongue midline no atrophy or fasciculations present  []?  Palate midline, hearing to finger rub normal  []? Shoulder shrug and SCM testing normal  COMMENTS: Left Pupil was reactive, right pupil misshapen likely surgical, face appears sym, EOM limited    Motor   []? 5/5 strength x 4 extremities  [x]? Normal bulk and tone  [x]? No tremor present  [x]? No rigidity or bradykinesia noted  COMMENTS: 4/5 globally, weaker over the right side   Sensory  []? Sensation intact to light touch, pin prick, vibration, and proprioception BLE  []? Sensation intact to light touch, pin prick, vibration, and proprioception BUE  COMMENTS: Limtied   Coordination []? FTN normal bilaterally   []? HTS normal bilaterally  []? JIA normal.   COMMENTS: Limited    Reflexes  [x]? Symmetric and non-pathological  [x]? Toes downgoing bilaterally  [x]? No clonus present  COMMENTS:   Gait                  []? Normal steady gait    []? Ataxic    []? Spastic     []? Magnetic     []? Shuffling  [x]? Not assessed  COMMENTS:        LABS/IMAGING:    CT HEAD WO CONTRAST    Result Date: 11/12/2021  CT HEAD WO CONTRAST 11/12/2021 11:25 AM HISTORY: Code stroke COMPARISON: None DOSE LENGTH PRODUCT: 961 mGy cm TECHNIQUE: Helical tomographic images of the brain were obtained without the use of intravenous contrast. Automated exposure control was also utilized to decrease patient radiation dose. FINDINGS: Exam is limited by motion artifact. There is no evidence of evolving large vascular territory infarct. Underlying chronic small vessel ischemic change. No visualized intra-axial or extra-axial hemorrhage. No mass lesion is identified. Normal size and configuration of the ventricular system. The basal cisterns are symmetric. Posterior fossa structures are unremarkable. The included orbits and their contents are unremarkable. Chronic right maxillary sinus disease. The visualized osseous structures and overlying soft tissues of the skull and face are unremarkable. 1. No acute intracranial process.  Signed by Dr Ruperto Andrews PORTABLE    Result Date: 11/12/2021  XR CHEST PORTABLE 11/12/2021 11:36 AM HISTORY: Code stroke  Technique: Single AP view of the chest COMPARISONS: Chest exam dated 4/29/2020 FINDINGS: Reidentified right suprahilar consolidation with volume loss. New developing consolidations in the left upper and left lower lobes with air bronchograms. No obvious pleural effusion. Underlying lung emphysema. Heart size is stable. The pulmonary vasculature are nondilated. Right chest wall [id number]. Spinal scoliotic curvature. 1. New multifocal consolidation in the left upper and left lower lobes, appearance concerning for new multifocal pneumonia. 2. Treatment-related changes in the right suprahilar region with scarring and volume loss. Signed by Dr Moustapha Giron    Result Date: 11/12/2021  EXAM: CT NECK ANGIOGRAM CT HEAD ANGIOGRAM on  11/12/2021 12:54 PM COMPARISON:  CT chest dated August 23, 2021. HISTORY:  74years-old Female. Code stroke TECHNIQUE: Multiple CT images were obtained of the of the head and neck after the administration of IV contrast. 3D MIP reformats were generated  and were sent to PACS for interpretation. Grading of carotid artery stenosis is performed by NASCET criteria. FINDINGS: CT Neck Angiogram: There is a conventional aortic arch with patent origins of the brachiocephalic trunk, left common carotid and left subclavian arteries. The bilateral common carotid arteries and subclavian arteries are well-opacified. There is atherosclerotic disease of the bilateral carotid bifurcations, left greater than than right, with essentially 0% stenosis. The bilateral internal carotid arteries are otherwise well opacified throughout. The bilateral vertebral arteries are well-opacified throughout. Additional findings: Emphysema. New left upper lobe opacities posteriorly, most concerning for infection. Additional right upper lobe opacity is also concerning for infection.  Centrally necrotic right upper and lower lobe posterior mass, incompletely visualized but appears grossly stable when compared to prior examination. Small bilateral pleural effusions. CT Head Angiogram: The right internal carotid artery demonstrates normal course and caliber without evidence of flow limiting stenosis or occlusion. The major branch vessels to the right anterior and middle cerebral arteries are seen without evidence of stenosis or occlusion. There is no evidence of aneurysm or vascular malformation. Some atherosclerotic disease in the carotid siphon. The left internal carotid artery demonstrates normal course and caliber without evidence of flow limiting stenosis or occlusion. The major branch vessels to the left anterior and middle cerebral arteries are seen without evidence of stenosis or occlusion. There is no evidence of aneurysm or vascular malformation. Some atherosclerotic disease in the carotid siphon. Evaluation of the posterior circulation demonstrates normal caliber of the vertebral arteries, basilar artery, and major branch vessels of the posterior cerebral arteries bilaterally without evidence of flow limiting stenosis or occlusion. There is no evidence of aneurysm or vascular malformation. CT Angiography Of The Neck With Intravenous Contrast 1. No evidence of high-grade arterial stenosis or underlying dissection. 2. Bilateral upper lobe new opacities are concerning for infection. CT Angiography Of The Head With Intravenous Contrast 1. No evidence of acute thrombotic occlusion, high-grade arterial stenosis, or focal cerebral aneurysm. Signed by Dr Victor M Fox Date: 11/12/2021  EXAM: CT NECK ANGIOGRAM CT HEAD ANGIOGRAM on  11/12/2021 12:54 PM COMPARISON:  CT chest dated August 23, 2021. HISTORY:  74years-old Female.  Code stroke TECHNIQUE: Multiple CT images were obtained of the of the head and neck after the administration of IV contrast. 3D MIP reformats were generated  and were sent to PACS for interpretation. Grading of carotid artery stenosis is performed by NASCET criteria. FINDINGS: CT Neck Angiogram: There is a conventional aortic arch with patent origins of the brachiocephalic trunk, left common carotid and left subclavian arteries. The bilateral common carotid arteries and subclavian arteries are well-opacified. There is atherosclerotic disease of the bilateral carotid bifurcations, left greater than than right, with essentially 0% stenosis. The bilateral internal carotid arteries are otherwise well opacified throughout. The bilateral vertebral arteries are well-opacified throughout. Additional findings: Emphysema. New left upper lobe opacities posteriorly, most concerning for infection. Additional right upper lobe opacity is also concerning for infection. Centrally necrotic right upper and lower lobe posterior mass, incompletely visualized but appears grossly stable when compared to prior examination. Small bilateral pleural effusions. CT Head Angiogram: The right internal carotid artery demonstrates normal course and caliber without evidence of flow limiting stenosis or occlusion. The major branch vessels to the right anterior and middle cerebral arteries are seen without evidence of stenosis or occlusion. There is no evidence of aneurysm or vascular malformation. Some atherosclerotic disease in the carotid siphon. The left internal carotid artery demonstrates normal course and caliber without evidence of flow limiting stenosis or occlusion. The major branch vessels to the left anterior and middle cerebral arteries are seen without evidence of stenosis or occlusion. There is no evidence of aneurysm or vascular malformation. Some atherosclerotic disease in the carotid siphon.  Evaluation of the posterior circulation demonstrates normal caliber of the vertebral arteries, basilar artery, and major branch vessels of the posterior cerebral arteries bilaterally without evidence of flow limiting stenosis or occlusion. There is no evidence of aneurysm or vascular malformation. CT Angiography Of The Neck With Intravenous Contrast 1. No evidence of high-grade arterial stenosis or underlying dissection. 2. Bilateral upper lobe new opacities are concerning for infection. CT Angiography Of The Head With Intravenous Contrast 1. No evidence of acute thrombotic occlusion, high-grade arterial stenosis, or focal cerebral aneurysm. Signed by Dr Jennifer Bliss      Lab Results   Component Value Date    WBC 15.6 (H) 11/13/2021    HGB 12.4 11/13/2021    HCT 42.3 11/13/2021    .7 (H) 11/13/2021     11/13/2021     Lab Results   Component Value Date     11/13/2021    K 3.4 (L) 11/13/2021     11/13/2021    CO2 34 (H) 11/13/2021    BUN 22 11/13/2021    CREATININE 0.2 (L) 11/13/2021    GLUCOSE 110 (H) 11/13/2021    CALCIUM 8.3 (L) 11/13/2021    PROT 6.1 (L) 11/12/2021    LABALBU 2.9 (L) 11/12/2021    BILITOT 0.4 11/12/2021    ALKPHOS 120 (H) 11/12/2021    AST 15 11/12/2021    ALT 7 11/12/2021    LABGLOM >60 11/13/2021    GFRAA >59 11/13/2021    AGRATIO 1.4 05/01/2020    GLOB 3.5 02/25/2021     Lab Results   Component Value Date    INR 1.18 11/12/2021    INR 0.96 05/16/2019    INR 0.99 11/28/2011    PROTIME 15.2 (H) 11/12/2021    PROTIME 12.2 05/16/2019    PROTIME 10.80 11/28/2011       RECORD REVIEW:   Previous medical records, medications were reviewed at today's visit. Nursing/physician notes, imaging, labs and vitals reviewed. PT,OT and/or speech notes reviewed      ASSESSMENT:  74 y.o. admitted with AMS, hypoxia, pneumonia, lung cancer history, right sided weakness, slurred speech, worrisome for new stroke. CT head, CTAs negative. Exam unchanged overnight.      PLAN:  1. MRI brain once able, on BiPAP currently. 2.  Tele, follow up ECHO, EEG  3. Follow up remaining labs  4. Continue addressing medical issues, pneumonia, respiratory failure on BiPAP  5. ASA, statin, permissive hypertension   6.  PT, OT, ST  7. DVT proph  8. Limit sedating medications, supplementing thiamine    Please feel free to call with any questions. [phone] (cell phone).       Maribeth Hoyos DO  Board Certified Neurology

## 2021-11-14 NOTE — PROCEDURES
ADULT INPATIENT ELECTROENCEPHALOGRAM REPORT    Patient:   Luis Daniel Conklin  MR#:    [id number]  Room #:    INPATIENT  YOB: 1955  Date of Evaluation:  11/13/2021  Primary Physician:     DOMINICK Bauman NP   Referring Physician:   Leela Gaston DO      CLINICAL INFORMATION:     This patient is a 74 y.o. female with a history of AMS. MEDICATIONS:     See MAR. RECORDING CONDITIONS:     This EEG was performed utilizing standard International 10-20 System of electrode placement, with additional channels monitored for eye movement. One channel electrocardiogram was monitored. Data was obtained, stored, and interpreted according to ACNS guidelines (J Clin Neurophysiol 2006;23(2):) utilizing referential montage recording, with reformatting to longitudinal, transverse bipolar, and referential montages as necessary for interpretation, along with the digital/automated EEG analysis. Patient tolerated entire procedure well. Photic stimulation and hyperventilation were utilized as activation procedures unless otherwise specified below. E.E.G. DESCRIPTION:     The background consists of diffuse 5 to 7 Hz sharply contoured slowing. No overt electrographic seizure activity was noted. No overt epileptiform abnormalities were seen. Hyperventilation was not performed. Photic stimulation was performed and had little change on the recording. Muscle, motion, and eye movement artifacts were noted. EEG INTERPRETATION:    Abnormal EEG due to diffuse slowing of the background rhythm. No overt epileptiform abnormalities were noted. CLINICAL CORRELATION:     This EEG is suggestive of a moderate encephalopathy, nonspecific to etiology.        Leela Gaston DO  Board Certified Neurologist    Date reported: 11/14/2021  Date signed: 11/14/2021

## 2021-11-14 NOTE — PROGRESS NOTES
[id number] [facility] Neurology Progress Note      Patient:   Tommy Antonio  MR#:    [id number]   Room:    [address] [place] [address]   YOB: 1955  Date of Progress Note: 11/14/2021  Time of Note                           11:34 AM  Consulting Physician:  Paul Kelsey DO  Attending Physician:  Hu Gómez DO      INTERVAL HISTORY:  Doing about the same overnight, is slightly more responsive today, following some commands but not consistently. REVIEW OF SYSTEMS:  Limited given BiPAP, AMS. PHYSICAL EXAM:    Constitutional    BP (!) 114/55   Pulse 103   Temp 97.8 °F (36.6 °C) (Temporal)   Resp 25   Ht 5' 5\" (1.651 m)   Wt 109 lb 12.8 oz (49.8 kg)   SpO2 90%   BMI 18.27 kg/m²   General appearance: No acute distress   EYES -   Conjunctiva normal  Pupillary exam as below, see CN exam in the neurologic exam  ENT-    No scars, masses, or lesions over external nose or ears  Oropharynx - limited on BiPAP  Cardiovascular -   No clubbing, cyanosis, or edema   Pulmonary-   On BiPAP   Musculoskeletal    No significant wasting of muscles noted  Gait as below, see gait exam in the neurologic exam  Muscle strength, tone, stability as below see the motor exam in the neurologic exam.   No bony deformities  Skin    Warm, dry, and intact to inspection and palpation. No rash, erythema, or pallor        NEUROLOGICAL EXAM     Mental status    []? Awake, alert, oriented   []? Affect attention and concentration appear appropriate  []? Recent and remote memory appears unremarkable  []? Speech normal without dysarthria or aphasia, comprehension and repetition intact. COMMENTS:  Opens eyes to voice, following some commands intermittently, confusion noted, speech limited    Cranial Nerves []? No VF deficit to confrontation,  optic discs normal, no papilledema on fundoscopic exam.  []? PERRLA, EOMI, no nystagmus, conjugate eye movements, no ptosis  []? Face symmetric  []? Facial sensation intact  []?  Tongue midline no atrophy or fasciculations present  []? Palate midline, hearing to finger rub normal  []? Shoulder shrug and SCM testing normal  COMMENTS: Left Pupil was reactive, right pupil misshapen likely surgical, face appears sym, EOM limited    Motor   []? 5/5 strength x 4 extremities  [x]? Normal bulk and tone  [x]? No tremor present  [x]? No rigidity or bradykinesia noted  COMMENTS: 4/5 globally, weaker over the right side   Sensory  []? Sensation intact to light touch, pin prick, vibration, and proprioception BLE  []? Sensation intact to light touch, pin prick, vibration, and proprioception BUE  COMMENTS: Limtied   Coordination []? FTN normal bilaterally   []? HTS normal bilaterally  []? JIA normal.   COMMENTS: Limited    Reflexes  [x]? Symmetric and non-pathological  [x]? Toes downgoing bilaterally  [x]? No clonus present  COMMENTS:   Gait                  []? Normal steady gait    []? Ataxic    []? Spastic     []? Magnetic     []? Shuffling  [x]? Not assessed  COMMENTS:        LABS/IMAGING:    CT HEAD WO CONTRAST    Result Date: 11/12/2021  CT HEAD WO CONTRAST 11/12/2021 11:25 AM HISTORY: Code stroke COMPARISON: None DOSE LENGTH PRODUCT: 961 mGy cm TECHNIQUE: Helical tomographic images of the brain were obtained without the use of intravenous contrast. Automated exposure control was also utilized to decrease patient radiation dose. FINDINGS: Exam is limited by motion artifact. There is no evidence of evolving large vascular territory infarct. Underlying chronic small vessel ischemic change. No visualized intra-axial or extra-axial hemorrhage. No mass lesion is identified. Normal size and configuration of the ventricular system. The basal cisterns are symmetric. Posterior fossa structures are unremarkable. The included orbits and their contents are unremarkable. Chronic right maxillary sinus disease. The visualized osseous structures and overlying soft tissues of the skull and face are unremarkable.      1. No acute intracranial process. Signed by Dr Phoenix Puckett    XR CHEST PORTABLE    Result Date: 11/12/2021  XR CHEST PORTABLE 11/12/2021 11:36 AM HISTORY: Code stroke  Technique: Single AP view of the chest COMPARISONS: Chest exam dated 4/29/2020 FINDINGS: Reidentified right suprahilar consolidation with volume loss. New developing consolidations in the left upper and left lower lobes with air bronchograms. No obvious pleural effusion. Underlying lung emphysema. Heart size is stable. The pulmonary vasculature are nondilated. Right chest wall [id number]. Spinal scoliotic curvature. 1. New multifocal consolidation in the left upper and left lower lobes, appearance concerning for new multifocal pneumonia. 2. Treatment-related changes in the right suprahilar region with scarring and volume loss. Signed by Dr Solange Guzman    Result Date: 11/12/2021  EXAM: CT NECK ANGIOGRAM CT HEAD ANGIOGRAM on  11/12/2021 12:54 PM COMPARISON:  CT chest dated August 23, 2021. HISTORY:  74years-old Female. Code stroke TECHNIQUE: Multiple CT images were obtained of the of the head and neck after the administration of IV contrast. 3D MIP reformats were generated  and were sent to PACS for interpretation. Grading of carotid artery stenosis is performed by NASCET criteria. FINDINGS: CT Neck Angiogram: There is a conventional aortic arch with patent origins of the brachiocephalic trunk, left common carotid and left subclavian arteries. The bilateral common carotid arteries and subclavian arteries are well-opacified. There is atherosclerotic disease of the bilateral carotid bifurcations, left greater than than right, with essentially 0% stenosis. The bilateral internal carotid arteries are otherwise well opacified throughout. The bilateral vertebral arteries are well-opacified throughout. Additional findings: Emphysema. New left upper lobe opacities posteriorly, most concerning for infection.  Additional right upper lobe opacity is also concerning for infection. Centrally necrotic right upper and lower lobe posterior mass, incompletely visualized but appears grossly stable when compared to prior examination. Small bilateral pleural effusions. CT Head Angiogram: The right internal carotid artery demonstrates normal course and caliber without evidence of flow limiting stenosis or occlusion. The major branch vessels to the right anterior and middle cerebral arteries are seen without evidence of stenosis or occlusion. There is no evidence of aneurysm or vascular malformation. Some atherosclerotic disease in the carotid siphon. The left internal carotid artery demonstrates normal course and caliber without evidence of flow limiting stenosis or occlusion. The major branch vessels to the left anterior and middle cerebral arteries are seen without evidence of stenosis or occlusion. There is no evidence of aneurysm or vascular malformation. Some atherosclerotic disease in the carotid siphon. Evaluation of the posterior circulation demonstrates normal caliber of the vertebral arteries, basilar artery, and major branch vessels of the posterior cerebral arteries bilaterally without evidence of flow limiting stenosis or occlusion. There is no evidence of aneurysm or vascular malformation. CT Angiography Of The Neck With Intravenous Contrast 1. No evidence of high-grade arterial stenosis or underlying dissection. 2. Bilateral upper lobe new opacities are concerning for infection. CT Angiography Of The Head With Intravenous Contrast 1. No evidence of acute thrombotic occlusion, high-grade arterial stenosis, or focal cerebral aneurysm. Signed by Dr Mary Kohli Date: 11/12/2021  EXAM: CT NECK ANGIOGRAM CT HEAD ANGIOGRAM on  11/12/2021 12:54 PM COMPARISON:  CT chest dated August 23, 2021. HISTORY:  74years-old Female.  Code stroke TECHNIQUE: Multiple CT images were obtained of the of the head and neck after the administration of IV contrast. 3D MIP reformats were generated  and were sent to PACS for interpretation. Grading of carotid artery stenosis is performed by NASCET criteria. FINDINGS: CT Neck Angiogram: There is a conventional aortic arch with patent origins of the brachiocephalic trunk, left common carotid and left subclavian arteries. The bilateral common carotid arteries and subclavian arteries are well-opacified. There is atherosclerotic disease of the bilateral carotid bifurcations, left greater than than right, with essentially 0% stenosis. The bilateral internal carotid arteries are otherwise well opacified throughout. The bilateral vertebral arteries are well-opacified throughout. Additional findings: Emphysema. New left upper lobe opacities posteriorly, most concerning for infection. Additional right upper lobe opacity is also concerning for infection. Centrally necrotic right upper and lower lobe posterior mass, incompletely visualized but appears grossly stable when compared to prior examination. Small bilateral pleural effusions. CT Head Angiogram: The right internal carotid artery demonstrates normal course and caliber without evidence of flow limiting stenosis or occlusion. The major branch vessels to the right anterior and middle cerebral arteries are seen without evidence of stenosis or occlusion. There is no evidence of aneurysm or vascular malformation. Some atherosclerotic disease in the carotid siphon. The left internal carotid artery demonstrates normal course and caliber without evidence of flow limiting stenosis or occlusion. The major branch vessels to the left anterior and middle cerebral arteries are seen without evidence of stenosis or occlusion. There is no evidence of aneurysm or vascular malformation. Some atherosclerotic disease in the carotid siphon.  Evaluation of the posterior circulation demonstrates normal caliber of the vertebral arteries, basilar artery, and major branch vessels of the posterior cerebral arteries bilaterally without evidence of flow limiting stenosis or occlusion. There is no evidence of aneurysm or vascular malformation. CT Angiography Of The Neck With Intravenous Contrast 1. No evidence of high-grade arterial stenosis or underlying dissection. 2. Bilateral upper lobe new opacities are concerning for infection. CT Angiography Of The Head With Intravenous Contrast 1. No evidence of acute thrombotic occlusion, high-grade arterial stenosis, or focal cerebral aneurysm. Signed by Dr Inga Crowell      Lab Results   Component Value Date    WBC 11.1 (H) 11/14/2021    HGB 11.6 (L) 11/14/2021    HCT 40.5 11/14/2021    .7 (H) 11/14/2021     11/14/2021     Lab Results   Component Value Date     (H) 11/14/2021    K 4.2 11/14/2021     11/14/2021    CO2 36 (H) 11/14/2021    BUN 30 (H) 11/14/2021    CREATININE 0.2 (L) 11/14/2021    GLUCOSE 87 11/14/2021    CALCIUM 8.6 (L) 11/14/2021    PROT 5.1 (L) 11/14/2021    LABALBU 2.8 (L) 11/14/2021    BILITOT 0.3 11/14/2021    ALKPHOS 109 (H) 11/14/2021    AST 16 11/14/2021    ALT 8 11/14/2021    LABGLOM >60 11/14/2021    GFRAA >59 11/14/2021    AGRATIO 1.4 05/01/2020    GLOB 3.5 02/25/2021     Lab Results   Component Value Date    INR 1.18 11/12/2021    INR 0.96 05/16/2019    INR 0.99 11/28/2011    PROTIME 15.2 (H) 11/12/2021    PROTIME 12.2 05/16/2019    PROTIME 10.80 11/28/2011       RECORD REVIEW:   Previous medical records, medications were reviewed at today's visit. Nursing/physician notes, imaging, labs and vitals reviewed. PT,OT and/or speech notes reviewed      ASSESSMENT:  74 y.o. admitted with AMS, hypoxia, pneumonia, lung cancer history, right sided weakness, slurred speech, worrisome for new stroke. CT head, CTAs negative. ECHO, CD negative. EEG with slowing, nothing epileptiform. Exam unchanged overnight.      PLAN:  1. MRI brain once able, still on BiPAP   2. Follow Tele   3.   Continue addressing medical issues, pneumonia, respiratory failure on BiPAP  4. ASA, statin, cautious blood pressure titration. 6.  PT, OT, ST  7. DVT proph  8. Limit sedating medications, supplementing thiamine    Please feel free to call with any questions. [phone] (cell phone).       Humberto Villagran DO  Board Certified Neurology

## 2021-11-14 NOTE — CONSULTS
**Physician Signature**  This document was electronically signed by: Kizzy Leos MD  2021   11:27 AM    **Consult Information**  Member Facility: [address] [place] [address] MRN: [id number]  Visit/Encounter Number: [id number]  Consult ID: [id number]  Facility Time Zone: CT  Date and Time of Request: 2021 10:11 AM  CT  Requesting Clinician: Jaja Denton DO  Patient Name: Nena Mitchell  YOB: 1955  Gender: Female  Patient identity was confirmed at the beginning of the consult with the   patient/family/staff using two personal identifiers: Patient name and       **Reason for Consult**  Reason for Consult: [facility]    **Admission**  Admission Date: 2021  Chief reason for ICU admission: Respiratory Failure  Secondary reasons for ICU admission: Altered Mental Status    **Core Metrics**  General orienting sentence for patient: 73 yo female admitted  (not   covid) found unresponsive at home with sats in 46s. Hx lung cancer and   COPD. ON bipap CT scan brain negative for stroke. REmains pleasantly   confused no follow commands  Chief physiologic deterioration: Increase in FiO2 required by   30%  Is the patient on DVT prophylaxis?: Yes  DVT Prophylaxis Comments: asa suppository  Is the patient on GI prophylaxis?: No  Has this patient reached their nutritional goal?: No and issues are being   addressed  Are there current issues with pain management in this patient?:   No  Are there issues with skin integrity?: Yes and issues are being   addressed  Skin Integrity Details: red excoriation on bottom  Are there issues with delirium?: Yes and issues are being addressed  Has the patient been mobilized?: No  Is this patient currently intubated?: No  Intubation Details: on high flow bipap  Are there ethical or care philosophy or family issues?: No  Do you recommend an in depth evaluation?: Yes, but no request made at this   time  Chief Reason for In Depth Evaluation: Respiratory Failure  Disposition Recommendations: Continue ICU level of care    **Inserted/Removed Devices**  Device Name: Young Catheter  Site of insertion: Urethra  Date of insertion: 11-    **Physician Signature**  This document was electronically signed by: Satish Kingsley MD  11/14/2021   11:27 AM

## 2021-11-14 NOTE — PLAN OF CARE
Problem: Falls - Risk of:  Goal: Will remain free from falls  Description: Will remain free from falls  Outcome: Ongoing  Goal: Absence of physical injury  Description: Absence of physical injury  Outcome: Ongoing     Problem: Skin Integrity:  Goal: Will show no infection signs and symptoms  Description: Will show no infection signs and symptoms  Outcome: Ongoing  Goal: Absence of new skin breakdown  Description: Absence of new skin breakdown  Outcome: Ongoing     Problem: Pain:  Goal: Pain level will decrease  Description: Pain level will decrease  Outcome: Ongoing  Goal: Control of acute pain  Description: Control of acute pain  Outcome: Ongoing  Goal: Control of chronic pain  Description: Control of chronic pain  Outcome: Ongoing     Problem: Coping:  Goal: Ability to remain calm will improve  Description: Ability to remain calm will improve  Outcome: Ongoing     Problem: Safety:  Goal: Ability to remain free from injury will improve  Description: Ability to remain free from injury will improve  Outcome: Ongoing     Problem: Self-Care:  Goal: Ability to participate in self-care as condition permits will improve  Description: Ability to participate in self-care as condition permits will improve  Outcome: Ongoing     Problem: Tobacco Use:  Goal: Inpatient tobacco use cessation counseling participation  Description: Inpatient tobacco use cessation counseling participation  Outcome: Ongoing

## 2021-11-14 NOTE — PROGRESS NOTES
Hospitalist Progress Note    Patient:  Eboni Lujan  YOB: 1955  Date of Service: 11/13/2021  MRN: [id number]   Acct: [de-identified]   Primary Care Physician: DOMINICK Crawford NP  Advance Directive: Full Code  Admit Date: 11/12/2021       Hospital Day: 1  Referring Provider: Yu Salinas DO    Patient Seen, Chart, Consults, Notes, Labs, Radiology studies reviewed. Subjective:  Aidee Cates is a 74 y.o. female  whom we are following for hypoxemic respiratory failure and possible stroke. She remains on BiPAP support. She has been evaluated by neurology. Hemodynamics are stable. She is afebrile.     Allergies:  No known allergies    Medicines:  Current Facility-Administered Medications   Medication Dose Route Frequency Provider Last Rate Last Admin    albuterol sulfate  (90 Base) MCG/ACT inhaler 1 puff  1 puff Inhalation 4x daily [facility], DO        citalopram (CELEXA) tablet 20 mg  20 mg Oral Daily [facility], DO        morphine (MS CONTIN) extended release tablet 15 mg  15 mg Oral Q8H [facility], DO        ondansetron (ZOFRAN-ODT) disintegrating tablet 4 mg  4 mg Oral Q8H PRN [facility], DO        Or    [facility]ansetron [facility] injection 4 mg  4 mg IntraVENous Q6H PRN [facility], DO   4 mg at 11/13/21 9571    polyethylene glycol (GLYCOLAX) packet 17 g  17 g Oral Daily PRN [facility], DO        enoxaparin (LOVENOX) injection 40 mg  40 mg SubCUTAneous Nightly [facility], DO   40 mg at 11/12/21 2132    aspirin EC tablet 81 mg  81 mg Oral Daily [facility], DO        Or    aspirin suppository 300 mg  300 mg Rectal Daily [facility], DO   300 mg at 11/13/21 7155    atorvastatin (LIPITOR) tablet 40 mg  40 mg Oral Nightly [facility], DO        labetalol (NORMODYNE;TRANDATE) injection 10 mg  10 mg IntraVENous Q10 Min PRN [facility], DO        morphine (PF) injection 2 mg  2 mg IntraVENous Q3H PRN Carlos Anand MD   2 mg at 11/13/21 1408    thiamine (B-1) injection 100 mg  100 mg IntraVENous Daily Rgela Albrecht DO   100 mg at 11/13/21 8471       Past Medical History:  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     COPD [facility] obstructive [facility] [address]     Depression     Emphysema (subcutaneous) (surgical) resulting from a procedure     History of lung biopsy 05/16/2019    Malignant neoplasm of right main bronchus ([facility] [address] 03/2019    NSCLC, SCC [id number] stage IIIb    Syncope     TIA (transient ischemic attack)        Past Surgical History:  Past Surgical History:   Procedure Laterality Date    APPENDECTOMY      BACK SURGERY      times two    BLADDER SUSPENSION      CHOLECYSTECTOMY      HYSTERECTOMY      INCONTINENCE SURGERY         Family History  Family History   Problem Relation Age of Onset    Colon Cancer Mother     High Blood Pressure Brother     Diabetes Brother        Social History  Social History     Socioeconomic History    Marital status:      Spouse name: Not on file    Number of children: Not on file    Years of education: Not on file    Highest education level: Not on file   Occupational History    Not on file   Tobacco Use    Smoking status: Current Every Day Smoker    Smokeless tobacco: Never Used   Substance and Sexual Activity    Alcohol use: Not Currently    Drug use: Never    Sexual activity: Not on file   Other Topics Concern    Not on file   Social History Narrative    Not on file     Social Determinants of Health     Financial Resource Strain:     Difficulty of Paying Living Expenses: Not on file   Food Insecurity:     Worried About Running Out of Food in the Last Year: Not on file    Trisha of Food in the Last Year: Not on file   Transportation Needs:     Lack of Transportation (Medical):  Not on file    Lack of Transportation (Non-Medical): Not on file   Physical Activity:     Days of Exercise per Week: Not on file    Minutes of Exercise per Session: Not on file   Stress:     Feeling of Stress : Not on file   Social Connections:     Frequency of Communication with Friends and Family: Not on file    Frequency of Social Gatherings with Friends and Family: Not on file    Attends Bahai Services: Not on file    Active Member of [address] [place] [address] or Organizations: Not on file    Attends Club or Organization Meetings: Not on file    Marital Status: Not on file   Intimate Partner Violence:     Fear of Current or Ex-Partner: Not on file    Emotionally Abused: Not on file    Physically Abused: Not on file    Sexually Abused: Not on file   Housing Stability:     Unable to Pay for Housing in the Last Year: Not on file    Number of [place] in the Last Year: Not on file    Unstable Housing in the Last Year: Not on file         Review of Systems:    Review of Systems   Unable to perform ROS: Patient nonverbal           Objective:  Blood pressure 101/63, pulse 105, temperature 97.9 °F (36.6 °C), temperature source Temporal, resp. rate 18, height 5' 5\" (1.651 m), weight 109 lb 12.8 oz (49.8 kg), SpO2 (!) 85 %. Intake/Output Summary (Last 24 hours) at 11/13/2021 1831  Last data filed at 11/13/2021 1800  Gross per 24 hour   Intake 355 ml   Output 975 ml   Net -620 ml       Physical Exam  Vitals and nursing note reviewed. Constitutional:       Appearance: She is ill-appearing. HENT:      Head: Normocephalic and atraumatic. Right Ear: External ear normal.      Left Ear: External ear normal.      Nose: Nose normal.      Mouth/Throat:      Mouth: Mucous membranes are moist.   Eyes:      Conjunctiva/sclera: Conjunctivae normal.      Pupils: Pupils are equal, round, and reactive to light. Cardiovascular:      Rate and Rhythm: Normal rate and regular rhythm. Heart sounds: Normal heart sounds.    Pulmonary:      Effort: Pulmonary effort is normal. Breath sounds: Normal breath sounds. Abdominal:      General: Abdomen is flat. Palpations: Abdomen is soft. Musculoskeletal:         General: Normal range of motion. Cervical back: Neck supple. No rigidity. No muscular tenderness. Skin:     General: Skin is warm and dry. Neurological:      Mental Status: She is alert. Labs:  BMP:   Recent Labs     11/12/21  1112 11/12/21  1145 11/13/21 0141     --  144   K 3.7 3.0 3.4*     --  101   CO2 29  --  34*   BUN 25*  --  22   CREATININE 0.4*  --  0.2*   CALCIUM 8.4*  --  8.3*     CBC:   Recent Labs     11/12/21  1112 11/13/21 0141   WBC 15.7* 15.6*   HGB 13.3 12.4   HCT 46.0 42.3   .8* 102.7*    183     LIVER PROFILE:   Recent Labs     11/12/21 1112   AST 15   ALT 7   BILITOT 0.4   ALKPHOS 120*     PT/INR:   Recent Labs     11/12/21 1112   PROTIME 15.2*   INR 1.18     APTT: No results for input(s): APTT in the last 72 hours. BNP:  No results for input(s): BNP in the last 72 hours. Ionized Calcium:No results for input(s): IONCA in the last 72 hours. Magnesium:  Recent Labs     11/13/21 0141   MG 2.0     Phosphorus:No results for input(s): PHOS in the last 72 hours. HgbA1C:   Recent Labs     11/13/21 0141   LABA1C 5.8     Hepatic:   Recent Labs     11/12/21 1112   ALKPHOS 120*   ALT 7   AST 15   PROT 6.1*   BILITOT 0.4   LABALBU 2.9*     Lactic Acid:   Recent Labs     11/12/21 1954   LACTA 1.6     Troponin: No results for input(s): CKTOTAL, CKMB, TROPONINT in the last 72 hours. ABGs: No results for input(s): PH, PCO2, PO2, HCO3, O2SAT in the last 72 hours. CRP:  No results for input(s): CRP in the last 72 hours. Sed Rate:  No results for input(s): SEDRATE in the last 72 hours. Cultures:   No results for input(s): CULTURE in the last 72 hours. Recent Labs     11/12/21  1225 11/12/21  1235   BC No Growth to date. Any change in status will be called. --    BLOODCULT2  --  No Growth to date.   Any change in status will be called. No results for input(s): CXSURG in the last 72 hours. Radiology reports as per the Radiologist  Radiology: CT HEAD WO CONTRAST    Result Date: 11/12/2021  CT HEAD WO CONTRAST 11/12/2021 11:25 AM HISTORY: Code stroke COMPARISON: None DOSE LENGTH PRODUCT: 961 mGy cm TECHNIQUE: Helical tomographic images of the brain were obtained without the use of intravenous contrast. Automated exposure control was also utilized to decrease patient radiation dose. FINDINGS: Exam is limited by motion artifact. There is no evidence of evolving large vascular territory infarct. Underlying chronic small vessel ischemic change. No visualized intra-axial or extra-axial hemorrhage. No mass lesion is identified. Normal size and configuration of the ventricular system. The basal cisterns are symmetric. Posterior fossa structures are unremarkable. The included orbits and their contents are unremarkable. Chronic right maxillary sinus disease. The visualized osseous structures and overlying soft tissues of the skull and face are unremarkable. 1. No acute intracranial process. Signed by Dr Torsten Pickering    XR CHEST PORTABLE    Result Date: 11/12/2021  XR CHEST PORTABLE 11/12/2021 11:36 AM HISTORY: Code stroke  Technique: Single AP view of the chest COMPARISONS: Chest exam dated 4/29/2020 FINDINGS: Reidentified right suprahilar consolidation with volume loss. New developing consolidations in the left upper and left lower lobes with air bronchograms. No obvious pleural effusion. Underlying lung emphysema. Heart size is stable. The pulmonary vasculature are nondilated. Right chest wall Ngvern-i-[id number]. Spinal scoliotic curvature. 1. New multifocal consolidation in the left upper and left lower lobes, appearance concerning for new multifocal pneumonia. 2. Treatment-related changes in the right suprahilar region with scarring and volume loss.  Signed by Dr Carly Loo Date: 11/12/2021  EXAM: CT NECK ANGIOGRAM CT HEAD ANGIOGRAM on  11/12/2021 12:54 PM COMPARISON:  CT chest dated August 23, 2021. HISTORY:  74years-old Female. Code stroke TECHNIQUE: Multiple CT images were obtained of the of the head and neck after the administration of IV contrast. 3D MIP reformats were generated  and were sent to PACS for interpretation. Grading of carotid artery stenosis is performed by NASCET criteria. FINDINGS: CT Neck Angiogram: There is a conventional aortic arch with patent origins of the brachiocephalic trunk, left common carotid and left subclavian arteries. The bilateral common carotid arteries and subclavian arteries are well-opacified. There is atherosclerotic disease of the bilateral carotid bifurcations, left greater than than right, with essentially 0% stenosis. The bilateral internal carotid arteries are otherwise well opacified throughout. The bilateral vertebral arteries are well-opacified throughout. Additional findings: Emphysema. New left upper lobe opacities posteriorly, most concerning for infection. Additional right upper lobe opacity is also concerning for infection. Centrally necrotic right upper and lower lobe posterior mass, incompletely visualized but appears grossly stable when compared to prior examination. Small bilateral pleural effusions. CT Head Angiogram: The right internal carotid artery demonstrates normal course and caliber without evidence of flow limiting stenosis or occlusion. The major branch vessels to the right anterior and middle cerebral arteries are seen without evidence of stenosis or occlusion. There is no evidence of aneurysm or vascular malformation. Some atherosclerotic disease in the carotid siphon. The left internal carotid artery demonstrates normal course and caliber without evidence of flow limiting stenosis or occlusion.  The major branch vessels to the left anterior and middle cerebral arteries are seen without evidence of stenosis or occlusion. There is no evidence of aneurysm or vascular malformation. Some atherosclerotic disease in the carotid siphon. Evaluation of the posterior circulation demonstrates normal caliber of the vertebral arteries, basilar artery, and major branch vessels of the posterior cerebral arteries bilaterally without evidence of flow limiting stenosis or occlusion. There is no evidence of aneurysm or vascular malformation. CT Angiography Of The Neck With Intravenous Contrast 1. No evidence of high-grade arterial stenosis or underlying dissection. 2. Bilateral upper lobe new opacities are concerning for infection. CT Angiography Of The Head With Intravenous Contrast 1. No evidence of acute thrombotic occlusion, high-grade arterial stenosis, or focal cerebral aneurysm. Signed by Dr Amadou Rodgers Date: 11/12/2021  EXAM: CT NECK ANGIOGRAM CT HEAD ANGIOGRAM on  11/12/2021 12:54 PM COMPARISON:  CT chest dated August 23, 2021. HISTORY:  74years-old Female. Code stroke TECHNIQUE: Multiple CT images were obtained of the of the head and neck after the administration of IV contrast. 3D MIP reformats were generated  and were sent to PACS for interpretation. Grading of carotid artery stenosis is performed by NASCET criteria. FINDINGS: CT Neck Angiogram: There is a conventional aortic arch with patent origins of the brachiocephalic trunk, left common carotid and left subclavian arteries. The bilateral common carotid arteries and subclavian arteries are well-opacified. There is atherosclerotic disease of the bilateral carotid bifurcations, left greater than than right, with essentially 0% stenosis. The bilateral internal carotid arteries are otherwise well opacified throughout. The bilateral vertebral arteries are well-opacified throughout. Additional findings: Emphysema. New left upper lobe opacities posteriorly, most concerning for infection.  Additional right upper lobe opacity is also concerning for infection. Centrally necrotic right upper and lower lobe posterior mass, incompletely visualized but appears grossly stable when compared to prior examination. Small bilateral pleural effusions. CT Head Angiogram: The right internal carotid artery demonstrates normal course and caliber without evidence of flow limiting stenosis or occlusion. The major branch vessels to the right anterior and middle cerebral arteries are seen without evidence of stenosis or occlusion. There is no evidence of aneurysm or vascular malformation. Some atherosclerotic disease in the carotid siphon. The left internal carotid artery demonstrates normal course and caliber without evidence of flow limiting stenosis or occlusion. The major branch vessels to the left anterior and middle cerebral arteries are seen without evidence of stenosis or occlusion. There is no evidence of aneurysm or vascular malformation. Some atherosclerotic disease in the carotid siphon. Evaluation of the posterior circulation demonstrates normal caliber of the vertebral arteries, basilar artery, and major branch vessels of the posterior cerebral arteries bilaterally without evidence of flow limiting stenosis or occlusion. There is no evidence of aneurysm or vascular malformation. CT Angiography Of The Neck With Intravenous Contrast 1. No evidence of high-grade arterial stenosis or underlying dissection. 2. Bilateral upper lobe new opacities are concerning for infection. CT Angiography Of The Head With Intravenous Contrast 1. No evidence of acute thrombotic occlusion, high-grade arterial stenosis, or focal cerebral aneurysm. Signed by Dr Ricardo Youngblood       Assessment     Acute hypoxemic respiratory failure. Continue BiPAP support. Treatment for underlying COPD.     Stroke of unknown etiology. Medical management.   Neurology evaluation.     Please document 39 minutes of critical care time for patient assessment, chart review, discussion with staff, .       Shabbir Broaden, DO

## 2021-11-15 NOTE — PROCEDURES
Patient given etomidate 20 mg IVP followed by vecuronium 10 mg IVP. Cords were easily visualized. A # 7 1/2 ET tube place at 23 cm at the lip.  + ET CO2 change. CXR pending.

## 2021-11-15 NOTE — PROGRESS NOTES
Physical Therapy  Pt noted to be intubated. Will d/c PT orders at this time.   Electronically signed by Kirk Borges PT on 11/15/2021 at 2:53 PM

## 2021-11-15 NOTE — PROGRESS NOTES
20mg etomidate IVP followed by 10mg vecuronium IVP given. 200mg succinylcholine wasted with Maria Dent RN.

## 2021-11-15 NOTE — PROGRESS NOTES
atrophy or fasciculations present  []? Palate midline, hearing to finger rub normal  []? Shoulder shrug and SCM testing normal  COMMENTS: Left Pupil was reactive, right pupil misshapen likely surgical, face appears sym, EOM limited    Motor   []? 5/5 strength x 4 extremities  [x]? Normal bulk and tone  [x]? No tremor present  [x]? No rigidity or bradykinesia noted  COMMENTS: 4/5 globally, weaker over the right side   Sensory  []? Sensation intact to light touch, pin prick, vibration, and proprioception BLE  []? Sensation intact to light touch, pin prick, vibration, and proprioception BUE  COMMENTS: Limtied   Coordination []? FTN normal bilaterally   []? HTS normal bilaterally  []? JIA normal.   COMMENTS: Limited    Reflexes  [x]? Symmetric and non-pathological  [x]? Toes downgoing bilaterally  [x]? No clonus present  COMMENTS:   Gait                  []? Normal steady gait    []? Ataxic    []? Spastic     []? Magnetic     []? Shuffling  [x]? Not assessed  COMMENTS:        LABS/IMAGING:    CT HEAD WO CONTRAST    Result Date: 11/12/2021  CT HEAD WO CONTRAST 11/12/2021 11:25 AM HISTORY: Code stroke COMPARISON: None DOSE LENGTH PRODUCT: 961 mGy cm TECHNIQUE: Helical tomographic images of the brain were obtained without the use of intravenous contrast. Automated exposure control was also utilized to decrease patient radiation dose. FINDINGS: Exam is limited by motion artifact. There is no evidence of evolving large vascular territory infarct. Underlying chronic small vessel ischemic change. No visualized intra-axial or extra-axial hemorrhage. No mass lesion is identified. Normal size and configuration of the ventricular system. The basal cisterns are symmetric. Posterior fossa structures are unremarkable. The included orbits and their contents are unremarkable. Chronic right maxillary sinus disease. The visualized osseous structures and overlying soft tissues of the skull and face are unremarkable.      1. No acute opacity is also concerning for infection. Centrally necrotic right upper and lower lobe posterior mass, incompletely visualized but appears grossly stable when compared to prior examination. Small bilateral pleural effusions. CT Head Angiogram: The right internal carotid artery demonstrates normal course and caliber without evidence of flow limiting stenosis or occlusion. The major branch vessels to the right anterior and middle cerebral arteries are seen without evidence of stenosis or occlusion. There is no evidence of aneurysm or vascular malformation. Some atherosclerotic disease in the carotid siphon. The left internal carotid artery demonstrates normal course and caliber without evidence of flow limiting stenosis or occlusion. The major branch vessels to the left anterior and middle cerebral arteries are seen without evidence of stenosis or occlusion. There is no evidence of aneurysm or vascular malformation. Some atherosclerotic disease in the carotid siphon. Evaluation of the posterior circulation demonstrates normal caliber of the vertebral arteries, basilar artery, and major branch vessels of the posterior cerebral arteries bilaterally without evidence of flow limiting stenosis or occlusion. There is no evidence of aneurysm or vascular malformation. CT Angiography Of The Neck With Intravenous Contrast 1. No evidence of high-grade arterial stenosis or underlying dissection. 2. Bilateral upper lobe new opacities are concerning for infection. CT Angiography Of The Head With Intravenous Contrast 1. No evidence of acute thrombotic occlusion, high-grade arterial stenosis, or focal cerebral aneurysm. Signed by Dr Lianne Taveras Date: 11/12/2021  EXAM: CT NECK ANGIOGRAM CT HEAD ANGIOGRAM on  11/12/2021 12:54 PM COMPARISON:  CT chest dated August 23, 2021. HISTORY:  74years-old Female.  Code stroke TECHNIQUE: Multiple CT images were obtained of the of the head and neck after the administration of IV contrast. 3D MIP reformats were generated  and were sent to PACS for interpretation. Grading of carotid artery stenosis is performed by NASCET criteria. FINDINGS: CT Neck Angiogram: There is a conventional aortic arch with patent origins of the brachiocephalic trunk, left common carotid and left subclavian arteries. The bilateral common carotid arteries and subclavian arteries are well-opacified. There is atherosclerotic disease of the bilateral carotid bifurcations, left greater than than right, with essentially 0% stenosis. The bilateral internal carotid arteries are otherwise well opacified throughout. The bilateral vertebral arteries are well-opacified throughout. Additional findings: Emphysema. New left upper lobe opacities posteriorly, most concerning for infection. Additional right upper lobe opacity is also concerning for infection. Centrally necrotic right upper and lower lobe posterior mass, incompletely visualized but appears grossly stable when compared to prior examination. Small bilateral pleural effusions. CT Head Angiogram: The right internal carotid artery demonstrates normal course and caliber without evidence of flow limiting stenosis or occlusion. The major branch vessels to the right anterior and middle cerebral arteries are seen without evidence of stenosis or occlusion. There is no evidence of aneurysm or vascular malformation. Some atherosclerotic disease in the carotid siphon. The left internal carotid artery demonstrates normal course and caliber without evidence of flow limiting stenosis or occlusion. The major branch vessels to the left anterior and middle cerebral arteries are seen without evidence of stenosis or occlusion. There is no evidence of aneurysm or vascular malformation. Some atherosclerotic disease in the carotid siphon.  Evaluation of the posterior circulation demonstrates normal caliber of the vertebral arteries, basilar artery, and major branch vessels of the posterior cerebral arteries bilaterally without evidence of flow limiting stenosis or occlusion. There is no evidence of aneurysm or vascular malformation. CT Angiography Of The Neck With Intravenous Contrast 1. No evidence of high-grade arterial stenosis or underlying dissection. 2. Bilateral upper lobe new opacities are concerning for infection. CT Angiography Of The Head With Intravenous Contrast 1. No evidence of acute thrombotic occlusion, high-grade arterial stenosis, or focal cerebral aneurysm. Signed by Dr Jennifer Bliss      Lab Results   Component Value Date    WBC 11.1 (H) 11/14/2021    HGB 11.6 (L) 11/14/2021    HCT 40.5 11/14/2021    .7 (H) 11/14/2021     11/14/2021     Lab Results   Component Value Date     (H) 11/14/2021    K 4.2 11/14/2021     11/14/2021    CO2 36 (H) 11/14/2021    BUN 30 (H) 11/14/2021    CREATININE 0.2 (L) 11/14/2021    GLUCOSE 87 11/14/2021    CALCIUM 8.6 (L) 11/14/2021    PROT 5.1 (L) 11/14/2021    LABALBU 2.8 (L) 11/14/2021    BILITOT 0.3 11/14/2021    ALKPHOS 109 (H) 11/14/2021    AST 16 11/14/2021    ALT 8 11/14/2021    LABGLOM >60 11/14/2021    GFRAA >59 11/14/2021    AGRATIO 1.4 05/01/2020    GLOB 3.5 02/25/2021     Lab Results   Component Value Date    INR 1.18 11/12/2021    INR 0.96 05/16/2019    INR 0.99 11/28/2011    PROTIME 15.2 (H) 11/12/2021    PROTIME 12.2 05/16/2019    PROTIME 10.80 11/28/2011       RECORD REVIEW:   Previous medical records, medications were reviewed at today's visit. Nursing/physician notes, imaging, labs and vitals reviewed. PT,OT and/or speech notes reviewed      ASSESSMENT:  74 y.o. admitted with AMS, hypoxia, pneumonia, lung cancer history, right sided weakness, slurred speech, worrisome for new stroke. CT head, CTAs negative. ECHO, CD negative. EEG with slowing, nothing epileptiform. Exam unchanged overnight.      PLAN:  1.   MRI brain once able, still on BiPAP, will try for repeat CT today to evaluate for evolving stroke. 2.  Follow Tele   3. Continue addressing medical issues, pneumonia, respiratory failure on BiPAP  4. ASA, statin, cautious blood pressure titration. 6.  PT, OT, ST  7. DVT proph  8. Limit sedating medications, supplementing thiamine    Please feel free to call with any questions. [phone] (cell phone).       Wilmer Chu DO  Board Certified Neurology

## 2021-11-15 NOTE — PROGRESS NOTES
Attempted a nursing swallow screen. Patient unable to follow commands or understand instructions.      Electronically signed by Duane Rideau, RN on 11/14/2021 at 7:58 PM

## 2021-11-15 NOTE — PROGRESS NOTES
Occupational Therapy  Note patient was intubated today. Will discontinue OT orders at this time. Please reconsult when patient able to actively participate in therapy.   Electronically signed by Max Garcia OT on 11/15/2021 at 2:31 PM

## 2021-11-16 PROBLEM — Z51.5 PALLIATIVE CARE PATIENT: Status: ACTIVE | Noted: 2021-01-01

## 2021-11-16 PROBLEM — E43 SEVERE MALNUTRITION (HCC): Status: ACTIVE | Noted: 2021-01-01

## 2021-11-16 NOTE — PROGRESS NOTES
Speech Language Pathology  Facility/Department: [facility] CORONARY CARE UNIT    NAME: Susanne Dan  : 1955  MRN: [id number]     Patient currently intubated. Speech therapy to sign-off at this time. Will await new orders to evaluate post extubation.     Electronically signed by LEVON Salomon on 2021 at 6:25 AM

## 2021-11-16 NOTE — PROGRESS NOTES
[facility] Neurology Progress Note      Patient:   Hardik Hollis  MR#:    [id number]   Room:    [facility]-   YOB: 1955  Date of Progress Note: 11/16/2021  Time of Note                           12:20 PM  Consulting Physician:  Carleen Egan DO  Attending Physician:  Mechelle Coffman DO      INTERVAL HISTORY:  Now intubated, largely unresponsive. REVIEW OF SYSTEMS:  Limited given AMS. PHYSICAL EXAM:    Constitutional    BP (!) 111/51   Pulse 98   Temp 99.7 °F (37.6 °C) (Temporal)   Resp 19   Ht 5' 5\" (1.651 m)   Wt 109 lb 12.8 oz (49.8 kg)   SpO2 93%   BMI 18.27 kg/m²   General appearance: Intubated, sedated. EYES -   Conjunctiva normal  Pupillary exam as below, see CN exam in the neurologic exam  ENT-    No scars, masses, or lesions over external nose or ears  Oropharynx - intubated  Cardiovascular -   No clubbing, cyanosis, or edema   Pulmonary-   Mechanically ventilated    Musculoskeletal    No significant wasting of muscles noted  Gait as below, see gait exam in the neurologic exam  Muscle strength, tone, stability as below see the motor exam in the neurologic exam.   No bony deformities  Skin    Warm, dry, and intact to inspection and palpation. No rash, erythema, or pallor        NEUROLOGICAL EXAM     Mental status    []? Awake, alert, oriented   []? Affect attention and concentration appear appropriate  []? Recent and remote memory appears unremarkable  []? Speech normal without dysarthria or aphasia, comprehension and repetition intact. COMMENTS:  Largely unresponsive, sedated, intubated    Cranial Nerves []? No VF deficit to confrontation,  optic discs normal, no papilledema on fundoscopic exam.  []? PERRLA, EOMI, no nystagmus, conjugate eye movements, no ptosis  []? Face symmetric  []? Facial sensation intact  []? Tongue midline no atrophy or fasciculations present  []? Palate midline, hearing to finger rub normal  []?  Shoulder shrug and SCM testing normal  COMMENTS: Left Pupil was reactive, right pupil misshapen likely surgical, face appears sym, EOM limited    Motor   []? 5/5 strength x 4 extremities  [x]? Normal bulk and tone  [x]? No tremor present  [x]? No rigidity or bradykinesia noted  COMMENTS: No withdrawal, limited   Sensory  []? Sensation intact to light touch, pin prick, vibration, and proprioception BLE  []? Sensation intact to light touch, pin prick, vibration, and proprioception BUE  COMMENTS: Limtied   Coordination []? FTN normal bilaterally   []? HTS normal bilaterally  []? JIA normal.   COMMENTS: Limited    Reflexes  [x]? Symmetric and non-pathological  [x]? Toes downgoing bilaterally  [x]? No clonus present  COMMENTS:   Gait                  []? Normal steady gait    []? Ataxic    []? Spastic     []? Magnetic     []? Shuffling  [x]? Not assessed  COMMENTS:        LABS/IMAGING:    CT HEAD WO CONTRAST    Result Date: 11/12/2021  CT HEAD WO CONTRAST 11/12/2021 11:25 AM HISTORY: Code stroke COMPARISON: None DOSE LENGTH PRODUCT: 961 mGy cm TECHNIQUE: Helical tomographic images of the brain were obtained without the use of intravenous contrast. Automated exposure control was also utilized to decrease patient radiation dose. FINDINGS: Exam is limited by motion artifact. There is no evidence of evolving large vascular territory infarct. Underlying chronic small vessel ischemic change. No visualized intra-axial or extra-axial hemorrhage. No mass lesion is identified. Normal size and configuration of the ventricular system. The basal cisterns are symmetric. Posterior fossa structures are unremarkable. The included orbits and their contents are unremarkable. Chronic right maxillary sinus disease. The visualized osseous structures and overlying soft tissues of the skull and face are unremarkable. 1. No acute intracranial process.  Signed by Dr Gurmeet Mathur    XR CHEST PORTABLE    Result Date: 11/12/2021  XR CHEST PORTABLE 11/12/2021 11:36 AM HISTORY: Code stroke  Technique: Single AP view of the chest COMPARISONS: Chest exam dated 4/29/2020 FINDINGS: Reidentified right suprahilar consolidation with volume loss. New developing consolidations in the left upper and left lower lobes with air bronchograms. No obvious pleural effusion. Underlying lung emphysema. Heart size is stable. The pulmonary vasculature are nondilated. Right chest wall [id number]. Spinal scoliotic curvature. 1. New multifocal consolidation in the left upper and left lower lobes, appearance concerning for new multifocal pneumonia. 2. Treatment-related changes in the right suprahilar region with scarring and volume loss. Signed by Dr Aries Major    Result Date: 11/12/2021  EXAM: CT NECK ANGIOGRAM CT HEAD ANGIOGRAM on  11/12/2021 12:54 PM COMPARISON:  CT chest dated August 23, 2021. HISTORY:  74years-old Female. Code stroke TECHNIQUE: Multiple CT images were obtained of the of the head and neck after the administration of IV contrast. 3D MIP reformats were generated  and were sent to PACS for interpretation. Grading of carotid artery stenosis is performed by NASCET criteria. FINDINGS: CT Neck Angiogram: There is a conventional aortic arch with patent origins of the brachiocephalic trunk, left common carotid and left subclavian arteries. The bilateral common carotid arteries and subclavian arteries are well-opacified. There is atherosclerotic disease of the bilateral carotid bifurcations, left greater than than right, with essentially 0% stenosis. The bilateral internal carotid arteries are otherwise well opacified throughout. The bilateral vertebral arteries are well-opacified throughout. Additional findings: Emphysema. New left upper lobe opacities posteriorly, most concerning for infection. Additional right upper lobe opacity is also concerning for infection.  Centrally necrotic right upper and lower lobe posterior mass, incompletely visualized but appears grossly stable when compared to prior examination. Small bilateral pleural effusions. CT Head Angiogram: The right internal carotid artery demonstrates normal course and caliber without evidence of flow limiting stenosis or occlusion. The major branch vessels to the right anterior and middle cerebral arteries are seen without evidence of stenosis or occlusion. There is no evidence of aneurysm or vascular malformation. Some atherosclerotic disease in the carotid siphon. The left internal carotid artery demonstrates normal course and caliber without evidence of flow limiting stenosis or occlusion. The major branch vessels to the left anterior and middle cerebral arteries are seen without evidence of stenosis or occlusion. There is no evidence of aneurysm or vascular malformation. Some atherosclerotic disease in the carotid siphon. Evaluation of the posterior circulation demonstrates normal caliber of the vertebral arteries, basilar artery, and major branch vessels of the posterior cerebral arteries bilaterally without evidence of flow limiting stenosis or occlusion. There is no evidence of aneurysm or vascular malformation. CT Angiography Of The Neck With Intravenous Contrast 1. No evidence of high-grade arterial stenosis or underlying dissection. 2. Bilateral upper lobe new opacities are concerning for infection. CT Angiography Of The Head With Intravenous Contrast 1. No evidence of acute thrombotic occlusion, high-grade arterial stenosis, or focal cerebral aneurysm. Signed by Dr Alex Jackson Date: 11/12/2021  EXAM: CT NECK ANGIOGRAM CT HEAD ANGIOGRAM on  11/12/2021 12:54 PM COMPARISON:  CT chest dated August 23, 2021. HISTORY:  74years-old Female. Code stroke TECHNIQUE: Multiple CT images were obtained of the of the head and neck after the administration of IV contrast. 3D MIP reformats were generated  and were sent to PACS for interpretation.  Grading of carotid artery stenosis is performed by NASCET criteria. FINDINGS: CT Neck Angiogram: There is a conventional aortic arch with patent origins of the brachiocephalic trunk, left common carotid and left subclavian arteries. The bilateral common carotid arteries and subclavian arteries are well-opacified. There is atherosclerotic disease of the bilateral carotid bifurcations, left greater than than right, with essentially 0% stenosis. The bilateral internal carotid arteries are otherwise well opacified throughout. The bilateral vertebral arteries are well-opacified throughout. Additional findings: Emphysema. New left upper lobe opacities posteriorly, most concerning for infection. Additional right upper lobe opacity is also concerning for infection. Centrally necrotic right upper and lower lobe posterior mass, incompletely visualized but appears grossly stable when compared to prior examination. Small bilateral pleural effusions. CT Head Angiogram: The right internal carotid artery demonstrates normal course and caliber without evidence of flow limiting stenosis or occlusion. The major branch vessels to the right anterior and middle cerebral arteries are seen without evidence of stenosis or occlusion. There is no evidence of aneurysm or vascular malformation. Some atherosclerotic disease in the carotid siphon. The left internal carotid artery demonstrates normal course and caliber without evidence of flow limiting stenosis or occlusion. The major branch vessels to the left anterior and middle cerebral arteries are seen without evidence of stenosis or occlusion. There is no evidence of aneurysm or vascular malformation. Some atherosclerotic disease in the carotid siphon. Evaluation of the posterior circulation demonstrates normal caliber of the vertebral arteries, basilar artery, and major branch vessels of the posterior cerebral arteries bilaterally without evidence of flow limiting stenosis or occlusion.  There is no evidence of aneurysm or vascular malformation. CT Angiography Of The Neck With Intravenous Contrast 1. No evidence of high-grade arterial stenosis or underlying dissection. 2. Bilateral upper lobe new opacities are concerning for infection. CT Angiography Of The Head With Intravenous Contrast 1. No evidence of acute thrombotic occlusion, high-grade arterial stenosis, or focal cerebral aneurysm. Signed by Dr Ricardo Youngblood      Lab Results   Component Value Date    WBC 8.2 11/16/2021    HGB 10.2 (L) 11/16/2021    HCT 35.7 (L) 11/16/2021    .9 (H) 11/16/2021     11/16/2021     Lab Results   Component Value Date     (H) 11/16/2021    K 3.3 (L) 11/16/2021     11/16/2021    CO2 38 (H) 11/16/2021    BUN 19 11/16/2021    CREATININE 0.5 11/16/2021    GLUCOSE 87 11/16/2021    CALCIUM 8.4 (L) 11/16/2021    PROT 4.5 (L) 11/16/2021    LABALBU 2.6 (L) 11/16/2021    BILITOT 0.6 11/16/2021    ALKPHOS 81 11/16/2021    AST 8 11/16/2021    ALT 6 11/16/2021    LABGLOM >60 11/16/2021    GFRAA >59 11/16/2021    AGRATIO 1.4 05/01/2020    GLOB 3.5 02/25/2021     Lab Results   Component Value Date    INR 1.18 11/12/2021    INR 0.96 05/16/2019    INR 0.99 11/28/2011    PROTIME 15.2 (H) 11/12/2021    PROTIME 12.2 05/16/2019    PROTIME 10.80 11/28/2011       RECORD REVIEW:   Previous medical records, medications were reviewed at today's visit. Nursing/physician notes, imaging, labs and vitals reviewed. PT,OT and/or speech notes reviewed      ASSESSMENT:  74 y.o. admitted with AMS, hypoxia, pneumonia, lung cancer history, right sided weakness, slurred speech, MRI consistent with scattered bi-hemishperic strokes, likely embolic vs watershed. CTAs negative. ECHO, CD negative. EEG with slowing, nothing epileptiform. Now intubated, sedated.      PLAN:  1. Supportive care   2. Follow Tele   3. Continue addressing medical issues, pneumonia, respiratory failure now intubated. 4.  ASA, statin, cautious blood pressure titration.  Likely not a good candidate for anticoagulation given multiple medial comorbidities. 6.  PT, OT, ST  7. DVT proph  8. Limit sedating medications, supplementing thiamine  9. Consult palliative care, consider hospice. Please feel free to call with any questions. [phone] (cell phone).       Frederick Jacobs DO  Board Certified Neurology

## 2021-11-16 NOTE — PROGRESS NOTES
Hospitalist Progress Note    Patient:  Gloria Nickerson  YOB: 1955  Date of Service: 11/15/2021  MRN: [id number]   Acct: [de-identified]   Primary Care Physician: DOMINICK Rivas NP  Advance Directive: Full Code  Admit Date: 11/12/2021       Hospital Day: 3  Referring Provider: Bryn Lopez DO    Patient Seen, Chart, Consults, Notes, Labs, Radiology studies reviewed.     Subjective:  Aidee Cates is a 74 y.o. female  whom we are following for ***    Allergies:  No known allergies    Medicines:  Current Facility-Administered Medications   Medication Dose Route Frequency Provider Last Rate Last Admin    propofol injection  5-50 mcg/kg/min IntraVENous Titrated Bryn Lopez DO 13.4 mL/hr at 11/15/21 1849 45 mcg/kg/min at 11/15/21 1849    albuterol sulfate  (90 Base) MCG/ACT inhaler 1 puff  1 puff Inhalation 4x daily Bryn Lopez DO        citalopram (CELEXA) tablet 20 mg  20 mg Oral Daily Bryn Lopez, DO   20 mg at 11/15/21 1136    ondansetron (ZOFRAN-ODT) disintegrating tablet 4 mg  4 mg Oral Q8H PRN Bryn Lopez DO        Or    ondansetron [facility]) injection 4 mg  4 mg IntraVENous Q6H PRN Bryn Lopez, DO   4 mg at 11/13/21 9892    polyethylene glycol (GLYCOLAX) packet 17 g  17 g Oral Daily PRN Bryn Lopez, DO        enoxaparin (LOVENOX) injection 40 mg  40 mg SubCUTAneous Nightly Bryn Kellyon, DO   40 mg at 11/14/21 1923    aspirin EC tablet 81 mg  81 mg Oral Daily Bryn Kellyon, DO   81 mg at 11/15/21 1136    Or    aspirin suppository 300 mg  300 mg Rectal Daily Bryn Kellyon, DO   300 mg at 11/14/21 2302    atorvastatin (LIPITOR) tablet 40 mg  40 mg Oral Nightly Bryn Kellyon, DO        labetalol (NORMODYNE;TRANDATE) injection 10 mg  10 mg IntraVENous Q10 Min PRN Bryn Lopez, DO        morphine (PF) injection 2 mg  2 mg IntraVENous Q3H PRN Jeanette Edwards MD   2 mg at 11/15/21 3290    thiamine (B-1) injection 100 mg  100 mg IntraVENous Daily Lindsey Paez DO   100 mg at 11/15/21 1100       Past Medical History:  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     COPD [facility] obstructive [facility] disease) [facility] [address] [facility]     Depression     Emphysema (subcutaneous) (surgical) resulting from a procedure     History of lung biopsy 05/16/2019    Malignant neoplasm of right main bronchus ([facility] [facility] 03/2019    NSCLC, SCC [id number] stage IIIb    Syncope     TIA (transient ischemic attack)        Past Surgical History:  Past Surgical History:   Procedure Laterality Date    APPENDECTOMY      BACK SURGERY      times two    BLADDER SUSPENSION      CHOLECYSTECTOMY      HYSTERECTOMY      INCONTINENCE SURGERY         Family History  Family History   Problem Relation Age of Onset    Colon Cancer Mother     High Blood Pressure Brother     Diabetes Brother        Social History  Social History     Socioeconomic History    Marital status:      Spouse name: Not on file    Number of children: Not on file    Years of education: Not on file    Highest education level: Not on file   Occupational History    Not on file   Tobacco Use    Smoking status: Current Every Day Smoker    Smokeless tobacco: Never Used   Substance and Sexual Activity    Alcohol use: Not Currently    Drug use: Never    Sexual activity: Not on file   Other Topics Concern    Not on file   Social History Narrative    Not on file     Social Determinants of Health     Financial Resource Strain:     Difficulty of Paying Living Expenses: Not on file   Food Insecurity:     Worried About Running Out of Food in the Last Year: Not on file    Trisha of Food in the Last Year: Not on file   Transportation Needs:     Lack of Transportation (Medical): Not on file    Lack of Transportation (Non-Medical):  Not on file   Physical Activity:     Days of Exercise per Week: Not on file    hours.  Magnesium:  Recent Labs     11/13/21 0141 11/14/21 0202   MG 2.0 2.4     Phosphorus:  Recent Labs     11/14/21 0202   PHOS 2.4*     HgbA1C:   Recent Labs     11/13/21 0141   LABA1C 5.8     Hepatic:   Recent Labs     11/14/21 0202   ALKPHOS 109*   ALT 8   AST 16   PROT 5.1*   BILITOT 0.3   LABALBU 2.8*     Lactic Acid:   Recent Labs     11/12/21 1954   LACTA 1.6     Troponin: No results for input(s): CKTOTAL, CKMB, TROPONINT in the last 72 hours. ABGs: No results for input(s): PH, PCO2, PO2, HCO3, O2SAT in the last 72 hours. CRP:  No results for input(s): CRP in the last 72 hours. Sed Rate:  No results for input(s): SEDRATE in the last 72 hours. Cultures:   No results for input(s): CULTURE in the last 72 hours. No results for input(s): BC, Yeny Fell in the last 72 hours. No results for input(s): CXSURG in the last 72 hours. Radiology reports as per the Radiologist  Radiology: ECHO Complete 2D W Doppler W Color    Result Date: 11/14/2021  Transthoracic Echocardiography Report (TTE)  Demographics   Patient Name   Roseanna Arellano   Date of Study           11/13/2021   MRN            [id number]        Gender                  Female   Date of Birth  1955    Room Number             [id number]   Age            74 year(s)   Height:        65 inches     Referring Physician     Opal Vargas   Weight:        109 pounds    Sonographer   BSA:           1.53 m^2      Interpreting Physician  Masood Davis MD   BMI:           18.14 kg/m^2  Procedure Type of Study   TTE procedure:ECHO 2D W/DOPPLER/COLOR/CONTRAST. Study Location: Portable Technical Quality: Limited visualization due to poor acoustical window. Patient Status: Inpatient Contrast Medium: Definity. Amount - 4 ml HR: 105 bpm BP: 99/57 mmHg  Conclusions   Summary  Normal size left atrium. bubble study negative  No evidence of pleural effusion. No evidence of significant pericardial effusion is noted.   Structurally normal mitral valve with normal leaflet mobility. No evidence  of mitral valve stenosis or mild mitral regurgitation. aortic valve thickened with calcification. no as. ar  Tricuspid valve is structurally normal.  mild tr  pasp 40 mm hg   Signature   ----------------------------------------------------------------  Electronically signed by Padma Hurd MD(Interpreting  physician) on 11/14/2021 11:18 AM  ----------------------------------------------------------------   Findings   Mitral Valve  Structurally normal mitral valve with normal leaflet mobility. No evidence  of mitral valve stenosis or mild mitral regurgitation. Aortic Valve  aortic valve thickened with calcification. no as. ar   Tricuspid Valve  Tricuspid valve is structurally normal.  mild tr  pasp 40 mm hg   Left Atrium  Normal size left atrium. bubble study negative   Left Ventricle  Normal left ventricular size with preserved LV function and an estimated  ejection fraction of approximately 55-60%. No evidence of left ventricular mass or thrombus noted. Right Atrium  Normal right atrial dimension with no evidence of thrombus or mass noted. Right Ventricle  Normal right ventricular size with preserved RV function. Pericardial Effusion  No evidence of significant pericardial effusion is noted. Pleural Effusion  No evidence of pleural effusion. M-Mode Measurements (cm)   LVIDd: 4.47 cm                         LVIDs: 3.24 cm  IVSd: 0.88 cm  LVPWd: 0.78 cm                         AO Root Dimension: 1.2 cm  Rt. Vent.  Dimension: 3.21 cm           LA: 3 cm                                         LVOT: 2 cm  Doppler Measurements:   AV Peak Velocity:104 cm/s          MV Peak E-Wave: 77.1 cm/s  AV Peak Gradient: 4.33 mmHg        MV Peak A-Wave: 85.9 cm/s                                     MV E/A Ratio: 0.9 %  TR Velocity:309 cm/s               MV Peak Gradient: 2.38 mmHg  TR Gradient:38.19 mmHg  Estimated RAP:5 mmHg  RVSP:43 mmHg      CT HEAD WO CONTRAST    Result Date: 11/12/2021  CT HEAD WO CONTRAST 11/12/2021 11:25 AM HISTORY: Code stroke COMPARISON: None DOSE LENGTH PRODUCT: 961 mGy cm TECHNIQUE: Helical tomographic images of the brain were obtained without the use of intravenous contrast. Automated exposure control was also utilized to decrease patient radiation dose. FINDINGS: Exam is limited by motion artifact. There is no evidence of evolving large vascular territory infarct. Underlying chronic small vessel ischemic change. No visualized intra-axial or extra-axial hemorrhage. No mass lesion is identified. Normal size and configuration of the ventricular system. The basal cisterns are symmetric. Posterior fossa structures are unremarkable. The included orbits and their contents are unremarkable. Chronic right maxillary sinus disease. The visualized osseous structures and overlying soft tissues of the skull and face are unremarkable. 1. No acute intracranial process. Signed by Dr Sadaf Shelton    XR CHEST PORTABLE    Result Date: 11/12/2021  XR CHEST PORTABLE 11/12/2021 11:36 AM HISTORY: Code stroke  Technique: Single AP view of the chest COMPARISONS: Chest exam dated 4/29/2020 FINDINGS: Reidentified right suprahilar consolidation with volume loss. New developing consolidations in the left upper and left lower lobes with air bronchograms. No obvious pleural effusion. Underlying lung emphysema. Heart size is stable. The pulmonary vasculature are nondilated. Right chest wall [id number]. Spinal scoliotic curvature. 1. New multifocal consolidation in the left upper and left lower lobes, appearance concerning for new multifocal pneumonia. 2. Treatment-related changes in the right suprahilar region with scarring and volume loss.  Signed by Dr Sadaf Shelton    Vascular carotid duplex bilateral    Result Date: 11/14/2021  Vascular Carotid Procedure  Demographics   Patient Name      Carlitos Roberts  Age                     74   Patient Number    [id number]       Gender Female   Visit Number      [id number]    Interpreting Physician  Paty Underwood MD   Date of Birth     1955   Referring Physician     [facility]   Accession Number  [id number]   [address]  Procedure Type of Study:   Cerebral:Carotid, VL CAROTID BILATERAL. Indications for Study:CVA and Weakness, right sided. Risk Factors   - Current - Every day. Impression   There is mixed plaque visualized in the bilateral internal carotid  arteries. There is less than 50% stenosis in the right internal carotid artery. There is no stenosis of the left internal carotid artery. There is normal antegrade flow in the bilateral vertebral arteries. Signature   ----------------------------------------------------------------  Electronically signed by Paty Underwood MD(Interpreting  physician) on 11/14/2021 10:37 AM  ----------------------------------------------------------------  Blood Pressure:Left arm 99/59 mmHg. Velocities are measured in cm/s ; Diameters are measured in mm Carotid Right Measurements +------------+------+-------+--------+-------+------------+----------------+ ! Location    ! PSV   ! EDV    ! Angle   ! RI     !%Stenosis   ! Tortuosity      ! +------------+------+-------+--------+-------+------------+----------------+ ! Prox CCA    ! 162   !61.5   !60      !0.62   !            !                ! +------------+------+-------+--------+-------+------------+----------------+ ! Mid CCA     !131   !46.1   ! 60      !0.65   !            !                ! +------------+------+-------+--------+-------+------------+----------------+ ! Prox ICA    !146   !50.5   ! 60      !0.65   !            !                ! +------------+------+-------+--------+-------+------------+----------------+ ! Mid ICA     ! 98    !40.8   ! 60      !0.58   !            !                ! +------------+------+-------+--------+-------+------------+----------------+ ! Dist ICA    !118   !53.9   !48      !0.54   ! !                ! +------------+------+-------+--------+-------+------------+----------------+ ! Prox ECA    !139   !22     !60      !0.84   !            !                ! +------------+------+-------+--------+-------+------------+----------------+ ! Vertebral   !91    !44.6   !52      !0.51   !            !                ! +------------+------+-------+--------+-------+------------+----------------+   - There is antegrade vertebral flow noted on the right side. - Additional Measurements:ICAPSV/CCAPSV 0.9. ICAEDV/CCAEDV 0.88. Carotid Left Measurements +------------+------+-------+--------+-------+------------+----------------+ ! Location    ! PSV   ! EDV    ! Angle   ! RI     !%Stenosis   ! Tortuosity      ! +------------+------+-------+--------+-------+------------+----------------+ ! Prox CCA    !147   !52.5   !60      !0.64   !            !                ! +------------+------+-------+--------+-------+------------+----------------+ ! Mid CCA     !132   !47     !60      !0.64   !            !                ! +------------+------+-------+--------+-------+------------+----------------+ ! Prox ICA    !100   !47.2   ! 60      !0.53   !            !                ! +------------+------+-------+--------+-------+------------+----------------+ ! Mid ICA     ! 75    !36     !0       !0.52   !            !                ! +------------+------+-------+--------+-------+------------+----------------+ ! Prox ECA    !137   !       !61      !       !            !                ! +------------+------+-------+--------+-------+------------+----------------+ ! Vertebral   !128   !47.6   ! 60      !0.63   !            !                ! +------------+------+-------+--------+-------+------------+----------------+   - There is antegrade vertebral flow noted on the left side. - Additional Measurements:ICAPSV/CCAPSV 0.68. ICAEDV/CCAEDV 0.9.     CTA NECK W CONTRAST    Result Date: 11/12/2021  EXAM: CT NECK ANGIOGRAM CT HEAD ANGIOGRAM on 11/12/2021 12:54 PM COMPARISON:  CT chest dated August 23, 2021. HISTORY:  74years-old Female. Code stroke TECHNIQUE: Multiple CT images were obtained of the of the head and neck after the administration of IV contrast. 3D MIP reformats were generated  and were sent to PACS for interpretation. Grading of carotid artery stenosis is performed by NASCET criteria. FINDINGS: CT Neck Angiogram: There is a conventional aortic arch with patent origins of the brachiocephalic trunk, left common carotid and left subclavian arteries. The bilateral common carotid arteries and subclavian arteries are well-opacified. There is atherosclerotic disease of the bilateral carotid bifurcations, left greater than than right, with essentially 0% stenosis. The bilateral internal carotid arteries are otherwise well opacified throughout. The bilateral vertebral arteries are well-opacified throughout. Additional findings: Emphysema. New left upper lobe opacities posteriorly, most concerning for infection. Additional right upper lobe opacity is also concerning for infection. Centrally necrotic right upper and lower lobe posterior mass, incompletely visualized but appears grossly stable when compared to prior examination. Small bilateral pleural effusions. CT Head Angiogram: The right internal carotid artery demonstrates normal course and caliber without evidence of flow limiting stenosis or occlusion. The major branch vessels to the right anterior and middle cerebral arteries are seen without evidence of stenosis or occlusion. There is no evidence of aneurysm or vascular malformation. Some atherosclerotic disease in the carotid siphon. The left internal carotid artery demonstrates normal course and caliber without evidence of flow limiting stenosis or occlusion. The major branch vessels to the left anterior and middle cerebral arteries are seen without evidence of stenosis or occlusion.  There is no evidence of aneurysm or vascular lower lobe posterior mass, incompletely visualized but appears grossly stable when compared to prior examination. Small bilateral pleural effusions. CT Head Angiogram: The right internal carotid artery demonstrates normal course and caliber without evidence of flow limiting stenosis or occlusion. The major branch vessels to the right anterior and middle cerebral arteries are seen without evidence of stenosis or occlusion. There is no evidence of aneurysm or vascular malformation. Some atherosclerotic disease in the carotid siphon. The left internal carotid artery demonstrates normal course and caliber without evidence of flow limiting stenosis or occlusion. The major branch vessels to the left anterior and middle cerebral arteries are seen without evidence of stenosis or occlusion. There is no evidence of aneurysm or vascular malformation. Some atherosclerotic disease in the carotid siphon. Evaluation of the posterior circulation demonstrates normal caliber of the vertebral arteries, basilar artery, and major branch vessels of the posterior cerebral arteries bilaterally without evidence of flow limiting stenosis or occlusion. There is no evidence of aneurysm or vascular malformation. CT Angiography Of The Neck With Intravenous Contrast 1. No evidence of high-grade arterial stenosis or underlying dissection. 2. Bilateral upper lobe new opacities are concerning for infection. CT Angiography Of The Head With Intravenous Contrast 1. No evidence of acute thrombotic occlusion, high-grade arterial stenosis, or focal cerebral aneurysm. Signed by Dr Jesus Desir:    Acute hypoxemic respiratory failure [facility])    Stroke of unknown etiology ([facility] [address]  Resolved Problems:    * No resolved hospital problems.  Blanka Mcgowan DO

## 2021-11-16 NOTE — CONSULTS
Comprehensive Nutrition Assessment    Type and Reason for Visit:  Initial, Consult    Nutrition Recommendations/Plan: start Vital AF 1.2 goal rate 31ml/hr, 1 Proteinex as tf flulsh and 35ml free water hourly    Nutrition Assessment:  Pt is at risk for nutritional compromise d/t NPO/vent status and weight loss. Pt meets criteria for severe malnutrition AEB fat and muscle mass depletion. Received consult to start  EN    Malnutrition Assessment:  Malnutrition Status:  Severe malnutrition    Context:  Chronic Illness     Findings of the 6 clinical characteristics of malnutrition:  Energy Intake:  Mild decrease in energy intake (Comment)  Weight Loss:  7 - Greater than 10% over 6 months     Body Fat Loss:  7 - Severe body fat loss     Muscle Mass Loss:  7 - Severe muscle mass loss    Fluid Accumulation:  No significant fluid accumulation     Strength:  Not Performed    Estimated Daily Nutrient Needs:  Energy (kcal):  9027-0765 kcls (25-30 kcals/kg); Weight Used for Energy Requirements:  Current     Protein (g):  84g; Weight Used for Protein Requirements:  Current        Fluid (ml/day):  3927-4115 ml; Method Used for Fluid Requirements:  1 ml/kcal      Nutrition Related Findings:  very thin      Wounds:  None       Current Nutrition Therapies:    Current Tube Feeding (TF) Orders:  · Feeding Route: Orogastric  · Formula: Peptide Based  · Schedule: Continuous  · Additives/Modulars: Protein  · Water Flushes: 35ml/hr  · Current TF & Flush Orders Provides: 0  · Goal TF & Flush Orders Provides: Vital AfF goal rate 31ml/hr = 892 kcals with 55g protein,82g CHO and 603ml free water. 1 Proteinex gives 104 kcals with 26g protein.   35ml free water hourly = 840ml free water from flush      Anthropometric Measures:  · Height: 5' 5\" (165.1 cm)  · Current Body Weight: 109 lb 12.8 oz (49.8 kg)   · Admission Body Weight: 109 lb 12.8 oz (49.8 kg)    · Usual Body Weight: 120 lb (54.4 kg) (4/2021)     · Ideal Body Weight: 125 lbs; % Ideal Body Weight 87.8 %   · BMI: 18.3  · Adjusted Body Weight:  ; No Adjustment   · BMI Categories: Underweight (BMI less than 22) age over 72       Nutrition Diagnosis:   · Inadequate oral intake related to acute injury/trauma, impaired nutrient utilization as evidenced by NPO or clear liquid status due to medical condition, intubation, nutrition support - enteral nutrition      Nutrition Interventions:   Food and/or Nutrient Delivery:  Continue NPO, Start Tube Feeding  Nutrition Education/Counseling:  No recommendation at this time   Coordination of Nutrition Care:  Continue to monitor while inpatient    Goals:  meet nutritional needs through EN       Nutrition Monitoring and Evaluation:   Behavioral-Environmental Outcomes:  None Identified   Food/Nutrient Intake Outcomes:  Enteral Nutrition Intake/Tolerance  Physical Signs/Symptoms Outcomes:  Biochemical Data, Weight, Skin, Nutrition Focused Physical Findings, Fluid Status or Edema     Discharge Planning:     Too soon to determine     Electronically signed by Adan Ying MS, RD, LD on 11/16/21 at 11:24 AM CST    Contact: [phone]

## 2021-11-16 NOTE — PLAN OF CARE
Nutrition Problem #1: Inadequate oral intake  Intervention: Food and/or Nutrient Delivery: Continue NPO, Start Tube Feeding  Nutritional Goals: meet nutritional needs through EN

## 2021-11-16 NOTE — CONSULTS
**Physician Signature**  This document was electronically signed by: Leland Ramirez MD  2021   11:00 AM    **Consult Information**  Member Facility: [address] [place] [address] Drive MRN: [id number]  Visit/Encounter Number: [id number]  Consult ID: [id number]  Facility Time Zone: CT  Date and Time of Request: 2021 08:11 AM  CT  Requesting Clinician: Charissa Orourke DO  Patient Name: Ani Baker  YOB: 1955  Gender: Female  Patient identity was confirmed at the beginning of the consult with the   patient/family/staff using two personal identifiers: Patient name and       **Reason for Consult**  Reason for Consult: [facility]    **Admission**  Admission Date: 2021  Chief reason for ICU admission: Respiratory Failure  Secondary reasons for ICU admission: Pneumonia, Altered Mental   Status    **Core Metrics**  General orienting sentence for patient: 73 yo female admitted  (not   covid) found unresponsive at home with sats in 46s. Hx lung cancer and   COPD. ON bipap CT scan brain negative for stroke. REmains pleasantly   confused no follow commands could get MRI. Was full of secretions and tenuous   status anyway. MRI shows   probable subacute CVA, but no hemorrhage. Anisocoria. Propofol. Currently on   85%. Hx lung cancer, currently undergoing treatment.  3 PPD ongoing   tobacco.  Chief physiologic deterioration: Increase in FiO2 required by   30%  Is the patient on DVT prophylaxis?: Yes  Prophylaxis type: Pharmacological  DVT Prophylaxis Comments: Lovenox  Is the patient on GI prophylaxis?: Yes  Gi Prophylaxis Comments: I have ordered Pepcid  Has this patient reached their nutritional goal?: No and issues are being   addressed  Nutritional Goals Details: Tube feeds to start   Are there current issues with pain management in this patient?:   No  Are there issues with skin integrity?: Yes and issues are being   addressed  Skin Integrity Details: red excoriation on bottom  Are there issues with delirium?: No  Delirium Comments: Sedated, otherwise YES  Has the patient been mobilized?: No  Is this patient currently intubated?: Yes  Has facility initiated vent bundle?: No  Intubation Details: Too sick for weaning  Are there ethical or care philosophy or family issues?: No  Family Issues Details: 2 children.  Palliative care  Do you recommend an in depth evaluation?: No  Disposition Recommendations: Continue ICU level of care    **Inserted/Removed Devices**  Device Name: Young Catheter  Site of insertion: Urethra  Date of insertion: 11-  Device Name: Orogastric Tube  Site of insertion: Mouth  Date of insertion: 11-  Device Name: Endotracheal Tube  Site of insertion: Trachea  Date of insertion: 11-    **Physician Signature**  This document was electronically signed by: Annetta Duran MD  11/16/2021   11:00 AM

## 2021-11-16 NOTE — CONSULTS
Palliative Care Consult Note  11/16/2021 1:06 PM  Subjective:   Admit Date: 11/12/2021  PCP: DOMINICK Stout NP    Reason For Consult: Goals of care, Code status, Family Support    Requesting Physician:  Dr. Alexi Marx    History Obtained From: EMR, nursing, patient's family    Chief Complaint: intubated and mechanically ventilated    History of Present Illness:  Patient is a 53-year-old female with a PMH of COPD, Hx of lung cancer, tobacco abuse, Hx of TIA, that presented to [facility] ED on 11/12/2021 via EMS after patient developed respiratory distress and difficulty with speaking at home. Patient son reported that she had been declining over the previous few days and when he checked on her she was having difficulty with speech, EMS summoned and she was noted to be hypoxic with sats less than 50%, supplemental O2 via NRB applied and she was later transitioned to BiPAP. CXR showed new multifocal consolidation in the left upper and lower lobes. CT the head negative for acute processes. Labs notable for WBC 15.7, PCO2 69, PO2 69, and urine cultures obtained. She was admitted for further evaluation and treatment, has been receiving meds for management of COPD and respiratory failure. Neurology consulted and following, patient remained altered, EEG completed suggestive of moderate encephalopathy, no epileptiform abnormalities. She was intubated on 11/15/2021, MRI completed consistent with scattered bihemispheric strokes. She remains in CCU intubated and mechanically ventilated, overall poor prognosis. Palliative care was consulted to assist with discussions regarding goals of care, CODE STATUS, family support.       Past Medical History:        Diagnosis Date    Anxiety     Asthma     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     COPD (chronic obstructive pulmonary disease) (HCC)     Depression     Emphysema (subcutaneous) (surgical) resulting from a procedure     History of lung biopsy 05/16/2019    Malignant neoplasm of right main bronchus ([facility] [address] 03/2019    NSCLC, SCC [id number] stage IIIb    Palliative care patient 11/16/2021    Syncope     TIA (transient ischemic attack)        Past Surgical History:        Procedure Laterality Date    APPENDECTOMY      BACK SURGERY      times two    BLADDER SUSPENSION      CHOLECYSTECTOMY      HYSTERECTOMY      INCONTINENCE SURGERY         Allergies:  No known allergies    Social History:    TOBACCO:   reports that she has been smoking. She has never used smokeless tobacco.  ETOH:   reports previous alcohol use.     Family History:       Problem Relation Age of Onset    Colon Cancer Mother     High Blood Pressure Brother     Diabetes Brother        Palliative Performance Score: 30%    Review of Systems   Unable to obtain due to acuity of care, intubated and mechanically ventilated    Palliative Review of Advance Directives:     Surrogate Decision Maker:yes, Adult children as NOK    Durable Power of :no    Advanced Directives/Living [place]: no    Out of hospital medical orders in place to reflect resuscitation status (MOLST/POLST): no    Information Sharing:  Patient's awareness of illness:  [] Terminal [] Life-Threatening [] Serious [] Non life-threatening [] Not serious   [x] Not discussed    Family awareness of illness:   [] Terminal [] Life-Threatening [x] Serious [] Nonlife-threatening [] Not serious   [] Not discussed    Diet NPO    Medications:   propofol 40 mcg/kg/min (11/16/21 1159)     Current Facility-Administered Medications   Medication Dose Route Frequency Provider Last Rate Last Admin    famotidine (PEPCID) tablet 20 mg  20 mg Oral Daily Peter Meza MD        propofol injection  5-50 mcg/kg/min IntraVENous Titrated Gilmar Hughes DO 12 mL/hr at 11/16/21 1159 40 mcg/kg/min at 11/16/21 1159    albuterol sulfate  (90 Base) MCG/ACT inhaler 1 puff  1 puff Inhalation 4x daily Gilmar Hughes DO   1 puff at 11/15/21 2007    citalopram (CELEXA) tablet 20 mg  20 mg Oral Daily Tammie Oxford, DO   20 mg at 11/16/21 1237    ondansetron (ZOFRAN-ODT) disintegrating tablet 4 mg  4 mg Oral Q8H PRN Tammie Albany, DO        Or    ondansetron [facility]) injection 4 mg  4 mg IntraVENous Q6H PRN Tammie Albany, DO   4 mg at 11/13/21 9186    polyethylene glycol (GLYCOLAX) packet 17 g  17 g Oral Daily PRN Tammie Oxford, DO        enoxaparin (LOVENOX) injection 40 mg  40 mg SubCUTAneous Nightly Tammie Oxford, DO   40 mg at 11/15/21 2007    aspirin EC tablet 81 mg  81 mg Oral Daily Tammie Oxford, DO   81 mg at 11/16/21 7877    Or    aspirin suppository 300 mg  300 mg Rectal Daily Tammie Oxford, DO   300 mg at 11/14/21 9798    atorvastatin (LIPITOR) tablet 40 mg  40 mg Oral Nightly Tammie Oxford, DO   40 mg at 11/15/21 2007    labetalol (NORMODYNE;TRANDATE) injection 10 mg  10 mg IntraVENous Q10 Min PRN Tammie Oxford, DO        morphine (PF) injection 2 mg  2 mg IntraVENous Q3H PRN Lenny Sherman MD   2 mg at 11/16/21 4193    thiamine (B-1) injection 100 mg  100 mg IntraVENous Daily Espinoza Encinas, DO   100 mg at 11/16/21 0587        Labs:     Recent Labs     11/14/21  0202 11/16/21  0154   WBC 11.1* 8.2   RBC 3.76* 3.37*   HGB 11.6* 10.2*   HCT 40.5 35.7*   .7* 105.9*   MCH 30.9 30.3   MCHC 28.6* 28.6*    173     Recent Labs     11/14/21  0202 11/16/21  0154   * 152*   K 4.2 3.3*   ANIONGAP 7 9    105   CO2 36* 38*   BUN 30* 19   CREATININE 0.2* 0.5   GLUCOSE 87 87   CALCIUM 8.6* 8.4*     Recent Labs     11/14/21  0202 11/16/21  0154   MG 2.4 1.9   PHOS 2.4*  --      Recent Labs     11/14/21  0202 11/16/21  0154   AST 16 8   ALT 8 6   BILITOT 0.3 0.6   ALKPHOS 109* 81     ABGs:No results for input(s): PHART, WKW9OPD, PO2ART, HFA1BGK, BEART, HGBAE, J9AZTWQR, CARBOXHGBART, 02THERAPY in the last 72 hours.   HgBA1c: No results for input(s): LABA1C in the last 72 hours. FLP:    Lab Results   Component Value Date    TRIG 55 11/13/2021    HDL 58 11/13/2021    LDLCALC 45 11/13/2021     TSH:    Lab Results   Component Value Date    TSH 0.148 02/25/2021     Troponin T: No results for input(s): TROPONINI in the last 72 hours. INR: No results for input(s): INR in the last 72 hours. Objective:   Vitals: BP (!) 111/51   Pulse 98   Temp 99.7 °F (37.6 °C) (Temporal)   Resp 19   Ht 5' 5\" (1.651 m)   Wt 109 lb 12.8 oz (49.8 kg)   SpO2 93%   BMI 18.27 kg/m²   24HR INTAKE/OUTPUT:      Intake/Output Summary (Last 24 hours) at 11/16/2021 1306  Last data filed at 11/16/2021 0400  Gross per 24 hour   Intake 277.86 ml   Output 755 ml   Net -477.14 ml     Physical Exam      General appearance: 74 yr old female, appears ill, intubated, no acute distress  HEENT: atraumatic, eyes with clear conjunctiva and normal lids, pupils unequal R>L,  normal, external ears and nose are normal,lips normal.  Neck: without masses, supple   Lungs:ventilating bilaterally, coarse with rhonchi and wheezes  Heart: S1, S2 normal, tachycardic  Abdomen: soft, non-tender; bowel sounds normal; no masses,  no organomegaly  Genitourinary: No bladder fullness, masses, or tenderness, indwelling castanon  Extremities: extremities normal, atraumatic, no cyanosis or edema  Neurologic: sedated, responds to pain, wiggles toes bilaterally (not to command)   Psychiatric: No agitation or anxiety  Skin: warm, dry    Assessment and Plan: Active Problems:    Acute hypoxemic respiratory failure (HCC)    Stroke of unknown etiology (HCC)    Severe malnutrition ([facility] [address]  Resolved Problems:    * No resolved hospital problems. *      Visit Summary: Chart reviewed, patient discussed with nursing staff and seen at the bedside. She remains intubated and mechanically ventilated, pupils unequal, she does not verbalize or follow commands, does wiggle toes bilaterally.   Nursing staff had reported that neurologic status prior to intubation was poor with no ability to follow commands, unable to swallow, would respond to pain. I contacted the patient's son, Nancy Brown, and discussed palliative care services and reason for consult. Update was provided and concerns regarding findings from MRI, patient's neurologic and meaningful recovery, and ability to successfully wean from vent was discussed. He does verbalize understanding but is hopeful that patient may make some progress weaning from vent. I have discussed how her neurologic function/recovery may impair her ability to wean from vent and that her chronic respiratory issues at baseline could make this problematic as well. At this time, he would like to continue current measures and have time to evaluate for improvement or changes that can assist with decision-making moving forward. I have encouraged discussion regarding CODE STATUS as she will remain at risk for further events or decompensation and discussed ability to pursue comfort care if needed. I will also attempt to gain further information for the patient's son that will assist with his ability to make good decisions on her behalf moving forward. All questions answered and emotional support provided. Palliative will follow. Recommendations:     1. Palliative Care- GOC dependent on patient's progress throughout hospitalization, briefly discussed hospice care CODE STATUS- FULL CODE, encouraged further discussion    2. Acute Hypoxemic Respiratory Failure- Vent management per attending, inhalers, supportive care    3. Acute Stroke- Neurology following, MRI complete showing bi-hemispheric stroke (suspected embolic), EEG with slowing nothing epileptiform, poor candidate for anticoagulation, neuro assessments ongoing    Thank you for consulting palliative care and allowing us to participate in the care of the patient.       CounselingTopics: Goals of care, Code Status, Disease process education, pt/family support    Time Spent Counseling > 50%:  YES                                   Total Time Spent: 75 min    Electronically signed by DOMINICK Tariq on 11/16/2021 at 1:06 PM    (Please note that portions of this note were completed with a voice recognition program.  Efforts were made to edit the dictations but occasionally words are mis-transcribed.)

## 2021-11-17 NOTE — CONSULTS
**Physician Signature**  This document was electronically signed by: Gilma Yin MD  2021   11:05 AM    **Consult Information**  Member Facility: [address] [place] [address] Drive MRN: [id number]  Visit/Encounter Number: [id number]  Consult ID: [id number]  Facility Time Zone: CT  Date and Time of Request: 2021 08:30 AM  CT  Requesting Clinician: Amanda Rollins DO  Patient Name: Trey Martin  YOB: 1955  Gender: Female  Patient identity was confirmed at the beginning of the consult with the   patient/family/staff using two personal identifiers: Patient name and       **Reason for Consult**  Reason for Consult: [facility]    **Admission**  Admission Date: 2021  Chief reason for ICU admission: Respiratory Failure  Secondary reasons for ICU admission: Pneumonia, Altered Mental   Status    **Core Metrics**  General orienting sentence for patient: 73 yo female admitted  (not   covid) found unresponsive at home with sats in 46s. Hx lung cancer and   COPD. ON bipap CT scan brain negative for stroke. REmains pleasantly   confused no follow commands could get MRI. Was full of secretions and tenuous   status anyway. MRI shows   probable subacute CVA, but no hemorrhage. Anisocoria. Propofol. Currently on   85%. Hx lung cancer, currently undergoing treatment. 3 PPD ongoing   tobacco. Remains on 30 mcg/kg/min propofol. 75% FIO2, 5 PEEP.  Lots of   secretions  Chief physiologic deterioration: Increase in FiO2 required by   30%  Is the patient on DVT prophylaxis?: Yes  Prophylaxis type: Pharmacological  DVT Prophylaxis Comments: Lovenox  Is the patient on GI prophylaxis?: Yes  Gi Prophylaxis Comments: Pepcid  Has this patient reached their nutritional goal?: Yes  Nutritional Goals Details: Tube feeds  Are there current issues with pain management in this patient?:   No  Are there issues with skin integrity?: Yes and issues are being   addressed  Skin Integrity Details: red excoriation on bottom  Are there issues with delirium?: No  Delirium Comments: Sedated, otherwise YES  Has the patient been mobilized?: No  Is this patient currently intubated?: Yes  Has facility initiated vent bundle?: No  Intubation Details: Too sick for weaning  Are there ethical or care philosophy or family issues?: No  Family Issues Details: 2 children.  Palliative care  Do you recommend an in depth evaluation?: No  Disposition Recommendations: Continue ICU level of care    **Inserted/Removed Devices**  Device Name: Young Catheter  Site of insertion: Urethra  Date of insertion: 11-  Device Name: Orogastric Tube  Site of insertion: Mouth  Date of insertion: 11-  Device Name: Endotracheal Tube  Site of insertion: Trachea  Date of insertion: 11-    **Physician Signature**  This document was electronically signed by: Cathryn Perez MD  11/17/2021   11:05 AM

## 2021-11-17 NOTE — PROGRESS NOTES
Pharmacy Note  Vancomycin Consult    Aidee Celis is a 74 y.o. female started on Vancomycin for aspiration pneumonia; consult received from Dr. Chichi Madden to manage therapy. Also receiving the following antibiotics: zosyn. Active Problems:    Acute hypoxemic respiratory failure (HCC)    Stroke of unknown etiology (HCC)    Severe malnutrition (HCC)    Altered mental status  Resolved Problems:    * No resolved hospital problems. *      Allergies:  No known allergies     Temp max: 99.4    Recent Labs     11/16/21  0154 11/17/21 0229   BUN 19 22       Recent Labs     11/16/21 0154 11/17/21 0229   CREATININE 0.5 0.5       Recent Labs     11/16/21 0154 11/17/21 0229   WBC 8.2 9.6         Intake/Output Summary (Last 24 hours) at 11/17/2021 1600  Last data filed at 11/17/2021 1035  Gross per 24 hour   Intake 868.03 ml   Output 645 ml   Net 223.03 ml       Culture Date Source Results   11/12/21 Blood x 2 No growth   11/17/21 Respiratory No result       Ht Readings from Last 1 Encounters:   11/16/21 5' 5\" (1.651 m)        Wt Readings from Last 1 Encounters:   11/12/21 109 lb 12.8 oz (49.8 kg)         Body mass index is 18.27 kg/m². Estimated Creatinine Clearance: 85 mL/min (based on SCr of 0.5 mg/dL). Assessment/Plan:  Will initiate vancomycin 750 mg IV every 12 hours. Timing of trough level will be determined based on culture results, renal function, and clinical response. Loading dose: 1250 mg at 17:30 11/17/2021. Regimen: 750 mg IV every 12 hours. Start time: 05:30 on 11/18/2021  Exposure target: AUC24 (range)400-600 mg/L.hr   AUC24,ss: 440 mg/L.hr  Probability of AUC24 > 400: 60 %  Ctrough,ss: 13 mg/L  Probability of Ctrough,ss > 20: 18 %  Probability of nephrotoxicity (Lodise MIGUEL 2009): 8 %    Thank you for the consult. Will continue to follow.     TARAN LOZA, PHARM D, 11/17/2021, 4:02 PM

## 2021-11-17 NOTE — PROGRESS NOTES
Pt Sats dropping to 84% on vent at 70% FIO2. Pt suctioned, only small amt creamy secretions returned. Pt given 100% prior and post suctioning. Pt Sats did not increase. FIO2 increased to 80%. Pt given Morphine 2mg IV. Dr Anupama Huerta here and aware. Will monitor closely.  Electronically signed by Ellyn Mesa RN on 11/17/2021 at 3:38 PM

## 2021-11-17 NOTE — PROGRESS NOTES
Physician Progress Note      Michelle Marcelino  CSN #:                  [id number]  :                       1955  ADMIT DATE:       2021 11:12 AM  DISCH DATE:  RESPONDING  PROVIDER #:        Eva Carvajal          QUERY TEXT:    Patient admitted with CVA. Documentation reflects multifocal pneumonia in ER   Provider notes. If possible, please document in the progress notes and   discharge summary if multifocal pneumonia was: The medical record reflects the following:  Risk Factors: COPD  Clinical Indicators: CXR 1. New multifocal consolidation in the left upper and   left lower lobes, appearance concerning for new multifocal pneumonia. WBC 15.7   15.6 11.1, Bands 18, Neurtophils 77%. Treatment: CXR, Zithromax 500 mg x 1, Rocephin 1g IV x 1. CBC  Options provided:  -- Multifocal pneumonia confirmed after study  -- Multifocal pneumonia treated and resolved  -- Multifocal pneumonia ruled out after study  -- Other - I will add my own diagnosis  -- Disagree - Not applicable / Not valid  -- Disagree - Clinically unable to determine / Unknown  -- Refer to Clinical Documentation Reviewer    PROVIDER RESPONSE TEXT:    Provider is clinically unable to determine a response to this query.     Query created by: Jessica Hernandez on 2021 4:17 PM      Electronically signed by:  Eva Carvajal 2021 12:06 PM

## 2021-11-17 NOTE — PROGRESS NOTES
Hospitalist Progress Note  [facility]     Patient: Rickie Sandifer  : 1955  MRN: [id number]  Code Status: Full Code    Hospital Day: 5   Date of Service: 2021    Subjective:   Patient seen and examined. Intubated and sedated. 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     COPD (chronic obstructive pulmonary disease) (HCC)     Depression     Emphysema (subcutaneous) (surgical) resulting from a procedure     History of lung biopsy 2019    Malignant neoplasm of right main bronchus ([address] 2019    NSCLC, SCC [id number] stage IIIb    Palliative care patient 2021    Syncope     TIA (transient ischemic attack)        Past Surgical History:   Procedure Laterality Date    APPENDECTOMY      BACK SURGERY      times two    BLADDER SUSPENSION      CHOLECYSTECTOMY      HYSTERECTOMY      INCONTINENCE SURGERY         Family History   Problem Relation Age of Onset    Colon Cancer Mother     High Blood Pressure Brother     Diabetes Brother        Social History     Socioeconomic History    Marital status:      Spouse name: Not on file    Number of children: Not on file    Years of education: Not on file    Highest education level: Not on file   Occupational History    Not on file   Tobacco Use    Smoking status: Current Every Day Smoker    Smokeless tobacco: Never Used   Substance and Sexual Activity    Alcohol use: Not Currently    Drug use: Never    Sexual activity: Not on file   Other Topics Concern    Not on file   Social History Narrative    Not on file     Social Determinants of Health     Financial Resource Strain:     Difficulty of Paying Living Expenses: Not on file   Food Insecurity:     Worried About Running Out of Food in the Last Year: Not on file    Trisha of Food in the Last Year: Not on file   Transportation Needs:     Lack of Transportation (Medical):  Not on file    Lack of Transportation (Non-Medical):  Not on file   Physical Activity:     Days of Exercise per Week: Not on file    Minutes of Exercise per Session: Not on file   Stress:     Feeling of Stress : Not on file   Social Connections:     Frequency of Communication with Friends and Family: Not on file    Frequency of Social Gatherings with Friends and Family: Not on file    Attends Restorationist Services: Not on file    Active Member of [address] [place] [address] or Organizations: Not on file    Attends Club or Organization Meetings: Not on file    Marital Status: Not on file   Intimate Partner Violence:     Fear of Current or Ex-Partner: Not on file    Emotionally Abused: Not on file    Physically Abused: Not on file    Sexually Abused: Not on file   Housing Stability:     Unable to Pay for Housing in the Last Year: Not on file    Number of [place] in the Last Year: Not on file    Unstable Housing in the Last Year: Not on file       Current Facility-Administered Medications   Medication Dose Route Frequency Provider Last Rate Last Admin    nicotine (NICODERM CQ) 21 MG/24HR 1 patch  1 patch TransDERmal Daily Eliot Dakin, MD   1 patch at 11/17/21 1155    piperacillin-tazobactam (ZOSYN) 3,375 mg in dextrose 5 % 50 mL IVPB (mini-bag)  3,375 mg IntraVENous Q6H Patsy Pulido MD        famotidine (PEPCID) tablet 20 mg  20 mg Oral Daily Eliot Dakin, MD   20 mg at 11/17/21 1000    chlorhexidine (PERIDEX) 0.12 % solution 15 mL  15 mL Mouth/Throat BID Hasmukh Jones MD   15 mL at 11/17/21 1002    potassium chloride (KLOR-CON M) extended release tablet 40 mEq  40 mEq Oral PRN Hasmukh Jones MD        Or    potassium bicarb-citric acid (EFFER-K) effervescent tablet 40 mEq  40 mEq Oral PRN Hasmukh Jones MD        Or    potassium chloride 10 mEq/100 mL IVPB (Peripheral Line)  10 mEq IntraVENous PRN Hasmukh Jones  mL/hr at 11/17/21 1312 10 mEq at 11/17/21 1312    propofol injection 5-50 mcg/kg/min IntraVENous Titrated Stephen Sick, DO 9 mL/hr at 11/17/21 1117 30 mcg/kg/min at 11/17/21 1117    albuterol sulfate  (90 Base) MCG/ACT inhaler 1 puff  1 puff Inhalation 4x daily Stephen Sick, DO   1 puff at 11/16/21 1959    citalopram (CELEXA) tablet 20 mg  20 mg Oral Daily Stephen Sick, DO   20 mg at 11/17/21 1000    ondansetron (ZOFRAN-ODT) disintegrating tablet 4 mg  4 mg Oral Q8H PRN Stephen Sick, DO        Or    ondansetron [facility]) injection 4 mg  4 mg IntraVENous Q6H PRN Stephen Sick, DO   4 mg at 11/13/21 2691    polyethylene glycol (GLYCOLAX) packet 17 g  17 g Oral Daily PRN Stephen Sick, DO        enoxaparin (LOVENOX) injection 40 mg  40 mg SubCUTAneous Nightly Stephen Sick, DO   40 mg at 11/16/21 1959    aspirin EC tablet 81 mg  81 mg Oral Daily Stephen Sick, DO   81 mg at 11/17/21 1000    Or    aspirin suppository 300 mg  300 mg Rectal Daily Stephen Sick, DO   300 mg at 11/14/21 8952    atorvastatin (LIPITOR) tablet 40 mg  40 mg Oral Nightly Stephen Sick, DO   40 mg at 11/16/21 1959    labetalol (NORMODYNE;TRANDATE) injection 10 mg  10 mg IntraVENous Q10 Min PRN Stephen Sick, DO        morphine (PF) injection 2 mg  2 mg IntraVENous Q3H PRN Tree Dooley MD   2 mg at 11/17/21 1533    thiamine (B-1) injection 100 mg  100 mg IntraVENous Daily [place] Ok, DO   100 mg at 11/17/21 1000         propofol 30 mcg/kg/min (11/17/21 1117)        Objective:   BP (!) 121/53   Pulse 95   Temp 99.4 °F (37.4 °C) (Temporal)   Resp 21   Ht 5' 5\" (1.651 m)   Wt 109 lb 12.8 oz (49.8 kg)   SpO2 91%   BMI 18.27 kg/m²     General: cachectic  HEENT: normocephalic, atraumatic  Neck: supple, symmetrical, trachea midline   Lungs: rhonchi  Cardiovascular: s1 and s2 normal  Abdomen: soft, positive bowel sounds  Extremities: no edema or cyanosis   Neuro: intubated and sedated  Skin: normal color and texture Recent Labs     11/16/21 0154 11/17/21 0229   WBC 8.2 9.6   RBC 3.37* 3.45*   HGB 10.2* 10.6*   HCT 35.7* 36.6*   .9* 106.1*   MCH 30.3 30.7   MCHC 28.6* 29.0*    168     Recent Labs     11/16/21 0154 11/17/21 0229   * 153*   K 3.3* 2.8*   ANIONGAP 9 9    105   CO2 38* 39*   BUN 19 22   CREATININE 0.5 0.5   GLUCOSE 87 129*   CALCIUM 8.4* 8.0*     Recent Labs     11/16/21 0154 11/17/21 0229   MG 1.9 2.0     Recent Labs     11/16/21 0154 11/17/21 0229   AST 8 14   ALT 6 8   BILITOT 0.6 0.6   ALKPHOS 81 79     No results for input(s): PH, PO2, PCO2, HCO3, BE, O2SAT in the last 72 hours. No results for input(s): TROPONINI in the last 72 hours. No results for input(s): INR in the last 72 hours. No results for input(s): LACTA in the last 72 hours. Intake/Output Summary (Last 24 hours) at 11/17/2021 1539  Last data filed at 11/17/2021 1035  Gross per 24 hour   Intake 868.03 ml   Output 645 ml   Net 223.03 ml       XR CHEST PORTABLE    Result Date: 11/15/2021  XR CHEST PORTABLE 11/15/2021 3:42 PM History: Respiratory distress. Tube placement. Endotracheal tube and nasogastric tube present. The nasogastric tube is in good position extending at least to the mid stomach. The endotracheal tube is adequately positioned though low-lying being approximately 8 mm from the cayla. There is right greater than left bibasilar infiltrate. Diffuse underlying interstitial lung disease has the appearance of mild edema. A right subclavian port is in good position. 1. Right greater than left bibasilar infiltrate compatible with pneumonia. 2. Interstitial prominence compatible with underlying edema. 3. Well-positioned nasogastric tube. 4. Endotracheal tube present. The tip is within 1 cm above the cayla. Position is adequate though optimal positioning would be to pull this back 3 cm.  Signed by Dr Alexandra Hernandez    Result Date: 11/15/2021  MRI BRAIN W WO CONTRAST 11/15/2021 5:28 PM History: Stroke symptoms. Pre and postcontrast MR imaging of the brain. Comparison is made with CT imaging performed earlier today at 9:40 AM. Comparison is made with brain MRI from October 29, 2020. Based on diffusion-weighted imaging there are acute ischemic changes within the left parietal lobe and right frontal lobe. No hemorrhage or mass effect. Normal ventricle size. Patchy small vessel disease. Sphenoid, ethmoid, and right maxillary sinusitis changes. Patchy mastoid fluid. 1. Patchy acute ischemic changes within both cerebral hemispheres. 2. The larger ischemic focus is in the left parietal region and involves an area measuring approximately 30 x 17 mm. 3. No hemorrhage or mass effect. 4. Sinusitis changes in mastoid fluid.  Signed by Dr Melissa Jeter and Plan:   Bihemispheric strokes  Neurology following  Meds per neurology  Neurochecks  PT/OT/SLP once able  Follow sodium    Aspiration pneumonia versus CAP  Broad-spectrum antibiotics  Follow cultures    Ventilator dependent acute respiratory failure  Titrate minute ventilation for pH 7.35  Follow ABG/CXR    History of lung cancer    Tobacco dependence    Severe protein calorie malnutrition  Dietitian following  Tube feeds on board    DVT prophylaxis  Lovenox    Total critical care time: 49 minutes    Subhash Petit MD   11/17/2021 3:39 PM

## 2021-11-17 NOTE — PROGRESS NOTES
Propofol weaned to 30mcg/kg/min during this shift. Pt will nod yes to \" can you squeeze my hand\", but does not follow command. Pt moving all extremities radomly, yawning occasionally. Pupils unchanged this shift.  Electronically signed by Oswaldo Almanzar RN on 11/16/2021 at 8:11 PM

## 2021-11-17 NOTE — PLAN OF CARE
permits will improve  Description: Ability to participate in self-care as condition permits will improve  11/16/2021 1929 by Ariella Lind RN  Outcome: Ongoing  11/16/2021 0559 by Josefina Helton RN  Outcome: Ongoing     Problem: Tobacco Use:  Goal: Inpatient tobacco use cessation counseling participation  Description: Inpatient tobacco use cessation counseling participation  11/16/2021 1929 by Ariella iLnd RN  Outcome: Ongoing  11/16/2021 0559 by Josefina Helton RN  Outcome: Ongoing     Problem: Nutrition  Goal: Optimal nutrition therapy  Outcome: Ongoing

## 2021-11-17 NOTE — CARE COORDINATION
Placed Stroke booklet in patient's chart. Informed patient's nurse, Aquilino Balderas, that she may provide booklet to family during next visit.   Electronically signed by Elsie Delarosa RN on 11/17/2021 at 3:17 PM

## 2021-11-17 NOTE — PROGRESS NOTES
[facility] Neurology Progress Note      Patient:   Lenin Tom  MR#:    [id number]   Room:    3381/566-46   YOB: 1955  Date of Progress Note: 11/17/2021  Time of Note                           8:28 AM  Consulting Physician:  Iker Goff DO  Attending Physician:  Sue Badillo MD      INTERVAL HISTORY:  Intubated, sedated, appears more responsive this am, no acute events. REVIEW OF SYSTEMS:  Limited given AMS. PHYSICAL EXAM:    Constitutional    BP (!) 121/53   Pulse 101   Temp 99.4 °F (37.4 °C) (Temporal)   Resp 23   Ht 5' 5\" (1.651 m)   Wt 109 lb 12.8 oz (49.8 kg)   SpO2 90%   BMI 18.27 kg/m²   General appearance: Intubated, sedated. EYES -   Conjunctiva normal  Pupillary exam as below, see CN exam in the neurologic exam  ENT-    No scars, masses, or lesions over external nose or ears  Oropharynx - intubated  Cardiovascular -   No clubbing, cyanosis, or edema   Pulmonary-   Mechanically ventilated    Musculoskeletal    No significant wasting of muscles noted  Gait as below, see gait exam in the neurologic exam  Muscle strength, tone, stability as below see the motor exam in the neurologic exam.   No bony deformities  Skin    Warm, dry, and intact to inspection and palpation. No rash, erythema, or pallor        NEUROLOGICAL EXAM     Mental status    []? Awake, alert, oriented   []? Affect attention and concentration appear appropriate  []? Recent and remote memory appears unremarkable  []? Speech normal without dysarthria or aphasia, comprehension and repetition intact. COMMENTS:  Will open eyes, not consistently following commands. Cranial Nerves []? No VF deficit to confrontation,  optic discs normal, no papilledema on fundoscopic exam.  []? PERRLA, EOMI, no nystagmus, conjugate eye movements, no ptosis  []? Face symmetric  []? Facial sensation intact  []? Tongue midline no atrophy or fasciculations present  []? Palate midline, hearing to finger rub normal  []? Shoulder shrug and SCM testing normal  COMMENTS: Left Pupil was reactive, right pupil misshapen likely surgical, face appears sym, EOM limited    Motor   []? 5/5 strength x 4 extremities  [x]? Normal bulk and tone  [x]? No tremor present  [x]? No rigidity or bradykinesia noted  COMMENTS:SMAE noted   Sensory  []? Sensation intact to light touch, pin prick, vibration, and proprioception BLE  []? Sensation intact to light touch, pin prick, vibration, and proprioception BUE  COMMENTS: Limtied   Coordination []? FTN normal bilaterally   []? HTS normal bilaterally  []? JIA normal.   COMMENTS: Limited    Reflexes  [x]? Symmetric and non-pathological  [x]? Toes downgoing bilaterally  [x]? No clonus present  COMMENTS:   Gait                  []? Normal steady gait    []? Ataxic    []? Spastic     []? Magnetic     []? Shuffling  [x]? Not assessed  COMMENTS:        LABS/IMAGING:    CT HEAD WO CONTRAST    Result Date: 11/12/2021  CT HEAD WO CONTRAST 11/12/2021 11:25 AM HISTORY: Code stroke COMPARISON: None DOSE LENGTH PRODUCT: 961 mGy cm TECHNIQUE: Helical tomographic images of the brain were obtained without the use of intravenous contrast. Automated exposure control was also utilized to decrease patient radiation dose. FINDINGS: Exam is limited by motion artifact. There is no evidence of evolving large vascular territory infarct. Underlying chronic small vessel ischemic change. No visualized intra-axial or extra-axial hemorrhage. No mass lesion is identified. Normal size and configuration of the ventricular system. The basal cisterns are symmetric. Posterior fossa structures are unremarkable. The included orbits and their contents are unremarkable. Chronic right maxillary sinus disease. The visualized osseous structures and overlying soft tissues of the skull and face are unremarkable. 1. No acute intracranial process.  Signed by Dr Felicia Davis    XR CHEST PORTABLE    Result Date: 11/12/2021  XR CHEST PORTABLE 11/12/2021 11:36 AM HISTORY: Code stroke  Technique: Single AP view of the chest COMPARISONS: Chest exam dated 4/29/2020 FINDINGS: Reidentified right suprahilar consolidation with volume loss. New developing consolidations in the left upper and left lower lobes with air bronchograms. No obvious pleural effusion. Underlying lung emphysema. Heart size is stable. The pulmonary vasculature are nondilated. Right chest wall [id number]. Spinal scoliotic curvature. 1. New multifocal consolidation in the left upper and left lower lobes, appearance concerning for new multifocal pneumonia. 2. Treatment-related changes in the right suprahilar region with scarring and volume loss. Signed by Dr Carola Kirk    Result Date: 11/12/2021  EXAM: CT NECK ANGIOGRAM CT HEAD ANGIOGRAM on  11/12/2021 12:54 PM COMPARISON:  CT chest dated August 23, 2021. HISTORY:  74years-old Female. Code stroke TECHNIQUE: Multiple CT images were obtained of the of the head and neck after the administration of IV contrast. 3D MIP reformats were generated  and were sent to PACS for interpretation. Grading of carotid artery stenosis is performed by NASCET criteria. FINDINGS: CT Neck Angiogram: There is a conventional aortic arch with patent origins of the brachiocephalic trunk, left common carotid and left subclavian arteries. The bilateral common carotid arteries and subclavian arteries are well-opacified. There is atherosclerotic disease of the bilateral carotid bifurcations, left greater than than right, with essentially 0% stenosis. The bilateral internal carotid arteries are otherwise well opacified throughout. The bilateral vertebral arteries are well-opacified throughout. Additional findings: Emphysema. New left upper lobe opacities posteriorly, most concerning for infection. Additional right upper lobe opacity is also concerning for infection.  Centrally necrotic right upper and lower lobe posterior mass, incompletely visualized but appears grossly stable when compared to prior examination. Small bilateral pleural effusions. CT Head Angiogram: The right internal carotid artery demonstrates normal course and caliber without evidence of flow limiting stenosis or occlusion. The major branch vessels to the right anterior and middle cerebral arteries are seen without evidence of stenosis or occlusion. There is no evidence of aneurysm or vascular malformation. Some atherosclerotic disease in the carotid siphon. The left internal carotid artery demonstrates normal course and caliber without evidence of flow limiting stenosis or occlusion. The major branch vessels to the left anterior and middle cerebral arteries are seen without evidence of stenosis or occlusion. There is no evidence of aneurysm or vascular malformation. Some atherosclerotic disease in the carotid siphon. Evaluation of the posterior circulation demonstrates normal caliber of the vertebral arteries, basilar artery, and major branch vessels of the posterior cerebral arteries bilaterally without evidence of flow limiting stenosis or occlusion. There is no evidence of aneurysm or vascular malformation. CT Angiography Of The Neck With Intravenous Contrast 1. No evidence of high-grade arterial stenosis or underlying dissection. 2. Bilateral upper lobe new opacities are concerning for infection. CT Angiography Of The Head With Intravenous Contrast 1. No evidence of acute thrombotic occlusion, high-grade arterial stenosis, or focal cerebral aneurysm. Signed by Dr Ellan Saint Date: 11/12/2021  EXAM: CT NECK ANGIOGRAM CT HEAD ANGIOGRAM on  11/12/2021 12:54 PM COMPARISON:  CT chest dated August 23, 2021. HISTORY:  74years-old Female. Code stroke TECHNIQUE: Multiple CT images were obtained of the of the head and neck after the administration of IV contrast. 3D MIP reformats were generated  and were sent to PACS for interpretation.  Grading of carotid of aneurysm or vascular malformation. CT Angiography Of The Neck With Intravenous Contrast 1. No evidence of high-grade arterial stenosis or underlying dissection. 2. Bilateral upper lobe new opacities are concerning for infection. CT Angiography Of The Head With Intravenous Contrast 1. No evidence of acute thrombotic occlusion, high-grade arterial stenosis, or focal cerebral aneurysm. Signed by Dr Natalie Islas      Lab Results   Component Value Date    WBC 9.6 11/17/2021    HGB 10.6 (L) 11/17/2021    HCT 36.6 (L) 11/17/2021    .1 (H) 11/17/2021     11/17/2021     Lab Results   Component Value Date     (H) 11/17/2021    K 2.8 (LL) 11/17/2021     11/17/2021    CO2 39 (H) 11/17/2021    BUN 22 11/17/2021    CREATININE 0.5 11/17/2021    GLUCOSE 129 (H) 11/17/2021    CALCIUM 8.0 (L) 11/17/2021    PROT 4.5 (L) 11/17/2021    LABALBU 2.6 (L) 11/17/2021    BILITOT 0.6 11/17/2021    ALKPHOS 79 11/17/2021    AST 14 11/17/2021    ALT 8 11/17/2021    LABGLOM >60 11/17/2021    GFRAA >59 11/17/2021    AGRATIO 1.4 05/01/2020    GLOB 3.5 02/25/2021     Lab Results   Component Value Date    INR 1.18 11/12/2021    INR 0.96 05/16/2019    INR 0.99 11/28/2011    PROTIME 15.2 (H) 11/12/2021    PROTIME 12.2 05/16/2019    PROTIME 10.80 11/28/2011       RECORD REVIEW:   Previous medical records, medications were reviewed at today's visit. Nursing/physician notes, imaging, labs and vitals reviewed. PT,OT and/or speech notes reviewed      ASSESSMENT:  74 y.o. admitted with AMS, hypoxia, pneumonia, lung cancer history, COPD, right sided weakness, slurred speech, MRI consistent with scattered bi-hemishperic strokes, likely embolic vs watershed. CTAs negative. ECHO, CD negative. EEG with slowing, nothing epileptiform. Intubated, sedated. Exam stable.      PLAN:  1. Supportive care   2. Follow Tele   3. Continue addressing medical issues, pneumonia, respiratory failure - intubated.    4.  ASA, statin, cautious blood pressure titration. Considerd MARCI, but likely not a good candidate for anticoagulation given multiple medial co-morbidities, risk of bleeding complications long term and fall risk is felt high. Discussed with family, they are in agreement currently with more conservative treatment from a stroke standpoint. 6.  PT, OT, ST  7. DVT proph  8. Limit sedating medications, wean sedation when able, supplementing thiamine  9. Consulted palliative care, consider hospice of worsens. Please feel free to call with any questions. [phone] (cell phone).       Sofie Walter DO  Board Certified Neurology

## 2021-11-17 NOTE — PROGRESS NOTES
Palliative Care Progress Note  11/17/2021 8:16 AM  Subjective:   Admit Date: 11/12/2021  PCP: DOMINICK Fernandes - [facility]    Chief Complaint: intubated and mechanically ventilated    Interval History: No acute overnight events. She remains intubated and mechanically ventilated. FiO2 75, PEEP 5, O2 sats 90%. She is more responsive today, moving extremities, not following commands. Neurology continues to follow. Portions of this note have been copied forward, however, changed to reflect the most current clinical status of this patient.     Review of Systems   Unable to complete due to acuity of care, intubated and mechanically ventilated    Diet NPO  ADULT TUBE FEEDING; Orogastric; Standard with Fiber; Continuous; 31; No; 35; Q 1 hour; Protein; 1 Proteinex as flush    Medications:   propofol 30 mcg/kg/min (11/17/21 0547)     Current Facility-Administered Medications   Medication Dose Route Frequency Provider Last Rate Last Admin    famotidine (PEPCID) tablet 20 mg  20 mg Oral Daily Shanique Vuong MD   20 mg at 11/16/21 1430    chlorhexidine (PERIDEX) 0.12 % solution 15 mL  15 mL Mouth/Throat BID Lenny Sherman MD   15 mL at 11/16/21 2200    potassium chloride (KLOR-CON M) extended release tablet 40 mEq  40 mEq Oral PRN Lenny Sherman MD        Or    potassium bicarb-citric acid (EFFER-K) effervescent tablet 40 mEq  40 mEq Oral PRN Lenny Sherman MD        Or    potassium chloride 10 mEq/100 mL IVPB (Peripheral Line)  10 mEq IntraVENous PRN Lenny Sherman  mL/hr at 11/17/21 0753 10 mEq at 11/17/21 0753    propofol injection  5-50 mcg/kg/min IntraVENous Titrated Tammiekhoi Hi DO 9 mL/hr at 11/17/21 0547 30 mcg/kg/min at 11/17/21 0547    albuterol sulfate  (90 Base) MCG/ACT inhaler 1 puff  1 puff Inhalation 4x daily Tammierupali Hi DO   1 puff at 11/16/21 1959    citalopram (CELEXA) tablet 20 mg  20 mg Oral Daily Tammiekhoi Hi DO   20 mg at 11/16/21 0955    ondansetron (ZOFRAN-ODT) disintegrating tablet 4 mg  4 mg Oral Q8H PRN Walterine Lemmings, DO        Or    ondansetron [facility]) injection 4 mg  4 mg IntraVENous Q6H PRN Walterine Lemmings, DO   4 mg at 11/13/21 8013    polyethylene glycol (GLYCOLAX) packet 17 g  17 g Oral Daily PRN Walterine Lemmings, DO        enoxaparin (LOVENOX) injection 40 mg  40 mg SubCUTAneous Nightly Walterine Lemmings, DO   40 mg at 11/16/21 1959    aspirin EC tablet 81 mg  81 mg Oral Daily Walterine Lemmings, DO   81 mg at 11/16/21 2707    Or    aspirin suppository 300 mg  300 mg Rectal Daily Walterine Lemmings, DO   300 mg at 11/14/21 3581    atorvastatin (LIPITOR) tablet 40 mg  40 mg Oral Nightly Walterine Lemmings, DO   40 mg at 11/16/21 1959    labetalol (NORMODYNE;TRANDATE) injection 10 mg  10 mg IntraVENous Q10 Min PRN Walterine Lemmings, DO        morphine (PF) injection 2 mg  2 mg IntraVENous Q3H PRN Laila Gomez MD   2 mg at 11/17/21 0044    thiamine (B-1) injection 100 mg  100 mg IntraVENous Daily Nelson Morita, DO   100 mg at 11/16/21 5041        Labs:     Recent Labs     11/16/21 0154 11/17/21 0229   WBC 8.2 9.6   RBC 3.37* 3.45*   HGB 10.2* 10.6*   HCT 35.7* 36.6*   .9* 106.1*   MCH 30.3 30.7   MCHC 28.6* 29.0*    168     Recent Labs     11/16/21 0154 11/17/21 0229   * 153*   K 3.3* 2.8*   ANIONGAP 9 9    105   CO2 38* 39*   BUN 19 22   CREATININE 0.5 0.5   GLUCOSE 87 129*   CALCIUM 8.4* 8.0*     Recent Labs     11/16/21 0154 11/17/21 0229   MG 1.9 2.0     Recent Labs     11/16/21  0154 11/17/21  0229   AST 8 14   ALT 6 8   BILITOT 0.6 0.6   ALKPHOS 81 79     ABGs:No results for input(s): PHART, GZY7WZU, PO2ART, SKU0PLO, BEART, HGBAE, G7QOVYAN, CARBOXHGBART, 02THERAPY in the last 72 hours. HgBA1c: No results for input(s): LABA1C in the last 72 hours.   FLP:    Lab Results   Component Value Date    TRIG 55 11/13/2021    HDL 58 11/13/2021    [facility] 45 11/13/2021     TSH:    Lab Results   Component Value Date    TSH 0.148 02/25/2021     Troponin T: No results for input(s): TROPONINI in the last 72 hours. INR: No results for input(s): INR in the last 72 hours. Objective:   Vitals: BP (!) 121/53   Pulse 101   Temp 99.4 °F (37.4 °C) (Temporal)   Resp 23   Ht 5' 5\" (1.651 m)   Wt 109 lb 12.8 oz (49.8 kg)   SpO2 90%   BMI 18.27 kg/m²   24HR INTAKE/OUTPUT:      Intake/Output Summary (Last 24 hours) at 11/17/2021 0816  Last data filed at 11/17/2021 0400  Gross per 24 hour   Intake 676.7 ml   Output 920 ml   Net -243.3 ml     Physical Exam  General appearance: 74 yr old female, appears ill, intubated, no acute distress  HEENT: atraumatic, eyes with clear conjunctiva and normal lids, pupils unequal R>L,  normal, external ears and nose are normal,lips normal.  Neck: without masses, supple   Lungs:ventilating bilaterally, coarse with rhonchi and wheezes  Heart: S1, S2 normal, tachycardic  Abdomen: soft, non-tender; bowel sounds normal; no masses,  no organomegaly  Genitourinary: No bladder fullness, masses, or tenderness, indwelling castanon  Extremities: extremities normal, atraumatic, no cyanosis or edema  Neurologic: sedated, responds to pain, wiggles toes bilaterally (not to command), opens eyes to name  Psychiatric: No agitation or anxiety  Skin: warm, dry      Assessment and Plan: Active Problems:    Acute hypoxemic respiratory failure (HCC)    Stroke of unknown etiology (HCC)    Severe malnutrition (HCC)    Altered mental status  Resolved Problems:    * No resolved hospital problems. *        Visit Summary: Chart reviewed, discussed patient with nursing staff. Patient seen at the bedside, more responsive today but not following commands. She is still requiring high FiO2, nursing will continue attempts to wean as pt tolerates.  Family has understanding that patient may be difficult to wean from the vent but would like to continue with care and assess along the way. If any notable changes or decline, inability to wean, family aware of option to pursue comfort care but remains cautiously hopeful. Palliative will follow. Recommendations:   1. Palliative Care- GOC dependent on patient's progress throughout hospitalization CODE STATUS- FULL CODE, encouraged further discussion     2. Acute Hypoxemic Respiratory Failure- Vent management per attending (Day #2), inhalers, supportive care     3. Acute Stroke- Neurology following, MRI complete showing bi-hemispheric stroke (suspected embolic), EEG with slowing nothing epileptiform, poor candidate for anticoagulation, neuro assessments ongoing        Thank you for consulting Palliative Care and allowing us to participate in the care of this patient.      Greater than 50% of time spent Counseling: Yes  Total visit time: 27 min    Electronically signed by DOMINICK Sorto on 11/17/2021 at 8:16 AM    (Please note that portions of this note were completed with a voice recognition program.  Sky Mendenhall made to edit the dictations but occasionally words are mis-transcribed.)

## 2021-11-17 NOTE — PROGRESS NOTES
Pt Sats now 88% on vent FIO2 at 80%. Will cont to monitor. Pt is resting comfortably. On Propofol at 30mcg/kg/min.  Electronically signed by Oswaldo Almanzar RN on 11/17/2021 at 3:40 PM

## 2021-11-17 NOTE — PROGRESS NOTES
Hospitalist Progress Note    Patient:  Indira Rodriguez  YOB: 1955  Date of Service: 11/16/2021  MRN: [id number]   Acct: [de-identified]   Primary Care Physician: DOMINICK Ibarra NP  Advance Directive: Full Code  Admit Date: 11/12/2021       Hospital Day: 4  Referring Provider: Tramaine López DO    Patient Seen, Chart, Consults, Notes, Labs, Radiology studies reviewed. Subjective:  Aidee Cates is a 74 y.o. female  whom we are following for hypoxemic respiratory failure, stroke, altered mental status. We proceeded with intubation yesterday because of a decline in her neurologic status and airway compromise. Her MRI shows bihemispheric multiple areas of infarct. Most likely embolic in nature. She is on assist-control rate of 18, tidal volume of 400, 5 of PEEP, FiO2 of 0.7.     Allergies:  No known allergies    Medicines:  Current Facility-Administered Medications   Medication Dose Route Frequency Provider Last Rate Last Admin    famotidine (PEPCID) tablet 20 mg  20 mg Oral Daily Heidy Cho MD   20 mg at 11/16/21 1430    propofol injection  5-50 mcg/kg/min IntraVENous Titrated Tramaine López DO 9 mL/hr at 11/16/21 1620 30 mcg/kg/min at 11/16/21 1620    albuterol sulfate  (90 Base) MCG/ACT inhaler 1 puff  1 puff Inhalation 4x daily Tramaine López DO   1 puff at 11/15/21 2007    citalopram (CELEXA) tablet 20 mg  20 mg Oral Daily Tramaine López, DO   20 mg at 11/16/21 9701    ondansetron (ZOFRAN-ODT) disintegrating tablet 4 mg  4 mg Oral Q8H PRN Tramaine López, DO        Or    ondansetron [facility]) injection 4 mg  4 mg IntraVENous Q6H PRN Tramaine López, DO   4 mg at 11/13/21 4354    polyethylene glycol (GLYCOLAX) packet 17 g  17 g Oral Daily PRN Tramaine López, DO        enoxaparin (LOVENOX) injection 40 mg  40 mg SubCUTAneous Nightly Tramaine López, DO   40 mg at 11/15/21 2007    aspirin EC tablet 81 mg  81 mg Oral Daily Tramaine López DO   81 mg at 11/16/21 0955    Or    aspirin suppository 300 mg  300 mg Rectal Daily Glorianne Delay, DO   300 mg at 11/14/21 6300    atorvastatin (LIPITOR) tablet 40 mg  40 mg Oral Nightly Glorianne Delay, DO   40 mg at 11/15/21 2007    labetalol (NORMODYNE;TRANDATE) injection 10 mg  10 mg IntraVENous Q10 Min PRN Glorianne Delay, DO        morphine (PF) injection 2 mg  2 mg IntraVENous Q3H PRN Mauri Espinoza MD   2 mg at 11/16/21 7422    thiamine (B-1) injection 100 mg  100 mg IntraVENous Daily Bety Kasey, DO   100 mg at 11/16/21 0647       Past Medical History:  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     COPD [facility]ch[facility] obstructive [facility] disease) [facility] [address]     Depression     Emphysema (subcutaneous) (surgical) resulting from a procedure     History of lung biopsy 05/16/2019    Malignant neoplasm of right main bronchus ([facility] [address] 03/2019    NSCLC, SCC [id number] stage IIIb    Palliative care patient 11/16/2021    Syncope     TIA (transient ischemic attack)        Past Surgical History:  Past Surgical History:   Procedure Laterality Date    APPENDECTOMY      BACK SURGERY      times two    BLADDER SUSPENSION      CHOLECYSTECTOMY      HYSTERECTOMY      INCONTINENCE SURGERY         Family History  Family History   Problem Relation Age of Onset    Colon Cancer Mother     High Blood Pressure Brother     Diabetes Brother        Social History  Social History     Socioeconomic History    Marital status:       Spouse name: Not on file    Number of children: Not on file    Years of education: Not on file    Highest education level: Not on file   Occupational History    Not on file   Tobacco Use    Smoking status: Current Every Day Smoker    Smokeless tobacco: Never Used   Substance and Sexual Activity    Alcohol use: Not Currently    Drug use: Never    Sexual activity: Not on file   Other Topics Concern  Not on file   Social History Narrative    Not on file     Social Determinants of Health     Financial Resource Strain:     Difficulty of Paying Living Expenses: Not on file   Food Insecurity:     Worried About Running Out of Food in the Last Year: Not on file    Trisha of Food in the Last Year: Not on file   Transportation Needs:     Lack of Transportation (Medical): Not on file    Lack of Transportation (Non-Medical): Not on file   Physical Activity:     Days of Exercise per Week: Not on file    Minutes of Exercise per Session: Not on file   Stress:     Feeling of Stress : Not on file   Social Connections:     Frequency of Communication with Friends and Family: Not on file    Frequency of Social Gatherings with Friends and Family: Not on file    Attends Sabianism Services: Not on file    Active Member of [address] [place] [address] [employer] or Organizations: Not on file    Attends Club or Organization Meetings: Not on file    Marital Status: Not on file   Intimate Partner Violence:     Fear of Current or Ex-Partner: Not on file    Emotionally Abused: Not on file    Physically Abused: Not on file    Sexually Abused: Not on file   Housing Stability:     Unable to Pay for Housing in the Last Year: Not on file    Number of [place] in the Last Year: Not on file    Unstable Housing in the Last Year: Not on file         Review of Systems:    Review of Systems   Unable to perform ROS: Intubated           Objective:  Blood pressure (!) 155/61, pulse 96, temperature 98.9 °F (37.2 °C), temperature source Temporal, resp. rate 19, height 5' 5\" (1.651 m), weight 109 lb 12.8 oz (49.8 kg), SpO2 91 %. Intake/Output Summary (Last 24 hours) at 11/16/2021 1926  Last data filed at 11/16/2021 0400  Gross per 24 hour   Intake 223.96 ml   Output 530 ml   Net -306.04 ml       Physical Exam  Vitals and nursing note reviewed. Constitutional:       Interventions: She is sedated and intubated.    HENT:      Head: Normocephalic and input(s): PH, PCO2, PO2, HCO3, O2SAT in the last 72 hours. CRP:  No results for input(s): CRP in the last 72 hours. Sed Rate:  No results for input(s): SEDRATE in the last 72 hours. Cultures:   No results for input(s): CULTURE in the last 72 hours. No results for input(s): BC, Leticia Joesph in the last 72 hours. No results for input(s): CXSURG in the last 72 hours. Radiology reports as per the Radiologist  Radiology: ECHO Complete 2D W Doppler W Color    Result Date: 11/14/2021  Transthoracic Echocardiography Report (TTE)  Demographics   Patient Name   Broderick Fuentes   Date of Study           11/13/2021   MRN            [id number]        Gender                  Female   Date of Birth  1955    Room Number             [id number]   Age            74 year(s)   Height:        65 inches     Referring Physician     Alfredito Huff   Weight:        109 pounds    Sonographer   BSA:           1.53 m^2      Interpreting Physician  Zuleima Marcial MD   BMI:           18.14 kg/m^2  Procedure Type of Study   TTE procedure:ECHO 2D W/DOPPLER/COLOR/CONTRAST. Study Location: Portable Technical Quality: Limited visualization due to poor acoustical window. Patient Status: Inpatient Contrast Medium: Definity. Amount - 4 ml HR: 105 bpm BP: 99/57 mmHg  Conclusions   Summary  Normal size left atrium. bubble study negative  No evidence of pleural effusion. No evidence of significant pericardial effusion is noted. Structurally normal mitral valve with normal leaflet mobility. No evidence  of mitral valve stenosis or mild mitral regurgitation. aortic valve thickened with calcification.  no as. ar  Tricuspid valve is structurally normal.  mild tr  pasp 40 mm hg   Signature   ----------------------------------------------------------------  Electronically signed by Zuleima Marcial MD(Interpreting  physician) on 11/14/2021 11:18 AM  ----------------------------------------------------------------   Findings   Mitral Valve  Structurally normal mitral valve with normal leaflet mobility. No evidence  of mitral valve stenosis or mild mitral regurgitation. Aortic Valve  aortic valve thickened with calcification. no as. ar   Tricuspid Valve  Tricuspid valve is structurally normal.  mild tr  pasp 40 mm hg   Left Atrium  Normal size left atrium. bubble study negative   Left Ventricle  Normal left ventricular size with preserved LV function and an estimated  ejection fraction of approximately 55-60%. No evidence of left ventricular mass or thrombus noted. Right Atrium  Normal right atrial dimension with no evidence of thrombus or mass noted. Right Ventricle  Normal right ventricular size with preserved RV function. Pericardial Effusion  No evidence of significant pericardial effusion is noted. Pleural Effusion  No evidence of pleural effusion. M-Mode Measurements (cm)   LVIDd: 4.47 cm                         LVIDs: 3.24 cm  IVSd: 0.88 cm  LVPWd: 0.78 cm                         AO Root Dimension: 1.2 cm  Rt. Vent. Dimension: 3.21 cm           LA: 3 cm                                         LVOT: 2 cm  Doppler Measurements:   AV Peak Velocity:104 cm/s          MV Peak E-Wave: 77.1 cm/s  AV Peak Gradient: 4.33 mmHg        MV Peak A-Wave: 85.9 cm/s                                     MV E/A Ratio: 0.9 %  TR Velocity:309 cm/s               MV Peak Gradient: 2.38 mmHg  TR Gradient:38.19 mmHg  Estimated RAP:5 mmHg  RVSP:43 mmHg      CT HEAD WO CONTRAST    Result Date: 11/12/2021  CT HEAD WO CONTRAST 11/12/2021 11:25 AM HISTORY: Code stroke COMPARISON: None DOSE LENGTH PRODUCT: 961 mGy cm TECHNIQUE: Helical tomographic images of the brain were obtained without the use of intravenous contrast. Automated exposure control was also utilized to decrease patient radiation dose. FINDINGS: Exam is limited by motion artifact. There is no evidence of evolving large vascular territory infarct. Underlying chronic small vessel ischemic change.  No visualized intra-axial or extra-axial hemorrhage. No mass lesion is identified. Normal size and configuration of the ventricular system. The basal cisterns are symmetric. Posterior fossa structures are unremarkable. The included orbits and their contents are unremarkable. Chronic right maxillary sinus disease. The visualized osseous structures and overlying soft tissues of the skull and face are unremarkable. 1. No acute intracranial process. Signed by Dr Preet Steven    XR CHEST PORTABLE    Result Date: 11/12/2021  XR CHEST PORTABLE 11/12/2021 11:36 AM HISTORY: Code stroke  Technique: Single AP view of the chest COMPARISONS: Chest exam dated 4/29/2020 FINDINGS: Reidentified right suprahilar consolidation with volume loss. New developing consolidations in the left upper and left lower lobes with air bronchograms. No obvious pleural effusion. Underlying lung emphysema. Heart size is stable. The pulmonary vasculature are nondilated. Right chest wall [id number]. Spinal scoliotic curvature. 1. New multifocal consolidation in the left upper and left lower lobes, appearance concerning for new multifocal pneumonia. 2. Treatment-related changes in the right suprahilar region with scarring and volume loss. Signed by Dr Preet Steven    Vascular carotid duplex bilateral    Result Date: 11/14/2021  Vascular Carotid Procedure  Demographics   Patient Name      Jalen Andrews  Age                     74   Patient Number    [id number]       Gender                  Female   Visit Number      [id number]    Interpreting Physician  Charles Che MD   Date of Birth     1955   Referring Physician     Shantelle Cherry   Accession Number  [id number]   [address]T  Procedure Type of Study:   Cerebral:Carotid, VL CAROTID BILATERAL. Indications for Study:CVA and Weakness, right sided. Risk Factors   - Current - Every day.   Impression   There is mixed plaque visualized in the bilateral internal carotid arteries. There is less than 50% stenosis in the right internal carotid artery. There is no stenosis of the left internal carotid artery. There is normal antegrade flow in the bilateral vertebral arteries. Signature   ----------------------------------------------------------------  Electronically signed by Reggie Bah MD(Interpreting  physician) on 11/14/2021 10:37 AM  ----------------------------------------------------------------  Blood Pressure:Left arm 99/59 mmHg. Velocities are measured in cm/s ; Diameters are measured in mm Carotid Right Measurements +------------+------+-------+--------+-------+------------+----------------+ ! Location    ! PSV   ! EDV    ! Angle   ! RI     !%Stenosis   ! Tortuosity      ! +------------+------+-------+--------+-------+------------+----------------+ ! Prox CCA    ! 162   !61.5   !60      !0.62   !            !                ! +------------+------+-------+--------+-------+------------+----------------+ ! Mid CCA     !131   !46.1   ! 60      !0.65   !            !                ! +------------+------+-------+--------+-------+------------+----------------+ ! Prox ICA    !146   !50.5   ! 60      !0.65   !            !                ! +------------+------+-------+--------+-------+------------+----------------+ ! Mid ICA     ! 98    !40.8   ! 60      !0.58   !            !                ! +------------+------+-------+--------+-------+------------+----------------+ ! Dist ICA    !118   !53.9   !48      !0.54   !            !                ! +------------+------+-------+--------+-------+------------+----------------+ ! Prox ECA    !139   !22     !60      !0.84   !            !                ! +------------+------+-------+--------+-------+------------+----------------+ ! Vertebral   !91    !44.6   !52      !0.51   !            !                ! +------------+------+-------+--------+-------+------------+----------------+   - There is antegrade vertebral flow noted on the right side.    - left common carotid and left subclavian arteries. The bilateral common carotid arteries and subclavian arteries are well-opacified. There is atherosclerotic disease of the bilateral carotid bifurcations, left greater than than right, with essentially 0% stenosis. The bilateral internal carotid arteries are otherwise well opacified throughout. The bilateral vertebral arteries are well-opacified throughout. Additional findings: Emphysema. New left upper lobe opacities posteriorly, most concerning for infection. Additional right upper lobe opacity is also concerning for infection. Centrally necrotic right upper and lower lobe posterior mass, incompletely visualized but appears grossly stable when compared to prior examination. Small bilateral pleural effusions. CT Head Angiogram: The right internal carotid artery demonstrates normal course and caliber without evidence of flow limiting stenosis or occlusion. The major branch vessels to the right anterior and middle cerebral arteries are seen without evidence of stenosis or occlusion. There is no evidence of aneurysm or vascular malformation. Some atherosclerotic disease in the carotid siphon. The left internal carotid artery demonstrates normal course and caliber without evidence of flow limiting stenosis or occlusion. The major branch vessels to the left anterior and middle cerebral arteries are seen without evidence of stenosis or occlusion. There is no evidence of aneurysm or vascular malformation. Some atherosclerotic disease in the carotid siphon. Evaluation of the posterior circulation demonstrates normal caliber of the vertebral arteries, basilar artery, and major branch vessels of the posterior cerebral arteries bilaterally without evidence of flow limiting stenosis or occlusion. There is no evidence of aneurysm or vascular malformation. CT Angiography Of The Neck With Intravenous Contrast 1.  No evidence of high-grade arterial stenosis or underlying dissection. 2. Bilateral upper lobe new opacities are concerning for infection. CT Angiography Of The Head With Intravenous Contrast 1. No evidence of acute thrombotic occlusion, high-grade arterial stenosis, or focal cerebral aneurysm. Signed by Dr Luz Elena Lu Date: 11/12/2021  EXAM: CT NECK ANGIOGRAM CT HEAD ANGIOGRAM on  11/12/2021 12:54 PM COMPARISON:  CT chest dated August 23, 2021. HISTORY:  74years-old Female. Code stroke TECHNIQUE: Multiple CT images were obtained of the of the head and neck after the administration of IV contrast. 3D MIP reformats were generated  and were sent to PACS for interpretation. Grading of carotid artery stenosis is performed by NASCET criteria. FINDINGS: CT Neck Angiogram: There is a conventional aortic arch with patent origins of the brachiocephalic trunk, left common carotid and left subclavian arteries. The bilateral common carotid arteries and subclavian arteries are well-opacified. There is atherosclerotic disease of the bilateral carotid bifurcations, left greater than than right, with essentially 0% stenosis. The bilateral internal carotid arteries are otherwise well opacified throughout. The bilateral vertebral arteries are well-opacified throughout. Additional findings: Emphysema. New left upper lobe opacities posteriorly, most concerning for infection. Additional right upper lobe opacity is also concerning for infection. Centrally necrotic right upper and lower lobe posterior mass, incompletely visualized but appears grossly stable when compared to prior examination. Small bilateral pleural effusions. CT Head Angiogram: The right internal carotid artery demonstrates normal course and caliber without evidence of flow limiting stenosis or occlusion. The major branch vessels to the right anterior and middle cerebral arteries are seen without evidence of stenosis or occlusion. There is no evidence of aneurysm or vascular malformation. Some atherosclerotic disease in the carotid siphon. The left internal carotid artery demonstrates normal course and caliber without evidence of flow limiting stenosis or occlusion. The major branch vessels to the left anterior and middle cerebral arteries are seen without evidence of stenosis or occlusion. There is no evidence of aneurysm or vascular malformation. Some atherosclerotic disease in the carotid siphon. Evaluation of the posterior circulation demonstrates normal caliber of the vertebral arteries, basilar artery, and major branch vessels of the posterior cerebral arteries bilaterally without evidence of flow limiting stenosis or occlusion. There is no evidence of aneurysm or vascular malformation. CT Angiography Of The Neck With Intravenous Contrast 1. No evidence of high-grade arterial stenosis or underlying dissection. 2. Bilateral upper lobe new opacities are concerning for infection. CT Angiography Of The Head With Intravenous Contrast 1. No evidence of acute thrombotic occlusion, high-grade arterial stenosis, or focal cerebral aneurysm. Signed by Dr Mendoza Canal       Assessment     Acute hypoxemic respiratory failure. Continue ventilatory support. Wean when feasible. Probable embolic stroke. Continue anticoagulation. Severe malnutrition. Nutritional support. Please document 40 minutes of critical care time for patient assessment, chart review, discussion with staff, .       Calin Holt DO

## 2021-11-18 NOTE — PROGRESS NOTES
At 1235 pt Sats again started dropping to 83-84% , pt suctioned, scant thin cream secretions returned. Pt repositioned. O2 breaths given. Pt Sats at 1240 remains 84%. Again O2 breaths 100% given. Pt Sats increased minimally to 86% and remained there with O2 breaths. Pt Sats again decreased to 84% at 1255 FIO2 increased to 90%. Sats increased to 95% by 1300 then decreased again to 84%abdominal tenderness 1305. Gave O2 breaths and Sats increased to 88%, FIO2 was increased to 100% by vent at 1310 and remains there. Pt Sats gradually increased now 91% on 100% FIO2 vent. Dr Brown Odor aware, in CCU at present. HR 92 pt remains on Propofol 30mcg/kg/min, pt RASS -2. Electronically signed by Mercedes Cabrera RN on 11/18/2021 at 2:25 PM

## 2021-11-18 NOTE — PROGRESS NOTES
Pt Sats decreased from 88-89% to 86% at 0910 , pt was repositioned, suctioned, scant return, Lung unchanged very wheezy RUL, RML, RLL, mild exp wheeze CLAIRE and LLL very diminished from 0800 assessment. Gave O2 100% breaths x 2. Sats continued to decrease to 83% after breaths. Repeat O2 breaths 100%. Dr Mariano Nair notified and RT notified. I increased FIO2 to 100% per vent at 0930. Sats gradually increased . Now 94%. I have decreased FIO2 back to 90%. Will monitor as wean back to 80% as tolerated.  Electronically signed by Abhishek Will RN on 11/18/2021 at 1:29 PM

## 2021-11-18 NOTE — PROGRESS NOTES
Comprehensive Nutrition Assessment    Type and Reason for Visit:  Reassess    Nutrition Recommendations/Plan: modify current EN to Vital AF 1.2. Nutrition Assessment:  Pt remains at risk for nutritional compromise. Propofol 9ml/hr. Modified current TF formula to Vital AF 1.2 with goal of 33ml.hr and 35ml free water flush and 1 Proteinex. Malnutrition Assessment:  Malnutrition Status:  Severe malnutrition    Context:  Chronic Illness     Findings of the 6 clinical characteristics of malnutrition:  Energy Intake:  Mild decrease in energy intake (Comment)  Weight Loss:  7 - Greater than 10% over 6 months     Body Fat Loss:  7 - Severe body fat loss     Muscle Mass Loss:  7 - Severe muscle mass loss    Fluid Accumulation:  No significant fluid accumulation Extremities   Strength:  Not Performed    Estimated Daily Nutrient Needs:  Energy (kcal):  0717-3242 kcls (25-30 kcals/kg); Weight Used for Energy Requirements:  Current     Protein (g):  84g; Weight Used for Protein Requirements:  Current        Fluid (ml/day):  2162-2511 ml; Method Used for Fluid Requirements:  1 ml/kcal      Nutrition Related Findings:  very thin      Wounds:  None       Current Nutrition Therapies:    Current Tube Feeding (TF) Orders:  · Feeding Route: Orogastric  · Formula: Peptide Based  · Schedule: Continuous  · Additives/Modulars: Protein  · Water Flushes: 35ml/hr  · Current TF & Flush Orders Provides: Vital AF 1.2 at 31ml/hr =892 kcals with 82g protein, 603ml free water. Proteinex gives 104 kcals wtih 216 g protein  35ml free water hourly = 840ml  · Goal TF & Flush Orders Provides: Vital AF 1.2 at 33ml/hr = 950 kcals with 59g protein, 87g CHO, and 642 ml free water from formula. 840ml free water from flush and Proteinex adds 104 opal cals with 26g protein.       Anthropometric Measures:  · Height: 5' 5\" (165.1 cm)  · Current Body Weight: 109 lb 12.8 oz (49.8 kg)   · Admission Body Weight: 109 lb 12.8 oz (49.8 kg)    · Usual Body Weight: 120 lb (54.4 kg) (4/2021)     · Ideal Body Weight: 125 lbs; % Ideal Body Weight 87.8 %   · BMI: 18.3  · Adjusted Body Weight:  ; No Adjustment   · BMI Categories: Underweight (BMI less than 22) age over 72       Nutrition Diagnosis:   · Inadequate oral intake related to acute injury/trauma, impaired respiratory function as evidenced by NPO or clear liquid status due to medical condition, intubation, nutrition support - enteral nutrition      Nutrition Interventions:   Food and/or Nutrient Delivery:  Continue NPO, Continue Oral Nutrition Supplement, Modify Tube Feeding  Nutrition Education/Counseling:  No recommendation at this time   Coordination of Nutrition Care:  Continue to monitor while inpatient    Goals:  meet nutritional needs through EN       Nutrition Monitoring and Evaluation:   Behavioral-Environmental Outcomes:  None Identified   Food/Nutrient Intake Outcomes:  Enteral Nutrition Intake/Tolerance  Physical Signs/Symptoms Outcomes:  Biochemical Data, Weight, Skin, Nutrition Focused Physical Findings, Fluid Status or Edema     Discharge Planning:     Too soon to determine     Electronically signed by Kristofer Jordan, MS, RD, LD on 11/18/21 at 9:43 AM CST    Contact: [phone]

## 2021-11-18 NOTE — PROGRESS NOTES
Palliative Care Progress Note  11/18/2021 10:28 AM  Subjective:   Admit Date: 11/12/2021  PCP: DOMINICK Sellers NP    Chief Complaint: intubated and mechanically ventilated    Interval History: Patient remains intubated, has had episodes of desaturation requiring increase in FiO2 to maintain sats. She is less responsive this morning,withdraws to pain only. Neurology continues to follow. Continued discussions regarding goals of care and code status. Portions of this note have been copied forward, however, changed to reflect the most current clinical status of this patient.     Review of Systems   Unable to complete due to acuity of care, intubated      Diet NPO  ADULT TUBE FEEDING; Orogastric; Peptide Based; Continuous; 33; No; 35; Q 1 hour; Protein; 1 Proteinex as flush    Medications:   propofol 30 mcg/kg/min (11/18/21 0941)     Current Facility-Administered Medications   Medication Dose Route Frequency Provider Last Rate Last Admin    nicotine (NICODERM CQ) 21 MG/24HR 1 patch  1 patch TransDERmal Daily Moira Franklin MD   1 patch at 11/17/21 1155    piperacillin-tazobactam (ZOSYN) 3,375 mg in dextrose 5 % 50 mL IVPB (mini-bag)  3,375 mg IntraVENous Q6H Kajal Blanton  mL/hr at 11/18/21 0942 3,375 mg at 11/18/21 9530    vancomycin (VANCOCIN) intermittent dosing (placeholder)   Other RX Placeholder Kajal Blanton MD        vancomycin (VANCOCIN) 750 mg in dextrose 5 % 250 mL IVPB  750 mg IntraVENous Q12H Kajal Blanton MD   Stopped at 11/18/21 0611    famotidine (PEPCID) tablet 20 mg  20 mg Oral Daily Moira Franklin MD   20 mg at 11/18/21 0942    chlorhexidine (PERIDEX) 0.12 % solution 15 mL  15 mL Mouth/Throat BID Donn Damico MD   15 mL at 11/17/21 2003    potassium chloride (KLOR-CON M) extended release tablet 40 mEq  40 mEq Oral PRN Donn Damico MD        Or    potassium bicarb-citric acid (EFFER-K) effervescent tablet 40 mEq  40 mEq Oral PRN Adeola MALDONADO MD Nadine   40 mEq at 11/18/21 0424    Or    potassium chloride 10 mEq/100 mL IVPB (Peripheral Line)  10 mEq IntraVENous PRN Leo Hull MD   Stopped at 11/17/21 1410    propofol injection  5-50 mcg/kg/min IntraVENous Titrated [facility], DO 9 mL/hr at 11/18/21 0941 30 mcg/kg/min at 11/18/21 0941    albuterol sulfate  (90 Base) MCG/ACT inhaler 1 puff  1 puff Inhalation 4x daily [facility], DO   1 puff at 11/18/21 0810    citalopram (CELEXA) tablet 20 mg  20 mg Oral Daily [facility], DO   20 mg at 11/18/21 7828    ondansetron (ZOFRAN-ODT) disintegrating tablet 4 mg  4 mg Oral Q8H PRN [facility], DO        Or    [facility] injection 4 mg  4 mg IntraVENous Q6H PRN [facility], DO   4 mg at 11/13/21 6113    polyethylene glycol (GLYCOLAX) packet 17 g  17 g Oral Daily PRN [facility], DO        enoxaparin (LOVENOX) injection 40 mg  40 mg SubCUTAneous Nightly [facility], DO   40 mg at 11/17/21 2001    aspirin EC tablet 81 mg  81 mg Oral Daily [facility], DO   81 mg at 11/18/21 4158    Or    aspirin suppository 300 mg  300 mg Rectal Daily [facility], DO   300 mg at 11/14/21 9439    atorvastatin (LIPITOR) tablet 40 mg  40 mg Oral Nightly [facility], DO   40 mg at 11/17/21 2001    labetalol (NORMODYNE;TRANDATE) injection 10 mg  10 mg IntraVENous Q10 Min PRN [facility], DO        morphine (PF) injection 2 mg  2 mg IntraVENous Q3H PRN Leo Hull MD   2 mg at 11/17/21 2156    thiamine (B-1) injection 100 mg  100 mg IntraVENous Daily Bryant Cherry, DO   100 mg at 11/18/21 2816        Labs:     Recent Labs     11/16/21  0154 11/17/21  0229 11/18/21  0300   WBC 8.2 9.6 11.2*   RBC 3.37* 3.45* 3.36*   HGB 10.2* 10.6* 10.2*   HCT 35.7* 36.6* 36.3*   .9* 106.1* 108.0*   MCH 30.3 30.7 30.4   MCHC 28.6* 29.0* 28.1*    168 149     Recent Labs     11/16/21  0154 11/16/21 0154 11/17/21 0229 11/17/21 2013 11/18/21  0300   *  --  153*  --  147*   K 3.3*   < > 2.8* 3.6 3.5   ANIONGAP 9  --  9  --  8     --  105  --  103   CO2 38*  --  39*  --  36*   BUN 19  --  22  --  18   CREATININE 0.5  --  0.5  --  0.5   GLUCOSE 87  --  129*  --  125*   CALCIUM 8.4*  --  8.0*  --  8.5*    < > = values in this interval not displayed. Recent Labs     11/16/21 0154 11/17/21 0229 11/18/21 0300   MG 1.9 2.0 1.8     Recent Labs     11/16/21 0154 11/17/21 0229 11/18/21 0300   AST 8 14 11   ALT 6 8 7   BILITOT 0.6 0.6 0.5   ALKPHOS 81 79 69     ABGs:No results for input(s): PHART, LEY9DZS, PO2ART, AYX4YHT, BEART, HGBAE, I2KQUXCO, CARBOXHGBART, 02THERAPY in the last 72 hours. HgBA1c: No results for input(s): LABA1C in the last 72 hours. FLP:    Lab Results   Component Value Date    TRIG 55 11/13/2021    HDL 58 11/13/2021    LDLCALC 45 11/13/2021     TSH:    Lab Results   Component Value Date    TSH 0.148 02/25/2021     Troponin T: No results for input(s): TROPONINI in the last 72 hours. INR: No results for input(s): INR in the last 72 hours.     Objective:   Vitals: BP (!) 113/46   Pulse 86   Temp 98.5 °F (36.9 °C) (Temporal)   Resp 19   Ht 5' 5\" (1.651 m)   Wt 109 lb 12.8 oz (49.8 kg)   SpO2 92%   BMI 18.27 kg/m²   24HR INTAKE/OUTPUT:      Intake/Output Summary (Last 24 hours) at 11/18/2021 1028  Last data filed at 11/18/2021 0400  Gross per 24 hour   Intake 2042.49 ml   Output 1080 ml   Net 962.49 ml     Physical Exam  General appearance: 66 yr old female, appears ill, frail, intubated, no acute distress  HEENT: atraumatic, eyes with clear conjunctiva and normal lids, pupils unequal R>L,  normal, external ears and nose are normal,lips normal.  Neck: without masses, supple   Lungs: ventilating bilaterally, coarse with rhonchi and wheezes  Heart: S1, S2 normal, tachycardic  Abdomen: soft, non-tender; bowel sounds normal; no masses,  no organomegaly  Genitourinary: No bladder fullness, masses, or tenderness, indwelling castanon  Extremities: extremities normal, atraumatic, no cyanosis or edema  Neurologic: sedated, withdraws to pain  Psychiatric: No agitation or anxiety  Skin: warm, dry        Assessment and Plan: Active Problems:    Acute hypoxemic respiratory failure (HCC)    Stroke of unknown etiology (HCC)    Severe malnutrition (HCC)    Altered mental status  Resolved Problems:    * No resolved hospital problems. *        Visit Summary: Chart reviewed and patient discussed with nursing staff. Patient seen at bedside with no family present. She is less responsive today and requiring increased FiO2 to maintain sats. She is at high risk for decline and poor outcome. I attempted to contact patient's son, Hilary Hernandez, and provide an update and readdress CODE STATUS. Unable to reach, voicemail left and I will reattempt later this afternoon. Nursing staff has provided update to the patient's brother, who plans to address CODE STATUS with the patient's sons as well. Palliative will follow. FOLLOW UP- Contacted patient's son, Hilary Hernandez, and provided an update and discussed concerns about patient's status and risk of decline/poor outcome. Code status readdressed and I have highly encouraged Hilary Hernandez to speak with his brother and uncle, recommended DNR, and notify nursing or provider if they would like to make changes. Patient would have poor likelihood of survival or meaningful recovery if she declined further or had an event requiring aggressive interventions/CPR. I have discussed the ability to keep patient comfortable rather than have aggressive intervention as well. Shree Gordon understanding and plans to speak with family. All questions answered, emotional support provided. Recommendations:   1.  Palliative Care- GOC dependent on patient's progress throughout hospitalization, would consider hospice care if no improvement CODE STATUS- FULL CODE, encouraged further discussion and would recommend DNR     2. Acute Hypoxemic Respiratory Failure- Vent management per attending (Day #2), inhalers, supportive care     3. Acute Stroke- Neurology following, MRI complete showing bi-hemispheric stroke (suspected embolic), EEG with slowing nothing epileptiform, poor candidate for anticoagulation, neuro assessments ongoing           Thank you for consulting Palliative Care and allowing us to participate in the care of this patient.      Total visit time: 38 min    Electronically signed by DOMINICK Bolivar on 11/18/2021 at 10:28 AM    (Please note that portions of this note were completed with a voice recognition program.  Patricia Tirado made to edit the dictations but occasionally words are mis-transcribed.)

## 2021-11-18 NOTE — CONSULTS
**Physician Signature**  This document was electronically signed by: Rikki Fabry MD  2021   10:52 AM    **Consult Information**  Member Facility: [address] [place] [address] Drive MRN: [id number]  Visit/Encounter Number: [id number]  Consult ID: [id number]  Facility Time Zone: CT  Date and Time of Request: 2021 05:50 AM  CT  Requesting Clinician: Thao Us DO  Patient Name: Denise Mayberry  YOB: 1955  Gender: Female  Patient identity was confirmed at the beginning of the consult with the   patient/family/staff using two personal identifiers: Patient name and       **Reason for Consult**  Reason for Consult: [facility]    **Admission**  Admission Date: 2021  Chief reason for ICU admission: Respiratory Failure  Secondary reasons for ICU admission: Pneumonia, Altered Mental   Status    **Core Metrics**  General orienting sentence for patient: 75 yo female admitted  (not   covid) found unresponsive at home with sats in 46s. Hx lung cancer and   COPD. ON bipap CT scan brain negative for stroke. REmains pleasantly   confused no follow commands could get MRI. Was full of secretions and tenuous   status anyway. MRI shows   probable subacute CVA, but no hemorrhage. Anisocoria. Propofol. Currently on   85%. Hx lung cancer, currently undergoing treatment. 3 PPD ongoing   tobacco. Remains on 30 mcg/kg/min propofol. 75% FIO2, 5 PEEP. Lots of   secretions desaturation event this AM, and required 100% briefly. She remains   on   propofol gtt.   Chief physiologic deterioration: Increase in FiO2 required by   30%  Is the patient on DVT prophylaxis?: Yes  Prophylaxis type: Pharmacological  DVT Prophylaxis Comments: Lovenox  Is the patient on GI prophylaxis?: Yes  Gi Prophylaxis Comments: Pepcid  Has this patient reached their nutritional goal?: Yes  Nutritional Goals Details: Tube feeds  Are there current issues with pain management in this patient?:   No  Are there issues with skin integrity?: Yes and issues are being   addressed  Skin Integrity Details: redness perianal  Are there issues with delirium?: No  Delirium Comments: Sedated, otherwise YES  Has the patient been mobilized?: No  Is this patient currently intubated?: Yes  Has facility initiated vent bundle?: No  Intubation Details: Too sick for weaning  Are there ethical or care philosophy or family issues?: No  Family Issues Details: 2 children.  Palliative care  Do you recommend an in depth evaluation?: No  Disposition Recommendations: Continue ICU level of care    **Inserted/Removed Devices**  Device Name: Young Catheter  Site of insertion: Urethra  Date of insertion: 11-  Device Name: Orogastric Tube  Site of insertion: Mouth  Date of insertion: 11-  Device Name: Endotracheal Tube  Site of insertion: Trachea  Date of insertion: 11-    **Physician Signature**  This document was electronically signed by: aDve Bronson MD  11/18/2021   10:52 AM

## 2021-11-18 NOTE — PROGRESS NOTES
Hospitalist Progress Note  [facility]     Patient: Karthik Burch  : 1955  MRN: [id number]  Code Status: Sergio Yen Day: 6   Date of Service: 2021    Subjective:   Patient seen and examined. Intubated and sedated. 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     COPD (chronic obstructive pulmonary disease) (HCC)     Depression     Emphysema (subcutaneous) (surgical) resulting from a procedure     History of lung biopsy 2019    Malignant neoplasm of right main bronchus ([address] 2019    NSCLC, SCC [id number] stage IIIb    Palliative care patient 2021    Syncope     TIA (transient ischemic attack)        Past Surgical History:   Procedure Laterality Date    APPENDECTOMY      BACK SURGERY      times two    BLADDER SUSPENSION      CHOLECYSTECTOMY      HYSTERECTOMY      INCONTINENCE SURGERY         Family History   Problem Relation Age of Onset    Colon Cancer Mother     High Blood Pressure Brother     Diabetes Brother        Social History     Socioeconomic History    Marital status:      Spouse name: Not on file    Number of children: Not on file    Years of education: Not on file    Highest education level: Not on file   Occupational History    Not on file   Tobacco Use    Smoking status: Current Every Day Smoker    Smokeless tobacco: Never Used   Substance and Sexual Activity    Alcohol use: Not Currently    Drug use: Never    Sexual activity: Not on file   Other Topics Concern    Not on file   Social History Narrative    Not on file     Social Determinants of Health     Financial Resource Strain:     Difficulty of Paying Living Expenses: Not on file   Food Insecurity:     Worried About Running Out of Food in the Last Year: Not on file    Trisha of Food in the Last Year: Not on file   Transportation Needs:     Lack of Transportation (Medical):  Not on file    Lack of Transportation (Non-Medical):  Not on file   Physical Activity:     Days of Exercise per Week: Not on file    Minutes of Exercise per Session: Not on file   Stress:     Feeling of Stress : Not on file   Social Connections:     Frequency of Communication with Friends and Family: Not on file    Frequency of Social Gatherings with Friends and Family: Not on file    Attends Bahai Services: Not on file    Active Member of Clubs or Organizations: Not on file    Attends Club or Organization Meetings: Not on file    Marital Status: Not on file   Intimate Partner Violence:     Fear of Current or Ex-Partner: Not on file    Emotionally Abused: Not on file    Physically Abused: Not on file    Sexually Abused: Not on file   Housing Stability:     Unable to Pay for Housing in the Last Year: Not on file    Number of [place] in the Last Year: Not on file    Unstable Housing in the Last Year: Not on file       Current Facility-Administered Medications   Medication Dose Route Frequency Provider Last Rate Last Admin    nicotine (NICODERM CQ) 21 MG/24HR 1 patch  1 patch TransDERmal Daily Heidy Cho MD   1 patch at 11/17/21 1155    piperacillin-tazobactam (ZOSYN) 3,375 mg in dextrose 5 % 50 mL IVPB (mini-bag)  3,375 mg IntraVENous Q6H Rod Taylor  mL/hr at 11/18/21 0942 3,375 mg at 11/18/21 2627    vancomycin (VANCOCIN) intermittent dosing (placeholder)   Other RX Placeholder Rod Taylor MD        vancomycin (VANCOCIN) 750 mg in dextrose 5 % 250 mL IVPB  750 mg IntraVENous Q12H Rod Taylor MD   Stopped at 11/18/21 0611    famotidine (PEPCID) tablet 20 mg  20 mg Oral Daily Heidy Cho MD   20 mg at 11/18/21 0942    chlorhexidine (PERIDEX) 0.12 % solution 15 mL  15 mL Mouth/Throat BID Eileen Plaza MD   15 mL at 11/17/21 2003    potassium chloride (KLOR-CON M) extended release tablet 40 mEq  40 mEq Oral PRN Eileen Plaza MD        Or    potassium bicarb-citric acid (EFFER-K) effervescent tablet 40 mEq  40 mEq Oral PRN Claudette Furrow, MD   40 mEq at 11/18/21 0424    Or    potassium chloride 10 mEq/100 mL IVPB (Peripheral Line)  10 mEq IntraVENous PRN Claudette Furrow, MD   Stopped at 11/17/21 1410    propofol injection  5-50 mcg/kg/min IntraVENous Titrated Keke Alvarez, DO 9 mL/hr at 11/18/21 0941 30 mcg/kg/min at 11/18/21 0941    albuterol sulfate  (90 Base) MCG/ACT inhaler 1 puff  1 puff Inhalation 4x daily Keke Alvarez, DO   1 puff at 11/18/21 1244    citalopram (CELEXA) tablet 20 mg  20 mg Oral Daily Keke Alvarez, DO   20 mg at 11/18/21 3381    ondansetron (ZOFRAN-ODT) disintegrating tablet 4 mg  4 mg Oral Q8H PRN Keke Alvarez, DO        Or    [facility]) injection 4 mg  4 mg IntraVENous Q6H PRN Keke Alvarez, DO   4 mg at 11/13/21 3987    polyethylene glycol (GLYCOLAX) packet 17 g  17 g Oral Daily PRN Keke Alvarez, DO        enoxaparin (LOVENOX) injection 40 mg  40 mg SubCUTAneous Nightly Keke Alvarez, DO   40 mg at 11/17/21 2001    aspirin EC tablet 81 mg  81 mg Oral Daily Keke Alvarez, DO   81 mg at 11/18/21 6764    Or    aspirin suppository 300 mg  300 mg Rectal Daily Keke Alvarez, DO   300 mg at 11/14/21 3693    atorvastatin (LIPITOR) tablet 40 mg  40 mg Oral Nightly Keke Alvarez, DO   40 mg at 11/17/21 2001    labetalol (NORMODYNE;TRANDATE) injection 10 mg  10 mg IntraVENous Q10 Min PRN Keke Alvarez, DO        morphine (PF) injection 2 mg  2 mg IntraVENous Q3H PRN Claudette Furrow, MD   2 mg at 11/17/21 2156    thiamine (B-1) injection 100 mg  100 mg IntraVENous Daily Denise Alamin, DO   100 mg at 11/18/21 0942         propofol 30 mcg/kg/min (11/18/21 0941)        Objective:   BP (!) 113/46   Pulse 86   Temp 98.5 °F (36.9 °C) (Temporal)   Resp 19   Ht 5' 5\" (1.651 m)   Wt 109 lb 12.8 oz (49.8 kg)   SpO2 92%   BMI 18.27 kg/m² General: cachectic  HEENT: normocephalic, atraumatic  Neck: supple, symmetrical, trachea midline   Lungs: rhonchi  Cardiovascular: s1 and s2 normal  Abdomen: soft, positive bowel sounds  Extremities: no edema or cyanosis   Neuro: intubated and sedated  Skin: normal color and texture     Recent Labs     11/16/21 0154 11/17/21 0229 11/18/21  0300   WBC 8.2 9.6 11.2*   RBC 3.37* 3.45* 3.36*   HGB 10.2* 10.6* 10.2*   HCT 35.7* 36.6* 36.3*   .9* 106.1* 108.0*   MCH 30.3 30.7 30.4   MCHC 28.6* 29.0* 28.1*    168 149     Recent Labs     11/16/21 0154 11/16/21 0154 11/17/21 0229 11/17/21 2013 11/18/21  0300   *  --  153*  --  147*   K 3.3*   < > 2.8* 3.6 3.5   ANIONGAP 9  --  9  --  8     --  105  --  103   CO2 38*  --  39*  --  36*   BUN 19  --  22  --  18   CREATININE 0.5  --  0.5  --  0.5   GLUCOSE 87  --  129*  --  125*   CALCIUM 8.4*  --  8.0*  --  8.5*    < > = values in this interval not displayed. Recent Labs     11/16/21 0154 11/17/21 0229 11/18/21  0300   MG 1.9 2.0 1.8     Recent Labs     11/16/21 0154 11/17/21 0229 11/18/21  0300   AST 8 14 11   ALT 6 8 7   BILITOT 0.6 0.6 0.5   ALKPHOS 81 79 69     No results for input(s): PH, PO2, PCO2, HCO3, BE, O2SAT in the last 72 hours. No results for input(s): TROPONINI in the last 72 hours. No results for input(s): INR in the last 72 hours. No results for input(s): LACTA in the last 72 hours. Intake/Output Summary (Last 24 hours) at 11/18/2021 1341  Last data filed at 11/18/2021 0400  Gross per 24 hour   Intake 1897.49 ml   Output 900 ml   Net 997.49 ml       XR CHEST PORTABLE    Result Date: 11/18/2021  Examination. XR CHEST PORTABLE 11/18/2021 4:35 AM History: The patient on a vent. A single frontal portable semiupright view of the chest is compared with the previous study dated 11/15/2021. The lungs are hyperinflated, left more than the right.  There is elevation of the right diaphragm with evidence of pleural diaphragmatic adhesions. There is an area of discoid atelectasis in the right midlung. There is a small right basal pleural effusion. These have moderately improved since the previous study. There is bilateral coarse interstitial infiltrate which is similar to the previous study. The endotracheal tube and nasogastric tubes are in stable and unchanged position. A right subclavian approach MediPort system is seen in place. No acute bony abnormality. Moderate improvement, predominantly in the right lung, as detailed above.  Signed by Dr Venkatesh Smith and Plan:   Bihemispheric strokes  Neurology following  Meds per neurology  Neurochecks  PT/OT/SLP once able  Follow sodium     Aspiration pneumonia versus CAP  Broad-spectrum antibiotics     Ventilator dependent acute respiratory failure  Titrate minute ventilation for pH 7.35  Follow ABG/CXR     History of lung cancer     Tobacco dependence     Severe protein calorie malnutrition  Dietitian following  Tube feeds on board     DVT prophylaxis  Lovenox    Palliative following for goals of care    Total critical care time: 45 minutes    Natalie Sun MD   11/18/2021 1:41 PM

## 2021-11-18 NOTE — PROGRESS NOTES
Shoulder shrug and SCM testing normal  COMMENTS: Left Pupil was reactive, right pupil misshapen likely surgical, face appears sym, EOM limited    Motor   []? 5/5 strength x 4 extremities  [x]? Normal bulk and tone  [x]? No tremor present  [x]? No rigidity or bradykinesia noted  COMMENTS:SMAE noted   Sensory  []? Sensation intact to light touch, pin prick, vibration, and proprioception BLE  []? Sensation intact to light touch, pin prick, vibration, and proprioception BUE  COMMENTS: Limtied   Coordination []? FTN normal bilaterally   []? HTS normal bilaterally  []? JIA normal.   COMMENTS: Limited    Reflexes  [x]? Symmetric and non-pathological  [x]? Toes downgoing bilaterally  [x]? No clonus present  COMMENTS:   Gait                  []? Normal steady gait    []? Ataxic    []? Spastic     []? Magnetic     []? Shuffling  [x]? Not assessed  COMMENTS:        LABS/IMAGING:    CT HEAD WO CONTRAST    Result Date: 11/12/2021  CT HEAD WO CONTRAST 11/12/2021 11:25 AM HISTORY: Code stroke COMPARISON: None DOSE LENGTH PRODUCT: 961 mGy cm TECHNIQUE: Helical tomographic images of the brain were obtained without the use of intravenous contrast. Automated exposure control was also utilized to decrease patient radiation dose. FINDINGS: Exam is limited by motion artifact. There is no evidence of evolving large vascular territory infarct. Underlying chronic small vessel ischemic change. No visualized intra-axial or extra-axial hemorrhage. No mass lesion is identified. Normal size and configuration of the ventricular system. The basal cisterns are symmetric. Posterior fossa structures are unremarkable. The included orbits and their contents are unremarkable. Chronic right maxillary sinus disease. The visualized osseous structures and overlying soft tissues of the skull and face are unremarkable. 1. No acute intracranial process.  Signed by Dr Carmie Skiff    XR CHEST PORTABLE    Result Date: 11/12/2021  XR CHEST PORTABLE 11/12/2021 11:36 AM HISTORY: Code stroke  Technique: Single AP view of the chest COMPARISONS: Chest exam dated 4/29/2020 FINDINGS: Reidentified right suprahilar consolidation with volume loss. New developing consolidations in the left upper and left lower lobes with air bronchograms. No obvious pleural effusion. Underlying lung emphysema. Heart size is stable. The pulmonary vasculature are nondilated. Right chest wall [id number]. Spinal scoliotic curvature. 1. New multifocal consolidation in the left upper and left lower lobes, appearance concerning for new multifocal pneumonia. 2. Treatment-related changes in the right suprahilar region with scarring and volume loss. Signed by Dr Massimo Zamudio    Result Date: 11/12/2021  EXAM: CT NECK ANGIOGRAM CT HEAD ANGIOGRAM on  11/12/2021 12:54 PM COMPARISON:  CT chest dated August 23, 2021. HISTORY:  74years-old Female. Code stroke TECHNIQUE: Multiple CT images were obtained of the of the head and neck after the administration of IV contrast. 3D MIP reformats were generated  and were sent to PACS for interpretation. Grading of carotid artery stenosis is performed by NASCET criteria. FINDINGS: CT Neck Angiogram: There is a conventional aortic arch with patent origins of the brachiocephalic trunk, left common carotid and left subclavian arteries. The bilateral common carotid arteries and subclavian arteries are well-opacified. There is atherosclerotic disease of the bilateral carotid bifurcations, left greater than than right, with essentially 0% stenosis. The bilateral internal carotid arteries are otherwise well opacified throughout. The bilateral vertebral arteries are well-opacified throughout. Additional findings: Emphysema. New left upper lobe opacities posteriorly, most concerning for infection. Additional right upper lobe opacity is also concerning for infection.  Centrally necrotic right upper and lower lobe posterior mass, incompletely visualized but appears grossly stable when compared to prior examination. Small bilateral pleural effusions. CT Head Angiogram: The right internal carotid artery demonstrates normal course and caliber without evidence of flow limiting stenosis or occlusion. The major branch vessels to the right anterior and middle cerebral arteries are seen without evidence of stenosis or occlusion. There is no evidence of aneurysm or vascular malformation. Some atherosclerotic disease in the carotid siphon. The left internal carotid artery demonstrates normal course and caliber without evidence of flow limiting stenosis or occlusion. The major branch vessels to the left anterior and middle cerebral arteries are seen without evidence of stenosis or occlusion. There is no evidence of aneurysm or vascular malformation. Some atherosclerotic disease in the carotid siphon. Evaluation of the posterior circulation demonstrates normal caliber of the vertebral arteries, basilar artery, and major branch vessels of the posterior cerebral arteries bilaterally without evidence of flow limiting stenosis or occlusion. There is no evidence of aneurysm or vascular malformation. CT Angiography Of The Neck With Intravenous Contrast 1. No evidence of high-grade arterial stenosis or underlying dissection. 2. Bilateral upper lobe new opacities are concerning for infection. CT Angiography Of The Head With Intravenous Contrast 1. No evidence of acute thrombotic occlusion, high-grade arterial stenosis, or focal cerebral aneurysm. Signed by Dr Carli Hare Date: 11/12/2021  EXAM: CT NECK ANGIOGRAM CT HEAD ANGIOGRAM on  11/12/2021 12:54 PM COMPARISON:  CT chest dated August 23, 2021. HISTORY:  74years-old Female. Code stroke TECHNIQUE: Multiple CT images were obtained of the of the head and neck after the administration of IV contrast. 3D MIP reformats were generated  and were sent to PACS for interpretation.  Grading of carotid artery stenosis is performed by NASCET criteria. FINDINGS: CT Neck Angiogram: There is a conventional aortic arch with patent origins of the brachiocephalic trunk, left common carotid and left subclavian arteries. The bilateral common carotid arteries and subclavian arteries are well-opacified. There is atherosclerotic disease of the bilateral carotid bifurcations, left greater than than right, with essentially 0% stenosis. The bilateral internal carotid arteries are otherwise well opacified throughout. The bilateral vertebral arteries are well-opacified throughout. Additional findings: Emphysema. New left upper lobe opacities posteriorly, most concerning for infection. Additional right upper lobe opacity is also concerning for infection. Centrally necrotic right upper and lower lobe posterior mass, incompletely visualized but appears grossly stable when compared to prior examination. Small bilateral pleural effusions. CT Head Angiogram: The right internal carotid artery demonstrates normal course and caliber without evidence of flow limiting stenosis or occlusion. The major branch vessels to the right anterior and middle cerebral arteries are seen without evidence of stenosis or occlusion. There is no evidence of aneurysm or vascular malformation. Some atherosclerotic disease in the carotid siphon. The left internal carotid artery demonstrates normal course and caliber without evidence of flow limiting stenosis or occlusion. The major branch vessels to the left anterior and middle cerebral arteries are seen without evidence of stenosis or occlusion. There is no evidence of aneurysm or vascular malformation. Some atherosclerotic disease in the carotid siphon. Evaluation of the posterior circulation demonstrates normal caliber of the vertebral arteries, basilar artery, and major branch vessels of the posterior cerebral arteries bilaterally without evidence of flow limiting stenosis or occlusion.  There is no evidence of aneurysm or vascular malformation. CT Angiography Of The Neck With Intravenous Contrast 1. No evidence of high-grade arterial stenosis or underlying dissection. 2. Bilateral upper lobe new opacities are concerning for infection. CT Angiography Of The Head With Intravenous Contrast 1. No evidence of acute thrombotic occlusion, high-grade arterial stenosis, or focal cerebral aneurysm. Signed by Dr Sara Caruso      Lab Results   Component Value Date    WBC 11.2 (H) 11/18/2021    HGB 10.2 (L) 11/18/2021    HCT 36.3 (L) 11/18/2021    .0 (H) 11/18/2021     11/18/2021     Lab Results   Component Value Date     (H) 11/18/2021    K 3.5 11/18/2021     11/18/2021    CO2 36 (H) 11/18/2021    BUN 18 11/18/2021    CREATININE 0.5 11/18/2021    GLUCOSE 125 (H) 11/18/2021    CALCIUM 8.5 (L) 11/18/2021    PROT 5.1 (L) 11/18/2021    LABALBU 2.5 (L) 11/18/2021    BILITOT 0.5 11/18/2021    ALKPHOS 69 11/18/2021    AST 11 11/18/2021    ALT 7 11/18/2021    LABGLOM >60 11/18/2021    GFRAA >59 11/18/2021    AGRATIO 1.4 05/01/2020    GLOB 3.5 02/25/2021     Lab Results   Component Value Date    INR 1.18 11/12/2021    INR 0.96 05/16/2019    INR 0.99 11/28/2011    PROTIME 15.2 (H) 11/12/2021    PROTIME 12.2 05/16/2019    PROTIME 10.80 11/28/2011       RECORD REVIEW:   Previous medical records, medications were reviewed at today's visit. Nursing/physician notes, imaging, labs and vitals reviewed. PT,OT and/or speech notes reviewed      ASSESSMENT:  74 y.o. admitted with AMS, hypoxia, pneumonia, lung cancer history, COPD, right sided weakness, slurred speech, MRI consistent with scattered bi-hemishperic strokes, likely embolic vs watershed. CTAs negative. ECHO, CD negative. EEG with slowing, nothing epileptiform. Intubated, sedated. Exam unchanged.      PLAN:  1. Supportive care   2. Follow Tele   3. Continue addressing medical issues, pneumonia, respiratory failure - intubated.    4.  ASA, statin, cautious blood pressure titration. Considerd MARCI, but likely not a good candidate for anticoagulation given multiple medial co-morbidities, risk of bleeding complications and long term fall risk is felt high. Discussed with family yesaterday, they are in agreement currently with more conservative treatment from a stroke standpoint. 6.  PT, OT, ST  7. DVT proph  8. Limit sedating medications, wean sedation when able, supplementing thiamine  9. Consulted palliative care, consider hospice of worsens. Please feel free to call with any questions. [phone] (cell phone).     Lindsey Nascimento DO  Board Certified Neurology

## 2021-11-18 NOTE — PROGRESS NOTES
Pt restless, frown on face, eyes wide open, when asked if hurting, nodded slightly \"yes\". When I questioned different body parts, pt nodded yes to back. Morphine 2mg IV given. Pt does not follow any commands yet, but does move all extremities.  Electronically signed by Josi Souza RN on 11/17/2021 at 7:33 PM

## 2021-11-18 NOTE — PLAN OF CARE
Nutrition Problem #1: Inadequate oral intake  Intervention: Food and/or Nutrient Delivery: Continue NPO, Continue Oral Nutrition Supplement, Modify Tube Feeding  Nutritional Goals: meet nutritional needs through EN

## 2021-11-18 NOTE — PROGRESS NOTES
Physician Progress Note      Mery Apple  CSN #:                  [id number]  :                       1955  ADMIT DATE:       2021 11:12 AM  DISCH DATE:  RESPONDING  PROVIDER #:        Erika Diaz          QUERY TEXT:    Patient admitted with CVA. Documentation reflects multifocal pneumonia in ER   Provider notes. If possible, please document in the progress notes and   discharge summary if multifocal pneumonia was: The medical record reflects the following:  Risk Factors: COPD  Clinical Indicators: CXR 1. New multifocal consolidation in the left upper and   left lower lobes, appearance concerning for new multifocal pneumonia. WBC 15.7   15.6 11.1, Bands 18, Neurtophils 77%. Treatment: CXR, Zithromax 500 mg x 1, Rocephin 1g IV x 1. CBC  Options provided:  -- Multifocal pneumonia confirmed after study  -- Multifocal pneumonia treated and resolved  -- Multifocal pneumonia ruled out after study  -- Other - I will add my own diagnosis  -- Disagree - Not applicable / Not valid  -- Disagree - Clinically unable to determine / Unknown  -- Refer to Clinical Documentation Reviewer    PROVIDER RESPONSE TEXT:    Multifocal pneumonia confirmed after study.     Query created by: Maya Viveros on 2021 12:17 PM      Electronically signed by:  Erika Diaz 2021 8:24 PM

## 2021-11-19 NOTE — PROGRESS NOTES
Hospitalist Progress Note  [facility]     Patient: Lorene Lynn  : 1955  MRN: [id number]  Code Status: Full Code    Hospital Day: 7   Date of Service: 2021    Subjective:   Patient seen and examined. Intubated and sedated. 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     COPD (chronic obstructive pulmonary disease) (HCC)     Depression     Emphysema (subcutaneous) (surgical) resulting from a procedure     History of lung biopsy 2019    Malignant neoplasm of right main bronchus ([address] 2019    NSCLC, SCC [id number] stage IIIb    Palliative care patient 2021    Syncope     TIA (transient ischemic attack)        Past Surgical History:   Procedure Laterality Date    APPENDECTOMY      BACK SURGERY      times two    BLADDER SUSPENSION      CHOLECYSTECTOMY      HYSTERECTOMY      INCONTINENCE SURGERY         Family History   Problem Relation Age of Onset    Colon Cancer Mother     High Blood Pressure Brother     Diabetes Brother        Social History     Socioeconomic History    Marital status:      Spouse name: Not on file    Number of children: Not on file    Years of education: Not on file    Highest education level: Not on file   Occupational History    Not on file   Tobacco Use    Smoking status: Current Every Day Smoker    Smokeless tobacco: Never Used   Substance and Sexual Activity    Alcohol use: Not Currently    Drug use: Never    Sexual activity: Not on file   Other Topics Concern    Not on file   Social History Narrative    Not on file     Social Determinants of Health     Financial Resource Strain:     Difficulty of Paying Living Expenses: Not on file   Food Insecurity:     Worried About Running Out of Food in the Last Year: Not on file    Trisha of Food in the Last Year: Not on file   Transportation Needs:     Lack of Transportation (Medical):  Not on file    Lack of Transportation (Non-Medical):  Not on file   Physical Activity:     Days of Exercise per Week: Not on file    Minutes of Exercise per Session: Not on file   Stress:     Feeling of Stress : Not on file   Social Connections:     Frequency of Communication with Friends and Family: Not on file    Frequency of Social Gatherings with Friends and Family: Not on file    Attends Temple Services: Not on file    Active Member of Clubs or Organizations: Not on file    Attends Club or Organization Meetings: Not on file    Marital Status: Not on file   Intimate Partner Violence:     Fear of Current or Ex-Partner: Not on file    Emotionally Abused: Not on file    Physically Abused: Not on file    Sexually Abused: Not on file   Housing Stability:     Unable to Pay for Housing in the Last Year: Not on file    Number of [place] in the Last Year: Not on file    Unstable Housing in the Last Year: Not on file       Current Facility-Administered Medications   Medication Dose Route Frequency Provider Last Rate Last Admin    enoxaparin (LOVENOX) injection 50 mg  1 mg/kg SubCUTAneous BID Sangeeta Moe MD        nicotine (NICODERM CQ) 21 MG/24HR 1 patch  1 patch TransDERmal Daily Ariella Jones MD   1 patch at 11/19/21 0923    piperacillin-tazobactam (ZOSYN) 3,375 mg in dextrose 5 % 50 mL IVPB (mini-bag)  3,375 mg IntraVENous Q6H Sangeeta Moe  mL/hr at 11/19/21 0924 3,375 mg at 11/19/21 1718    vancomycin (VANCOCIN) intermittent dosing (placeholder)   Other RX Placeholder Sangeeta Moe MD        vancomycin (VANCOCIN) 750 mg in dextrose 5 % 250 mL IVPB  750 mg IntraVENous Q12H Sangeeta Moe  mL/hr at 11/19/21 0927 750 mg at 11/19/21 0927    famotidine (PEPCID) tablet 20 mg  20 mg Oral Daily Ariella Jones MD   20 mg at 11/19/21 0926    chlorhexidine (PERIDEX) 0.12 % solution 15 mL  15 mL Mouth/Throat BID Rachel Alford MD   15 mL at 11/19/21 0926    potassium chloride (KLOR-CON M) extended release tablet 40 mEq  40 mEq Oral PRN Eileen Plaza MD        Or    potassium bicarb-citric acid (EFFER-K) effervescent tablet 40 mEq  40 mEq Oral PRN Eileen Plaza MD   40 mEq at 11/18/21 0424    Or    potassium chloride 10 mEq/100 mL IVPB (Peripheral Line)  10 mEq IntraVENous PRN Eileen Plaza MD   Stopped at 11/17/21 1410    propofol injection  5-50 mcg/kg/min IntraVENous Titrated Tramaine López DO 6 mL/hr at 11/19/21 0927 20 mcg/kg/min at 11/19/21 0927    albuterol sulfate  (90 Base) MCG/ACT inhaler 1 puff  1 puff Inhalation 4x daily Tramaine López DO   1 puff at 11/19/21 2709    citalopram (CELEXA) tablet 20 mg  20 mg Oral Daily Tramaine López, DO   20 mg at 11/19/21 0553    ondansetron (ZOFRAN-ODT) disintegrating tablet 4 mg  4 mg Oral Q8H PRN Tramaine López DO        Or    ondansetron [facility]) injection 4 mg  4 mg IntraVENous Q6H PRN Tramaine López, DO   4 mg at 11/13/21 0261    polyethylene glycol (GLYCOLAX) packet 17 g  17 g Oral Daily PRN Tramaine López, DO        aspirin EC tablet 81 mg  81 mg Oral Daily Tramaine López, DO   81 mg at 11/19/21 4460    Or    aspirin suppository 300 mg  300 mg Rectal Daily Tramaine López, DO   300 mg at 11/14/21 3315    atorvastatin (LIPITOR) tablet 40 mg  40 mg Oral Nightly Tramaine López, DO   40 mg at 11/18/21 2024    labetalol (NORMODYNE;TRANDATE) injection 10 mg  10 mg IntraVENous Q10 Min PRN Tramaine López DO        morphine (PF) injection 2 mg  2 mg IntraVENous Q3H PRN Eileen Plaza MD   2 mg at 11/17/21 2156    thiamine (B-1) injection 100 mg  100 mg IntraVENous Daily Alissa Hoover DO   100 mg at 11/19/21 0926         propofol 20 mcg/kg/min (11/19/21 0927)        Objective:   BP (!) 106/41   Pulse 90   Temp 98.9 °F (37.2 °C) (Temporal)   Resp 22   Ht 5' 5\" (1.651 m)   Wt 109 lb 12.8 oz (49.8 kg)   SpO2 91%   BMI 18.27 kg/m² General: cachectic  HEENT: normocephalic, atraumatic  Neck: supple, symmetrical, trachea midline   Lungs: rhonchi  Cardiovascular: s1 and s2 normal  Abdomen: soft, positive bowel sounds  Extremities: no edema or cyanosis   Neuro: intubated and sedated  Skin: normal color and texture     Recent Labs     11/17/21 0229 11/18/21 0300 11/19/21 0329   WBC 9.6 11.2* 7.6   RBC 3.45* 3.36* 3.10*   HGB 10.6* 10.2* 9.3*   HCT 36.6* 36.3* 33.7*   .1* 108.0* 108.7*   MCH 30.7 30.4 30.0   MCHC 29.0* 28.1* 27.6*    149 155     Recent Labs     11/17/21 0229 11/17/21 2013 11/18/21 0300 11/19/21 0329 11/19/21  0408   *  --  147* 145  --    K 2.8*   < > 3.5 3.7 3.6   ANIONGAP 9  --  8 3*  --      --  103 102  --    CO2 39*  --  36* 40*  --    BUN 22  --  18 16  --    CREATININE 0.5  --  0.5 0.5  --    GLUCOSE 129*  --  125* 111*  --    CALCIUM 8.0*  --  8.5* 8.4*  --     < > = values in this interval not displayed. Recent Labs     11/17/21 0229 11/18/21 0300 11/19/21 0329   MG 2.0 1.8 1.9     Recent Labs     11/17/21 0229 11/18/21 0300 11/19/21 0329   AST 14 11 11   ALT 8 7 9   BILITOT 0.6 0.5 0.4   ALKPHOS 79 69 63     No results for input(s): PH, PO2, PCO2, HCO3, BE, O2SAT in the last 72 hours. No results for input(s): TROPONINI in the last 72 hours. No results for input(s): INR in the last 72 hours. No results for input(s): LACTA in the last 72 hours. Intake/Output Summary (Last 24 hours) at 11/19/2021 1111  Last data filed at 11/19/2021 1000  Gross per 24 hour   Intake 2761.38 ml   Output 1376 ml   Net 1385.38 ml       XR CHEST PORTABLE    Result Date: 11/18/2021  Examination. XR CHEST PORTABLE 11/18/2021 4:35 AM History: The patient on a vent. A single frontal portable semiupright view of the chest is compared with the previous study dated 11/15/2021. The lungs are hyperinflated, left more than the right.  There is elevation of the right diaphragm with evidence of pleural diaphragmatic adhesions. There is an area of discoid atelectasis in the right midlung. There is a small right basal pleural effusion. These have moderately improved since the previous study. There is bilateral coarse interstitial infiltrate which is similar to the previous study. The endotracheal tube and nasogastric tubes are in stable and unchanged position. A right subclavian approach MediPort system is seen in place. No acute bony abnormality. Moderate improvement, predominantly in the right lung, as detailed above.  Signed by Dr Dagmar Bhatia and Plan:   Bihemispheric strokes  Neurology following  Meds per neurology  Neurochecks  PT/OT/SLP once able     Aspiration pneumonia versus CAP  Broad-spectrum antibiotics     Ventilator dependent acute respiratory failure  Titrate minute ventilation for pH 7.35  Follow ABG/CXR  Continues to require high levels of FiO2  PE remains in differential especially given history of lung cancer  Patient too unstable for transport to CT  Empiric full dose Lovenox  Lower extremity ultrasound as warranted     History of lung cancer     Tobacco dependence     Severe protein calorie malnutrition  Dietitian following  Tube feeds on board     DVT prophylaxis  Lovenox     Palliative following for goals of care    Total critical care time: 53 minutes    Katherin Jarrett MD   11/19/2021 11:11 AM

## 2021-11-19 NOTE — CONSULTS
No  Are there issues with skin integrity?: Yes and issues are being   addressed  Skin Integrity Details: redness perianal  Are there issues with delirium?: No  Delirium Comments: Sedated, otherwise YES  Has the patient been mobilized?: No  Is this patient currently intubated?: Yes  Has facility initiated vent bundle?: No  Intubation Details: Too sick for weaning  Are there ethical or care philosophy or family issues?: No  Family Issues Details: 2 children.  Palliative care  Do you recommend an in depth evaluation?: No  Disposition Recommendations: Continue ICU level of care    **Inserted/Removed Devices**  Device Name: Young Catheter  Site of insertion: Urethra  Date of insertion: 11-  Device Name: Orogastric Tube  Site of insertion: Mouth  Date of insertion: 11-  Device Name: Endotracheal Tube  Site of insertion: Trachea  Date of insertion: 11-    **Physician Signature**  This document was electronically signed by: Jose A Lizama MD  11/19/2021   10:56 AM

## 2021-11-19 NOTE — PROGRESS NOTES
Results for Amanda Haider (MRN [id number]) as of 11/19/2021 04:11   Ref.  Range 11/19/2021 04:08   Hemoglobin, Art, Extended Latest Ref Range: 12.0 - 16.0 g/dL 9.6 (L)   pH, Arterial Latest Ref Range: 7.350 - 7.450  7.370   pCO2, Arterial Latest Ref Range: 35.0 - 45.0 mmHg 83.0 (HH)   pO2, Arterial Latest Ref Range: 80.0 - 100.0 mmHg 87.0   HCO3, Arterial Latest Ref Range: 22.0 - 26.0 mmol/L 48.0 (H)   Base Excess, Arterial Latest Ref Range: -2.0 - 2.0 mmol/L 19.5 (H)   O2 Sat, Arterial Latest Ref Range: >92 % 95.5   O2 Content, Arterial Latest Ref Range: Not Established mL/dL 13.0   Methemoglobin, Arterial Latest Ref Range: <1.5 % 0.7   Carboxyhgb, Arterial Latest Ref Range: 0.0 - 5.0 % 2.1   AC\ 18 90 +5  RR AT+

## 2021-11-19 NOTE — PROGRESS NOTES
Pt remains on vent FIO2 at 90% . Pt sats 89-94%. RR 23-26. copius oral secretions clear this afternoon. ET secretions small to moderate creamy. Lung with exp wheezes diminished CLAIRE and LLL. Occasional Rhonchi RUL , RML with exp wheezed and diminished RML with exp wheezes. Pt tolerating tube feedings with minimal residual 2-3 cc. TF increased to 33ml/hr at 1556. Pt has been more awake since 1600. Still not following commands, moving all extremities slightly. Pt's Sister did visit around [phone]. Pt did smile one time at her sister. Pt has no gag reflex today. Propofol remains at 30mcg/kg/min. Young today with brown/yellow clear urine. One stool brown jelly-like , occcult specimen sent to lab.  Electronically signed by Ivania Russell RN on 11/18/2021 at 7:40 PM

## 2021-11-19 NOTE — PROGRESS NOTES
[facility] Neurology Progress Note      Patient:   Tommy Antonio  MR#:    [id number]   Room:    [address] [place] [address]   YOB: 1955  Date of Progress Note: 11/19/2021  Time of Note                           8:59 AM  Consulting Physician:  Paul Kelsey DO  Attending Physician:  Vernida Osler, MD      INTERVAL HISTORY:  Intubated, appears more alert this am, largely unchanged overnight, no acute events. REVIEW OF SYSTEMS:  Limited given AMS. PHYSICAL EXAM:    Constitutional    BP (!) 92/40   Pulse 79   Temp 98.4 °F (36.9 °C) (Temporal)   Resp 22   Ht 5' 5\" (1.651 m)   Wt 109 lb 12.8 oz (49.8 kg)   SpO2 90%   BMI 18.27 kg/m²   General appearance: Intubated, sedated. EYES -   Conjunctiva normal  Pupillary exam as below, see CN exam in the neurologic exam  ENT-    No scars, masses, or lesions over external nose or ears  Oropharynx - intubated  Cardiovascular -   No clubbing, cyanosis, or edema   Pulmonary-   Mechanically ventilated    Musculoskeletal    No significant wasting of muscles noted  Gait as below, see gait exam in the neurologic exam  Muscle strength, tone, stability as below see the motor exam in the neurologic exam.   No bony deformities  Skin    Warm, dry, and intact to inspection and palpation. No rash, erythema, or pallor        NEUROLOGICAL EXAM     Mental status    []? Awake, alert, oriented   []? Affect attention and concentration appear appropriate  []? Recent and remote memory appears unremarkable  []? Speech normal without dysarthria or aphasia, comprehension and repetition intact. COMMENTS:  Will open eyes, not consistently following commands. Cranial Nerves []? No VF deficit to confrontation,  optic discs normal, no papilledema on fundoscopic exam.  []? PERRLA, EOMI, no nystagmus, conjugate eye movements, no ptosis  []? Face symmetric  []? Facial sensation intact  []? Tongue midline no atrophy or fasciculations present  []?  Palate midline, hearing to finger rub normal  []? Shoulder shrug and SCM testing normal  COMMENTS: Left Pupil was reactive, right pupil misshapen likely surgical, face appears sym, EOM limited    Motor   []? 5/5 strength x 4 extremities  [x]? Normal bulk and tone  [x]? No tremor present  [x]? No rigidity or bradykinesia noted  COMMENTS:SMAE noted   Sensory  []? Sensation intact to light touch, pin prick, vibration, and proprioception BLE  []? Sensation intact to light touch, pin prick, vibration, and proprioception BUE  COMMENTS: Limtied   Coordination []? FTN normal bilaterally   []? HTS normal bilaterally  []? JIA normal.   COMMENTS: Limited    Reflexes  [x]? Symmetric and non-pathological  [x]? Toes downgoing bilaterally  [x]? No clonus present  COMMENTS:   Gait                  []? Normal steady gait    []? Ataxic    []? Spastic     []? Magnetic     []? Shuffling  [x]? Not assessed  COMMENTS:        LABS/IMAGING:    CT HEAD WO CONTRAST    Result Date: 11/12/2021  CT HEAD WO CONTRAST 11/12/2021 11:25 AM HISTORY: Code stroke COMPARISON: None DOSE LENGTH PRODUCT: 961 mGy cm TECHNIQUE: Helical tomographic images of the brain were obtained without the use of intravenous contrast. Automated exposure control was also utilized to decrease patient radiation dose. FINDINGS: Exam is limited by motion artifact. There is no evidence of evolving large vascular territory infarct. Underlying chronic small vessel ischemic change. No visualized intra-axial or extra-axial hemorrhage. No mass lesion is identified. Normal size and configuration of the ventricular system. The basal cisterns are symmetric. Posterior fossa structures are unremarkable. The included orbits and their contents are unremarkable. Chronic right maxillary sinus disease. The visualized osseous structures and overlying soft tissues of the skull and face are unremarkable. 1. No acute intracranial process.  Signed by Dr Torsten Pickering    XR CHEST PORTABLE    Result Date: 11/12/2021  XR CHEST incompletely visualized but appears grossly stable when compared to prior examination. Small bilateral pleural effusions. CT Head Angiogram: The right internal carotid artery demonstrates normal course and caliber without evidence of flow limiting stenosis or occlusion. The major branch vessels to the right anterior and middle cerebral arteries are seen without evidence of stenosis or occlusion. There is no evidence of aneurysm or vascular malformation. Some atherosclerotic disease in the carotid siphon. The left internal carotid artery demonstrates normal course and caliber without evidence of flow limiting stenosis or occlusion. The major branch vessels to the left anterior and middle cerebral arteries are seen without evidence of stenosis or occlusion. There is no evidence of aneurysm or vascular malformation. Some atherosclerotic disease in the carotid siphon. Evaluation of the posterior circulation demonstrates normal caliber of the vertebral arteries, basilar artery, and major branch vessels of the posterior cerebral arteries bilaterally without evidence of flow limiting stenosis or occlusion. There is no evidence of aneurysm or vascular malformation. CT Angiography Of The Neck With Intravenous Contrast 1. No evidence of high-grade arterial stenosis or underlying dissection. 2. Bilateral upper lobe new opacities are concerning for infection. CT Angiography Of The Head With Intravenous Contrast 1. No evidence of acute thrombotic occlusion, high-grade arterial stenosis, or focal cerebral aneurysm. Signed by Dr Carli Hare Date: 11/12/2021  EXAM: CT NECK ANGIOGRAM CT HEAD ANGIOGRAM on  11/12/2021 12:54 PM COMPARISON:  CT chest dated August 23, 2021. HISTORY:  74years-old Female.  Code stroke TECHNIQUE: Multiple CT images were obtained of the of the head and neck after the administration of IV contrast. 3D MIP reformats were generated  and were sent to PACS for interpretation. Grading of carotid artery stenosis is performed by NASCET criteria. FINDINGS: CT Neck Angiogram: There is a conventional aortic arch with patent origins of the brachiocephalic trunk, left common carotid and left subclavian arteries. The bilateral common carotid arteries and subclavian arteries are well-opacified. There is atherosclerotic disease of the bilateral carotid bifurcations, left greater than than right, with essentially 0% stenosis. The bilateral internal carotid arteries are otherwise well opacified throughout. The bilateral vertebral arteries are well-opacified throughout. Additional findings: Emphysema. New left upper lobe opacities posteriorly, most concerning for infection. Additional right upper lobe opacity is also concerning for infection. Centrally necrotic right upper and lower lobe posterior mass, incompletely visualized but appears grossly stable when compared to prior examination. Small bilateral pleural effusions. CT Head Angiogram: The right internal carotid artery demonstrates normal course and caliber without evidence of flow limiting stenosis or occlusion. The major branch vessels to the right anterior and middle cerebral arteries are seen without evidence of stenosis or occlusion. There is no evidence of aneurysm or vascular malformation. Some atherosclerotic disease in the carotid siphon. The left internal carotid artery demonstrates normal course and caliber without evidence of flow limiting stenosis or occlusion. The major branch vessels to the left anterior and middle cerebral arteries are seen without evidence of stenosis or occlusion. There is no evidence of aneurysm or vascular malformation. Some atherosclerotic disease in the carotid siphon.  Evaluation of the posterior circulation demonstrates normal caliber of the vertebral arteries, basilar artery, and major branch vessels of the posterior cerebral arteries bilaterally without evidence of flow limiting stenosis or occlusion. There is no evidence of aneurysm or vascular malformation. CT Angiography Of The Neck With Intravenous Contrast 1. No evidence of high-grade arterial stenosis or underlying dissection. 2. Bilateral upper lobe new opacities are concerning for infection. CT Angiography Of The Head With Intravenous Contrast 1. No evidence of acute thrombotic occlusion, high-grade arterial stenosis, or focal cerebral aneurysm. Signed by Dr Natalie Islas      Lab Results   Component Value Date    WBC 7.6 11/19/2021    HGB 9.3 (L) 11/19/2021    HCT 33.7 (L) 11/19/2021    .7 (H) 11/19/2021     11/19/2021     Lab Results   Component Value Date     11/19/2021    K 3.6 11/19/2021     11/19/2021    CO2 40 (H) 11/19/2021    BUN 16 11/19/2021    CREATININE 0.5 11/19/2021    GLUCOSE 111 (H) 11/19/2021    CALCIUM 8.4 (L) 11/19/2021    PROT 5.0 (L) 11/19/2021    LABALBU 2.3 (L) 11/19/2021    BILITOT 0.4 11/19/2021    ALKPHOS 63 11/19/2021    AST 11 11/19/2021    ALT 9 11/19/2021    LABGLOM >60 11/19/2021    GFRAA >59 11/19/2021    AGRATIO 1.4 05/01/2020    GLOB 3.5 02/25/2021     Lab Results   Component Value Date    INR 1.18 11/12/2021    INR 0.96 05/16/2019    INR 0.99 11/28/2011    PROTIME 15.2 (H) 11/12/2021    PROTIME 12.2 05/16/2019    PROTIME 10.80 11/28/2011       RECORD REVIEW:   Previous medical records, medications were reviewed at today's visit. Nursing/physician notes, imaging, labs and vitals reviewed. PT,OT and/or speech notes reviewed      ASSESSMENT:  74 y.o. admitted with AMS, hypoxia, pneumonia, lung cancer history, COPD, right sided weakness, slurred speech, MRI consistent with scattered bi-hemishperic strokes, likely embolic vs watershed. CTAs negative. ECHO, CD negative. EEG with slowing, nothing epileptiform. Intubated, exam unchanged.      PLAN:  1. Supportive care   2. Follow Tele   3. Continue addressing medical issues, pneumonia, respiratory failure - intubated.    4. ASA, statin, cautious blood pressure titration. Considerd MARCI and discuss anticoagulation with family. Patient felt to not be a good candidate for anticoagulation given multiple severe medial co-morbidities, risk of bleeding complications and long term fall risk is felt high. Discussed with family previously and they are in agreement with more conservative treatment from a stroke standpoint. 6.  PT, OT, ST  7. DVT proph  8. Limit sedating medications, wean sedation when able, supplementing thiamine  9. Palliative care following, consider hospice of worsens. Please feel free to call with any questions. [phone] (cell phone).     Aviva Mulligan DO  Board Certified Neurology

## 2021-11-19 NOTE — PROGRESS NOTES
Palliative Care Progress Note  11/19/2021 8:26 AM  Subjective:   Admit Date: 11/12/2021  PCP: DOMINICK Davison NP    Chief Complaint: intubated and mechanically ventilated    Interval History: Patient remains intubated, has had episodes of desaturation requiring increase in FiO2 to maintain sats. She is less responsive this morning,withdraws to pain only. Neurology continues to follow. Continued discussions regarding goals of care and code status. Portions of this note have been copied forward, however, changed to reflect the most current clinical status of this patient.     Review of Systems   Unable to complete due to acuity of care, intubated      Diet NPO  ADULT TUBE FEEDING; Orogastric; Peptide Based; Continuous; 33; No; 35; Q 1 hour; Protein; 1 Proteinex as flush    Medications:   propofol 25 mcg/kg/min (11/19/21 0015)     Current Facility-Administered Medications   Medication Dose Route Frequency Provider Last Rate Last Admin    nicotine (NICODERM CQ) 21 MG/24HR 1 patch  1 patch TransDERmal Daily Aidan Chong MD   1 patch at 11/17/21 1155    piperacillin-tazobactam (ZOSYN) 3,375 mg in dextrose 5 % 50 mL IVPB (mini-bag)  3,375 mg IntraVENous Q6H Cathy Pendleton MD   Stopped at 11/19/21 0406    vancomycin (VANCOCIN) intermittent dosing (placeholder)   Other RX Placeholder Cathy Pendleton MD        vancomycin (VANCOCIN) 750 mg in dextrose 5 % 250 mL IVPB  750 mg IntraVENous Q12H Cathy Pendleton MD   Stopped at 11/18/21 2205    famotidine (PEPCID) tablet 20 mg  20 mg Oral Daily Aidan Chong MD   20 mg at 11/18/21 0942    chlorhexidine (PERIDEX) 0.12 % solution 15 mL  15 mL Mouth/Throat BID Lamin Ma MD   15 mL at 11/18/21 2025    potassium chloride (KLOR-CON M) extended release tablet 40 mEq  40 mEq Oral PRN Lamin Ma MD        Or    potassium bicarb-citric acid (EFFER-K) effervescent tablet 40 mEq  40 mEq Oral PRN Lamin Ma MD   40 mEq at 11/18/21 0424    Or    potassium chloride 10 mEq/100 mL IVPB (Peripheral Line)  10 mEq IntraVENous PRN Lamin Ma MD   Stopped at 11/17/21 1410    propofol injection  5-50 mcg/kg/min IntraVENous Titrated Louretta Yanet, DO 7.5 mL/hr at 11/19/21 0015 25 mcg/kg/min at 11/19/21 0015    albuterol sulfate  (90 Base) MCG/ACT inhaler 1 puff  1 puff Inhalation 4x daily Louretta Arbuckle, DO   1 puff at 11/18/21 2024    citalopram (CELEXA) tablet 20 mg  20 mg Oral Daily Louretta Arbuckle, DO   20 mg at 11/18/21 6284    ondansetron (ZOFRAN-ODT) disintegrating tablet 4 mg  4 mg Oral Q8H PRN Louretta Yanet, DO        Or    ondansetron [facility]) injection 4 mg  4 mg IntraVENous Q6H PRN Louretta Yanet, DO   4 mg at 11/13/21 8634    polyethylene glycol (GLYCOLAX) packet 17 g  17 g Oral Daily PRN Louretta Arbuckle, DO        enoxaparin (LOVENOX) injection 40 mg  40 mg SubCUTAneous Nightly Louretta Yanet, DO   40 mg at 11/18/21 2024    aspirin EC tablet 81 mg  81 mg Oral Daily Louretta Arbuckle, DO   81 mg at 11/18/21 7976    Or    aspirin suppository 300 mg  300 mg Rectal Daily Louretta Yanet, DO   300 mg at 11/14/21 2862    atorvastatin (LIPITOR) tablet 40 mg  40 mg Oral Nightly Louretta Arbuckle, DO   40 mg at 11/18/21 2024    labetalol (NORMODYNE;TRANDATE) injection 10 mg  10 mg IntraVENous Q10 Min PRN Louretta Arbuckle, DO        morphine (PF) injection 2 mg  2 mg IntraVENous Q3H PRN Lamin Ma MD   2 mg at 11/17/21 2156    thiamine (B-1) injection 100 mg  100 mg IntraVENous Daily Orest Naval, DO   100 mg at 11/18/21 4189        Labs:     Recent Labs     11/17/21  0229 11/18/21 0300 11/19/21 0329   WBC 9.6 11.2* 7.6   RBC 3.45* 3.36* 3.10*   HGB 10.6* 10.2* 9.3*   HCT 36.6* 36.3* 33.7*   .1* 108.0* 108.7*   MCH 30.7 30.4 30.0   MCHC 29.0* 28.1* 27.6*    149 155     Recent Labs     11/17/21 0229 11/17/21 2013 11/18/21 0300 11/19/21 0329 11/19/21 0408   *  --  147* 145  --    K 2.8*   < > 3.5 3.7 3.6   ANIONGAP 9  --  8 3*  --      --  103 102  --    CO2 39*  --  36* 40*  --    BUN 22  --  18 16  --    CREATININE 0.5  --  0.5 0.5  --    GLUCOSE 129*  --  125* 111*  --    CALCIUM 8.0*  --  8.5* 8.4*  --     < > = values in this interval not displayed. Recent Labs     11/17/21 0229 11/18/21 0300 11/19/21 0329   MG 2.0 1.8 1.9     Recent Labs     11/17/21 0229 11/18/21 0300 11/19/21 0329   AST 14 11 11   ALT 8 7 9   BILITOT 0.6 0.5 0.4   ALKPHOS 79 69 63     ABGs:  Recent Labs     11/19/21 0408   PHART 7.370   IIC8RSW 83.0*   PO2ART 87.0   GSJ0AOF 48.0*   BEART 19.5*   HGBAE 9.6*   N0FUWWVQ 95.5   CARBOXHGBART 2.1   02THERAPY Unknown     HgBA1c: No results for input(s): LABA1C in the last 72 hours. FLP:    Lab Results   Component Value Date    TRIG 55 11/13/2021    HDL 58 11/13/2021    LDLCALC 45 11/13/2021     TSH:    Lab Results   Component Value Date    TSH 0.148 02/25/2021     Troponin T: No results for input(s): TROPONINI in the last 72 hours. INR: No results for input(s): INR in the last 72 hours.     Objective:   Vitals: BP (!) 92/40   Pulse 79   Temp 98.4 °F (36.9 °C) (Temporal)   Resp 22   Ht 5' 5\" (1.651 m)   Wt 109 lb 12.8 oz (49.8 kg)   SpO2 90%   BMI 18.27 kg/m²   24HR INTAKE/OUTPUT:      Intake/Output Summary (Last 24 hours) at 11/19/2021 0826  Last data filed at 11/19/2021 0400  Gross per 24 hour   Intake 2838.38 ml   Output 1103 ml   Net 1735.38 ml     Physical Exam  General appearance: 66 yr old female, appears ill, frail, intubated, no acute distress  HEENT: atraumatic, eyes with clear conjunctiva and normal lids, pupils unequal R>L,  normal, external ears and nose are normal,lips normal.  Neck: without masses, supple   Lungs: ventilating bilaterally, coarse with rhonchi and wheezes  Heart: S1, S2 normal, tachycardic  Abdomen: soft, non-tender; bowel sounds normal; no masses,  no organomegaly  Genitourinary: No bladder fullness, masses, or tenderness, indwelling castanon  Extremities: extremities normal, atraumatic, no cyanosis or edema  Neurologic: sedated, withdraws to pain  Psychiatric: No agitation or anxiety  Skin: warm, dry        Assessment and Plan: Active Problems:    Acute hypoxemic respiratory failure (HCC)    Stroke of unknown etiology (HCC)    Severe malnutrition (HCC)    Altered mental status  Resolved Problems:    * No resolved hospital problems. *        Visit Summary: Chart reviewed and patient discussed with nursing staff. Patient seen at bedside with family present. She is more responsive today but respiratory status remains poor and she desaturates easily. I met with the patient's son, Nila Hernandez, and discussed patient's status, likely difficulty weaning from vent, and overall poor prognosis. I readdressed code status, do not feel that patient would have meaningful recovery or survival if she underwent CPR or further aggressive measures, and encouraged him to talk with his brother and recommended DNR code status. I have asked Nila Hernandez to notify nursing staff or provider if they choose to make changes to code status. Nila Hernandez also asked about procedure of withdrawing care and I reviewed palliative extubation and hospice services. All questions answered, emotional support provided. Nursing staff and attending notified of the outcome of my visit. Palliative will follow. Recommendations:   1. Palliative Care- GOC dependent on patient's progress throughout hospitalization, would consider hospice care if no improvement CODE STATUS- FULL CODE, encouraged further discussion and would recommend DNR     2. Acute Hypoxemic Respiratory Failure- Vent management per attending, inhalers, supportive care     3.  Acute Stroke- Neurology following, MRI complete showing bi-hemispheric stroke (suspected embolic), EEG with slowing nothing epileptiform, poor candidate for anticoagulation, neuro assessments ongoing           Thank you for consulting Palliative Care and allowing us to participate in the care of this patient.      Total visit time: 36 min    Electronically signed by DOMINICK Norris on 11/19/2021 at 8:26 AM    (Please note that portions of this note were completed with a voice recognition program.  Boyd Carbon made to edit the dictations but occasionally words are mis-transcribed.)

## 2021-11-20 NOTE — PROGRESS NOTES
Parker Johnson, patient's son called to notify staff of decision to change patient code status to DNR. Dr. Boni Wilson verified the change in code status with Parker Johnson.   Electronically signed by Elisha Alberts RN on 11/20/2021 at 12:51 PM

## 2021-11-20 NOTE — PROGRESS NOTES
Hospitalist Progress Note  [facility]     Patient: Solange Batres  : 1955  MRN: [id number]  Code Status: Full Code    Hospital Day: 8   Date of Service: 2021    Subjective:   Patient seen and examined. Intubated and sedated. 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     COPD (chronic obstructive pulmonary disease) (HCC)     Depression     Emphysema (subcutaneous) (surgical) resulting from a procedure     History of lung biopsy 2019    Malignant neoplasm of right main bronchus ([address] 2019    NSCLC, SCC [id number] stage IIIb    Palliative care patient 2021    Syncope     TIA (transient ischemic attack)        Past Surgical History:   Procedure Laterality Date    APPENDECTOMY      BACK SURGERY      times two    BLADDER SUSPENSION      CHOLECYSTECTOMY      HYSTERECTOMY      INCONTINENCE SURGERY         Family History   Problem Relation Age of Onset    Colon Cancer Mother     High Blood Pressure Brother     Diabetes Brother        Social History     Socioeconomic History    Marital status:      Spouse name: Not on file    Number of children: Not on file    Years of education: Not on file    Highest education level: Not on file   Occupational History    Not on file   Tobacco Use    Smoking status: Current Every Day Smoker    Smokeless tobacco: Never Used   Substance and Sexual Activity    Alcohol use: Not Currently    Drug use: Never    Sexual activity: Not on file   Other Topics Concern    Not on file   Social History Narrative    Not on file     Social Determinants of Health     Financial Resource Strain:     Difficulty of Paying Living Expenses: Not on file   Food Insecurity:     Worried About Running Out of Food in the Last Year: Not on file    Trisha of Food in the Last Year: Not on file   Transportation Needs:     Lack of Transportation (Medical):  Not on file    Lack of Transportation (Non-Medical):  Not on file   Physical Activity:     Days of Exercise per Week: Not on file    Minutes of Exercise per Session: Not on file   Stress:     Feeling of Stress : Not on file   Social Connections:     Frequency of Communication with Friends and Family: Not on file    Frequency of Social Gatherings with Friends and Family: Not on file    Attends Congregation Services: Not on file    Active Member of Clubs or Organizations: Not on file    Attends Club or Organization Meetings: Not on file    Marital Status: Not on file   Intimate Partner Violence:     Fear of Current or Ex-Partner: Not on file    Emotionally Abused: Not on file    Physically Abused: Not on file    Sexually Abused: Not on file   Housing Stability:     Unable to Pay for Housing in the Last Year: Not on file    Number of [place] in the Last Year: Not on file    Unstable Housing in the Last Year: Not on file       Current Facility-Administered Medications   Medication Dose Route Frequency Provider Last Rate Last Admin    enoxaparin (LOVENOX) injection 50 mg  1 mg/kg SubCUTAneous BID Shawna Mcduffie MD   50 mg at 11/20/21 0825    ipratropium-albuterol (DUONEB) nebulizer solution 1 ampule  1 ampule Inhalation 4x daily Shawna Mcduffie MD   1 ampule at 11/20/21 1100    piperacillin-tazobactam (ZOSYN) 3,375 mg in dextrose 5 % 50 mL IVPB extended infusion (mini-bag)  3,375 mg IntraVENous Q8H Shawna Mcduffie MD   Stopped at 11/20/21 0836    nicotine (NICODERM CQ) 21 MG/24HR 1 patch  1 patch TransDERmal Daily Bibiana Bates MD   1 patch at 11/20/21 0826    famotidine (PEPCID) tablet 20 mg  20 mg Oral Daily Bibiana Bates MD   20 mg at 11/20/21 0826    chlorhexidine (PERIDEX) 0.12 % solution 15 mL  15 mL Mouth/Throat BID Claudette Furrow, MD   15 mL at 11/20/21 0844    potassium chloride (KLOR-CON M) extended release tablet 40 mEq  40 mEq Oral PRN Claudette Furrow, MD        Or    potassium bicarb-citric acid (EFFER-K) effervescent tablet 40 mEq  40 mEq Oral PRN Delphine Huang MD   40 mEq at 11/20/21 0430    Or    potassium chloride 10 mEq/100 mL IVPB (Peripheral Line)  10 mEq IntraVENous PRN Delphine Huang MD   Stopped at 11/17/21 1410    propofol injection  5-50 mcg/kg/min IntraVENous Titrated Leveda Jodi, DO 9 mL/hr at 11/20/21 1039 30 mcg/kg/min at 11/20/21 1039    citalopram (CELEXA) tablet 20 mg  20 mg Oral Daily Leveda Jodi, DO   20 mg at 11/20/21 0825    ondansetron (ZOFRAN-ODT) disintegrating tablet 4 mg  4 mg Oral Q8H PRN Leveda Jodi, DO        Or    ondansetron [facility]) injection 4 mg  4 mg IntraVENous Q6H PRN Leveda Jodi, DO   4 mg at 11/13/21 0758    polyethylene glycol (GLYCOLAX) packet 17 g  17 g Oral Daily PRN Leveda Jodi, DO        aspirin EC tablet 81 mg  81 mg Oral Daily Leveda Jodi, DO   81 mg at 11/20/21 0825    Or    aspirin suppository 300 mg  300 mg Rectal Daily Leveda Jodi, DO   300 mg at 11/14/21 2405    atorvastatin (LIPITOR) tablet 40 mg  40 mg Oral Nightly Leveda Jodi, DO   40 mg at 11/19/21 2037    labetalol (NORMODYNE;TRANDATE) injection 10 mg  10 mg IntraVENous Q10 Min PRN Leveda Jodi, DO        morphine (PF) injection 2 mg  2 mg IntraVENous Q3H PRN Delphine Huang MD   2 mg at 11/20/21 0825    thiamine (B-1) injection 100 mg  100 mg IntraVENous Daily Suellyn Mink, DO   100 mg at 11/20/21 0825         propofol 30 mcg/kg/min (11/20/21 1039)        Objective:   BP (!) 91/48   Pulse 89   Temp 98.4 °F (36.9 °C) (Temporal)   Resp 26   Ht 5' 5\" (1.651 m)   Wt 109 lb 12.8 oz (49.8 kg)   SpO2 91%   BMI 18.27 kg/m²     General: cachectic  HEENT: normocephalic, atraumatic  Neck: supple, symmetrical, trachea midline   Lungs: rhonchi  Cardiovascular: s1 and s2 normal  Abdomen: soft, positive bowel sounds  Extremities: no edema or cyanosis   Neuro: intubated and sedated  Skin: normal color and texture     Recent Labs     11/18/21 0300 11/19/21 0329 11/20/21 0224   WBC 11.2* 7.6 9.1   RBC 3.36* 3.10* 3.05*   HGB 10.2* 9.3* 9.2*   HCT 36.3* 33.7* 32.4*   .0* 108.7* 106.2*   MCH 30.4 30.0 30.2   MCHC 28.1* 27.6* 28.4*    155 152     Recent Labs     11/18/21 0300 11/18/21 0300 11/19/21 0329 11/19/21 0408 11/20/21 0224   *  --  145  --  143   K 3.5   < > 3.7 3.6 3.3*   ANIONGAP 8  --  3*  --  6*     --  102  --  98   CO2 36*  --  40*  --  39*   BUN 18  --  16  --  10   CREATININE 0.5  --  0.5  --  0.5   GLUCOSE 125*  --  111*  --  107   CALCIUM 8.5*  --  8.4*  --  8.5*    < > = values in this interval not displayed. Recent Labs     11/18/21 0300 11/19/21 0329 11/20/21 0224   MG 1.8 1.9 1.9     Recent Labs     11/18/21 0300 11/19/21 0329 11/20/21 0224   AST 11 11 15   ALT 7 9 10   BILITOT 0.5 0.4 0.4   ALKPHOS 69 63 61     No results for input(s): PH, PO2, PCO2, HCO3, BE, O2SAT in the last 72 hours. No results for input(s): TROPONINI in the last 72 hours. No results for input(s): INR in the last 72 hours. No results for input(s): LACTA in the last 72 hours. Intake/Output Summary (Last 24 hours) at 11/20/2021 1127  Last data filed at 11/20/2021 1000  Gross per 24 hour   Intake 1782 ml   Output 2345 ml   Net -563 ml       No results found.      Assessment and Plan:   Bihemispheric strokes  Neurology following  Meds per neurology  Neurochecks  PT/OT/SLP once able     Aspiration pneumonia versus CAP  Broad-spectrum antibiotics     Ventilator dependent acute respiratory failure  Titrate minute ventilation for pH 7.35  Follow ABG/CXR  Continues to require high levels of FiO2  PE remains in differential especially given history of lung cancer  Patient too unstable for transport to CT  Empiric full dose Lovenox  Lower extremity ultrasound as warranted     History of lung cancer     Tobacco dependence     Severe protein calorie malnutrition  Dietitian following  Tube feeds on board     DVT prophylaxis  Lovenox    Extensive discussion with patient's brother Katiana Lu) in regards to current clinical state/goals of care. All questions sought and answered. Poly Winslow will attempt to have patient's sons Randy Jernigananika and Yaniv Murillo) visit the hospital to further discuss. Palliative following.     Total critical care time: 55 minutes    Justino Anthony MD   11/20/2021 11:27 AM

## 2021-11-20 NOTE — PROGRESS NOTES
[address], [address]  [facility]  Phone [phone]     Neurology Progress Note  2021 11:30 AM  Information:   Patient Name: Tia Moctezuma  :   1955  Age:   74 y.o. MRN:   [id number]  Account #:  [de-identified]  Admit Date:   2021  Today:  21     ADMIT DX:   Acute respiratory failure    Subjective:     74 y.o. admitted with AMS, hypoxia, pneumonia, lung cancer history, COPD, right sided weakness, slurred speech, MRI consistent with scattered bi-hemishperic strokes, likely embolic vs watershed. CTAs negative. ECHO, CD negative. EEG with slowing, nothing epileptiform. Intubated. Interval History:   She remains intubated and sedated. However, she is awake, able to communicate, and following simple instructions. 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     COPD (chronic obstructive pulmonary disease) (HCC)     Depression     Emphysema (subcutaneous) (surgical) resulting from a procedure     History of lung biopsy 2019    Malignant neoplasm of right main bronchus ([facility] [address] 2019    NSCLC, SCC [id number] stage IIIb    Palliative care patient 2021    Syncope     TIA (transient ischemic attack)        Past Surgical History:   Procedure Laterality Date    APPENDECTOMY      BACK SURGERY      times two    BLADDER SUSPENSION      CHOLECYSTECTOMY      HYSTERECTOMY      INCONTINENCE SURGERY         Family History   Problem Relation Age of Onset    Colon Cancer Mother     High Blood Pressure Brother     Diabetes Brother        Social History     Socioeconomic History    Marital status:       Spouse name: Not on file    Number of children: Not on file    Years of education: Not on file    Highest education level: Not on file   Occupational History    Not on file   Tobacco Use    Smoking status: Current Every Day Smoker    Smokeless tobacco: Never Used   Substance and Sexual Activity    Alcohol use: Not Currently    Drug use: Never    Sexual activity: Not on file   Other Topics Concern    Not on file   Social History Narrative    Not on file     Social Determinants of Health     Financial Resource Strain:     Difficulty of Paying Living Expenses: Not on file   Food Insecurity:     Worried About Running Out of Food in the Last Year: Not on file    Trisha of Food in the Last Year: Not on file   Transportation Needs:     Lack of Transportation (Medical): Not on file    Lack of Transportation (Non-Medical):  Not on file   Physical Activity:     Days of Exercise per Week: Not on file    Minutes of Exercise per Session: Not on file   Stress:     Feeling of Stress : Not on file   Social Connections:     Frequency of Communication with Friends and Family: Not on file    Frequency of Social Gatherings with Friends and Family: Not on file    Attends [employer] Services: Not on file    Active Member of Clubs or Organizations: Not on file    Attends Club or Organization Meetings: Not on file    Marital Status: Not on file   Intimate Partner Violence:     Fear of Current or Ex-Partner: Not on file    Emotionally Abused: Not on file    Physically Abused: Not on file    Sexually Abused: Not on file   Housing Stability:     Unable to Pay for Housing in the Last Year: Not on file    Number of Places Lived in the Last Year: Not on file    Unstable Housing in the Last Year: Not on file       Medications:   propofol 30 mcg/kg/min (11/20/21 1039)      enoxaparin  1 mg/kg SubCUTAneous BID    ipratropium-albuterol  1 ampule Inhalation 4x daily    piperacillin-tazobactam  3,375 mg IntraVENous Q8H    nicotine  1 patch TransDERmal Daily    famotidine  20 mg Oral Daily    chlorhexidine  15 mL Mouth/Throat BID    citalopram  20 mg Oral Daily    aspirin  81 mg Oral Daily    Or    aspirin  300 mg Rectal Daily    atorvastatin  40 mg Oral Nightly    thiamine  100 mg IntraVENous Daily       Diagnostic Studies:  Reviewed all labs/diagnositcs since last 24hrs:  Recent Results (from the past 24 hour(s))   CBC Auto Differential    Collection Time: 11/20/21  2:24 AM   Result Value Ref Range    WBC 9.1 4.8 - 10.8 K/uL    RBC 3.05 (L) 4.20 - 5.40 M/uL    Hemoglobin 9.2 (L) 12.0 - 16.0 g/dL    Hematocrit 32.4 (L) 37.0 - 47.0 %    .2 (H) 81.0 - 99.0 fL    MCH 30.2 27.0 - 31.0 pg    MCHC 28.4 (L) 33.0 - 37.0 g/dL    RDW 17.0 (H) 11.5 - 14.5 %    Platelets 355 796 - 695 K/uL    MPV 12.1 9.4 - 12.3 fL    PLATELET SLIDE REVIEW Adequate     SLIDE REVIEW see below     Neutrophils % 86.5 (H) 50.0 - 65.0 %    Lymphocytes % 6.6 (L) 20.0 - 40.0 %    Monocytes % 5.5 0.0 - 10.0 %    Eosinophils % 0.8 0.0 - 5.0 %    Basophils % 0.1 0.0 - 1.0 %    Neutrophils Absolute 7.9 (H) 1.5 - 7.5 K/uL    Immature Granulocytes # 0.1 K/uL    Lymphocytes Absolute 0.6 (L) 1.1 - 4.5 K/uL    Monocytes Absolute 0.50 0.00 - 0.90 K/uL    Eosinophils Absolute 0.10 0.00 - 0.60 K/uL    Basophils Absolute 0.00 0.00 - 0.20 K/uL    Macrocytes 2+ (A)     Hypochromia 1+ (A)     Stomatocytes Occasional (A)    Comprehensive Metabolic Panel    Collection Time: 11/20/21  2:24 AM   Result Value Ref Range    Sodium 143 136 - 145 mmol/L    Potassium 3.3 (L) 3.5 - 5.0 mmol/L    Chloride 98 98 - 111 mmol/L    CO2 39 (H) 22 - 29 mmol/L    Anion Gap 6 (L) 7 - 19 mmol/L    Glucose 107 74 - 109 mg/dL    BUN 10 8 - 23 mg/dL    CREATININE 0.5 0.5 - 0.9 mg/dL    GFR Non-African American >60 >60    GFR African American >59 >59    Calcium 8.5 (L) 8.8 - 10.2 mg/dL    Total Protein 5.1 (L) 6.6 - 8.7 g/dL    Albumin 2.2 (L) 3.5 - 5.2 g/dL    Total Bilirubin 0.4 0.2 - 1.2 mg/dL    Alkaline Phosphatase 61 35 - 104 U/L    ALT 10 5 - 33 U/L    AST 15 5 - 32 U/L   Magnesium    Collection Time: 11/20/21  2:24 AM   Result Value Ref Range    Magnesium 1.9 1.6 - 2.4 mg/dL     Narrative   MRI BRAIN W WO CONTRAST Additional right upper   lobe opacity is also concerning for infection. Centrally necrotic   right upper and lower lobe posterior mass, incompletely visualized but   appears grossly stable when compared to prior examination. Small   bilateral pleural effusions. CT Head Angiogram:   The right internal carotid artery demonstrates normal course and   caliber without evidence of flow limiting stenosis or occlusion. The   major branch vessels to the right anterior and middle cerebral   arteries are seen without evidence of stenosis or occlusion. There is   no evidence of aneurysm or vascular malformation. Some atherosclerotic   disease in the carotid siphon. The left internal carotid artery demonstrates normal course and   caliber without evidence of flow limiting stenosis or occlusion. The   major branch vessels to the left anterior and middle cerebral arteries   are seen without evidence of stenosis or occlusion. There is no   evidence of aneurysm or vascular malformation. Some atherosclerotic   disease in the carotid siphon. Evaluation of the posterior circulation demonstrates normal caliber of   the vertebral arteries, basilar artery, and major branch vessels of   the posterior cerebral arteries bilaterally without evidence of flow   limiting stenosis or occlusion. There is no evidence of aneurysm or   vascular malformation.        Impression   CT Angiography Of The Neck With Intravenous Contrast    1. No evidence of high-grade arterial stenosis or underlying   dissection. 2. Bilateral upper lobe new opacities are concerning for infection. CT Angiography Of The Head With Intravenous Contrast   1.   No evidence of acute thrombotic occlusion, high-grade arterial   stenosis, or focal cerebral aneurysm. Signed by Dr Liliana Harp:    Delphine Gonsales on  11/12/2021 12:54 PM   COMPARISON:  CT chest dated August 23, 2021. HISTORY:  71 years-old Female.  Code stroke TECHNIQUE:    Multiple CT images were obtained of the of the head and neck after the   administration of IV contrast. 3D MIP reformats were generated  and   were sent to PACS for interpretation. Grading of carotid artery stenosis is performed by NASCET criteria. FINDINGS:    CT Neck Angiogram:   There is a conventional aortic arch with patent origins of the   brachiocephalic trunk, left common carotid and left subclavian   arteries. The bilateral common carotid arteries and subclavian   arteries are well-opacified. There is atherosclerotic disease of the   bilateral carotid bifurcations, left greater than than right, with   essentially 0% stenosis. The bilateral internal carotid arteries are   otherwise well opacified throughout. The bilateral vertebral arteries are well-opacified throughout.    Additional findings: Emphysema. New left upper lobe opacities   posteriorly, most concerning for infection. Additional right upper   lobe opacity is also concerning for infection. Centrally necrotic   right upper and lower lobe posterior mass, incompletely visualized but   appears grossly stable when compared to prior examination. Small   bilateral pleural effusions. CT Head Angiogram:   The right internal carotid artery demonstrates normal course and   caliber without evidence of flow limiting stenosis or occlusion. The   major branch vessels to the right anterior and middle cerebral   arteries are seen without evidence of stenosis or occlusion. There is   no evidence of aneurysm or vascular malformation. Some atherosclerotic   disease in the carotid siphon. The left internal carotid artery demonstrates normal course and   caliber without evidence of flow limiting stenosis or occlusion. The   major branch vessels to the left anterior and middle cerebral arteries   are seen without evidence of stenosis or occlusion. There is no   evidence of aneurysm or vascular malformation.  Some atherosclerotic   disease in the carotid siphon. Evaluation of the posterior circulation demonstrates normal caliber of   the vertebral arteries, basilar artery, and major branch vessels of   the posterior cerebral arteries bilaterally without evidence of flow   limiting stenosis or occlusion. There is no evidence of aneurysm or   vascular malformation.        Impression   CT Angiography Of The Neck With Intravenous Contrast    1. No evidence of high-grade arterial stenosis or underlying   dissection. 2. Bilateral upper lobe new opacities are concerning for infection. CT Angiography Of The Head With Intravenous Contrast   1.   No evidence of acute thrombotic occlusion, high-grade arterial   stenosis, or focal cerebral aneurysm.    Signed by Dr Kelly Harris     Narrative   Vascular Carotid Procedure        Demographics        Patient Name     Jennifer Thayer        Patient Number    [id number]       Gender                  Female        Visit Number      [id number]    Interpreting Physician Trace Gallegos MD        Date of Birth     1955   Referring Physician     Zoe Wise        Accession Number  [id number]   Sonographer             Leonardo Yancey [facility]       Procedure   Type of Study:        Cerebral:Carotid, VL CAROTID BILATERAL.       Indications for Study:CVA and Weakness, right sided.       Risk Factors         - Current - Every day.        Impression        There is mixed plaque visualized in the bilateral internal carotid    arteries.   Anand Allen is less than 50% stenosis in the right internal carotid artery.    There is no stenosis of the left internal carotid artery.    There is normal antegrade flow in the bilateral vertebral arteries.        Signature        ----------------------------------------------------------------    Electronically signed by Trace Gallegos MD(Interpreting    physician) on 11/14/2021 10:37 AM     Narrative & Impression  Transthoracic Echocardiography Report (TTE)    Demographics      Patient Name   Anthony Lot   Date of Study           11/13/2021      MRN            [id number]        Gender                  Female      Date of Birth  1955    Room Number             [id number]      Age            74 year(s)      Height:        65 inches     Referring Physician     Alonzo Anguiano      Weight:        109 pounds    Sonographer      BSA:           1.53 m^2      Interpreting Physician  Alexandra Wright MD      BMI:           18.14 kg/m^2     Procedure     Type of Study      TTE procedure:ECHO 2D W/DOPPLER/COLOR/CONTRAST. Study Location: Portable  Technical Quality: Limited visualization due to poor acoustical window.     Patient Status: Inpatient     Contrast Medium: Definity. Amount - 4 ml     HR: 105 bpm BP: 99/57 mmHg      Conclusions      Summary   Normal size left atrium. bubble study negative   No evidence of pleural effusion. No evidence of significant pericardial effusion is noted. Structurally normal mitral valve with normal leaflet mobility. No evidence   of mitral valve stenosis or mild mitral regurgitation. aortic valve thickened with calcification.  no as. ar   Tricuspid valve is structurally normal.   mild tr   pasp 40 mm hg      Signature      ----------------------------------------------------------------   Electronically signed by Alexandra Wright MD(Interpreting   physician) on 11/14/2021 11:18 AM    Diet:  Diet NPO  ADULT TUBE FEEDING; Orogastric; Peptide Based; Continuous; 33; No; 35; Q 1 hour; Protein; 1 Proteinex as flush    Examination:  Vitals: BP (!) 90/49   Pulse 88   Temp 98.4 °F (36.9 °C) (Temporal)   Resp 23   Ht 5' 5\" (1.651 m)   Wt 109 lb 12.8 oz (49.8 kg)   SpO2 95%   BMI 18.27 kg/m²      Intake/Output Summary (Last 24 hours) at 11/20/2021 1130  Last data filed at 11/20/2021 1100  Gross per 24 hour   Intake 1782 ml   Output 2670 ml   Net -888 ml     General appearance: She is a cachectic chronically ill appearing woman who is intubated and awake in the CCU  HEENT: Exam; heent: Head: Normal, normocephalic, atraumatic. Lungs: rhonchi bilaterally  Heart: regular rate and rhythm, S1, S2 normal, no murmur, click, rub or gallop  Abdomen: soft, non-tender; bowel sounds normal; no masses,  no organomegaly  Extremities: OA joint changes  Neurologic:  Alert. She is able to answer simple questions and follow simple commands. EOMI, PERRL, VFF. No facial weakness. Moves the left arm and both lower extremities purposefully without gross weakness. She has little right arm and hand movement. Assessment:   1. Bilateral (right frontal and left parietal) hemisphere acute cerebral infarcts  2. COPD, aspiration pneumonia  3. Acute respiratory failure. She continues to need 80% FIO2  4. Malnutrition  5. Tobacco dependence    Plan:   1. Ventilator support as necessary  2. Zosyn  3. Nebs  4. ASA/Lovenox  5. Limit sedation     Discharge planning: To be determined    Reviewed treatment plans with the patient and/or family. 25 minutes spent in face to face interaction and coordination of care.      Electronically signed by Mariam Kumar MD on 11/20/2021 at 11:30 AM

## 2021-11-20 NOTE — CONSULTS
**Physician Signature**  This document was electronically signed by: Pedrito Hu MD  2021   10:24 AM    **Consult Information**  Member Facility: [address] [place] [address] Drive MRN: [id number]  Visit/Encounter Number: [id number]  Consult ID: [id number]  Facility Time Zone: CT  Date and Time of Request: 2021 07:33 AM  CT  Requesting Clinician: Reji Weiss DO  Patient Name: Mel Das  YOB: 1955  Gender: Female  Patient identity was confirmed at the beginning of the consult with the   patient/family/staff using two personal identifiers: Patient name and       **Reason for Consult**  Reason for Consult: [facility]    **Admission**  Admission Date: 2021  Chief reason for ICU admission: Respiratory Failure  Secondary reasons for ICU admission: Pneumonia, Altered Mental   Status    **Core Metrics**  General orienting sentence for patient: 75 yo female admitted  (not   covid) found unresponsive at home with sats in 46s. Hx lung cancer and   COPD. ON bipap CT scan brain negative for stroke. REmains pleasantly   confused no follow commands could get MRI. Was full of secretions and tenuous   status anyway. MRI shows   probable subacute CVA, but no hemorrhage. Anisocoria. Propofol. Currently on   85%. Hx lung cancer, currently undergoing treatment. 3 PPD ongoing   tobacco. Remains on 30 mcg/kg/min propofol. 75% FIO2, 5 PEEP. Lots of   secretions desaturation event this AM, and required 100% briefly. She remains   on   propofol gtt. gtt. FiO2 50%. PEEP 5. Changed RR to 24. 75% today.   is awake   and alert but anxious  Chief physiologic deterioration: Increase in FiO2 required by   30%  Is the patient on DVT prophylaxis?: Yes  Prophylaxis type: Pharmacological  DVT Prophylaxis Comments: Lovenox  Is the patient on GI prophylaxis?: Yes  Gi Prophylaxis Comments: Pepcid  Has this patient reached their nutritional goal?: Yes  Nutritional Goals Details: Tube feeds  Are there current issues with pain management in this patient?:   No  Are there issues with skin integrity?: Yes and issues are being   addressed  Skin Integrity Details: redness perianal  Are there issues with delirium?: No  Delirium Comments: Sedated, otherwise YES  Has the patient been mobilized?: No  Is this patient currently intubated?: Yes  Has facility initiated vent bundle?: No  Intubation Details: Too sick for weaning  Are there ethical or care philosophy or family issues?: No  Family Issues Details: 2 children.  Palliative care  Do you recommend an in depth evaluation?: No  Disposition Recommendations: Continue ICU level of care    **Inserted/Removed Devices**  Device Name: Young Catheter  Site of insertion: Urethra  Date of insertion: 11-  Device Name: Orogastric Tube  Site of insertion: Mouth  Date of insertion: 11-  Device Name: Endotracheal Tube  Site of insertion: Trachea  Date of insertion: 11-    **Physician Signature**  This document was electronically signed by: Yuan Ray MD  11/20/2021   10:24 AM

## 2021-11-21 NOTE — PROGRESS NOTES
Hospitalist Progress Note  [facility]     Patient: Ana Guerrero  : 1955  MRN: [id number]  Code Status: [facility]    Hospital Day: 9   Date of Service: 2021    Subjective:   Patient seen and examined. Intubated and sedated. Alert. Following simple commands. 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     COPD (chronic obstructive pulmonary disease) (HCC)     Depression     Emphysema (subcutaneous) (surgical) resulting from a procedure     History of lung biopsy 2019    Malignant neoplasm of right main bronchus ([address] 2019    NSCLC, SCC [id number] stage IIIb    Palliative care patient 2021    Syncope     TIA (transient ischemic attack)        Past Surgical History:   Procedure Laterality Date    APPENDECTOMY      BACK SURGERY      times two    BLADDER SUSPENSION      CHOLECYSTECTOMY      HYSTERECTOMY      INCONTINENCE SURGERY         Family History   Problem Relation Age of Onset    Colon Cancer Mother     High Blood Pressure Brother     Diabetes Brother        Social History     Socioeconomic History    Marital status:       Spouse name: Not on file    Number of children: Not on file    Years of education: Not on file    Highest education level: Not on file   Occupational History    Not on file   Tobacco Use    Smoking status: Current Every Day Smoker    Smokeless tobacco: Never Used   Substance and Sexual Activity    Alcohol use: Not Currently    Drug use: Never    Sexual activity: Not on file   Other Topics Concern    Not on file   Social History Narrative    Not on file     Social Determinants of Health     Financial Resource Strain:     Difficulty of Paying Living Expenses: Not on file   Food Insecurity:     Worried About Running Out of Food in the Last Year: Not on file    Trisha of Food in the Last Year: Not on file   Transportation Needs:     Lack of Transportation (Medical): Not on file    Lack of Transportation (Non-Medical):  Not on file   Physical Activity:     Days of Exercise per Week: Not on file    Minutes of Exercise per Session: Not on file   Stress:     Feeling of Stress : Not on file   Social Connections:     Frequency of Communication with Friends and Family: Not on file    Frequency of Social Gatherings with Friends and Family: Not on file    Attends Hindu Services: Not on file    Active Member of Clubs or Organizations: Not on file    Attends Club or Organization Meetings: Not on file    Marital Status: Not on file   Intimate Partner Violence:     Fear of Current or Ex-Partner: Not on file    Emotionally Abused: Not on file    Physically Abused: Not on file    Sexually Abused: Not on file   Housing Stability:     Unable to Pay for Housing in the Last Year: Not on file    Number of [place] in the Last Year: Not on file    Unstable Housing in the Last Year: Not on file       Current Facility-Administered Medications   Medication Dose Route Frequency Provider Last Rate Last Admin    enoxaparin (LOVENOX) injection 50 mg  1 mg/kg SubCUTAneous BID Lionel Christiansen MD   50 mg at 11/21/21 0818    ipratropium-albuterol (DUONEB) nebulizer solution 1 ampule  1 ampule Inhalation 4x daily Lionel Christiansen MD   1 ampule at 11/21/21 1047    piperacillin-tazobactam (ZOSYN) 3,375 mg in dextrose 5 % 50 mL IVPB extended infusion (mini-bag)  3,375 mg IntraVENous Q8H Lionel Christiansen MD 12.5 mL/hr at 11/21/21 1212 3,375 mg at 11/21/21 1212    nicotine (NICODERM CQ) 21 MG/24HR 1 patch  1 patch TransDERmal Daily Enedelia Meier MD   1 patch at 11/21/21 0819    famotidine (PEPCID) tablet 20 mg  20 mg Oral Daily Enedelia Meier MD   20 mg at 11/21/21 0819    chlorhexidine (PERIDEX) 0.12 % solution 15 mL  15 mL Mouth/Throat BID Stephy Rodriguez MD   15 mL at 11/21/21 0830    potassium chloride (KLOR-CON M) extended release tablet 40 mEq  40 mEq Oral PRN Marcus Arrington MD        Or    potassium bicarb-citric acid (EFFER-K) effervescent tablet 40 mEq  40 mEq Oral PRN Marcus Arrington MD   40 mEq at 11/20/21 0430    Or    potassium chloride 10 mEq/100 mL IVPB (Peripheral Line)  10 mEq IntraVENous PRN Marcus Arrington MD   Stopped at 11/17/21 1410    propofol injection  5-50 mcg/kg/min IntraVENous Titrated Jennifer Lyme, DO 7.5 mL/hr at 11/21/21 1108 25 mcg/kg/min at 11/21/21 1108    citalopram (CELEXA) tablet 20 mg  20 mg Oral Daily Jennifer Lyme, DO   20 mg at 11/21/21 0353    ondansetron (ZOFRAN-ODT) disintegrating tablet 4 mg  4 mg Oral Q8H PRN Jennifer Lyme, DO        Or    ondansetron [facility]) injection 4 mg  4 mg IntraVENous Q6H PRN Jennifer Lyme, DO   4 mg at 11/13/21 0758    polyethylene glycol (GLYCOLAX) packet 17 g  17 g Oral Daily PRN Jennifer Lyme, DO        aspirin EC tablet 81 mg  81 mg Oral Daily Jennifer Lyme, DO   81 mg at 11/21/21 5160    Or    aspirin suppository 300 mg  300 mg Rectal Daily Jennifer Lyme, DO   300 mg at 11/14/21 9368    atorvastatin (LIPITOR) tablet 40 mg  40 mg Oral Nightly Jennifer Lyme, DO   40 mg at 11/20/21 2118    labetalol (NORMODYNE;TRANDATE) injection 10 mg  10 mg IntraVENous Q10 Min PRN Jennifer Lyme, DO        morphine (PF) injection 2 mg  2 mg IntraVENous Q3H PRN Marcus Arrington MD   2 mg at 11/21/21 0819    thiamine (B-1) injection 100 mg  100 mg IntraVENous Daily Rahul Cerise, DO   100 mg at 11/21/21 0819         propofol 25 mcg/kg/min (11/21/21 1108)        Objective:   BP 97/77   Pulse 90   Temp 97.4 °F (36.3 °C) (Temporal)   Resp 20   Ht 5' 5\" (1.651 m)   Wt 108 lb 11.2 oz (49.3 kg)   SpO2 93%   BMI 18.09 kg/m²     General: cachectic  HEENT: normocephalic, atraumatic  Neck: supple, symmetrical, trachea midline   Lungs: rhonchi  Cardiovascular: s1 and s2 normal  Abdomen: soft, positive bowel sounds  Extremities: no edema or cyanosis   Neuro: intubated and sedated, alert, following simple commands  Skin: normal color and texture     Recent Labs     11/19/21 0329 11/20/21 0224 11/21/21  0345   WBC 7.6 9.1 8.5   RBC 3.10* 3.05* 2.79*   HGB 9.3* 9.2* 8.7*   HCT 33.7* 32.4* 29.6*   .7* 106.2* 106.1*   MCH 30.0 30.2 31.2*   MCHC 27.6* 28.4* 29.4*    152 156     Recent Labs     11/19/21 0329 11/19/21  0408 11/20/21 0224 11/21/21  0345     --  143 144   K 3.7 3.6 3.3* 3.7   ANIONGAP 3*  --  6* 6*     --  98 100   CO2 40*  --  39* 38*   BUN 16  --  10 12   CREATININE 0.5  --  0.5 0.5   GLUCOSE 111*  --  107 97   CALCIUM 8.4*  --  8.5* 8.3*     Recent Labs     11/19/21 0329 11/20/21 0224 11/21/21  0345   MG 1.9 1.9 2.0     Recent Labs     11/19/21 0329 11/20/21 0224 11/21/21  0345   AST 11 15 17   ALT 9 10 14   BILITOT 0.4 0.4 0.4   ALKPHOS 63 61 58     No results for input(s): PH, PO2, PCO2, HCO3, BE, O2SAT in the last 72 hours. No results for input(s): TROPONINI in the last 72 hours. No results for input(s): INR in the last 72 hours. No results for input(s): LACTA in the last 72 hours. Intake/Output Summary (Last 24 hours) at 11/21/2021 1238  Last data filed at 11/21/2021 1200  Gross per 24 hour   Intake 2343.08 ml   Output 2060 ml   Net 283.08 ml       No results found.      Assessment and Plan:   Bihemispheric strokes  Neurology following  Meds per neurology  Neurochecks  PT/OT/SLP once able     Aspiration pneumonia versus CAP  Broad-spectrum antibiotics     Ventilator dependent acute respiratory failure  Titrate minute ventilation for pH 7.35  Follow ABG/CXR  Continues to require high levels of FiO2  PE remains in differential especially given history of lung cancer  Patient too unstable for transport to CT  Empiric full dose Lovenox     History of lung cancer     Tobacco dependence     Severe protein calorie malnutrition  Dietitian following  Tube feeds on board     DVT prophylaxis  Lovenox     Extensive discussion with patient's brother Carmen Thornton) on 11/20/2021 in regards to current clinical state/goals of care. All questions sought and answered. Last [place] will attempt to have patient's sons Rae Molina and Severiano Escalante) visit the hospital to further discuss. Palliative following. Patient's son Garret Abel) requested DNR status on 11/20/2021. All questions sought and answered. Unit staff witnessed the entire conversation.     Total critical care time: 52 minutes    Lionel Christiansen MD   11/21/2021 12:38 PM

## 2021-11-21 NOTE — CONSULTS
**Physician Signature**  This document was electronically signed by: Racheal Whyte MD  2021   10:53 AM    **Consult Information**  Member Facility: [address] [place] [address] Drive MRN: [id number]  Visit/Encounter Number: [id number]  Consult ID: [id number]  Facility Time Zone: CT  Date and Time of Request: 2021 09:27 AM  CT  Requesting Clinician: Da Harris DO  Patient Name: Gloria Omalley  YOB: 1955  Gender: Female  Patient identity was confirmed at the beginning of the consult with the   patient/family/staff using two personal identifiers: Patient name and       **Reason for Consult**  Reason for Consult: [facility]    **Admission**  Admission Date: 2021  Chief reason for ICU admission: Respiratory Failure  Secondary reasons for ICU admission: Pneumonia, Altered Mental   Status    **Core Metrics**  General orienting sentence for patient: 75 yo female admitted  (not   covid) found unresponsive at home with sats in 46s. Hx lung cancer and   COPD. ON bipap CT scan brain negative for stroke. REmains pleasantly   confused no follow commands could get MRI. Was full of secretions and tenuous   status anyway. MRI shows   probable subacute CVA, but no hemorrhage. Anisocoria. Propofol. Currently on   85%. Hx lung cancer, currently undergoing treatment. 3 PPD ongoing   tobacco. Remains on 30 mcg/kg/min propofol. 75% FIO2, 5 PEEP. Lots of   secretions desaturation event this AM, and required 100% briefly. She remains   on   propofol gtt. gtt. FiO2 50%. PEEP 5. Changed RR to 24. 75% today. is awake   and alert but anxious  She remains on Propofol gtt. She wakes up and follows   commands.   Chief physiologic deterioration: Increase in FiO2 required by   30%  Is the patient on DVT prophylaxis?: Yes  Prophylaxis type: Pharmacological  DVT Prophylaxis Comments: Lovenox  Is the patient on GI prophylaxis?: Yes  Gi Prophylaxis Comments: Pepcid  Has this patient reached their nutritional goal?: Yes  Nutritional Goals Details: Tube feeds  Are there current issues with pain management in this patient?:   No  Are there issues with skin integrity?: Yes and issues are being   addressed  Skin Integrity Details: redness perianal  Are there issues with delirium?: No  Delirium Comments: Sedated, otherwise YES  Has the patient been mobilized?: No  Is this patient currently intubated?: Yes  Has facility initiated vent bundle?: No  Intubation Details: Too sick for weaning  Are there ethical or care philosophy or family issues?: No  Family Issues Details: 2 children.  Palliative care  Do you recommend an in depth evaluation?: No  Disposition Recommendations: Continue ICU level of care    **Inserted/Removed Devices**  Device Name: Young Catheter  Site of insertion: Urethra  Date of insertion: 11-  Device Name: Orogastric Tube  Site of insertion: Mouth  Date of insertion: 11-  Device Name: Endotracheal Tube  Site of insertion: Trachea  Date of insertion: 11-    **Physician Signature**  This document was electronically signed by: Jase Pressley MD  11/21/2021   10:53 AM

## 2021-11-22 NOTE — CONSULTS
**Physician Signature**  This document was electronically signed by: Michelle Rodriguez MD  2021   10:37 AM    **Consult Information**  Member Facility: [address] [place] [address] Drive MRN: [id number]  Visit/Encounter Number: [id number]  Consult ID: [id number]  Facility Time Zone: CT  Date and Time of Request: 2021 05:14 AM  CT  Requesting Clinician: Kranthi Ross DO  Patient Name: Leon Viramontes  YOB: 1955  Gender: Female  Patient identity was confirmed at the beginning of the consult with the   patient/family/staff using two personal identifiers: Patient name and       **Reason for Consult**  Reason for Consult: [facility]    **Admission**  Admission Date: 2021  Chief reason for ICU admission: Respiratory Failure  Secondary reasons for ICU admission: Pneumonia, Altered Mental   Status    **Core Metrics**  General orienting sentence for patient: 75 yo female admitted  (not   covid) found unresponsive at home with sats in 46s. Hx lung cancer and   COPD. ON bipap CT scan brain negative for stroke. REmains pleasantly   confused no follow commands could get MRI. Was full of secretions and tenuous   status anyway. MRI shows   probable subacute CVA, but no hemorrhage. Anisocoria. Propofol. Currently on   85%. Hx lung cancer, currently undergoing treatment. 3 PPD ongoing   tobacco. Remains on 30 mcg/kg/min propofol. 75% FIO2, 5 PEEP. Lots of   secretions desaturation event this AM, and required 100% briefly. She remains   on   propofol gtt. gtt. FiO2 50%. PEEP 5. Changed RR to 24. 75% today. is awake   and alert but anxious  She remains on Propofol gtt. She wakes up and follows   commands. Acute CVA diagnosed. Moving all extremities, alert on propofol. FIO2   down to   50%, 5 PEEP.   Chief physiologic deterioration: Increase in FiO2 required by   30%  Is the patient on DVT prophylaxis?: Yes  Prophylaxis type: Pharmacological  DVT Prophylaxis Comments: Lovenox  Is the patient on GI prophylaxis?: Yes  Gi Prophylaxis Comments: Pepcid  Has this patient reached their nutritional goal?: Yes  Nutritional Goals Details: Tube feeds  Are there current issues with pain management in this patient?:   No  Are there issues with skin integrity?: Yes and issues are being   addressed  Skin Integrity Details: redness perianal  Are there issues with delirium?: No  Has the patient been mobilized?: No  Is this patient currently intubated?: Yes  Has facility initiated vent bundle?: Yes  Intubation Details: Weaning trials can start. SAT has \"succeeded\"  Are there ethical or care philosophy or family issues?: No  Family Issues Details: 2 children.  Palliative care  Do you recommend an in depth evaluation?: No  Disposition Recommendations: Continue ICU level of care    **Inserted/Removed Devices**  Device Name: Young Catheter  Site of insertion: Urethra  Date of insertion: 11-  Device Name: Orogastric Tube  Site of insertion: Mouth  Date of insertion: 11-  Device Name: Endotracheal Tube  Site of insertion: Trachea  Date of insertion: 11-  Device Name: Port-a-cath  Site of insertion: Subclavian - Right   Date of insertion: Unknown    **Physician Signature**  This document was electronically signed by: Erica Anne MD  11/22/2021   10:37 AM

## 2021-11-22 NOTE — PROGRESS NOTES
Hospitalist Progress Note  [facility]     Patient: Lawrence Cabral  : 1955  MRN: [id number]  Code Status: [facility] Day: 10   Date of Service: 2021    Subjective:   Patient seen and examined. Intubated and sedated. Remains alert. Answering questions with head movements. 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     COPD (chronic obstructive pulmonary disease) (HCC)     Depression     Emphysema (subcutaneous) (surgical) resulting from a procedure     History of lung biopsy 2019    Malignant neoplasm of right main bronchus ([address] 2019    NSCLC, SCC [id number] stage IIIb    Palliative care patient 2021    Syncope     TIA (transient ischemic attack)        Past Surgical History:   Procedure Laterality Date    APPENDECTOMY      BACK SURGERY      times two    BLADDER SUSPENSION      CHOLECYSTECTOMY      HYSTERECTOMY      INCONTINENCE SURGERY         Family History   Problem Relation Age of Onset    Colon Cancer Mother     High Blood Pressure Brother     Diabetes Brother        Social History     Socioeconomic History    Marital status:       Spouse name: Not on file    Number of children: Not on file    Years of education: Not on file    Highest education level: Not on file   Occupational History    Not on file   Tobacco Use    Smoking status: Current Every Day Smoker    Smokeless tobacco: Never Used   Substance and Sexual Activity    Alcohol use: Not Currently    Drug use: Never    Sexual activity: Not on file   Other Topics Concern    Not on file   Social History Narrative    Not on file     Social Determinants of Health     Financial Resource Strain:     Difficulty of Paying Living Expenses: Not on file   Food Insecurity:     Worried About Running Out of Food in the Last Year: Not on file    Trisha of Food in the Last Year: Not on file   Transportation Needs:     Lack of Transportation (Medical): Not on file    Lack of Transportation (Non-Medical):  Not on file   Physical Activity:     Days of Exercise per Week: Not on file    Minutes of Exercise per Session: Not on file   Stress:     Feeling of Stress : Not on file   Social Connections:     Frequency of Communication with Friends and Family: Not on file    Frequency of Social Gatherings with Friends and Family: Not on file    Attends Yazdanism Services: Not on file    Active Member of Clubs or Organizations: Not on file    Attends Club or Organization Meetings: Not on file    Marital Status: Not on file   Intimate Partner Violence:     Fear of Current or Ex-Partner: Not on file    Emotionally Abused: Not on file    Physically Abused: Not on file    Sexually Abused: Not on file   Housing Stability:     Unable to Pay for Housing in the Last Year: Not on file    Number of [place] in the Last Year: Not on file    Unstable Housing in the Last Year: Not on file       Current Facility-Administered Medications   Medication Dose Route Frequency Provider Last Rate Last Admin    enoxaparin (LOVENOX) injection 50 mg  1 mg/kg SubCUTAneous BID Natalie Sun MD   50 mg at 11/22/21 1996    ipratropium-albuterol (DUONEB) nebulizer solution 1 ampule  1 ampule Inhalation 4x daily Natalie Sun MD   1 ampule at 11/22/21 1010    piperacillin-tazobactam (ZOSYN) 3,375 mg in dextrose 5 % 50 mL IVPB extended infusion (mini-bag)  3,375 mg IntraVENous Q8H Natalie Sun MD   Stopped at 11/22/21 0748    nicotine (NICODERM CQ) 21 MG/24HR 1 patch  1 patch TransDERmal Daily Minda Lyons MD   1 patch at 11/22/21 0821    famotidine (PEPCID) tablet 20 mg  20 mg Oral Daily Minda Lyons MD   20 mg at 11/22/21 0821    chlorhexidine (PERIDEX) 0.12 % solution 15 mL  15 mL Mouth/Throat BID Asia Macario MD   15 mL at 11/22/21 0900    potassium chloride (KLOR-CON M) extended release tablet 40 mEq  40 mEq Oral PRN Carlos Anand MD        Or    potassium bicarb-citric acid (EFFER-K) effervescent tablet 40 mEq  40 mEq Oral PRN Carlos Anand MD   40 mEq at 11/20/21 0430    Or    potassium chloride 10 mEq/100 mL IVPB (Peripheral Line)  10 mEq IntraVENous PRN Carlos Anand MD   Stopped at 11/17/21 1410    propofol injection  5-50 mcg/kg/min IntraVENous Titrated Nighat Feather, DO 6 mL/hr at 11/22/21 0506 20 mcg/kg/min at 11/22/21 0506    citalopram (CELEXA) tablet 20 mg  20 mg Oral Daily Nighat Feather, DO   20 mg at 11/22/21 2481    ondansetron (ZOFRAN-ODT) disintegrating tablet 4 mg  4 mg Oral Q8H PRN Nighat Feather, DO        Or    ondansetron [facility]) injection 4 mg  4 mg IntraVENous Q6H PRN Nighat Feather, DO   4 mg at 11/13/21 0528    polyethylene glycol (GLYCOLAX) packet 17 g  17 g Oral Daily PRN Nighat Feather, DO        aspirin EC tablet 81 mg  81 mg Oral Daily Nighat Feather, DO   81 mg at 11/22/21 6698    Or    aspirin suppository 300 mg  300 mg Rectal Daily Nighat Feather, DO   300 mg at 11/14/21 2016    atorvastatin (LIPITOR) tablet 40 mg  40 mg Oral Nightly Nighat Feather, DO   40 mg at 11/21/21 2017    labetalol (NORMODYNE;TRANDATE) injection 10 mg  10 mg IntraVENous Q10 Min PRN Nighat Feather, DO        morphine (PF) injection 2 mg  2 mg IntraVENous Q3H PRN Carlos Anand MD   2 mg at 11/22/21 1054    thiamine (B-1) injection 100 mg  100 mg IntraVENous Daily [facility], DO   100 mg at 11/22/21 6713         propofol 20 mcg/kg/min (11/22/21 0506)        Objective:   BP (!) 120/45   Pulse 76   Temp 98.3 °F (36.8 °C) (Temporal)   Resp 22   Ht 5' 5\" (1.651 m)   Wt 108 lb 11.2 oz (49.3 kg)   SpO2 95%   BMI 18.09 kg/m²     General: cachectic  HEENT: normocephalic, atraumatic  Neck: supple, symmetrical, trachea midline   Lungs: slowly improving rhonchi  Cardiovascular: s1 and s2 normal  Abdomen: soft, positive bowel sounds  Extremities: no edema or cyanosis   Neuro: intubated and sedated, alert, following commands  Skin: normal color and texture     Recent Labs     11/20/21 0224 11/21/21 0345 11/22/21  0230   WBC 9.1 8.5 7.9   RBC 3.05* 2.79* 2.84*   HGB 9.2* 8.7* 8.4*   HCT 32.4* 29.6* 29.3*   .2* 106.1* 103.2*   MCH 30.2 31.2* 29.6   MCHC 28.4* 29.4* 28.7*    156 174     Recent Labs     11/20/21 0224 11/21/21 0345 11/22/21  0230    144 144   K 3.3* 3.7 3.8   ANIONGAP 6* 6* 4*   CL 98 100 101   CO2 39* 38* 39*   BUN 10 12 12   CREATININE 0.5 0.5 0.5   GLUCOSE 107 97 105   CALCIUM 8.5* 8.3* 8.3*     Recent Labs     11/20/21 0224 11/21/21 0345 11/22/21  0230   MG 1.9 2.0 2.1     Recent Labs     11/20/21 0224 11/21/21 0345 11/22/21  0230   AST 15 17 17   ALT 10 14 15   BILITOT 0.4 0.4 0.3   ALKPHOS 61 58 56     No results for input(s): PH, PO2, PCO2, HCO3, BE, O2SAT in the last 72 hours. No results for input(s): TROPONINI in the last 72 hours. No results for input(s): INR in the last 72 hours. No results for input(s): LACTA in the last 72 hours. Intake/Output Summary (Last 24 hours) at 11/22/2021 1135  Last data filed at 11/22/2021 0800  Gross per 24 hour   Intake 2023.25 ml   Output 1470 ml   Net 553.25 ml       No results found.      Assessment and Plan:   Bihemispheric strokes  Neurology following  Meds per neurology  Neurochecks  PT/OT/SLP once able     Aspiration pneumonia versus CAP  Broad-spectrum antibiotics     Ventilator dependent acute respiratory failure  Titrate minute ventilation for pH 7.35  Follow ABG/CXR  Continues to require higher levels of FiO2  PE remains in differential especially given history of lung cancer  Patient was too unstable for transport to CT before but now appears stable  Await CTA chest findings  Empiric full dose Lovenox     History of lung cancer     Tobacco dependence     Severe protein calorie malnutrition  Dietitian following  Tube feeds on board     DVT prophylaxis  Lovenox     Extensive discussion with patient's brother Cha Jasmine) on 11/20/2021 in regards to current clinical state/goals of care.  All questions sought and answered. Jim Or will attempt to have patient's sons Ward Kiser and Everett) visit the hospital to further discuss.  Palliative following.     Patient's son Eliezer Hamman) requested DNR status on 11/20/2021. All questions sought and answered. Unit staff witnessed the entire conversation.     Total critical care time: 47 minutes    Shawna Mcduffie MD   11/22/2021 11:35 AM

## 2021-11-22 NOTE — PROGRESS NOTES
[facility] Neurology Progress Note      Patient:   Luis Daniel Conklin  MR#:    [id number]   Room:    [id number]   YOB: 1955  Date of Progress Note: 11/22/2021  Time of Note                           8:56 AM  Consulting Physician:  Opal Orellana DO  Attending Physician:  Job Vuong MD      INTERVAL HISTORY:  Intubated, much more alert, following commands, no acute events. REVIEW OF SYSTEMS:  Limited given intubated    PHYSICAL EXAM:    Constitutional    BP (!) 111/45   Pulse 75   Temp 95.7 °F (35.4 °C) (Temporal)   Resp 22   Ht 5' 5\" (1.651 m)   Wt 108 lb 11.2 oz (49.3 kg)   SpO2 92%   BMI 18.09 kg/m²   General appearance: Intubated, sedated. EYES -   Conjunctiva normal  Pupillary exam as below, see CN exam in the neurologic exam  ENT-    No scars, masses, or lesions over external nose or ears  Oropharynx - intubated  Cardiovascular -   No clubbing, cyanosis, or edema   Pulmonary-   Mechanically ventilated    Musculoskeletal    No significant wasting of muscles noted  Gait as below, see gait exam in the neurologic exam  Muscle strength, tone, stability as below see the motor exam in the neurologic exam.   No bony deformities  Skin    Warm, dry, and intact to inspection and palpation. No rash, erythema, or pallor        NEUROLOGICAL EXAM     Mental status    []? Awake, alert, oriented   []? Affect attention and concentration appear appropriate  []? Recent and remote memory appears unremarkable  []? Speech normal without dysarthria or aphasia, comprehension and repetition intact. COMMENTS:  Eyes open, much more alert, following commands. Cranial Nerves []? No VF deficit to confrontation,  optic discs normal, no papilledema on fundoscopic exam.  []? PERRLA, EOMI, no nystagmus, conjugate eye movements, no ptosis  []? Face symmetric  []? Facial sensation intact  []? Tongue midline no atrophy or fasciculations present  []? Palate midline, hearing to finger rub normal  []?  Shoulder shrug and SCM testing normal  COMMENTS: Left Pupil was reactive, right pupil misshapen likely surgical, face appears sym, EOM limited    Motor   []? 5/5 strength x 4 extremities  [x]? Normal bulk and tone  [x]? No tremor present  [x]? No rigidity or bradykinesia noted  COMMENTS:SMAE noted   Sensory  []? Sensation intact to light touch, pin prick, vibration, and proprioception BLE  []? Sensation intact to light touch, pin prick, vibration, and proprioception BUE  COMMENTS: Limtied   Coordination []? FTN normal bilaterally   []? HTS normal bilaterally  []? JIA normal.   COMMENTS: Limited    Reflexes  [x]? Symmetric and non-pathological  [x]? Toes downgoing bilaterally  [x]? No clonus present  COMMENTS:   Gait                  []? Normal steady gait    []? Ataxic    []? Spastic     []? Magnetic     []? Shuffling  [x]? Not assessed  COMMENTS:        LABS/IMAGING:    CT HEAD WO CONTRAST    Result Date: 11/12/2021  CT HEAD WO CONTRAST 11/12/2021 11:25 AM HISTORY: Code stroke COMPARISON: None DOSE LENGTH PRODUCT: 961 mGy cm TECHNIQUE: Helical tomographic images of the brain were obtained without the use of intravenous contrast. Automated exposure control was also utilized to decrease patient radiation dose. FINDINGS: Exam is limited by motion artifact. There is no evidence of evolving large vascular territory infarct. Underlying chronic small vessel ischemic change. No visualized intra-axial or extra-axial hemorrhage. No mass lesion is identified. Normal size and configuration of the ventricular system. The basal cisterns are symmetric. Posterior fossa structures are unremarkable. The included orbits and their contents are unremarkable. Chronic right maxillary sinus disease. The visualized osseous structures and overlying soft tissues of the skull and face are unremarkable. 1. No acute intracranial process.  Signed by Dr Dinh Mcgraw    XR CHEST PORTABLE    Result Date: 11/12/2021  XR CHEST PORTABLE 11/12/2021 11:36 AM HISTORY: Code stroke  Technique: Single AP view of the chest COMPARISONS: Chest exam dated 4/29/2020 FINDINGS: Reidentified right suprahilar consolidation with volume loss. New developing consolidations in the left upper and left lower lobes with air bronchograms. No obvious pleural effusion. Underlying lung emphysema. Heart size is stable. The pulmonary vasculature are nondilated. Right chest wall [id number]. Spinal scoliotic curvature. 1. New multifocal consolidation in the left upper and left lower lobes, appearance concerning for new multifocal pneumonia. 2. Treatment-related changes in the right suprahilar region with scarring and volume loss. Signed by Dr Ollie Paula    Result Date: 11/12/2021  EXAM: CT NECK ANGIOGRAM CT HEAD ANGIOGRAM on  11/12/2021 12:54 PM COMPARISON:  CT chest dated August 23, 2021. HISTORY:  74years-old Female. Code stroke TECHNIQUE: Multiple CT images were obtained of the of the head and neck after the administration of IV contrast. 3D MIP reformats were generated  and were sent to PACS for interpretation. Grading of carotid artery stenosis is performed by NASCET criteria. FINDINGS: CT Neck Angiogram: There is a conventional aortic arch with patent origins of the brachiocephalic trunk, left common carotid and left subclavian arteries. The bilateral common carotid arteries and subclavian arteries are well-opacified. There is atherosclerotic disease of the bilateral carotid bifurcations, left greater than than right, with essentially 0% stenosis. The bilateral internal carotid arteries are otherwise well opacified throughout. The bilateral vertebral arteries are well-opacified throughout. Additional findings: Emphysema. New left upper lobe opacities posteriorly, most concerning for infection. Additional right upper lobe opacity is also concerning for infection.  Centrally necrotic right upper and lower lobe posterior mass, incompletely visualized but appears grossly stable when compared to prior examination. Small bilateral pleural effusions. CT Head Angiogram: The right internal carotid artery demonstrates normal course and caliber without evidence of flow limiting stenosis or occlusion. The major branch vessels to the right anterior and middle cerebral arteries are seen without evidence of stenosis or occlusion. There is no evidence of aneurysm or vascular malformation. Some atherosclerotic disease in the carotid siphon. The left internal carotid artery demonstrates normal course and caliber without evidence of flow limiting stenosis or occlusion. The major branch vessels to the left anterior and middle cerebral arteries are seen without evidence of stenosis or occlusion. There is no evidence of aneurysm or vascular malformation. Some atherosclerotic disease in the carotid siphon. Evaluation of the posterior circulation demonstrates normal caliber of the vertebral arteries, basilar artery, and major branch vessels of the posterior cerebral arteries bilaterally without evidence of flow limiting stenosis or occlusion. There is no evidence of aneurysm or vascular malformation. CT Angiography Of The Neck With Intravenous Contrast 1. No evidence of high-grade arterial stenosis or underlying dissection. 2. Bilateral upper lobe new opacities are concerning for infection. CT Angiography Of The Head With Intravenous Contrast 1. No evidence of acute thrombotic occlusion, high-grade arterial stenosis, or focal cerebral aneurysm. Signed by Dr Varsha Carter Date: 11/12/2021  EXAM: CT NECK ANGIOGRAM CT HEAD ANGIOGRAM on  11/12/2021 12:54 PM COMPARISON:  CT chest dated August 23, 2021. HISTORY:  74years-old Female. Code stroke TECHNIQUE: Multiple CT images were obtained of the of the head and neck after the administration of IV contrast. 3D MIP reformats were generated  and were sent to PACS for interpretation.  Grading of carotid artery stenosis is performed by NASCET criteria. FINDINGS: CT Neck Angiogram: There is a conventional aortic arch with patent origins of the brachiocephalic trunk, left common carotid and left subclavian arteries. The bilateral common carotid arteries and subclavian arteries are well-opacified. There is atherosclerotic disease of the bilateral carotid bifurcations, left greater than than right, with essentially 0% stenosis. The bilateral internal carotid arteries are otherwise well opacified throughout. The bilateral vertebral arteries are well-opacified throughout. Additional findings: Emphysema. New left upper lobe opacities posteriorly, most concerning for infection. Additional right upper lobe opacity is also concerning for infection. Centrally necrotic right upper and lower lobe posterior mass, incompletely visualized but appears grossly stable when compared to prior examination. Small bilateral pleural effusions. CT Head Angiogram: The right internal carotid artery demonstrates normal course and caliber without evidence of flow limiting stenosis or occlusion. The major branch vessels to the right anterior and middle cerebral arteries are seen without evidence of stenosis or occlusion. There is no evidence of aneurysm or vascular malformation. Some atherosclerotic disease in the carotid siphon. The left internal carotid artery demonstrates normal course and caliber without evidence of flow limiting stenosis or occlusion. The major branch vessels to the left anterior and middle cerebral arteries are seen without evidence of stenosis or occlusion. There is no evidence of aneurysm or vascular malformation. Some atherosclerotic disease in the carotid siphon. Evaluation of the posterior circulation demonstrates normal caliber of the vertebral arteries, basilar artery, and major branch vessels of the posterior cerebral arteries bilaterally without evidence of flow limiting stenosis or occlusion.  There is no evidence of aneurysm or vascular malformation. CT Angiography Of The Neck With Intravenous Contrast 1. No evidence of high-grade arterial stenosis or underlying dissection. 2. Bilateral upper lobe new opacities are concerning for infection. CT Angiography Of The Head With Intravenous Contrast 1. No evidence of acute thrombotic occlusion, high-grade arterial stenosis, or focal cerebral aneurysm. Signed by Dr Madai [facility]      Lab Results   Component Value Date    WBC 7.9 11/22/2021    HGB 8.4 (L) 11/22/2021    HCT 29.3 (L) 11/22/2021    .2 (H) 11/22/2021     11/22/2021     Lab Results   Component Value Date     11/22/2021    K 3.8 11/22/2021     11/22/2021    CO2 39 (H) 11/22/2021    BUN 12 11/22/2021    CREATININE 0.5 11/22/2021    GLUCOSE 105 11/22/2021    CALCIUM 8.3 (L) 11/22/2021    PROT 4.6 (L) 11/22/2021    LABALBU 2.3 (L) 11/22/2021    BILITOT 0.3 11/22/2021    ALKPHOS 56 11/22/2021    AST 17 11/22/2021    ALT 15 11/22/2021    LABGLOM >60 11/22/2021    GFRAA >59 11/22/2021    AGRATIO 1.4 05/01/2020    GLOB 3.5 02/25/2021     Lab Results   Component Value Date    INR 1.18 11/12/2021    INR 0.96 05/16/2019    INR 0.99 11/28/2011    PROTIME 15.2 (H) 11/12/2021    PROTIME 12.2 05/16/2019    PROTIME 10.80 11/28/2011       RECORD REVIEW:   Previous medical records, medications were reviewed at today's visit. Nursing/physician notes, imaging, labs and vitals reviewed. PT,OT and/or speech notes reviewed      ASSESSMENT:  74 y.o. admitted with AMS, hypoxia, pneumonia, lung cancer history, COPD, right sided weakness, slurred speech, MRI consistent with scattered bi-hemishperic strokes, likely embolic vs watershed. CTAs negative. ECHO, CD negative. EEG with slowing, nothing epileptiform. Intubated, exam improving, more alert, following commands.       PLAN:  1. Supportive care   2. Follow Tele   3. Continue addressing medical issues, pneumonia, respiratory failure - intubated.    4.  ASA, statin, cautious blood pressure titration. Continue treatment dose Lovenox, added for possible PE, will also cover from an embolic stroke standpoint, given improvement would agree with more aggressive treatment. 6.  PT, OT, ST  7. DVT proph - Lovenox   8. Limit sedating medications, wean sedation / extubate when able, supplementing thiamine  9. Palliative care following      Please feel free to call with any questions. [phone] (cell phone).     Behzad Chanel DO  Board Certified Neurology

## 2021-11-23 PROBLEM — J43.2 CENTRILOBULAR EMPHYSEMA (HCC): Status: ACTIVE | Noted: 2019-08-25

## 2021-11-23 PROBLEM — C34.91 PRIMARY SQUAMOUS CELL CARCINOMA OF RIGHT LUNG (HCC): Status: ACTIVE | Noted: 2019-05-31

## 2021-11-23 PROBLEM — C34.11 MALIGNANT NEOPLASM OF UPPER LOBE OF RIGHT LUNG (HCC): Status: ACTIVE | Noted: 2019-06-10

## 2021-11-23 NOTE — PROGRESS NOTES
[id number] [facility] Neurology Progress Note      Patient:   Susanne Dan  MR#:    [id number]   Room:    [address] [place] [address]   YOB: 1955  Date of Progress Note: 11/23/2021  Time of Note                           8:37 AM  Consulting Physician:  Johann Gordon DO  Attending Physician:  Sangeeta Moe MD      INTERVAL HISTORY: Remains intubated, much more alert, following commands, no acute events. REVIEW OF SYSTEMS:  Limited given intubated    PHYSICAL EXAM:    Constitutional    /62   Pulse 78   Temp 97.7 °F (36.5 °C) (Temporal)   Resp 23   Ht 5' 5\" (1.651 m)   Wt 108 lb 11.2 oz (49.3 kg)   SpO2 93%   BMI 18.09 kg/m²   General appearance: Intubated, sedated. EYES -   Conjunctiva normal  Pupillary exam as below, see CN exam in the neurologic exam  ENT-    No scars, masses, or lesions over external nose or ears  Oropharynx - intubated  Cardiovascular -   No clubbing, cyanosis, or edema   Pulmonary-   Mechanically ventilated    Musculoskeletal    No significant wasting of muscles noted  Gait as below, see gait exam in the neurologic exam  Muscle strength, tone, stability as below see the motor exam in the neurologic exam.   No bony deformities  Skin    Warm, dry, and intact to inspection and palpation. No rash, erythema, or pallor        NEUROLOGICAL EXAM     Mental status    []? Awake, alert, oriented   []? Affect attention and concentration appear appropriate  []? Recent and remote memory appears unremarkable  []? Speech normal without dysarthria or aphasia, comprehension and repetition intact. COMMENTS:  Eyes open, much more alert, following commands. Cranial Nerves []? No VF deficit to confrontation,  optic discs normal, no papilledema on fundoscopic exam.  []? PERRLA, EOMI, no nystagmus, conjugate eye movements, no ptosis  []? Face symmetric  []? Facial sensation intact  []? Tongue midline no atrophy or fasciculations present  []? Palate midline, hearing to finger rub normal  []? Shoulder shrug and SCM testing normal  COMMENTS: Left Pupil was reactive, right pupil misshapen likely surgical, face appears sym, EOM limited    Motor   []? 5/5 strength x 4 extremities  [x]? Normal bulk and tone  [x]? No tremor present  [x]? No rigidity or bradykinesia noted  COMMENTS:SMAE noted   Sensory  []? Sensation intact to light touch, pin prick, vibration, and proprioception BLE  []? Sensation intact to light touch, pin prick, vibration, and proprioception BUE  COMMENTS: Limtied   Coordination []? FTN normal bilaterally   []? HTS normal bilaterally  []? JIA normal.   COMMENTS: Limited    Reflexes  [x]? Symmetric and non-pathological  [x]? Toes downgoing bilaterally  [x]? No clonus present  COMMENTS:   Gait                  []? Normal steady gait    []? Ataxic    []? Spastic     []? Magnetic     []? Shuffling  [x]? Not assessed  COMMENTS:        LABS/IMAGING:    CT HEAD WO CONTRAST    Result Date: 11/12/2021  CT HEAD WO CONTRAST 11/12/2021 11:25 AM HISTORY: Code stroke COMPARISON: None DOSE LENGTH PRODUCT: 961 mGy cm TECHNIQUE: Helical tomographic images of the brain were obtained without the use of intravenous contrast. Automated exposure control was also utilized to decrease patient radiation dose. FINDINGS: Exam is limited by motion artifact. There is no evidence of evolving large vascular territory infarct. Underlying chronic small vessel ischemic change. No visualized intra-axial or extra-axial hemorrhage. No mass lesion is identified. Normal size and configuration of the ventricular system. The basal cisterns are symmetric. Posterior fossa structures are unremarkable. The included orbits and their contents are unremarkable. Chronic right maxillary sinus disease. The visualized osseous structures and overlying soft tissues of the skull and face are unremarkable. 1. No acute intracranial process.  Signed by Dr Bi House    XR CHEST PORTABLE    Result Date: 11/12/2021  XR CHEST PORTABLE 11/12/2021 11:36 AM HISTORY: Code stroke  Technique: Single AP view of the chest COMPARISONS: Chest exam dated 4/29/2020 FINDINGS: Reidentified right suprahilar consolidation with volume loss. New developing consolidations in the left upper and left lower lobes with air bronchograms. No obvious pleural effusion. Underlying lung emphysema. Heart size is stable. The pulmonary vasculature are nondilated. Right chest wall Avetza-l-[id number]. Spinal scoliotic curvature. 1. New multifocal consolidation in the left upper and left lower lobes, appearance concerning for new multifocal pneumonia. 2. Treatment-related changes in the right suprahilar region with scarring and volume loss. Signed by Dr Naveen Palacios    Result Date: 11/12/2021  EXAM: CT NECK ANGIOGRAM CT HEAD ANGIOGRAM on  11/12/2021 12:54 PM COMPARISON:  CT chest dated August 23, 2021. HISTORY:  74years-old Female. Code stroke TECHNIQUE: Multiple CT images were obtained of the of the head and neck after the administration of IV contrast. 3D MIP reformats were generated  and were sent to PACS for interpretation. Grading of carotid artery stenosis is performed by NASCET criteria. FINDINGS: CT Neck Angiogram: There is a conventional aortic arch with patent origins of the brachiocephalic trunk, left common carotid and left subclavian arteries. The bilateral common carotid arteries and subclavian arteries are well-opacified. There is atherosclerotic disease of the bilateral carotid bifurcations, left greater than than right, with essentially 0% stenosis. The bilateral internal carotid arteries are otherwise well opacified throughout. The bilateral vertebral arteries are well-opacified throughout. Additional findings: Emphysema. New left upper lobe opacities posteriorly, most concerning for infection. Additional right upper lobe opacity is also concerning for infection.  Centrally necrotic right upper and lower lobe posterior mass, incompletely visualized but appears grossly stable when compared to prior examination. Small bilateral pleural effusions. CT Head Angiogram: The right internal carotid artery demonstrates normal course and caliber without evidence of flow limiting stenosis or occlusion. The major branch vessels to the right anterior and middle cerebral arteries are seen without evidence of stenosis or occlusion. There is no evidence of aneurysm or vascular malformation. Some atherosclerotic disease in the carotid siphon. The left internal carotid artery demonstrates normal course and caliber without evidence of flow limiting stenosis or occlusion. The major branch vessels to the left anterior and middle cerebral arteries are seen without evidence of stenosis or occlusion. There is no evidence of aneurysm or vascular malformation. Some atherosclerotic disease in the carotid siphon. Evaluation of the posterior circulation demonstrates normal caliber of the vertebral arteries, basilar artery, and major branch vessels of the posterior cerebral arteries bilaterally without evidence of flow limiting stenosis or occlusion. There is no evidence of aneurysm or vascular malformation. CT Angiography Of The Neck With Intravenous Contrast 1. No evidence of high-grade arterial stenosis or underlying dissection. 2. Bilateral upper lobe new opacities are concerning for infection. CT Angiography Of The Head With Intravenous Contrast 1. No evidence of acute thrombotic occlusion, high-grade arterial stenosis, or focal cerebral aneurysm. Signed by Dr Ida Koyanagi Date: 11/12/2021  EXAM: CT NECK ANGIOGRAM CT HEAD ANGIOGRAM on  11/12/2021 12:54 PM COMPARISON:  CT chest dated August 23, 2021. HISTORY:  74years-old Female. Code stroke TECHNIQUE: Multiple CT images were obtained of the of the head and neck after the administration of IV contrast. 3D MIP reformats were generated  and were sent to PACS for interpretation.  Grading of carotid artery stenosis is performed by NASCET criteria. FINDINGS: CT Neck Angiogram: There is a conventional aortic arch with patent origins of the brachiocephalic trunk, left common carotid and left subclavian arteries. The bilateral common carotid arteries and subclavian arteries are well-opacified. There is atherosclerotic disease of the bilateral carotid bifurcations, left greater than than right, with essentially 0% stenosis. The bilateral internal carotid arteries are otherwise well opacified throughout. The bilateral vertebral arteries are well-opacified throughout. Additional findings: Emphysema. New left upper lobe opacities posteriorly, most concerning for infection. Additional right upper lobe opacity is also concerning for infection. Centrally necrotic right upper and lower lobe posterior mass, incompletely visualized but appears grossly stable when compared to prior examination. Small bilateral pleural effusions. CT Head Angiogram: The right internal carotid artery demonstrates normal course and caliber without evidence of flow limiting stenosis or occlusion. The major branch vessels to the right anterior and middle cerebral arteries are seen without evidence of stenosis or occlusion. There is no evidence of aneurysm or vascular malformation. Some atherosclerotic disease in the carotid siphon. The left internal carotid artery demonstrates normal course and caliber without evidence of flow limiting stenosis or occlusion. The major branch vessels to the left anterior and middle cerebral arteries are seen without evidence of stenosis or occlusion. There is no evidence of aneurysm or vascular malformation. Some atherosclerotic disease in the carotid siphon. Evaluation of the posterior circulation demonstrates normal caliber of the vertebral arteries, basilar artery, and major branch vessels of the posterior cerebral arteries bilaterally without evidence of flow limiting stenosis or occlusion.  There is no evidence ASA, statin, Eliquis cautious blood pressure titration. 5.  PT, OT, ST  6. DVT proph - Eliquis  7. Limit sedating medications, wean sedation / extubate when able, supplementing thiamine  8. Palliative care following      Please feel free to call with any questions. [phone] (cell phone).     Brian Ferraro DO  Board Certified Neurology

## 2021-11-23 NOTE — PROGRESS NOTES
Spoke with sons, they are agreeable to plan for bronch, and understand that we will stop anticoagulants for 48 hours prior     Will notify Dr. Austin Gray

## 2021-11-23 NOTE — CONSULTS
Pulmonary and Critical Care Consult Note    [facility] Yenni Rodriguez    MRN# [id number]    Acct# [de-identified]  11/23/2021   10:01 AM CST    Referring Juan Luis Mckeon MD      Chief Complaint: Resp failure on the vent. Requesting physician: Dr. Deven Wu    Reason for consult: Lateral pleural effusions, complete atelectasis of the right upper lobe      HPI: We have been consulted to see this 74y.o. year old female born on 1955. The patient is currently intubated sedated on mechanical ventilation, she is unable to participate in the history. History obtained from the records and the nursing staff. She presented to the hospital on the 12th of this month. At the time she had shortness of breath and slurred speech. She has a history of lung cancer that apparently had been in remission. She is a smoker however. The patient was hypoxic in the emergency room and was placed on BiPAP. She then required intubation and mechanical ventilation. CT of the chest was obtained yesterday that showed no evidence of PE however she did have lobar atelectasis and bilateral pleural effusions. I was asked to see her regarding the above.        Past Medical History      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     COPD (chronic obstructive pulmonary disease) (HCC)     Depression     Emphysema (subcutaneous) (surgical) resulting from a procedure     History of lung biopsy 05/16/2019    Malignant neoplasm of right main bronchus ([facility] [address] 03/2019    NSCLC, SCC [id number] stage IIIb    Palliative care patient 11/16/2021    Syncope     TIA (transient ischemic attack)      SurgicalHistory  Past Surgical History:   Procedure Laterality Date    APPENDECTOMY      BACK SURGERY      times two    BLADDER SUSPENSION      CHOLECYSTECTOMY      HYSTERECTOMY      INCONTINENCE SURGERY       Allergies  Allergies   Allergen Reactions    No Known Allergies      Medications    furosemide, 40 mg, IntraVENous, Once    apixaban, 5 mg, Oral, BID    ipratropium-albuterol, 1 ampule, Inhalation, 4x daily    piperacillin-tazobactam, 3,375 mg, IntraVENous, Q8H    nicotine, 1 patch, TransDERmal, Daily    famotidine, 20 mg, Oral, Daily    chlorhexidine, 15 mL, Mouth/Throat, BID    citalopram, 20 mg, Oral, Daily    aspirin, 81 mg, Oral, Daily **OR** aspirin, 300 mg, Rectal, Daily    atorvastatin, 40 mg, Oral, Nightly    thiamine, 100 mg, IntraVENous, Daily   Social History   reports that she has been smoking. She has never used smokeless tobacco. She reports previous alcohol use. She reports that she does not use drugs. Family History  family history includes Colon Cancer in her mother; Diabetes in her brother; High Blood Pressure in her brother. Review of Systems:  Unable to obtain due to being intubated and sedated. Physical Exam:  /63   Pulse 77   Temp 98.4 °F (36.9 °C) (Temporal)   Resp 22   Ht 5' 5\" (1.651 m)   Wt 108 lb 11.2 oz (49.3 kg)   SpO2 96%   BMI 18.09 kg/m²     Intake/Output Summary (Last 24 hours) at 11/23/2021 1001  Last data filed at 11/23/2021 0800  Gross per 24 hour   Intake 3392 ml   Output 3295 ml   Net 97 ml       General appearance: Elderly white female who is intubated on mechanical ventilation   HEENT: Normocephalic atraumatic endotracheal tube in place  Heart: S1-S2 distant sounds no murmurs  Lungs: Diminished bilaterally.   No rubs or chest wall tenderness or dullness to percussion  Abdomen: Soft nontender no organomegalies normal bowel sounds  Extremities: No clubbing cyanosis or edema  Neuro: Sedated on mechanical ventilation  Skin: Intact        Labs:  Recent Labs     11/21/21  0345 11/22/21  0230 11/23/21  0400   WBC 8.5 7.9 7.5   RBC 2.79* 2.84* 2.70*   HGB 8.7* 8.4* 8.0*   HCT 29.6* 29.3* 28.4*    174 194   .1* 103.2* 105.2*   MCH 31.2* 29.6 29.6   MCHC 29.4* 28.7* 28.2*   RDW 17.1* 17.0* 16.7*      Recent Labs     11/21/21  0345 11/22/21  0230 11/23/21  0400 11/23/21  0421    144 141  --    K 3.7 3.8 3.8 3.6    101 100  --    CO2 38* 39* 35*  --    BUN 12 12 11  --    CREATININE 0.5 0.5 0.5  --    CALCIUM 8.3* 8.3* 8.4*  --    GLUCOSE 97 105 98  --       Recent Labs     11/23/21 0421   PHART 7.420   VYM1JZF 65.0*   PO2ART 70.0*   ZMS1HBA 42.2*   K1YEWEEM 92.8   BEART 15.6*     Recent Labs     11/23/21  0400   AST 15   ALT 14   ALKPHOS 54   BILITOT <0.2   MG 2.0   CALCIUM 8.4*     No results for input(s): BC, LABGRAM, CULTRESP, BFCX in the last 72 hours. Radiograph: XR CHEST PORTABLE    Result Date: 11/22/2021  1. Stable radiographic appearance the chest bilateral infiltrative opacities. Signed by Dr Willi Rios    XR CHEST PORTABLE    Result Date: 11/18/2021  Moderate improvement, predominantly in the right lung, as detailed above. Signed by Dr Kayla Chandra    Result Date: 11/22/2021  No evidence of pulmonary embolism. Pulmonary arterial hypertension. Evidence of right heart strain. Extensive chronic emphysematous lung changes with superimposed acute infiltrate as detailed above. Bilateral pleural effusion which was not seen in the previous study. Complete atelectasis of the right upper lobe anterior segment which was not seen in the previous study. Persistent cavitating lesion in the right upper lobe posterior segment. Endotracheal tube and nasogastric tubes in place. A right subclavian approach MediPort system in place. Signed by Dr Orquidea Queen       My radiograph interpretation/independent review of imaging: reviewed.      Problem list generated by [facility] Problems           Last Modified POA    Acute hypoxemic respiratory failure ([facility] [address] 11/12/2021 Yes    Stroke of unknown etiology ([facility] [address] 11/12/2021 Yes    Severe malnutrition ([address] 11/16/2021 Yes    Altered mental status 11/16/2021 Yes             Pulmonary Assessment/plan:    1. Acute hypoxic respiratory failure continue oxygen supplementation and mechanical ventilation as necessary to ensure adequate gas exchange. 2. Possible RUL post obstructive pneumonia on antibiotics, continue . She has copious secretions. Airway toilet might be problematic for her extubation. 3. Right upper lobe atelectasis. Possible endobronchial lesion such as malignancy recurrence versus mucous plug. Consider bronchoscopy. Discuss with the family regarding goals of care. She has to be off Eliquis for two days. 4. Bilateral pleural effusions likely secondary to volume overload. Diuresis. 5. Possible stroke. Follow-up by neurology. 6. Prognosis is guarded. 7. DVT prophylaxis. Critical care time 36 min       Niki Melgar MD, [facility], [facility]    The above note was generated using voice recognition software. Inadvertent typographical errors in transcription may have occurred.     Electronically signed by Destiny Sanders MD on 11/23/21 at 10:01 AM

## 2021-11-23 NOTE — PROGRESS NOTES
Ref Range & Units 11/23/21 0421   pH, Arterial 7.350 - 7.450 7.420    pCO2, Arterial 35.0 - 45.0 mmHg 65.0 High     pO2, Arterial 80.0 - 100.0 mmHg 70.0 Low     HCO3, Arterial 22.0 - 26.0 mmol/L 42.2 High     Base Excess, Arterial -2.0 - 2.0 mmol/L 15.6 High     Hemoglobin, Art, Extended 12.0 - 16.0 g/dL 9.0 Low     O2 Sat, Arterial >92 % 92.8    Carboxyhgb, Arterial 0.0 - 5.0 % 2.1    Comment:      0.0-1.5   (Smokers 1.5-5.0)    Methemoglobin, Arterial <1.5 % 1.3    O2 Content, Arterial Not Established mL/dL 11.8    O2 Therapy  Unknown    Resulting Agency  [facility]              Specimen Collected: 11/23/21 04:21 Last Resulted: 11/23/21 04:22        Lab Flowsheet     Order Details     View Encounter     Lab and Collection Details     Routing     Result History             Result Care Coordination      Patient Communication    Not Released Not seen Back to Top             Result Information    Flag: Abnormal Abnormal   Status: Final result (Collected: 11/23/2021 04:21) Provider Status: Ordered     Blood Gas, Arterial: Patient Communication    Not Released Not seen     Routing History    Priority Sent On From To Message Type    11/23/2021  4:22 AM Destiney Ca Incoming Lab Results From [address] MD     11/13/2021  2:10 AM Destiney Ca Incoming Lab Results From [facility], DO    Click to RxResultss [facility] Info    Blood Gas, Arterial (Order #[id number]) on 11/22/21     Order Report    Order Details      vc 22, 400, +5 60%, RR, AT+

## 2021-11-23 NOTE — PROGRESS NOTES
Hospitalist Progress Note  [facility]     Patient: Karthik Burch  : 1955  MRN: [id number]  Code Status: [facility] Day: 11   Date of Service: 2021    Subjective:   Patient seen and examined. Intubated and sedated. Alert. Following commands. 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     COPD (chronic obstructive pulmonary disease) (HCC)     Depression     Emphysema (subcutaneous) (surgical) resulting from a procedure     History of lung biopsy 2019    Malignant neoplasm of right main bronchus ([address] 2019    NSCLC, SCC [id number] stage IIIb    Palliative care patient 2021    Syncope     TIA (transient ischemic attack)        Past Surgical History:   Procedure Laterality Date    APPENDECTOMY      BACK SURGERY      times two    BLADDER SUSPENSION      CHOLECYSTECTOMY      HYSTERECTOMY      INCONTINENCE SURGERY         Family History   Problem Relation Age of Onset    Colon Cancer Mother     High Blood Pressure Brother     Diabetes Brother        Social History     Socioeconomic History    Marital status:       Spouse name: Not on file    Number of children: Not on file    Years of education: Not on file    Highest education level: Not on file   Occupational History    Not on file   Tobacco Use    Smoking status: Current Every Day Smoker    Smokeless tobacco: Never Used   Substance and Sexual Activity    Alcohol use: Not Currently    Drug use: Never    Sexual activity: Not on file   Other Topics Concern    Not on file   Social History Narrative    Not on file     Social Determinants of Health     Financial Resource Strain:     Difficulty of Paying Living Expenses: Not on file   Food Insecurity:     Worried About Running Out of Food in the Last Year: Not on file    Trisha of Food in the Last Year: Not on file   Transportation Needs:     Lack of Transportation chloride (KLOR-CON M) extended release tablet 40 mEq  40 mEq Oral PRN Carlos Anand MD        Or    potassium bicarb-citric acid (EFFER-K) effervescent tablet 40 mEq  40 mEq Oral PRN Carlos Anand MD   40 mEq at 11/20/21 0430    Or    potassium chloride 10 mEq/100 mL IVPB (Peripheral Line)  10 mEq IntraVENous PRN Carlos Anand MD   Stopped at 11/17/21 1410    propofol injection  5-50 mcg/kg/min IntraVENous Titrated Nighat Feather, DO 9 mL/hr at 11/23/21 0800 30 mcg/kg/min at 11/23/21 0800    citalopram (CELEXA) tablet 20 mg  20 mg Oral Daily Nighat Feather, DO   20 mg at 11/23/21 0755    ondansetron (ZOFRAN-ODT) disintegrating tablet 4 mg  4 mg Oral Q8H PRN Nighat Feather, DO        Or    [facility]dansetron [facility] injection 4 mg  4 mg IntraVENous Q6H PRN Nighat Feather, DO   4 mg at 11/13/21 0758    polyethylene glycol (GLYCOLAX) packet 17 g  17 g Oral Daily PRN Nighat Feather, DO        aspirin EC tablet 81 mg  81 mg Oral Daily Nighat Feather, DO   81 mg at 11/23/21 9064    Or    aspirin suppository 300 mg  300 mg Rectal Daily Nighat Feather, DO   300 mg at 11/14/21 9213    atorvastatin (LIPITOR) tablet 40 mg  40 mg Oral Nightly Nighat Feather, DO   40 mg at 11/22/21 2046    labetalol (NORMODYNE;TRANDATE) injection 10 mg  10 mg IntraVENous Q10 Min PRN Nighat Feather, DO        morphine (PF) injection 2 mg  2 mg IntraVENous Q3H PRN Carlos Anand MD   2 mg at 11/23/21 0231    thiamine (B-1) injection 100 mg  100 mg IntraVENous Daily [facility], DO   100 mg at 11/23/21 0755         propofol 30 mcg/kg/min (11/23/21 0800)        Objective:   /63   Pulse 80   Temp 98.4 °F (36.9 °C) (Temporal)   Resp 22   Ht 5' 5\" (1.651 m)   Wt 108 lb 11.2 oz (49.3 kg)   SpO2 94%   BMI 18.09 kg/m²     General: cachectic  HEENT: normocephalic, atraumatic  Neck: supple, symmetrical, trachea midline   Lungs: slowly improving rhonchi  Cardiovascular: s1 and s2 normal  Abdomen: soft, positive bowel sounds  Extremities: no edema or cyanosis   Neuro: intubated and sedated, alert, following commands  Skin: normal color and texture     Recent Labs     11/21/21 0345 11/22/21 0230 11/23/21  0400   WBC 8.5 7.9 7.5   RBC 2.79* 2.84* 2.70*   HGB 8.7* 8.4* 8.0*   HCT 29.6* 29.3* 28.4*   .1* 103.2* 105.2*   MCH 31.2* 29.6 29.6   MCHC 29.4* 28.7* 28.2*    174 194     Recent Labs     11/21/21 0345 11/21/21 0345 11/22/21 0230 11/23/21 0400 11/23/21  0421     --  144 141  --    K 3.7   < > 3.8 3.8 3.6   ANIONGAP 6*  --  4* 6*  --      --  101 100  --    CO2 38*  --  39* 35*  --    BUN 12  --  12 11  --    CREATININE 0.5  --  0.5 0.5  --    GLUCOSE 97  --  105 98  --    CALCIUM 8.3*  --  8.3* 8.4*  --     < > = values in this interval not displayed. Recent Labs     11/21/21 0345 11/22/21 0230 11/23/21  0400   MG 2.0 2.1 2.0     Recent Labs     11/21/21 0345 11/22/21  0230 11/23/21  0400   AST 17 17 15   ALT 14 15 14   BILITOT 0.4 0.3 <0.2   ALKPHOS 58 56 54     No results for input(s): PH, PO2, PCO2, HCO3, BE, O2SAT in the last 72 hours. No results for input(s): TROPONINI in the last 72 hours. No results for input(s): INR in the last 72 hours. No results for input(s): LACTA in the last 72 hours. Intake/Output Summary (Last 24 hours) at 11/23/2021 1220  Last data filed at 11/23/2021 1000  Gross per 24 hour   Intake 3392 ml   Output 3425 ml   Net -33 ml       XR CHEST PORTABLE    Result Date: 11/22/2021  XR CHEST PORTABLE 11/22/2021 8:24 AM HISTORY:   Respiratory distress  Single view. COMPARISONS:  11/18/2021 and 11/15/2021 FINDINGS: Endotracheal tube, NG tube and right-sided port catheter again identified. Right lung volume loss with diaphragm elevation observed with thickening along the minor fissure with diffuse interstitial prominence.  Mild patchy airspace opacities and interstitial prominence identified cancer     Tobacco dependence     Severe protein calorie malnutrition  Dietitian following  Tube feeds on board     DVT prophylaxis  Eliquis     Extensive discussion with patient's brother Melany Reyes 11/20/2021 in regards to current clinical state/goals of care.  All questions sought and answered. Wicho Flowers will attempt to have patient's sons Rajendra Gonzalez and vEerett) visit the hospital to further discuss.  Palliative following.     Patient's son Jarrod Lazaro) requested DNR status on 11/20/2021.  All questions sought and answered.  Unit staff witnessed the entire conversation.     Total critical care time: 53 minutes    Tatum Juarez MD   11/23/2021 12:20 PM

## 2021-11-23 NOTE — PROGRESS NOTES
Palliative Care Progress Note  11/23/2021 12:08 PM    Patient:  Binu Menon  YOB: 1955  Primary Care Physician: DOMINICK Nogueira NP  Advance Directive: [facility]  Admit Date: 11/12/2021       Hospital Day: 6  Portions of this note have been copied forward, however, changed to reflect the most current clinical status of this patient. CHIEF COMPLAINT/REASON FOR CONSULTATION Goals of care    SUBJECTIVE:  Mrs. Shae Callahan remains intubated. Interval History: Remains intubated but is responsive, following commands. Code status changed to DNR with goal of successful extubation    Review of Systems:   14 point review of systems is negative except as specifically addressed above. Objective:   VITALS:  /63   Pulse 80   Temp 98.4 °F (36.9 °C) (Temporal)   Resp 22   Ht 5' 5\" (1.651 m)   Wt 108 lb 11.2 oz (49.3 kg)   SpO2 94%   BMI 18.09 kg/m²   24HR INTAKE/OUTPUT:      Intake/Output Summary (Last 24 hours) at 11/23/2021 1208  Last data filed at 11/23/2021 1000  Gross per 24 hour   Intake 3392 ml   Output 3425 ml   Net -33 ml       General appearance: 73 yo female, intubated, no acute distress, frail  Head: Normocephalic, without obvious abnormality, atraumatic  Eyes: conjunctivae/corneas clear.  Pupils reactive bilaterally  Ears: normal external ears and nose, throat without exudate  Neck: no adenopathy, no carotid bruit, no JVD, supple, symmetrical, trachea midline   Lungs: coarse rhonchi, mechanically ventilated, equal/adequate expansion   Heart: regular rate and rhythm, S1, S2 normal, no murmur  Abdomen:soft, non-tender; non-distended, bowel sounds present    Extremities:No lower extremity edema,  No erythema, no tenderness to palpation  Skin: warm, dry  Lymphatic: No palpable lymph node enlargment  Neurologic: sedated but following commands  Psychiatric:  calm    Medications:      propofol 30 mcg/kg/min (11/23/21 0800)      acetylcysteine  600 mg Inhalation Q6H    ipratropium-albuterol  1 ampule Inhalation Q6H    apixaban  5 mg Oral BID    piperacillin-tazobactam  3,375 mg IntraVENous Q8H    nicotine  1 patch TransDERmal Daily    famotidine  20 mg Oral Daily    chlorhexidine  15 mL Mouth/Throat BID    citalopram  20 mg Oral Daily    aspirin  81 mg Oral Daily    Or    aspirin  300 mg Rectal Daily    atorvastatin  40 mg Oral Nightly    thiamine  100 mg IntraVENous Daily     potassium chloride **OR** potassium alternative oral replacement **OR** potassium chloride, ondansetron **OR** ondansetron, polyethylene glycol, labetalol, morphine  Diet NPO  ADULT TUBE FEEDING; Nasogastric; Other Tube Feeding (specify); vital 1.5; Continuous; 35; No; 35; Q 1 hour; Protein; 1 Proteinex given as flush in 24 hours     Lab and other Data:     Recent Labs     11/21/21 0345 11/22/21 0230 11/23/21 0400   WBC 8.5 7.9 7.5   HGB 8.7* 8.4* 8.0*    174 194     Recent Labs     11/21/21 0345 11/21/21 0345 11/22/21 0230 11/23/21 0400 11/23/21  0421     --  144 141  --    K 3.7   < > 3.8 3.8 3.6     --  101 100  --    CO2 38*  --  39* 35*  --    BUN 12  --  12 11  --    CREATININE 0.5  --  0.5 0.5  --    GLUCOSE 97  --  105 98  --     < > = values in this interval not displayed. Recent Labs     11/21/21 0345 11/22/21 0230 11/23/21  0400   AST 17 17 15   ALT 14 15 14   BILITOT 0.4 0.3 <0.2   ALKPHOS 58 56 54     ABG:  Recent Labs     11/23/21 0421   PHART 7.420   KBR2APO 65.0*   PO2ART 70.0*   AIO2YQD 42.2*   BEART 15.6*   HGBAE 9.0*   I8JLHPTX 92.8   CARBOXHGBART 2.1       Assessment/Plan   Active Problems:    Acute hypoxemic respiratory failure (HCC)    Stroke of unknown etiology (HCC)    Severe malnutrition (HCC)    Altered mental status  Resolved Problems:    * No resolved hospital problems. *      Visit Summary:  Chart reviewed, patient discussed with consulting service and nursing staff.  Reviewed health issues, work up and treatment plan as well as factors that lead to hospitalization. I saw Ms. Cates at bedside. She is more responsive. Following commands. I called and spoke with her son, Adam. He states he has to talk with his brother but feels they would want to proceed with bronchoscopy. All questions/concerns addressed. Support/comfort offered. Will continue to follow. Recommendations:   1. Palliative care: GOC to be determined. Patient more responsive the last few days. Code status: DNR. Will follow     2. Acute hypoxemic respiratory failure-ventilator mgmt per Pulmonology/Hospitalist, bronchoscopy considered    3. Acute stroke-Neurology following, supportive care. Improvement in mental status noted     Thank you for consulting Palliative Care and allowing us to participate in the care of this patient.    Time Spent Counseling > 50%:  YES                                   Total Time Spent with patient/family counseling, workup/treatment review, counseling and placement of orders/preparation of this note: 26 minutes    Electronically signed by Jv Cardenas PA-C on 11/23/2021 at 12:08 PM    (Please note that portions of this note were completed with a voice recognition program.  Baldev Will made to edit the dictations but occasionally words are mis-transcribed.)

## 2021-11-23 NOTE — CONSULTS
Prophylaxis Comments: Eliquis  Is the patient on GI prophylaxis?: Yes  Gi Prophylaxis Comments: Pepcid  Has this patient reached their nutritional goal?: Yes  Nutritional Goals Details: Tube feeds  Are there current issues with pain management in this patient?:   No  Are there issues with skin integrity?: Yes and issues are being   addressed  Skin Integrity Details: redness perianal  Are there issues with delirium?: No  Has the patient been mobilized?: No  Is this patient currently intubated?: Yes  Has facility initiated vent bundle?: Yes  Intubation Details: Weaning trials can start. SAT has \"succeeded\"  Are there ethical or care philosophy or family issues?: No  Family Issues Details: 2 children.  Palliative care  Do you recommend an in depth evaluation?: No  Disposition Recommendations: Continue ICU level of care    **Inserted/Removed Devices**  Device Name: Young Catheter  Site of insertion: Urethra  Date of insertion: 11-  Device Name: Orogastric Tube  Site of insertion: Mouth  Date of insertion: 11-  Device Name: Endotracheal Tube  Site of insertion: Trachea  Date of insertion: 11-  Device Name: Port-a-cath  Site of insertion: Subclavian - Right   Date of insertion: Unknown    **Physician Signature**  This document was electronically signed by: Sallie Bronson MD  11/23/2021   10:40 AM

## 2021-11-23 NOTE — PROGRESS NOTES
Pharmacy Consult      Vi Kalli Wren is a 74 y.o. female for whom pharmacy has been consulted to dose Apixaban for embolic stroke. Patient Active Problem List   Diagnosis    Malignant [address]oplasm of right main [address]us [address]    Acute hypoxemic respiratory failure ([facility]    Stroke of unknown etiology ([facility] [facility]    Palliative care patient    Severe malnutrition ([facility] [facility]    Altered mental status       Allergies:  No known allergies     Recent Labs     11/21/21  0345 11/22/21  0230   CREATININE 0.5 0.5       Ht/Wt:   Ht Readings from Last 1 Encounters:   11/22/21 5' 5\" (1.651 m)        Wt Readings from Last 1 Encounters:   11/21/21 108 lb 11.2 oz (49.3 kg)         Estimated Creatinine Clearance: 84 mL/min (based on SCr of 0.5 mg/dL). Assessment/Plan:    Will discontinue Lovenox 1 mg/kg SC twice daily and begin Apixaban 5 mg po twice daily at the time of the next scheduled dose of Lovenox. Thank you for the consult.       Electronically signed by Jesus Sweeney [facility] on 11/22/2021 at 6:22 PM

## 2021-11-24 NOTE — PLAN OF CARE
Problem: Falls - Risk of:  Goal: Will remain free from falls  Description: Will remain free from falls  Outcome: Ongoing  Goal: Absence of physical injury  Description: Absence of physical injury  Outcome: Ongoing     Problem: Skin Integrity:  Goal: Will show no infection signs and symptoms  Description: Will show no infection signs and symptoms  Outcome: Ongoing  Goal: Absence of new skin breakdown  Description: Absence of new skin breakdown  Outcome: Ongoing     Problem: Pain:  Description: Pain management should include both nonpharmacologic and pharmacologic interventions.   Goal: Pain level will decrease  Description: Pain level will decrease  Outcome: Ongoing  Goal: Control of acute pain  Description: Control of acute pain  Outcome: Ongoing  Goal: Control of chronic pain  Description: Control of chronic pain  Outcome: Ongoing     Problem: Coping:  Goal: Ability to remain calm will improve  Description: Ability to remain calm will improve  Outcome: Ongoing     Problem: Safety:  Goal: Ability to remain free from injury will improve  Description: Ability to remain free from injury will improve  Outcome: Ongoing     Problem: Self-Care:  Goal: Ability to participate in self-care as condition permits will improve  Description: Ability to participate in self-care as condition permits will improve  Outcome: Ongoing     Problem: Tobacco Use:  Goal: Inpatient tobacco use cessation counseling participation  Description: Inpatient tobacco use cessation counseling participation  Outcome: Ongoing     Problem: Nutrition  Goal: Optimal nutrition therapy  Outcome: Ongoing     Problem: Urinary Elimination:  Goal: Signs and symptoms of infection will decrease  Description: Signs and symptoms of infection will decrease  Outcome: Ongoing  Goal: Complications related to the disease process, condition or treatment will be avoided or minimized  Description: Complications related to the disease process, condition or treatment will be avoided or minimized  Outcome: Ongoing     Problem: Infection - Central Venous Catheter-Associated Bloodstream Infection:  Goal: Will show no infection signs and symptoms  Description: Will show no infection signs and symptoms  Outcome: Ongoing

## 2021-11-24 NOTE — PROGRESS NOTES
[facility] Progress Note      Patient:   Caesar Jacobs  MR#:    [id number]   Room:    4814/041-26   YOB: 1955  Date of Progress Note: 11/24/2021  Time of Note                           9:15 AM  Consulting Physician:  Gabriela Banks DO  Attending Physician:  Donna Spencer MD      INTERVAL HISTORY: Remains intubated, more alert, following commands, no acute events. REVIEW OF SYSTEMS:  Limited given intubated    PHYSICAL EXAM:    Constitutional    BP (!) 98/55   Pulse 80   Temp 99.6 °F (37.6 °C) (Temporal)   Resp 21   Ht 5' 5\" (1.651 m)   Wt 108 lb 11.2 oz (49.3 kg)   SpO2 90%   BMI 18.09 kg/m²   General appearance: Intubated, sedated. EYES -   Conjunctiva normal  Pupillary exam as below, see CN exam in the neurologic exam  ENT-    No scars, masses, or lesions over external nose or ears  Oropharynx - intubated  Cardiovascular -   No clubbing, cyanosis, or edema   Pulmonary-   Mechanically ventilated    Musculoskeletal    No significant wasting of muscles noted  Gait as below, see gait exam in the neurologic exam  Muscle strength, tone, stability as below see the motor exam in the neurologic exam.   No bony deformities  Skin    Warm, dry, and intact to inspection and palpation. No rash, erythema, or pallor        NEUROLOGICAL EXAM     Mental status    []? Awake, alert, oriented   []? Affect attention and concentration appear appropriate  []? Recent and remote memory appears unremarkable  []? Speech normal without dysarthria or aphasia, comprehension and repetition intact. COMMENTS:  Eyes open, much more alert, following commands. Cranial Nerves []? No VF deficit to confrontation,  optic discs normal, no papilledema on fundoscopic exam.  []? PERRLA, EOMI, no nystagmus, conjugate eye movements, no ptosis  []? Face symmetric  []? Facial sensation intact  []? Tongue midline no atrophy or fasciculations present  []? Palate midline, hearing to finger rub normal  []? Shoulder shrug and SCM testing normal  COMMENTS: Left Pupil was reactive, right pupil misshapen likely surgical, face appears sym, EOM limited    Motor   []? 5/5 strength x 4 extremities  [x]? Normal bulk and tone  [x]? No tremor present  [x]? No rigidity or bradykinesia noted  COMMENTS:SMAE noted   Sensory  []? Sensation intact to light touch, pin prick, vibration, and proprioception BLE  []? Sensation intact to light touch, pin prick, vibration, and proprioception BUE  COMMENTS: Limtied   Coordination []? FTN normal bilaterally   []? HTS normal bilaterally  []? JIA normal.   COMMENTS: Limited    Reflexes  [x]? Symmetric and non-pathological  [x]? Toes downgoing bilaterally  [x]? No clonus present  COMMENTS:   Gait                  []? Normal steady gait    []? Ataxic    []? Spastic     []? Magnetic     []? Shuffling  [x]? Not assessed  COMMENTS:        LABS/IMAGING:    CT HEAD WO CONTRAST    Result Date: 11/12/2021  CT HEAD WO CONTRAST 11/12/2021 11:25 AM HISTORY: Code stroke COMPARISON: None DOSE LENGTH PRODUCT: 961 mGy cm TECHNIQUE: Helical tomographic images of the brain were obtained without the use of intravenous contrast. Automated exposure control was also utilized to decrease patient radiation dose. FINDINGS: Exam is limited by motion artifact. There is no evidence of evolving large vascular territory infarct. Underlying chronic small vessel ischemic change. No visualized intra-axial or extra-axial hemorrhage. No mass lesion is identified. Normal size and configuration of the ventricular system. The basal cisterns are symmetric. Posterior fossa structures are unremarkable. The included orbits and their contents are unremarkable. Chronic right maxillary sinus disease. The visualized osseous structures and overlying soft tissues of the skull and face are unremarkable. 1. No acute intracranial process.  Signed by Dr Bonita Queen    XR CHEST PORTABLE    Result Date: 11/12/2021  XR CHEST PORTABLE 11/12/2021 11:36 AM HISTORY: Code stroke  Technique: Single AP view of the chest COMPARISONS: Chest exam dated 4/29/2020 FINDINGS: Reidentified right suprahilar consolidation with volume loss. New developing consolidations in the left upper and left lower lobes with air bronchograms. No obvious pleural effusion. Underlying lung emphysema. Heart size is stable. The pulmonary vasculature are nondilated. Right chest wall [id number]. Spinal scoliotic curvature. 1. New multifocal consolidation in the left upper and left lower lobes, appearance concerning for new multifocal pneumonia. 2. Treatment-related changes in the right suprahilar region with scarring and volume loss. Signed by Dr Kinga Rosenberg    Result Date: 11/12/2021  EXAM: CT NECK ANGIOGRAM CT HEAD ANGIOGRAM on  11/12/2021 12:54 PM COMPARISON:  CT chest dated August 23, 2021. HISTORY:  74years-old Female. Code stroke TECHNIQUE: Multiple CT images were obtained of the of the head and neck after the administration of IV contrast. 3D MIP reformats were generated  and were sent to PACS for interpretation. Grading of carotid artery stenosis is performed by NASCET criteria. FINDINGS: CT Neck Angiogram: There is a conventional aortic arch with patent origins of the brachiocephalic trunk, left common carotid and left subclavian arteries. The bilateral common carotid arteries and subclavian arteries are well-opacified. There is atherosclerotic disease of the bilateral carotid bifurcations, left greater than than right, with essentially 0% stenosis. The bilateral internal carotid arteries are otherwise well opacified throughout. The bilateral vertebral arteries are well-opacified throughout. Additional findings: Emphysema. New left upper lobe opacities posteriorly, most concerning for infection. Additional right upper lobe opacity is also concerning for infection.  Centrally necrotic right upper and lower lobe posterior mass, incompletely visualized but appears grossly stable when compared to prior examination. Small bilateral pleural effusions. CT Head Angiogram: The right internal carotid artery demonstrates normal course and caliber without evidence of flow limiting stenosis or occlusion. The major branch vessels to the right anterior and middle cerebral arteries are seen without evidence of stenosis or occlusion. There is no evidence of aneurysm or vascular malformation. Some atherosclerotic disease in the carotid siphon. The left internal carotid artery demonstrates normal course and caliber without evidence of flow limiting stenosis or occlusion. The major branch vessels to the left anterior and middle cerebral arteries are seen without evidence of stenosis or occlusion. There is no evidence of aneurysm or vascular malformation. Some atherosclerotic disease in the carotid siphon. Evaluation of the posterior circulation demonstrates normal caliber of the vertebral arteries, basilar artery, and major branch vessels of the posterior cerebral arteries bilaterally without evidence of flow limiting stenosis or occlusion. There is no evidence of aneurysm or vascular malformation. CT Angiography Of The Neck With Intravenous Contrast 1. No evidence of high-grade arterial stenosis or underlying dissection. 2. Bilateral upper lobe new opacities are concerning for infection. CT Angiography Of The Head With Intravenous Contrast 1. No evidence of acute thrombotic occlusion, high-grade arterial stenosis, or focal cerebral aneurysm. Signed by Dr Gabriella Be Date: 11/12/2021  EXAM: CT NECK ANGIOGRAM CT HEAD ANGIOGRAM on  11/12/2021 12:54 PM COMPARISON:  CT chest dated August 23, 2021. HISTORY:  74years-old Female. Code stroke TECHNIQUE: Multiple CT images were obtained of the of the head and neck after the administration of IV contrast. 3D MIP reformats were generated  and were sent to PACS for interpretation.  Grading of carotid artery stenosis is performed by NASCET criteria. FINDINGS: CT Neck Angiogram: There is a conventional aortic arch with patent origins of the brachiocephalic trunk, left common carotid and left subclavian arteries. The bilateral common carotid arteries and subclavian arteries are well-opacified. There is atherosclerotic disease of the bilateral carotid bifurcations, left greater than than right, with essentially 0% stenosis. The bilateral internal carotid arteries are otherwise well opacified throughout. The bilateral vertebral arteries are well-opacified throughout. Additional findings: Emphysema. New left upper lobe opacities posteriorly, most concerning for infection. Additional right upper lobe opacity is also concerning for infection. Centrally necrotic right upper and lower lobe posterior mass, incompletely visualized but appears grossly stable when compared to prior examination. Small bilateral pleural effusions. CT Head Angiogram: The right internal carotid artery demonstrates normal course and caliber without evidence of flow limiting stenosis or occlusion. The major branch vessels to the right anterior and middle cerebral arteries are seen without evidence of stenosis or occlusion. There is no evidence of aneurysm or vascular malformation. Some atherosclerotic disease in the carotid siphon. The left internal carotid artery demonstrates normal course and caliber without evidence of flow limiting stenosis or occlusion. The major branch vessels to the left anterior and middle cerebral arteries are seen without evidence of stenosis or occlusion. There is no evidence of aneurysm or vascular malformation. Some atherosclerotic disease in the carotid siphon. Evaluation of the posterior circulation demonstrates normal caliber of the vertebral arteries, basilar artery, and major branch vessels of the posterior cerebral arteries bilaterally without evidence of flow limiting stenosis or occlusion.  There is no evidence of aneurysm or vascular malformation. CT Angiography Of The Neck With Intravenous Contrast 1. No evidence of high-grade arterial stenosis or underlying dissection. 2. Bilateral upper lobe new opacities are concerning for infection. CT Angiography Of The Head With Intravenous Contrast 1. No evidence of acute thrombotic occlusion, high-grade arterial stenosis, or focal cerebral aneurysm. Signed by Dr Kody Coronel      Lab Results   Component Value Date    WBC 11.5 (H) 11/24/2021    HGB 8.8 (L) 11/24/2021    HCT 29.3 (L) 11/24/2021    .8 (H) 11/24/2021     11/24/2021     Lab Results   Component Value Date     11/24/2021    K 3.2 11/24/2021    CL 99 11/24/2021    CO2 36 (H) 11/24/2021    BUN 10 11/24/2021    CREATININE 0.5 11/24/2021    GLUCOSE 109 11/24/2021    CALCIUM 8.2 (L) 11/24/2021    PROT 5.4 (L) 11/24/2021    LABALBU 2.4 (L) 11/24/2021    BILITOT 0.3 11/24/2021    ALKPHOS 59 11/24/2021    AST 16 11/24/2021    ALT 16 11/24/2021    LABGLOM >60 11/24/2021    GFRAA >59 11/24/2021    AGRATIO 1.4 05/01/2020    GLOB 3.5 02/25/2021     Lab Results   Component Value Date    INR 1.18 11/12/2021    INR 0.96 05/16/2019    INR 0.99 11/28/2011    PROTIME 15.2 (H) 11/12/2021    PROTIME 12.2 05/16/2019    PROTIME 10.80 11/28/2011       RECORD REVIEW:   Previous medical records, medications were reviewed at today's visit. Nursing/physician notes, imaging, labs and vitals reviewed. PT,OT and/or speech notes reviewed      ASSESSMENT:  74 y.o. admitted with AMS, hypoxia, pneumonia, lung cancer history, COPD, right sided weakness, slurred speech, MRI consistent with scattered bi-hemishperic strokes, likely embolic vs watershed. CTAs negative. ECHO, CD negative. EEG with slowing, nothing epileptiform. Remains intubated, exam improving, more alert, following commands. Unchanged.      PLAN:  1. Supportive care   2. Follow Tele   3.   Continue addressing medical issues, pneumonia, respiratory failure - intubated. Pulmonology following, planing bronchoscopy for monday  4. ASA, statin, continue Eliquis, will need to be held for bronchoscopy,  cautious blood pressure titration. 5.  PT, OT, ST  6. DVT proph - Eliquis  7. Limit sedating medications, wean sedation / extubate when able, supplementing thiamine  8. Palliative care following      Please feel free to call with any questions. [phone] (cell phone).     Briana Lockwood DO  Board Certified Neurology

## 2021-11-24 NOTE — PROGRESS NOTES
Palliative Care Progress Note  11/24/2021 11:42 AM    Patient:  Eliz Cates  YOB: 1955  Primary Care Physician: DOMINICK Nogueira NP  Advance Directive: [facility]  Admit Date: 11/12/2021       Hospital Day: 15  Portions of this note have been copied forward, however, changed to reflect the most current clinical status of this patient. CHIEF COMPLAINT/REASON FOR CONSULTATION Goals of care    SUBJECTIVE:  Remains intubated but is responsive to verbal stimuli and follows commands. Interval History: Remains intubated but is responsive, following commands. Code status changed to DNR with goal of successful extubation. Bronchoscopy planned for Monday. Review of Systems:   14 point review of systems is negative except as specifically addressed above. Objective:   VITALS:  BP (!) 98/55   Pulse 83   Temp 99.6 °F (37.6 °C) (Temporal)   Resp 24   Ht 5' 5\" (1.651 m)   Wt 108 lb 11.2 oz (49.3 kg)   SpO2 92%   BMI 18.09 kg/m²   24HR INTAKE/OUTPUT:      Intake/Output Summary (Last 24 hours) at 11/24/2021 1142  Last data filed at 11/24/2021 0830  Gross per 24 hour   Intake 2665.17 ml   Output 4550 ml   Net -1884.83 ml       General appearance: 73 yo female, frail, chronically ill appearing, NAD  Head: Normocephalic, without obvious abnormality, atraumatic  Eyes: conjunctivae/corneas clear.  Pupils reactive bilaterally  Ears: normal external ears and nose, throat without exudate  Neck: no adenopathy, no carotid bruit, no JVD, supple, symmetrical, trachea midline   Lungs: scattered rhonchi, mechanically ventilated, equal/adequate expansion   Heart: RRR, S1, S2 normal, no murmur  Abdomen:soft, non-tender; non-distended, bowel sounds present    Extremities:No lower extremity edema,  No erythema, no tenderness to palpation  Skin: warm, dry  Lymphatic: No palpable lymph node enlargment  Neurologic: Follows commands appropriately   Psychiatric:  calm    Medications:      norepinephrine 3 mcg/min (11/24/21 0830)    sodium chloride 150 mL/hr at 11/24/21 0830    propofol 25 mcg/kg/min (11/24/21 0924)      acetylcysteine  600 mg Inhalation Q6H    ipratropium-albuterol  1 ampule Inhalation Q6H    apixaban  5 mg Oral BID    piperacillin-tazobactam  3,375 mg IntraVENous Q8H    nicotine  1 patch TransDERmal Daily    famotidine  20 mg Oral Daily    chlorhexidine  15 mL Mouth/Throat BID    citalopram  20 mg Oral Daily    aspirin  81 mg Oral Daily    Or    aspirin  300 mg Rectal Daily    atorvastatin  40 mg Oral Nightly    thiamine  100 mg IntraVENous Daily     potassium chloride **OR** potassium alternative oral replacement **OR** potassium chloride, ondansetron **OR** ondansetron, polyethylene glycol, labetalol, morphine  Diet NPO  ADULT TUBE FEEDING; Nasogastric; Other Tube Feeding (specify); vital 1.5; Continuous; 35; No; 35; Q 1 hour; Protein; 1 Proteinex given as flush in 24 hours     Lab and other Data:     Recent Labs     11/22/21 0230 11/23/21 0400 11/24/21 0240   WBC 7.9 7.5 11.5*   HGB 8.4* 8.0* 8.8*    194 234     Recent Labs     11/22/21 0230 11/22/21 0230 11/23/21 0400 11/23/21 0421 11/24/21 0240 11/24/21 0409     --  141  --  142  --    K 3.8   < > 3.8 3.6 3.3* 3.2     --  100  --  99  --    CO2 39*  --  35*  --  36*  --    BUN 12  --  11  --  10  --    CREATININE 0.5  --  0.5  --  0.5  --    GLUCOSE 105  --  98  --  109  --     < > = values in this interval not displayed.      Recent Labs     11/22/21 0230 11/23/21 0400 11/24/21 0240   AST 17 15 16   ALT 15 14 16   BILITOT 0.3 <0.2 0.3   ALKPHOS 56 54 59     ABG:  Recent Labs     11/24/21 0409   PHART 7.480*   YQT0KTV 57.0*   PO2ART 65.0*   CON4QGS 42.4*   BEART 16.8*   HGBAE 9.1*   X8HVKLBV 92.4   CARBOXHGBART 2.0       Assessment/Plan   Active Problems:    Acute hypoxemic respiratory failure (HCC)    Stroke of unknown etiology (HCC)    Palliative care patient    Severe malnutrition (HCC)    Altered mental status    Centrilobular emphysema (HCC)    Primary squamous cell carcinoma of right lung (HCC)  Resolved Problems:    * No resolved hospital problems. *      Visit Summary:  Chart reviewed. No acute overnight events. Patient seen at bedside. Bronchoscopy planned for Monday with patient/family permission given. Will continue to follow for goals of care. Recommendations:   1. Palliative care: GOC to be determined. Code status: DNR    2. Acute hypoxemic respiratory failure-ventilator mgmt per Pulmonology/Hospitalist, bronchoscopy planned 11/29/2021    3. Acute stroke-Neurology following, supportive care. Improvement in mental status noted     Thank you for consulting Palliative Care and allowing us to participate in the care of this patient.    Time Spent Counseling > 50%:  YES                                   Total Time Spent with patient/family counseling, workup/treatment review, counseling and placement of orders/preparation of this note: 15 minutes    Electronically signed by Katie Segal PA-C on 11/24/2021 at 11:42 AM    (Please note that portions of this note were completed with a voice recognition program.  Kell Martinez made to edit the dictations but occasionally words are mis-transcribed.)

## 2021-11-24 NOTE — PROGRESS NOTES
Midodrine started PO per OGT. BP 98/61 at present. Levophed gtt is at 3mcg/min. Will wean as tolerated.  Electronically signed by Rue Phalen, RN on 11/24/2021 at 5:56 PM

## 2021-11-24 NOTE — PROGRESS NOTES
Ref Range & Units 11/24/21 0409   pH, Arterial 7.350 - 7.450 7.480 High     pCO2, Arterial 35.0 - 45.0 mmHg 57. 0 High     pO2, Arterial 80.0 - 100.0 mmHg 65.0 Low     HCO3, Arterial 22.0 - 26.0 mmol/L 42.4 High     Base Excess, Arterial -2.0 - 2.0 mmol/L 16.8 High     Hemoglobin, Art, Extended 12.0 - 16.0 g/dL 9.1 Low     O2 Sat, Arterial >92 % 92.4    Carboxyhgb, Arterial 0.0 - 5.0 % 2.0    Comment:      0.0-1.5   (Smokers 1.5-5.0)    Methemoglobin, Arterial <1.5 % 1.0    O2 Content, Arterial Not Established mL/dL 11.9    O2 Therapy  Unknown    Resulting Agency  [address] [facility]              Specimen Collected: 11/24/21 04:09 Last Resulted: 11/24/21 04:11        Lab Flowsheet     Order Details     View Encounter     Lab and Collection Details     Routing     Result History             Result Care Coordination      Patient Communication    Not Released Not seen Back to Top             Result Information    Flag: Abnormal Abnormal   Status: Final result (Collected: 11/24/2021 04:09) Provider Status: Ordered     Blood Gas, Arterial: Patient Communication    Not Released Not seen     Routing History    Priority Sent On From To Message Type    11/23/2021  4:22 AM [facility] Incoming Lab Results From [address] MD     11/13/2021  2:10 AM [facility]ine[facility] Incoming Lab Results From [employer], DO    Click to Buru Burus Agent Video Intelligence Info    Blood Gas, Arterial (Order #[id number]) on 11/23/21     Order Report      AT, 400 VT, 5 PEEP, RR18, 55%  RRad.

## 2021-11-24 NOTE — CONSULTS
**Physician Signature**  This document was electronically signed by: Angus Loya MD  2021   10:56 AM    **Consult Information**  Member Facility: [address] [place] [address] Drive MRN: [id number]  Visit/Encounter Number: [id number]  Consult ID: [id number]  Facility Time Zone: CT  Date and Time of Request: 2021 05:38 AM  CT  Requesting Clinician: Jackie Perez DO  Patient Name: Rafy Gutierrez  YOB: 1955  Gender: Female  Patient identity was confirmed at the beginning of the consult with the   patient/family/staff using two personal identifiers: Patient name and       **Reason for Consult**  Reason for Consult: [facility]    **Admission**  Admission Date: 2021  Chief reason for ICU admission: Respiratory Failure  Secondary reasons for ICU admission: Pneumonia, Altered Mental   Status    **Core Metrics**  General orienting sentence for patient: 75 yo female admitted  (not   covid) found unresponsive at home with sats in 46s. Hx lung cancer and   COPD. ON bipap CT scan brain negative for stroke. REmains pleasantly   confused no follow commands could get MRI. Was full of secretions and tenuous   status anyway. MRI shows   probable subacute CVA, but no hemorrhage. Anisocoria. Propofol. Currently on   85%. Hx lung cancer, currently undergoing treatment. 3 PPD ongoing   tobacco. Remains on 30 mcg/kg/min propofol. 75% FIO2, 5 PEEP. Lots of   secretions desaturation event this AM, and required 100% briefly. She remains   on   propofol gtt. gtt. FiO2 50%. PEEP 5. Changed RR to 24. 75% today. is awake   and alert but anxious  She remains on Propofol gtt. She wakes up and follows   commands. Acute CVA diagnosed. Moving all extremities, alert on propofol. FIO2   down to   50%, 5 PEEP. commands. Voluminous secretions. Propofol for calming. [facility]   vent: AC -18/400/0.5/+5, being treated for pneumonia.   Chief physiologic deterioration: None - Stable patient  Is the patient on DVT prophylaxis?: Yes  Prophylaxis type: Pharmacological  DVT Prophylaxis Comments: Eliquis  Is the patient on GI prophylaxis?: Yes  Gi Prophylaxis Comments: Pepcid  Has this patient reached their nutritional goal?: Yes  Nutritional Goals Details: Tube feeds  Are there current issues with pain management in this patient?:   No  Are there issues with skin integrity?: Yes and issues are being   addressed  Skin Integrity Details: redness perianal  Are there issues with delirium?: No  Has the patient been mobilized?: No  Is this patient currently intubated?: Yes  Has facility initiated vent bundle?: Yes  Are there ethical or care philosophy or family issues?: No  Family Issues Details: 2 children.  Palliative care  Do you recommend an in depth evaluation?: No  Disposition Recommendations: Continue ICU level of care    **Inserted/Removed Devices**  Device Name: Young Catheter  Site of insertion: Urethra  Date of insertion: 11-  Device Name: Orogastric Tube  Site of insertion: Mouth  Date of insertion: 11-  Device Name: Endotracheal Tube  Site of insertion: Trachea  Date of insertion: 11-  Device Name: Theodore Mylesy  Site of insertion: Subclavian - Right   Date of insertion: Unknown    **Physician Signature**  This document was electronically signed by: Ankit Moise MD  11/24/2021   10:56 AM

## 2021-11-24 NOTE — PROGRESS NOTES
This  noticed this pt on the vent had her eyes open. I waved to her and she waved back. I held my hands together to represent prayer and the pt nodded her head. This  provided spiritual care with sustaining presence, and a prayer. When the prayer was over, this  waved again and the pt waved back.      Electronically signed by Chin Bay on 11/24/2021 at 3:16 PM

## 2021-11-24 NOTE — PROGRESS NOTES
Nutrition Assessment     Type and Reason for Visit: Reassess    Nutrition Assessment:  Pt is tolerating EN at goal rate. Will continue to monitor. Malnutrition Assessment:  Malnutrition Status: Severe malnutrition    Estimated Daily Nutrient Needs:  Energy (kcal): 6738-2618; Weight Used for Energy Requirements:  Admission     Protein (g): 85; Weight Used for Protein Requirements:  Admission        Fluid (ml/day): 9917-6038; Weight Used for Fluid Requirements:  1 ml/kcal      Current Nutrition Therapies:    Diet NPO  ADULT TUBE FEEDING; Nasogastric; Other Tube Feeding (specify); vital 1.5; Continuous; 35; No; 35; Q 1 hour; Protein; 1 Proteinex given as flush in 24 hours    Anthropometric Measures:  · Height: 5' 5\" (165.1 cm)  · Current Body Wt: 108 lb 11.2 oz (49.3 kg)   · BMI: 18.1    Nutrition Diagnosis:   · Inadequate oral intake related to impaired respiratory function as evidenced by intubation      Nutrition Interventions:   Food and/or Nutrient Delivery:  Continue Current Tube Feeding  Nutrition Education/Counseling:  No recommendation at this time   Coordination of Nutrition Care:  Continue to monitor while inpatient    Goals:  meet nutritional needs through EN       Nutrition Monitoring and Evaluation:   Behavioral-Environmental Outcomes:  None Identified   Food/Nutrient Intake Outcomes:  Supplement Intake, Enteral Nutrition Intake/Tolerance  Physical Signs/Symptoms Outcomes:  Biochemical Data, Weight, Skin, Nutrition Focused Physical Findings, Fluid Status or Edema     Discharge Planning:     Too soon to determine     Electronically signed by Jose Carlos Christian MS, RD, LD on 11/24/21 at 9:41 AM CST    Contact: [phone]

## 2021-11-24 NOTE — PROGRESS NOTES
Hospitalist Progress Note  [facility]     Patient: Cathy Rinaldi  : 1955  MRN: [id number]  Code Status: [facility] Day: 12   Date of Service: 2021    Subjective:   Patient seen and examined. Intubated and sedated. Remains alert and follows commands. 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     COPD (chronic obstructive pulmonary disease) (HCC)     Depression     Emphysema (subcutaneous) (surgical) resulting from a procedure     History of lung biopsy 2019    Malignant neoplasm of right main bronchus ([address] 2019    NSCLC, SCC [id number] stage IIIb    Palliative care patient 2021    Syncope     TIA (transient ischemic attack)        Past Surgical History:   Procedure Laterality Date    APPENDECTOMY      BACK SURGERY      times two    BLADDER SUSPENSION      CHOLECYSTECTOMY      HYSTERECTOMY      INCONTINENCE SURGERY         Family History   Problem Relation Age of Onset    Colon Cancer Mother     High Blood Pressure Brother     Diabetes Brother        Social History     Socioeconomic History    Marital status:       Spouse name: Not on file    Number of children: Not on file    Years of education: Not on file    Highest education level: Not on file   Occupational History    Not on file   Tobacco Use    Smoking status: Current Every Day Smoker    Smokeless tobacco: Never Used   Substance and Sexual Activity    Alcohol use: Not Currently    Drug use: Never    Sexual activity: Not on file   Other Topics Concern    Not on file   Social History Narrative    Not on file     Social Determinants of Health     Financial Resource Strain:     Difficulty of Paying Living Expenses: Not on file   Food Insecurity:     Worried About Running Out of Food in the Last Year: Not on file    Trisha of Food in the Last Year: Not on file   Transportation Needs:     Lack of Transportation (Medical): Not on file    Lack of Transportation (Non-Medical):  Not on file   Physical Activity:     Days of Exercise per Week: Not on file    Minutes of Exercise per Session: Not on file   Stress:     Feeling of Stress : Not on file   Social Connections:     Frequency of Communication with Friends and Family: Not on file    Frequency of Social Gatherings with Friends and Family: Not on file    Attends Hinduism Services: Not on file    Active Member of [employer] or Organizations: Not on file    Attends Club or Organization Meetings: Not on file    Marital Status: Not on file   Intimate Partner Violence:     Fear of Current or Ex-Partner: Not on file    Emotionally Abused: Not on file    Physically Abused: Not on file    Sexually Abused: Not on file   Housing Stability:     Unable to Pay for Housing in the Last Year: Not on file    Number of [place] in the Last Year: Not on file    Unstable Housing in the Last Year: Not on file       Current Facility-Administered Medications   Medication Dose Route Frequency Provider Last Rate Last Admin    norepinephrine (LEVOPHED) 16 mg in sodium chloride 0.9 % 250 mL infusion  0.01-3.3 mcg/kg/min IntraVENous Continuous Bal Martin MD 2.8 mL/hr at 11/24/21 0830 3 mcg/min at 11/24/21 0830    lactated ringers infusion   IntraVENous Continuous Елена Wilkins MD 75 mL/hr at 11/24/21 1235 New Bag at 11/24/21 1235    acetylcysteine (MUCOMYST) 20 % solution 600 mg  600 mg Inhalation Q6H Niki Melgar MD   600 mg at 11/24/21 0646    ipratropium-albuterol (DUONEB) nebulizer solution 1 ampule  1 ampule Inhalation Q6H Niki Melgar MD   1 ampule at 11/24/21 0646    apixaban (ELIQUIS) tablet 5 mg  5 mg Oral BID Елена Wilkins MD   5 mg at 11/24/21 0924    piperacillin-tazobactam (ZOSYN) 3,375 mg in dextrose 5 % 50 mL IVPB extended infusion (mini-bag)  3,375 mg IntraVENous Q8H Елена Wilkins MD 12.5 mL/hr at 11/24/21 1232 3,375 mg at 11/24/21 1232    nicotine (NICODERM CQ) 21 MG/24HR 1 patch  1 patch TransDERmal Daily Bibiana Bates MD   1 patch at 11/24/21 0925    famotidine (PEPCID) tablet 20 mg  20 mg Oral Daily Bibiana Bates MD   20 mg at 11/24/21 0924    chlorhexidine (PERIDEX) 0.12 % solution 15 mL  15 mL Mouth/Throat BID Claudette Furrow, MD   15 mL at 11/24/21 0928    potassium chloride (KLOR-CON M) extended release tablet 40 mEq  40 mEq Oral PRN Claudette Furrow, MD        Or    potassium bicarb-citric acid (EFFER-K) effervescent tablet 40 mEq  40 mEq Oral PRN Claudette Furrow, MD   40 mEq at 11/24/21 0502    Or    potassium chloride 10 mEq/100 mL IVPB (Peripheral Line)  10 mEq IntraVENous PRN Claudette Furrow, MD   Stopped at 11/17/21 1410    propofol injection  5-50 mcg/kg/min IntraVENous Titrated Keke Alvarez, DO 7.5 mL/hr at 11/24/21 0924 25 mcg/kg/min at 11/24/21 0924    citalopram (CELEXA) tablet 20 mg  20 mg Oral Daily Keke Alvarez, DO   20 mg at 11/24/21 5820    ondansetron (ZOFRAN-ODT) disintegrating tablet 4 mg  4 mg Oral Q8H PRN Keke Alvarez, DO        Or    ondansetron [facility]) injection 4 mg  4 mg IntraVENous Q6H PRN Keke Alvarez, DO   4 mg at 11/13/21 0758    polyethylene glycol (GLYCOLAX) packet 17 g  17 g Oral Daily PRN Keke Alvarez, DO        aspirin EC tablet 81 mg  81 mg Oral Daily Keke Alvarez, DO   81 mg at 11/24/21 4438    Or    aspirin suppository 300 mg  300 mg Rectal Daily Keke Alvarez, DO   300 mg at 11/14/21 8143    atorvastatin (LIPITOR) tablet 40 mg  40 mg Oral Nightly Keke Alvarez, DO   40 mg at 11/23/21 2011    labetalol (NORMODYNE;TRANDATE) injection 10 mg  10 mg IntraVENous Q10 Min PRN Keke Alvarez, DO        morphine (PF) injection 2 mg  2 mg IntraVENous Q3H PRN Claudette Furrow, MD   2 mg at 11/23/21 0231    thiamine (B-1) injection 100 mg  100 mg IntraVENous Daily Denise Alamin, DO   100 mg at 11/23/21 0755         norepinephrine 3 mcg/min (11/24/21 0830)    lactated ringers 75 mL/hr at 11/24/21 1235    propofol 25 mcg/kg/min (11/24/21 0924)        Objective:   BP (!) 98/55   Pulse 83   Temp 99.6 °F (37.6 °C) (Temporal)   Resp 24   Ht 5' 5\" (1.651 m)   Wt 108 lb 11.2 oz (49.3 kg)   SpO2 92%   BMI 18.09 kg/m²     General: cachectic  HEENT: normocephalic, atraumatic  Neck: supple, symmetrical, trachea midline   Lungs: slowly improving rhonchi  Cardiovascular: s1 and s2 normal  Abdomen: soft, positive bowel sounds  Extremities: no edema or cyanosis   Neuro: intubated and sedated, alert, following commands  Skin: normal color and texture     Recent Labs     11/22/21  0230 11/23/21  0400 11/24/21  0240   WBC 7.9 7.5 11.5*   RBC 2.84* 2.70* 2.85*   HGB 8.4* 8.0* 8.8*   HCT 29.3* 28.4* 29.3*   .2* 105.2* 102.8*   MCH 29.6 29.6 30.9   MCHC 28.7* 28.2* 30.0*    194 234     Recent Labs     11/22/21  0230 11/22/21  0230 11/23/21  0400 11/23/21  0421 11/24/21  0240 11/24/21  0409 11/24/21  1047     --  141  --  142  --   --    K 3.8   < > 3.8   < > 3.3* 3.2 3.6   ANIONGAP 4*  --  6*  --  7  --   --      --  100  --  99  --   --    CO2 39*  --  35*  --  36*  --   --    BUN 12  --  11  --  10  --   --    CREATININE 0.5  --  0.5  --  0.5  --   --    GLUCOSE 105  --  98  --  109  --   --    CALCIUM 8.3*  --  8.4*  --  8.2*  --   --     < > = values in this interval not displayed. Recent Labs     11/22/21  0230 11/23/21  0400 11/24/21  0240   MG 2.1 2.0 2.0     Recent Labs     11/22/21  0230 11/23/21  0400 11/24/21  0240   AST 17 15 16   ALT 15 14 16   BILITOT 0.3 <0.2 0.3   ALKPHOS 56 54 59     No results for input(s): PH, PO2, PCO2, HCO3, BE, O2SAT in the last 72 hours. No results for input(s): TROPONINI in the last 72 hours. No results for input(s): INR in the last 72 hours. No results for input(s): LACTA in the last 72 hours.       Intake/Output Summary (Last 24 hours) at 11/24/2021 1236  Last data filed at 11/24/2021 0830  Gross per 24 hour   Intake 2525.17 ml   Output 2450 ml   Net 75.17 ml       XR CHEST PORTABLE    Result Date: 11/24/2021  EXAMINATION: Chest one view 11/24/2020 HISTORY: Acute hypoxemic respiratory failure. FINDINGS: Today's exam is compared to previous study of 2 days earlier. An NG tube and endotracheal tube remain in place as well as a dual lumen infusion catheter in place via right-sided approach. There is a cavitary lesion within the right upper lobe. There is volume loss within the right lung with a pleurodiaphragmatic adhesion within the right lung base and elevation of the right hemidiaphragm. Predominantly interstitial infiltrate within the left lung has shown interval improvement although this is accentuated by the differences in technique. There are emphysematous changes of the lungs. 1.. Underlying emphysema. There is volume loss on the right with a cavitary lesion in the right upper lobe and a pleurodiaphragmatic adhesion in the right base. 2. Predominantly interstitial process within the left lung either representing an interstitial pneumonia or edema shows interval improvement. 3. No interval line changes. Signed by Dr Phyllistine Dakins    Result Date: 11/22/2021  XR CHEST PORTABLE 11/22/2021 8:24 AM HISTORY:   Respiratory distress  Single view. COMPARISONS:  11/18/2021 and 11/15/2021 FINDINGS: Endotracheal tube, NG tube and right-sided port catheter again identified. Right lung volume loss with diaphragm elevation observed with thickening along the minor fissure with diffuse interstitial prominence. Mild patchy airspace opacities and interstitial prominence identified bilaterally, likely unchanged. 1. Stable radiographic appearance the chest bilateral infiltrative opacities. Signed by Dr Chet Sargent    Result Date: 11/22/2021  Examination.  CTA PULMONARY W CONTRAST 11/22/2021 2:24 PM History: Respiratory distress. DLP: 363 mGycm. The CT angiography of the chest is performed after intravenous contrast enhancement. The images are acquired in axial plane and subsequent reconstruction in coronal and sagittal planes. There is no previous similar study for comparison. The correlation is made with CT scan of the chest dated 8/23/2021. There are extensive motion and respiratory artifacts which lowers the diagnostic yield of the study. There is good opacification of a dilated main pulmonary artery and right and left pulmonary arteries. There are no filling defects in the visualized opacified pulmonary arterial bed. Atheromatous changes of thoracic aorta. No aneurysmal dilatation. Atheromatous changes of coronary arteries. Significant dilatation of the main pulmonary artery and right and left pulmonary artery representing pulmonary arterial hypertension which is similar to the previous study. The RV/LV ratio is 44/28 which is abnormal suggesting right heart strain. There are extensive chronic emphysematous minutes lung changes. There is small right-sided and moderate left-sided pleural effusion which was not seen in the previous study. A cavitating lesion in the right upper lobe posteriorly is similar to the previous study. There are new nodular infiltrative changes in the left upper lobe posterior segment and in the right lower lobe posterior segment which was not seen in the previous study. There is persistent moderate diffuse narrowing of the right mainstem bronchus with some secretions/mucous debris which was not noted in the previous study. There is a mucous plugging in the right lower lobe posterior segmental bronchi. Similar changes to a lesser extent are seen in the left lower lobe. There is consolidation/atelectasis in the left lower lobe posterior segment. There is an area of atelectasis involving the right upper lobe anterior segment which was not seen in the previous study.  An endotracheal tube is seen in an optimal position. Nasogastric tube is seen in place. The soft tissues of the neck are not optimally visualized are evaluated due to extensive artifacts. There is no evidence of mediastinal or hilar lymphadenopathy. The limited visualized liver and spleen are unremarkable. The gallbladder is surgically absent. The pancreas and kidneys are incompletely included and not evaluated evaluated. The images reviewed in bone window show chronic degenerative changes of the thoracic and visualized lumbar spine. No focal bony lesion or bone abnormality. A right subclavian approach MediPort system is seen with distal end in the distal superior vena cava. No evidence of pulmonary embolism. Pulmonary arterial hypertension. Evidence of right heart strain. Extensive chronic emphysematous lung changes with superimposed acute infiltrate as detailed above. Bilateral pleural effusion which was not seen in the previous study. Complete atelectasis of the right upper lobe anterior segment which was not seen in the previous study. Persistent cavitating lesion in the right upper lobe posterior segment. Endotracheal tube and nasogastric tubes in place. A right subclavian approach MediPort system in place.  Signed by Dr Cristiano Rapp and Plan:   Bihemispheric strokes  Neurology following  Meds per neurology  Neurochecks  PT/OT/SLP once able  Eliquis per neuro recs for likely embolic stroke     Corynebacterium pneumonia  Zosyn per pharmacy     Ventilator dependent acute respiratory failure  Titrate minute ventilation for pH 7.35  Follow ABG/CXR  CTA chest 11/22/2021: No evidence of PE  Pulmonary following with potential plans for bronch     History of lung cancer     Tobacco dependence     Severe protein calorie malnutrition  Dietitian following  Tube feeds on board     DVT prophylaxis  Eliquis     Extensive discussion with patient's brother Tiffany Curry 11/20/2021 in regards to current clinical state/goals of care.  All questions sought and answered. Read Danger will attempt to have patient's sons Wendy Doran and Everett) visit the hospital to further discuss.  Palliative following.     Patient's son Paras Gaitan) requested DNR status on 11/20/2021.  All questions sought and answered.  Unit staff witnessed the entire conversation.     Total critical care time: 47 minutes    Job Vuong MD   11/24/2021 12:36 PM

## 2021-11-25 NOTE — PROGRESS NOTES
[facility] Neurology  [address] [place] [address] [address], [address]  [place], [place]  Phone [phone]     Neurology Progress Note  2021 12:13 PM  Information:   Patient Name: Julia Ying  :   1955  Age:   74 y.o. MRN:   [id number]  Account #:  [de-identified]  Admit Date:   2021  Today:  21     ADMIT DX:   Acute respiratory failure    Subjective:     74 y.o. admitted with AMS, hypoxia, pneumonia, lung cancer history, COPD, right sided weakness, slurred speech, MRI consistent with scattered bi-hemishperic strokes, likely embolic vs watershed. CTAs negative. ECHO, CD negative. EEG with slowing, nothing epileptiform. Intubated. Interval History:   She remains intubated in the CCU. She is awake and cooperative. She is now moving the right arm some. 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     COPD (chronic obstructive pulmonary disease) (HCC)     Depression     Emphysema (subcutaneous) (surgical) resulting from a procedure     History of lung biopsy 2019    Malignant neoplasm of right main [address]us ([facility] [address] 2019    NSCLC, SCC [id number] stage IIIb    Palliative care patient 2021    Syncope     TIA (transient ischemic attack)        Past Surgical History:   Procedure Laterality Date    APPENDECTOMY      BACK SURGERY      times two    BLADDER SUSPENSION      CHOLECYSTECTOMY      HYSTERECTOMY      INCONTINENCE SURGERY         Family History   Problem Relation Age of Onset    Colon Cancer Mother     High Blood Pressure Brother     Diabetes Brother        Social History     Socioeconomic History    Marital status:       Spouse name: Not on file    Number of children: Not on file    Years of education: Not on file    Highest education level: Not on file   Occupational History    Not on file   Tobacco Use    Smoking status: Current Every Day Smoker    Smokeless tobacco: Never Used   Substance and Sexual Activity    Alcohol use: Not Currently    Drug use: Never    Sexual activity: Not on file   Other Topics Concern    Not on file   Social History Narrative    Not on file     Social Determinants of Health     Financial Resource Strain:     Difficulty of Paying Living Expenses: Not on file   Food Insecurity:     Worried About Running Out of Food in the Last Year: Not on file    Trisha of Food in the Last Year: Not on file   Transportation Needs:     Lack of Transportation (Medical): Not on file    Lack of Transportation (Non-Medical):  Not on file   Physical Activity:     Days of Exercise per Week: Not on file    Minutes of Exercise per Session: Not on file   Stress:     Feeling of Stress : Not on file   Social Connections:     Frequency of Communication with Friends and Family: Not on file    Frequency of Social Gatherings with Friends and Family: Not on file    Attends Christianity Services: Not on file    Active Member of [address] [place] [address] or Organizations: Not on file    Attends Club or Organization Meetings: Not on file    Marital Status: Not on file   Intimate Partner Violence:     Fear of Current or Ex-Partner: Not on file    Emotionally Abused: Not on file    Physically Abused: Not on file    Sexually Abused: Not on file   Housing Stability:     Unable to Pay for Housing in the Last Year: Not on file    Number of [place] in the Last Year: Not on file    Unstable Housing in the Last Year: Not on file       Medications:   norepinephrine 2 mcg/kg/min (11/25/21 0510)    lactated ringers 75 mL/hr at 11/24/21 1235    propofol 25 mcg/kg/min (11/24/21 3446)      midodrine  2.5 mg Oral TID     ipratropium-albuterol  1 ampule Inhalation Q6H    apixaban  5 mg Oral BID    piperacillin-tazobactam  3,375 mg IntraVENous Q8H    nicotine  1 patch TransDERmal Daily    famotidine  20 mg Oral Daily    chlorhexidine  15 mL Mouth/Throat BID    citalopram  20 mg Oral Daily    aspirin  81 mg Oral Daily    Or    aspirin  300 mg Rectal Daily    atorvastatin  40 mg Oral Nightly    thiamine  100 mg IntraVENous Daily       Diagnostic Studies:  Reviewed all labs/diagnositcs since last 24hrs:  Recent Results (from the past 24 hour(s))   CBC Auto Differential    Collection Time: 11/25/21  4:15 AM   Result Value Ref Range    WBC 11.2 (H) 4.8 - 10.8 K/uL    RBC 2.78 (L) 4.20 - 5.40 M/uL    Hemoglobin 8.5 (L) 12.0 - 16.0 g/dL    Hematocrit 28.5 (L) 37.0 - 47.0 %    .5 (H) 81.0 - 99.0 fL    MCH 30.6 27.0 - 31.0 pg    MCHC 29.8 (L) 33.0 - 37.0 g/dL    RDW 16.3 (H) 11.5 - 14.5 %    Platelets 003 755 - 537 K/uL    MPV 12.2 9.4 - 12.3 fL    Neutrophils % 85.5 (H) 50.0 - 65.0 %    Lymphocytes % 6.3 (L) 20.0 - 40.0 %    Monocytes % 6.8 0.0 - 10.0 %    Eosinophils % 0.8 0.0 - 5.0 %    Basophils % 0.2 0.0 - 1.0 %    Neutrophils Absolute 9.5 (H) 1.5 - 7.5 K/uL    Immature Granulocytes # 0.1 K/uL    Lymphocytes Absolute 0.7 (L) 1.1 - 4.5 K/uL    Monocytes Absolute 0.80 0.00 - 0.90 K/uL    Eosinophils Absolute 0.10 0.00 - 0.60 K/uL    Basophils Absolute 0.00 0.00 - 0.20 K/uL   Comprehensive Metabolic Panel    Collection Time: 11/25/21  4:15 AM   Result Value Ref Range    Sodium 141 136 - 145 mmol/L    Potassium 3.7 3.5 - 5.0 mmol/L    Chloride 102 98 - 111 mmol/L    CO2 32 (H) 22 - 29 mmol/L    Anion Gap 7 7 - 19 mmol/L    Glucose 109 74 - 109 mg/dL    BUN 6 (L) 8 - 23 mg/dL    CREATININE 0.5 0.5 - 0.9 mg/dL    GFR Non-African American >60 >60    GFR African American >59 >59    Calcium 8.3 (L) 8.8 - 10.2 mg/dL    Total Protein 5.5 (L) 6.6 - 8.7 g/dL    Albumin 2.4 (L) 3.5 - 5.2 g/dL    Total Bilirubin 0.3 0.2 - 1.2 mg/dL    Alkaline Phosphatase 57 35 - 104 U/L    ALT 16 5 - 33 U/L    AST 15 5 - 32 U/L   Magnesium    Collection Time: 11/25/21  4:15 AM   Result Value Ref Range    Magnesium 2.1 1.6 - 2.4 mg/dL       Diet:  Diet NPO  ADULT TUBE FEEDING; Nasogastric;  Other Tube Feeding (specify); vital 1.5; Continuous; 35; No; 35; Q 1 hour; Protein; 1 Proteinex given as flush in 24 hours    Examination:  Vitals: BP 95/60   Pulse 81   Temp 98.7 °F (37.1 °C) (Temporal)   Resp 18   Ht 5' 5\" (1.651 m)   Wt 108 lb 11.2 oz (49.3 kg)   SpO2 92%   BMI 18.09 kg/m²      Intake/Output Summary (Last 24 hours) at 11/25/2021 1213  Last data filed at 11/25/2021 0810  Gross per 24 hour   Intake 3138.15 ml   Output 2250 ml   Net 888.15 ml     General appearance: She is a cachectic chronically ill appearing woman who is intubated and awake in the CCU  HEENT: Exam; heent: Head: Normal, normocephalic, atraumatic. Lungs: rhonchi bilaterally  Heart: regular rate and rhythm, S1, S2 normal, no murmur, click, rub or gallop  Abdomen: soft, non-tender; bowel sounds normal; no masses,  no organomegaly  Extremities: OA joint changes  Neurologic: Gabriela Baker is able to answer simple questions and follow simple commands.  EOMI, PERRL, VFF.  No facial weakness.  Moves the left arm and both lower extremities purposefully without gross weakness.  She has improved right arm and hand movement. Assessment:   1.         Bilateral (right frontal and left parietal) hemisphere acute cerebral infarcts  2.         COPD, aspiration pneumonia  3.         Acute respiratory failure.  She continues to need 50% FIO2  4.         Malnutrition  5.         Tobacco dependence    Plan:   1. Plans for bronchoscopy on Monday  2.         Ventilator support as necessary  3.         Zosyn  4.         Nebs  5.         ASA/Eliquis  6.         Limit sedation     Discharge planning: To be determined    Reviewed treatment plans with the patient and/or family. 25 minutes spent in face to face interaction and coordination of care.      Electronically signed by Jillian Gudino MD on 11/25/2021 at 12:13 PM

## 2021-11-25 NOTE — PROGRESS NOTES
Pt is awake , alert, following commands. Able to communicate by pointing and nodding yes and no to questions. HR sinus. Pt on Levophed gtt at 3mcg/min BP 97/56 at present. Sats 91-93% on 50% FIO2 per vent. Lungs with insp mild wheezing both lung fields, diminished in lower lobes. Secretions small amt suctioned from ET. Oral clear secretions. No edema. Pulses palpable. Young with clear yellow urine.  Electronically signed by Ivania Russell RN on 11/24/2021 at 7:01 PM

## 2021-11-25 NOTE — CONSULTS
**Physician Signature**  This document was electronically signed by: Stephanie Alford DO  2021   10:57 AM    **Consult Information**  Member Facility: [address] [place] [address] Drive MRN: [id number]  Visit/Encounter Number: [id number]  Consult ID: [id number]  Facility Time Zone: CT  Date and Time of Request: 2021 05:19 AM  CT  Requesting Clinician: Charissa Orourke DO  Patient Name: Ani Baker  YOB: 1955  Gender: Female  Patient identity was confirmed at the beginning of the consult with the   patient/family/staff using two personal identifiers: Patient name and       **Reason for Consult**  Reason for Consult: [facility]    **Admission**  Admission Date: 2021  Chief reason for ICU admission: Respiratory Failure  Secondary reasons for ICU admission: Pneumonia, Altered Mental   Status    **Core Metrics**  General orienting sentence for patient: 73 yo female admitted  (not   covid) found unresponsive at home with sats in 46s. Hx lung cancer and   COPD. ON bipap CT scan brain negative for stroke. REmains pleasantly   confused no follow commands could get MRI. Was full of secretions and tenuous   status anyway. MRI shows   probable subacute CVA, but no hemorrhage. Anisocoria. Propofol. Currently on   85%. Hx lung cancer, currently undergoing treatment. 3 PPD ongoing   tobacco. Remains on 30 mcg/kg/min propofol. 75% FIO2, 5 PEEP. Lots of   secretions desaturation event this AM, and required 100% briefly. She remains   on   propofol gtt. gtt. FiO2 50%. PEEP 5. Changed RR to 24. 75% today. is awake   and alert but anxious  She remains on Propofol gtt. She wakes up and follows   commands. Acute CVA diagnosed. Moving all extremities, alert on propofol. FIO2   down to   50%, 5 PEEP. commands. Voluminous secretions. Propofol for calming. [facility]   vent: AC -18/400/0.5/+5, being treated for pneumonia. vent 50% and 5 PEEP. On   midodrine and 1mcg NE.   Chief physiologic deterioration: None - Stable patient  Is the patient on DVT prophylaxis?: Yes  Prophylaxis type: Pharmacological  DVT Prophylaxis Comments: Eliquis  Is the patient on GI prophylaxis?: Yes  Gi Prophylaxis Comments: Pepcid  Has this patient reached their nutritional goal?: Yes  Nutritional Goals Details: Tube feeds  Are there current issues with pain management in this patient?:   No  Are there issues with skin integrity?: Yes and issues are being   addressed  Skin Integrity Details: redness perianal  Are there issues with delirium?: No  Has the patient been mobilized?: No  Is this patient currently intubated?: Yes  Has facility initiated vent bundle?: Yes  Are there ethical or care philosophy or family issues?: No  Family Issues Details: 2 children.  Palliative care  Do you recommend an in depth evaluation?: No  Disposition Recommendations: Continue ICU level of care    **Inserted/Removed Devices**  Device Name: Young Catheter  Site of insertion: Urethra  Date of insertion: 11-  Device Name: Orogastric Tube  Site of insertion: Mouth  Date of insertion: 11-  Device Name: Endotracheal Tube  Site of insertion: Trachea  Date of insertion: 11-  Device Name: Jada Door  Site of insertion: Subclavian - Right   Date of insertion: Unknown    **Physician Signature**  This document was electronically signed by: Jama Anderson DO  11/25/2021   10:57 AM

## 2021-11-25 NOTE — PROGRESS NOTES
Pt now on Precedex at 0.5mcg/kg/hr and Fentanyl gtt at 50mcg/kg/min. Pt woke to me walking into room. When I stated I was going to get someone to help me reposition her to get her off her \"booty\", she smiled at me. Pt when repositioning,  her R leg off the bed without assist. After repositioning pt was questioned if that was better and without hesitation pt nodded yes and smiled. Pt Sats 98% on FIO2 45% per vent. RR 18 HR 62 sinus. /28 at present. Lung unchanged clear, but diminished.  Electronically signed by Alan Ansari RN on 11/25/2021 at 4:54 PM

## 2021-11-25 NOTE — PROGRESS NOTES
Hospitalist Progress Note  [facility]     Patient: Adrián Tate  : 1955  MRN: [id number]  Code Status: [facility]    Hospital Day: 13   Date of Service: 2021    Subjective:   Patient seen and examined. Intubated and sedated. Remains alert and continues to follow commands. 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     COPD (chronic obstructive pulmonary disease) (HCC)     Depression     Emphysema (subcutaneous) (surgical) resulting from a procedure     History of lung biopsy 2019    Malignant neoplasm of right main bronchus ([address] 2019    NSCLC, SCC [id number] stage IIIb    Palliative care patient 2021    Syncope     TIA (transient ischemic attack)        Past Surgical History:   Procedure Laterality Date    APPENDECTOMY      BACK SURGERY      times two    BLADDER SUSPENSION      CHOLECYSTECTOMY      HYSTERECTOMY      INCONTINENCE SURGERY         Family History   Problem Relation Age of Onset    Colon Cancer Mother     High Blood Pressure Brother     Diabetes Brother        Social History     Socioeconomic History    Marital status:       Spouse name: Not on file    Number of children: Not on file    Years of education: Not on file    Highest education level: Not on file   Occupational History    Not on file   Tobacco Use    Smoking status: Current Every Day Smoker    Smokeless tobacco: Never Used   Substance and Sexual Activity    Alcohol use: Not Currently    Drug use: Never    Sexual activity: Not on file   Other Topics Concern    Not on file   Social History Narrative    Not on file     Social Determinants of Health     Financial Resource Strain:     Difficulty of Paying Living Expenses: Not on file   Food Insecurity:     Worried About Running Out of Food in the Last Year: Not on file    Trisha of Food in the Last Year: Not on file   Transportation Needs:     Lack of Transportation (Medical): Not on file    Lack of Transportation (Non-Medical):  Not on file   Physical Activity:     Days of Exercise per Week: Not on file    Minutes of Exercise per Session: Not on file   Stress:     Feeling of Stress : Not on file   Social Connections:     Frequency of Communication with Friends and Family: Not on file    Frequency of Social Gatherings with Friends and Family: Not on file    Attends Mu-ism Services: Not on file    Active Member of [address] [place] [address] or Organizations: Not on file    Attends Club or Organization Meetings: Not on file    Marital Status: Not on file   Intimate Partner Violence:     Fear of Current or Ex-Partner: Not on file    Emotionally Abused: Not on file    Physically Abused: Not on file    Sexually Abused: Not on file   Housing Stability:     Unable to Pay for Housing in the Last Year: Not on file    Number of [place] in the Last Year: Not on file    Unstable Housing in the Last Year: Not on file       Current Facility-Administered Medications   Medication Dose Route Frequency Provider Last Rate Last Admin    norepinephrine (LEVOPHED) 16 mg in sodium chloride 0.9 % 250 mL infusion  0.01-3.3 mcg/kg/min IntraVENous Continuous Genevieve Royal MD 92.4 mL/hr at 11/25/21 0510 2 mcg/kg/min at 11/25/21 0510    lactated ringers infusion   IntraVENous Continuous José Luis Rabago MD 75 mL/hr at 11/24/21 1235 New Bag at 11/24/21 1235    midodrine (PROAMATINE) tablet 2.5 mg  2.5 mg Oral TID WC José Luis Rabago MD   2.5 mg at 11/25/21 1030    ipratropium-albuterol (DUONEB) nebulizer solution 1 ampule  1 ampule Inhalation Q6H Niki Melgar MD   1 ampule at 11/25/21 0641    apixaban (ELIQUIS) tablet 5 mg  5 mg Oral BID José Luis Rabago MD   5 mg at 11/25/21 1030    piperacillin-tazobactam (ZOSYN) 3,375 mg in dextrose 5 % 50 mL IVPB extended infusion (mini-bag)  3,375 mg IntraVENous Q8H José Luis Rabago MD   Stopped at 11/25/21 0810    nicotine (NICODERM CQ) 21 MG/24HR 1 patch  1 patch TransDERmal Daily Peter Meza MD   1 patch at 11/25/21 1030    famotidine (PEPCID) tablet 20 mg  20 mg Oral Daily Peter Meza MD   20 mg at 11/25/21 1030    chlorhexidine (PERIDEX) 0.12 % solution 15 mL  15 mL Mouth/Throat BID Octavia Carlin MD   15 mL at 11/25/21 1030    potassium chloride (KLOR-CON M) extended release tablet 40 mEq  40 mEq Oral PRN Octavia Carlin MD        Or    potassium bicarb-citric acid (EFFER-K) effervescent tablet 40 mEq  40 mEq Oral PRN Octavia Carlin MD   40 mEq at 11/24/21 0502    Or    potassium chloride 10 mEq/100 mL IVPB (Peripheral Line)  10 mEq IntraVENous PRN Octavia Carlin MD   Stopped at 11/17/21 1410    propofol injection  5-50 mcg/kg/min IntraVENous Titrated Gilmar Hughes DO 7.5 mL/hr at 11/24/21 2356 25 mcg/kg/min at 11/24/21 2356    citalopram (CELEXA) tablet 20 mg  20 mg Oral Daily Gilmar Hughes DO   20 mg at 11/25/21 1030    ondansetron (ZOFRAN-ODT) disintegrating tablet 4 mg  4 mg Oral Q8H PRN Gilmar Hughes DO        Or    [facility]dansetron [facility]) injection 4 mg  4 mg IntraVENous Q6H PRN Gilmar Hughes DO   4 mg at 11/13/21 0758    polyethylene glycol (GLYCOLAX) packet 17 g  17 g Oral Daily PRN Gilmar Hughes DO        aspirin EC tablet 81 mg  81 mg Oral Daily Gilmar Hughes DO   81 mg at 11/25/21 1030    Or    aspirin suppository 300 mg  300 mg Rectal Daily Gilmar Hughes DO   300 mg at 11/1955    atorvastatin (LIPITOR) tablet 40 mg  40 mg Oral Nightly Gilmar Hughes DO   40 mg at 11/24/21 2000    labetalol (NORMODYNE;TRANDATE) injection 10 mg  10 mg IntraVENous Q10 Min PRN Gilmar Hughes DO        morphine (PF) injection 2 mg  2 mg IntraVENous Q3H PRN Octavia Carlin MD   2 mg at 11/24/21 2000    thiamine (B-1) injection 100 mg  100 mg IntraVENous Daily Alexi Marx DO   100 mg at 11/25/21 9856         norepinephrine 2 mcg/kg/min (11/25/21 0510)    lactated ringers 75 mL/hr at 11/24/21 1235    propofol 25 mcg/kg/min (11/24/21 0795)        Objective:   BP 95/60   Pulse 81   Temp 98.7 °F (37.1 °C) (Temporal)   Resp 18   Ht 5' 5\" (1.651 m)   Wt 108 lb 11.2 oz (49.3 kg)   SpO2 92%   BMI 18.09 kg/m²     General: cachectic  HEENT: normocephalic, atraumatic  Neck: supple, symmetrical, trachea midline   Lungs: slowly improving rhonchi  Cardiovascular: s1 and s2 normal  Abdomen: soft, positive bowel sounds  Extremities: no edema or cyanosis   Neuro: intubated and sedated, alert, following commands  Skin: normal color and texture     Recent Labs     11/23/21 0400 11/24/21  0240 11/25/21  0415   WBC 7.5 11.5* 11.2*   RBC 2.70* 2.85* 2.78*   HGB 8.0* 8.8* 8.5*   HCT 28.4* 29.3* 28.5*   .2* 102.8* 102.5*   MCH 29.6 30.9 30.6   MCHC 28.2* 30.0* 29.8*    234 275     Recent Labs     11/23/21 0400 11/23/21  0421 11/24/21  0240 11/24/21  0409 11/24/21  1047 11/25/21  0415     --  142  --   --  141   K 3.8   < > 3.3* 3.2 3.6 3.7   ANIONGAP 6*  --  7  --   --  7     --  99  --   --  102   CO2 35*  --  36*  --   --  32*   BUN 11  --  10  --   --  6*   CREATININE 0.5  --  0.5  --   --  0.5   GLUCOSE 98  --  109  --   --  109   CALCIUM 8.4*  --  8.2*  --   --  8.3*    < > = values in this interval not displayed. Recent Labs     11/23/21 0400 11/24/21  0240 11/25/21  0415   MG 2.0 2.0 2.1     Recent Labs     11/23/21  0400 11/24/21  0240 11/25/21  0415   AST 15 16 15   ALT 14 16 16   BILITOT <0.2 0.3 0.3   ALKPHOS 54 59 57     No results for input(s): PH, PO2, PCO2, HCO3, BE, O2SAT in the last 72 hours. No results for input(s): TROPONINI in the last 72 hours. No results for input(s): INR in the last 72 hours. No results for input(s): LACTA in the last 72 hours.       Intake/Output Summary (Last 24 hours) at 11/25/2021 1250  Last data filed at 11/25/2021 0810  Gross per 24 hour   Intake 2724.9 hospital to further discuss.  Palliative following.     Patient's son Shelby Mendoza) requested DNR status on 11/20/2021.  All questions sought and answered.  Unit staff witnessed the entire conversation.     Total critical care time: 43 minutes    Ashlyn Uriarte MD   11/25/2021 12:50 PM

## 2021-11-26 NOTE — PROGRESS NOTES
Hospitalist Progress Note  [facility] [place]     Patient: Malena Johnson  : 1955  MRN: [id number]  Code Status: [facility]    Hospital Day: 14   Date of Service: 2021    Subjective:   Patient seen and examined. Intubated and sedated. Remains alert. Continues to follow commands. 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     COPD (chronic obstructive pulmonary disease) (HCC)     Depression     Emphysema (subcutaneous) (surgical) resulting from a procedure     History of lung biopsy 2019    Malignant neoplasm of right main bronchus ([address] 2019    NSCLC, SCC [id number] stage IIIb    Palliative care patient 2021    Syncope     TIA (transient ischemic attack)        Past Surgical History:   Procedure Laterality Date    APPENDECTOMY      BACK SURGERY      times two    BLADDER SUSPENSION      CHOLECYSTECTOMY      HYSTERECTOMY      INCONTINENCE SURGERY         Family History   Problem Relation Age of Onset    Colon Cancer Mother     High Blood Pressure Brother     Diabetes Brother        Social History     Socioeconomic History    Marital status:       Spouse name: Not on file    Number of children: Not on file    Years of education: Not on file    Highest education level: Not on file   Occupational History    Not on file   Tobacco Use    Smoking status: Current Every Day Smoker    Smokeless tobacco: Never Used   Substance and Sexual Activity    Alcohol use: Not Currently    Drug use: Never    Sexual activity: Not on file   Other Topics Concern    Not on file   Social History Narrative    Not on file     Social Determinants of Health     Financial Resource Strain:     Difficulty of Paying Living Expenses: Not on file   Food Insecurity:     Worried About Running Out of Food in the Last Year: Not on file    Trisha of Food in the Last Year: Not on file   Transportation Needs:     Lack of Transportation (Medical): Not on file    Lack of Transportation (Non-Medical):  Not on file   Physical Activity:     Days of Exercise per Week: Not on file    Minutes of Exercise per Session: Not on file   Stress:     Feeling of Stress : Not on file   Social Connections:     Frequency of Communication with Friends and Family: Not on file    Frequency of Social Gatherings with Friends and Family: Not on file    Attends Scientology Services: Not on file    Active Member of Clubs or Organizations: Not on file    Attends Club or Organization Meetings: Not on file    Marital Status: Not on file   Intimate Partner Violence:     Fear of Current or Ex-Partner: Not on file    Emotionally Abused: Not on file    Physically Abused: Not on file    Sexually Abused: Not on file   Housing Stability:     Unable to Pay for Housing in the Last Year: Not on file    Number of [place] in the Last Year: Not on file    Unstable Housing in the Last Year: Not on file       Current Facility-Administered Medications   Medication Dose Route Frequency Provider Last Rate Last Admin    norepinephrine (LEVOPHED) 16 mg in sodium chloride 0.9 % 250 mL infusion  0.01-3.3 mcg/kg/min IntraVENous Continuous Renee Merida MD 4.7 mL/hr at 11/26/21 0345 5 mcg/min at 11/26/21 0345    lactated ringers infusion   IntraVENous Continuous Natalie Sun MD 75 mL/hr at 11/25/21 1423 New Bag at 11/25/21 1423    midodrine (PROAMATINE) tablet 2.5 mg  2.5 mg Oral TID WC Natalie Sun MD   2.5 mg at 11/26/21 0942    ipratropium-albuterol (DUONEB) nebulizer solution 1 ampule  1 ampule Inhalation Q6H Niki Melgar MD   1 ampule at 11/26/21 1105    apixaban (ELIQUIS) tablet 5 mg  5 mg Oral BID Natalie Sun MD   5 mg at 11/26/21 0942    piperacillin-tazobactam (ZOSYN) 3,375 mg in dextrose 5 % 50 mL IVPB extended infusion (mini-bag)  3,375 mg IntraVENous Q8H Natalie Sun MD   Stopped at 11/26/21 0815    nicotine (Georgana Kevin CQ) 21 MG/24HR 1 patch  1 patch TransDERmal Daily Minda Lyons MD   1 patch at 11/26/21 0944    famotidine (PEPCID) tablet 20 mg  20 mg Oral Daily Minda Lyons MD   20 mg at 11/26/21 0942    chlorhexidine (PERIDEX) 0.12 % solution 15 mL  15 mL Mouth/Throat BID Asia Macario MD   15 mL at 11/26/21 0944    potassium chloride (KLOR-CON M) extended release tablet 40 mEq  40 mEq Oral PRN Asia Macario MD        Or    potassium bicarb-citric acid (EFFER-K) effervescent tablet 40 mEq  40 mEq Oral PRN Asia Macario MD   40 mEq at 11/24/21 0502    Or    potassium chloride 10 mEq/100 mL IVPB (Peripheral Line)  10 mEq IntraVENous PRN Asia Macario MD   Stopped at 11/17/21 1410    propofol injection  5-50 mcg/kg/min IntraVENous Titrated Arturo Moe DO 7.5 mL/hr at 11/26/21 0115 25 mcg/kg/min at 11/26/21 0115    citalopram (CELEXA) tablet 20 mg  20 mg Oral Daily Arturo Moe DO   20 mg at 11/26/21 8212    ondansetron (ZOFRAN-ODT) disintegrating tablet 4 mg  4 mg Oral Q8H PRN Arturo Moe DO        Or    ondansetron [facility]) injection 4 mg  4 mg IntraVENous Q6H PRN Arturo Moe DO   4 mg at 11/13/21 0758    polyethylene glycol (GLYCOLAX) packet 17 g  17 g Oral Daily PRN Arturo Moe DO        aspirin EC tablet 81 mg  81 mg Oral Daily Arturo Moe DO   81 mg at 11/26/21 6419    Or    aspirin suppository 300 mg  300 mg Rectal Daily Arturo Moe DO   300 mg at 11/14/21 5337    atorvastatin (LIPITOR) tablet 40 mg  40 mg Oral Nightly Arturo Moe DO   40 mg at 11/25/21 7628    labetalol (NORMODYNE;TRANDATE) injection 10 mg  10 mg IntraVENous Q10 Min PRN Arturo Moe DO        morphine (PF) injection 2 mg  2 mg IntraVENous Q3H PRN Asia Macario MD   2 mg at 11/24/21 2000    thiamine (B-1) injection 100 mg  100 mg IntraVENous Daily Lazaro Lockwood DO   100 mg at 11/26/21 0966         norepinephrine 5 mcg/min (11/26/21 2965)    lactated ringers 75 mL/hr at 11/25/21 1423    propofol 25 mcg/kg/min (11/26/21 0115)        Objective:   BP (!) 96/54   Pulse 88   Temp 98.3 °F (36.8 °C) (Temporal)   Resp 23   Ht 5' 5\" (1.651 m)   Wt 108 lb 11.2 oz (49.3 kg)   SpO2 (!) 89%   BMI 18.09 kg/m²     General: cachectic  HEENT: normocephalic, atraumatic  Neck: supple, symmetrical, trachea midline   Lungs: slowly improving rhonchi  Cardiovascular: s1 and s2 normal  Abdomen: soft, positive bowel sounds  Extremities: no edema or cyanosis   Neuro: intubated and sedated, alert, following commands  Skin: normal color and texture     Recent Labs     11/24/21 0240 11/25/21 0415 11/26/21 0420   WBC 11.5* 11.2* 10.4   RBC 2.85* 2.78* 3.01*   HGB 8.8* 8.5* 8.9*   HCT 29.3* 28.5* 31.0*   .8* 102.5* 103.0*   MCH 30.9 30.6 29.6   MCHC 30.0* 29.8* 28.7*    275 329     Recent Labs     11/24/21 0240 11/24/21  0409 11/24/21  1047 11/25/21 0415 11/26/21 0420     --   --  141 140   K 3.3*   < > 3.6 3.7 3.9   ANIONGAP 7  --   --  7 8   CL 99  --   --  102 100   CO2 36*  --   --  32* 32*   BUN 10  --   --  6* 7*   CREATININE 0.5  --   --  0.5 0.5   GLUCOSE 109  --   --  109 109   CALCIUM 8.2*  --   --  8.3* 8.7*    < > = values in this interval not displayed. Recent Labs     11/24/21 0240 11/25/21 0415 11/26/21 0420   MG 2.0 2.1 2.2     Recent Labs     11/24/21  0240 11/25/21  0415 11/26/21  0420   AST 16 15 22   ALT 16 16 22   BILITOT 0.3 0.3 0.3   ALKPHOS 59 57 60     No results for input(s): PH, PO2, PCO2, HCO3, BE, O2SAT in the last 72 hours. No results for input(s): TROPONINI in the last 72 hours. No results for input(s): INR in the last 72 hours. No results for input(s): LACTA in the last 72 hours.       Intake/Output Summary (Last 24 hours) at 11/26/2021 1223  Last data filed at 11/26/2021 0800  Gross per 24 hour   Intake 3043.9 ml   Output 3255 ml   Net -211.1 ml       No results

## 2021-11-26 NOTE — PROGRESS NOTES
[facility] Neurology  [address] [place] [address] Drive, [address]  [place], [place] [address]  Phone [phone]     Neurology Progress Note  2021 11:23 AM  Information:   Patient Name: Tami Zurita  :   1955  Age:   74 y.o. MRN:   [id number]  Account #:  [de-identified]  Admit Date:   2021  Today:  21     ADMIT DX:   <principal problem not specified>    Subjective:     74 y.o. admitted with AMS, hypoxia, pneumonia, lung cancer history, COPD, right sided weakness, slurred speech, MRI consistent with scattered bi-hemishperic strokes, likely embolic vs watershed. CTAs negative. ECHO, CD negative. EEG with slowing, nothing epileptiform. Intubated. Interval History:   She remains intubated in the CCU. She is awake and cooperative. She is now moving the right arm better. She remains on norepinephrine. 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     COPD (chronic obstructive pulmonary disease) (HCC)     Depression     Emphysema (subcutaneous) (surgical) resulting from a procedure     History of lung biopsy 2019    Malignant neoplasm of right main bronchus ([address] 2019    NSCLC, SCC [id number] stage IIIb    Palliative care patient 2021    Syncope     TIA (transient ischemic attack)        Past Surgical History:   Procedure Laterality Date    APPENDECTOMY      BACK SURGERY      times two    BLADDER SUSPENSION      CHOLECYSTECTOMY      HYSTERECTOMY      INCONTINENCE SURGERY         Family History   Problem Relation Age of Onset    Colon Cancer Mother     High Blood Pressure Brother     Diabetes Brother        Social History     Socioeconomic History    Marital status:       Spouse name: Not on file    Number of children: Not on file    Years of education: Not on file    Highest education level: Not on file   Occupational History    Not on file   Tobacco Use    Smoking status: Current Every Day Smoker    Smokeless tobacco: Never Used   Substance and Sexual Activity    Alcohol use: Not Currently    Drug use: Never    Sexual activity: Not on file   Other Topics Concern    Not on file   Social History Narrative    Not on file     Social Determinants of Health     Financial Resource Strain:     Difficulty of Paying Living Expenses: Not on file   Food Insecurity:     Worried About Running Out of Food in the Last Year: Not on file    Trisha of Food in the Last Year: Not on file   Transportation Needs:     Lack of Transportation (Medical): Not on file    Lack of Transportation (Non-Medical):  Not on file   Physical Activity:     Days of Exercise per Week: Not on file    Minutes of Exercise per Session: Not on file   Stress:     Feeling of Stress : Not on file   Social Connections:     Frequency of Communication with Friends and Family: Not on file    Frequency of Social Gatherings with Friends and Family: Not on file    Attends Restorationism Services: Not on file    Active Member of [address] [place] [address] or Organizations: Not on file    Attends Club or Organization Meetings: Not on file    Marital Status: Not on file   Intimate Partner Violence:     Fear of Current or Ex-Partner: Not on file    Emotionally Abused: Not on file    Physically Abused: Not on file    Sexually Abused: Not on file   Housing Stability:     Unable to Pay for Housing in the Last Year: Not on file    Number of [place] in the Last Year: Not on file    Unstable Housing in the Last Year: Not on file       Medications:   norepinephrine 5 mcg/min (11/26/21 0345)    lactated ringers 75 mL/hr at 11/25/21 1423    propofol 25 mcg/kg/min (11/26/21 0115)      midodrine  2.5 mg Oral TID WC    ipratropium-albuterol  1 ampule Inhalation Q6H    apixaban  5 mg Oral BID    piperacillin-tazobactam  3,375 mg IntraVENous Q8H    nicotine  1 patch TransDERmal Daily    famotidine  20 mg Oral Daily    chlorhexidine  15 mL Mouth/Throat BID    citalopram  20 mg Oral Daily    aspirin  81 mg Oral Daily    Or    aspirin  300 mg Rectal Daily    atorvastatin  40 mg Oral Nightly    thiamine  100 mg IntraVENous Daily       Diagnostic Studies:  Reviewed all labs/diagnositcs since last 24hrs:  Recent Results (from the past 24 hour(s))   CBC Auto Differential    Collection Time: 11/26/21  4:20 AM   Result Value Ref Range    WBC 10.4 4.8 - 10.8 K/uL    RBC 3.01 (L) 4.20 - 5.40 M/uL    Hemoglobin 8.9 (L) 12.0 - 16.0 g/dL    Hematocrit 31.0 (L) 37.0 - 47.0 %    .0 (H) 81.0 - 99.0 fL    MCH 29.6 27.0 - 31.0 pg    MCHC 28.7 (L) 33.0 - 37.0 g/dL    RDW 16.8 (H) 11.5 - 14.5 %    Platelets 573 981 - 820 K/uL    MPV 12.2 9.4 - 12.3 fL    PLATELET SLIDE REVIEW Adequate     Neutrophils % 79.3 (H) 50.0 - 65.0 %    Lymphocytes % 11.5 (L) 20.0 - 40.0 %    Monocytes % 6.8 0.0 - 10.0 %    Eosinophils % 1.5 0.0 - 5.0 %    Basophils % 0.3 0.0 - 1.0 %    Neutrophils Absolute 8.2 (H) 1.5 - 7.5 K/uL    Immature Granulocytes # 0.1 K/uL    Lymphocytes Absolute 1.2 1.1 - 4.5 K/uL    Monocytes Absolute 0.70 0.00 - 0.90 K/uL    Eosinophils Absolute 0.20 0.00 - 0.60 K/uL    Basophils Absolute 0.00 0.00 - 0.20 K/uL    Macrocytes 1+ (A)     Hypochromia 1+ (A)     Stomatocytes 1+ (A)    Comprehensive Metabolic Panel    Collection Time: 11/26/21  4:20 AM   Result Value Ref Range    Sodium 140 136 - 145 mmol/L    Potassium 3.9 3.5 - 5.0 mmol/L    Chloride 100 98 - 111 mmol/L    CO2 32 (H) 22 - 29 mmol/L    Anion Gap 8 7 - 19 mmol/L    Glucose 109 74 - 109 mg/dL    BUN 7 (L) 8 - 23 mg/dL    CREATININE 0.5 0.5 - 0.9 mg/dL    GFR Non-African American >60 >60    GFR African American >59 >59    Calcium 8.7 (L) 8.8 - 10.2 mg/dL    Total Protein 5.7 (L) 6.6 - 8.7 g/dL    Albumin 2.3 (L) 3.5 - 5.2 g/dL    Total Bilirubin 0.3 0.2 - 1.2 mg/dL    Alkaline Phosphatase 60 35 - 104 U/L    ALT 22 5 - 33 U/L    AST 22 5 - 32 U/L   Magnesium    Collection Time: 11/26/21  4:20 AM Result Value Ref Range    Magnesium 2.2 1.6 - 2.4 mg/dL       Diet:  Diet NPO  ADULT TUBE FEEDING; Nasogastric; Other Tube Feeding (specify); vital 1.5; Continuous; 35; No; 35; Q 1 hour; Protein; 1 Proteinex given as flush in 24 hours    Examination:  Vitals: BP (!) 96/54   Pulse 88   Temp 98.3 °F (36.8 °C) (Temporal)   Resp 23   Ht 5' 5\" (1.651 m)   Wt 108 lb 11.2 oz (49.3 kg)   SpO2 (!) 89%   BMI 18.09 kg/m²      Intake/Output Summary (Last 24 hours) at 11/26/2021 1123  Last data filed at 11/26/2021 0800  Gross per 24 hour   Intake 3247.15 ml   Output 3255 ml   Net -7.85 ml     General appearance: She is a cachectic chronically ill appearing woman who is intubated and awake in the CCU  HEENT: Exam; heent: Head: Normal, normocephalic, atraumatic. Lungs: rhonchi bilaterally  Heart: regular rate and rhythm, S1, S2 normal, no murmur, click, rub or gallop  Abdomen: soft, non-tender; bowel sounds normal; no masses,  no organomegaly  Extremities: OA joint changes  Neurologic: Marveen Nose is able to answer simple questions and follow simple commands.  EOMI, PERRL, VFF.  No facial weakness.  Moves the left arm and both lower extremities purposefully without gross weakness.  She has improved right arm and hand movement. Assessment:   1.         Bilateral (right frontal and left parietal) hemisphere acute cerebral infarcts  2.         COPD, aspiration pneumonia  3.         Acute respiratory failure.  She continues to need 50% FIO2  4.         Malnutrition  5.         Tobacco dependence    Plan:   1. Plans for bronchoscopy on Monday  2.         Ventilator support as necessary  3.         Zosyn  4.         Nebs  5.         ASA/Eliquis  6.         Limit sedation     Discharge planning: To be determined    Reviewed treatment plans with the patient and/or family. 25 minutes spent in face to face interaction and coordination of care.      Electronically signed by Mariam Kumar MD on 11/26/2021 at 11:23 AM

## 2021-11-26 NOTE — CONSULTS
**Physician Signature**  This document was electronically signed by: Yancy Mitchell DO  2021 11:16   AM    **Consult Information**  Member Facility: [address] [place] [address] Drive MRN: [id number]  Visit/Encounter Number: [id number]  Consult ID: [id number]  Facility Time Zone: CT  Date and Time of Request: 2021 06:05 AM  CT  Requesting Clinician: Janet Velazquez DO  Patient Name: Ilda Herrera  YOB: 1955  Gender: Female  Patient identity was confirmed at the beginning of the consult with the   patient/family/staff using two personal identifiers: Patient name and       **Reason for Consult**  Reason for Consult: [facility]    **Admission**  Admission Date: 2021  Chief reason for ICU admission: Respiratory Failure  Secondary reasons for ICU admission: Pneumonia, Altered Mental   Status    **Core Metrics**  General orienting sentence for patient: 73 yo female admitted  (not   covid) found unresponsive at home with sats in 46s. Hx lung cancer and   COPD. ON bipap CT scan brain negative for stroke. REmains pleasantly   confused no follow commands could get MRI. Was full of secretions and tenuous   status anyway. MRI shows   probable subacute CVA, but no hemorrhage. Anisocoria. Propofol. Currently on   85%. Hx lung cancer, currently undergoing treatment. 3 PPD ongoing   tobacco. Remains on 30 mcg/kg/min propofol. 75% FIO2, 5 PEEP. Lots of   secretions desaturation event this AM, and required 100% briefly. She remains   on   propofol gtt. gtt. FiO2 50%. PEEP 5. Changed RR to 24. 75% today. is awake   and alert but anxious  She remains on Propofol gtt. She wakes up and follows   commands. Acute CVA diagnosed. Moving all extremities, alert on propofol. FIO2   down to   50%, 5 PEEP. commands. Voluminous secretions. Propofol for calming. [facility]   vent: AC -18/400/0.5/+5, being treated for pneumonia. vent 50% and 5 PEEP. On   midodrine and 1mcg NE. sedation. Norepinephrine at 5mcg/min.   TFs at goal.    50%, PEEP of   5. Chief physiologic deterioration: Increase in FiO2 required by   30%  Is the patient on DVT prophylaxis?: Yes  Prophylaxis type: Pharmacological  DVT Prophylaxis Comments: Eliquis  Is the patient on GI prophylaxis?: Yes  Gi Prophylaxis Comments: Pepcid  Has this patient reached their nutritional goal?: Yes  Nutritional Goals Details: Tube feeds  Are there current issues with pain management in this patient?:   No  Are there issues with skin integrity?: Yes and issues are being   addressed  Skin Integrity Details: redness perianal  Are there issues with delirium?: No  Has the patient been mobilized?: No  Is this patient currently intubated?: Yes  Has facility initiated vent bundle?: Yes  Are there ethical or care philosophy or family issues?: No  Family Issues Details: 2 children.  Palliative care  Do you recommend an in depth evaluation?: No  Disposition Recommendations: Continue ICU level of care    **Inserted/Removed Devices**  Device Name: Young Catheter  Site of insertion: Urethra  Date of insertion: 11-  Device Name: Orogastric Tube  Site of insertion: Mouth  Date of insertion: 11-  Device Name: Endotracheal Tube  Site of insertion: Trachea  Date of insertion: 11-  Device Name: Jenet Pancoast  Site of insertion: Subclavian - Right   Date of insertion: Unknown    **Physician Signature**  This document was electronically signed by: Janine Hill DO  11/26/2021 11:16   AM

## 2021-11-26 NOTE — PROGRESS NOTES
ringers 75 mL/hr at 11/25/21 1423    propofol 25 mcg/kg/min (11/26/21 0115)      midodrine  2.5 mg Oral TID WC    ipratropium-albuterol  1 ampule Inhalation Q6H    apixaban  5 mg Oral BID    piperacillin-tazobactam  3,375 mg IntraVENous Q8H    nicotine  1 patch TransDERmal Daily    famotidine  20 mg Oral Daily    chlorhexidine  15 mL Mouth/Throat BID    citalopram  20 mg Oral Daily    aspirin  81 mg Oral Daily    Or    aspirin  300 mg Rectal Daily    atorvastatin  40 mg Oral Nightly    thiamine  100 mg IntraVENous Daily     potassium chloride **OR** potassium alternative oral replacement **OR** potassium chloride, ondansetron **OR** ondansetron, polyethylene glycol, labetalol, morphine  Diet NPO  ADULT TUBE FEEDING; Nasogastric; Other Tube Feeding (specify); vital 1.5; Continuous; 35; No; 35; Q 1 hour; Protein; 1 Proteinex given as flush in 24 hours     Lab and other Data:     Recent Labs     11/24/21 0240 11/25/21 0415 11/26/21 0420   WBC 11.5* 11.2* 10.4   HGB 8.8* 8.5* 8.9*    275 329     Recent Labs     11/24/21 0240 11/24/21 0409 11/24/21  1047 11/25/21 0415 11/26/21 0420     --   --  141 140   K 3.3*   < > 3.6 3.7 3.9   CL 99  --   --  102 100   CO2 36*  --   --  32* 32*   BUN 10  --   --  6* 7*   CREATININE 0.5  --   --  0.5 0.5   GLUCOSE 109  --   --  109 109    < > = values in this interval not displayed.      Recent Labs     11/24/21 0240 11/25/21 0415 11/26/21 0420   AST 16 15 22   ALT 16 16 22   BILITOT 0.3 0.3 0.3   ALKPHOS 59 57 60     ABG:  Recent Labs     11/24/21 0409   PHART 7.480*   CEU0BSR 57.0*   PO2ART 65.0*   LZS1LFZ 42.4*   BEART 16.8*   HGBAE 9.1*   B6SHTYOH 92.4   CARBOXHGBART 2.0       Assessment/Plan   Active Problems:    Acute hypoxemic respiratory failure (HCC)    Stroke of unknown etiology (HCC)    Palliative care patient    Severe malnutrition (HCC)    Altered mental status    Centrilobular emphysema (HCC)    Primary squamous cell carcinoma of right lung ([facility] [address]  Resolved Problems:    * No resolved hospital problems. *      Visit Summary:  Chart reviewed. No acute overnight events. Plans for Bronchoscopy Monday. Will follow. Recommendations:   1. Palliative care: GOC to be determined based on patient's status s/p   Bronchoscopy. Code status: DNR    2. Acute hypoxemic respiratory failure-ventilator mgmt per Pulmonology/Hospitalist, bronchoscopy planned 11/29/2021    3. Bilateral (right frontal/left parietal) hemisphere acute cerebral infarcts-Neurology following, supportive care. Remains responsive to verbal stimuli     Thank you for consulting Palliative Care and allowing us to participate in the care of this patient.    Time Spent Counseling > 50%:  YES                                   Total Time Spent with patient/family counseling, workup/treatment review, counseling and placement of orders/preparation of this note: 16 minutes    Electronically signed by Triny Sexton PA-C on 11/26/2021 at 10:24 AM    (Please note that portions of this note were completed with a voice recognition program.  Patricia Tirado made to edit the dictations but occasionally words are mis-transcribed.)

## 2021-11-27 NOTE — PROGRESS NOTES
Pt transferred to ICU room 145. Placed to bedside monitoring. HR sinus 90's. BP 99/56. Levophed gtt now turned off. RR 29 Sats 92% on FIO2 50%.  Electronically signed by Griselda Skates, RN on 11/27/2021 at 5:45 PM

## 2021-11-27 NOTE — CONSULTS
**Physician Signature**  This document was electronically signed by: Charlene Delarosa MD  2021   10:55 AM    **Consult Information**  Member Facility: [address] [place] [address] Drive MRN: [id number]  Visit/Encounter Number: [id number]  Consult ID: [id number]  Facility Time Zone: CT  Date and Time of Request: 2021 09:14 AM  CT  Requesting Clinician: Jackie Perez DO  Patient Name: Rafy Gutierrez  YOB: 1955  Gender: Female  Patient identity was confirmed at the beginning of the consult with the   patient/family/staff using two personal identifiers: Patient name and       **Reason for Consult**  Reason for Consult: [facility]    **Admission**  Admission Date: 2021  Chief reason for ICU admission: Respiratory Failure  Secondary reasons for ICU admission: Pneumonia, Altered Mental   Status    **Core Metrics**  General orienting sentence for patient: 73 yo female admitted  (not   covid) found unresponsive at home with sats in 46s. Hx lung cancer and   COPD. ON bipap CT scan brain negative for stroke. REmains pleasantly   confused no follow commands could get MRI. Was full of secretions and tenuous   status anyway. MRI shows   probable subacute CVA, but no hemorrhage. Anisocoria. Propofol. Currently on   85%. Hx lung cancer, currently undergoing treatment. 3 PPD ongoing   tobacco. Remains on 30 mcg/kg/min propofol. 75% FIO2, 5 PEEP. Lots of   secretions desaturation event this AM, and required 100% briefly. She remains   on   propofol gtt. gtt. FiO2 50%. PEEP 5. Changed RR to 24. 75% today. is awake   and alert but anxious  She remains on Propofol gtt. She wakes up and follows   commands. Acute CVA diagnosed. Moving all extremities, alert on propofol. FIO2   down to   50%, 5 PEEP. commands. Voluminous secretions. Propofol for calming. [facility]   vent: AC -18/400/0.5/+5, being treated for pneumonia. vent 50% and 5 PEEP. On   midodrine and 1mcg NE. sedation. Norepinephrine at 5mcg/min. TFs at goal.    50%, PEEP of 5. No changes per charge RN. Pts' RN is tied up with another   patient during   [address]. Chief physiologic deterioration: Increase in FiO2 required by   30%  Is the patient on DVT prophylaxis?: Yes  Prophylaxis type: Pharmacological  DVT Prophylaxis Comments: Eliquis  Is the patient on GI prophylaxis?: Yes  Gi Prophylaxis Comments: Pepcid  Has this patient reached their nutritional goal?: Yes  Nutritional Goals Details: Tube feeds  Are there current issues with pain management in this patient?:   No  Are there issues with skin integrity?: Yes and issues are being   addressed  Skin Integrity Details: redness perianal  Are there issues with delirium?: No  Has the patient been mobilized?: No  Is this patient currently intubated?: Yes  Has facility initiated vent bundle?: Yes  Are there ethical or care philosophy or family issues?: No  Family Issues Details: 2 children.  Palliative care  Do you recommend an in depth evaluation?: No  Disposition Recommendations: Continue ICU level of care    **Inserted/Removed Devices**  Device Name: Young Catheter  Site of insertion: Urethra  Date of insertion: 11-  Device Name: Orogastric Tube  Site of insertion: Mouth  Date of insertion: 11-  Device Name: Endotracheal Tube  Site of insertion: Trachea  Date of insertion: 11-  Device Name: Port-a-cath  Site of insertion: Subclavian - Right   Date of insertion: Unknown    **Physician Signature**  This document was electronically signed by: Gila Zapata MD  11/27/2021   10:55 AM

## 2021-11-27 NOTE — PROGRESS NOTES
Hospitalist Progress Note  [facility]     Patient: Tami Zurita  : 1955  MRN: [id number]  Code Status: [place] [facility] Day: 15   Date of Service: 2021    Subjective:   Patient seen and examined. Intubated and sedated. Remains alert and continues to follow commands. 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     COPD (chronic obstructive pulmonary disease) (HCC)     Depression     Emphysema (subcutaneous) (surgical) resulting from a procedure     History of lung biopsy 2019    Malignant neoplasm of right main bronchus ([address] 2019    NSCLC, SCC [id number] stage IIIb    Palliative care patient 2021    Syncope     TIA (transient ischemic attack)        Past Surgical History:   Procedure Laterality Date    APPENDECTOMY      BACK SURGERY      times two    BLADDER SUSPENSION      CHOLECYSTECTOMY      HYSTERECTOMY      INCONTINENCE SURGERY         Family History   Problem Relation Age of Onset    Colon Cancer Mother     High Blood Pressure Brother     Diabetes Brother        Social History     Socioeconomic History    Marital status:       Spouse name: Not on file    Number of children: Not on file    Years of education: Not on file    Highest education level: Not on file   Occupational History    Not on file   Tobacco Use    Smoking status: Current Every Day Smoker    Smokeless tobacco: Never Used   Substance and Sexual Activity    Alcohol use: Not Currently    Drug use: Never    Sexual activity: Not on file   Other Topics Concern    Not on file   Social History Narrative    Not on file     Social Determinants of Health     Financial Resource Strain:     Difficulty of Paying Living Expenses: Not on file   Food Insecurity:     Worried About Running Out of Food in the Last Year: Not on file    Trisha of Food in the Last Year: Not on file   Transportation Needs:     Lack of Transportation (Medical): Not on file    Lack of Transportation (Non-Medical):  Not on file   Physical Activity:     Days of Exercise per Week: Not on file    Minutes of Exercise per Session: Not on file   Stress:     Feeling of Stress : Not on file   Social Connections:     Frequency of Communication with Friends and Family: Not on file    Frequency of Social Gatherings with Friends and Family: Not on file    Attends Orthodoxy Services: Not on file    Active Member of [address] [place] [address] or Organizations: Not on file    Attends Club or Organization Meetings: Not on file    Marital Status: Not on file   Intimate Partner Violence:     Fear of Current or Ex-Partner: Not on file    Emotionally Abused: Not on file    Physically Abused: Not on file    Sexually Abused: Not on file   Housing Stability:     Unable to Pay for Housing in the Last Year: Not on file    Number of [place] in the Last Year: Not on file    Unstable Housing in the Last Year: Not on file       Current Facility-Administered Medications   Medication Dose Route Frequency Provider Last Rate Last Admin    midodrine (PROAMATINE) tablet 5 mg  5 mg Oral TID WC Odette Payne MD        norepinephrine (LEVOPHED) 16 mg in sodium chloride 0.9 % 250 mL infusion  0.01-3.3 mcg/kg/min IntraVENous Continuous Charissa Gates MD 4.7 mL/hr at 11/26/21 0345 5 mcg/min at 11/26/21 0345    lactated ringers infusion   IntraVENous Continuous Odette Payne MD 75 mL/hr at 11/26/21 1823 New Bag at 11/26/21 1823    ipratropium-albuterol (DUONEB) nebulizer solution 1 ampule  1 ampule Inhalation Q6H Niki Melgar MD   1 ampule at 11/27/21 0722    apixaban (ELIQUIS) tablet 5 mg  5 mg Oral BID Odette Payne MD   5 mg at 11/26/21 1946    piperacillin-tazobactam (ZOSYN) 3,375 mg in dextrose 5 % 50 mL IVPB extended infusion (mini-bag)  3,375 mg IntraVENous Q8H Odette Payne MD 12.5 mL/hr at 11/27/21 0358 3,375 mg at 11/27/21 0358    nicotine (Kylie Dines CQ) 21 MG/24HR 1 patch  1 patch TransDERmal Daily Daniel Esquivel MD   1 patch at 11/26/21 0944    famotidine (PEPCID) tablet 20 mg  20 mg Oral Daily Daniel Esquivel MD   20 mg at 11/26/21 0942    chlorhexidine (PERIDEX) 0.12 % solution 15 mL  15 mL Mouth/Throat BID Eli Marshall MD   15 mL at 11/26/21 1951    potassium chloride (KLOR-CON M) extended release tablet 40 mEq  40 mEq Oral PRN Eli Marshall MD        Or    potassium bicarb-citric acid (EFFER-K) effervescent tablet 40 mEq  40 mEq Oral PRN Eli Marshall MD   40 mEq at 11/24/21 0502    Or    potassium chloride 10 mEq/100 mL IVPB (Peripheral Line)  10 mEq IntraVENous PRN Eli Marshall MD   Stopped at 11/17/21 1410    propofol injection  5-50 mcg/kg/min IntraVENous Titrated Damián Damaris, DO 6 mL/hr at 11/27/21 0436 20 mcg/kg/min at 11/27/21 0436    citalopram (CELEXA) tablet 20 mg  20 mg Oral Daily Rantoul Damaris, DO   20 mg at 11/26/21 1810    ondansetron (ZOFRAN-ODT) disintegrating tablet 4 mg  4 mg Oral Q8H PRN Damián Damaris, DO        Or    ondansetron [facility]) injection 4 mg  4 mg IntraVENous Q6H PRN Rantoul Damaris, DO   4 mg at 11/13/21 0758    polyethylene glycol (GLYCOLAX) packet 17 g  17 g Oral Daily PRN Rantoul Damaris, DO        aspirin EC tablet 81 mg  81 mg Oral Daily Damián Damaris, DO   81 mg at 11/26/21 0761    Or    aspirin suppository 300 mg  300 mg Rectal Daily Rantoul Damaris, DO   300 mg at 11/14/21 7115    atorvastatin (LIPITOR) tablet 40 mg  40 mg Oral Nightly Damián Damaris, DO   40 mg at 11/26/21 3036    labetalol (NORMODYNE;TRANDATE) injection 10 mg  10 mg IntraVENous Q10 Min PRN Damián Damaris, DO        morphine (PF) injection 2 mg  2 mg IntraVENous Q3H PRN Faustine Rolando, MD   2 mg at 11/26/21 1949    thiamine (B-1) injection 100 mg  100 mg IntraVENous Daily Joi Coreas DO   100 mg at 11/26/21 9610         norepinephrine 5 mcg/min (11/26/21 0345)    lactated ringers 75 mL/hr at 11/26/21 1823    propofol 20 mcg/kg/min (11/27/21 1026)        Objective:   BP (!) 98/55   Pulse 86   Temp 97.8 °F (36.6 °C) (Temporal)   Resp 25   Ht 5' 5\" (1.651 m)   Wt 108 lb 11.2 oz (49.3 kg)   SpO2 93%   BMI 18.09 kg/m²     General: cachectic  HEENT: normocephalic, atraumatic  Neck: supple, symmetrical, trachea midline   Lungs: slowly improving rhonchi  Cardiovascular: s1 and s2 normal  Abdomen: soft, positive bowel sounds  Extremities: no edema or cyanosis   Neuro: intubated and sedated, alert, following commands  Skin: normal color and texture     Recent Labs     11/25/21 0415 11/26/21 0420 11/27/21 0400   WBC 11.2* 10.4 14.4*   RBC 2.78* 3.01* 2.86*   HGB 8.5* 8.9* 8.7*   HCT 28.5* 31.0* 29.3*   .5* 103.0* 102.4*   MCH 30.6 29.6 30.4   MCHC 29.8* 28.7* 29.7*    329 337     Recent Labs     11/25/21 0415 11/26/21 0420 11/27/21  0400    140 141   K 3.7 3.9 3.7   ANIONGAP 7 8 6*    100 101   CO2 32* 32* 34*   BUN 6* 7* 6*   CREATININE 0.5 0.5 0.5   GLUCOSE 109 109 116*   CALCIUM 8.3* 8.7* 8.6*     Recent Labs     11/25/21 0415 11/26/21 0420 11/27/21  0400   MG 2.1 2.2 2.0     Recent Labs     11/25/21 0415 11/26/21 0420 11/27/21  0400   AST 15 22 15   ALT 16 22 19   BILITOT 0.3 0.3 <0.2   ALKPHOS 57 60 65     No results for input(s): PH, PO2, PCO2, HCO3, BE, O2SAT in the last 72 hours. No results for input(s): TROPONINI in the last 72 hours. No results for input(s): INR in the last 72 hours. No results for input(s): LACTA in the last 72 hours. Intake/Output Summary (Last 24 hours) at 11/27/2021 1138  Last data filed at 11/27/2021 0400  Gross per 24 hour   Intake 3710.2 ml   Output 3050 ml   Net 660.2 ml       No results found.      Assessment and Plan:   Bihemispheric strokes  Neurology following  Meds per neurology  Neurochecks  PT/OT/SLP once able  Eliquis per neuro recs for likely embolic stroke, held starting today for upcoming bronch     Corynebacterium pneumonia  Zosyn per pharmacy     Ventilator dependent acute respiratory failure  Titrate minute ventilation for pH 7.35  Follow ABG/CXR  CTA chest 11/22/2021: No evidence of PE  Pulmonary following with plans for bronch 11/29/2021     History of lung cancer     Tobacco dependence     Severe protein calorie malnutrition  Dietitian following  Tube feeds on board     DVT prophylaxis  SCDs while Eliquis on hold     Extensive discussion with patient's brother Rick Gomez 11/20/2021 in regards to current clinical state/goals of care.  All questions sought and answered. Antonio Danielle will attempt to have patient's sons (Jaxson and Everett) visit the hospital to further discuss.  Palliative following.     Patient's son Ariela Keene) requested DNR status on 11/20/2021.  All questions sought and answered.  Unit staff witnessed the entire conversation.     Total critical care time: 43 minutes    Lavon Walker MD   11/27/2021 11:38 AM

## 2021-11-27 NOTE — PROGRESS NOTES
Levophed gtt now decreased to 3mcg/min. Pt has had total 5 Midodrine at 1200. /64. Will wean Levophed as tolerated. HR 87 sinus.  Electronically signed by Bard Driss RN on 11/27/2021 at 12:54 PM

## 2021-11-27 NOTE — PROGRESS NOTES
[address]  Tony Traore  Phone [phone]     Neurology Progress Note  2021 11:32 AM  Information:   Patient Name: Indira Rodriguez  :   1955  Age:   74 y.o. MRN:   [id number]  Account #:  [de-identified]  Admit Date:   2021  Today:  21     ADMIT DX:   <principal problem not specified>    Subjective:     74 y.o. admitted with AMS, hypoxia, pneumonia, lung cancer history, COPD, right sided weakness, slurred speech, MRI consistent with scattered bi-hemishperic strokes, likely embolic vs watershed. CTAs negative. ECHO, CD negative. EEG with slowing, nothing epileptiform. Intubated. Interval History:   She remains intubated in the CCU. She is awake and cooperative. No complaints noted    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     COPD (chronic obstructive pulmonary disease) (HCC)     Depression     Emphysema (subcutaneous) (surgical) resulting from a procedure     History of lung biopsy 2019    Malignant neoplasm of right main bronchus ([address] 2019    NSCLC, SCC [id number] stage IIIb    Palliative care patient 2021    Syncope     TIA (transient ischemic attack)        Past Surgical History:   Procedure Laterality Date    APPENDECTOMY      BACK SURGERY      times two    BLADDER SUSPENSION      CHOLECYSTECTOMY      HYSTERECTOMY      INCONTINENCE SURGERY         Family History   Problem Relation Age of Onset    Colon Cancer Mother     High Blood Pressure Brother     Diabetes Brother        Social History     Socioeconomic History    Marital status:       Spouse name: Not on file    Number of children: Not on file    Years of education: Not on file    Highest education level: Not on file   Occupational History    Not on file   Tobacco Use    Smoking status: Current Every Day Smoker    Smokeless tobacco: Never Used Substance and Sexual Activity    Alcohol use: Not Currently    Drug use: Never    Sexual activity: Not on file   Other Topics Concern    Not on file   Social History Narrative    Not on file     Social Determinants of Health     Financial Resource Strain:     Difficulty of Paying Living Expenses: Not on file   Food Insecurity:     Worried About Running Out of Food in the Last Year: Not on file    Trisha of Food in the Last Year: Not on file   Transportation Needs:     Lack of Transportation (Medical): Not on file    Lack of Transportation (Non-Medical):  Not on file   Physical Activity:     Days of Exercise per Week: Not on file    Minutes of Exercise per Session: Not on file   Stress:     Feeling of Stress : Not on file   Social Connections:     Frequency of Communication with Friends and Family: Not on file    Frequency of Social Gatherings with Friends and Family: Not on file    Attends Protestant Services: Not on file    Active Member of [address] [place] [address] or Organizations: Not on file    Attends Club or Organization Meetings: Not on file    Marital Status: Not on file   Intimate Partner Violence:     Fear of Current or Ex-Partner: Not on file    Emotionally Abused: Not on file    Physically Abused: Not on file    Sexually Abused: Not on file   Housing Stability:     Unable to Pay for Housing in the Last Year: Not on file    Number of [place] in the Last Year: Not on file    Unstable Housing in the Last Year: Not on file       Medications:   norepinephrine 5 mcg/min (11/26/21 0345)    lactated ringers 75 mL/hr at 11/26/21 1823    propofol 20 mcg/kg/min (11/27/21 0436)      midodrine  2.5 mg Oral TID     ipratropium-albuterol  1 ampule Inhalation Q6H    apixaban  5 mg Oral BID    piperacillin-tazobactam  3,375 mg IntraVENous Q8H    nicotine  1 patch TransDERmal Daily    famotidine  20 mg Oral Daily    chlorhexidine  15 mL Mouth/Throat BID    citalopram  20 mg Oral Daily    aspirin 81 mg Oral Daily    Or    aspirin  300 mg Rectal Daily    atorvastatin  40 mg Oral Nightly    thiamine  100 mg IntraVENous Daily       Diagnostic Studies:  Reviewed all labs/diagnositcs since last 24hrs:  Recent Results (from the past 24 hour(s))   CBC Auto Differential    Collection Time: 11/27/21  4:00 AM   Result Value Ref Range    WBC 14.4 (H) 4.8 - 10.8 K/uL    RBC 2.86 (L) 4.20 - 5.40 M/uL    Hemoglobin 8.7 (L) 12.0 - 16.0 g/dL    Hematocrit 29.3 (L) 37.0 - 47.0 %    .4 (H) 81.0 - 99.0 fL    MCH 30.4 27.0 - 31.0 pg    MCHC 29.7 (L) 33.0 - 37.0 g/dL    RDW 16.8 (H) 11.5 - 14.5 %    Platelets 901 550 - 916 K/uL    MPV 11.8 9.4 - 12.3 fL    Neutrophils % 88.0 (H) 50.0 - 65.0 %    Lymphocytes % 4.0 (L) 20.0 - 40.0 %    Monocytes % 4.0 0.0 - 10.0 %    Eosinophils % 0.0 0.0 - 5.0 %    Basophils % 0.0 0.0 - 1.0 %    Neutrophils Absolute 12.8 (H) 1.5 - 7.5 K/uL    Immature Granulocytes # 0.1 K/uL    Lymphocytes Absolute 1.0 (L) 1.1 - 4.5 K/uL    Monocytes Absolute 0.60 0.00 - 0.90 K/uL    Eosinophils Absolute 0.00 0.00 - 0.60 K/uL    Basophils Absolute 0.00 0.00 - 0.20 K/uL    Bands Relative 1 0 - 5 %    Atypical Lymphocytes Relative 3 0 - 8 %    Anisocytosis 1+ (A)     Macrocytes 1+ (A)     Polychromasia 1+ (A)     Hypochromia 1+ (A)     Stomatocytes 1+ (A)    Comprehensive Metabolic Panel    Collection Time: 11/27/21  4:00 AM   Result Value Ref Range    Sodium 141 136 - 145 mmol/L    Potassium 3.7 3.5 - 5.0 mmol/L    Chloride 101 98 - 111 mmol/L    CO2 34 (H) 22 - 29 mmol/L    Anion Gap 6 (L) 7 - 19 mmol/L    Glucose 116 (H) 74 - 109 mg/dL    BUN 6 (L) 8 - 23 mg/dL    CREATININE 0.5 0.5 - 0.9 mg/dL    GFR Non-African American >60 >60    GFR African American >59 >59    Calcium 8.6 (L) 8.8 - 10.2 mg/dL    Total Protein 5.8 (L) 6.6 - 8.7 g/dL    Albumin 2.5 (L) 3.5 - 5.2 g/dL    Total Bilirubin <0.2 0.2 - 1.2 mg/dL    Alkaline Phosphatase 65 35 - 104 U/L    ALT 19 5 - 33 U/L    AST 15 5 - 32 U/L   Magnesium Collection Time: 11/27/21  4:00 AM   Result Value Ref Range    Magnesium 2.0 1.6 - 2.4 mg/dL       Diet:  Diet NPO  ADULT TUBE FEEDING; Nasogastric; Other Tube Feeding (specify); vital 1.5; Continuous; 35; No; 35; Q 1 hour; Protein; 1 Proteinex given as flush in 24 hours    Examination:  Vitals: BP (!) 98/55   Pulse 86   Temp 97.8 °F (36.6 °C) (Temporal)   Resp 25   Ht 5' 5\" (1.651 m)   Wt 108 lb 11.2 oz (49.3 kg)   SpO2 93%   BMI 18.09 kg/m²      Intake/Output Summary (Last 24 hours) at 11/27/2021 1132  Last data filed at 11/27/2021 0400  Gross per 24 hour   Intake 3710.2 ml   Output 3050 ml   Net 660.2 ml     General appearance: She is a cachectic chronically ill appearing woman who is intubated and awake in the CCU  HEENT: Exam; heent: Head: Normal, normocephalic, atraumatic. Lungs: rhonchi bilaterally  Heart: regular rate and rhythm, S1, S2 normal, no murmur, click, rub or gallop  Abdomen: soft, non-tender; bowel sounds normal; no masses,  no organomegaly  Extremities: OA joint changes  Neurologic: Mirian Martinez is able to answer simple questions and follow simple commands.  EOMI, PERRL, VFF.  No facial weakness.  Moves the left arm and both lower extremities purposefully without gross weakness.  She has improved right arm and hand movement. Assessment:   1.         Bilateral (right frontal and left parietal) hemisphere acute cerebral infarcts  2.         COPD, aspiration pneumonia  3.         Acute respiratory failure.  She continues to need 50% FIO2  4.         Malnutrition  5.         Tobacco dependence    Plan:   1. Plans for bronchoscopy on Monday  2.         Ventilator support as necessary  3.         Zosyn  4.         Nebs  5.         ASA/Eliquis  6.         Limit sedation     Discharge planning: To be determined    Please call over the weekend if needed. Dr. Priti Iverson returns on Monday.   Electronically signed by Modesta Tolbert MD on 11/27/2021 at 11:32 AM

## 2021-11-27 NOTE — PROGRESS NOTES
Pt found to have 2 Nicotine patches in place, one on each arm. One dated 11-25-21 and the other not dated. Both removed and new Nicotine patch due today placed.  Electronically signed by Yadira Sky RN on 11/27/2021 at 12:27 PM

## 2021-11-28 NOTE — PROGRESS NOTES
Pulmonary and Critical Care Progress note. Barbara Pat    MRN# [id number]    Acct# [de-identified]  11/28/2021   1:38 PM CST    Referring Damian Bauman MD      Chief Complaint: History of lung cancer respiratory failure    HPI: She is intubated on mechanical ventilation however she is awake alert answers questions.     Medications    midodrine, 5 mg, Oral, TID WC    ipratropium-albuterol, 1 ampule, Inhalation, Q6H    [Held by provider] apixaban, 5 mg, Oral, BID    piperacillin-tazobactam, 3,375 mg, IntraVENous, Q8H    nicotine, 1 patch, TransDERmal, Daily    famotidine, 20 mg, Oral, Daily    chlorhexidine, 15 mL, Mouth/Throat, BID    citalopram, 20 mg, Oral, Daily    aspirin, 81 mg, Oral, Daily **OR** aspirin, 300 mg, Rectal, Daily    atorvastatin, 40 mg, Oral, Nightly    thiamine, 100 mg, IntraVENous, Daily     Review of Systems:      Physical Exam:  BP (!) 100/59   Pulse 92   Temp 97.8 °F (36.6 °C) (Temporal)   Resp 29   Ht 5' 5\" (1.651 m)   Wt 106 lb 14.4 oz (48.5 kg)   SpO2 91%   BMI 17.79 kg/m²   Intake/Output Summary (Last 24 hours) at 11/28/2021 1338  Last data filed at 11/28/2021 1000  Gross per 24 hour   Intake 1460 ml   Output 3555 ml   Net -2095 ml       General appearance: Elderly white female who is in no distress intubated   HEENT: Endotracheal tube in place  Heart: S1-S2 distant sounds no murmurs  Lungs: Diminished bilaterally no rubs or chest wall tenderness or dullness to percussion  Abdomen: Soft nontender no organomegalies normal bowel sounds  Extremities: No clubbing cyanosis or edema  Neuro: No focal findings  Skin: Intact    Recent Labs     11/26/21  0420 11/27/21  0400 11/28/21  0230   WBC 10.4 14.4* 11.4*   RBC 3.01* 2.86* 2.86*   HGB 8.9* 8.7* 8.5*   HCT 31.0* 29.3* 28.9*    337 280   .0* 102.4* 101.0*   MCH 29.6 30.4 29.7   MCHC 28.7* 29.7* 29.4*   RDW 16.8* 16.8* 16.6*   BANDST  --  1  --       Recent Labs     11/26/21  0426 11/27/21  0400 11/28/21  0230    141 142   K 3.9 3.7 3.9    101 101   CO2 32* 34* 34*   BUN 7* 6* 13   CREATININE 0.5 0.5 0.5   CALCIUM 8.7* 8.6* 8.5*   GLUCOSE 109 116* 112*      No results for input(s): PHART, XHI7XOY, PO2ART, MTW9JBA, W9IINBII, BEART in the last 72 hours. Recent Labs     11/28/21  0230   AST 14   ALT 19   ALKPHOS 68   BILITOT 0.3   MG 2.0   CALCIUM 8.5*     No results for input(s): BC, LABGRAM, CULTRESP, BFCX in the last 72 hours. Radiograph: XR CHEST PORTABLE    Result Date: 11/28/2021  1. Mild Improvement in the radiograph appearance of the chest with decreasing bilateral infiltrates. Signed by Dr Ally Arambula    XR CHEST PORTABLE    Result Date: 11/24/2021  1. . Underlying emphysema. There is volume loss on the right with a cavitary lesion in the right upper lobe and a pleurodiaphragmatic adhesion in the right base. 2. Predominantly interstitial process within the left lung either representing an interstitial pneumonia or edema shows interval improvement. 3. No interval line changes. Signed by Dr Atiya Cancino    Result Date: 11/22/2021  1. Stable radiographic appearance the chest bilateral infiltrative opacities. Signed by Dr Lucía Montoya    Result Date: 11/22/2021  No evidence of pulmonary embolism. Pulmonary arterial hypertension. Evidence of right heart strain. Extensive chronic emphysematous lung changes with superimposed acute infiltrate as detailed above. Bilateral pleural effusion which was not seen in the previous study. Complete atelectasis of the right upper lobe anterior segment which was not seen in the previous study. Persistent cavitating lesion in the right upper lobe posterior segment. Endotracheal tube and nasogastric tubes in place. A right subclavian approach MediPort system in place.  Signed by Dr Nara Cardozo       My radiograph interpretation/independent review of imaging: Reviewed chest x-ray is improved    Problem list generated by Mariana Uribe:  Hospital Problems           Last Modified POA    Acute hypoxemic respiratory failure ([facility] 11/12/2021 Yes    Stroke of unknown etiology ([address] 11/12/2021 Yes    Palliative care patient 11/23/2021 Yes    Severe malnutrition ([address] 11/16/2021 Yes    Altered mental status 11/16/2021 Yes    Centrilobular emphysema ([address] 11/23/2021 Yes    Primary squamous cell carcinoma of right lung ([address] 11/23/2021 Yes           Pulmonary Assessment/Plan:     1.  acute hypoxic respiratory failure continue mechanical ventilation. She apparently desaturated quickly yesterday after an attempt at weaning. 2. Possible right upper lobe bronchial obstruction with postobstructive process however the chest x-ray seems to be improving. We will proceed with bronchoscopy in the morning. 3. Bilateral pleural effusions improved on chest x-ray. 4. Possible stroke followed by neurology. 5. DVT prophylaxis. Critical care time 36 min. Cherrie Agrawal MD, [facility], [facility]    The above note was generated using voice recognition software. Inadvertent typographical errors in transcription may have occurred.       Electronically signed by Cherrie Agrawal MD on 11/28/21 at 1:38 PM

## 2021-11-28 NOTE — PROGRESS NOTES
Hospitalist Progress Note  [facility]     Patient: Vernell Rivas  : 1955  MRN: [id number]  Code Status: [facility]    Hospital Day: 16   Date of Service: 2021    Subjective:   Patient seen and examined. Intubated and sedated. Alert. Following commands. 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     COPD (chronic obstructive pulmonary disease) (HCC)     Depression     Emphysema (subcutaneous) (surgical) resulting from a procedure     History of lung biopsy 2019    Malignant neoplasm of right main bronchus ([address] 2019    NSCLC, SCC [id number] stage IIIb    Palliative care patient 2021    Syncope     TIA (transient ischemic attack)        Past Surgical History:   Procedure Laterality Date    APPENDECTOMY      BACK SURGERY      times two    BLADDER SUSPENSION      CHOLECYSTECTOMY      HYSTERECTOMY      INCONTINENCE SURGERY         Family History   Problem Relation Age of Onset    Colon Cancer Mother     High Blood Pressure Brother     Diabetes Brother        Social History     Socioeconomic History    Marital status:       Spouse name: Not on file    Number of children: Not on file    Years of education: Not on file    Highest education level: Not on file   Occupational History    Not on file   Tobacco Use    Smoking status: Current Every Day Smoker    Smokeless tobacco: Never Used   Substance and Sexual Activity    Alcohol use: Not Currently    Drug use: Never    Sexual activity: Not on file   Other Topics Concern    Not on file   Social History Narrative    Not on file     Social Determinants of Health     Financial Resource Strain:     Difficulty of Paying Living Expenses: Not on file   Food Insecurity:     Worried About Running Out of Food in the Last Year: Not on file    Trisha of Food in the Last Year: Not on file   Transportation Needs:     Lack of Transportation (Medical): Not on file    Lack of Transportation (Non-Medical):  Not on file   Physical Activity:     Days of Exercise per Week: Not on file    Minutes of Exercise per Session: Not on file   Stress:     Feeling of Stress : Not on file   Social Connections:     Frequency of Communication with Friends and Family: Not on file    Frequency of Social Gatherings with Friends and Family: Not on file    Attends Buddhism Services: Not on file    Active Member of [address] [place] [address] [email or web address] or Organizations: Not on file    Attends Club or Organization Meetings: Not on file    Marital Status: Not on file   Intimate Partner Violence:     Fear of Current or Ex-Partner: Not on file    Emotionally Abused: Not on file    Physically Abused: Not on file    Sexually Abused: Not on file   Housing Stability:     Unable to Pay for Housing in the Last Year: Not on file    Number of [place] in the Last Year: Not on file    Unstable Housing in the Last Year: Not on file       Current Facility-Administered Medications   Medication Dose Route Frequency Provider Last Rate Last Admin    midodrine (PROAMATINE) tablet 5 mg  5 mg Oral TID WC Lavon Walker MD   5 mg at 11/28/21 0800    norepinephrine (LEVOPHED) 16 mg in sodium chloride 0.9 % 250 mL infusion  0.01-3.3 mcg/kg/min IntraVENous Continuous Dorrine MD Shayy 2.8 mL/hr at 11/28/21 0811 3 mcg/min at 11/28/21 5523    lactated ringers infusion   IntraVENous Continuous Lavon Walker MD 75 mL/hr at 11/27/21 2145 New Bag at 11/27/21 2145    ipratropium-albuterol (DUONEB) nebulizer solution 1 ampule  1 ampule Inhalation Q6H Niki Melgar MD   1 ampule at 11/28/21 2824    [Held by provider] apixaban (ELIQUIS) tablet 5 mg  5 mg Oral BID Lavon Walker MD   5 mg at 11/26/21 1946    piperacillin-tazobactam (ZOSYN) 3,375 mg in dextrose 5 % 50 mL IVPB extended infusion (mini-bag)  3,375 mg IntraVENous Q8H Lavon Walker MD   Stopped at 11/28/21 0854    nicotine ([place] CQ) 21 MG/24HR 1 patch  1 patch TransDERmal Daily Shanique Vuong MD   1 patch at 11/28/21 0906    famotidine (PEPCID) tablet 20 mg  20 mg Oral Daily Shanique Vuong MD   20 mg at 11/28/21 0905    chlorhexidine (PERIDEX) 0.12 % solution 15 mL  15 mL Mouth/Throat BID Lenny Sherman MD   15 mL at 11/28/21 0907    potassium chloride (KLOR-CON M) extended release tablet 40 mEq  40 mEq Oral PRN Lenny Sherman MD        Or    potassium bicarb-citric acid (EFFER-K) effervescent tablet 40 mEq  40 mEq Oral PRN Lenny Sherman MD   40 mEq at 11/24/21 0502    Or    potassium chloride 10 mEq/100 mL IVPB (Peripheral Line)  10 mEq IntraVENous PRN Lenny Sherman MD   Stopped at 11/17/21 1410    propofol injection  5-50 mcg/kg/min IntraVENous Titrated Tammie Oxford, DO 6 mL/hr at 11/27/21 2037 20 mcg/kg/min at 11/27/21 2037    citalopram (CELEXA) tablet 20 mg  20 mg Oral Daily Tammie Oxford, DO   20 mg at 11/28/21 1077    ondansetron (ZOFRAN-ODT) disintegrating tablet 4 mg  4 mg Oral Q8H PRN Tammie Forest, DO        Or    ondansetron [facility] injection 4 mg  4 mg IntraVENous Q6H PRN Tammie Forest, DO   4 mg at 11/13/21 0758    polyethylene glycol (GLYCOLAX) packet 17 g  17 g Oral Daily PRN Tammie Forest, DO        aspirin EC tablet 81 mg  81 mg Oral Daily Tammie Oxford, DO   81 mg at 11/28/21 8399    Or    aspirin suppository 300 mg  300 mg Rectal Daily Tammie Oxford, DO   300 mg at 11/14/21 1400    atorvastatin (LIPITOR) tablet 40 mg  40 mg Oral Nightly Tammie Oxford, DO   40 mg at 11/27/21 2142    labetalol (NORMODYNE;TRANDATE) injection 10 mg  10 mg IntraVENous Q10 Min PRN Tammie Oxford, DO        morphine (PF) injection 2 mg  2 mg IntraVENous Q3H PRN Lenny Sherman MD   2 mg at 11/27/21 1544    thiamine (B-1) injection 100 mg  100 mg IntraVENous Daily Espinoza Encinas DO   100 mg at 11/28/21 0906         norepinephrine 3 mcg/min (11/28/21 0811)    lactated ringers 75 mL/hr at 11/27/21 2145    propofol 20 mcg/kg/min (11/27/21 2037)        Objective:   BP (!) 103/57   Pulse 90   Temp 97.8 °F (36.6 °C) (Temporal)   Resp 17   Ht 5' 5\" (1.651 m)   Wt 106 lb 14.4 oz (48.5 kg)   SpO2 91%   BMI 17.79 kg/m²     General: cachectic  HEENT: normocephalic, atraumatic  Neck: supple, symmetrical, trachea midline   Lungs: slowly improving rhonchi  Cardiovascular: s1 and s2 normal  Abdomen: soft, positive bowel sounds  Extremities: no edema or cyanosis   Neuro: intubated and sedated, alert, following commands  Skin: normal color and texture     Recent Labs     11/26/21 0420 11/27/21 0400 11/28/21  0230   WBC 10.4 14.4* 11.4*   RBC 3.01* 2.86* 2.86*   HGB 8.9* 8.7* 8.5*   HCT 31.0* 29.3* 28.9*   .0* 102.4* 101.0*   MCH 29.6 30.4 29.7   MCHC 28.7* 29.7* 29.4*    337 280     Recent Labs     11/26/21 0420 11/27/21 0400 11/28/21  0230    141 142   K 3.9 3.7 3.9   ANIONGAP 8 6* 7    101 101   CO2 32* 34* 34*   BUN 7* 6* 13   CREATININE 0.5 0.5 0.5   GLUCOSE 109 116* 112*   CALCIUM 8.7* 8.6* 8.5*     Recent Labs     11/26/21 0420 11/27/21 0400 11/28/21  0230   MG 2.2 2.0 2.0     Recent Labs     11/26/21 0420 11/27/21 0400 11/28/21  0230   AST 22 15 14   ALT 22 19 19   BILITOT 0.3 <0.2 0.3   ALKPHOS 60 65 68     No results for input(s): PH, PO2, PCO2, HCO3, BE, O2SAT in the last 72 hours. No results for input(s): TROPONINI in the last 72 hours. No results for input(s): INR in the last 72 hours. No results for input(s): LACTA in the last 72 hours. Intake/Output Summary (Last 24 hours) at 11/28/2021 0932  Last data filed at 11/28/2021 0600  Gross per 24 hour   Intake 1460 ml   Output 4580 ml   Net -3120 ml       No results found.      Assessment and Plan:   Bihemispheric strokes  Neurology following  Meds per neurology  Neurochecks  PT/OT/SLP once able  Eliquis per neuro recs for likely embolic stroke, held for upcoming bronch     Corynebacterium pneumonia  Zosyn per pharmacy     Ventilator dependent acute respiratory failure  Titrate minute ventilation for pH 7.35  Follow ABG/CXR  CTA chest 11/22/2021: No evidence of PE  Pulmonary following with plans for bronch 11/29/2021     History of lung cancer     Tobacco dependence     Severe protein calorie malnutrition  Dietitian following  Tube feeds on board     DVT prophylaxis  SCDs while Eliquis on hold     Extensive discussion with patient's brother Dorinda Rowe 11/20/2021 in regards to current clinical state/goals of care.  All questions sought and answered. Vada Fothergill will attempt to have patient's sons (Jaxson and Nalini Moctezuma) visit the hospital to further discuss.  Palliative following.     Patient's son Jasson Batres) requested DNR status on 11/20/2021.  All questions sought and answered.  Unit staff witnessed the entire conversation.     Total critical care time: 40 minutes    Natalie Sun MD   11/28/2021 9:32 AM

## 2021-11-29 NOTE — PLAN OF CARE
Problem: Falls - Risk of:  Goal: Will remain free from falls  Description: Will remain free from falls  Outcome: Ongoing  Goal: Absence of physical injury  Description: Absence of physical injury  Outcome: Ongoing     Problem: Skin Integrity:  Goal: Will show no infection signs and symptoms  Description: Will show no infection signs and symptoms  Outcome: Ongoing  Goal: Absence of new skin breakdown  Description: Absence of new skin breakdown  Outcome: Ongoing     Problem: Pain:  Goal: Pain level will decrease  Description: Pain level will decrease  Outcome: Ongoing  Goal: Control of acute pain  Description: Control of acute pain  Outcome: Ongoing  Goal: Control of chronic pain  Description: Control of chronic pain  Outcome: Ongoing     Problem: Coping:  Goal: Ability to remain calm will improve  Description: Ability to remain calm will improve  Outcome: Ongoing     Problem: Safety:  Goal: Ability to remain free from injury will improve  Description: Ability to remain free from injury will improve  Outcome: Ongoing     Problem: Self-Care:  Goal: Ability to participate in self-care as condition permits will improve  Description: Ability to participate in self-care as condition permits will improve  Outcome: Ongoing     Problem: Tobacco Use:  Goal: Inpatient tobacco use cessation counseling participation  Description: Inpatient tobacco use cessation counseling participation  Outcome: Ongoing     Problem: Nutrition  Goal: Optimal nutrition therapy  Outcome: Ongoing     Problem: Urinary Elimination:  Goal: Signs and symptoms of infection will decrease  Description: Signs and symptoms of infection will decrease  Outcome: Ongoing  Goal: Complications related to the disease process, condition or treatment will be avoided or minimized  Description: Complications related to the disease process, condition or treatment will be avoided or minimized  Outcome: Ongoing     Problem: Infection - Central Venous Catheter-Associated Bloodstream Infection:  Goal: Will show no infection signs and symptoms  Description: Will show no infection signs and symptoms  Outcome: Ongoing

## 2021-11-29 NOTE — PROGRESS NOTES
Comprehensive Nutrition Assessment    Type and Reason for Visit:  Reassess    Nutrition Recommendations/Plan: follow for restarting EN    Nutrition Assessment:  Propofol 6ml/hr. At present TF is off as pt is to have Bronchoscopy. Has been tolerating tf. Malnutrition Assessment:  Malnutrition Status:  Severe malnutrition    Context:  Chronic Illness     Findings of the 6 clinical characteristics of malnutrition:  Energy Intake:  Mild decrease in energy intake (Comment)  Weight Loss:  7 - Greater than 10% over 6 months     Body Fat Loss:  7 - Severe body fat loss     Muscle Mass Loss:  7 - Severe muscle mass loss    Fluid Accumulation:  No significant fluid accumulation Extremities   Strength:  Not Performed    Estimated Daily Nutrient Needs:  Energy (kcal):  1217-0893; Weight Used for Energy Requirements:  Admission     Protein (g):  85; Weight Used for Protein Requirements:  Admission        Fluid (ml/day):  6807-1987; Method Used for Fluid Requirements:  1 ml/kcal      Nutrition Related Findings:  very thin      Wounds:  None       Current Nutrition Therapies:    Current Tube Feeding (TF) Orders:  · Feeding Route: Orogastric  · Formula: Other Tube Feeding (Comment) (Vital 1.5)  · Schedule: Continuous  · Additives/Modulars: Protein  · Water Flushes: 35ml  · Current TF & Flush Orders Provides: 0  · Goal TF & Flush Orders Provides: Vital AF 1.5 @ 35ml/hr, 1 Proteinex and 35ml free water flush = 1364 kcals with 82g protein, 157 g CHO and 1481 ml free water.   Propofol adds another 158 NP kcals      Anthropometric Measures:  · Height: 5' 5\" (165.1 cm)  · Current Body Weight: 106 lb 5 oz (48.2 kg)   · Admission Body Weight: 109 lb 12.8 oz (49.8 kg)    · Usual Body Weight: 120 lb (54.4 kg) (4/2021)     · Ideal Body Weight: 125 lbs; % Ideal Body Weight 85.1 %   · BMI: 17.7  · Adjusted Body Weight:  ; No Adjustment   · BMI Categories: Underweight (BMI less than 22) age over 72       Nutrition Diagnosis: · Inadequate oral intake related to acute injury/trauma, impaired respiratory function as evidenced by NPO or clear liquid status due to medical condition, intubation, nutrition support - enteral nutrition      Nutrition Interventions:   Food and/or Nutrient Delivery:  Continue NPO, Continue Current Tube Feeding  Nutrition Education/Counseling:  No recommendation at this time   Coordination of Nutrition Care:  Continue to monitor while inpatient    Goals:  meet nutritional needs through EN       Nutrition Monitoring and Evaluation:   Behavioral-Environmental Outcomes:  None Identified   Food/Nutrient Intake Outcomes:  Supplement Intake, Enteral Nutrition Intake/Tolerance  Physical Signs/Symptoms Outcomes:  Biochemical Data, Weight, Skin, Nutrition Focused Physical Findings, Fluid Status or Edema     Discharge Planning:     Too soon to determine     Electronically signed by Anton Ingram MS, RD, LD on 11/29/21 at 10:52 AM CST    Contact: [phone]

## 2021-11-29 NOTE — PROGRESS NOTES
Hospitalist Progress Note  [facility]     Patient: Elana Mcmanus  : 1955  MRN: [id number]  Code Status: [facility]    Hospital Day: 17   Date of Service: 2021    Subjective:   Patient seen and examined. Intubated and sedated. Remains alert and continues to follow commands. 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     COPD (chronic obstructive pulmonary disease) (HCC)     Depression     Emphysema (subcutaneous) (surgical) resulting from a procedure     History of lung biopsy 2019    Malignant neoplasm of right main [address]us ([facility] 2019    NSCLC, SCC [id number] stage IIIb    Palliative care patient 2021    Syncope     TIA (transient ischemic attack)        Past Surgical History:   Procedure Laterality Date    APPENDECTOMY      BACK SURGERY      times two    BLADDER SUSPENSION      CHOLECYSTECTOMY      HYSTERECTOMY      INCONTINENCE SURGERY         Family History   Problem Relation Age of Onset    Colon Cancer Mother     High Blood Pressure Brother     Diabetes Brother        Social History     Socioeconomic History    Marital status:       Spouse name: Not on file    Number of children: Not on file    Years of education: Not on file    Highest education level: Not on file   Occupational History    Not on file   Tobacco Use    Smoking status: Current Every Day Smoker    Smokeless tobacco: Never Used   Substance and Sexual Activity    Alcohol use: Not Currently    Drug use: Never    Sexual activity: Not on file   Other Topics Concern    Not on file   Social History Narrative    Not on file     Social Determinants of Health     Financial Resource Strain:     Difficulty of Paying Living Expenses: Not on file   Food Insecurity:     Worried About Running Out of Food in the Last Year: Not on file    [place] of Food in the Last Year: Not on file   Transportation Needs:     Lack of Transportation (Medical): Not on file    Lack of Transportation (Non-Medical):  Not on file   Physical Activity:     Days of Exercise per Week: Not on file    Minutes of Exercise per Session: Not on file   Stress:     Feeling of Stress : Not on file   Social Connections:     Frequency of Communication with Friends and Family: Not on file    Frequency of Social Gatherings with Friends and Family: Not on file    Attends [employer] Services: Not on file    Active Member of [address] [place] [address] or Organizations: Not on file    Attends Club or Organization Meetings: Not on file    Marital Status: Not on file   Intimate Partner Violence:     Fear of Current or Ex-Partner: Not on file    Emotionally Abused: Not on file    Physically Abused: Not on file    Sexually Abused: Not on file   Housing Stability:     Unable to Pay for Housing in the Last Year: Not on file    Number of [place] in the Last Year: Not on file    Unstable Housing in the Last Year: Not on file       Current Facility-Administered Medications   Medication Dose Route Frequency Provider Last Rate Last Admin    midodrine (PROAMATINE) tablet 5 mg  5 mg Oral TID  Sue Badillo MD   5 mg at 11/29/21 0753    norepinephrine (LEVOPHED) 16 mg in sodium chloride 0.9 % 250 mL infusion  0.01-3.3 mcg/kg/min IntraVENous Continuous Kar Street MD 4.7 mL/hr at 11/28/21 2021 5 mcg/min at 11/28/21 2021    lactated ringers infusion   IntraVENous Continuous Sue Badillo MD 75 mL/hr at 11/29/21 0036 New Bag at 11/29/21 0036    ipratropium-albuterol (DUONEB) nebulizer solution 1 ampule  1 ampule Inhalation Q6H Niki Melgar MD   1 ampule at 11/29/21 0707    [Held by provider] apixaban (ELIQUIS) tablet 5 mg  5 mg Oral BID Sue Badillo MD   5 mg at 11/26/21 1946    piperacillin-tazobactam (ZOSYN) 3,375 mg in dextrose 5 % 50 mL IVPB extended infusion (mini-bag)  3,375 mg IntraVENous Q8H Sue Badillo MD   Stopped at 11/29/21 0840    nicotine (NICODERM CQ) 21 MG/24HR 1 patch  1 patch TransDERmal Daily Heidy Cho MD   1 patch at 11/29/21 0753    famotidine (PEPCID) tablet 20 mg  20 mg Oral Daily Heidy Cho MD   20 mg at 11/29/21 0753    chlorhexidine (PERIDEX) 0.12 % solution 15 mL  15 mL Mouth/Throat BID Eileen Plaza MD   15 mL at 11/29/21 0900    potassium chloride (KLOR-CON M) extended release tablet 40 mEq  40 mEq Oral PRN Eileen Plaza MD        Or    potassium bicarb-citric acid (EFFER-K) effervescent tablet 40 mEq  40 mEq Oral PRN Eileen Plaza MD   40 mEq at 11/24/21 0502    Or    potassium chloride 10 mEq/100 mL IVPB (Peripheral Line)  10 mEq IntraVENous PRN Eileen Plaza MD   Stopped at 11/17/21 1410    propofol injection  5-50 mcg/kg/min IntraVENous Titrated Tramaine López DO 6 mL/hr at 11/29/21 0439 20 mcg/kg/min at 11/29/21 0439    citalopram (CELEXA) tablet 20 mg  20 mg Oral Daily Tramaine López DO   20 mg at 11/29/21 0753    ondansetron (ZOFRAN-ODT) disintegrating tablet 4 mg  4 mg Oral Q8H PRN Tramaine López DO        Or    [facility]ron [facility]) injection 4 mg  4 mg IntraVENous Q6H PRN Tramaine López, DO   4 mg at 11/13/21 0758    polyethylene glycol (GLYCOLAX) packet 17 g  17 g Oral Daily PRN Tramaine López DO        aspirin EC tablet 81 mg  81 mg Oral Daily Tramaine López, DO   81 mg at 11/29/21 8510    Or    aspirin suppository 300 mg  300 mg Rectal Daily Tramaine López DO   300 mg at 11/14/21 1995    atorvastatin (LIPITOR) tablet 40 mg  40 mg Oral Nightly Tramaine López, DO   40 mg at 11/28/21 2102    labetalol (NORMODYNE;TRANDATE) injection 10 mg  10 mg IntraVENous Q10 Min PRN Tramaine López DO        morphine (PF) injection 2 mg  2 mg IntraVENous Q3H PRN Eileen Plaza MD   2 mg at 11/29/21 0758    thiamine (B-1) injection 100 mg  100 mg IntraVENous Daily Alissa Hoover,    100 mg at 11/29/21 9215  norepinephrine 5 mcg/min (11/28/21 2021)    lactated ringers 75 mL/hr at 11/29/21 0036    propofol 20 mcg/kg/min (11/29/21 0439)        Objective:   BP (!) 101/50   Pulse 92   Temp 98 °F (36.7 °C) (Temporal)   Resp 28   Ht 5' 5\" (1.651 m)   Wt 106 lb 5 oz (48.2 kg)   SpO2 93%   BMI 17.69 kg/m²     General: cachectic  HEENT: normocephalic, atraumatic  Neck: supple, symmetrical, trachea midline   Lungs: slowly improving rhonchi  Cardiovascular: s1 and s2 normal  Abdomen: soft, positive bowel sounds  Extremities: no edema or cyanosis   Neuro: intubated and sedated, alert, following commands  Skin: normal color and texture     Recent Labs     11/27/21 0400 11/28/21 0230 11/29/21 0349   WBC 14.4* 11.4* 13.3*   RBC 2.86* 2.86* 2.98*   HGB 8.7* 8.5* 9.0*   HCT 29.3* 28.9* 30.2*   .4* 101.0* 101.3*   MCH 30.4 29.7 30.2   MCHC 29.7* 29.4* 29.8*    280 336     Recent Labs     11/27/21 0400 11/28/21 0230 11/29/21 0349    142 142   K 3.7 3.9 3.8   ANIONGAP 6* 7 7    101 102   CO2 34* 34* 33*   BUN 6* 13 8   CREATININE 0.5 0.5 0.2*   GLUCOSE 116* 112* 133*   CALCIUM 8.6* 8.5* 8.5*     Recent Labs     11/27/21 0400 11/28/21 0230 11/29/21 0349   MG 2.0 2.0 2.0     Recent Labs     11/27/21 0400 11/28/21 0230 11/29/21 0349   AST 15 14 20   ALT 19 19 21   BILITOT <0.2 0.3 0.3   ALKPHOS 65 68 66     No results for input(s): PH, PO2, PCO2, HCO3, BE, O2SAT in the last 72 hours. No results for input(s): TROPONINI in the last 72 hours. Recent Labs     11/29/21  0349   INR 1.13     No results for input(s): LACTA in the last 72 hours.       Intake/Output Summary (Last 24 hours) at 11/29/2021 0925  Last data filed at 11/29/2021 0800  Gross per 24 hour   Intake 3394.66 ml   Output 3575 ml   Net -180.34 ml       XR CHEST PORTABLE    Result Date: 11/29/2021  Frontal portable upright radiograph of the chest 11/29/2021 4:28 AM History: Shortness of breath Comparison: Chest x-ray dated November 28, 2021. Findings: Lines and tubes are stable in position. Significant increase in the right pleural effusion and associated opacities. The left lung is unchanged. The cardiomediastinal silhouette and pulmonary vascularity are unchanged. No acute osseous or soft tissue abnormality is noted. Impression: 1. Significant increase in the right pleural effusion and associated opacities. . Signed by Dr Sara Caruso    XR CHEST PORTABLE    Result Date: 11/28/2021  XR CHEST PORTABLE 11/28/2021 3:30 AM HISTORY:   Respiratory distress  Single view. COMPARISONS:  11/24/2021 and 11/22/2021 FINDINGS: Endotracheal tube, NG tube and right-sided port again identified. There is slight improvement in bilateral lung infiltrates with less consolidation. Persistent patchy interstitial airspace opacities noted. The linear consolidation along the minor fissure no longer observed. The heart is normal in size. The bony structures are intact. 1. Mild Improvement in the radiograph appearance of the chest with decreasing bilateral infiltrates.  Signed by Dr Stefanie Molina and Plan:   Bihemispheric strokes  Neurology following  Meds per neurology  Neurochecks  PT/OT/SLP once able  Eliquis per neuro recs for likely embolic stroke, held for upcoming bronch     Corynebacterium pneumonia  Zosyn per pharmacy     Ventilator dependent acute respiratory failure  Titrate minute ventilation for pH 7.35  Follow ABG/CXR  CTA chest 11/22/2021: No evidence of PE  Pulmonary following with plans for bronch today     History of lung cancer     Tobacco dependence     Severe protein calorie malnutrition  Dietitian following  Tube feeds on board     DVT prophylaxis  SCDs while Eliquis on hold     Extensive discussions with patient's brother Rick Aureliano 11/20/2021 in regards to current clinical state/goals of care.  All questions sought and answered. Peter Villar will attempt to have patient's sons Pradip Ibanez and Hiren Roberto) visit the hospital to further discuss.  Palliative following.     Patient's son Jasson Batres) requested DNR status on 11/20/2021.  All questions sought and answered.  Unit staff witnessed the entire conversation.     Total critical care time: 43 minutes    Natalie Sun MD   11/29/2021 9:25 AM

## 2021-11-29 NOTE — PROGRESS NOTES
Bronchoscopy completed without difficulty. Video saved in the glidoscope by Dr Ariella Boston. Called IT to see if they may be able to download the video from the glidoscope. Shravan Bedoya from [facility] here in  unit to try to upload to PACS system to view.

## 2021-11-29 NOTE — PROGRESS NOTES
[facility] Neurology Progress Note      Patient:   Lenin Tom  MR#:    [id number]   Room:    4507/002-46   YOB: 1955  Date of Progress Note: 11/29/2021  Time of Note                           9:01 AM  Consulting Physician:  Iker Goff DO  Attending Physician:  Sue Badillo MD      INTERVAL HISTORY: Remains intubated but alert, following commands, no acute events. REVIEW OF SYSTEMS:  Limited given intubated    PHYSICAL EXAM:    Constitutional    BP (!) 101/50   Pulse 92   Temp 98 °F (36.7 °C) (Temporal)   Resp 28   Ht 5' 5\" (1.651 m)   Wt 106 lb 5 oz (48.2 kg)   SpO2 93%   BMI 17.69 kg/m²   General appearance: Intubated, sedated. EYES -   Conjunctiva normal  Pupillary exam as below, see CN exam in the neurologic exam  ENT-    No scars, masses, or lesions over external nose or ears  Oropharynx - intubated  Cardiovascular -   No clubbing, cyanosis, or edema   Pulmonary-   Mechanically ventilated    Musculoskeletal    No significant wasting of muscles noted  Gait as below, see gait exam in the neurologic exam  Muscle strength, tone, stability as below see the motor exam in the neurologic exam.   No bony deformities  Skin    Warm, dry, and intact to inspection and palpation. No rash, erythema, or pallor        NEUROLOGICAL EXAM     Mental status    []? Awake, alert, oriented   []? Affect attention and concentration appear appropriate  []? Recent and remote memory appears unremarkable  []? Speech normal without dysarthria or aphasia, comprehension and repetition intact. COMMENTS:  Eyes open, alert, following commands. Cranial Nerves []? No VF deficit to confrontation,  optic discs normal, no papilledema on fundoscopic exam.  []? PERRLA, EOMI, no nystagmus, conjugate eye movements, no ptosis  []? Face symmetric  []? Facial sensation intact  []? Tongue midline no atrophy or fasciculations present  []? Palate midline, hearing to finger rub normal  []?  Shoulder shrug and SCM testing normal  COMMENTS: Left Pupil reactive, right pupil misshapen likely surgical, face appears sym, EOM limited    Motor   []? 5/5 strength x 4 extremities  [x]? Normal bulk and tone  [x]? No tremor present  [x]? No rigidity or bradykinesia noted  COMMENTS:SMAE noted   Sensory  []? Sensation intact to light touch, pin prick, vibration, and proprioception BLE  []? Sensation intact to light touch, pin prick, vibration, and proprioception BUE  COMMENTS: Limtied   Coordination []? FTN normal bilaterally   []? HTS normal bilaterally  []? JIA normal.   COMMENTS: Limited    Reflexes  [x]? Symmetric and non-pathological  [x]? Toes downgoing bilaterally  [x]? No clonus present  COMMENTS:   Gait                  []? Normal steady gait    []? Ataxic    []? Spastic     []? Magnetic     []? Shuffling  [x]? Not assessed  COMMENTS:        LABS/IMAGING:    CT HEAD WO CONTRAST    Result Date: 11/12/2021  CT HEAD WO CONTRAST 11/12/2021 11:25 AM HISTORY: Code stroke COMPARISON: None DOSE LENGTH PRODUCT: 961 mGy cm TECHNIQUE: Helical tomographic images of the brain were obtained without the use of intravenous contrast. Automated exposure control was also utilized to decrease patient radiation dose. FINDINGS: Exam is limited by motion artifact. There is no evidence of evolving large vascular territory infarct. Underlying chronic small vessel ischemic change. No visualized intra-axial or extra-axial hemorrhage. No mass lesion is identified. Normal size and configuration of the ventricular system. The basal cisterns are symmetric. Posterior fossa structures are unremarkable. The included orbits and their contents are unremarkable. Chronic right maxillary sinus disease. The visualized osseous structures and overlying soft tissues of the skull and face are unremarkable. 1. No acute intracranial process.  Signed by Dr Danielle Cintron    XR CHEST PORTABLE    Result Date: 11/12/2021  XR CHEST PORTABLE 11/12/2021 11:36 AM HISTORY: Code stroke  Technique: Single AP view of the chest COMPARISONS: Chest exam dated 4/29/2020 FINDINGS: Reidentified right suprahilar consolidation with volume loss. New developing consolidations in the left upper and left lower lobes with air bronchograms. No obvious pleural effusion. Underlying lung emphysema. Heart size is stable. The pulmonary vasculature are nondilated. Right chest wall [id number]. Spinal scoliotic curvature. 1. New multifocal consolidation in the left upper and left lower lobes, appearance concerning for new multifocal pneumonia. 2. Treatment-related changes in the right suprahilar region with scarring and volume loss. Signed by Dr Eulalio Ch    Result Date: 11/12/2021  EXAM: CT NECK ANGIOGRAM CT HEAD ANGIOGRAM on  11/12/2021 12:54 PM COMPARISON:  CT chest dated August 23, 2021. HISTORY:  74years-old Female. Code stroke TECHNIQUE: Multiple CT images were obtained of the of the head and neck after the administration of IV contrast. 3D MIP reformats were generated  and were sent to PACS for interpretation. Grading of carotid artery stenosis is performed by NASCET criteria. FINDINGS: CT Neck Angiogram: There is a conventional aortic arch with patent origins of the brachiocephalic trunk, left common carotid and left subclavian arteries. The bilateral common carotid arteries and subclavian arteries are well-opacified. There is atherosclerotic disease of the bilateral carotid bifurcations, left greater than than right, with essentially 0% stenosis. The bilateral internal carotid arteries are otherwise well opacified throughout. The bilateral vertebral arteries are well-opacified throughout. Additional findings: Emphysema. New left upper lobe opacities posteriorly, most concerning for infection. Additional right upper lobe opacity is also concerning for infection.  Centrally necrotic right upper and lower lobe posterior mass, incompletely visualized but appears grossly stable when compared to prior examination. Small bilateral pleural effusions. CT Head Angiogram: The right internal carotid artery demonstrates normal course and caliber without evidence of flow limiting stenosis or occlusion. The major branch vessels to the right anterior and middle cerebral arteries are seen without evidence of stenosis or occlusion. There is no evidence of aneurysm or vascular malformation. Some atherosclerotic disease in the carotid siphon. The left internal carotid artery demonstrates normal course and caliber without evidence of flow limiting stenosis or occlusion. The major branch vessels to the left anterior and middle cerebral arteries are seen without evidence of stenosis or occlusion. There is no evidence of aneurysm or vascular malformation. Some atherosclerotic disease in the carotid siphon. Evaluation of the posterior circulation demonstrates normal caliber of the vertebral arteries, basilar artery, and major branch vessels of the posterior cerebral arteries bilaterally without evidence of flow limiting stenosis or occlusion. There is no evidence of aneurysm or vascular malformation. CT Angiography Of The Neck With Intravenous Contrast 1. No evidence of high-grade arterial stenosis or underlying dissection. 2. Bilateral upper lobe new opacities are concerning for infection. CT Angiography Of The Head With Intravenous Contrast 1. No evidence of acute thrombotic occlusion, high-grade arterial stenosis, or focal cerebral aneurysm. Signed by Dr Dorcas Jain Date: 11/12/2021  EXAM: CT NECK ANGIOGRAM CT HEAD ANGIOGRAM on  11/12/2021 12:54 PM COMPARISON:  CT chest dated August 23, 2021. HISTORY:  74years-old Female. Code stroke TECHNIQUE: Multiple CT images were obtained of the of the head and neck after the administration of IV contrast. 3D MIP reformats were generated  and were sent to PACS for interpretation.  Grading of carotid artery stenosis is performed by NASCET criteria. FINDINGS: CT Neck Angiogram: There is a conventional aortic arch with patent origins of the brachiocephalic trunk, left common carotid and left subclavian arteries. The bilateral common carotid arteries and subclavian arteries are well-opacified. There is atherosclerotic disease of the bilateral carotid bifurcations, left greater than than right, with essentially 0% stenosis. The bilateral internal carotid arteries are otherwise well opacified throughout. The bilateral vertebral arteries are well-opacified throughout. Additional findings: Emphysema. New left upper lobe opacities posteriorly, most concerning for infection. Additional right upper lobe opacity is also concerning for infection. Centrally necrotic right upper and lower lobe posterior mass, incompletely visualized but appears grossly stable when compared to prior examination. Small bilateral pleural effusions. CT Head Angiogram: The right internal carotid artery demonstrates normal course and caliber without evidence of flow limiting stenosis or occlusion. The major branch vessels to the right anterior and middle cerebral arteries are seen without evidence of stenosis or occlusion. There is no evidence of aneurysm or vascular malformation. Some atherosclerotic disease in the carotid siphon. The left internal carotid artery demonstrates normal course and caliber without evidence of flow limiting stenosis or occlusion. The major branch vessels to the left anterior and middle cerebral arteries are seen without evidence of stenosis or occlusion. There is no evidence of aneurysm or vascular malformation. Some atherosclerotic disease in the carotid siphon. Evaluation of the posterior circulation demonstrates normal caliber of the vertebral arteries, basilar artery, and major branch vessels of the posterior cerebral arteries bilaterally without evidence of flow limiting stenosis or occlusion.  There is no evidence of aneurysm or vascular malformation. CT Angiography Of The Neck With Intravenous Contrast 1. No evidence of high-grade arterial stenosis or underlying dissection. 2. Bilateral upper lobe new opacities are concerning for infection. CT Angiography Of The Head With Intravenous Contrast 1. No evidence of acute thrombotic occlusion, high-grade arterial stenosis, or focal cerebral aneurysm. Signed by Dr Hudson Billingsley      Lab Results   Component Value Date    WBC 13.3 (H) 11/29/2021    HGB 9.0 (L) 11/29/2021    HCT 30.2 (L) 11/29/2021    .3 (H) 11/29/2021     11/29/2021     Lab Results   Component Value Date     11/29/2021    K 3.8 11/29/2021     11/29/2021    CO2 33 (H) 11/29/2021    BUN 8 11/29/2021    CREATININE 0.2 (L) 11/29/2021    GLUCOSE 133 (H) 11/29/2021    CALCIUM 8.5 (L) 11/29/2021    PROT 6.0 (L) 11/29/2021    LABALBU 2.7 (L) 11/29/2021    BILITOT 0.3 11/29/2021    ALKPHOS 66 11/29/2021    AST 20 11/29/2021    ALT 21 11/29/2021    LABGLOM >60 11/29/2021    GFRAA >59 11/29/2021    AGRATIO 1.4 05/01/2020    GLOB 3.5 02/25/2021     Lab Results   Component Value Date    INR 1.13 11/29/2021    INR 1.18 11/12/2021    INR 0.96 05/16/2019    PROTIME 14.7 (H) 11/29/2021    PROTIME 15.2 (H) 11/12/2021    PROTIME 12.2 05/16/2019       RECORD REVIEW:   Previous medical records, medications were reviewed at today's visit. Nursing/physician notes, imaging, labs and vitals reviewed. PT,OT and/or speech notes reviewed      ASSESSMENT:  74 y.o. admitted with AMS, hypoxia, pneumonia, lung cancer history, COPD, right sided weakness, slurred speech, MRI consistent with scattered bi-hemishperic strokes, likely embolic vs watershed. CTAs negative. ECHO, CD negative. EEG with slowing, nothing epileptiform. Remains intubated, exam improved, more alert, following commands. Plans for bronchoscopy today.      PLAN:  1. Supportive care   2. Follow Tele   3.   Continue addressing medical issues, pneumonia, respiratory failure - intubated. Pulmonology following, planing bronchoscopy today. 4.  ASA, statin. Eliquis held currently for bronchoscopy. Cautious blood pressure titration. 5.  PT, OT, ST  6. DVT proph   7. Limit sedating medications, extubate when able, supplementing thiamine  8. Palliative care following      Please feel free to call with any questions. [phone] (cell phone).     Arley Stewart DO  Board Certified Neurology

## 2021-11-29 NOTE — PROGRESS NOTES
Pulmonary and Critical Care Progress note. Honeela Pat    MRN# [id number]    Acct# [de-identified]  11/29/2021   1:38 PM CST    Referring Es Childs MD      Chief Complaint: History of lung cancer respiratory failure    HPI: She is intubated on mechanical ventilation however she is awake alert answers questions, she is aware of bronch and would like to proceed.      Medications    midodrine, 5 mg, Oral, TID WC    ipratropium-albuterol, 1 ampule, Inhalation, Q6H    [Held by provider] apixaban, 5 mg, Oral, BID    piperacillin-tazobactam, 3,375 mg, IntraVENous, Q8H    nicotine, 1 patch, TransDERmal, Daily    famotidine, 20 mg, Oral, Daily    chlorhexidine, 15 mL, Mouth/Throat, BID    citalopram, 20 mg, Oral, Daily    aspirin, 81 mg, Oral, Daily **OR** aspirin, 300 mg, Rectal, Daily    atorvastatin, 40 mg, Oral, Nightly    thiamine, 100 mg, IntraVENous, Daily     Review of Systems:      Physical Exam:  /79   Pulse 93   Temp 98 °F (36.7 °C) (Temporal)   Resp 24   Ht 5' 5\" (1.651 m)   Wt 106 lb 5 oz (48.2 kg)   SpO2 99%   BMI 17.69 kg/m²     Intake/Output Summary (Last 24 hours) at 11/29/2021 1203  Last data filed at 11/29/2021 1100  Gross per 24 hour   Intake 3649.39 ml   Output 3875 ml   Net -225.61 ml       General appearance: Elderly white female who is in no distress intubated   HEENT: Endotracheal tube in place  Heart: S1-S2 distant sounds no murmurs  Lungs: Diminished bilaterally no rubs or chest wall tenderness or dullness to percussion  Abdomen: Soft nontender no organomegalies normal bowel sounds  Extremities: No clubbing cyanosis or edema  Neuro: No focal findings  Skin: Intact    Recent Labs     11/27/21  0400 11/28/21  0230 11/29/21  0349   WBC 14.4* 11.4* 13.3*   RBC 2.86* 2.86* 2.98*   HGB 8.7* 8.5* 9.0*   HCT 29.3* 28.9* 30.2*    280 336   .4* 101.0* 101.3*   MCH 30.4 29.7 30.2   MCHC 29.7* 29.4* 29.8*   RDW 16.8* 16.6* 16.5* BANDSPCT 1  --   --       Recent Labs     11/27/21  0400 11/28/21  0230 11/29/21  0349    142 142   K 3.7 3.9 3.8    101 102   CO2 34* 34* 33*   BUN 6* 13 8   CREATININE 0.5 0.5 0.2*   CALCIUM 8.6* 8.5* 8.5*   GLUCOSE 116* 112* 133*      No results for input(s): PHART, BNM9ZKQ, PO2ART, EEK8HPQ, A1EJWNDY, BEART in the last 72 hours. Recent Labs     11/29/21  0349   AST 20   ALT 21   ALKPHOS 66   BILITOT 0.3   MG 2.0   CALCIUM 8.5*   INR 1.13     No results for input(s): BC, LABGRAM, CULTRESP, BFCX in the last 72 hours. Radiograph: XR CHEST PORTABLE    Result Date: 11/28/2021  1. Mild Improvement in the radiograph appearance of the chest with decreasing bilateral infiltrates. Signed by Dr Sheridan Boast    XR CHEST PORTABLE    Result Date: 11/24/2021  1. . Underlying emphysema. There is volume loss on the right with a cavitary lesion in the right upper lobe and a pleurodiaphragmatic adhesion in the right base. 2. Predominantly interstitial process within the left lung either representing an interstitial pneumonia or edema shows interval improvement. 3. No interval line changes. Signed by Dr Guilherme Alford    Result Date: 11/22/2021  1. Stable radiographic appearance the chest bilateral infiltrative opacities. Signed by Dr Ha Gleason    Result Date: 11/22/2021  No evidence of pulmonary embolism. Pulmonary arterial hypertension. Evidence of right heart strain. Extensive chronic emphysematous lung changes with superimposed acute infiltrate as detailed above. Bilateral pleural effusion which was not seen in the previous study. Complete atelectasis of the right upper lobe anterior segment which was not seen in the previous study. Persistent cavitating lesion in the right upper lobe posterior segment. Endotracheal tube and nasogastric tubes in place. A right subclavian approach MediPort system in place.  Signed by Dr Shaheen Olson       My radiograph interpretation/independent review of imaging: Reviewed chest x-ray is improved    Problem list generated by [facility]:  Hospital Problems           Last Modified POA    Acute hypoxemic respiratory failure ([address] 11/12/2021 Yes    Stroke of unknown etiology ([address] 11/12/2021 Yes    Palliative care patient 11/23/2021 Yes    Severe malnutrition ([address] 11/16/2021 Yes    Altered mental status 11/16/2021 Yes    Centrilobular emphysema ([address] 11/23/2021 Yes    Primary squamous cell carcinoma of right lung ([address] 11/23/2021 Yes           Pulmonary Assessment/Plan:     1. acute hypoxic respiratory failure continue mechanical ventilation, will proceed with SBT if successful extubate. 2. Bronchoscopy done. No evidence of endobronchial obstruction. She did have however copious mucus plugging bilaterally. This was suctioned to clear. Please see [facility] note. 3. Bilateral pleural effusions improved on chest x-ray. 4. Possible stroke followed by neurology. 5. DVT prophylaxis. Critical care time 36 min. Betty Lugo MD, [facility], [facility]    The above note was generated using voice recognition software. Inadvertent typographical errors in transcription may have occurred.       Electronically signed by Betty Lugo MD on 11/29/21 at 12:03 PM

## 2021-11-29 NOTE — PLAN OF CARE
Nutrition Problem #1: Inadequate oral intake  Intervention: Food and/or Nutrient Delivery: Continue NPO, Continue Current Tube Feeding  Nutritional Goals: meet nutritional needs through EN

## 2021-11-30 NOTE — PROGRESS NOTES
Hospitalist Progress Note  [facility]     Patient: Donetta Koyanagi  : 1955  MRN: [id number]  Code Status: [facility]    Hospital Day: 18   Date of Service: 2021    Subjective:   Patient seen and examined. Extubated today. 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     COPD (chronic obstructive pulmonary disease) (HCC)     Depression     Emphysema (subcutaneous) (surgical) resulting from a procedure     History of lung biopsy 2019    Malignant neoplasm of right main bronchus ([address] 2019    NSCLC, SCC [id number] stage IIIb    Palliative care patient 2021    Syncope     TIA (transient ischemic attack)        Past Surgical History:   Procedure Laterality Date    APPENDECTOMY      BACK SURGERY      times two    BLADDER SUSPENSION      CHOLECYSTECTOMY      HYSTERECTOMY      INCONTINENCE SURGERY         Family History   Problem Relation Age of Onset    Colon Cancer Mother     High Blood Pressure Brother     Diabetes Brother        Social History     Socioeconomic History    Marital status:      Spouse name: Not on file    Number of children: Not on file    Years of education: Not on file    Highest education level: Not on file   Occupational History    Not on file   Tobacco Use    Smoking status: Current Every Day Smoker    Smokeless tobacco: Never Used   Substance and Sexual Activity    Alcohol use: Not Currently    Drug use: Never    Sexual activity: Not on file   Other Topics Concern    Not on file   Social History Narrative    Not on file     Social Determinants of Health     Financial Resource Strain:     Difficulty of Paying Living Expenses: Not on file   Food Insecurity:     Worried About Running Out of Food in the Last Year: Not on file    Trisha of Food in the Last Year: Not on file   Transportation Needs:     Lack of Transportation (Medical):  Not on file    Lack of Transportation (Non-Medical):  Not on file   Physical Activity:     Days of Exercise per Week: Not on file    Minutes of Exercise per Session: Not on file   Stress:     Feeling of Stress : Not on file   Social Connections:     Frequency of Communication with Friends and Family: Not on file    Frequency of Social Gatherings with Friends and Family: Not on file    Attends Congregational Services: Not on file    Active Member of Clubs or Organizations: Not on file    Attends Club or Organization Meetings: Not on file    Marital Status: Not on file   Intimate Partner Violence:     Fear of Current or Ex-Partner: Not on file    Emotionally Abused: Not on file    Physically Abused: Not on file    Sexually Abused: Not on file   Housing Stability:     Unable to Pay for Housing in the Last Year: Not on file    Number of [place] in the Last Year: Not on file    Unstable Housing in the Last Year: Not on file       Current Facility-Administered Medications   Medication Dose Route Frequency Provider Last Rate Last Admin    midodrine (PROAMATINE) tablet 5 mg  5 mg Oral TID  Agnes Chao MD   5 mg at 11/30/21 0756    norepinephrine (LEVOPHED) 16 mg in sodium chloride 0.9 % 250 mL infusion  0.01-3.3 mcg/kg/min IntraVENous Continuous Dallin Darnell MD 8.4 mL/hr at 11/30/21 0100 9 mcg/min at 11/30/21 0100    lactated ringers infusion   IntraVENous Continuous Agnes Chao MD 75 mL/hr at 11/30/21 0354 New Bag at 11/30/21 0354    ipratropium-albuterol (DUONEB) nebulizer solution 1 ampule  1 ampule Inhalation Q6H Niki Melgar MD   1 ampule at 11/30/21 0611    [Held by provider] apixaban (ELIQUIS) tablet 5 mg  5 mg Oral BID Agnes Chao MD   5 mg at 11/26/21 1946    piperacillin-tazobactam (ZOSYN) 3,375 mg in dextrose 5 % 50 mL IVPB extended infusion (mini-bag)  3,375 mg IntraVENous Q8H Agnes Chao MD   Stopped at 11/30/21 0905    nicotine (NICODERM CQ) 21 MG/24HR 1 patch  1 patch TransDERmal Daily Kathi Enrique MD   1 patch at 11/30/21 0757    famotidine (PEPCID) tablet 20 mg  20 mg Oral Daily Kathi Enrique MD   20 mg at 11/30/21 0756    chlorhexidine (PERIDEX) 0.12 % solution 15 mL  15 mL Mouth/Throat BID Ortiz Wing MD   15 mL at 11/30/21 0757    potassium chloride (KLOR-CON M) extended release tablet 40 mEq  40 mEq Oral PRN Ortiz Wing MD        Or    potassium bicarb-citric acid (EFFER-K) effervescent tablet 40 mEq  40 mEq Oral PRN Ortiz Wing MD   40 mEq at 11/24/21 0502    Or    potassium chloride 10 mEq/100 mL IVPB (Peripheral Line)  10 mEq IntraVENous PRN Ortiz Wing MD   Stopped at 11/17/21 1410    propofol injection  5-50 mcg/kg/min IntraVENous Titrated Alyssa Grady, DO   Stopped at 11/30/21 4413    citalopram (CELEXA) tablet 20 mg  20 mg Oral Daily Alyssa Ocean, DO   20 mg at 11/30/21 0756    ondansetron (ZOFRAN-ODT) disintegrating tablet 4 mg  4 mg Oral Q8H PRN Alyssa [place], DO        Or    ondansetron [facility]) injection 4 mg  4 mg IntraVENous Q6H PRN Alyssa Ocean, DO   4 mg at 11/13/21 0758    polyethylene glycol (GLYCOLAX) packet 17 g  17 g Oral Daily PRN Alyssa Ocean, DO        aspirin EC tablet 81 mg  81 mg Oral Daily Alyssa Ocean, DO   81 mg at 11/30/21 9662    Or    aspirin suppository 300 mg  300 mg Rectal Daily Alyssa Ocean, DO   300 mg at 11/14/21 5441    atorvastatin (LIPITOR) tablet 40 mg  40 mg Oral Nightly Alyssa Ocean, DO   40 mg at 11/29/21 2013    labetalol (NORMODYNE;TRANDATE) injection 10 mg  10 mg IntraVENous Q10 Min PRN Alyssa Ocean, DO        morphine (PF) injection 2 mg  2 mg IntraVENous Q3H PRN Ortiz Wing MD   2 mg at 11/30/21 0756    thiamine (B-1) injection 100 mg  100 mg IntraVENous Daily Ramos Sawyer, DO   100 mg at 11/30/21 0756         norepinephrine 9 mcg/min (11/30/21 0100)    lactated ringers 75 mL/hr at 11/30/21 0354    propofol Stopped (11/30/21 0905)        Objective:   /63   Pulse 83   Temp 98.7 °F (37.1 °C) (Temporal)   Resp 24   Ht 5' 5\" (1.651 m)   Wt 105 lb 6.4 oz (47.8 kg)   SpO2 91%   BMI 17.54 kg/m²     General: cachectic  HEENT: normocephalic, atraumatic  Neck: supple, symmetrical, trachea midline   Lungs: slowly improving rhonchi  Cardiovascular: s1 and s2 normal  Abdomen: soft, positive bowel sounds  Extremities: no edema or cyanosis   Neuro: aaox3, moving all 4 extremities  Skin: normal color and texture     Recent Labs     11/28/21 0230 11/29/21 0349 11/30/21  0410   WBC 11.4* 13.3* 10.3   RBC 2.86* 2.98* 3.10*   HGB 8.5* 9.0* 9.3*   HCT 28.9* 30.2* 31.1*   .0* 101.3* 100.3*   MCH 29.7 30.2 30.0   MCHC 29.4* 29.8* 29.9*    336 346     Recent Labs     11/28/21 0230 11/29/21 0349 11/30/21  0410    142 146*   K 3.9 3.8 3.9   ANIONGAP 7 7 12    102 102   CO2 34* 33* 32*   BUN 13 8 9   CREATININE 0.5 0.2* 0.5   GLUCOSE 112* 133* 138*   CALCIUM 8.5* 8.5* 8.6*     Recent Labs     11/28/21 0230 11/29/21 0349 11/30/21  0410   MG 2.0 2.0 2.1     Recent Labs     11/28/21 0230 11/29/21 0349 11/30/21  0410   AST 14 20 22   ALT 19 21 28   BILITOT 0.3 0.3 0.3   ALKPHOS 68 66 77     No results for input(s): PH, PO2, PCO2, HCO3, BE, O2SAT in the last 72 hours. No results for input(s): TROPONINI in the last 72 hours. Recent Labs     11/29/21 0349   INR 1.13     No results for input(s): LACTA in the last 72 hours. Intake/Output Summary (Last 24 hours) at 11/30/2021 1042  Last data filed at 11/30/2021 0800  Gross per 24 hour   Intake 2939.06 ml   Output 3950 ml   Net -1010.94 ml       XR CHEST PORTABLE    Result Date: 11/29/2021  Frontal portable upright radiograph of the chest 11/29/2021 4:28 AM History: Shortness of breath Comparison: Chest x-ray dated November 28, 2021. Findings: Lines and tubes are stable in position.  Significant increase in the right pleural effusion and associated opacities. The left lung is unchanged. The cardiomediastinal silhouette and pulmonary vascularity are unchanged. No acute osseous or soft tissue abnormality is noted. Impression: 1. Significant increase in the right pleural effusion and associated opacities. . Signed by Dr Webster Elmo    Bronchoscopy    Result Date: 11/29/2021  No dictation        Assessment and Plan:   Bihemispheric strokes  Neurology following  Meds per neurology  Neurochecks  PT/OT/SLP  Eliquis per neuro recs for likely embolic stroke     Pseudomonas/Corynebacterium pneumonia  Zosyn     Ventilator dependent acute respiratory failure  Extubated 11/30/2021  CTA chest 11/22/2021: No evidence of PE  S/p bronch 11/29/2021, report on file     History of lung cancer     Tobacco dependence     Severe protein calorie malnutrition  Dietitian following     DVT prophylaxis  Eliquis     Extensive discussions with patient's brother Nilay Ren 11/20/2021 in regards to current clinical state/goals of care.  All questions sought and answered. Read Danger will attempt to have patient's sons (Jaxson and Everett) visit the hospital to further discuss.  Palliative following.     Patient's son Paras Gaitan) requested DNR status on 11/20/2021.  All questions sought and answered.  Unit staff witnessed the entire conversation.     Total critical care time: 49 minutes    Job Vuong MD   11/30/2021 10:42 AM

## 2021-11-30 NOTE — CONSULTS
**Physician Signature**  This document was electronically signed by: Anibal Gutierrez MD  2021   10:12 PM    **Consult Information**  Member Facility: [address] [place] [address] Drive MRN: [id number]  Visit/Encounter Number: [id number]  Consult ID: [id number]  Facility Time Zone: CT  Date and Time of Request: 2021 09:22 PM  CT  Requesting Clinician: Migdalia Lugo MD  Patient Name: Leon Viramontes  YOB: 1955  Gender: Female  Patient identity was confirmed at the beginning of the consult with the   patient/family/staff using two personal identifiers: Patient name and       **Reason for Consult**  Reason for Consult: [facility]    **Admission**  Admission Date: 2021  Chief reason for ICU admission: Respiratory Failure  Secondary reasons for ICU admission: Pneumonia, Altered Mental   Status    **Core Metrics**  General orienting sentence for patient: 73 yo female admitted  (not   covid) found unresponsive at home with sats in 46s. Hx lung cancer and   COPD. ON bipap CT scan brain negative for stroke. REmains pleasantly   confused no follow commands could get MRI. Was full of secretions and tenuous   status anyway. MRI shows   probable subacute CVA, but no hemorrhage. Anisocoria. Propofol. Currently on   85%. Hx lung cancer, currently undergoing treatment. 3 PPD ongoing   tobacco. Remains on 30 mcg/kg/min propofol. 75% FIO2, 5 PEEP. Lots of   secretions desaturation event this AM, and required 100% briefly. She remains   on   propofol gtt. gtt. FiO2 50%. PEEP 5. Changed RR to 24. 75% today. is awake   and alert but anxious  She remains on Propofol gtt. She wakes up and follows   commands. Acute CVA diagnosed. Moving all extremities, alert on propofol. FIO2   down to   50%, 5 PEEP. commands. Voluminous secretions. Propofol for calming. [facility]   vent: AC -18/400/0.5/+5, being treated for pneumonia. vent 50% and 5 PEEP. On   midodrine and 1mcg NE. sedation. Norepinephrine at 5mcg/min.   TFs at goal.    50%, PEEP of 5. No changes per charge RN. Pts' RN is tied up with another   patient during   [address]. off now that midodrine has been initiated. FiO2 50%, PEEP 5. Bronchoscopy scheduled for the AM.  MRI of the brain shows patchy ischemia. Awake and Alert when sedation is weaned. Propofol. mcg. No changes   otherwise. (clean out), past CVA in L parietal lobe, on low dose pressors for   propofol,   on midodrine  Chief physiologic deterioration: Increase in FiO2 required by   30%  Is the patient on DVT prophylaxis?: Yes  Prophylaxis type: Mechanical  DVT Prophylaxis Comments: Eliquis on hold for bronch on 11/29,   SCDs  Is the patient on GI prophylaxis?: Yes  Gi Prophylaxis Comments: Pepcid  Has this patient reached their nutritional goal?: Yes  Nutritional Goals Details: Tube feeds  Are there current issues with pain management in this patient?:   No  Are there issues with skin integrity?: Yes and issues are being   addressed  Skin Integrity Details: redness perianal  Are there issues with delirium?: No  Has the patient been mobilized?: No  Is this patient currently intubated?: Yes  Has facility initiated vent bundle?: Yes  Are there ethical or care philosophy or family issues?: No  Family Issues Details: 2 children.  Palliative care  Do you recommend an in depth evaluation?: No  Disposition Recommendations: Continue ICU level of care    **Inserted/Removed Devices**  Device Name: Young Catheter  Site of insertion: Urethra  Date of insertion: 11-  Device Name: Orogastric Tube  Site of insertion: Mouth  Date of insertion: 11-  Device Name: Endotracheal Tube  Site of insertion: Trachea  Date of insertion: 11-  Device Name: Nikhil Knee  Site of insertion: Subclavian - Right   Date of insertion: Unknown    **Physician Signature**  This document was electronically signed by: Geni Lopez MD  11/29/2021   10:12 PM

## 2021-11-30 NOTE — PLAN OF CARE
Problem: Falls - Risk of:  Goal: Will remain free from falls  Description: Will remain free from falls  Outcome: Ongoing  Goal: Absence of physical injury  Description: Absence of physical injury  Outcome: Ongoing     Problem: Skin Integrity:  Goal: Absence of new skin breakdown  Description: Absence of new skin breakdown  Outcome: Ongoing     Problem: Pain:  Goal: Pain level will decrease  Description: Pain level will decrease  Outcome: Ongoing  Goal: Control of acute pain  Description: Control of acute pain  Outcome: Ongoing  Goal: Control of chronic pain  Description: Control of chronic pain  Outcome: Ongoing     Problem: Coping:  Goal: Ability to remain calm will improve  Description: Ability to remain calm will improve  Outcome: Ongoing     Problem: Safety:  Goal: Ability to remain free from injury will improve  Description: Ability to remain free from injury will improve  Outcome: Ongoing     Problem: Self-Care:  Goal: Ability to participate in self-care as condition permits will improve  Description: Ability to participate in self-care as condition permits will improve  Outcome: Ongoing     Problem: Nutrition  Goal: Optimal nutrition therapy  Outcome: Ongoing     Problem: Urinary Elimination:  Goal: Signs and symptoms of infection will decrease  Description: Signs and symptoms of infection will decrease  Outcome: Ongoing  Goal: Complications related to the disease process, condition or treatment will be avoided or minimized  Description: Complications related to the disease process, condition or treatment will be avoided or minimized  Outcome: Ongoing

## 2021-11-30 NOTE — PROGRESS NOTES
Pulmonary and Critical Care Progress note. Barbara Pat    MRN# [id number]    Acct# [de-identified]  11/30/2021   1:38 PM CST    Referring Alanna Conley MD      Chief Complaint: History of lung cancer respiratory failure    HPI: She is dated. Sitting up in a chair denies any complaints.     Medications  midodrine, 5 mg, Oral, TID WC    ipratropium-albuterol, 1 ampule, Inhalation, Q6H    apixaban, 5 mg, Oral, BID    piperacillin-tazobactam, 3,375 mg, IntraVENous, Q8H    nicotine, 1 patch, TransDERmal, Daily    famotidine, 20 mg, Oral, Daily    chlorhexidine, 15 mL, Mouth/Throat, BID    citalopram, 20 mg, Oral, Daily    aspirin, 81 mg, Oral, Daily **OR** aspirin, 300 mg, Rectal, Daily    atorvastatin, 40 mg, Oral, Nightly    thiamine, 100 mg, IntraVENous, Daily     Review of Systems:      Physical Exam:  /63   Pulse 86   Temp 97.9 °F (36.6 °C) (Temporal)   Resp 23   Ht 5' 5\" (1.651 m)   Wt 105 lb 6.4 oz (47.8 kg)   SpO2 91%   BMI 17.54 kg/m²     Intake/Output Summary (Last 24 hours) at 11/30/2021 1651  Last data filed at 11/30/2021 1400  Gross per 24 hour   Intake 2864.06 ml   Output 3525 ml   Net -660.94 ml       General appearance: Elderly white female who is in no distress   HEENT: Endotracheal tube in place  Heart: S1-S2 distant sounds no murmurs  Lungs: Diminished bilaterally no rubs or chest wall tenderness or dullness to percussion  Abdomen: Soft nontender no organomegalies normal bowel sounds  Extremities: No clubbing cyanosis or edema  Neuro: No focal findings  Skin: Intact    Recent Labs     11/28/21  0230 11/29/21  0349 11/30/21  0410   WBC 11.4* 13.3* 10.3   RBC 2.86* 2.98* 3.10*   HGB 8.5* 9.0* 9.3*   HCT 28.9* 30.2* 31.1*    336 346   .0* 101.3* 100.3*   MCH 29.7 30.2 30.0   MCHC 29.4* 29.8* 29.9*   RDW 16.6* 16.5* 16.4*      Recent Labs     11/28/21  0230 11/29/21  0349 11/30/21  0410    142 146*   K 3.9 3.8 3.9    102 102 CO2 34* 33* 32*   BUN 13 8 9   CREATININE 0.5 0.2* 0.5   CALCIUM 8.5* 8.5* 8.6*   GLUCOSE 112* 133* 138*      No results for input(s): PHART, NJQ0MQE, PO2ART, YAR8FFN, O9NNUDTL, BEART in the last 72 hours. Recent Labs     11/29/21  0349 11/29/21  0349 11/30/21  0410   AST 20   < > 22   ALT 21   < > 28   ALKPHOS 66   < > 77   BILITOT 0.3   < > 0.3   MG 2.0   < > 2.1   CALCIUM 8.5*   < > 8.6*   INR 1.13  --   --     < > = values in this interval not displayed. Recent Labs     11/29/21  1240   LABGRAM Few WBC's (Polymorphonuclear)  No Epithelial Cells seen  Few Gram positive rods  Rare Gram negative rods     CULTRESP Moderate growth normal respiratory hari with*  Moderate growth  Sensitivity to follow           Radiograph: XR CHEST PORTABLE    Result Date: 11/28/2021  1. Mild Improvement in the radiograph appearance of the chest with decreasing bilateral infiltrates. Signed by Dr Radha Castellano    XR CHEST PORTABLE    Result Date: 11/24/2021  1. . Underlying emphysema. There is volume loss on the right with a cavitary lesion in the right upper lobe and a pleurodiaphragmatic adhesion in the right base. 2. Predominantly interstitial process within the left lung either representing an interstitial pneumonia or edema shows interval improvement. 3. No interval line changes. Signed by Dr Max Stiles    Result Date: 11/22/2021  1. Stable radiographic appearance the chest bilateral infiltrative opacities. Signed by Dr Danuta Manuel    Result Date: 11/22/2021  No evidence of pulmonary embolism. Pulmonary arterial hypertension. Evidence of right heart strain. Extensive chronic emphysematous lung changes with superimposed acute infiltrate as detailed above. Bilateral pleural effusion which was not seen in the previous study. Complete atelectasis of the right upper lobe anterior segment which was not seen in the previous study.  Persistent cavitating lesion in the right upper lobe posterior segment. Endotracheal tube and nasogastric tubes in place. A right subclavian approach MediPort system in place. Signed by Dr Irene Gomez       My radiograph interpretation/independent review of imaging: Reviewed chest x-ray is improved    Problem list generated by [facility]:  Hospital Problems           Last Modified POA    Acute hypoxemic respiratory failure ([address] 11/12/2021 Yes    Stroke of unknown etiology ([address] 11/12/2021 Yes    Palliative care patient 11/23/2021 Yes    Severe malnutrition ([address] 11/16/2021 Yes    Altered mental status 11/16/2021 Yes    Centrilobular emphysema ([address] 11/23/2021 Yes    Primary squamous cell carcinoma of right lung ([address] 11/23/2021 Yes    Pleural effusion 11/29/2021 Yes    Atelectasis of left lung 11/29/2021 Yes           Pulmonary Assessment/Plan:     1. acute hypoxic respiratory failure improved, off the vent. 2. Persistent hypotension. On oral midodrine. 3. Bronchoscopy done. No evidence of endobronchial obstruction. 4. Bilateral pleural effusions improved on chest x-ray. 5. Possible stroke followed by neurology. 6. DVT prophylaxis. Ramirez Manning MD, [facility], [facility]    The above note was generated using voice recognition software. Inadvertent typographical errors in transcription may have occurred.       Electronically signed by Ramirez Manning MD on 11/30/21 at 4:51 PM

## 2021-11-30 NOTE — PROGRESS NOTES
Palliative Care Progress Note  11/30/2021 1:59 PM    Patient:  Gissel Lackey  YOB: 1955  Primary Care Physician: DOMINICK Varghese NP  Advance Directive: [facility]  Admit Date: 11/12/2021       Hospital Day: 25  Portions of this note have been copied forward, however, changed to reflect the most current clinical status of this patient. CHIEF COMPLAINT/REASON FOR CONSULTATION Goals of care    SUBJECTIVE:  Remains intubated, responsive to verbal stimuli, follows commands     Interval History: Patient was successfully extubated this morning. She is awake. Passed SLP evaluation. Review of Systems:   14 point review of systems is negative except as specifically addressed above. Objective:   VITALS:  /63   Pulse 86   Temp 97.9 °F (36.6 °C) (Temporal)   Resp 27   Ht 5' 5\" (1.651 m)   Wt 105 lb 6.4 oz (47.8 kg)   SpO2 93%   BMI 17.54 kg/m²   24HR INTAKE/OUTPUT:      Intake/Output Summary (Last 24 hours) at 11/30/2021 1359  Last data filed at 11/30/2021 0800  Gross per 24 hour   Intake 2864.06 ml   Output 3175 ml   Net -310.94 ml     General appearance: 73 yo female, frail, chronically ill appearing, NAD   Head: Normocephalic, without obvious abnormality, atraumatic  Eyes: conjunctivae/corneas clear.  Pupils reactive bilaterally  Ears: normal external ears and nose, throat without exudate  Neck: no adenopathy, no carotid bruit, no JVD, supple, symmetrical, trachea midline   Lungs: faint scattered rhonchi, respirations nonlabored  Heart: RRR, S1, S2 normal, no murmur  Abdomen:soft,non-distended, bowel sounds present    Extremities: No lower extremity edema,  No erythema, no tenderness to palpation  Skin: warm, dry  Lymphatic: No palpable lymph node enlargment  Neurologic: Follows commands, participates in conversation, generalized weakness, no focal deficits   Psychiatric:  calm    Medications:      norepinephrine 9 mcg/min (11/30/21 0100)    lactated ringers 75 mL/hr at 11/30/21 1867  midodrine  5 mg Oral TID     ipratropium-albuterol  1 ampule Inhalation Q6H    apixaban  5 mg Oral BID    piperacillin-tazobactam  3,375 mg IntraVENous Q8H    nicotine  1 patch TransDERmal Daily    famotidine  20 mg Oral Daily    chlorhexidine  15 mL Mouth/Throat BID    citalopram  20 mg Oral Daily    aspirin  81 mg Oral Daily    Or    aspirin  300 mg Rectal Daily    atorvastatin  40 mg Oral Nightly    thiamine  100 mg IntraVENous Daily     oxyCODONE-acetaminophen, potassium chloride **OR** potassium alternative oral replacement **OR** potassium chloride, ondansetron **OR** ondansetron, polyethylene glycol, labetalol, morphine  ADULT TUBE FEEDING; Nasogastric; Other Tube Feeding (specify); vital 1.5; Continuous; 35; No; 35; Q 1 hour; Protein; 1 Proteinex given as flush in 24 hours  ADULT DIET; Regular     Lab and other Data:     Recent Labs     11/28/21 0230 11/29/21 0349 11/30/21  0410   WBC 11.4* 13.3* 10.3   HGB 8.5* 9.0* 9.3*    336 346     Recent Labs     11/28/21 0230 11/29/21  0349 11/30/21  0410    142 146*   K 3.9 3.8 3.9    102 102   CO2 34* 33* 32*   BUN 13 8 9   CREATININE 0.5 0.2* 0.5   GLUCOSE 112* 133* 138*     Recent Labs     11/28/21  0230 11/29/21  0349 11/30/21  0410   AST 14 20 22   ALT 19 21 28   BILITOT 0.3 0.3 0.3   ALKPHOS 68 66 77     Assessment/Plan   Active Problems:    Acute hypoxemic respiratory failure (HCC)    Stroke of unknown etiology (HCC)    Palliative care patient    Severe malnutrition (HCC)    Altered mental status    Centrilobular emphysema (HCC)    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 *    Visit Summary:  Chart reviewed. No acute overnight events. Successfully extubated today. Mucous plugging noted on bronchoscopy. Passed SLP evaluation. Recommendations:   1. Palliative care: GOC to be determined.  Suspect need for SNF/rehab Code status: DNR    2. Acute hypoxemic respiratory failure-ventilator mgmt per Pulmonology/Hospitalist, bronchoscopy completed 11/29/2021    3. Bilateral (right frontal/left parietal) hemisphere acute cerebral infarcts-Neurology following, supportive care. Awake and following commands    Thank you for consulting Palliative Care and allowing us to participate in the care of this patient.    Time Spent Counseling > 50%:  YES                                   Total Time Spent with patient/family counseling, workup/treatment review, counseling and placement of orders/preparation of this note: 25 minutes    Electronically signed by Cinthia Hodge PA-C on 11/30/2021 at 1:59 PM    (Please note that portions of this note were completed with a voice recognition program.  Kortney Escobedo made to edit the dictations but occasionally words are mis-transcribed.)

## 2021-11-30 NOTE — CONSULTS
**Physician Signature**  This document was electronically signed by: Se Kahn MD  2021   11:06 AM    **Consult Information**  Member Facility: [address] [place] [address] Drive MRN: [id number]  Visit/Encounter Number: [id number]  Consult ID: [id number]  Facility Time Zone: CT  Date and Time of Request: 2021 08:41 AM  CT  Requesting Clinician: Bernardino Villar MD  Patient Name: Deneen Anthony  YOB: 1955  Gender: Female  Patient identity was confirmed at the beginning of the consult with the   patient/family/staff using two personal identifiers: Patient name and       **Reason for Consult**  Reason for Consult: [facility]    **Admission**  Admission Date: 2021  Chief reason for ICU admission: Respiratory Failure  Secondary reasons for ICU admission: Pneumonia, Altered Mental   Status    **Core Metrics**  General orienting sentence for patient: 75 yo female admitted  (not   covid) found unresponsive at home with sats in 46s. Hx lung cancer and   COPD. ON bipap CT scan brain negative for stroke. REmains pleasantly   confused no follow commands could get MRI. Was full of secretions and tenuous   status anyway. MRI shows   probable subacute CVA, but no hemorrhage. Anisocoria. Propofol. Currently on   85%. Hx lung cancer, currently undergoing treatment. 3 PPD ongoing   tobacco. Remains on 30 mcg/kg/min propofol. 75% FIO2, 5 PEEP. Lots of   secretions desaturation event this AM, and required 100% briefly. She remains   on   propofol gtt. gtt. FiO2 50%. PEEP 5. Changed RR to 24. 75% today. is awake   and alert but anxious  She remains on Propofol gtt. She wakes up and follows   commands. Acute CVA diagnosed. Moving all extremities, alert on propofol. FIO2   down to   50%, 5 PEEP. commands. Voluminous secretions. Propofol for calming. [facility]   vent: AC -18/400/0.5/+5, being treated for pneumonia. vent 50% and 5 PEEP. On   midodrine and 1mcg NE. sedation. Norepinephrine at 5mcg/min.   TFs at goal.    50%, PEEP of 5. No changes per charge RN. Pts' RN is tied up with another   patient during   [address]. off now that midodrine has been initiated. FiO2 50%, PEEP 5. Bronchoscopy scheduled for the AM.  MRI of the brain shows patchy ischemia. Awake and Alert when sedation is weaned. Propofol. mcg. No changes   otherwise. 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 She is oriented. Bronch completed   yesterday. Chief physiologic deterioration: None - Stable patient  Is the patient on DVT prophylaxis?: Yes  Prophylaxis type: Mechanical, Pharmacological  DVT Prophylaxis Comments: Eliquis, SCDs  Is the patient on GI prophylaxis?: Yes  Gi Prophylaxis Comments: Pepcid  Has this patient reached their nutritional goal?: No and issues are not being   addressed  Nutritional Goals Details: Speech eval pending  Are there current issues with pain management in this patient?:   No  Are there issues with skin integrity?: Yes and issues are being   addressed  Skin Integrity Details: redness perianal  Are there issues with delirium?: No  Has the patient been mobilized?: No  Is this patient currently intubated?: No  Are there ethical or care philosophy or family issues?: No  Family Issues Details: 2 children.  Palliative care  Do you recommend an in depth evaluation?: No  Disposition Recommendations: Continue ICU level of care    **Inserted/Removed Devices**  Device Name: Young Catheter  Site of insertion: Urethra  Date of insertion: 11-  Device Name: Orogastric Tube  Site of insertion: Mouth  Date of insertion: 11-  Date of device removal: 11-  Device Name: Endotracheal Tube  Site of insertion: Trachea  Date of insertion: 11-  Date of device removal: 11-  Device Name: Port-a-cath  Site of insertion: Subclavian - Right   Date of insertion: Unknown    **Physician Signature**  This document was electronically signed by: Rod Watkins MD  11/30/2021   11:06 AM

## 2021-11-30 NOTE — PROGRESS NOTES
Speech Language Pathology  Facility/Department: [facility] ICU   CLINICAL BEDSIDE SWALLOW EVALUATION    NAME: Aidee Cates  : 1955  MRN: [id number]    ADMISSION DATE: 2021  ADMITTING DIAGNOSIS: has Malignant neoplasm of right main bronchus ([address] Acute hypoxemic respiratory failure ([address] Stroke of unknown etiology ([address] Palliative care patient; Severe malnutrition ([address] Altered mental status; Centrilobular emphysema ([address] Malignant neoplasm of upper lobe of right lung ([address] Primary squamous cell carcinoma of right lung ([address] Pleural effusion; and Atelectasis of left lung on their problem list.      Recent Chest Xray/CT of Chest:  Narrative   Frontal portable upright radiograph of the chest 2021 4:28 AM   History: Shortness of breath   Comparison: Chest x-ray dated 2021. Findings:    Lines and tubes are stable in position. Significant increase in the   right pleural effusion and associated opacities. The left lung is   unchanged. The cardiomediastinal silhouette and pulmonary vascularity   are unchanged.     No acute osseous or soft tissue abnormality is noted.        Impression   Impression:    1.   Significant increase in the right pleural effusion and associated   opacities. .   Signed by Dr Darin Borrego         Date of Eval: 2021  Evaluating Therapist: LEVON Morris    Current Diet level:  Current Diet : NPO  Current Liquid Diet : NPO      Primary Complaint  Patient Complaint: Ready to eat    Pain:  Pain Assessment  Pain Assessment: FLACC  Pain Level: 0  Sales-Baker Pain Rating: No hurt  Patient's Stated Pain Goal: No pain  Pain Type: Acute pain  Pain Location: Chest (right)  Pain Orientation: Right  Pain Descriptors: Discomfort  Pain Frequency: Intermittent  Pain Onset: Gradual  Non-Pharmaceutical Pain Intervention(s): Repositioned  Response to Pain Intervention: Patient Satisfied  RASS Score: Light Sedation - Patient awakens with eye opening and eye contact, but not sustained  Pain Assessment/FLACC  Pain Rating: FLACC (rest) - Face: no particular expression or smile  Pain Rating: FLACC (rest) - Legs: normal position or relaxed  Pain Rating: FLACC (rest) - Activity: lying quietly, normal position, moves easily  Pain Rating: FLACC (rest) - Cry: no cry (awake or asleep)  Pain Rating: FLACC (rest) - Consolability: content, relaxed  Score: FLACC (rest): 0  Pain Rating: FLACC (activity) - Face: no particular expression or smile  Pain Rating: FLACC (activity) - Legs: normal position or relaxed  Pain Rating: FLACC (activity): lying quietly, normal position, moves easily  Pain Rating: FLACC (activity) - Cry: no cry (awake or asleep)  Pain Rating: FLACC (activity) - Consolability: content, relaxed  Score: FLACC (activity): 0    Reason for Referral  Aidee Cates was referred for a bedside swallow evaluation to assess the efficiency of her swallow function, identify signs and symptoms of aspiration and make recommendations regarding safe dietary consistencies, effective compensatory strategies, and safe eating environment. Impression  Pt presented with functional oral and pharyngeal phase of swallowing as evidenced by mastication process, swallow initiation, and LE via laryngeal palpation. Pt had no overt s/s of aspiration at the bedside with all presented textures. At this time, SLP recommends pt trial regular diet texture with thin liquids. Meds whole with water. Assist with meal set-up      Dysphagia Diagnosis: Swallow function appears grossly intact    Treatment Plan  Requires SLP Intervention: Yes  Duration/Frequency of Treatment: 1x/day, 2-3 days  D/C Recommendations: To be determined       Recommended Diet and Intervention  Diet Solids Recommendation: Regular  Liquid Consistency Recommendation:  Thin  Recommended Form of Meds: Whole with water     Therapeutic Interventions: Diet tolerance monitoring; Patient/Family education    Compensatory Swallowing Strategies  Compensatory Swallowing Strategies: Alternate solids and liquids; Upright as possible for all oral intake    Treatment/Goals  Short-term Goals  Timeframe for Short-term Goals: 2-3 days  Goal 1: Pt tolerate regular diet texture with thin liquids without overt s/s of aspiration  Goal 2: Pt/family/staff follow safe swallow/aspiration precautions 100% of PO intake. Goal 3: Speech/language/cognitive evaluation  Long-term Goals  Goal 1: Pt tolerate safer and least restrictive diet without overt s/s of aspiration    General  Chart Reviewed: Yes  Comments: Nursing had PO meds to administer at time of BSE  Subjective  Subjective: Pt alert and cooperative. Pt reported no hx of swallowing difficulty. Pt reported acute right side weakness/numbness that she felt has resolved. Pt stated she was hungry and has a good appetite. Behavior/Cognition: Alert; Cooperative; Pleasant mood  Temperature Spikes Noted: No  Respiratory Status: O2 via nasual cannula  O2 Device: Nasal cannula  Communication Observation: Functional (Delayed responses observed; potential expressive speech deficits)  Follows Directions: Simple  Dentition: Edentulous  Patient Positioning: Upright in bed  Baseline Vocal Quality: Weak; Hoarse  Volitional Swallow:  (1-2 second initiation of automatic swallow response)  Prior Dysphagia History: None reported  Consistencies Administered: Reg solid; Dysphagia Soft and Bite-Sized (Dysphagia III); Thin - cup; Thin - straw; Ice Chips           Oral Motor Deficits  Oral/Motor  Oral Motor: Exceptions to [facility]  Lingual Coordination: Reduced (Decreased approximation with lingual tip lateralization)    Oral Phase Dysfunction  Oral Phase  Oral Phase: WFL  Oral Phase  Oral Phase - Comment: Pt demonstrated functional oral prep, mastication process with bolus formation and coordination with soft solid and regular solid textures. Minimal to no oral residue observed. Pt had no buccal pocketing.      Indicators of Pharyngeal Phase Dysfunction Pharyngeal Phase  Pharyngeal Phase: WFL  Pharyngeal Phase   Pharyngeal: Pt demonstrated minimally decreased LE for airway protection; however no overt s/s of aspiration were observed with thin liquid consistency, regular, or soft solid texture . Pt demonstrated timely swallow response with multiple trials of thin H2O. Education  Patient Education: Diet; safe swallow/aspiration precautions  Patient Education Response: Demonstrated understanding             At this time, SLP recommends pt trial regular diet texture with thin liquids. Meds whole with water.  Assist with meal set-up      LEVON Quintanilla  11/30/2021 1:48 PM    Electronically signed by LEVON Quintanilla on 11/30/21 at 1:49 PM CST

## 2021-12-01 NOTE — PROGRESS NOTES
[facility] Neurology Progress Note      Patient:   Lawrence Cabral  MR#:    [id number]   Room:    [facility]/032-87   YOB: 1955  Date of Progress Note: 12/1/2021  Time of Note                           8:05 AM  Consulting Physician:  Kim Vázquez DO  Attending Physician:  Iftikhar Bolden MD      INTERVAL HISTORY: Extubated, continues to slowly improve, following commands, no acute events overnight. REVIEW OF SYSTEMS:  Limited     PHYSICAL EXAM:    Constitutional    BP (!) 99/52   Pulse 76   Temp 97.2 °F (36.2 °C) (Temporal)   Resp 17   Ht 5' 5\" (1.651 m)   Wt 106 lb 1.6 oz (48.1 kg)   SpO2 99%   BMI 17.66 kg/m²   General appearance: No acute disctress    EYES -   Conjunctiva normal  Pupillary exam as below, see CN exam in the neurologic exam  ENT-    No scars, masses, or lesions over external nose or ears  Oropharynx - intubated  Cardiovascular -   No clubbing, cyanosis, or edema   Musculoskeletal    No significant wasting of muscles noted  Gait as below, see gait exam in the neurologic exam  Muscle strength, tone, stability as below see the motor exam in the neurologic exam.   No bony deformities  Skin    Warm, dry, and intact to inspection and palpation. No rash, erythema, or pallor        NEUROLOGICAL EXAM     Mental status    []? Awake, alert, oriented   []? Affect attention and concentration appear appropriate  []? Recent and remote memory appears unremarkable  []? Speech normal without dysarthria or aphasia, comprehension and repetition intact. COMMENTS:  Eyes open, alert, following commands, speech improving. Cranial Nerves []? No VF deficit to confrontation,  optic discs normal, no papilledema on fundoscopic exam.  []? PERRLA, EOMI, no nystagmus, conjugate eye movements, no ptosis  []? Face symmetric  []? Facial sensation intact  []? Tongue midline no atrophy or fasciculations present  []? Palate midline, hearing to finger rub normal  []?  Shoulder shrug and SCM testing normal  COMMENTS: Left Pupil reactive, right pupil misshapen likely surgical, face appears sym, EOM intact    Motor   []? 5/5 strength x 4 extremities  [x]? Normal bulk and tone  [x]? No tremor present  [x]? No rigidity or bradykinesia noted  COMMENTS:SMAE noted   Sensory  []? Sensation intact to light touch, pin prick, vibration, and proprioception BLE  []? Sensation intact to light touch, pin prick, vibration, and proprioception BUE  COMMENTS: Limtied   Coordination []? FTN normal bilaterally   []? HTS normal bilaterally  []? JIA normal.   COMMENTS: Limited    Reflexes  [x]? Symmetric and non-pathological  [x]? Toes downgoing bilaterally  [x]? No clonus present  COMMENTS:   Gait                  []? Normal steady gait    []? Ataxic    []? Spastic     []? Magnetic     []? Shuffling  [x]? Not assessed  COMMENTS:        LABS/IMAGING:    CT HEAD WO CONTRAST    Result Date: 11/12/2021  CT HEAD WO CONTRAST 11/12/2021 11:25 AM HISTORY: Code stroke COMPARISON: None DOSE LENGTH PRODUCT: 961 mGy cm TECHNIQUE: Helical tomographic images of the brain were obtained without the use of intravenous contrast. Automated exposure control was also utilized to decrease patient radiation dose. FINDINGS: Exam is limited by motion artifact. There is no evidence of evolving large vascular territory infarct. Underlying chronic small vessel ischemic change. No visualized intra-axial or extra-axial hemorrhage. No mass lesion is identified. Normal size and configuration of the ventricular system. The basal cisterns are symmetric. Posterior fossa structures are unremarkable. The included orbits and their contents are unremarkable. Chronic right maxillary sinus disease. The visualized osseous structures and overlying soft tissues of the skull and face are unremarkable. 1. No acute intracranial process.  Signed by Dr Romayne Quint    XR CHEST PORTABLE    Result Date: 11/12/2021  XR CHEST PORTABLE 11/12/2021 11:36 AM HISTORY: Code stroke Technique: Single AP view of the chest COMPARISONS: Chest exam dated 4/29/2020 FINDINGS: Reidentified right suprahilar consolidation with volume loss. New developing consolidations in the left upper and left lower lobes with air bronchograms. No obvious pleural effusion. Underlying lung emphysema. Heart size is stable. The pulmonary vasculature are nondilated. Right chest wall [id number]. Spinal scoliotic curvature. 1. New multifocal consolidation in the left upper and left lower lobes, appearance concerning for new multifocal pneumonia. 2. Treatment-related changes in the right suprahilar region with scarring and volume loss. Signed by Dr Laverne Corrales    Result Date: 11/12/2021  EXAM: CT NECK ANGIOGRAM CT HEAD ANGIOGRAM on  11/12/2021 12:54 PM COMPARISON:  CT chest dated August 23, 2021. HISTORY:  74years-old Female. Code stroke TECHNIQUE: Multiple CT images were obtained of the of the head and neck after the administration of IV contrast. 3D MIP reformats were generated  and were sent to PACS for interpretation. Grading of carotid artery stenosis is performed by NASCET criteria. FINDINGS: CT Neck Angiogram: There is a conventional aortic arch with patent origins of the brachiocephalic trunk, left common carotid and left subclavian arteries. The bilateral common carotid arteries and subclavian arteries are well-opacified. There is atherosclerotic disease of the bilateral carotid bifurcations, left greater than than right, with essentially 0% stenosis. The bilateral internal carotid arteries are otherwise well opacified throughout. The bilateral vertebral arteries are well-opacified throughout. Additional findings: Emphysema. New left upper lobe opacities posteriorly, most concerning for infection. Additional right upper lobe opacity is also concerning for infection.  Centrally necrotic right upper and lower lobe posterior mass, incompletely visualized but appears grossly stable when compared to prior examination. Small bilateral pleural effusions. CT Head Angiogram: The right internal carotid artery demonstrates normal course and caliber without evidence of flow limiting stenosis or occlusion. The major branch vessels to the right anterior and middle cerebral arteries are seen without evidence of stenosis or occlusion. There is no evidence of aneurysm or vascular malformation. Some atherosclerotic disease in the carotid siphon. The left internal carotid artery demonstrates normal course and caliber without evidence of flow limiting stenosis or occlusion. The major branch vessels to the left anterior and middle cerebral arteries are seen without evidence of stenosis or occlusion. There is no evidence of aneurysm or vascular malformation. Some atherosclerotic disease in the carotid siphon. Evaluation of the posterior circulation demonstrates normal caliber of the vertebral arteries, basilar artery, and major branch vessels of the posterior cerebral arteries bilaterally without evidence of flow limiting stenosis or occlusion. There is no evidence of aneurysm or vascular malformation. CT Angiography Of The Neck With Intravenous Contrast 1. No evidence of high-grade arterial stenosis or underlying dissection. 2. Bilateral upper lobe new opacities are concerning for infection. CT Angiography Of The Head With Intravenous Contrast 1. No evidence of acute thrombotic occlusion, high-grade arterial stenosis, or focal cerebral aneurysm. Signed by Dr Edie Ness Date: 11/12/2021  EXAM: CT NECK ANGIOGRAM CT HEAD ANGIOGRAM on  11/12/2021 12:54 PM COMPARISON:  CT chest dated August 23, 2021. HISTORY:  74years-old Female. Code stroke TECHNIQUE: Multiple CT images were obtained of the of the head and neck after the administration of IV contrast. 3D MIP reformats were generated  and were sent to PACS for interpretation.  Grading of carotid artery stenosis is performed by NASCET criteria. FINDINGS: CT Neck Angiogram: There is a conventional aortic arch with patent origins of the brachiocephalic trunk, left common carotid and left subclavian arteries. The bilateral common carotid arteries and subclavian arteries are well-opacified. There is atherosclerotic disease of the bilateral carotid bifurcations, left greater than than right, with essentially 0% stenosis. The bilateral internal carotid arteries are otherwise well opacified throughout. The bilateral vertebral arteries are well-opacified throughout. Additional findings: Emphysema. New left upper lobe opacities posteriorly, most concerning for infection. Additional right upper lobe opacity is also concerning for infection. Centrally necrotic right upper and lower lobe posterior mass, incompletely visualized but appears grossly stable when compared to prior examination. Small bilateral pleural effusions. CT Head Angiogram: The right internal carotid artery demonstrates normal course and caliber without evidence of flow limiting stenosis or occlusion. The major branch vessels to the right anterior and middle cerebral arteries are seen without evidence of stenosis or occlusion. There is no evidence of aneurysm or vascular malformation. Some atherosclerotic disease in the carotid siphon. The left internal carotid artery demonstrates normal course and caliber without evidence of flow limiting stenosis or occlusion. The major branch vessels to the left anterior and middle cerebral arteries are seen without evidence of stenosis or occlusion. There is no evidence of aneurysm or vascular malformation. Some atherosclerotic disease in the carotid siphon. Evaluation of the posterior circulation demonstrates normal caliber of the vertebral arteries, basilar artery, and major branch vessels of the posterior cerebral arteries bilaterally without evidence of flow limiting stenosis or occlusion.  There is no evidence of aneurysm or vascular malformation. CT Angiography Of The Neck With Intravenous Contrast 1. No evidence of high-grade arterial stenosis or underlying dissection. 2. Bilateral upper lobe new opacities are concerning for infection. CT Angiography Of The Head With Intravenous Contrast 1. No evidence of acute thrombotic occlusion, high-grade arterial stenosis, or focal cerebral aneurysm. Signed by Dr Rivas Lung      Lab Results   Component Value Date    WBC 9.2 12/01/2021    HGB 8.5 (L) 12/01/2021    HCT 28.9 (L) 12/01/2021    .1 (H) 12/01/2021     12/01/2021     Lab Results   Component Value Date     12/01/2021    K 3.8 12/01/2021     12/01/2021    CO2 32 (H) 12/01/2021    BUN 11 12/01/2021    CREATININE 0.2 (L) 12/01/2021    GLUCOSE 97 12/01/2021    CALCIUM 8.5 (L) 12/01/2021    PROT 5.4 (L) 12/01/2021    LABALBU 2.6 (L) 12/01/2021    BILITOT <0.2 12/01/2021    ALKPHOS 69 12/01/2021    AST 16 12/01/2021    ALT 22 12/01/2021    LABGLOM >60 12/01/2021    GFRAA >59 12/01/2021    AGRATIO 1.4 05/01/2020    GLOB 3.5 02/25/2021     Lab Results   Component Value Date    INR 1.13 11/29/2021    INR 1.18 11/12/2021    INR 0.96 05/16/2019    PROTIME 14.7 (H) 11/29/2021    PROTIME 15.2 (H) 11/12/2021    PROTIME 12.2 05/16/2019       RECORD REVIEW:   Previous medical records, medications were reviewed at today's visit. Nursing/physician notes, imaging, labs and vitals reviewed. PT,OT and/or speech notes reviewed      ASSESSMENT:  74 y.o. admitted with AMS, hypoxia, pneumonia, lung cancer history, COPD, right sided weakness, slurred speech, MRI consistent with scattered bi-hemishperic strokes, likely embolic vs watershed. CTAs negative. ECHO, CD negative. EEG with slowing, nothing epileptiform. Now extubated, exam stable / improved, more alert, following commands.       PLAN:  1. Supportive care   2. Follow Tele   3. Continue addressing medical issues, pneumonia, respiratory failure - intubated.  Pulmonology following. 4.  ASA, statin, Eliquis. 5.  PT, OT, ST  6. DVT proph   7. Limit sedating medications, supplementing thiamine  8. Palliative care following      Please feel free to call with any questions. [phone] (cell phone).     Radha Garcia DO  Board Certified Neurology

## 2021-12-01 NOTE — PROGRESS NOTES
Hospitalist Progress Note  [facility]     Patient: Marilou Sheikh  : 1955  MRN: [id number]  Code Status: [facility]    Hospital Day: 19   Date of Service: 2021    Subjective:   Patient seen and examined. Eating breakfast.  No current complaints. 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     COPD (chronic obstructive pulmonary disease) (HCC)     Depression     Emphysema (subcutaneous) (surgical) resulting from a procedure     History of lung biopsy 2019    Malignant neoplasm of right main bronchus ([address] 2019    NSCLC, SCC [id number] stage IIIb    Palliative care patient 2021    Syncope     TIA (transient ischemic attack)        Past Surgical History:   Procedure Laterality Date    APPENDECTOMY      BACK SURGERY      times two    BLADDER SUSPENSION      CHOLECYSTECTOMY      HYSTERECTOMY      INCONTINENCE SURGERY         Family History   Problem Relation Age of Onset    Colon Cancer Mother     High Blood Pressure Brother     Diabetes Brother        Social History     Socioeconomic History    Marital status:      Spouse name: Not on file    Number of children: Not on file    Years of education: Not on file    Highest education level: Not on file   Occupational History    Not on file   Tobacco Use    Smoking status: Current Every Day Smoker    Smokeless tobacco: Never Used   Substance and Sexual Activity    Alcohol use: Not Currently    Drug use: Never    Sexual activity: Not on file   Other Topics Concern    Not on file   Social History Narrative    Not on file     Social Determinants of Health     Financial Resource Strain:     Difficulty of Paying Living Expenses: Not on file   Food Insecurity:     Worried About Running Out of Food in the Last Year: Not on file    Trisha of Food in the Last Year: Not on file   Transportation Needs:     Lack of Transportation (Medical):  Not on file    Lack of Transportation (Non-Medical):  Not on file   Physical Activity:     Days of Exercise per Week: Not on file    Minutes of Exercise per Session: Not on file   Stress:     Feeling of Stress : Not on file   Social Connections:     Frequency of Communication with Friends and Family: Not on file    Frequency of Social Gatherings with Friends and Family: Not on file    Attends Synagogue Services: Not on file    Active Member of Clubs or Organizations: Not on file    Attends Club or Organization Meetings: Not on file    Marital Status: Not on file   Intimate Partner Violence:     Fear of Current or Ex-Partner: Not on file    Emotionally Abused: Not on file    Physically Abused: Not on file    Sexually Abused: Not on file   Housing Stability:     Unable to Pay for Housing in the Last Year: Not on file    Number of [place] in the Last Year: Not on file    Unstable Housing in the Last Year: Not on file       Current Facility-Administered Medications   Medication Dose Route Frequency Provider Last Rate Last Admin    oxyCODONE-acetaminophen (PERCOCET) 5-325 MG per tablet 1 tablet  1 tablet Oral Q6H PRN Ayleen Major PA-C   1 tablet at 12/01/21 0228    midodrine (PROAMATINE) tablet 5 mg  5 mg Oral TID WC Patsy Pulido MD   5 mg at 12/01/21 0900    norepinephrine (LEVOPHED) 16 mg in sodium chloride 0.9 % 250 mL infusion  0.01-3.3 mcg/kg/min IntraVENous Continuous Wally Sheikh MD 3.8 mL/hr at 12/01/21 0608 4 mcg/min at 12/01/21 6138    lactated ringers infusion   IntraVENous Continuous Patsy Pulido MD 75 mL/hr at 11/30/21 2000 New Bag at 11/30/21 2000    ipratropium-albuterol (DUONEB) nebulizer solution 1 ampule  1 ampule Inhalation Q6H Niki Melgar MD   1 ampule at 12/01/21 0625    apixaban (ELIQUIS) tablet 5 mg  5 mg Oral BID Patsy Pulido MD   5 mg at 12/01/21 0901    piperacillin-tazobactam (ZOSYN) 3,375 mg in dextrose 5 % 50 mL IVPB extended infusion (mini-bag)  3,375 mg IntraVENous Q8H Harvinder Trevizo MD   Stopped at 12/01/21 0806    nicotine (NICODERM CQ) 21 MG/24HR 1 patch  1 patch TransDERmal Daily Brigitte Oleary MD   1 patch at 12/01/21 0901    famotidine (PEPCID) tablet 20 mg  20 mg Oral Daily Brigitte Oleary MD   20 mg at 12/01/21 0901    chlorhexidine (PERIDEX) 0.12 % solution 15 mL  15 mL Mouth/Throat BID Laila Gomez MD   15 mL at 11/30/21 0757    potassium chloride (KLOR-CON M) extended release tablet 40 mEq  40 mEq Oral PRN Laila Gomez MD        Or    potassium bicarb-citric acid (EFFER-K) effervescent tablet 40 mEq  40 mEq Oral PRN Laila Gomez MD   40 mEq at 11/24/21 0502    Or    potassium chloride 10 mEq/100 mL IVPB (Peripheral Line)  10 mEq IntraVENous PRN Laila Gomez MD   Stopped at 11/17/21 1410    citalopram (CELEXA) tablet 20 mg  20 mg Oral Daily Berniece Dover, DO   20 mg at 12/01/21 0901    ondansetron (ZOFRAN-ODT) disintegrating tablet 4 mg  4 mg Oral Q8H PRN Berniece Dover, DO        Or    ondansetron [facility]) injection 4 mg  4 mg IntraVENous Q6H PRN Berniece Dover, DO   4 mg at 11/13/21 0758    polyethylene glycol (GLYCOLAX) packet 17 g  17 g Oral Daily PRN Berniece Dover, DO        aspirin EC tablet 81 mg  81 mg Oral Daily Berniece Dover, DO   81 mg at 12/01/21 0901    Or    aspirin suppository 300 mg  300 mg Rectal Daily Berniece Dover, DO   300 mg at 11/14/21 9553    atorvastatin (LIPITOR) tablet 40 mg  40 mg Oral Nightly Berniece Dover, DO   40 mg at 11/30/21 1955    labetalol (NORMODYNE;TRANDATE) injection 10 mg  10 mg IntraVENous Q10 Min PRN Berniece Dover, DO        morphine (PF) injection 2 mg  2 mg IntraVENous Q3H PRN Laila Gomez MD   2 mg at 11/30/21 3875    thiamine (B-1) injection 100 mg  100 mg IntraVENous Daily Nelson Bryant DO   100 mg at 12/01/21 0901         norepinephrine 4 mcg/min (12/01/21 9671)   Gil lactated ringers 75 mL/hr at 11/30/21 2000        Objective:   BP (!) 97/59   Pulse 77   Temp 97.3 °F (36.3 °C) (Temporal)   Resp 19   Ht 5' 5\" (1.651 m)   Wt 106 lb 1.6 oz (48.1 kg)   SpO2 93%   BMI 17.66 kg/m²     General: cachectic  HEENT: normocephalic, atraumatic  Neck: supple, symmetrical, trachea midline   Lungs: slowly improving rhonchi  Cardiovascular: s1 and s2 normal  Abdomen: soft, positive bowel sounds  Extremities: no edema or cyanosis   Neuro: aaox3, moving all 4 extremities  Skin: normal color and texture     Recent Labs     11/29/21 0349 11/30/21 0410 12/01/21 0415   WBC 13.3* 10.3 9.2   RBC 2.98* 3.10* 2.83*   HGB 9.0* 9.3* 8.5*   HCT 30.2* 31.1* 28.9*   .3* 100.3* 102.1*   MCH 30.2 30.0 30.0   MCHC 29.8* 29.9* 29.4*    346 283     Recent Labs     11/29/21 0349 11/30/21 0410 12/01/21  0415    146* 142   K 3.8 3.9 3.8   ANIONGAP 7 12 8    102 102   CO2 33* 32* 32*   BUN 8 9 11   CREATININE 0.2* 0.5 0.2*   GLUCOSE 133* 138* 97   CALCIUM 8.5* 8.6* 8.5*     Recent Labs     11/29/21 0349 11/30/21 0410 12/01/21  0415   MG 2.0 2.1 2.0     Recent Labs     11/29/21 0349 11/30/21  0410 12/01/21  0415   AST 20 22 16   ALT 21 28 22   BILITOT 0.3 0.3 <0.2   ALKPHOS 66 77 69     No results for input(s): PH, PO2, PCO2, HCO3, BE, O2SAT in the last 72 hours. No results for input(s): TROPONINI in the last 72 hours. Recent Labs     11/29/21 0349   INR 1.13     No results for input(s): LACTA in the last 72 hours.       Intake/Output Summary (Last 24 hours) at 12/1/2021 0905  Last data filed at 12/1/2021 0530  Gross per 24 hour   Intake 2186.83 ml   Output 2000 ml   Net 186.83 ml       Bronchoscopy    Result Date: 11/29/2021  No dictation        Assessment and Plan:   Bihemispheric strokes  Neurology following  Meds per neurology  Neurochecks  PT/OT/SLP  Eliquis per neuro recs for likely embolic stroke     Pseudomonas/Corynebacterium pneumonia  Zosyn      Ventilator dependent acute respiratory failure  Extubated 11/30/2021  CTA chest 11/22/2021: No evidence of PE  S/p bronch 11/29/2021, report on file, no evidence of endobronchial obstruction     History of lung cancer     Tobacco dependence     Severe protein calorie malnutrition  Dietitian following     DVT prophylaxis  Eliquis     Extensive discussions with patient's brother Vandana Shay 11/20/2021 in regards to current clinical state/goals of care.  All questions sought and answered. Pippa Lopez will attempt to have patient's sons (Jaxson and Irina Hinson) visit the hospital to further discuss.  Palliative following.     Patient's son Delmi Agustin) requested DNR status on 11/20/2021.  All questions sought and answered.  Unit staff witnessed the entire conversation.     Total critical care time: 47 minutes    Claudia Gonsalez MD   12/1/2021 9:05 AM

## 2021-12-01 NOTE — CONSULTS
**Physician Signature**  This document was electronically signed by: Win Lozano MD  2021   11:35 AM    **Consult Information**  Member Facility: [address] [place] [address] Drive MRN: [id number]  Visit/Encounter Number: [id number]  Consult ID: [id number]  Facility Time Zone: CT  Date and Time of Request: 2021 05:31 AM  CT  Requesting Clinician: Vicenta Wiggins MD  Patient Name: Lisa Sethi  YOB: 1955  Gender: Female  Patient identity was confirmed at the beginning of the consult with the   patient/family/staff using two personal identifiers: Patient name and       **Reason for Consult**  Reason for Consult: [facility]    **Admission**  Admission Date: 2021  Chief reason for ICU admission: Respiratory Failure  Secondary reasons for ICU admission: Pneumonia, Altered Mental   Status    **Core Metrics**  General orienting sentence for patient: 73 yo female admitted  (not   covid) found unresponsive at home with sats in 46s. Hx lung cancer and   COPD. ON bipap CT scan brain negative for stroke. REmains pleasantly   confused no follow commands could get MRI. Was full of secretions and tenuous   status anyway. MRI shows   probable subacute CVA, but no hemorrhage. Anisocoria. Propofol. Currently on   85%. Hx lung cancer, currently undergoing treatment. 3 PPD ongoing   tobacco. Remains on 30 mcg/kg/min propofol. 75% FIO2, 5 PEEP. Lots of   secretions desaturation event this AM, and required 100% briefly. She remains   on   propofol gtt. gtt. FiO2 50%. PEEP 5. Changed RR to 24. 75% today. is awake   and alert but anxious  She remains on Propofol gtt. She wakes up and follows   commands. Acute CVA diagnosed. Moving all extremities, alert on propofol. FIO2   down to   50%, 5 PEEP. commands. Voluminous secretions. Propofol for calming. [facility]   vent: AC -18/400/0.5/+5, being treated for pneumonia. vent 50% and 5 PEEP. On   midodrine and 1mcg NE. sedation. Norepinephrine at 5mcg/min.   TFs at goal.    50%, PEEP of 5. No changes per charge RN. Pts' RN is tied up with another   patient during   [address]. off now that midodrine has been initiated. FiO2 50%, PEEP 5. Bronchoscopy scheduled for the AM.  MRI of the brain shows patchy ischemia. Awake and Alert when sedation is weaned. Propofol. mcg. No changes   otherwise. 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 She is oriented. Bronch completed   yesterday. still on levophed is on Midodrine  Chief physiologic deterioration: None - Stable patient  Is the patient on DVT prophylaxis?: Yes  Prophylaxis type: Mechanical, Pharmacological  DVT Prophylaxis Comments: EliquAngeli aviles  Is the patient on GI prophylaxis?: Yes  Gi Prophylaxis Comments: Pepcid  Has this patient reached their nutritional goal?: Yes  Nutritional Goals Details: po diet  Are there current issues with pain management in this patient?:   No  Are there issues with skin integrity?: Yes and issues are being   addressed  Skin Integrity Details: redness perianal  Are there issues with delirium?: No  Has the patient been mobilized?: Yes  Is this patient currently intubated?: No  Are there ethical or care philosophy or family issues?: No  Family Issues Details: 2 children.  Palliative care  Do you recommend an in depth evaluation?: No  Disposition Recommendations: Continue ICU level of care    **Inserted/Removed Devices**  Device Name: Young Catheter  Site of insertion: Urethra  Date of insertion: 11-  Date of device removal: 12-  Device Name: Orogastric Tube  Site of insertion: Mouth  Date of insertion: 11-  Date of device removal: 11-  Device Name: Endotracheal Tube  Site of insertion: Trachea  Date of insertion: 11-  Date of device removal: 11-  Device Name: Port-a-cath  Site of insertion: Subclavian - Right   Date of insertion: Unknown    **Physician Signature**  This document was electronically signed by: Sancho Johnson MD  12/01/2021   11:35 AM

## 2021-12-01 NOTE — PROGRESS NOTES
Physical Therapy    Facility/Department: [facility] ICU  Initial Assessment    NAME: Aidee Cates  : 1955  MRN: [id number]    Date of Service: 2021    Discharge Recommendations:  Continue to assess pending progress, Patient would benefit from continued therapy after discharge        Assessment   Body structures, Functions, Activity limitations: Decreased functional mobility ; Decreased ADL status; Decreased strength; Decreased endurance; Decreased balance; Decreased posture  Assessment: Pt ABLE TO STAND AND TRANSFER TO CHAIR WITH ASSIST. WILL CONT TO PROGRESS WITH GT AS ABLE. PT Education: PT Role; Plan of Care  REQUIRES PT FOLLOW UP: Yes  Activity Tolerance  Activity Tolerance: Patient Tolerated treatment well       Patient Diagnosis(es): The primary encounter diagnosis was Altered mental status, unspecified altered mental status type. Diagnoses of Acute respiratory failure with hypoxia and hypercapnia (HCC), Acute right-sided weakness, Chronic obstructive pulmonary disease, unspecified COPD type ([address] Multifocal pneumonia, and Encephalopathy due to infection were also pertinent to this visit. has a past medical history of Anxiety, Asthma, 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, COPD (chronic obstructive pulmonary disease) ([address] Depression, Emphysema (subcutaneous) (surgical) resulting from a procedure, History of lung biopsy, Malignant neoplasm of right main bronchus [facility]), Palliative care patient, Syncope, and TIA (transient ischemic attack). has a past surgical history that includes Appendectomy; back surgery; Hysterectomy; bladder suspension; Cholecystectomy; and Incontinence surgery.     Restrictions  Restrictions/Precautions  Restrictions/Precautions: Fall Risk  Vision/Hearing        Subjective  General  Patient assessed for rehabilitation services?: Yes  Diagnosis: CVA, RESP FAILURE (VENT)  Subjective  Subjective: Pt WILLING TO PARTICIPATE  Pain Screening  Patient Currently in Pain: Yes  Pain Assessment  Pain Assessment: Faces  Sales-Baker Pain Rating: Hurts a little bit  Pain Type: Chronic pain  Pain Location: Back  Pain Descriptors: Aching  Pain Frequency: Intermittent  Functional Pain Assessment: Activities are not prevented  Non-Pharmaceutical Pain Intervention(s): Repositioned;  Rest  Response to Pain Intervention: Patient Satisfied  Vital Signs  Patient Currently in Pain: Yes       Orientation  Orientation  Overall Orientation Status: Within Functional Limits  Social/Functional History  Social/Functional History  Lives With: Son  Type of Home: House  Home Equipment: Rolling walker, Oxygen  ADL Assistance: Independent  Ambulation Assistance: Independent  Transfer Assistance: Independent  Cognition   Cognition  Overall Cognitive Status: WFL    Objective          AROM RLE (degrees)  RLE AROM: WFL  AROM LLE (degrees)  LLE AROM : WFL  Strength RLE  Comment: GROSSLY -4/5  Strength LLE  Comment: GROSSLY -4/5        Bed mobility  Supine to Sit: Minimal assistance  Transfers  Sit to Stand: Minimal Assistance; 2 Person Assistance  Stand to sit: Minimal Assistance; 2 Person Assistance  Bed to Chair: Minimal assistance; 2 Person Assistance  Stand Pivot Transfers: Minimal Assistance; 2 Person Assistance (STAND STEP HHA)  Comment: UNSTEADY OVERALL BUT ABLE TO ADVANCE FEET  Ambulation  Ambulation?: No     Balance  Sitting - Dynamic: Fair  Standing - Dynamic: Poor; +        Plan   Plan  Times per week: AT LEAST 5  Current Treatment Recommendations: Strengthening, Balance Training, Functional Mobility Training, Transfer Training, Gait Training, Safety Education & Training, Patient/Caregiver Education & Training  Safety Devices  Type of devices: Call light within reach    G-Code       OutComes Score                                                  AM-PAC Score             Goals  Short term goals  Time Frame for Short term goals: 14 DAYS  Short term goal 1: BED MOB SBA  Short term goal 2: TRANSFERS SBA  Short term goal 3: ' AAD SBA       Therapy Time   Individual Concurrent Group Co-treatment   Time In           Time Out           Minutes                   Lio Carranza, PT

## 2021-12-01 NOTE — CONSULTS
INFECTIOUS DISEASES CONSULT NOTE    Patient:  Akosua Cates 74 y.o. female  ROOM # [unfilled]  YOB: 1955  MRN: [id number]  [facility]:  [id number]  Admit date: 11/12/2021   Admitting Physician: Subhash Petit MD  Primary Care Physician: DOMINICK Fernandes NP  REFERRING PROVIDER: No ref. provider found    Reason for Consultation: Pseudomonas in sputum    History of Present Illness/Chief Complaint: Pleasant 68-year-old woman. She has a long history of tobacco use. She indicates she came to the hospital due to dyspnea. She reports some productive sputum. Per review of her admit H&P she had had respiratory distress and some altered speech. She has been receiving antibiotic treatment. She had a right sided infiltrate and right pleural effusion. She underwent bronchoscopy. Sputum has grown Pseudomonas. Infectious disease asked to evaluate and offer recommendations. She indicates she is feeling better than when she came in. She indicates cough is shown improvement. She is breathing comfortably at present. She indicates she is on oxygen 2 L at home and has been so for about 10 years. She does report continue to smoke 2 packs/day. She acknowledged to me that at times she will smoke with oxygen in place. I told her never to smoke with oxygen in place as it can cause severe and even fatal burn. She voiced understanding.     Current Scheduled Medications:    midodrine  5 mg Oral TID WC    ipratropium-albuterol  1 ampule Inhalation Q6H    apixaban  5 mg Oral BID    piperacillin-tazobactam  3,375 mg IntraVENous Q8H    nicotine  1 patch TransDERmal Daily    famotidine  20 mg Oral Daily    chlorhexidine  15 mL Mouth/Throat BID    citalopram  20 mg Oral Daily    aspirin  81 mg Oral Daily    Or    aspirin  300 mg Rectal Daily    atorvastatin  40 mg Oral Nightly    thiamine  100 mg IntraVENous Daily     Current PRN Medications:  oxyCODONE-acetaminophen, potassium chloride **OR** potassium alternative oral replacement **OR** potassium chloride, ondansetron **OR** ondansetron, polyethylene glycol, labetalol, morphine    Allergies: Allergies   Allergen Reactions    No Known Allergies      Past Medical History: Cavitating mass right upper lobe. Chronic obstructive pulmonary disease. Prior history of lung cancer. Depression. Past Surgical History: Appendectomy. Bladder suspension. Cholecystectomy. Back surgery. Social History: Smokes 1 to 2 packs/day of tobacco.  She reports no heavy alcohol use. No illicit drug use. Family History: Colon cancer. Hypertension. Diabetes. Exposure History: She reports no close contacts of been ill    Review of Systems: She reports stable weight. No nausea or diarrhea. No urinary symptoms    Vital Signs:  BP (!) 101/57   Pulse 86   Temp 96.8 °F (36 °C) (Temporal)   Resp 22   Ht 5' 5\" (1.651 m)   Wt 106 lb 1.6 oz (48.1 kg)   SpO2 96%   BMI 17.66 kg/m²  Temp (24hrs), Av.2 °F (36.2 °C), Min:96.8 °F (36 °C), Max:97.6 °F (36.4 °C)    Physical Exam:   Vital signs reviewed. Alert, pleasant, no distress  She is not dyspneic at present  Sclera nonicteric  She is on oxygen via nasal cannula  Decreased breath sounds right base. No crackles or wheezes.   [id number] right upper chest without erythema  Heart regular rhythm without murmur  Abdomen is soft and nontender without hepatosplenomegaly or mass  Extremities without significant edema    Lab Results:  CBC:   Recent Labs     21   WBC 13.3* 10.3 9.2   HGB 9.0* 9.3* 8.5*   HCT 30.2* 31.1* 28.9*    346 283   LYMPHOPCT 6.8* 10.0* 9.4*   MONOPCT 4.9 5.8 5.7     CMP:   Recent Labs     210 21    146* 142   K 3.8 3.9 3.8    102 102   CO2 33* 32* 32*   BUN 8 9 11   CREATININE 0.2* 0.5 0.2*   CALCIUM 8.5* 8.6* 8.5*   BILITOT 0.3 0.3 <0.2   ALKPHOS 66 77 69   ALT 21 28 22   AST 20 22 16   GLUCOSE 133* 138* 97 Culture:     Bronchoscopy Culture from 11/29/2021:   Pseudomonas aeruginosa     BACTERIAL SUSCEPTIBILITY PANEL BY ANGELICA     cefepime 2 mcg/mL Sensitive     gentamicin <=1 mcg/mL Sensitive     levofloxacin >=8 mcg/mL Resistant     meropenem 4 mcg/mL Sensitive     piperacillin-tazobactam <=4 mcg/mL Sensitive     tobramycin <=1 mcg/mL Sensitive      Radiology:     CXR 11/29/2021:  Impression   Impression:    1.   Significant increase in the right pleural effusion and associated   opacities. .   Signed by Dr Nadeem Robins       CT angiogram of the chest on November 22, 2021:  Impression   No evidence of pulmonary embolism. Pulmonary arterial hypertension. Evidence of right heart strain. Extensive chronic emphysematous lung changes with superimposed acute   infiltrate as detailed above. Bilateral pleural effusion which was not seen in the previous study. Complete atelectasis of the right upper lobe anterior segment which   was not seen in the previous study. Persistent cavitating lesion in the right upper lobe posterior   segment. Endotracheal tube and nasogastric tubes in place. A right subclavian   approach MediPort system in place. Signed by Dr Lissa Orellana     MRI brain and brainstem on November 15, 2021:  Impression   1. Patchy acute ischemic changes within both cerebral hemispheres. 2. The larger ischemic focus is in the left parietal region and   involves an area measuring approximately 30 x 17 mm.   3. No hemorrhage or mass effect. 4. Sinusitis changes in mastoid fluid. Signed by Dr Thao Pineda     Impression:   1. Pseudomonas in sputum. Possible bronchitis/pneumonia. 2.  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 Chronic obstructive pulmonary disease  4. Ongoing tobacco use  5.   Possible stroke or small watershed infarcts    Recommendations:    Continue treatment with Zosyn  No change in antibiotic therapy at present  Recommended

## 2021-12-01 NOTE — PLAN OF CARE
Problem: Falls - Risk of:  Goal: Will remain free from falls  Description: Will remain free from falls  Outcome: Ongoing  Goal: Absence of physical injury  Description: Absence of physical injury  Outcome: Ongoing     Problem: Skin Integrity:  Goal: Absence of new skin breakdown  Description: Absence of new skin breakdown  Outcome: Ongoing     Problem: Pain:  Goal: Pain level will decrease  Description: Pain level will decrease  Outcome: Ongoing  Goal: Control of acute pain  Description: Control of acute pain  Outcome: Ongoing  Goal: Control of chronic pain  Description: Control of chronic pain  Outcome: Ongoing     Problem: Coping:  Goal: Ability to remain calm will improve  Description: Ability to remain calm will improve  Outcome: Ongoing     Problem: Safety:  Goal: Ability to remain free from injury will improve  Description: Ability to remain free from injury will improve  Outcome: Ongoing     Problem: Self-Care:  Goal: Ability to participate in self-care as condition permits will improve  Description: Ability to participate in self-care as condition permits will improve  Outcome: Ongoing     Problem: Tobacco Use:  Goal: Inpatient tobacco use cessation counseling participation  Description: Inpatient tobacco use cessation counseling participation  Outcome: Ongoing     Problem: Nutrition  Goal: Optimal nutrition therapy  Outcome: Ongoing     Problem: Urinary Elimination:  Goal: Signs and symptoms of infection will decrease  Description: Signs and symptoms of infection will decrease  Outcome: Ongoing  Goal: Complications related to the disease process, condition or treatment will be avoided or minimized  Description: Complications related to the disease process, condition or treatment will be avoided or minimized  Outcome: Ongoing     Problem: Infection - Central Venous Catheter-Associated Bloodstream Infection:  Goal: Will show no infection signs and symptoms  Description: Will show no infection signs and symptoms  Outcome: Ongoing

## 2021-12-01 NOTE — PROGRESS NOTES
Occupational Therapy   Occupational Therapy Initial Assessment  Date: 2021   Patient Name: Jeaneth Murguia  MRN: [id number]     : 1955    Date of Service: 2021    Discharge Recommendations:          Assessment   Performance deficits / Impairments: Decreased functional mobility ; Decreased endurance; Decreased ADL status; Decreased balance; Decreased strength  Assessment: Will progress as tolerated  Treatment Diagnosis: Respiratory Failure  Prognosis: Good  REQUIRES OT FOLLOW UP: Yes  Activity Tolerance  Activity Tolerance: Patient Tolerated treatment well           Patient Diagnosis(es): The primary encounter diagnosis was Altered mental status, unspecified altered mental status type. Diagnoses of Acute respiratory failure with hypoxia and hypercapnia (HCC), Acute right-sided weakness, Chronic obstructive pulmonary disease, unspecified COPD type ([address] Multifocal pneumonia, and Encephalopathy due to infection were also pertinent to this visit. has a past medical history of Anxiety, Asthma, 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, COPD (chronic obstructive pulmonary disease) ([facility] Depression, Emphysema (subcutaneous) (surgical) resulting from a procedure, History of lung biopsy, Malignant neoplasm of right main bronchus [facility]), Palliative care patient, Syncope, and TIA (transient ischemic attack). has a past surgical history that includes Appendectomy; back surgery; Hysterectomy; bladder suspension; Cholecystectomy; and Incontinence surgery.     Treatment Diagnosis: Respiratory Failure      Restrictions       Subjective   General  Chart Reviewed: Yes  Patient assessed for rehabilitation services?: Yes  Diagnosis: Respiratory Failure  Patient Currently in Pain: Yes  Pain Assessment  Pain Level: 6  Sales-Baker Pain Rating: Hurts a little bit  Pain Type: Chronic pain  Pain Location: Back  Pain Descriptors: Aching  Pain Frequency: Intermittent  Pain Onset: Gradual  Clinical Progression: Not changed  Non-Pharmaceutical Pain Intervention(s): Rest  Response to Pain Intervention: Patient Satisfied  Vital Signs  Patient Currently in Pain: Yes  Social/Functional History  Social/Functional History  Lives With: Son  Type of Home: House  Home Equipment: Rolling walker, Oxygen  ADL Assistance: Independent  Ambulation Assistance: Independent  Transfer Assistance: Independent       Objective   Vision: Within Functional Limits  Hearing: Within functional limits    Orientation  Overall Orientation Status: Within Functional Limits        ADL  Feeding: Independent  Grooming: Supervision  UE Bathing: Supervision  LE Bathing: Moderate assistance  UE Dressing: Minimal assistance  LE Dressing: Maximum assistance  Toileting: Maximum assistance           Transfers  Stand Step Transfers: 2 Person assistance; Minimal assistance  Sit to stand: Minimal assistance; 2 Person assistance  Transfer Comments: Edil of 2 hand held     Cognition  Overall Cognitive Status: WFL                 LUE AROM (degrees)  LUE AROM : WFL  RUE AROM (degrees)  RUE AROM : WFL  LUE Strength  Gross LUE Strength: Exceptions to [facility]  L Shoulder Flex: 3+/5  L Shoulder ABduction: 3+/5  L Elbow Flex: 4-/5  L Elbow Ext: 4-/5  RUE Strength  Gross RUE Strength: WFL;  Exceptions to [facility]  R Shoulder Flex: 3+/5  R Shoulder ABduction: 3+/5  R Elbow Flex: 4-/5  R Elbow Ext: 4-/5                   Plan   Plan  Times per week: 3-5x/week  Times per day: Daily    G-Code     OutComes Score                                                  AM-PAC Score             Goals  Short term goals  Time Frame for Short term goals: 1 week  Short term goal 1: CGA with toilet tfers  Short term goal 2: CGA with clothing mgmt in standing  Short term goal 3: CGA with seated LB dsg  Long term goals  Long term goal 1: Return to PLOF       Therapy Time   Individual Concurrent Group Co-treatment   Time In           Time Out           Minutes                   Thomas Epperson, OT

## 2021-12-01 NOTE — CONSULTS
**Physician Signature**  This document was electronically signed by: Megan Ann MD  2021   10:19 PM    **Consult Information**  Member Facility: [address] [place] [address] MRN: [id number]  Visit/Encounter Number: [id number]  Consult ID: [id number]  Facility Time Zone: CT  Date and Time of Request: 2021 08:16 PM  CT  Requesting Clinician: Jaylin Figueroa MD  Patient Name: Neda Wise  YOB: 1955  Gender: Female  Patient identity was confirmed at the beginning of the consult with the   patient/family/staff using two personal identifiers: Patient name and       **Reason for Consult**  Reason for Consult: [facility]    **Admission**  Admission Date: 2021  Chief reason for ICU admission: Respiratory Failure  Secondary reasons for ICU admission: Pneumonia, Altered Mental   Status    **Core Metrics**  General orienting sentence for patient: 75 yo female admitted  (not   covid) found unresponsive at home with sats in 46s. Hx lung cancer and   COPD. ON bipap CT scan brain negative for stroke. REmains pleasantly   confused no follow commands could get MRI. Was full of secretions and tenuous   status anyway. MRI shows   probable subacute CVA, but no hemorrhage. Anisocoria. Propofol. Currently on   85%. Hx lung cancer, currently undergoing treatment. 3 PPD ongoing   tobacco. Remains on 30 mcg/kg/min propofol. 75% FIO2, 5 PEEP. Lots of   secretions desaturation event this AM, and required 100% briefly. She remains   on   propofol gtt. gtt. FiO2 50%. PEEP 5. Changed RR to 24. 75% today. is awake   and alert but anxious  She remains on Propofol gtt. She wakes up and follows   commands. Acute CVA diagnosed. Moving all extremities, alert on propofol. FIO2   down to   50%, 5 PEEP. commands. Voluminous secretions. Propofol for calming. [facility]   vent: AC -18/400/0.5/+5, being treated for pneumonia. vent 50% and 5 PEEP. On   midodrine and 1mcg NE. sedation. Norepinephrine at 5mcg/min.   TFs at goal.    50%, PEEP of 5. No changes per charge RN. Pts' RN is tied up with another   patient during   [address]. off now that midodrine has been initiated. FiO2 50%, PEEP 5. Bronchoscopy scheduled for the AM.  MRI of the brain shows patchy ischemia. Awake and Alert when sedation is weaned. Propofol. mcg. No changes   otherwise. 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 She is oriented. Bronch completed   yesterday. still on levophed  Chief physiologic deterioration: None - Stable patient  Is the patient on DVT prophylaxis?: Yes  Prophylaxis type: Mechanical, Pharmacological  DVT Prophylaxis Comments: Eliquis, SCDs  Is the patient on GI prophylaxis?: Yes  Gi Prophylaxis Comments: Pepcid  Has this patient reached their nutritional goal?: Yes  Are there current issues with pain management in this patient?:   No  Are there issues with skin integrity?: Yes and issues are being   addressed  Skin Integrity Details: redness perianal  Are there issues with delirium?: No  Has the patient been mobilized?: No  Is this patient currently intubated?: No  Are there ethical or care philosophy or family issues?: No  Family Issues Details: 2 children.  Palliative care  Do you recommend an in depth evaluation?: No    **Inserted/Removed Devices**  Device Name: Young Catheter  Site of insertion: Urethra  Date of insertion: 11-  Device Name: Orogastric Tube  Site of insertion: Mouth  Date of insertion: 11-  Date of device removal: 11-  Device Name: Endotracheal Tube  Site of insertion: Trachea  Date of insertion: 11-  Date of device removal: 11-  Device Name: Rhonda Milian  Site of insertion: Subclavian - Right   Date of insertion: Unknown    **Physician Signature**  This document was electronically signed by: Padmini Shay MD  11/30/2021   10:19 PM

## 2021-12-01 NOTE — PROGRESS NOTES
Palliative Care Progress Note  12/1/2021 12:10 PM    Patient:  Christin Collet  YOB: 1955  Primary Care Physician: DOMINICK Trivedi NP  Advance Directive: [facility]  Admit Date: 11/12/2021       Hospital Day: 23  Portions of this note have been copied forward, however, changed to reflect the most current clinical status of this patient. CHIEF COMPLAINT/REASON FOR CONSULTATION Goals of care    SUBJECTIVE:  Ms. Nely Vail is awake. No new complaints. Tolerating oral intake. Interval History: Extubated 12/1/2021    Review of Systems:   14 point review of systems is negative except as specifically addressed above. Objective:   VITALS:  BP (!) 90/51   Pulse 97   Temp 97.3 °F (36.3 °C) (Temporal)   Resp 28   Ht 5' 5\" (1.651 m)   Wt 106 lb 1.6 oz (48.1 kg)   SpO2 94%   BMI 17.66 kg/m²   24HR INTAKE/OUTPUT:      Intake/Output Summary (Last 24 hours) at 12/1/2021 1210  Last data filed at 12/1/2021 0800  Gross per 24 hour   Intake 2193.92 ml   Output 1250 ml   Net 943.92 ml     General appearance: 75 yo female, frail, chronically ill appearing, NAD  Head: Normocephalic, without obvious abnormality, atraumatic  Eyes: conjunctivae/corneas clear.  Pupils reactive bilaterally  Ears: normal external ears and nose, throat without exudate  Neck: no adenopathy, no carotid bruit, no JVD, supple, symmetrical, trachea midline   Lungs:  Improving scattered rhonchi  Heart: regular rate rhythm, S1, S2 normal, no murmur  Abdomen:soft,non-distended, bowel sounds present    Extremities: No lower extremity edema,  No erythema, no tenderness to palpation  Skin: warm, dry  Lymphatic: No palpable lymph node enlargment  Neurologic: Alert and oriented to self place year, follows commands, generalized weakness  Psychiatric:  calm    Medications:      norepinephrine 4 mcg/min (12/01/21 0608)    lactated ringers 30 mL/hr at 12/01/21 0955      midodrine  5 mg Oral TID     ipratropium-albuterol  1 ampule Inhalation Q6H  apixaban  5 mg Oral BID    piperacillin-tazobactam  3,375 mg IntraVENous Q8H    nicotine  1 patch TransDERmal Daily    famotidine  20 mg Oral Daily    chlorhexidine  15 mL Mouth/Throat BID    citalopram  20 mg Oral Daily    aspirin  81 mg Oral Daily    Or    aspirin  300 mg Rectal Daily    atorvastatin  40 mg Oral Nightly    thiamine  100 mg IntraVENous Daily     oxyCODONE-acetaminophen, potassium chloride **OR** potassium alternative oral replacement **OR** potassium chloride, ondansetron **OR** ondansetron, polyethylene glycol, labetalol, morphine  ADULT TUBE FEEDING; Nasogastric; Other Tube Feeding (specify); vital 1.5; Continuous; 35; No; 35; Q 1 hour; Protein; 1 Proteinex given as flush in 24 hours  ADULT DIET; Regular     Lab and other Data:     Recent Labs     11/29/21 0349 11/30/21 0410 12/01/21 0415   WBC 13.3* 10.3 9.2   HGB 9.0* 9.3* 8.5*    346 283     Recent Labs     11/29/21 0349 11/30/21 0410 12/01/21  0415    146* 142   K 3.8 3.9 3.8    102 102   CO2 33* 32* 32*   BUN 8 9 11   CREATININE 0.2* 0.5 0.2*   GLUCOSE 133* 138* 97     Recent Labs     11/29/21 0349 11/30/21 0410 12/01/21  0415   AST 20 22 16   ALT 21 28 22   BILITOT 0.3 0.3 <0.2   ALKPHOS 66 77 69     Assessment/Plan   Active Problems:    Acute hypoxemic respiratory failure (HCC)    Stroke of unknown etiology (HCC)    Palliative care patient    Severe malnutrition (HCC)    Altered mental status    Centrilobular emphysema (HCC)    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 *    Visit Summary:  Chart reviewed. No acute overnight events. Discussed with Hospitalist. Patient seen at bedside. She was alert and eating breakfast. We discussed hospitalization as well as code status.  She states understanding that her sons chose to change code status to DNR while she was intubated due to severity of illness in setting of chronic diseases. We talked about meaning of \"full code\" vs \"do not resuscitate\". At this time patient wishes to remain DNR. She does have a goal to participate with therapy. Recommendations:   1. Palliative care: GOC suspect need for SNF. Code status: DNR-confirmed again today with patient alert and oriented     2. Acute hypoxemic respiratory failure-resolving to baseline    3. Bilateral (right frontal/left parietal) hemisphere acute cerebral infarcts-Neurology following, supportive care. Awake and following commands    Thank you for consulting Palliative Care and allowing us to participate in the care of this patient.    Time Spent Counseling > 50%:  YES                                   Total Time Spent with patient/family counseling, workup/treatment review, counseling and placement of orders/preparation of this note: 30 minutes    Electronically signed by Nnamdi Garcia PA-C on 12/1/2021 at 12:10 PM    (Please note that portions of this note were completed with a voice recognition program.  Patsy White made to edit the dictations but occasionally words are mis-transcribed.)

## 2021-12-01 NOTE — PROGRESS NOTES
Pulmonary and Critical Care Progress note. Barbara Pat    MRN# [id number]    Acct# [de-identified]  12/1/2021   1:38 PM CST    Referring Harvinder Kimbrough MD      Chief Complaint: History of lung cancer respiratory failure    HPI: She is dated. Sitting up in a chair denies any complaints.     Medications  midodrine, 5 mg, Oral, TID WC    ipratropium-albuterol, 1 ampule, Inhalation, Q6H    apixaban, 5 mg, Oral, BID    piperacillin-tazobactam, 3,375 mg, IntraVENous, Q8H    nicotine, 1 patch, TransDERmal, Daily    famotidine, 20 mg, Oral, Daily    chlorhexidine, 15 mL, Mouth/Throat, BID    citalopram, 20 mg, Oral, Daily    aspirin, 81 mg, Oral, Daily **OR** aspirin, 300 mg, Rectal, Daily    atorvastatin, 40 mg, Oral, Nightly    thiamine, 100 mg, IntraVENous, Daily     Review of Systems:      Physical Exam:  BP (!) 98/50   Pulse 88   Temp 97.6 °F (36.4 °C) (Temporal)   Resp 27   Ht 5' 5\" (1.651 m)   Wt 106 lb 1.6 oz (48.1 kg)   SpO2 98%   BMI 17.66 kg/m²     Intake/Output Summary (Last 24 hours) at 12/1/2021 1614  Last data filed at 12/1/2021 1000  Gross per 24 hour   Intake 1133.05 ml   Output 750 ml   Net 383.05 ml       General appearance: Elderly white female who is in no distress   HEENT: Endotracheal tube in place  Heart: S1-S2 distant sounds no murmurs  Lungs: Diminished bilaterally no rubs or chest wall tenderness or dullness to percussion  Abdomen: Soft nontender no organomegalies normal bowel sounds  Extremities: No clubbing cyanosis or edema  Neuro: No focal findings  Skin: Intact    Recent Labs     11/29/21  0349 11/30/21  0410 12/01/21  0415   WBC 13.3* 10.3 9.2   RBC 2.98* 3.10* 2.83*   HGB 9.0* 9.3* 8.5*   HCT 30.2* 31.1* 28.9*    346 283   .3* 100.3* 102.1*   MCH 30.2 30.0 30.0   MCHC 29.8* 29.9* 29.4*   RDW 16.5* 16.4* 16.2*      Recent Labs     11/29/21  0349 11/30/21  0410 12/01/21  0415    146* 142   K 3.8 3.9 3.8    102 102   CO2 33* 32* 32*   BUN 8 9 11   CREATININE 0.2* 0.5 0.2*   CALCIUM 8.5* 8.6* 8.5*   GLUCOSE 133* 138* 97      No results for input(s): PHART, MBG5XDS, PO2ART, EYT3IKJ, B3NHHNGA, BEART in the last 72 hours. Recent Labs     11/29/21  0349 11/30/21  0410 12/01/21  0415   AST 20   < > 16   ALT 21   < > 22   ALKPHOS 66   < > 69   BILITOT 0.3   < > <0.2   MG 2.0   < > 2.0   CALCIUM 8.5*   < > 8.5*   INR 1.13  --   --     < > = values in this interval not displayed. Recent Labs     11/29/21  1240   LABGRAM Few WBC's (Polymorphonuclear)  No Epithelial Cells seen  Few Gram positive rods  Rare Gram negative rods     CULTRESP Moderate growth  Moderate growth  No further workup           Radiograph: XR CHEST PORTABLE    Result Date: 11/28/2021  1. Mild Improvement in the radiograph appearance of the chest with decreasing bilateral infiltrates. Signed by Dr Mora Valdivia    XR CHEST PORTABLE    Result Date: 11/24/2021  1. . Underlying emphysema. There is volume loss on the right with a cavitary lesion in the right upper lobe and a pleurodiaphragmatic adhesion in the right base. 2. Predominantly interstitial process within the left lung either representing an interstitial pneumonia or edema shows interval improvement. 3. No interval line changes. Signed by Dr Nivia Martinez    Result Date: 11/22/2021  1. Stable radiographic appearance the chest bilateral infiltrative opacities. Signed by Dr Helio King    Result Date: 11/22/2021  No evidence of pulmonary embolism. Pulmonary arterial hypertension. Evidence of right heart strain. Extensive chronic emphysematous lung changes with superimposed acute infiltrate as detailed above. Bilateral pleural effusion which was not seen in the previous study. Complete atelectasis of the right upper lobe anterior segment which was not seen in the previous study. Persistent cavitating lesion in the right upper lobe posterior segment.  Endotracheal tube and nasogastric tubes in place. A right subclavian approach MediPort system in place. Signed by Dr Sherryle Duffel       My radiograph interpretation/independent review of imaging: Reviewed chest x-ray is improved    Problem list generated by [facility]:  Hospital Problems           Last Modified POA    Acute hypoxemic respiratory failure ([address] 11/12/2021 Yes    Stroke of unknown etiology ([address] 11/12/2021 Yes    Palliative care patient 11/23/2021 Yes    Severe malnutrition ([address] 11/16/2021 Yes    Altered mental status 11/16/2021 Yes    Centrilobular emphysema ([address] 11/23/2021 Yes    Primary squamous cell carcinoma of right lung ([address] 11/23/2021 Yes    Pleural effusion 11/29/2021 Yes    Atelectasis of left lung 11/29/2021 Yes           Pulmonary Assessment/Plan:     1. acute hypoxic respiratory failure improved, on NC oxygen  2. Persistent hypotension. On oral midodrine and levophed. 3. Bronchoscopy done. No evidence of endobronchial obstruction. 4. Bilateral pleural effusions improved on chest x-ray. 5. Possible stroke followed by neurology. 6. DVT prophylaxis. 7. Dc planning. Pete Howard MD, [facility], [facility]    The above note was generated using voice recognition software. Inadvertent typographical errors in transcription may have occurred.       Electronically signed by Pete Howard MD on 12/01/21 at 4:14 PM

## 2021-12-02 NOTE — PROGRESS NOTES
Occupational Therapy  Facility/Department: [facility] ICU  Daily Treatment Note  NAME: Presley Isaac  : 1955  MRN: [id number]    Date of Service: 2021    Discharge Recommendations:          Assessment   Assessment: Pt. with slight improvement with transfers to recliner chair  Activity Tolerance  Activity Tolerance: Patient Tolerated treatment well         Patient Diagnosis(es): The primary encounter diagnosis was Altered mental status, unspecified altered mental status type. Diagnoses of Acute respiratory failure with hypoxia and hypercapnia (HCC), Acute right-sided weakness, Chronic obstructive pulmonary disease, unspecified COPD type ([address] Multifocal pneumonia, and Encephalopathy due to infection were also pertinent to this visit. has a past medical history of Anxiety, Asthma, 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, COPD (chronic obstructive pulmonary disease) ([address] Depression, Emphysema (subcutaneous) (surgical) resulting from a procedure, History of lung biopsy, Malignant neoplasm of right main bronchus [facility]), Palliative care patient, Syncope, and TIA (transient ischemic attack). has a past surgical history that includes Appendectomy; back surgery; Hysterectomy; bladder suspension; Cholecystectomy; and Incontinence surgery.     Restrictions  Restrictions/Precautions  Restrictions/Precautions: Fall Risk  Subjective   General  Chart Reviewed: Yes  Patient assessed for rehabilitation services?: Yes  Diagnosis: Respiratory Failure      Orientation     Objective                Bed mobility  Supine to Sit: Contact guard assistance  Transfers  Stand Step Transfers: Contact guard assistance  Sit to stand: Contact guard assistance  Transfer Comments: CGA with transfers to [address] per week: 3-5x/week  Times per day: Daily  G-Code     OutComes Score                                                  AM-PAC Score Goals  Short term goals  Time Frame for Short term goals: 1 week  Short term goal 1: CGA with toilet tfers  Short term goal 2: CGA with clothing mgmt in standing  Short term goal 3: CGA with seated LB dsg  Long term goals  Long term goal 1: Return to PLOF       Therapy Time   Individual Concurrent Group Co-treatment   Time In           Time Out           Minutes                   Zaida Green, OT

## 2021-12-02 NOTE — PROGRESS NOTES
Occupational Therapy  Facility/Department: [facility] ICU  Daily Treatment Note  NAME: Bria Giron  : 1955  MRN: [id number]    Date of Service: 2021    Discharge Recommendations:          Assessment   Assessment: Pt. with slight improvement with transfers to recliner chair  Activity Tolerance  Activity Tolerance: Patient Tolerated treatment well         Patient Diagnosis(es): The primary encounter diagnosis was Altered mental status, unspecified altered mental status type. Diagnoses of Acute respiratory failure with hypoxia and hypercapnia (HCC), Acute right-sided weakness, Chronic obstructive pulmonary disease, unspecified COPD type ([address] Multifocal pneumonia, and Encephalopathy due to infection were also pertinent to this visit. has a past medical history of Anxiety, Asthma, 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, COPD (chronic obstructive pulmonary disease) ([address] Depression, Emphysema (subcutaneous) (surgical) resulting from a procedure, History of lung biopsy, Malignant neoplasm of right main bronchus [facility]), Palliative care patient, Syncope, and TIA (transient ischemic attack). has a past surgical history that includes Appendectomy; back surgery; Hysterectomy; bladder suspension; Cholecystectomy; and Incontinence surgery.     Restrictions  Restrictions/Precautions  Restrictions/Precautions: Fall Risk  Subjective   General  Chart Reviewed: Yes  Patient assessed for rehabilitation services?: Yes  Diagnosis: Respiratory Failure      Orientation     Objective                Bed mobility  Supine to Sit: Contact guard assistance  Transfers  Stand Step Transfers: Contact guard assistance  Sit to stand: Contact guard assistance  Transfer Comments: CGA with transfers to [address] per week: 3-5x/week  Times per day: Daily  G-Code     OutComes Score                                                  AM-PAC Score Goals  Short term goals  Time Frame for Short term goals: 1 week  Short term goal 1: CGA with toilet tfers  Short term goal 2: CGA with clothing mgmt in standing  Short term goal 3: CGA with seated LB dsg  Long term goals  Long term goal 1: Return to PLOF       Therapy Time   Individual Concurrent Group Co-treatment   Time In           Time Out           Minutes                   Reinaldo Wright, OT

## 2021-12-02 NOTE — PROGRESS NOTES
12/02/21 1134   Restrictions/Precautions   Restrictions/Precautions Fall Risk   General   Chart Reviewed Yes   Subjective   Subjective Patient in bed agrees to go to chair. General Comment   Comments Patient has multiple lines including O2   Pain Screening   Patient Currently in Pain Yes   Pain Assessment   Pain Assessment 0-10   Pain Level 7   Patient's Stated Pain Goal No pain   Pain Type Chronic pain   Pain Location Back   Pain Orientation Lower   Pain Descriptors Aching   Pain Frequency Continuous   Pain Onset On-going   Vital Signs   Level of Consciousness Alert (0)   Oxygen Therapy   SpO2 93 %   O2 Device High flow nasal cannula   O2 Flow Rate (L/min) 6 L/min   Bed Mobility   Supine to Sit Stand by assistance   Scooting Stand by assistance   Transfers   Sit to Stand Contact guard assistance   Stand to sit Contact guard assistance   Bed to Chair Contact guard assistance   Comment Patient took steps to chair   Ambulation   Ambulation?  No   Balance   Standing - Dynamic Fair; -   Activity Tolerance   Activity Tolerance Patient Tolerated treatment well   Safety Devices   Type of devices Left in chair; Call light within reach   Physical Therapy    Electronically signed by Ramonita Basurto PTA on 12/2/2021 at 11:44 AM

## 2021-12-02 NOTE — PROGRESS NOTES
Palliative Care Progress Note  12/2/2021 1:05 PM    Patient:  Christin Collet  YOB: 1955  Primary Care Physician: DOMINICK Trivedi NP  Advance Directive: [facility]  Admit Date: 11/12/2021       Hospital Day: 20  Portions of this note have been copied forward, however, changed to reflect the most current clinical status of this patient. CHIEF COMPLAINT/REASON FOR CONSULTATION Goals of care    SUBJECTIVE:  Ms. Nely Vail is sitting up in bed,awake. Tolerating NC O2 as well as oral intake. Hopeful for transfer to medical floor today. She complains of generalized body aches/weakness. States Percocet helps. Interval History: Extubated 12/1/2021, tolerating oral intake. Ongoing attempts to wean pressor support. Review of Systems:   14 point review of systems is negative except as specifically addressed above. Objective:   VITALS:  BP (!) 89/51   Pulse 91   Temp 97.2 °F (36.2 °C) (Temporal)   Resp 30   Ht 5' 5\" (1.651 m)   Wt 106 lb 1.6 oz (48.1 kg)   SpO2 93%   BMI 17.66 kg/m²   24HR INTAKE/OUTPUT:      Intake/Output Summary (Last 24 hours) at 12/2/2021 1305  Last data filed at 12/2/2021 0800  Gross per 24 hour   Intake 1442.65 ml   Output 1350 ml   Net 92.65 ml     General appearance: 75 yo female, chronically ill appearing, frail, NAD  Head: Normocephalic, without obvious abnormality, atraumatic  Eyes: conjunctivae/corneas clear.  Pupils reactive bilaterally  Ears: normal external ears and nose, throat without exudate  Neck: no adenopathy, no carotid bruit, no JVD, supple, symmetrical, trachea midline   Lungs:  Diminished air entry throughout with soft scattered rhonchi  Heart: RRR, S1, S2 normal, no murmur  Abdomen:soft,non-distended, bowel sounds present    Extremities: No lower extremity edema,  No erythema, no tenderness to palpation  Skin: warm, dry  Lymphatic: No palpable lymph node enlargment  Neurologic: Alert , oriented x 3, generalized weakness, normal tone  Psychiatric: calm    Medications:      lactated ringers 30 mL/hr at 12/01/21 0955      midodrine  10 mg Oral TID WC    ipratropium-albuterol  1 ampule Inhalation Q6H    apixaban  5 mg Oral BID    piperacillin-tazobactam  3,375 mg IntraVENous Q8H    nicotine  1 patch TransDERmal Daily    famotidine  20 mg Oral Daily    chlorhexidine  15 mL Mouth/Throat BID    citalopram  20 mg Oral Daily    aspirin  81 mg Oral Daily    Or    aspirin  300 mg Rectal Daily    atorvastatin  40 mg Oral Nightly    thiamine  100 mg IntraVENous Daily     oxyCODONE-acetaminophen, potassium chloride **OR** potassium alternative oral replacement **OR** potassium chloride, ondansetron **OR** ondansetron, polyethylene glycol, labetalol, morphine  ADULT TUBE FEEDING; Nasogastric; Other Tube Feeding (specify); vital 1.5; Continuous; 35; No; 35; Q 1 hour; Protein; 1 Proteinex given as flush in 24 hours  ADULT DIET; Regular     Lab and other Data:     Recent Labs     11/30/21 0410 12/01/21 0415 12/02/21  0310   WBC 10.3 9.2 9.8   HGB 9.3* 8.5* 9.2*    283 293     Recent Labs     11/30/21 0410 12/01/21 0415 12/02/21  0310   * 142 146*   K 3.9 3.8 3.8    102 105   CO2 32* 32* 31*   BUN 9 11 14   CREATININE 0.5 0.2* 0.2*   GLUCOSE 138* 97 93     Recent Labs     11/30/21 0410 12/01/21 0415 12/02/21  0310   AST 22 16 14   ALT 28 22 21   BILITOT 0.3 <0.2 <0.2   ALKPHOS 77 69 70     Assessment/Plan   Active Problems:    Acute hypoxemic respiratory failure (HCC)    Stroke of unknown etiology (HCC)    Palliative care patient    Severe malnutrition (HCC)    Altered mental status    Centrilobular emphysema (HCC)    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 *    Visit Summary:  Chart reviewed. No acute overnight events. Attempting to wean pressor support per RN. Patient complains of pain and weakness.  Hopeful to work with Physical therapy with goal of getting stronger/ultimately returning home. Recommendations:   1. Palliative care: GOC continue current treatments. Code status: DNR     2. Acute on chronic hypoxemic respiratory failure w/ Pseudomonas/Corynebacterium Pneumonia-resolving to baseline, Zosyn ordered    3. Bilateral (right frontal/left parietal) hemisphere acute cerebral infarcts, likely embolic vs watershed-Neurology following, aspirin, statin, Eliquis    4. Hx Lung cancer w/ tobacco dependence-noted    5. Hypotension-Midodrine added    Thank you for consulting Palliative Care and allowing us to participate in the care of this patient.    Time Spent Counseling > 50%:  YES                                   Total Time Spent with patient/family counseling, workup/treatment review, counseling and placement of orders/preparation of this note: 26 minutes    Electronically signed by Tash Sandy PA-C on 12/2/2021 at 1:05 PM    (Please note that portions of this note were completed with a voice recognition program.  Anton Mcdonald made to edit the dictations but occasionally words are mis-transcribed.)

## 2021-12-02 NOTE — PROGRESS NOTES
[id number] [facility] Neurology Progress Note      Patient:   Binu Menon  MR#:    [id number]   Room:    [address] [place] [address]   YOB: 1955  Date of Progress Note: 12/2/2021  Time of Note                           9:25 AM  Consulting Physician:  Amelia Parks DO  Attending Physician:  Boby Humphrey MD      INTERVAL HISTORY: Continues to slowly improve, speech better, following commands, no acute events overnight. REVIEW OF SYSTEMS:    Constitutional:  Denies fever    Eyes:  Denies change in visual acuity   Cardiovascular:  Denies chest pain   GI:  Denies abdominal pain  Musculoskeletal:  Denies back pain    Neurologic:  Denies headache, new focal weakness or sensory changes       PHYSICAL EXAM:    Constitutional    /62   Pulse 104   Temp 97.2 °F (36.2 °C) (Temporal)   Resp 24   Ht 5' 5\" (1.651 m)   Wt 106 lb 1.6 oz (48.1 kg)   SpO2 90%   BMI 17.66 kg/m²   General appearance: No acute disctress    EYES -   Conjunctiva normal  Pupillary exam as below, see CN exam in the neurologic exam  ENT-    No scars, masses, or lesions over external nose or ears  Oropharynx - intubated  Cardiovascular -   No clubbing, cyanosis, or edema   Musculoskeletal    No significant wasting of muscles noted  Gait as below, see gait exam in the neurologic exam  Muscle strength, tone, stability as below see the motor exam in the neurologic exam.   No bony deformities  Skin    Warm, dry, and intact to inspection and palpation. No rash, erythema, or pallor        NEUROLOGICAL EXAM     Mental status    []? Awake, alert, oriented   []? Affect attention and concentration appear appropriate  []? Recent and remote memory appears unremarkable  []? Speech normal without dysarthria or aphasia, comprehension and repetition intact. COMMENTS:  Eyes open, alert, following commands, speech improving. Cranial Nerves []?  No VF deficit to confrontation,  optic discs normal, no papilledema on fundoscopic exam.  []? PERRLA, EOMI, no nystagmus, conjugate eye movements, no ptosis  []? Face symmetric  []? Facial sensation intact  []? Tongue midline no atrophy or fasciculations present  []? Palate midline, hearing to finger rub normal  []? Shoulder shrug and SCM testing normal  COMMENTS: Left Pupil reactive, right pupil misshapen likely surgical, face appears sym, EOM intact    Motor   []? 5/5 strength x 4 extremities  [x]? Normal bulk and tone  [x]? No tremor present  [x]? No rigidity or bradykinesia noted  COMMENTS:SMAE noted   Sensory  []? Sensation intact to light touch, pin prick, vibration, and proprioception BLE  []? Sensation intact to light touch, pin prick, vibration, and proprioception BUE  COMMENTS: Limtied   Coordination []? FTN normal bilaterally   []? HTS normal bilaterally  []? JIA normal.   COMMENTS: Limited    Reflexes  [x]? Symmetric and non-pathological  [x]? Toes downgoing bilaterally  [x]? No clonus present  COMMENTS:   Gait                  []? Normal steady gait    []? Ataxic    []? Spastic     []? Magnetic     []? Shuffling  [x]? Not assessed  COMMENTS:        LABS/IMAGING:    CT HEAD WO CONTRAST    Result Date: 11/12/2021  CT HEAD WO CONTRAST 11/12/2021 11:25 AM HISTORY: Code stroke COMPARISON: None DOSE LENGTH PRODUCT: 961 mGy cm TECHNIQUE: Helical tomographic images of the brain were obtained without the use of intravenous contrast. Automated exposure control was also utilized to decrease patient radiation dose. FINDINGS: Exam is limited by motion artifact. There is no evidence of evolving large vascular territory infarct. Underlying chronic small vessel ischemic change. No visualized intra-axial or extra-axial hemorrhage. No mass lesion is identified. Normal size and configuration of the ventricular system. The basal cisterns are symmetric. Posterior fossa structures are unremarkable. The included orbits and their contents are unremarkable. Chronic right maxillary sinus disease.  The visualized osseous structures and overlying soft tissues of the skull and face are unremarkable. 1. No acute intracranial process. Signed by Dr Ashtyn Cedillo    XR CHEST PORTABLE    Result Date: 11/12/2021  XR CHEST PORTABLE 11/12/2021 11:36 AM HISTORY: Code stroke  Technique: Single AP view of the chest COMPARISONS: Chest exam dated 4/29/2020 FINDINGS: Reidentified right suprahilar consolidation with volume loss. New developing consolidations in the left upper and left lower lobes with air bronchograms. No obvious pleural effusion. Underlying lung emphysema. Heart size is stable. The pulmonary vasculature are nondilated. Right chest wall [id number]. Spinal scoliotic curvature. 1. New multifocal consolidation in the left upper and left lower lobes, appearance concerning for new multifocal pneumonia. 2. Treatment-related changes in the right suprahilar region with scarring and volume loss. Signed by Dr Marilyn Beck    Result Date: 11/12/2021  EXAM: CT NECK ANGIOGRAM CT HEAD ANGIOGRAM on  11/12/2021 12:54 PM COMPARISON:  CT chest dated August 23, 2021. HISTORY:  74years-old Female. Code stroke TECHNIQUE: Multiple CT images were obtained of the of the head and neck after the administration of IV contrast. 3D MIP reformats were generated  and were sent to PACS for interpretation. Grading of carotid artery stenosis is performed by NASCET criteria. FINDINGS: CT Neck Angiogram: There is a conventional aortic arch with patent origins of the brachiocephalic trunk, left common carotid and left subclavian arteries. The bilateral common carotid arteries and subclavian arteries are well-opacified. There is atherosclerotic disease of the bilateral carotid bifurcations, left greater than than right, with essentially 0% stenosis. The bilateral internal carotid arteries are otherwise well opacified throughout. The bilateral vertebral arteries are well-opacified throughout. Additional findings: Emphysema.  New left upper lobe opacities posteriorly, most concerning for infection. Additional right upper lobe opacity is also concerning for infection. Centrally necrotic right upper and lower lobe posterior mass, incompletely visualized but appears grossly stable when compared to prior examination. Small bilateral pleural effusions. CT Head Angiogram: The right internal carotid artery demonstrates normal course and caliber without evidence of flow limiting stenosis or occlusion. The major branch vessels to the right anterior and middle cerebral arteries are seen without evidence of stenosis or occlusion. There is no evidence of aneurysm or vascular malformation. Some atherosclerotic disease in the carotid siphon. The left internal carotid artery demonstrates normal course and caliber without evidence of flow limiting stenosis or occlusion. The major branch vessels to the left anterior and middle cerebral arteries are seen without evidence of stenosis or occlusion. There is no evidence of aneurysm or vascular malformation. Some atherosclerotic disease in the carotid siphon. Evaluation of the posterior circulation demonstrates normal caliber of the vertebral arteries, basilar artery, and major branch vessels of the posterior cerebral arteries bilaterally without evidence of flow limiting stenosis or occlusion. There is no evidence of aneurysm or vascular malformation. CT Angiography Of The Neck With Intravenous Contrast 1. No evidence of high-grade arterial stenosis or underlying dissection. 2. Bilateral upper lobe new opacities are concerning for infection. CT Angiography Of The Head With Intravenous Contrast 1. No evidence of acute thrombotic occlusion, high-grade arterial stenosis, or focal cerebral aneurysm. Signed by Dr Ida Koyanagi Date: 11/12/2021  EXAM: CT NECK ANGIOGRAM CT HEAD ANGIOGRAM on  11/12/2021 12:54 PM COMPARISON:  CT chest dated August 23, 2021. HISTORY:  74years-old Female.  Code demonstrates normal caliber of the vertebral arteries, basilar artery, and major branch vessels of the posterior cerebral arteries bilaterally without evidence of flow limiting stenosis or occlusion. There is no evidence of aneurysm or vascular malformation. CT Angiography Of The Neck With Intravenous Contrast 1. No evidence of high-grade arterial stenosis or underlying dissection. 2. Bilateral upper lobe new opacities are concerning for infection. CT Angiography Of The Head With Intravenous Contrast 1. No evidence of acute thrombotic occlusion, high-grade arterial stenosis, or focal cerebral aneurysm. Signed by Dr Scottie Chinchilla      Lab Results   Component Value Date    WBC 9.8 12/02/2021    HGB 9.2 (L) 12/02/2021    HCT 31.2 (L) 12/02/2021    .0 (H) 12/02/2021     12/02/2021     Lab Results   Component Value Date     (H) 12/02/2021    K 3.8 12/02/2021     12/02/2021    CO2 31 (H) 12/02/2021    BUN 14 12/02/2021    CREATININE 0.2 (L) 12/02/2021    GLUCOSE 93 12/02/2021    CALCIUM 9.0 12/02/2021    PROT 6.3 (L) 12/02/2021    LABALBU 2.7 (L) 12/02/2021    BILITOT <0.2 12/02/2021    ALKPHOS 70 12/02/2021    AST 14 12/02/2021    ALT 21 12/02/2021    LABGLOM >60 12/02/2021    GFRAA >59 12/02/2021    AGRATIO 1.4 05/01/2020    GLOB 3.5 02/25/2021     Lab Results   Component Value Date    INR 1.13 11/29/2021    INR 1.18 11/12/2021    INR 0.96 05/16/2019    PROTIME 14.7 (H) 11/29/2021    PROTIME 15.2 (H) 11/12/2021    PROTIME 12.2 05/16/2019       RECORD REVIEW:   Previous medical records, medications were reviewed at today's visit. Nursing/physician notes, imaging, labs and vitals reviewed. PT,OT and/or speech notes reviewed      ASSESSMENT:  74 y.o. admitted with AMS, hypoxia, pneumonia, lung cancer history, COPD, right sided weakness, slurred speech, MRI consistent with scattered bi-hemishperic strokes, likely embolic vs watershed. CTAs negative. ECHO, CD negative.  EEG with slowing, nothing epileptiform. Extubated, exam stable / improved, more alert, speech better, following commands.       PLAN:  1. Supportive care   2. Follow Tele   3. Continue addressing medical issues, pneumonia, respiratory failure -pulmonology following. 4.  ASA, statin, Eliquis. 5.  PT, OT, ST  6. DVT proph   7. Limit sedating medications, supplementing thiamine  8. Palliative care following      Please feel free to call with any questions. [phone] (cell phone).     Rubens Sherman DO  Board Certified Neurology

## 2021-12-02 NOTE — PROGRESS NOTES
Hospitalist Progress Note  [facility]     Patient: Marilou Sheikh  : 1955  MRN: [id number]  Code Status: [facility]    Hospital Day: 20   Date of Service: 2021    Subjective:   Patient seen and examined. No current complaints. 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     COPD (chronic obstructive pulmonary disease) (HCC)     Depression     Emphysema (subcutaneous) (surgical) resulting from a procedure     History of lung biopsy 2019    Malignant neoplasm of right main bronchus ([address] 2019    NSCLC, SCC [id number] stage IIIb    Palliative care patient 2021    Syncope     TIA (transient ischemic attack)        Past Surgical History:   Procedure Laterality Date    APPENDECTOMY      BACK SURGERY      times two    BLADDER SUSPENSION      CHOLECYSTECTOMY      HYSTERECTOMY      INCONTINENCE SURGERY         Family History   Problem Relation Age of Onset    Colon Cancer Mother     High Blood Pressure Brother     Diabetes Brother        Social History     Socioeconomic History    Marital status:      Spouse name: Not on file    Number of children: Not on file    Years of education: Not on file    Highest education level: Not on file   Occupational History    Not on file   Tobacco Use    Smoking status: Current Every Day Smoker    Smokeless tobacco: Never Used   Substance and Sexual Activity    Alcohol use: Not Currently    Drug use: Never    Sexual activity: Not on file   Other Topics Concern    Not on file   Social History Narrative    Not on file     Social Determinants of Health     Financial Resource Strain:     Difficulty of Paying Living Expenses: Not on file   Food Insecurity:     Worried About Running Out of Food in the Last Year: Not on file    Trisha of Food in the Last Year: Not on file   Transportation Needs:     Lack of Transportation (Medical):  Not on file    Lack of Transportation (Non-Medical):  Not on file   Physical Activity:     Days of Exercise per Week: Not on file    Minutes of Exercise per Session: Not on file   Stress:     Feeling of Stress : Not on file   Social Connections:     Frequency of Communication with Friends and Family: Not on file    Frequency of Social Gatherings with Friends and Family: Not on file    Attends Gnosticism Services: Not on file    Active Member of [address] [place] [address] or Organizations: Not on file    Attends Club or Organization Meetings: Not on file    Marital Status: Not on file   Intimate Partner Violence:     Fear of Current or Ex-Partner: Not on file    Emotionally Abused: Not on file    Physically Abused: Not on file    Sexually Abused: Not on file   Housing Stability:     Unable to Pay for Housing in the Last Year: Not on file    Number of [place] in the Last Year: Not on file    Unstable Housing in the Last Year: Not on file       Current Facility-Administered Medications   Medication Dose Route Frequency Provider Last Rate Last Admin    oxyCODONE-acetaminophen (PERCOCET) 5-325 MG per tablet 1 tablet  1 tablet Oral Q6H PRN Medina Baxter PA-C   1 tablet at 12/02/21 0313    midodrine (PROAMATINE) tablet 5 mg  5 mg Oral TID  Rod Taylor MD   5 mg at 12/02/21 0806    norepinephrine (LEVOPHED) 16 mg in sodium chloride 0.9 % 250 mL infusion  0.01-3.3 mcg/kg/min IntraVENous Continuous Tay Hughes MD 1.9 mL/hr at 12/02/21 0411 2 mcg/min at 12/02/21 0411    lactated ringers infusion   IntraVENous Continuous Rod Taylor MD 30 mL/hr at 12/01/21 0955 Rate Change at 12/01/21 0955    ipratropium-albuterol (DUONEB) nebulizer solution 1 ampule  1 ampule Inhalation Q6H Niki Melgar MD   1 ampule at 12/02/21 7364    apixaban (ELIQUIS) tablet 5 mg  5 mg Oral BID Rod Taylor MD   5 mg at 12/02/21 0806    piperacillin-tazobactam (ZOSYN) 3,375 mg in dextrose 5 % 50 mL IVPB extended infusion (mini-bag) 3,375 mg IntraVENous Q8H Sangeeta Moe MD 12.5 mL/hr at 12/02/21 0405 3,375 mg at 12/02/21 0405    nicotine (NICODERM CQ) 21 MG/24HR 1 patch  1 patch TransDERmal Daily Ariella Jones MD   1 patch at 12/02/21 0808    famotidine (PEPCID) tablet 20 mg  20 mg Oral Daily Ariella Jones MD   20 mg at 12/02/21 0806    chlorhexidine (PERIDEX) 0.12 % solution 15 mL  15 mL Mouth/Throat BID Emily Sams MD   15 mL at 11/30/21 0757    potassium chloride (KLOR-CON M) extended release tablet 40 mEq  40 mEq Oral PRN Emily Sams MD        Or    potassium bicarb-citric acid (EFFER-K) effervescent tablet 40 mEq  40 mEq Oral PRN Emily Sams MD   40 mEq at 11/24/21 0502    Or    potassium chloride 10 mEq/100 mL IVPB (Peripheral Line)  10 mEq IntraVENous PRN Emily Sams MD   Stopped at 11/17/21 1410    citalopram (CELEXA) tablet 20 mg  20 mg Oral Daily Kalina Shannan, DO   20 mg at 12/02/21 2659    ondansetron (ZOFRAN-ODT) disintegrating tablet 4 mg  4 mg Oral Q8H PRN Kalina Shannan, DO        Or    ondansetron [facility]) injection 4 mg  4 mg IntraVENous Q6H PRN Kalina Shannan, DO   4 mg at 11/13/21 0758    polyethylene glycol (GLYCOLAX) packet 17 g  17 g Oral Daily PRN Kalina Shannan, DO        aspirin EC tablet 81 mg  81 mg Oral Daily Kalina Shannan, DO   81 mg at 12/02/21 3297    Or    aspirin suppository 300 mg  300 mg Rectal Daily Kalina Shannan, DO   300 mg at 11/14/21 1398    atorvastatin (LIPITOR) tablet 40 mg  40 mg Oral Nightly Kalina Shannan, DO   40 mg at 12/01/21 1919    labetalol (NORMODYNE;TRANDATE) injection 10 mg  10 mg IntraVENous Q10 Min PRN Kalina Shannan, DO        morphine (PF) injection 2 mg  2 mg IntraVENous Q3H PRN Emily Sams MD   2 mg at 11/30/21 2575    thiamine (B-1) injection 100 mg  100 mg IntraVENous Daily Citlalli Hall DO   100 mg at 12/02/21 0806         norepinephrine 2 mcg/min (12/02/21 0411)    lactated ringers 30 mL/hr at 12/01/21 0955        Objective:   /61   Pulse 98   Temp 97.8 °F (36.6 °C) (Temporal)   Resp 29   Ht 5' 5\" (1.651 m)   Wt 106 lb 1.6 oz (48.1 kg)   SpO2 91%   BMI 17.66 kg/m²     General: cachectic  HEENT: normocephalic, atraumatic  Neck: supple, symmetrical, trachea midline   Lungs: slowly improving rhonchi  Cardiovascular: s1 and s2 normal  Abdomen: soft, positive bowel sounds  Extremities: no edema or cyanosis   Neuro: aaox3, moving all 4 extremities  Skin: normal color and texture     Recent Labs     11/30/21 0410 12/01/21 0415 12/02/21 0310   WBC 10.3 9.2 9.8   RBC 3.10* 2.83* 3.06*   HGB 9.3* 8.5* 9.2*   HCT 31.1* 28.9* 31.2*   .3* 102.1* 102.0*   MCH 30.0 30.0 30.1   MCHC 29.9* 29.4* 29.5*    283 293     Recent Labs     11/30/21 0410 12/01/21 0415 12/02/21  0310   * 142 146*   K 3.9 3.8 3.8   ANIONGAP 12 8 10    102 105   CO2 32* 32* 31*   BUN 9 11 14   CREATININE 0.5 0.2* 0.2*   GLUCOSE 138* 97 93   CALCIUM 8.6* 8.5* 9.0     Recent Labs     11/30/21 0410 12/01/21 0415 12/02/21  0310   MG 2.1 2.0 2.1     Recent Labs     11/30/21 0410 12/01/21 0415 12/02/21  0310   AST 22 16 14   ALT 28 22 21   BILITOT 0.3 <0.2 <0.2   ALKPHOS 77 69 70     No results for input(s): PH, PO2, PCO2, HCO3, BE, O2SAT in the last 72 hours. No results for input(s): TROPONINI in the last 72 hours. No results for input(s): INR in the last 72 hours. No results for input(s): LACTA in the last 72 hours. Intake/Output Summary (Last 24 hours) at 12/2/2021 0858  Last data filed at 12/2/2021 0400  Gross per 24 hour   Intake 1273 ml   Output 1150 ml   Net 123 ml       No results found.      Assessment and Plan:   Bihemispheric strokes  Neurology following  Meds per neurology  Neurochecks  PT/OT/SLP  Eliquis per neuro recs for likely embolic stroke     Pseudomonas/Corynebacterium pneumonia  Zosyn per ID     Ventilator dependent acute respiratory failure  Extubated 11/30/2021  CTA chest 11/22/2021: No evidence of PE  S/p bronch 11/29/2021, report on file, no evidence of endobronchial obstruction     History of lung cancer     Tobacco dependence     Severe protein calorie malnutrition  Dietitian following     DVT prophylaxis  Eliquis     Extensive discussions with patient's brother Neida Da Silva 11/20/2021 in regards to current clinical state/goals of care.  All questions sought and answered. Vicente Branch will attempt to have patient's sons Khushi Garner and [place]) visit the hospital to further discuss.  Palliative following.     Patient's son Danielle Hawkins) requested DNR status on 11/20/2021.  All questions sought and answered.  Unit staff witnessed the entire conversation.     Total critical care time: 45 minutes    Jael Iraheta MD   12/2/2021 8:58 AM

## 2021-12-02 NOTE — PROGRESS NOTES
Infectious Diseases Progress Note    Patient:  Vickie Mayo  YOB: 1955  MRN: [id number]   Admit date: 11/12/2021   Admitting Physician: Vielka Dominguez MD  Primary Care Physician: DOMINICK Irwin NP    Chief Complaint/Interval History:  She is without fever. She is hemodynamically stable. She does not have increasing productive cough or sputum production. No rash or skin itching. No diarrhea. Discussed with pharmacy. She has been on Zosyn since November 17. Reviewed with Dr. Hebert Capps. In/Out    Intake/Output Summary (Last 24 hours) at 12/2/2021 0840  Last data filed at 12/2/2021 0400  Gross per 24 hour   Intake 1273 ml   Output 1150 ml   Net 123 ml     Allergies:    Allergies   Allergen Reactions    No Known Allergies      Current Meds: oxyCODONE-acetaminophen (PERCOCET) 5-325 MG per tablet 1 tablet, Q6H PRN  midodrine (PROAMATINE) tablet 5 mg, TID WC  norepinephrine (LEVOPHED) 16 mg in sodium chloride 0.9 % 250 mL infusion, Continuous  lactated ringers infusion, Continuous  ipratropium-albuterol (DUONEB) nebulizer solution 1 ampule, Q6H  apixaban (ELIQUIS) tablet 5 mg, BID  piperacillin-tazobactam (ZOSYN) 3,375 mg in dextrose 5 % 50 mL IVPB extended infusion (mini-bag), Q8H  nicotine (NICODERM CQ) 21 MG/24HR 1 patch, Daily  famotidine (PEPCID) tablet 20 mg, Daily  chlorhexidine (PERIDEX) 0.12 % solution 15 mL, BID  potassium chloride (KLOR-CON M) extended release tablet 40 mEq, PRN   Or  potassium bicarb-citric acid (EFFER-K) effervescent tablet 40 mEq, PRN   Or  potassium chloride 10 mEq/100 mL IVPB (Peripheral Line), PRN  citalopram (CELEXA) tablet 20 mg, Daily  ondansetron (ZOFRAN-ODT) disintegrating tablet 4 mg, Q8H PRN   Or  ondansetron (ZOFRAN) injection 4 mg, Q6H PRN  polyethylene glycol (GLYCOLAX) packet 17 g, Daily PRN  aspirin EC tablet 81 mg, Daily   Or  aspirin suppository 300 mg, Daily  atorvastatin (LIPITOR) tablet 40 mg, Nightly  labetalol (NORMODYNE;TRANDATE) injection 10 mg, Q10 Min PRN  morphine (PF) injection 2 mg, Q3H PRN  thiamine (B-1) injection 100 mg, Daily      Review of Systems See HPI    VitalSigns:  /61   Pulse 98   Temp 97.8 °F (36.6 °C) (Temporal)   Resp 29   Ht 5' 5\" (1.651 m)   Wt 106 lb 1.6 oz (48.1 kg)   SpO2 91%   BMI 17.66 kg/m²      Physical Exam  Line/IV site: No erythema, warmth, induration, or tenderness. She looks comfortable  Lungs with slight decreased breath sounds in the bases  Abdomen is soft and nontender    Lab Results:  CBC:   Recent Labs     11/30/21  0410 12/01/21  0415 12/02/21  0310   WBC 10.3 9.2 9.8   HGB 9.3* 8.5* 9.2*    283 293     BMP:  Recent Labs     11/30/21  0410 12/01/21  0415 12/02/21  0310   * 142 146*   K 3.9 3.8 3.8    102 105   CO2 32* 32* 31*   BUN 9 11 14   CREATININE 0.5 0.2* 0.2*   GLUCOSE 138* 97 93     Radiology: None    Additional Studies Reviewed:  None    Impression:  1. Pseudomonas recovered from sputum. Bronchoscopy did not show any endobronchial lesions. She has been on antipseudomonal treatment since November 17. She should have had adequate treatment at this point. 2. Lung cancer  3.   Chronic obstructive pulmonary disease    Recommendations:  Feel she has had adequate antibiotic treatment at present  She has had slightly over 2 weeks of antipseudomonal treatment  Would be happy to reassess if fever, increasing sputum production, or other concerns for recurrent infection  Will sign off for now  Follow-up with infectious diseases as needed    Shira Rice MD

## 2021-12-02 NOTE — PROGRESS NOTES
Pulmonary and Critical Care Progress note. Barbara Pat    MRN# [id number]    Acct# [de-identified]  12/2/2021   1:38 PM CST    Referring Breann Abdi MD      Chief Complaint: History of lung cancer respiratory failure    HPI: She is dated. Sitting up in a chair denies any complaints.     Medications    midodrine, 10 mg, Oral, TID WC    ipratropium-albuterol, 1 ampule, Inhalation, Q6H    apixaban, 5 mg, Oral, BID    piperacillin-tazobactam, 3,375 mg, IntraVENous, Q8H    nicotine, 1 patch, TransDERmal, Daily    famotidine, 20 mg, Oral, Daily    chlorhexidine, 15 mL, Mouth/Throat, BID    citalopram, 20 mg, Oral, Daily    aspirin, 81 mg, Oral, Daily **OR** aspirin, 300 mg, Rectal, Daily    atorvastatin, 40 mg, Oral, Nightly    thiamine, 100 mg, IntraVENous, Daily     Review of Systems:      Physical Exam:  /64   Pulse 99   Temp 97.2 °F (36.2 °C) (Temporal)   Resp 27   Ht 5' 5\" (1.651 m)   Wt 106 lb 1.6 oz (48.1 kg)   SpO2 93%   BMI 17.66 kg/m²     Intake/Output Summary (Last 24 hours) at 12/2/2021 1109  Last data filed at 12/2/2021 0800  Gross per 24 hour   Intake 1542.65 ml   Output 1350 ml   Net 192.65 ml       General appearance: Elderly white female who is in no distress   HEENT: Endotracheal tube in place  Heart: S1-S2 distant sounds no murmurs  Lungs: Diminished bilaterally no rubs or chest wall tenderness or dullness to percussion  Abdomen: Soft nontender no organomegalies normal bowel sounds  Extremities: No clubbing cyanosis or edema  Neuro: No focal findings  Skin: Intact    Recent Labs     11/30/21  0410 12/01/21  0415 12/02/21  0310   WBC 10.3 9.2 9.8   RBC 3.10* 2.83* 3.06*   HGB 9.3* 8.5* 9.2*   HCT 31.1* 28.9* 31.2*    283 293   .3* 102.1* 102.0*   MCH 30.0 30.0 30.1   MCHC 29.9* 29.4* 29.5*   RDW 16.4* 16.2* 16.4*      Recent Labs     11/30/21  0410 12/01/21  0415 12/02/21  0310   * 142 146*   K 3.9 3.8 3.8    102 105 interpretation/independent review of imaging: Reviewed chest x-ray is improved    Problem list generated by [facility]:  Hospital Problems           Last Modified POA    Acute hypoxemic respiratory failure ([address] 11/12/2021 Yes    Stroke of unknown etiology ([address] 11/12/2021 Yes    Palliative care patient 11/23/2021 Yes    Severe malnutrition ([address] 11/16/2021 Yes    Altered mental status 11/16/2021 Yes    Centrilobular emphysema ([address] 11/23/2021 Yes    Primary squamous cell carcinoma of right lung ([address] 11/23/2021 Yes    Pleural effusion 11/29/2021 Yes    Atelectasis of left lung 11/29/2021 Yes           Pulmonary Assessment/Plan:     1. Acute hypoxic respiratory failure improved, on NC oxygen  2. Persistent hypotension. Off pressors. 3. Bronchoscopy done. No evidence of endobronchial obstruction. 4. Bilateral pleural effusions improved on chest x-ray. 5. Possible stroke followed by neurology. 6. DVT prophylaxis. 7. Dc planning. Swati Ayala MD, [facility], [facility]    The above note was generated using voice recognition software. Inadvertent typographical errors in transcription may have occurred.       Electronically signed by Swati Ayala MD on 12/02/21 at 11:09 AM

## 2021-12-02 NOTE — CONSULTS
**Physician Signature**  This document was electronically signed by: Tabitha Lopez DO  2021 11:48   PM    **Consult Information**  Member Facility: [address] [place] [address] Drive MRN: [id number]  Visit/Encounter Number: [id number]  Consult ID: [id number]  Facility Time Zone: CT  Date and Time of Request: 2021 09:45 PM  CT  Requesting Clinician: Monroe Hoyos MD  Patient Name: Rafy Gutierrez  YOB: 1955  Gender: Female  Patient identity was confirmed at the beginning of the consult with the   patient/family/staff using two personal identifiers: Patient name and       **Reason for Consult**  Reason for Consult: [facility]    **Admission**  Admission Date: 2021  Chief reason for ICU admission: Respiratory Failure  Secondary reasons for ICU admission: Pneumonia, Altered Mental   Status    **Core Metrics**  General orienting sentence for patient: 75 yo female admitted  (not   covid) found unresponsive at home with sats in 46s. Hx lung cancer and   COPD. ON bipap CT scan brain negative for stroke. REmains pleasantly   confused no follow commands could get MRI. Was full of secretions and tenuous   status anyway. MRI shows   probable subacute CVA, but no hemorrhage. Anisocoria. Propofol. Currently on   85%. Hx lung cancer, currently undergoing treatment. 3 PPD ongoing   tobacco. Remains on 30 mcg/kg/min propofol. 75% FIO2, 5 PEEP. Lots of   secretions desaturation event this AM, and required 100% briefly. She remains   on   propofol gtt. gtt. FiO2 50%. PEEP 5. Changed RR to 24. 75% today. is awake   and alert but anxious  She remains on Propofol gtt. She wakes up and follows   commands. Acute CVA diagnosed. Moving all extremities, alert on propofol. FIO2   down to   50%, 5 PEEP. commands. Voluminous secretions. Propofol for calming. [facility]   vent: AC -18/400/0.5/+5, being treated for pneumonia. vent 50% and 5 PEEP. On   midodrine and 1mcg NE. sedation. Norepinephrine at 5mcg/min.   TFs at goal.    50%, PEEP of 5. No changes per charge RN. Pts' RN is tied up with another   patient during   [address]. off now that midodrine has been initiated. FiO2 50%, PEEP 5. Bronchoscopy scheduled for the AM.  MRI of the brain shows patchy ischemia. Awake and Alert when sedation is weaned. Propofol. mcg. No changes   otherwise. 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 She is oriented. Bronch completed   yesterday. still on levophed is on MidodrineChief physiologic deterioration:   None - Stable patient  Is the patient on DVT prophylaxis?: Yes  Prophylaxis type: Mechanical, Pharmacological  DVT Prophylaxis Comments: Eliqustefan Angeli  Is the patient on GI prophylaxis?: Yes  Gi Prophylaxis Comments: Pepcid  Has this patient reached their nutritional goal?: Yes  Nutritional Goals Details: po diet  Are there current issues with pain management in this patient?:   No  Are there issues with skin integrity?: Yes and issues are being   addressed  Skin Integrity Details: redness perianal  Are there issues with delirium?: No  Has the patient been mobilized?: Yes  Is this patient currently intubated?: No  Are there ethical or care philosophy or family issues?: No  Family Issues Details: 2 children.  Palliative care  Do you recommend an in depth evaluation?: No  Disposition Recommendations: Continue ICU level of care    **Inserted/Removed Devices**  Device Name: Young Catheter  Site of insertion: Urethra  Date of insertion: 11-  Date of device removal: 12-  Device Name: Orogastric Tube  Site of insertion: Mouth  Date of insertion: 11-  Date of device removal: 11-  Device Name: Endotracheal Tube  Site of insertion: Trachea  Date of insertion: 11-  Date of device removal: 11-  Device Name: Port-a-cath  Site of insertion: Subclavian - Right   Date of insertion: Unknown    **Physician Signature**  This document was electronically signed by: Ermias De Jesus DO  12/01/2021 11:48   PM

## 2021-12-03 NOTE — PROGRESS NOTES
Comprehensive Nutrition Assessment    Type and Reason for Visit:  Reassess    Nutrition Recommendations/Plan: start ONS    Nutrition Assessment:  Pt has been extubated and moved to medical floor. PO intake remains variable with intake ranging %. Usually intake is 50%. Aware intake is affected by pain. Malnutrition Assessment:  Malnutrition Status:  Severe malnutrition    Context:  Chronic Illness     Findings of the 6 clinical characteristics of malnutrition:  Energy Intake:  Mild decrease in energy intake (Comment)  Weight Loss:  7 - Greater than 10% over 6 months     Body Fat Loss:  7 - Severe body fat loss Orbital, Buccal region   Muscle Mass Loss:  7 - Severe muscle mass loss Temples (temporalis), Hand (interosseous)  Fluid Accumulation:  No significant fluid accumulation Extremities   Strength:  Not Performed    Estimated Daily Nutrient Needs:  Energy (kcal):  0541-2339 kcals (30-35 kcals/kg); Weight Used for Energy Requirements:  Current     Protein (g):  71g; Weight Used for Protein Requirements:  Current        Fluid (ml/day):  3815-9125 ml; Method Used for Fluid Requirements:  1 ml/kcal      Nutrition Related Findings:  very thin      Wounds:  None       Current Nutrition Therapies:    ADULT DIET;  Regular    Anthropometric Measures:  · Height: 5' 5\" (165.1 cm)  · Current Body Weight: 104 lb (47.2 kg)   · Admission Body Weight: 109 lb 12.8 oz (49.8 kg)    · Usual Body Weight: 120 lb (54.4 kg) (4/2021)     · Ideal Body Weight: 125 lbs; % Ideal Body Weight 83.2 %   · BMI: 17.3  · Adjusted Body Weight:  ; No Adjustment   · BMI Categories: Underweight (BMI less than 22) age over 72       Nutrition Diagnosis:   · Inadequate oral intake related to acute injury/trauma, impaired respiratory function as evidenced by intake 0-25%, intake 26-50%, intake 51-75%, weight loss      Nutrition Interventions:   Food and/or Nutrient Delivery:  Continue Current Diet, Start Oral Nutrition Supplement  Nutrition Education/Counseling:  No recommendation at this time   Coordination of Nutrition Care:  Continue to monitor while inpatient    Goals:  New Goal: meet nutritional needs through po intake       Nutrition Monitoring and Evaluation:   Behavioral-Environmental Outcomes:  None Identified   Food/Nutrient Intake Outcomes:  Food and Nutrient Intake, Supplement Intake  Physical Signs/Symptoms Outcomes:  Biochemical Data, Weight, Skin, Nutrition Focused Physical Findings, Fluid Status or Edema     Discharge Planning:     Too soon to determine     Electronically signed by Stephania Plaza MS, RD, LD on 12/3/21 at 9:57 AM CST    Contact: [phone]

## 2021-12-03 NOTE — PROGRESS NOTES
[facility] Neurology Progress Note      Patient:   Rickie Sandifer  MR#:    [id number]   Room:    [facility]551-29   YOB: 1955  Date of Progress Note: 12/3/2021  Time of Note                           9:40 AM  Consulting Physician:  Belva Primrose, DO  Attending Physician:  Ashlyn Uriarte MD      INTERVAL HISTORY: Continues to improve, speech better, following commands, no acute events overnight, no new focal complaints. REVIEW OF SYSTEMS:    Constitutional:  Denies fever    Eyes:  Denies change in visual acuity   Cardiovascular:  Denies chest pain   GI:  Denies abdominal pain  Musculoskeletal:  Denies back pain    Neurologic:  Denies headache, new focal weakness or sensory changes       PHYSICAL EXAM:    Constitutional    BP 93/62   Pulse 85   Temp 98.6 °F (37 °C) (Temporal)   Resp 16   Ht 5' 5\" (1.651 m)   Wt 104 lb (47.2 kg)   SpO2 97%   BMI 17.31 kg/m²   General appearance: No acute disctress    EYES -   Conjunctiva normal  Pupillary exam as below, see CN exam in the neurologic exam  ENT-    No scars, masses, or lesions over external nose or ears  Oropharynx - intubated  Cardiovascular -   No clubbing, cyanosis, or edema   Musculoskeletal    No significant wasting of muscles noted  Gait as below, see gait exam in the neurologic exam  Muscle strength, tone, stability as below see the motor exam in the neurologic exam.   No bony deformities  Skin    Warm, dry, and intact to inspection and palpation. No rash, erythema, or pallor        NEUROLOGICAL EXAM     Mental status    []? Awake, alert, oriented   []? Affect attention and concentration appear appropriate  []? Recent and remote memory appears unremarkable  []? Speech normal without dysarthria or aphasia, comprehension and repetition intact. COMMENTS:  Eyes open, alert, following commands, speech improving. Cranial Nerves []?  No VF deficit to confrontation,  optic discs normal, no papilledema on fundoscopic exam.  []? ARTHUR EOMI, no nystagmus, conjugate eye movements, no ptosis  []? Face symmetric  []? Facial sensation intact  []? Tongue midline no atrophy or fasciculations present  []? Palate midline, hearing to finger rub normal  []? Shoulder shrug and SCM testing normal  COMMENTS: Left Pupil reactive, right pupil misshapen likely surgical, face appears sym, EOM intact    Motor   []? 5/5 strength x 4 extremities  [x]? Normal bulk and tone  [x]? No tremor present  [x]? No rigidity or bradykinesia noted  COMMENTS:SMAE noted   Sensory  []? Sensation intact to light touch, pin prick, vibration, and proprioception BLE  []? Sensation intact to light touch, pin prick, vibration, and proprioception BUE  COMMENTS: Limtied   Coordination []? FTN normal bilaterally   []? HTS normal bilaterally  []? JIA normal.   COMMENTS: Limited    Reflexes  [x]? Symmetric and non-pathological  [x]? Toes downgoing bilaterally  [x]? No clonus present  COMMENTS:   Gait                  []? Normal steady gait    []? Ataxic    []? Spastic     []? Magnetic     []? Shuffling  [x]? Not assessed  COMMENTS:        LABS/IMAGING:    CT HEAD WO CONTRAST    Result Date: 11/12/2021  CT HEAD WO CONTRAST 11/12/2021 11:25 AM HISTORY: Code stroke COMPARISON: None DOSE LENGTH PRODUCT: 961 mGy cm TECHNIQUE: Helical tomographic images of the brain were obtained without the use of intravenous contrast. Automated exposure control was also utilized to decrease patient radiation dose. FINDINGS: Exam is limited by motion artifact. There is no evidence of evolving large vascular territory infarct. Underlying chronic small vessel ischemic change. No visualized intra-axial or extra-axial hemorrhage. No mass lesion is identified. Normal size and configuration of the ventricular system. The basal cisterns are symmetric. Posterior fossa structures are unremarkable. The included orbits and their contents are unremarkable. Chronic right maxillary sinus disease.  The visualized osseous structures and overlying soft tissues of the skull and face are unremarkable. 1. No acute intracranial process. Signed by Dr Alyssa Thomas    XR CHEST PORTABLE    Result Date: 11/12/2021  XR CHEST PORTABLE 11/12/2021 11:36 AM HISTORY: Code stroke  Technique: Single AP view of the chest COMPARISONS: Chest exam dated 4/29/2020 FINDINGS: Reidentified right suprahilar consolidation with volume loss. New developing consolidations in the left upper and left lower lobes with air bronchograms. No obvious pleural effusion. Underlying lung emphysema. Heart size is stable. The pulmonary vasculature are nondilated. Right chest wall [id number]. Spinal scoliotic curvature. 1. New multifocal consolidation in the left upper and left lower lobes, appearance concerning for new multifocal pneumonia. 2. Treatment-related changes in the right suprahilar region with scarring and volume loss. Signed by Dr Zavaleta Dural    Result Date: 11/12/2021  EXAM: CT NECK ANGIOGRAM CT HEAD ANGIOGRAM on  11/12/2021 12:54 PM COMPARISON:  CT chest dated August 23, 2021. HISTORY:  74years-old Female. Code stroke TECHNIQUE: Multiple CT images were obtained of the of the head and neck after the administration of IV contrast. 3D MIP reformats were generated  and were sent to PACS for interpretation. Grading of carotid artery stenosis is performed by NASCET criteria. FINDINGS: CT Neck Angiogram: There is a conventional aortic arch with patent origins of the brachiocephalic trunk, left common carotid and left subclavian arteries. The bilateral common carotid arteries and subclavian arteries are well-opacified. There is atherosclerotic disease of the bilateral carotid bifurcations, left greater than than right, with essentially 0% stenosis. The bilateral internal carotid arteries are otherwise well opacified throughout. The bilateral vertebral arteries are well-opacified throughout. Additional findings: Emphysema.  New left upper lobe opacities posteriorly, most concerning for infection. Additional right upper lobe opacity is also concerning for infection. Centrally necrotic right upper and lower lobe posterior mass, incompletely visualized but appears grossly stable when compared to prior examination. Small bilateral pleural effusions. CT Head Angiogram: The right internal carotid artery demonstrates normal course and caliber without evidence of flow limiting stenosis or occlusion. The major branch vessels to the right anterior and middle cerebral arteries are seen without evidence of stenosis or occlusion. There is no evidence of aneurysm or vascular malformation. Some atherosclerotic disease in the carotid siphon. The left internal carotid artery demonstrates normal course and caliber without evidence of flow limiting stenosis or occlusion. The major branch vessels to the left anterior and middle cerebral arteries are seen without evidence of stenosis or occlusion. There is no evidence of aneurysm or vascular malformation. Some atherosclerotic disease in the carotid siphon. Evaluation of the posterior circulation demonstrates normal caliber of the vertebral arteries, basilar artery, and major branch vessels of the posterior cerebral arteries bilaterally without evidence of flow limiting stenosis or occlusion. There is no evidence of aneurysm or vascular malformation. CT Angiography Of The Neck With Intravenous Contrast 1. No evidence of high-grade arterial stenosis or underlying dissection. 2. Bilateral upper lobe new opacities are concerning for infection. CT Angiography Of The Head With Intravenous Contrast 1. No evidence of acute thrombotic occlusion, high-grade arterial stenosis, or focal cerebral aneurysm. Signed by Dr Dorcas Jain Date: 11/12/2021  EXAM: CT NECK ANGIOGRAM CT HEAD ANGIOGRAM on  11/12/2021 12:54 PM COMPARISON:  CT chest dated August 23, 2021.  HISTORY:  74years-old Female. Code stroke TECHNIQUE: Multiple CT images were obtained of the of the head and neck after the administration of IV contrast. 3D MIP reformats were generated  and were sent to PACS for interpretation. Grading of carotid artery stenosis is performed by NASCET criteria. FINDINGS: CT Neck Angiogram: There is a conventional aortic arch with patent origins of the brachiocephalic trunk, left common carotid and left subclavian arteries. The bilateral common carotid arteries and subclavian arteries are well-opacified. There is atherosclerotic disease of the bilateral carotid bifurcations, left greater than than right, with essentially 0% stenosis. The bilateral internal carotid arteries are otherwise well opacified throughout. The bilateral vertebral arteries are well-opacified throughout. Additional findings: Emphysema. New left upper lobe opacities posteriorly, most concerning for infection. Additional right upper lobe opacity is also concerning for infection. Centrally necrotic right upper and lower lobe posterior mass, incompletely visualized but appears grossly stable when compared to prior examination. Small bilateral pleural effusions. CT Head Angiogram: The right internal carotid artery demonstrates normal course and caliber without evidence of flow limiting stenosis or occlusion. The major branch vessels to the right anterior and middle cerebral arteries are seen without evidence of stenosis or occlusion. There is no evidence of aneurysm or vascular malformation. Some atherosclerotic disease in the carotid siphon. The left internal carotid artery demonstrates normal course and caliber without evidence of flow limiting stenosis or occlusion. The major branch vessels to the left anterior and middle cerebral arteries are seen without evidence of stenosis or occlusion. There is no evidence of aneurysm or vascular malformation. Some atherosclerotic disease in the carotid siphon.  Evaluation of the posterior circulation demonstrates normal caliber of the vertebral arteries, basilar artery, and major branch vessels of the posterior cerebral arteries bilaterally without evidence of flow limiting stenosis or occlusion. There is no evidence of aneurysm or vascular malformation. CT Angiography Of The Neck With Intravenous Contrast 1. No evidence of high-grade arterial stenosis or underlying dissection. 2. Bilateral upper lobe new opacities are concerning for infection. CT Angiography Of The Head With Intravenous Contrast 1. No evidence of acute thrombotic occlusion, high-grade arterial stenosis, or focal cerebral aneurysm. Signed by Dr Sara Caruso      Lab Results   Component Value Date    WBC 6.2 12/03/2021    HGB 9.0 (L) 12/03/2021    HCT 31.0 (L) 12/03/2021    .3 (H) 12/03/2021     12/03/2021     Lab Results   Component Value Date     (H) 12/03/2021    K 4.1 12/03/2021     12/03/2021    CO2 34 (H) 12/03/2021    BUN 17 12/03/2021    CREATININE 0.2 (L) 12/03/2021    GLUCOSE 86 12/03/2021    CALCIUM 8.8 12/03/2021    PROT 5.7 (L) 12/03/2021    LABALBU 2.8 (L) 12/03/2021    BILITOT <0.2 12/03/2021    ALKPHOS 72 12/03/2021    AST 14 12/03/2021    ALT 18 12/03/2021    LABGLOM >60 12/03/2021    GFRAA >59 12/03/2021    AGRATIO 1.4 05/01/2020    GLOB 3.5 02/25/2021     Lab Results   Component Value Date    INR 1.13 11/29/2021    INR 1.18 11/12/2021    INR 0.96 05/16/2019    PROTIME 14.7 (H) 11/29/2021    PROTIME 15.2 (H) 11/12/2021    PROTIME 12.2 05/16/2019       RECORD REVIEW:   Previous medical records, medications were reviewed at today's visit. Nursing/physician notes, imaging, labs and vitals reviewed. PT,OT and/or speech notes reviewed      ASSESSMENT:  74 y.o. admitted with AMS, hypoxia, pneumonia, lung cancer history, COPD, right sided weakness, slurred speech, MRI consistent with scattered bi-hemishperic strokes, likely embolic vs watershed. CTAs negative. ECHO, CD negative.  EEG with slowing, nothing epileptiform. Extubated, off pressors, out of the ICU, exam stable / improved, more alert, speech better, following commands.       PLAN:  1. Supportive care   2. Follow Tele   3. Continue addressing medical issues, pneumonia, respiratory failure -pulmonology / ID following. 4.  ASA, statin, Eliquis. 5.  PT, OT, ST  6. DVT proph   7. Limit sedating medications, supplementing thiamine  8. Palliative care following      Please feel free to call with any questions. [phone] (cell phone).     Behzad Chanel DO  Board Certified Neurology

## 2021-12-03 NOTE — PROGRESS NOTES
read 79%. REQUIRES PT FOLLOW UP Yes   Discharge Recommendations Continue to assess pending progress; Patient would benefit from continued therapy after discharge   Activity Tolerance   Activity Tolerance Patient Tolerated treatment well   Activity Tolerance sats were 92% prior to PT, after amb. 79% and after recovery 87%   Plan   Times per week AT LEAST 5   Current Treatment Recommendations Strengthening; Balance Training; Functional Mobility Training; Transfer Training; Gait Training; Safety Education & Training; Patient/Caregiver Education & Training   Plan Comment cont. PT per POC. Safety Devices   Type of devices Bed alarm in place; Gait belt; Patient at risk for falls;  Left in bed; Nurse notified   PT Whiteboard Notes   Therapy Whiteboard RE 12/15  CVA, RESP FAIL, O2     Electronically signed by Madeleine Dexter PT on 12/3/2021 at 1:17 PM

## 2021-12-03 NOTE — PROGRESS NOTES
Palliative Care Progress Note  12/3/2021 10:21 AM    Patient:  Aury Cates  YOB: 1955  Primary Care Physician: DOMINICK Irwin NP  Advance Directive: [facility]  Admit Date: 11/12/2021       Hospital Day: 24  Portions of this note have been copied forward, however, changed to reflect the most current clinical status of this patient. CHIEF COMPLAINT/REASON FOR CONSULTATION Goals of care    SUBJECTIVE:  Ms. Joy Amador has no new complaints. She is sitting in bedside chair reading the newspaper this morning. States myalgias have improved. Had a good appetite this morning. Interval History: Extubated 12/1/2021, tolerating oral intake. Pressor support weaned, moved to PCU 12/02/2021    Review of Systems:   14 point review of systems is negative except as specifically addressed above. Objective:   VITALS:  BP 93/62   Pulse 85   Temp 98.6 °F (37 °C) (Temporal)   Resp 16   Ht 5' 5\" (1.651 m)   Wt 104 lb (47.2 kg)   SpO2 97%   BMI 17.31 kg/m²   24HR INTAKE/OUTPUT:      Intake/Output Summary (Last 24 hours) at 12/3/2021 1021  Last data filed at 12/3/2021 0537  Gross per 24 hour   Intake 230 ml   Output 150 ml   Net 80 ml     General appearance: 75 yo female, chronically ill appearing, sitting up in bedside chair  Head: Normocephalic, without obvious abnormality, atraumatic  Eyes: conjunctivae/corneas clear.  Pupils reactive bilaterally  Ears: normal external ears and nose, throat without exudate  Neck: no adenopathy, no carotid bruit, no JVD, supple, symmetrical, trachea midline   Lungs:  Diminished air entry no rhonchi or wheezing  Heart: regular rate rhythm, S1, S2 normal, no murmur  Abdomen:soft,non-distended, bowel sounds present, non-tender  Extremities: No lower extremity edema,  No erythema, no tenderness to palpation  Skin: warm, dry  Lymphatic: No palpable lymph node enlargment  Neurologic: Alert , oriented x 3, generalized weakness, normal tone  Psychiatric:  Calm, appropriate mood and affect    Medications:      lactated ringers 30 mL/hr at 12/01/21 0955      midodrine  10 mg Oral TID     ipratropium-albuterol  1 ampule Inhalation Q4H WA    apixaban  5 mg Oral BID    nicotine  1 patch TransDERmal Daily    famotidine  20 mg Oral Daily    chlorhexidine  15 mL Mouth/Throat BID    citalopram  20 mg Oral Daily    aspirin  81 mg Oral Daily    Or    aspirin  300 mg Rectal Daily    atorvastatin  40 mg Oral Nightly    thiamine  100 mg IntraVENous Daily     oxyCODONE-acetaminophen, potassium chloride **OR** potassium alternative oral replacement **OR** potassium chloride, ondansetron **OR** ondansetron, polyethylene glycol, labetalol, morphine  ADULT DIET; Regular  ADULT ORAL NUTRITION SUPPLEMENT; Lunch, Dinner; Frozen Oral Supplement     Lab and other Data:     Recent Labs     12/01/21 0415 12/02/21 0310 12/03/21  0525   WBC 9.2 9.8 6.2   HGB 8.5* 9.2* 9.0*    293 264     Recent Labs     12/01/21 0415 12/02/21 0310 12/03/21  0525    146* 146*   K 3.8 3.8 4.1    105 105   CO2 32* 31* 34*   BUN 11 14 17   CREATININE 0.2* 0.2* 0.2*   GLUCOSE 97 93 86     Recent Labs     12/01/21 0415 12/02/21  0310 12/03/21  0525   AST 16 14 14   ALT 22 21 18   BILITOT <0.2 <0.2 <0.2   ALKPHOS 69 70 72     Assessment/Plan   Active Problems:    Acute hypoxemic respiratory failure (HCC)    Stroke of unknown etiology (HCC)    Palliative care patient    Severe malnutrition (HCC)    Altered mental status    Centrilobular emphysema (HCC)    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 *    Visit Summary:  Chart reviewed. No acute overnight events. Tolerating therapy, oral intake. Patient reports improvement in pain. Will continue to follow. Recommendations:   1. Palliative care: [address] continue therapy with goal of returning home. Will consult social work for discharge planning. Code status: DNR     2. Acute on chronic hypoxemic respiratory failure w/ Pseudomonas/Corynebacterium Pneumonia-resolving to baseline, Course of Zosyn completed     3. Bilateral (right frontal/left parietal) hemisphere acute cerebral infarcts, likely embolic vs watershed-Neurology following, aspirin, statin, Eliquis, stable    4. Hx Lung cancer w/ tobacco dependence-noted    5. Hypotension-Midodrine added, stable    Thank you for consulting Palliative Care and allowing us to participate in the care of this patient.    Time Spent Counseling > 50%:  YES                                   Total Time Spent with patient/family counseling, workup/treatment review, counseling and placement of orders/preparation of this note: 18 minutes    Electronically signed by Yuan Drummond PA-C on 12/3/2021 at 10:21 AM    (Please note that portions of this note were completed with a voice recognition program.  Alissa Farris made to edit the dictations but occasionally words are mis-transcribed.)

## 2021-12-03 NOTE — PLAN OF CARE
Nutrition Problem #1: Inadequate oral intake  Intervention: Food and/or Nutrient Delivery: Continue Current Diet, Start Oral Nutrition Supplement  Nutritional Goals: New Goal: meet nutritional needs through po intake

## 2021-12-03 NOTE — PROGRESS NOTES
Aidee Cates received from ICU to room # 730 . Mental Status: Patient is oriented, alert, coherent, logical and able to concentrate and follow conversation. Vitals:    12/03/21 0217   BP: (!) 94/58   Pulse: 80   Resp: 22   Temp: 97.7 °F (36.5 °C)   SpO2: 97%     Placed on cardiac monitor: Yes. Box # . Belongings: unknown location is not applicable . Family at bedside No.  Oriented Patient to room. Call light within reach. Yes. Transfer was: Well tolerated by patient. .    Electronically signed by Jet Osborne, ELIS on 12/3/2021 at 2:20 AM

## 2021-12-03 NOTE — PROGRESS NOTES
Hospitalist Progress Note  [facility]     Patient: Darryl Madrid  : 1955  MRN: [id number]  Code Status: [facility]    Hospital Day: 21   Date of Service: 12/3/2021    Subjective:   Patient seen and examined. No current complaints. 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     COPD (chronic obstructive pulmonary disease) (HCC)     Depression     Emphysema (subcutaneous) (surgical) resulting from a procedure     History of lung biopsy 2019    Malignant neoplasm of right main bronchus ([address] 2019    NSCLC, SCC [id number] stage IIIb    Palliative care patient 2021    Syncope     TIA (transient ischemic attack)        Past Surgical History:   Procedure Laterality Date    APPENDECTOMY      BACK SURGERY      times two    BLADDER SUSPENSION      CHOLECYSTECTOMY      HYSTERECTOMY      INCONTINENCE SURGERY         Family History   Problem Relation Age of Onset    Colon Cancer Mother     High Blood Pressure Brother     Diabetes Brother        Social History     Socioeconomic History    Marital status:      Spouse name: Not on file    Number of children: Not on file    Years of education: Not on file    Highest education level: Not on file   Occupational History    Not on file   Tobacco Use    Smoking status: Current Every Day Smoker    Smokeless tobacco: Never Used   Substance and Sexual Activity    Alcohol use: Not Currently    Drug use: Never    Sexual activity: Not on file   Other Topics Concern    Not on file   Social History Narrative    Not on file     Social Determinants of Health     Financial Resource Strain:     Difficulty of Paying Living Expenses: Not on file   Food Insecurity:     Worried About Running Out of Food in the Last Year: Not on file    Trisha of Food in the Last Year: Not on file   Transportation Needs:     Lack of Transportation (Medical):  Not on file    Lack of MD Nadine   15 mL at 11/30/21 0757    potassium chloride (KLOR-CON M) extended release tablet 40 mEq  40 mEq Oral PRN Laila Gomez MD        Or    potassium bicarb-citric acid (EFFER-K) effervescent tablet 40 mEq  40 mEq Oral PRN Laila Gomez MD   40 mEq at 11/24/21 0502    Or    potassium chloride 10 mEq/100 mL IVPB (Peripheral Line)  10 mEq IntraVENous PRN Laila Gomez MD   Stopped at 11/17/21 1410    citalopram (CELEXA) tablet 20 mg  20 mg Oral Daily Berniece [place], DO   20 mg at 12/03/21 0846    ondansetron (ZOFRAN-ODT) disintegrating tablet 4 mg  4 mg Oral Q8H PRN Berniece Rik, DO        Or    ondansetron [facility]) injection 4 mg  4 mg IntraVENous Q6H PRN Berniece [place], DO   4 mg at 11/13/21 0758    polyethylene glycol (GLYCOLAX) packet 17 g  17 g Oral Daily PRN Berniece [place], DO        aspirin EC tablet 81 mg  81 mg Oral Daily Berniece Rik, DO   81 mg at 12/03/21 1751    Or    aspirin suppository 300 mg  300 mg Rectal Daily Berniece [place], DO   300 mg at 11/14/21 1008    atorvastatin (LIPITOR) tablet 40 mg  40 mg Oral Nightly Berniece [place], DO   40 mg at 12/02/21 1935    labetalol (NORMODYNE;TRANDATE) injection 10 mg  10 mg IntraVENous Q10 Min PRN Berniece Rik, DO        morphine (PF) injection 2 mg  2 mg IntraVENous Q3H PRN Laila Gomez MD   2 mg at 11/30/21 2355    thiamine (B-1) injection 100 mg  100 mg IntraVENous Daily Nelson Morita, DO   100 mg at 12/03/21 0846         lactated ringers 30 mL/hr at 12/01/21 0955        Objective:   BP 97/68   Pulse 99   Temp 98.5 °F (36.9 °C) (Temporal)   Resp 18   Ht 5' 5\" (1.651 m)   Wt 104 lb (47.2 kg)   SpO2 90%   BMI 17.31 kg/m²     General: cachectic  HEENT: normocephalic, atraumatic  Neck: supple, symmetrical, trachea midline   Lungs: slowly improving rhonchi  Cardiovascular: s1 and s2 normal  Abdomen: soft, positive bowel sounds  Extremities: no edema or cyanosis   Neuro: aaox3, moving all 4 extremities  Skin: normal color and texture     Recent Labs     12/01/21  0415 12/02/21  0310 12/03/21  0525   WBC 9.2 9.8 6.2   RBC 2.83* 3.06* 3.00*   HGB 8.5* 9.2* 9.0*   HCT 28.9* 31.2* 31.0*   .1* 102.0* 103.3*   MCH 30.0 30.1 30.0   MCHC 29.4* 29.5* 29.0*    293 264     Recent Labs     12/01/21 0415 12/02/21  0310 12/03/21  0525    146* 146*   K 3.8 3.8 4.1   ANIONGAP 8 10 7    105 105   CO2 32* 31* 34*   BUN 11 14 17   CREATININE 0.2* 0.2* 0.2*   GLUCOSE 97 93 86   CALCIUM 8.5* 9.0 8.8     Recent Labs     12/01/21 0415 12/02/21 0310 12/03/21  0525   MG 2.0 2.1 2.2     Recent Labs     12/01/21 0415 12/02/21  0310 12/03/21  0525   AST 16 14 14   ALT 22 21 18   BILITOT <0.2 <0.2 <0.2   ALKPHOS 69 70 72     No results for input(s): PH, PO2, PCO2, HCO3, BE, O2SAT in the last 72 hours. No results for input(s): TROPONINI in the last 72 hours. No results for input(s): INR in the last 72 hours. No results for input(s): LACTA in the last 72 hours. Intake/Output Summary (Last 24 hours) at 12/3/2021 1512  Last data filed at 12/3/2021 1429  Gross per 24 hour   Intake 350 ml   Output    Net 350 ml       No results found.      Assessment and Plan:   Bihemispheric strokes  Neurology following  Meds per neurology  Neurochecks  PT/OT/SLP  Eliquis per neuro recs for likely embolic stroke     Pseudomonas/Corynebacterium pneumonia  Completed course of Zosyn per ID     Ventilator dependent acute respiratory failure  Extubated 11/30/2021  CTA chest 11/22/2021: No evidence of PE  S/p bronch 11/29/2021, report on file, no evidence of endobronchial obstruction  Currently on 4 L NC  Requires 2 L at baseline     History of lung cancer     Tobacco dependence     Severe protein calorie malnutrition  Dietitian following     DVT prophylaxis  Eliquis     Extensive discussions with patient's brother Ally Cash 11/20/2021 in regards to current clinical state/goals of care.  All questions sought and answered. Oriana Katz will attempt to have patient's sons Isabella Marinelli and Everett) visit the hospital to further discuss.  Palliative following.     Patient's son Chris Cantu) requested DNR status on 11/20/2021.  All questions sought and answered.  Unit staff witnessed the entire conversation.     Iftikhar Bolden MD   12/3/2021 3:12 PM

## 2021-12-03 NOTE — PROGRESS NOTES
Occupational Therapy  Facility/Department: [facility] 7 PROGRESSIVE CARE  Daily Treatment Note  NAME: Aidee Cates  : 1955  MRN: [id number]    Date of Service: 12/3/2021    Discharge Recommendations:          Assessment   Assessment: Pt. able to move around the room with RW and CGA, O2 sats dropped but pt. not short of breath            Patient Diagnosis(es): The primary encounter diagnosis was Altered mental status, unspecified altered mental status type. Diagnoses of Acute respiratory failure with hypoxia and hypercapnia (HCC), Acute right-sided weakness, Chronic obstructive pulmonary disease, unspecified COPD type ([address] Multifocal pneumonia, and Encephalopathy due to infection were also pertinent to this visit. has a past medical history of Anxiety, Asthma, 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, COPD (chronic obstructive pulmonary disease) ([address] Depression, Emphysema (subcutaneous) (surgical) resulting from a procedure, History of lung biopsy, Malignant neoplasm of right main bronchus [facility]), Palliative care patient, Syncope, and TIA (transient ischemic attack). has a past surgical history that includes Appendectomy; back surgery; Hysterectomy; bladder suspension; Cholecystectomy; and Incontinence surgery.     Restrictions  Restrictions/Precautions  Restrictions/Precautions: Fall Risk  Subjective   General  Chart Reviewed: Yes  Patient assessed for rehabilitation services?: Yes  Diagnosis: Respiratory Failure      Orientation     Objective                   Transfers  Stand Step Transfers: Contact guard assistance  Sit to stand: Contact guard assistance                                                                 Plan   Plan  Times per week: 3-5x/week  Times per day: Daily  G-Code     OutComes Score                                                  AM-PAC Score             Goals  Short term goals  Time Frame for Short term goals: 1 week  Short term goal 1: CGA with toilet tfers  Short term goal 2: CGA with clothing mgmt in standing  Short term goal 3: CGA with seated LB dsg  Long term goals  Long term goal 1: Return to PLOF       Therapy Time   Individual Concurrent Group Co-treatment   Time In           Time Out           Minutes                   Racquel Cunningham, OT

## 2021-12-04 NOTE — PROGRESS NOTES
[facility] Neurology Progress Note      Patient:   Julia Ying  MR#:    [id number]   Room:    0730/730-01   YOB: 1955  Date of Progress Note: 12/4/2021  Time of Note                           10:49 AM  Consulting Physician:  Zia Walters DO  Attending Physician:  Lavon Walker MD      INTERVAL HISTORY: Continues to improve, speech better, following commands, no acute events overnight, no new focal complaints this am.       REVIEW OF SYSTEMS:    Constitutional:  Denies fever    Eyes:  Denies change in visual acuity   Cardiovascular:  Denies chest pain   GI:  Denies abdominal pain  Musculoskeletal:  Denies back pain    Neurologic:  Denies headache, new focal weakness or sensory changes       PHYSICAL EXAM:    Constitutional    /72   Pulse 90   Temp 97 °F (36.1 °C) (Temporal)   Resp 16   Ht 5' 5\" (1.651 m)   Wt 103 lb (46.7 kg)   SpO2 91%   BMI 17.14 kg/m²   General appearance: No acute disctress    EYES -   Conjunctiva normal  Pupillary exam as below, see CN exam in the neurologic exam  ENT-    No scars, masses, or lesions over external nose or ears  Oropharynx - intubated  Cardiovascular -   No clubbing, cyanosis, or edema   Musculoskeletal    No significant wasting of muscles noted  Gait as below, see gait exam in the neurologic exam  Muscle strength, tone, stability as below see the motor exam in the neurologic exam.   No bony deformities  Skin    Warm, dry, and intact to inspection and palpation. No rash, erythema, or pallor        NEUROLOGICAL EXAM     Mental status    []? Awake, alert, oriented   []? Affect attention and concentration appear appropriate  []? Recent and remote memory appears unremarkable  []? Speech normal without dysarthria or aphasia, comprehension and repetition intact. COMMENTS:  Eyes open, alert, following commands, speech improving. Cranial Nerves []?  No VF deficit to confrontation,  optic discs normal, no papilledema on fundoscopic exam.  []? PERRLA, EOMI, no nystagmus, conjugate eye movements, no ptosis  []? Face symmetric  []? Facial sensation intact  []? Tongue midline no atrophy or fasciculations present  []? Palate midline, hearing to finger rub normal  []? Shoulder shrug and SCM testing normal  COMMENTS: Left Pupil reactive, right pupil misshapen likely surgical, face appears sym, EOM intact    Motor   []? 5/5 strength x 4 extremities  [x]? Normal bulk and tone  [x]? No tremor present  [x]? No rigidity or bradykinesia noted  COMMENTS:SMAE noted   Sensory  []? Sensation intact to light touch, pin prick, vibration, and proprioception BLE  []? Sensation intact to light touch, pin prick, vibration, and proprioception BUE  COMMENTS: Limtied   Coordination []? FTN normal bilaterally   []? HTS normal bilaterally  []? JIA normal.   COMMENTS: Limited    Reflexes  [x]? Symmetric and non-pathological  [x]? Toes downgoing bilaterally  [x]? No clonus present  COMMENTS:   Gait                  []? Normal steady gait    []? Ataxic    []? Spastic     []? Magnetic     []? Shuffling  [x]? Not assessed  COMMENTS:        LABS/IMAGING:    CT HEAD WO CONTRAST    Result Date: 11/12/2021  CT HEAD WO CONTRAST 11/12/2021 11:25 AM HISTORY: Code stroke COMPARISON: None DOSE LENGTH PRODUCT: 961 mGy cm TECHNIQUE: Helical tomographic images of the brain were obtained without the use of intravenous contrast. Automated exposure control was also utilized to decrease patient radiation dose. FINDINGS: Exam is limited by motion artifact. There is no evidence of evolving large vascular territory infarct. Underlying chronic small vessel ischemic change. No visualized intra-axial or extra-axial hemorrhage. No mass lesion is identified. Normal size and configuration of the ventricular system. The basal cisterns are symmetric. Posterior fossa structures are unremarkable. The included orbits and their contents are unremarkable. Chronic right maxillary sinus disease.  The visualized osseous Female. Code stroke TECHNIQUE: Multiple CT images were obtained of the of the head and neck after the administration of IV contrast. 3D MIP reformats were generated  and were sent to PACS for interpretation. Grading of carotid artery stenosis is performed by NASCET criteria. FINDINGS: CT Neck Angiogram: There is a conventional aortic arch with patent origins of the brachiocephalic trunk, left common carotid and left subclavian arteries. The bilateral common carotid arteries and subclavian arteries are well-opacified. There is atherosclerotic disease of the bilateral carotid bifurcations, left greater than than right, with essentially 0% stenosis. The bilateral internal carotid arteries are otherwise well opacified throughout. The bilateral vertebral arteries are well-opacified throughout. Additional findings: Emphysema. New left upper lobe opacities posteriorly, most concerning for infection. Additional right upper lobe opacity is also concerning for infection. Centrally necrotic right upper and lower lobe posterior mass, incompletely visualized but appears grossly stable when compared to prior examination. Small bilateral pleural effusions. CT Head Angiogram: The right internal carotid artery demonstrates normal course and caliber without evidence of flow limiting stenosis or occlusion. The major branch vessels to the right anterior and middle cerebral arteries are seen without evidence of stenosis or occlusion. There is no evidence of aneurysm or vascular malformation. Some atherosclerotic disease in the carotid siphon. The left internal carotid artery demonstrates normal course and caliber without evidence of flow limiting stenosis or occlusion. The major branch vessels to the left anterior and middle cerebral arteries are seen without evidence of stenosis or occlusion. There is no evidence of aneurysm or vascular malformation. Some atherosclerotic disease in the carotid siphon.  Evaluation of the posterior circulation demonstrates normal caliber of the vertebral arteries, basilar artery, and major branch vessels of the posterior cerebral arteries bilaterally without evidence of flow limiting stenosis or occlusion. There is no evidence of aneurysm or vascular malformation. CT Angiography Of The Neck With Intravenous Contrast 1. No evidence of high-grade arterial stenosis or underlying dissection. 2. Bilateral upper lobe new opacities are concerning for infection. CT Angiography Of The Head With Intravenous Contrast 1. No evidence of acute thrombotic occlusion, high-grade arterial stenosis, or focal cerebral aneurysm. Signed by Dr Angi Lawton      Lab Results   Component Value Date    WBC 6.2 12/03/2021    HGB 9.0 (L) 12/03/2021    HCT 31.0 (L) 12/03/2021    .3 (H) 12/03/2021     12/03/2021     Lab Results   Component Value Date     (H) 12/03/2021    K 4.1 12/03/2021     12/03/2021    CO2 34 (H) 12/03/2021    BUN 17 12/03/2021    CREATININE 0.2 (L) 12/03/2021    GLUCOSE 86 12/03/2021    CALCIUM 8.8 12/03/2021    PROT 5.7 (L) 12/03/2021    LABALBU 2.8 (L) 12/03/2021    BILITOT <0.2 12/03/2021    ALKPHOS 72 12/03/2021    AST 14 12/03/2021    ALT 18 12/03/2021    LABGLOM >60 12/03/2021    GFRAA >59 12/03/2021    AGRATIO 1.4 05/01/2020    GLOB 3.5 02/25/2021     Lab Results   Component Value Date    INR 1.13 11/29/2021    INR 1.18 11/12/2021    INR 0.96 05/16/2019    PROTIME 14.7 (H) 11/29/2021    PROTIME 15.2 (H) 11/12/2021    PROTIME 12.2 05/16/2019       RECORD REVIEW:   Previous medical records, medications were reviewed at today's visit. Nursing/physician notes, imaging, labs and vitals reviewed. PT,OT and/or speech notes reviewed      ASSESSMENT:  74 y.o. admitted with AMS, hypoxia, pneumonia, lung cancer history, COPD, right sided weakness, slurred speech, MRI consistent with scattered bi-hemishperic strokes, likely embolic vs watershed. CTAs negative. ECHO, CD negative.  EEG with slowing, nothing epileptiform. Exam stable / improved, more alert, speech better, following commands.       PLAN:  1. Supportive care   2. Follow Tele   3. Continue addressing medical issues, pneumonia, respiratory failure -pulmonology / ID following. 4.  ASA, statin, Eliquis. 5.  PT, OT, ST  6. DVT proph   7. Limit sedating medications, supplementing thiamine  8. Palliative care following      Please feel free to call with any questions. [phone] (cell phone).     Rubens Sherman DO  Board Certified Neurology

## 2021-12-04 NOTE — PROGRESS NOTES
Pulmonary and Critical Care Progress note. Barbara Pat    MRN# [id number]    Acct# [de-identified]  12/4/2021   1:38 PM CST    Referring Flora Webb MD      Chief Complaint: History of lung cancer respiratory failure    HPI: She is dated. Sitting up in a chair denies any complaints.     Medications    midodrine, 10 mg, Oral, TID WC    ipratropium-albuterol, 1 ampule, Inhalation, Q4H WA    apixaban, 5 mg, Oral, BID    nicotine, 1 patch, TransDERmal, Daily    famotidine, 20 mg, Oral, Daily    chlorhexidine, 15 mL, Mouth/Throat, BID    citalopram, 20 mg, Oral, Daily    aspirin, 81 mg, Oral, Daily **OR** aspirin, 300 mg, Rectal, Daily    atorvastatin, 40 mg, Oral, Nightly    thiamine, 100 mg, IntraVENous, Daily     Review of Systems:      Physical Exam:  /72   Pulse 90   Temp 97 °F (36.1 °C) (Temporal)   Resp 16   Ht 5' 5\" (1.651 m)   Wt 103 lb (46.7 kg)   SpO2 90%   BMI 17.14 kg/m²     Intake/Output Summary (Last 24 hours) at 12/4/2021 1221  Last data filed at 12/4/2021 1013  Gross per 24 hour   Intake 450 ml   Output 550 ml   Net -100 ml       General appearance: Elderly white female who is in no distress   HEENT: Endotracheal tube in place  Heart: S1-S2 distant sounds no murmurs  Lungs: Diminished bilaterally no rubs or chest wall tenderness or dullness to percussion  Abdomen: Soft nontender no organomegalies normal bowel sounds  Extremities: No clubbing cyanosis or edema  Neuro: No focal findings  Skin: Intact    Recent Labs     12/02/21  0310 12/03/21  0525   WBC 9.8 6.2   RBC 3.06* 3.00*   HGB 9.2* 9.0*   HCT 31.2* 31.0*    264   .0* 103.3*   MCH 30.1 30.0   MCHC 29.5* 29.0*   RDW 16.4* 16.9*      Recent Labs     12/02/21  0310 12/03/21  0525   * 146*   K 3.8 4.1    105   CO2 31* 34*   BUN 14 17   CREATININE 0.2* 0.2*   CALCIUM 9.0 8.8   GLUCOSE 93 86      No results for input(s): PHART, XSM8XZO, PO2ART, LZD2FXA, H9JWGPYD, BEART in the last 72 hours. Recent Labs     12/03/21  0525   AST 14   ALT 18   ALKPHOS 72   BILITOT <0.2   MG 2.2   CALCIUM 8.8     No results for input(s): BC, LABGRAM, CULTRESP, BFCX in the last 72 hours. Radiograph: XR CHEST PORTABLE    Result Date: 11/28/2021  1. Mild Improvement in the radiograph appearance of the chest with decreasing bilateral infiltrates. Signed by Dr Leonora Naranjo    XR CHEST PORTABLE    Result Date: 11/24/2021  1. . Underlying emphysema. There is volume loss on the right with a cavitary lesion in the right upper lobe and a pleurodiaphragmatic adhesion in the right base. 2. Predominantly interstitial process within the left lung either representing an interstitial pneumonia or edema shows interval improvement. 3. No interval line changes. Signed by Dr Thao Covarrubias    Result Date: 11/22/2021  1. Stable radiographic appearance the chest bilateral infiltrative opacities. Signed by Dr Rula Millna    Result Date: 11/22/2021  No evidence of pulmonary embolism. Pulmonary arterial hypertension. Evidence of right heart strain. Extensive chronic emphysematous lung changes with superimposed acute infiltrate as detailed above. Bilateral pleural effusion which was not seen in the previous study. Complete atelectasis of the right upper lobe anterior segment which was not seen in the previous study. Persistent cavitating lesion in the right upper lobe posterior segment. Endotracheal tube and nasogastric tubes in place. A right subclavian approach MediPort system in place.  Signed by Dr Faye Clay       My radiograph interpretation/independent review of imaging: Reviewed chest x-ray is improved    Problem list generated by [facility]:  Hospital Problems           Last Modified POA    Acute hypoxemic respiratory failure ([address] 11/12/2021 Yes    Stroke of unknown etiology ([address] 11/12/2021 Yes    Palliative care patient 11/23/2021 Yes    Severe malnutrition ([address] 11/16/2021 Yes    Altered mental status 11/16/2021 Yes    Centrilobular emphysema ([facility] 11/23/2021 Yes    Primary squamous cell carcinoma of right lung ([facility] 11/23/2021 Yes    Pleural effusion 11/29/2021 Yes    Atelectasis of left lung 11/29/2021 Yes           Pulmonary Assessment/Plan:     1. Acute hypoxic respiratory failure improved, on NC oxygen back to baseline  2. Hypotension resolved. 3. Bronchoscopy done. No evidence of endobronchial obstruction. 4. Bilateral pleural effusions improved on chest x-ray. 5. Possible stroke followed by neurology. 6. DVT prophylaxis. 7. Dc planning. She is scheduled to go to inpatient rehab next week. 8. We will sign off at this time. Please recall if necessary       Joseph Salgado MD, [facility], [facility]    The above note was generated using voice recognition software. Inadvertent typographical errors in transcription may have occurred.       Electronically signed by Joseph Salgado MD on 12/04/21 at 12:21 PM

## 2021-12-04 NOTE — PROGRESS NOTES
Physical Therapy  Name: Fuad Dinh  MRN:  [id number]  Date of service:  12/4/2021 12/04/21 1113   Restrictions/Precautions   Restrictions/Precautions Fall Risk   General   Chart Reviewed Yes   Subjective   Subjective Pt up to recliner, agrees to work with therapy. Pain Screening   Patient Currently in Pain Denies   Oxygen Therapy   SpO2 90 %   O2 Device Nasal cannula   O2 Flow Rate (L/min) 5 L/min   Bed Mobility   Sit to Supine Stand by assistance   Transfers   Sit to Stand Contact guard assistance   Stand to sit Contact guard assistance   Ambulation   Ambulation? Yes   Ambulation 1   Surface level tile   Device Rolling Walker   Other Apparatus O2   Assistance Contact guard assistance   Quality of Gait steady with RW   Gait Deviations Slow Marisel   Distance 100'   Exercises   Hip Flexion 10   Hip Abduction 10   Knee Long Arc Quad 10   Ankle Pumps 10   Comments Seated BLE ther ex AROM   Short term goals   Time Frame for Short term goals 14 DAYS   Short term goal 1 BED MOB SBA   Short term goal 2 TRANSFERS SBA   Short term goal 3 ' AAD SBA   Conditions Requiring Skilled Therapeutic Intervention   Body structures, Functions, Activity limitations Decreased functional mobility ; Decreased ADL status; Decreased strength; Decreased endurance; Decreased balance; Decreased posture   Assessment Pt tolerated increased amb distance well, steady overall with rwx but slow and guarded. Pt tolerates seated BLE ther ex well. Declined back to the chair, positioned for comfort in supine with all needs in reach.    Activity Tolerance   Activity Tolerance Patient Tolerated treatment well   Safety Devices   Type of devices Bed alarm in place; Call light within reach; Gait belt; Left in bed  (bed alarm not working, personal alarm applied)         Electronically signed by Kiesha Burns PTA on 12/4/2021 at 11:16 AM

## 2021-12-04 NOTE — PROGRESS NOTES
Hospitalist Progress Note  [facility]     Patient: Tia Moctezuma  : 1955  MRN: [id number]  Code Status: [facility]    Hospital Day:    Date of Service: 2021    Subjective:   Patient seen and examined. No current complaints. 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     COPD (chronic obstructive pulmonary disease) (HCC)     Depression     Emphysema (subcutaneous) (surgical) resulting from a procedure     History of lung biopsy 2019    Malignant neoplasm of right main bronchus ([address] 2019    NSCLC, SCC [id number] stage IIIb    Palliative care patient 2021    Syncope     TIA (transient ischemic attack)        Past Surgical History:   Procedure Laterality Date    APPENDECTOMY      BACK SURGERY      times two    BLADDER SUSPENSION      CHOLECYSTECTOMY      HYSTERECTOMY      INCONTINENCE SURGERY         Family History   Problem Relation Age of Onset    Colon Cancer Mother     High Blood Pressure Brother     Diabetes Brother        Social History     Socioeconomic History    Marital status:      Spouse name: Not on file    Number of children: Not on file    Years of education: Not on file    Highest education level: Not on file   Occupational History    Not on file   Tobacco Use    Smoking status: Current Every Day Smoker    Smokeless tobacco: Never Used   Substance and Sexual Activity    Alcohol use: Not Currently    Drug use: Never    Sexual activity: Not on file   Other Topics Concern    Not on file   Social History Narrative    Not on file     Social Determinants of Health     Financial Resource Strain:     Difficulty of Paying Living Expenses: Not on file   Food Insecurity:     Worried About Running Out of Food in the Last Year: Not on file    Trisha of Food in the Last Year: Not on file   Transportation Needs:     Lack of Transportation (Medical):  Not on file    Lack of Transportation (Non-Medical):  Not on file   Physical Activity:     Days of Exercise per Week: Not on file    Minutes of Exercise per Session: Not on file   Stress:     Feeling of Stress : Not on file   Social Connections:     Frequency of Communication with Friends and Family: Not on file    Frequency of Social Gatherings with Friends and Family: Not on file    Attends Christian Services: Not on file    Active Member of Clubs or Organizations: Not on file    Attends Club or Organization Meetings: Not on file    Marital Status: Not on file   Intimate Partner Violence:     Fear of Current or Ex-Partner: Not on file    Emotionally Abused: Not on file    Physically Abused: Not on file    Sexually Abused: Not on file   Housing Stability:     Unable to Pay for Housing in the Last Year: Not on file    Number of [place] in the Last Year: Not on file    Unstable Housing in the Last Year: Not on file       Current Facility-Administered Medications   Medication Dose Route Frequency Provider Last Rate Last Admin    midodrine (PROAMATINE) tablet 10 mg  10 mg Oral TID  Isaak Thao MD   10 mg at 12/04/21 0953    ipratropium-albuterol (DUONEB) nebulizer solution 1 ampule  1 ampule Inhalation Q4H WA Isaak Thao MD   1 ampule at 12/04/21 1042    oxyCODONE-acetaminophen (PERCOCET) 5-325 MG per tablet 1 tablet  1 tablet Oral Q6H PRN Royer Tariq PA-C   1 tablet at 12/04/21 0955    lactated ringers infusion   IntraVENous Continuous Isaak Thao MD 30 mL/hr at 12/01/21 0955 Rate Change at 12/01/21 0955    apixaban (ELIQUIS) tablet 5 mg  5 mg Oral BID Isaak Thao MD   5 mg at 12/04/21 0949    nicotine (NICODERM CQ) 21 MG/24HR 1 patch  1 patch TransDERmal Daily Nicholas Garrison MD   1 patch at 12/04/21 0949    famotidine (PEPCID) tablet 20 mg  20 mg Oral Daily Nicholas Garrison MD   20 mg at 12/04/21 0949    chlorhexidine (PERIDEX) 0.12 % solution 15 mL  15 mL Mouth/Throat BID Rosanna MALDONADO MD Nadine   15 mL at 12/04/21 0950    potassium chloride (KLOR-CON M) extended release tablet 40 mEq  40 mEq Oral PRN Laila Gomez MD        Or    potassium bicarb-citric acid (EFFER-K) effervescent tablet 40 mEq  40 mEq Oral PRN Laila Gomez MD   40 mEq at 11/24/21 0502    Or    potassium chloride 10 mEq/100 mL IVPB (Peripheral Line)  10 mEq IntraVENous PRN Laila Gomez MD   Stopped at 11/17/21 1410    citalopram (CELEXA) tablet 20 mg  20 mg Oral Daily Berniece [place], DO   20 mg at 12/04/21 5825    ondansetron (ZOFRAN-ODT) disintegrating tablet 4 mg  4 mg Oral Q8H PRN Berniece Rik, DO        Or    ondansetron TELEVA [facility]) injection 4 mg  4 mg IntraVENous Q6H PRN Berniece [place], DO   4 mg at 11/13/21 0758    polyethylene glycol (GLYCOLAX) packet 17 g  17 g Oral Daily PRN Berniece [place], DO        aspirin EC tablet 81 mg  81 mg Oral Daily Berniece Rik, DO   81 mg at 12/04/21 0049    Or    aspirin suppository 300 mg  300 mg Rectal Daily Berniece [place], DO   300 mg at 11/14/21 5991    atorvastatin (LIPITOR) tablet 40 mg  40 mg Oral Nightly Berniece [place], DO   40 mg at 12/03/21 2039    labetalol (NORMODYNE;TRANDATE) injection 10 mg  10 mg IntraVENous Q10 Min PRN Berniece Rik, DO        morphine (PF) injection 2 mg  2 mg IntraVENous Q3H PRN Laila Gomez MD   2 mg at 11/30/21 2355    thiamine (B-1) injection 100 mg  100 mg IntraVENous Daily Nelson Morita, DO   100 mg at 12/04/21 0948         lactated ringers 30 mL/hr at 12/01/21 0955        Objective:   /72   Pulse 90   Temp 97 °F (36.1 °C) (Temporal)   Resp 16   Ht 5' 5\" (1.651 m)   Wt 103 lb (46.7 kg)   SpO2 90%   BMI 17.14 kg/m²     General: cachectic  HEENT: normocephalic, atraumatic  Neck: supple, symmetrical, trachea midline   Lungs: improving rhonchi  Cardiovascular: s1 and s2 normal  Abdomen: soft, positive bowel sounds  Extremities: no edema or cyanosis   Neuro: aaox3, moving all 4 extremities  Skin: normal color and texture     Recent Labs     12/02/21 0310 12/03/21  0525   WBC 9.8 6.2   RBC 3.06* 3.00*   HGB 9.2* 9.0*   HCT 31.2* 31.0*   .0* 103.3*   MCH 30.1 30.0   MCHC 29.5* 29.0*    264     Recent Labs     12/02/21 0310 12/03/21  0525   * 146*   K 3.8 4.1   ANIONGAP 10 7    105   CO2 31* 34*   BUN 14 17   CREATININE 0.2* 0.2*   GLUCOSE 93 86   CALCIUM 9.0 8.8     Recent Labs     12/02/21 0310 12/03/21  0525   MG 2.1 2.2     Recent Labs     12/02/21 0310 12/03/21  0525   AST 14 14   ALT 21 18   BILITOT <0.2 <0.2   ALKPHOS 70 72     No results for input(s): PH, PO2, PCO2, HCO3, BE, O2SAT in the last 72 hours. No results for input(s): TROPONINI in the last 72 hours. No results for input(s): INR in the last 72 hours. No results for input(s): LACTA in the last 72 hours. Intake/Output Summary (Last 24 hours) at 12/4/2021 1127  Last data filed at 12/4/2021 1013  Gross per 24 hour   Intake 450 ml   Output 550 ml   Net -100 ml       No results found.      Assessment and Plan:   Bihemispheric strokes  Neurology following  Meds per neurology  Neurochecks  PT/OT/SLP  Eliquis per neuro recs for likely embolic stroke  Social work following for placement options     Pseudomonas/Corynebacterium pneumonia  Completed course of Zosyn per ID     Ventilator dependent acute respiratory failure  Extubated 11/30/2021  CTA chest 11/22/2021: No evidence of PE  S/p bronch 11/29/2021, report on file, no evidence of endobronchial obstruction  Currently on 5 L NC  Requires 2 L at baseline     History of lung cancer     Tobacco dependence     Severe protein calorie malnutrition  Dietitian following     DVT prophylaxis  Eliquis     Extensive discussions with patient's brother Alexandr Rose 11/20/2021 in regards to current clinical state/goals of care.  All questions sought and answered. Arabella Rogers will attempt to have patient's sons Zafar Freeman and Everett) visit the hospital to further discuss.  Palliative following.     Patient's son Enrrique Fry) requested DNR status on 11/20/2021.  All questions sought and answered.  Unit staff witnessed the entire conversation.     Vielka Dominguez MD   12/4/2021 11:27 AM

## 2021-12-05 NOTE — PROGRESS NOTES
[facility] Neurology Progress Note      Patient:   Gloria Nickerson  MR#:    [id number]   Room:    8021/531-07   YOB: 1955  Date of Progress Note: 12/5/2021  Time of Note                           10:44 AM  Consulting Physician:  Wilberto Kelly DO  Attending Physician:  Arline Chawla MD      INTERVAL HISTORY: Continues to improve, speech better, following commands, no acute events overnight, no new focal complaints this am, unchanged. REVIEW OF SYSTEMS:    Constitutional:  Denies fever    Eyes:  Denies change in visual acuity   Cardiovascular:  Denies chest pain   GI:  Denies abdominal pain  Musculoskeletal:  Denies back pain    Neurologic:  Denies headache, new focal weakness or sensory changes       PHYSICAL EXAM:    Constitutional    /69   Pulse 88   Temp 97.7 °F (36.5 °C)   Resp 16   Ht 5' 5\" (1.651 m)   Wt 99 lb 6.4 oz (45.1 kg)   SpO2 92%   BMI 16.54 kg/m²   General appearance: No acute disctress    EYES -   Conjunctiva normal  Pupillary exam as below, see CN exam in the neurologic exam  ENT-    No scars, masses, or lesions over external nose or ears  Oropharynx - intubated  Cardiovascular -   No clubbing, cyanosis, or edema   Musculoskeletal    No significant wasting of muscles noted  Gait as below, see gait exam in the neurologic exam  Muscle strength, tone, stability as below see the motor exam in the neurologic exam.   No bony deformities  Skin    Warm, dry, and intact to inspection and palpation. No rash, erythema, or pallor        NEUROLOGICAL EXAM     Mental status    []? Awake, alert, oriented   []? Affect attention and concentration appear appropriate  []? Recent and remote memory appears unremarkable  []? Speech normal without dysarthria or aphasia, comprehension and repetition intact. COMMENTS:  Eyes open, alert, following commands, speech improving. Cranial Nerves []?  No VF deficit to confrontation,  optic discs normal, no papilledema on fundoscopic visualized osseous structures and overlying soft tissues of the skull and face are unremarkable. 1. No acute intracranial process. Signed by Dr Marleni Stoner    XR CHEST PORTABLE    Result Date: 11/12/2021  XR CHEST PORTABLE 11/12/2021 11:36 AM HISTORY: Code stroke  Technique: Single AP view of the chest COMPARISONS: Chest exam dated 4/29/2020 FINDINGS: Reidentified right suprahilar consolidation with volume loss. New developing consolidations in the left upper and left lower lobes with air bronchograms. No obvious pleural effusion. Underlying lung emphysema. Heart size is stable. The pulmonary vasculature are nondilated. Right chest wall [id number]. Spinal scoliotic curvature. 1. New multifocal consolidation in the left upper and left lower lobes, appearance concerning for new multifocal pneumonia. 2. Treatment-related changes in the right suprahilar region with scarring and volume loss. Signed by Dr Cain Bailey    Result Date: 11/12/2021  EXAM: CT NECK ANGIOGRAM CT HEAD ANGIOGRAM on  11/12/2021 12:54 PM COMPARISON:  CT chest dated August 23, 2021. HISTORY:  74years-old Female. Code stroke TECHNIQUE: Multiple CT images were obtained of the of the head and neck after the administration of IV contrast. 3D MIP reformats were generated  and were sent to PACS for interpretation. Grading of carotid artery stenosis is performed by NASCET criteria. FINDINGS: CT Neck Angiogram: There is a conventional aortic arch with patent origins of the brachiocephalic trunk, left common carotid and left subclavian arteries. The bilateral common carotid arteries and subclavian arteries are well-opacified. There is atherosclerotic disease of the bilateral carotid bifurcations, left greater than than right, with essentially 0% stenosis. The bilateral internal carotid arteries are otherwise well opacified throughout. The bilateral vertebral arteries are well-opacified throughout.   Additional findings: posterior circulation demonstrates normal caliber of the vertebral arteries, basilar artery, and major branch vessels of the posterior cerebral arteries bilaterally without evidence of flow limiting stenosis or occlusion. There is no evidence of aneurysm or vascular malformation. CT Angiography Of The Neck With Intravenous Contrast 1. No evidence of high-grade arterial stenosis or underlying dissection. 2. Bilateral upper lobe new opacities are concerning for infection. CT Angiography Of The Head With Intravenous Contrast 1. No evidence of acute thrombotic occlusion, high-grade arterial stenosis, or focal cerebral aneurysm. Signed by Dr Kvng Maloney      Lab Results   Component Value Date    WBC 6.2 12/05/2021    HGB 8.8 (L) 12/05/2021    HCT 29.9 (L) 12/05/2021    .4 (H) 12/05/2021     12/05/2021     Lab Results   Component Value Date     12/05/2021    K 3.7 12/05/2021     12/05/2021    CO2 30 (H) 12/05/2021    BUN 18 12/05/2021    CREATININE 0.2 (L) 12/05/2021    GLUCOSE 96 12/05/2021    CALCIUM 9.0 12/05/2021    PROT 6.1 (L) 12/05/2021    LABALBU 3.0 (L) 12/05/2021    BILITOT <0.2 12/05/2021    ALKPHOS 72 12/05/2021    AST 13 12/05/2021    ALT 16 12/05/2021    LABGLOM >60 12/05/2021    GFRAA >59 12/05/2021    AGRATIO 1.4 05/01/2020    GLOB 3.5 02/25/2021     Lab Results   Component Value Date    INR 1.13 11/29/2021    INR 1.18 11/12/2021    INR 0.96 05/16/2019    PROTIME 14.7 (H) 11/29/2021    PROTIME 15.2 (H) 11/12/2021    PROTIME 12.2 05/16/2019       RECORD REVIEW:   Previous medical records, medications were reviewed at today's visit. Nursing/physician notes, imaging, labs and vitals reviewed. PT,OT and/or speech notes reviewed      ASSESSMENT:  74 y.o. admitted with AMS, hypoxia, pneumonia, lung cancer history, COPD, right sided weakness, slurred speech, MRI consistent with scattered bi-hemishperic strokes, likely embolic vs watershed. CTAs negative. ECHO, CD negative.  EEG with slowing, nothing epileptiform. Exam stable / improved, more alert, speech better, following commands, unchanged overnight.        PLAN:  1. Supportive care   2. Follow Tele   3. Continue addressing medical issues, pneumonia, respiratory failure -pulmonology / ID following. 4.  ASA, statin, Eliquis. 5.  PT, OT, ST  6. DVT proph   7. Limit sedating medications, supplementing thiamine  8. Palliative care following    Will sign off, call if needed. Please feel free to call with any questions. [phone] (cell phone).     Toni Milton DO  Board Certified Neurology

## 2021-12-05 NOTE — PROGRESS NOTES
Hospitalist Progress Note  [facility]     Patient: Caesar Jacobs  : 1955  MRN: [id number]  Code Status: [facility]    Hospital Day:    Date of Service: 2021    Subjective:   Patient seen and examined. No current complaints. 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     COPD (chronic obstructive pulmonary disease) (HCC)     Depression     Emphysema (subcutaneous) (surgical) resulting from a procedure     History of lung biopsy 2019    Malignant neoplasm of right main bronchus ([address] 2019    NSCLC, SCC [id number] stage IIIb    Palliative care patient 2021    Syncope     TIA (transient ischemic attack)        Past Surgical History:   Procedure Laterality Date    APPENDECTOMY      BACK SURGERY      times two    BLADDER SUSPENSION      CHOLECYSTECTOMY      HYSTERECTOMY      INCONTINENCE SURGERY         Family History   Problem Relation Age of Onset    Colon Cancer Mother     High Blood Pressure Brother     Diabetes Brother        Social History     Socioeconomic History    Marital status:      Spouse name: Not on file    Number of children: Not on file    Years of education: Not on file    Highest education level: Not on file   Occupational History    Not on file   Tobacco Use    Smoking status: Current Every Day Smoker    Smokeless tobacco: Never Used   Substance and Sexual Activity    Alcohol use: Not Currently    Drug use: Never    Sexual activity: Not on file   Other Topics Concern    Not on file   Social History Narrative    Not on file     Social Determinants of Health     Financial Resource Strain:     Difficulty of Paying Living Expenses: Not on file   Food Insecurity:     Worried About Running Out of Food in the Last Year: Not on file    Trisha of Food in the Last Year: Not on file   Transportation Needs:     Lack of Transportation (Medical):  Not on file    Lack of Transportation (Non-Medical):  Not on file   Physical Activity:     Days of Exercise per Week: Not on file    Minutes of Exercise per Session: Not on file   Stress:     Feeling of Stress : Not on file   Social Connections:     Frequency of Communication with Friends and Family: Not on file    Frequency of Social Gatherings with Friends and Family: Not on file    Attends Gnosticist Services: Not on file    Active Member of Clubs or Organizations: Not on file    Attends Club or Organization Meetings: Not on file    Marital Status: Not on file   Intimate Partner Violence:     Fear of Current or Ex-Partner: Not on file    Emotionally Abused: Not on file    Physically Abused: Not on file    Sexually Abused: Not on file   Housing Stability:     Unable to Pay for Housing in the Last Year: Not on file    Number of [place] in the Last Year: Not on file    Unstable Housing in the Last Year: Not on file       Current Facility-Administered Medications   Medication Dose Route Frequency Provider Last Rate Last Admin    midodrine (PROAMATINE) tablet 10 mg  10 mg Oral TID  Keshawn Varghese MD   10 mg at 12/05/21 1016    ipratropium-albuterol (DUONEB) nebulizer solution 1 ampule  1 ampule Inhalation Q4H [address] MD   1 ampule at 12/05/21 1020    oxyCODONE-acetaminophen (PERCOCET) 5-325 MG per tablet 1 tablet  1 tablet Oral Q6H PRN Esperanza Molina PA-C   1 tablet at 12/05/21 1019    apixaban (ELIQUIS) tablet 5 mg  5 mg Oral BID Keshawn Varghese MD   5 mg at 12/05/21 1016    nicotine (NICODERM CQ) 21 MG/24HR 1 patch  1 patch TransDERmal Daily Basilio Portillo MD   1 patch at 12/05/21 1016    famotidine (PEPCID) tablet 20 mg  20 mg Oral Daily Basilio Portillo MD   20 mg at 12/05/21 1016    chlorhexidine (PERIDEX) 0.12 % solution 15 mL  15 mL Mouth/Throat BID Harper Oglesby MD   15 mL at 12/05/21 1018    potassium chloride (KLOR-CON M) extended release tablet 40 mEq  40 mEq Oral PRN Amanda MALDONADO MD Nadine        Or    potassium bicarb-citric acid (EFFER-K) effervescent tablet 40 mEq  40 mEq Oral PRN Delphine Huang MD   40 mEq at 11/24/21 0502    Or    potassium chloride 10 mEq/100 mL IVPB (Peripheral Line)  10 mEq IntraVENous PRN Delphine Huang MD   Stopped at 11/17/21 1410    citalopram (CELEXA) tablet 20 mg  20 mg Oral Daily Leveda Jodi, DO   20 mg at 12/05/21 1016    ondansetron (ZOFRAN-ODT) disintegrating tablet 4 mg  4 mg Oral Q8H PRN Leveda Jodi, DO        Or    [facility]ron [facility] injection 4 mg  4 mg IntraVENous Q6H PRN Leveda Jodi, DO   4 mg at 11/13/21 0758    polyethylene glycol (GLYCOLAX) packet 17 g  17 g Oral Daily PRN Leveda Jodi, DO        aspirin EC tablet 81 mg  81 mg Oral Daily Leveda Jodi, DO   81 mg at 12/05/21 1016    Or    aspirin suppository 300 mg  300 mg Rectal Daily Leveda Jodi, DO   300 mg at 11/14/21 5153    atorvastatin (LIPITOR) tablet 40 mg  40 mg Oral Nightly Leveda Jodi, DO   40 mg at 12/04/21 2052    labetalol (NORMODYNE;TRANDATE) injection 10 mg  10 mg IntraVENous Q10 Min PRN Leveda Jodi, DO        morphine (PF) injection 2 mg  2 mg IntraVENous Q3H PRN Delphine Huang MD   2 mg at 11/30/21 2105    thiamine (B-1) injection 100 mg  100 mg IntraVENous Daily Suellyn Mink, DO   100 mg at 12/05/21 1016             Objective:   /69   Pulse 88   Temp 97.7 °F (36.5 °C)   Resp 16   Ht 5' 5\" (1.651 m)   Wt 99 lb 6.4 oz (45.1 kg)   SpO2 92%   BMI 16.54 kg/m²     General: cachectic  HEENT: normocephalic, atraumatic  Neck: supple, symmetrical, trachea midline   Lungs: improving rhonchi  Cardiovascular: s1 and s2 normal  Abdomen: soft, positive bowel sounds  Extremities: no edema or cyanosis   Neuro: aaox3, moving all 4 extremities  Skin: normal color and texture     Recent Labs     12/03/21  0525 12/05/21  0248   WBC 6.2 6.2   RBC 3.00* 2.92*   HGB 9.0* 8.8*   HCT placement options    Estephania Paul MD   12/5/2021 10:31 AM

## 2021-12-06 NOTE — PROGRESS NOTES
12/06/21 0951   General   Chart Reviewed Yes   Subjective   Subjective Patient in bed agrees to work with Therapy   General Comment   Comments Patient has Chronic COPD is on 4L O2. Consulted with Karlee Winchester,   she stated to lower  O2  to  2L for [place]. Pain Screening   Patient Currently in Pain Yes   Pain Assessment   Pain Assessment 0-10   Pain Level 7   Patient's Stated Pain Goal No pain   Pain Type Chronic pain   Pain Location Back   Pain Orientation Lower   Pain Descriptors Aching   Pain Frequency Continuous   Pain Onset On-going   Non-Pharmaceutical Pain Intervention(s) Rest   Response to Pain Intervention Patient Satisfied   Functional Pain Assessment Activities are not prevented   Oxygen Therapy   SpO2 90 %  (Post gait)   O2 Device Nasal cannula   O2 Flow Rate (L/min) 2 L/min  (Post TX)   Pre Treatment Pain Screening   Pain at present 7   Scale Used Numeric Score   Intervention List Patient able to continue with treatment   Bed Mobility   Supine to Sit Stand by assistance   Scooting Independent   Transfers   Sit to Stand Stand by assistance   Stand to sit Contact guard assistance   Ambulation   Ambulation? Yes   Ambulation 1   Device Rolling Walker   Other Apparatus O2  (2L)   Assistance Stand by assistance   Quality of Gait Steady NO LOB Cues to stay closer to RW   Gait Deviations Slow Marisel   Distance 150'   Comments Patient slightly SOA post gait. SPO2 reached 90. Dr. Jennifer Villagran stated that was good  her baseline was probably 88% or 89%   Exercises   Heelslides 10   Hip Flexion 10   Knee Long Arc Quad 10   Knee Active Range of Motion yes   Ankle Pumps 10   Activity Tolerance   Activity Tolerance Patient Tolerated treatment well   Safety Devices   Type of devices Left in chair; Call light within reach;  Chair alarm in place   Physical Therapy    Electronically signed by Delma Zavala PTA on 12/6/2021 at 10:11 AM

## 2021-12-06 NOTE — PROGRESS NOTES
Occupational Therapy     12/06/21 1213   Restrictions/Precautions   Restrictions/Precautions Fall Risk   Vision   Vision [facility]   Hearing   Hearing [facility]   General   Chart Reviewed Yes   Patient assessed for rehabilitation services? Yes   Response to previous treatment Patient with no complaints from previous session   Family / Caregiver Present No   Diagnosis Respiratory Failure   Subjective   Subjective Pt in bed upon arrival for therapy and agreeable to participate. Pt states \"I wore oxygen at home before this\". Pain Assessment   Patient Currently in Pain Denies   Oxygen Therapy   SpO2 (!) 89 %   O2 Device Nasal cannula   O2 Flow Rate (L/min) 2 L/min   Patient Observation   Observations SOB when up in standing/ standing aspects of ADLs; requires rest breaks. Social/Functional History   Lives With Son   Type of Home House   Orientation   Overall Orientation Status WFL   ADL   Feeding Independent   Grooming Supervision   UE Bathing Supervision   LE Bathing Minimal assistance   UE Dressing Setup; Stand by assistance   LE Dressing Minimal assistance   Toileting Contact guard assistance; Minimal assistance   Additional Comments Requires cues to initiate tasks; completes slowly due to SOB. Balance   Sitting Balance Stand by assistance   Standing Balance Contact guard assistance   Functional Mobility   Functional - Mobility Device Rolling Walker   Activity To/from bathroom   Assist Level Contact guard assistance   Bed mobility   Supine to Sit Stand by assistance   Sit to Supine Stand by assistance   Scooting Stand by assistance; Independent   Transfers   Sit to stand Contact guard assistance   Stand to sit Contact guard assistance   Toilet Transfers   Toilet - Technique Ambulating   Equipment Used Grab bars   Toilet Transfer Contact guard assistance   Toilet Transfers Comments States she has a low toilet at home.     Cognition   Overall Cognitive Status WFL   Assessment   Performance deficits / Impairments Decreased functional mobility ; Decreased endurance; Decreased ADL status; Decreased balance; Decreased strength   Assessment Pt progressing well; limited by SOB and requires increased rest breaks. Treatment Diagnosis Respiratory Failure   Prognosis Good   REQUIRES OT FOLLOW UP Yes   Treatment Initiated  Tx focused on sitting EOB to perform dressing techniques, toileting task in bathroom and h/g tasks at sink in standing at RW level. pt requires cues for safety with RW. Discharge Recommendations Patient would benefit from continued therapy after discharge; Home with Home health OT; Home with assist PRN   Activity Tolerance   Activity Tolerance Patient Tolerated treatment well   Safety Devices   Safety Devices in place Yes   Type of devices Bed alarm in place; Call light within reach;  Left in bed   Plan   Times per week 3-5x/week   Times per day Daily   Electronically signed by KARAN Pena on 12/6/2021 at 12:21 PM

## 2021-12-06 NOTE — PLAN OF CARE
Nutrition Problem #1: Inadequate oral intake  Intervention: Food and/or Nutrient Delivery: Continue Current Diet, Continue Oral Nutrition Supplement  Nutritional Goals: New Goal: meet nutritional needs through po intake

## 2021-12-06 NOTE — PROGRESS NOTES
Nutrition Assessment     Type and Reason for Visit: Reassess    Nutrition Assessment:  Pt improving nutritionally despite continued wt loss (7lbs, 6.6% X 1 week). Po intakes now >75% most documented recent meals. Continue current POC. Malnutrition Assessment:  Malnutrition Status: Severe malnutrition    Current Nutrition Therapies:    ADULT DIET; Regular  ADULT ORAL NUTRITION SUPPLEMENT; Lunch, Dinner; Frozen Oral Supplement    Anthropometric Measures:  · Height: 5' 5\" (165.1 cm)  · Current Body Wt: 99 lb (44.9 kg)   · BMI: 16.5    Nutrition Interventions:   Food and/or Nutrient Delivery:  Continue Current Diet, Continue Oral Nutrition Supplement   Coordination of Nutrition Care:  Continue to monitor while inpatient    Goals:  New Goal: meet nutritional needs through po intake       Nutrition Monitoring and Evaluation:   Food/Nutrient Intake Outcomes:  Food and Nutrient Intake, Supplement Intake  Physical Signs/Symptoms Outcomes:  Biochemical Data, Weight, Skin, Nutrition Focused Physical Findings, Fluid Status or Edema     Discharge Planning:     Too soon to determine     Electronically signed by Steve Ocampo MS, RD, LD on 12/6/21 at 11:56 AM CST    Contact: [phone]

## 2021-12-06 NOTE — PROGRESS NOTES
Hospitalist Progress Note  [facility]     Patient: Tami Zurita  : 1955  MRN: [id number]  Code Status: [facility] Day: 24   Date of Service: 2021    Subjective:   Patient seen and examined. No current complaints. 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     COPD (chronic obstructive pulmonary disease) (HCC)     Depression     Emphysema (subcutaneous) (surgical) resulting from a procedure     History of lung biopsy 2019    Malignant neoplasm of right main bronchus ([address] 2019    NSCLC, SCC [id number] stage IIIb    Palliative care patient 2021    Syncope     TIA (transient ischemic attack)        Past Surgical History:   Procedure Laterality Date    APPENDECTOMY      BACK SURGERY      times two    BLADDER SUSPENSION      CHOLECYSTECTOMY      HYSTERECTOMY      INCONTINENCE SURGERY         Family History   Problem Relation Age of Onset    Colon Cancer Mother     High Blood Pressure Brother     Diabetes Brother        Social History     Socioeconomic History    Marital status:      Spouse name: Not on file    Number of children: Not on file    Years of education: Not on file    Highest education level: Not on file   Occupational History    Not on file   Tobacco Use    Smoking status: Current Every Day Smoker    Smokeless tobacco: Never Used   Substance and Sexual Activity    Alcohol use: Not Currently    Drug use: Never    Sexual activity: Not on file   Other Topics Concern    Not on file   Social History Narrative    Not on file     Social Determinants of Health     Financial Resource Strain:     Difficulty of Paying Living Expenses: Not on file   Food Insecurity:     Worried About Running Out of Food in the Last Year: Not on file    Trisha of Food in the Last Year: Not on file   Transportation Needs:     Lack of Transportation (Medical):  Not on file    Lack of Transportation (Non-Medical):  Not on file   Physical Activity:     Days of Exercise per Week: Not on file    Minutes of Exercise per Session: Not on file   Stress:     Feeling of Stress : Not on file   Social Connections:     Frequency of Communication with Friends and Family: Not on file    Frequency of Social Gatherings with Friends and Family: Not on file    Attends Jehovah's witness Services: Not on file    Active Member of [address] [place] [address] or Organizations: Not on file    Attends Club or Organization Meetings: Not on file    Marital Status: Not on file   Intimate Partner Violence:     Fear of Current or Ex-Partner: Not on file    Emotionally Abused: Not on file    Physically Abused: Not on file    Sexually Abused: Not on file   Housing Stability:     Unable to Pay for Housing in the Last Year: Not on file    Number of [place] in the Last Year: Not on file    Unstable Housing in the Last Year: Not on file       Current Facility-Administered Medications   Medication Dose Route Frequency Provider Last Rate Last Admin    acetaminophen (TYLENOL) tablet 650 mg  650 mg Oral Q6H PRN Medina Baxter PA-C        ibuprofen (ADVIL;MOTRIN) tablet 400 mg  400 mg Oral Q6H PRN Medina Baxter PA-C        midodrine (PROAMATINE) tablet 10 mg  10 mg Oral TID  Rod Taylor MD   10 mg at 12/06/21 0944    ipratropium-albuterol (DUONEB) nebulizer solution 1 ampule  1 ampule Inhalation Q4H WA Rod Taylor MD   1 ampule at 12/06/21 1126    oxyCODONE-acetaminophen (PERCOCET) 5-325 MG per tablet 1 tablet  1 tablet Oral Q6H PRN Medina Baxter PA-C   1 tablet at 12/06/21 0944    apixaban (ELIQUIS) tablet 5 mg  5 mg Oral BID Rod Taylor MD   5 mg at 12/06/21 0946    nicotine (NICODERM CQ) 21 MG/24HR 1 patch  1 patch TransDERmal Daily Heidy Cho MD   1 patch at 12/06/21 0945    famotidine (PEPCID) tablet 20 mg  20 mg Oral Daily Heidy Cho MD   20 mg at 12/06/21 0944    chlorhexidine (PERIDEX) 0.12 % solution 15 mL  15 mL Mouth/Throat BID Moses Flood MD   15 mL at 12/06/21 0945    potassium chloride (KLOR-CON M) extended release tablet 40 mEq  40 mEq Oral PRN Moses Flood MD        Or    potassium bicarb-citric acid (EFFER-K) effervescent tablet 40 mEq  40 mEq Oral PRN Moses Flood MD   40 mEq at 11/24/21 0502    Or    potassium chloride 10 mEq/100 mL IVPB (Peripheral Line)  10 mEq IntraVENous PRN Moses Flood MD   Stopped at 11/17/21 1410    citalopram (CELEXA) tablet 20 mg  20 mg Oral Daily Centeno Snare, DO   20 mg at 12/06/21 0944    ondansetron (ZOFRAN-ODT) disintegrating tablet 4 mg  4 mg Oral Q8H PRN Centeno Snare, DO        Or    ondansetron [facility]) injection 4 mg  4 mg IntraVENous Q6H PRN Centeno Snare, DO   4 mg at 11/13/21 0758    polyethylene glycol (GLYCOLAX) packet 17 g  17 g Oral Daily PRN Centeno Snare, DO        aspirin EC tablet 81 mg  81 mg Oral Daily Centeno Snare, DO   81 mg at 12/06/21 6835    Or    aspirin suppository 300 mg  300 mg Rectal Daily Centeno Snare, DO   300 mg at 11/14/21 5966    atorvastatin (LIPITOR) tablet 40 mg  40 mg Oral Nightly Centeno Snare, DO   40 mg at 12/05/21 2014    labetalol (NORMODYNE;TRANDATE) injection 10 mg  10 mg IntraVENous Q10 Min PRN Centeno Snare, DO        morphine (PF) injection 2 mg  2 mg IntraVENous Q3H PRN Moses Flood MD   2 mg at 12/05/21 2015    thiamine (B-1) injection 100 mg  100 mg IntraVENous Daily Haley Doc, DO   100 mg at 12/06/21 0945             Objective:   BP 94/63   Pulse 105   Temp 97.6 °F (36.4 °C) (Temporal)   Resp 18   Ht 5' 5\" (1.651 m)   Wt 99 lb 6.4 oz (45.1 kg)   SpO2 (!) 89%   BMI 16.54 kg/m²     General: cachectic  HEENT: normocephalic, atraumatic  Neck: supple, symmetrical, trachea midline   Lungs: improving rhonchi  Cardiovascular: s1 and s2 normal  Abdomen: soft, positive bowel sounds  Extremities: no edema or cyanosis   Neuro: aaox3, moving all 4 extremities  Skin: normal color and texture     Recent Labs     12/05/21  0248 12/06/21  0236 12/06/21  0332   WBC 6.2 6.9 8.0   RBC 2.92* 2.96* 3.16*   HGB 8.8* 8.8* 9.5*   HCT 29.9* 30.8* 32.1*   .4* 104.1* 101.6*   MCH 30.1 29.7 30.1   MCHC 29.4* 28.6* 29.6*    235 247     Recent Labs     12/05/21  0248 12/06/21  0236 12/06/21  0332    143 141   K 3.7 3.9 4.0   ANIONGAP 9 9 8    104 102   CO2 30* 30* 31*   BUN 18 16 15   CREATININE 0.2* 0.2* 0.2*   GLUCOSE 96 87 88   CALCIUM 9.0 8.8 9.1     Recent Labs     12/05/21  0248 12/06/21  0236 12/06/21  0332   MG 2.0 2.1 2.1     Recent Labs     12/05/21  0248 12/06/21  0236 12/06/21  0332   AST 13 13 15   ALT 16 16 17   BILITOT <0.2 0.3 0.3   ALKPHOS 72 76 81     No results for input(s): PH, PO2, PCO2, HCO3, BE, O2SAT in the last 72 hours. No results for input(s): TROPONINI in the last 72 hours. No results for input(s): INR in the last 72 hours. No results for input(s): LACTA in the last 72 hours. Intake/Output Summary (Last 24 hours) at 12/6/2021 1235  Last data filed at 12/5/2021 1849  Gross per 24 hour   Intake 120 ml   Output 200 ml   Net -80 ml       No results found.      Assessment and Plan:   Bihemispheric strokes  Neurology following  Meds per neurology  Neurochecks  PT/OT/SLP  Eliquis per neuro recs for likely embolic stroke     Pseudomonas/Corynebacterium pneumonia  Completed course of Zosyn per ID     Ventilator dependent acute respiratory failure  Extubated 11/30/2021  CTA chest 11/22/2021: No evidence of PE  S/p bronch 11/29/2021, report on file, no evidence of endobronchial obstruction  Requires 2 L at baseline which patient has just been weaned to     History of lung cancer     Tobacco dependence     Severe protein calorie malnutrition  Dietitian following     DVT prophylaxis  Eliquis     Extensive discussions with patient's brother Vin Coleman 11/20/2021 in regards to current clinical state/goals of care.  All questions sought and answered. Ira Torres will attempt to have patient's sons Matthew Stanley and Keegan Enriquez) visit the hospital to further discuss.  Palliative following.     Patient's son Enrrique Fry) requested DNR status on 11/20/2021.  All questions sought and answered.  Unit staff witnessed the entire conversation.     Social work following for placement options    Violetta Brown MD   12/6/2021 12:35 PM

## 2021-12-06 NOTE — CARE COORDINATION
The [address] at [facility]  Notification of Admission Decision      [] Patient has been accepted for admit to [facility] on :       Please write discharge orders and summary prior to discharge. [] Patient acceptance to Rehab pending the following :    [] Eval in progress       [x] Patient determined to be ineligible for services at [facility] because : too high level with P.T. We recommend you consider: HH       Thank you for your referral, we appreciate you. If you have any questions, please feel   free to contact me at [phone].     Electronically Signed by Jessica Magdaleno, Admissions Coordinator 12/6/2021 10:23 AM

## 2021-12-06 NOTE — PROGRESS NOTES
Palliative Care Progress Note  12/6/2021 11:44 AM    Patient:  Aury Cates  YOB: 1955  Primary Care Physician: DOMINICK Irwin NP  Advance Directive: [facility]  Admit Date: 11/12/2021       Hospital Day: 25  Portions of this note have been copied forward, however, changed to reflect the most current clinical status of this patient. CHIEF COMPLAINT/REASON FOR CONSULTATION Goals of care    SUBJECTIVE:  Ms. Joy Amador states she took a long walk with PT this morning. Did feel winded afterward but that resolved after 10 minutes with rest. She is hopeful to discharge home soon. She does complain of myalgias which is chronic for her. States Percocet helps but doesn't last long enough. Nothing makes it worse or better. Interval History: Extubated 12/1/2021, tolerating oral intake. Pressor support weaned, moved to PCU 12/02/2021. Now weaned to 2L NC O2, walking >100 feet with therapy. Review of Systems:   14 point review of systems is negative except as specifically addressed above. Objective:   VITALS:  BP 94/63   Pulse 105   Temp 97.6 °F (36.4 °C) (Temporal)   Resp 18   Ht 5' 5\" (1.651 m)   Wt 99 lb 6.4 oz (45.1 kg)   SpO2 91%   BMI 16.54 kg/m²   24HR INTAKE/OUTPUT:      Intake/Output Summary (Last 24 hours) at 12/6/2021 1144  Last data filed at 12/5/2021 1849  Gross per 24 hour   Intake 120 ml   Output 200 ml   Net -80 ml     General appearance: 75 yo female, chronically ill appearing, no acute distress, sitting up in bed with NC oxygen in place  Head: Normocephalic, without obvious abnormality, atraumatic  Eyes: conjunctivae/corneas clear.  Pupils reactive bilaterally  Ears: normal external ears and nose, throat without exudate  Neck: no adenopathy, no carotid bruit, no JVD, supple, symmetrical, trachea midline   Lungs:  Diminished air entry throughout no wheezing or rhonchi  Heart: RRR, S1, S2 normal, no murmur  Abdomen:soft, non-distended, bowel sounds present, non-tender  Extremities: No lower extremity edema,  No erythema, no tenderness to palpation  Skin: warm, dry  Lymphatic: No palpable lymph node enlargment  Neurologic: Alert , oriented x 3, generalized weakness, normal tone  Psychiatric:  Calm, appropriate mood and affect    Medications:        midodrine  10 mg Oral TID     ipratropium-albuterol  1 ampule Inhalation Q4H WA    apixaban  5 mg Oral BID    nicotine  1 patch TransDERmal Daily    famotidine  20 mg Oral Daily    chlorhexidine  15 mL Mouth/Throat BID    citalopram  20 mg Oral Daily    aspirin  81 mg Oral Daily    Or    aspirin  300 mg Rectal Daily    atorvastatin  40 mg Oral Nightly    thiamine  100 mg IntraVENous Daily     acetaminophen, ibuprofen, oxyCODONE-acetaminophen, potassium chloride **OR** potassium alternative oral replacement **OR** potassium chloride, ondansetron **OR** ondansetron, polyethylene glycol, labetalol, morphine  ADULT DIET; Regular  ADULT ORAL NUTRITION SUPPLEMENT; Lunch, Dinner; Frozen Oral Supplement     Lab and other Data:     Recent Labs     12/05/21 0248 12/06/21 0236 12/06/21  0332   WBC 6.2 6.9 8.0   HGB 8.8* 8.8* 9.5*    235 247     Recent Labs     12/05/21 0248 12/06/21  0236 12/06/21  0332    143 141   K 3.7 3.9 4.0    104 102   CO2 30* 30* 31*   BUN 18 16 15   CREATININE 0.2* 0.2* 0.2*   GLUCOSE 96 87 88     Recent Labs     12/05/21 0248 12/06/21  0236 12/06/21  0332   AST 13 13 15   ALT 16 16 17   BILITOT <0.2 0.3 0.3   ALKPHOS 72 76 81     Assessment/Plan   Active Problems:    Acute hypoxemic respiratory failure (HCC)    Stroke of unknown etiology (HCC)    Palliative care patient    Severe malnutrition (HCC)    Altered mental status    Centrilobular emphysema (HCC)    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 *    Visit Summary:  Chart reviewed. No acute overnight events. Patient seen at bedside and discussed with RN. She does request Percocet every 6 hours. Hypotensive at times. At home she takes Percocet 10/325 mg q 6. Currently she is receiving 5/325 mg q 6 prn. She states it helps but does not last long. Will add Tylenol/Ibuprofen as needed between doses to prevent worsening hypotension. Monitor with increase in activity and adjust as indicated. Will follow. Recommendations:   1. Palliative care: Via [address] with home health in the next few days. Code status: DNR     2. Acute on chronic hypoxemic respiratory failure w/ Pseudomonas/Corynebacterium Pneumonia-Course of Zosyn completed, improving to baseline    3. Bilateral (right frontal/left parietal) hemisphere acute cerebral infarcts, likely embolic vs watershed-Neurology following, aspirin, statin, Eliquis, stable    4. Hx Lung cancer w/ tobacco dependence-noted    5. Hypotension-Midodrine continued     Thank you for consulting Palliative Care and allowing us to participate in the care of this patient.    Time Spent Counseling > 50%:  YES                                   Total Time Spent with patient/family counseling, workup/treatment review, counseling and placement of orders/preparation of this note: 25 minutes    Electronically signed by Lesia Campbell PA-C on 12/6/2021 at 11:44 AM    (Please note that portions of this note were completed with a voice recognition program.  Nara Tejeda made to edit the dictations but occasionally words are mis-transcribed.)

## 2021-12-07 NOTE — PLAN OF CARE
Problem: Falls - Risk of:  Goal: Will remain free from falls  Description: Will remain free from falls  12/7/2021 1217 by Braulio Poole RN  Outcome: Ongoing  12/6/2021 2329 by Nadia Castellon RN  Outcome: Ongoing  Goal: Absence of physical injury  Description: Absence of physical injury  12/7/2021 1217 by Braulio Poole RN  Outcome: Ongoing  12/6/2021 2329 by Nadia Castellon RN  Outcome: Ongoing     Problem: Skin Integrity:  Goal: Absence of new skin breakdown  Description: Absence of new skin breakdown  12/7/2021 1217 by Braulio Poole RN  Outcome: Ongoing  12/6/2021 2329 by Nadia Castellon RN  Outcome: Ongoing     Problem: Pain:  Goal: Pain level will decrease  Description: Pain level will decrease  12/7/2021 1217 by Braulio Poole RN  Outcome: Ongoing  12/6/2021 2329 by Nadia Castellon RN  Outcome: Ongoing  Goal: Control of acute pain  Description: Control of acute pain  12/7/2021 1217 by Braulio Poole RN  Outcome: Ongoing  12/6/2021 2329 by Nadia Castellon RN  Outcome: Ongoing  Goal: Control of chronic pain  Description: Control of chronic pain  12/7/2021 1217 by Braulio Poole RN  Outcome: Ongoing  12/6/2021 2329 by Nadia Castellon RN  Outcome: Ongoing     Problem: Coping:  Goal: Ability to remain calm will improve  Description: Ability to remain calm will improve  12/7/2021 1217 by Braulio Poole RN  Outcome: Ongoing  12/6/2021 2329 by Naida Castellon RN  Outcome: Ongoing     Problem: Safety:  Goal: Ability to remain free from injury will improve  Description: Ability to remain free from injury will improve  12/7/2021 1217 by Braulio Poole RN  Outcome: Ongoing  12/6/2021 2329 by Nadia Castellon RN  Outcome: Ongoing     Problem: Self-Care:  Goal: Ability to participate in self-care as condition permits will improve  Description: Ability to participate in self-care as condition permits will improve  12/7/2021 1217 by Braulio Poole RN  Outcome: Ongoing  12/6/2021 2329 by Ghanshyam Juarez RN  Outcome: Ongoing     Problem: Tobacco Use:  Goal: Inpatient tobacco use cessation counseling participation  Description: Inpatient tobacco use cessation counseling participation  12/7/2021 1217 by Drake Rubi RN  Outcome: Ongoing  12/6/2021 2329 by Ghanshyam Juarez RN  Outcome: Ongoing     Problem: Nutrition  Goal: Optimal nutrition therapy  12/7/2021 1217 by Drake Rubi RN  Outcome: Ongoing  12/6/2021 2329 by Ghanshyam Juarez RN  Outcome: Ongoing     Problem: Infection - Central Venous Catheter-Associated Bloodstream Infection:  Goal: Will show no infection signs and symptoms  Description: Will show no infection signs and symptoms  12/7/2021 1217 by Drake Rubi RN  Outcome: Ongoing  12/6/2021 2329 by Ghanshyam Juarez RN  Outcome: Ongoing

## 2021-12-07 NOTE — PROGRESS NOTES
Speech Language Pathology  Facility/Department: [facility] 7 PROGRESSIVE CARE  Dysphagia Daily Treatment Note    NAME: Aidee Cates  : 1955  MRN: [id number]    Patient Diagnosis(es):   Patient Active Problem List    Diagnosis Date Noted    Pleural effusion     Atelectasis of left lung     Palliative care patient 2021    Severe malnutrition ([address] 2021    Altered mental status     Acute hypoxemic respiratory failure ([address] 2021    Stroke of unknown etiology ([address] 2021    Centrilobular emphysema ([address] 2019    Malignant neoplasm of upper lobe of right lung ([facility] 06/10/2019    Primary squamous cell carcinoma of right lung ([facility] 2019    Malignant neoplasm of right main bronchus ([facility] 2019     Allergies:    Allergies   Allergen Reactions    No Known Allergies        Current Diet Level:  Regular; thin liquids    Pain:  Pain Assessment  Pain Assessment: 0-10  Pain Level: 7  Sales-Baker Pain Rating: Hurts a little bit  Patient's Stated Pain Goal: No pain  Pain Type: Chronic pain  Pain Location: Back  Pain Orientation: Lower  Pain Radiating Towards: N/A  Pain Descriptors: Aching  Pain Frequency: Intermittent  Pain Onset: Gradual  Clinical Progression: Not changed  Functional Pain Assessment: Activities are not prevented  Non-Pharmaceutical Pain Intervention(s): Repositioned  Response to Pain Intervention: Patient Satisfied  RASS Score: Alert and calm  Pain Assessment/FLACC  Pain Rating: FLACC (rest) - Face: no particular expression or smile  Pain Rating: FLACC (rest) - Legs: normal position or relaxed  Pain Rating: FLACC (rest) - Activity: lying quietly, normal position, moves easily  Pain Rating: FLACC (rest) - Cry: no cry (awake or asleep)  Pain Rating: FLACC (rest) - Consolability: content, relaxed  Score: FLACC (rest): 0  Pain Rating: FLACC (activity) - Face: no particular expression or smile  Pain Rating: FLACC (activity) - Legs: normal position or relaxed  Pain Rating: FLACC (activity): lying quietly, normal position, moves easily  Pain Rating: FLACC (activity) - Cry: no cry (awake or asleep)  Pain Rating: FLACC (activity) - Consolability: content, relaxed  Score: FLACC (activity): 0    Diet Tolerance:  Patient tolerating current diet level without signs/symptoms of penetration/aspiration. P.O. Trials: Thin    Timely swallow initiation; no overt s/s of aspiration   [place]       Honey       Puree       Solid    Functional mastication with regular diet; no overt s/s of aspiration     Impressions:  Pt presented with functional swallow response. No change in swallow function has been documented or reported. SLP monitored pt with regular diet and thin liquids and pt demonstrated no deficits. SLP recommends pt continue with regular diet and thin liquids. No further speech therapy warranted at this time.         Plan: D/c from [address] SLP   Electronically signed by LEVON Yuen on 12/7/21 at 4:46 PM CST

## 2021-12-07 NOTE — PLAN OF CARE
Problem: Falls - Risk of:  Goal: Will remain free from falls  Description: Will remain free from falls  Outcome: Ongoing     Problem: Falls - Risk of:  Goal: Absence of physical injury  Description: Absence of physical injury  Outcome: Ongoing     Problem: Skin Integrity:  Goal: Absence of new skin breakdown  Description: Absence of new skin breakdown  Outcome: Ongoing     Problem: Pain:  Goal: Pain level will decrease  Description: Pain level will decrease  Outcome: Ongoing     Problem: Pain:  Goal: Control of acute pain  Description: Control of acute pain  Outcome: Ongoing     Problem: Pain:  Goal: Control of chronic pain  Description: Control of chronic pain  Outcome: Ongoing     Problem: Safety:  Goal: Ability to remain free from injury will improve  Description: Ability to remain free from injury will improve  Outcome: Ongoing     Problem: Self-Care:  Goal: Ability to participate in self-care as condition permits will improve  Description: Ability to participate in self-care as condition permits will improve  Outcome: Ongoing     Problem: Tobacco Use:  Goal: Inpatient tobacco use cessation counseling participation  Description: Inpatient tobacco use cessation counseling participation  Outcome: Ongoing     Problem: Infection - Central Venous Catheter-Associated Bloodstream Infection:  Goal: Will show no infection signs and symptoms  Description: Will show no infection signs and symptoms  Outcome: Ongoing     Problem: Nutrition  Goal: Optimal nutrition therapy  Outcome: Ongoing

## 2021-12-07 NOTE — CARE COORDINATION
Spoke with patient regarding MD orders for [facility] services. Patient agreeable and has chosen [facility]. Referral Faxed. [address] [place] [address] [phone]. [phone]. Please notify [address] [place] [address] when patient discharges and fax DC Summary,  DC med list and any new [facility] orders. The Patient and/or patient representative was provided with a choice of provider and agrees   with the discharge plan. [x] Yes [] No    Freedom of choice list was provided with basic dialogue that supports the patient's individualized plan of care/goals, treatment preferences and shares the quality data associated with the providers.  [x] Yes [] No  Electronically signed by Angela Barnett on 12/7/2021 at 10:53 AM

## 2021-12-07 NOTE — CARE COORDINATION
Faxed home O2 eval and order for home O2 to [facility] P: [phone] F: [phone]  Supplier confirmed has delivered portable and will follow up with pt for home DME needs.

## 2021-12-07 NOTE — DISCHARGE SUMMARY
Hospitalist Discharge Summary  [facility]    Patient: Cathy Rinaldi  : 1955  MRN: [id number]  Code Status: DNR-CC  PCP: DOMINICK Whitaker NP  Attending: Patsy Pulido MD  Admission Date: 2021  Discharge Date: 2021    Discharge Medications:     Current Discharge Medication List           Details   apixaban (ELIQUIS) 5 MG TABS tablet Take 1 tablet by mouth 2 times daily  Qty: 60 tablet, Refills: 2      atorvastatin (LIPITOR) 40 MG tablet Take 1 tablet by mouth nightly  Qty: 30 tablet, Refills: 3      nicotine (NICODERM CQ) 21 MG/24HR Place 1 patch onto the skin daily  Qty: 30 patch, Refills: 3      midodrine (PROAMATINE) 10 MG tablet Take 1 tablet by mouth 3 times daily (with meals)  Qty: 90 tablet, Refills: 2      vitamin B-1 (THIAMINE) 100 MG tablet Take 1 tablet by mouth daily  Qty: 30 tablet, Refills: 3              Details   Cholecalciferol (VITAMIN D3) 24982 units CAPS Take 50,000 Units by mouth      Glycopyrrolate (SEEBRI NEOHALER) 15.6 MCG CAPS Inhale 1 capsule into the lungs      lidocaine viscous hcl (XYLOCAINE) 2 % SOLN solution       vitamin D (ERGOCALCIFEROL) 05742 units CAPS capsule cholecalciferol (vitamin D3) 50,000 unit capsule   Take 1 capsule every week by oral route. OXYGEN Inhale 2 L/min into the lungs      budesonide-formoterol (SYMBICORT) 160-4.5 MCG/ACT AERO Symbicort 160 mcg-4.5 mcg/actuation HFA aerosol inhaler   Inhale 2 puffs twice a day by inhalation route. albuterol sulfate HFA (PROAIR HFA) 108 (90 Base) MCG/ACT inhaler ProAir HFA 90 mcg/actuation aerosol inhaler   Inhale 2 puffs every 4 hours by inhalation route as needed. aspirin EC 81 MG EC tablet Take 81 mg by mouth      calcium carbonate 600 MG TABS tablet Take 600 mg by mouth      citalopram (CELEXA) 20 MG tablet citalopram 20 mg tablet   Take 1 tablet every day by oral route.            Discharge Instructions:   Recommended Follow Up:  Jose Alfredo Darnell MD  Infectious Disease [facility]  [phone]      Follow-up with infectious diseases as needed.   C[facility]    DO Ignacio Hollis [address]     Schedule an appointment as soon as possible for a visit      Rayshawn Biggs MD  [address] [place] [address] [facility]. [phone]    Schedule an appointment as soon as possible for a visit      Mahamed Lynne, APRN - [facility]  [place] [address]  [phone]    Schedule an appointment as soon as possible for a visit      [facility] EMERGENCY DEPT  [facility]  [phone]  Go to  As needed, If symptoms worsen    Future Appointments Scheduled at Time of Discharge:  Future Appointments   Date Time Provider Ingrid Luis   3/7/2022 10:00 AM [facility] CT RM 1 [facility] CT [facility] Rad   3/15/2022  2:15 PM Sterling Alejandro MD [address] [place] [address] Course:   Bihemispheric strokes  Neurology following  Meds per neurology  Neurochecks  PT/OT/SLP  Eliquis per neuro recs for likely embolic stroke  Neurology states patient stable for discharge     Pseudomonas/Corynebacterium pneumonia  Completed course of Zosyn per ID     Ventilator dependent acute respiratory failure  Extubated 11/30/2021  CTA chest 11/22/2021: No evidence of PE  S/p bronch 11/29/2021, report on file, no evidence of endobronchial obstruction  Requires 2 L at baseline  Home O2 arranged  Home health care arranged     History of lung cancer     Tobacco dependence  Counseled     Severe protein calorie malnutrition  Dietitian following     Extensive discussions with patient's brother Ana Maria Gilmore 11/20/2021 in regards to current clinical state/goals of care.  All questions sought and answered. Oriana Katz will attempt to have patient's sons Isabella Marinelli and Killian Botello) visit the hospital to further discuss.  Palliative following.     Patient's son Chris Cantu) requested DNR status on 11/20/2021.  All questions sought and answered.  Unit staff witnessed the entire conversation. Patient denies any complaints currently  Patient/family agreeable for discharge  All providers state patient stable for discharge  Patient medically stable for discharge  Patient aware to follow-up with her outpatient providers  Patient aware to return to ER immediately with any concerning signs or symptoms    Discharge Exam:   BP 94/60   Pulse 107   Temp 98.2 °F (36.8 °C) (Temporal)   Resp 18   Ht 5' 5\" (1.651 m)   Wt 99 lb (44.9 kg)   SpO2 (!) 88% Comment: Post gait 2LNC. Per Dr. Luis Bryan her baseline  BMI 16.47 kg/m²     General: no acute distress  HEENT: normocephalic, atraumatic  Neck: supple, symmetrical, trachea midline   Lungs: improved rhonchi  Cardiovascular: s1 and s2 normal  Abdomen: soft, positive bowel sounds  Extremities: no edema or cyanosis   Neuro: aaox3, moving all 4 extremities  Skin: normal color and texture     Recent Labs     12/06/21  0236 12/06/21  0332 12/07/21  0259   WBC 6.9 8.0 6.6   HGB 8.8* 9.5* 9.0*    247 217     Recent Labs     12/06/21  0236 12/06/21  0332 12/07/21  0259    141 144   K 3.9 4.0 3.8    102 105   CO2 30* 31* 32*   BUN 16 15 23   CREATININE 0.2* 0.2* 0.2*   GLUCOSE 87 88 94     Recent Labs     12/06/21  0236 12/06/21  0332 12/07/21  0259   AST 13 15 14   ALT 16 17 17   BILITOT 0.3 0.3 0.3   ALKPHOS 76 81 80     Troponin T: No results for input(s): TROPONINI in the last 72 hours. Pro-BNP: No results for input(s): BNP in the last 72 hours. INR: No results for input(s): INR in the last 72 hours. UA:No results for input(s): NITRITE, COLORU, PHUR, LABCAST, WBCUA, RBCUA, MUCUS, TRICHOMONAS, YEAST, BACTERIA, CLARITYU, SPECGRAV, LEUKOCYTESUR, UROBILINOGEN, BILIRUBINUR, BLOODU, GLUCOSEU, AMORPHOUS in the last 72 hours. A1C: No results for input(s): LABA1C in the last 72 hours.   ABG:No results for input(s): PHART, ACV6IWX, PO2ART, TJV1OIX, BEART, HGBAE, H4MFNHIO, CARBOXHGBART in the last 72

## 2021-12-07 NOTE — PROGRESS NOTES
12/07/21 1104   General   Chart Reviewed Yes   Subjective   Subjective Patientin bed agrees to therapy. General Comment   Comments Patient chronic COPD O2 on 5.5 L NC.  took extra time with patient to work with patient to decrease O2  to 2L NC   Pain Screening   Patient Currently in Pain No   Vital Signs   Level of Consciousness Alert (0)   Oxygen Therapy   SpO2 (!) 88 %  (Post gait 2LNC. Per Dr. Abdul Shallow her baseline)   Pulse Oximeter Device Mode Continuous   Pulse Oximeter Device Location Finger   O2 Device Nasal cannula   O2 Flow Rate (L/min) 2 L/min   Bed Mobility   Supine to Sit Stand by assistance   Sit to Supine Stand by assistance   Scooting Independent   Comment Patient sat IND EOB for EX and allow time to adjust to 2L 02   Transfers   Sit to Stand Stand by assistance   Stand to sit Stand by assistance   Bed to Chair Stand by assistance   Ambulation   Ambulation? Yes   Ambulation 1   Device Rolling Walker   Other Apparatus O2  (2L)   Assistance Stand by assistance   Quality of Gait Steady NO LOB Cues to stay closer to RW   Gait Deviations Slow Marisel   Distance 175'   Comments Patient slightly SOA post gait recovered quickly   Exercises   Heelslides 15   Hip Flexion 15   Knee Long Arc Quad 15   Knee Active Range of Motion yes   Ankle Pumps 15   Comments Rest breaks with EX.    Activity Tolerance   Activity Tolerance Patient Tolerated treatment well   Safety Devices   Type of devices Left in chair; Chair alarm in place; Call light within reach   Physical Therapy    Electronically signed by Kathi Cotto PTA on 12/7/2021 at 11:20 AM

## 2021-12-07 NOTE — PROGRESS NOTES
12:05 p Home oxygen evaluation performed and results are as follows: SaO2 = 84% on R/A at rest, SaO2 = 91% on 2 lpm O2(NC) at rest, SaO2 = 81% on R/A while ambulating, SaO2 = 91% on 2 lpm O2(NC) while ambulating. (Recovery SaO2).  TS RRT

## 2021-12-07 NOTE — CARE COORDINATION
Confirmed with [facility] the pt has current services but only for nocturnal use. Pt is currently requiring 3-4L continuous. Spoke with RN and home O2 eval entered and will need orders faxed to [facility] for appropriate services prior to dc.      [facility] P: [phone] F: [phone]

## 2021-12-08 NOTE — CARE COORDINATION
[facility] Transitions Initial Follow Up Call    Call within 2 business days of discharge: Yes    Patient: Ron Cates Patient : 1955   MRN: [id number]    Discharge Date: 21 RARS: Readmission Risk Score: 19.8 ( )      Last Discharge [facility]       Complaint Diagnosis Description Type Department Provider    21 Altered Mental Status; Respiratory Distress Altered mental status, unspecified altered mental status type . .. ED to Hosp-Admission (Discharged) (ADMITTED) L PREET Anderson MD; Lalita Fuentes . .. Spoke with: N/A    Facility: [facility]    Non-face-to-face services provided:  Reviewed encounter information for continuity of care prior to follow up Care Transitions phone call - chart notes, consults, progress notes, test results, med list, appointments, AVS, other information. Care Transitions 24 Hour Call    Care Transitions Interventions       Attempted to make contact with patient/caregiver for an initial transitions of care follow up call post discharge without success. Unable to leave a message regarding intent of call and call back information. Call was forwarded to an unidentifiable voice messaging system (mobile voice mail or telephone answering machine). CTN will follow up again at a later time. Any previously scheduled hospital follow up appointments noted below.       Follow Up  Future Appointments   Date Time Provider Ingrid Luis   2021 11:00 AM MD Shala Rogers [facility]   3/7/2022 10:00 AM [facility] CT RM 1 [facility] CT [facility] Rad   3/15/2022  2:15 PM MD MALU Hayes [facility]       Rula Goff RN

## 2021-12-09 PROBLEM — J96.20 ACUTE ON CHRONIC RESPIRATORY FAILURE (HCC): Status: ACTIVE | Noted: 2021-01-01

## 2021-12-09 NOTE — PROGRESS NOTES
Contains critical data BLOOD GAS, ARTERIAL     Status: Final result    Component Ref Range & Units 12/09/21 1608   pH, Arterial 7.350 - 7.450 7.330 Low     pCO2, Arterial 35.0 - 45.0 mmHg 71.0 High Panic     pO2, Arterial 80.0 - 100.0 mmHg 77.0 Low     HCO3, Arterial 22.0 - 26.0 mmol/L 37.4 High     Base Excess, Arterial -2.0 - 2.0 mmol/L 9.4 High     Hemoglobin, Art, Extended 12.0 - 16.0 g/dL 10.5 Low     O2 Sat, Arterial >92 % 93.1    Carboxyhgb, Arterial 0.0 - 5.0 % 2.6    Comment:      0.0-1.5   (Smokers 1.5-5.0)    Methemoglobin, Arterial <1.5 % 1.4    O2 Content, Arterial Not Established mL/dL 13.8    O2 Therapy  Unknown    Resulting Agency  [facility]     Narrative    CALL  Waldoboro Bee Villareal MD ER, 12/09/2021 16:09, by RACQUEL        Specimen Collected: 12/09/21 1608 Last Resulted: 12/09/21 1608 View Other Order Details        Pt on NRB,  RR 24 site RR at+

## 2021-12-09 NOTE — ED PROVIDER NOTES
for arthralgias and gait problem. Skin: Negative for rash and wound. Neurological: Negative for weakness and headaches. Hematological: Negative. Psychiatric/Behavioral: Negative. All other systems reviewed and are negative. A complete review of systems was performed and is negative except as noted above in the HPI. 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     COPD (chronic obstructive pulmonary disease) (HCC)     Depression     Emphysema (subcutaneous) (surgical) resulting from a procedure     History of lung biopsy 05/16/2019    Malignant neoplasm of right main bronchus ([address] 03/2019    NSCLC, SCC [id number] stage IIIb    Palliative care patient 11/16/2021    Syncope     TIA (transient ischemic attack)          SURGICAL HISTORY       Past Surgical History:   Procedure Laterality Date    APPENDECTOMY      BACK SURGERY      times two    BLADDER SUSPENSION      CHOLECYSTECTOMY      HYSTERECTOMY      INCONTINENCE SURGERY           CURRENT MEDICATIONS       Previous Medications    ALBUTEROL SULFATE HFA (PROAIR HFA) 108 (90 BASE) MCG/ACT INHALER    ProAir HFA 90 mcg/actuation aerosol inhaler   Inhale 2 puffs every 4 hours by inhalation route as needed. APIXABAN (ELIQUIS) 5 MG TABS TABLET    Take 1 tablet by mouth 2 times daily    ASPIRIN EC 81 MG EC TABLET    Take 81 mg by mouth    ATORVASTATIN (LIPITOR) 40 MG TABLET    Take 1 tablet by mouth nightly    BUDESONIDE-FORMOTEROL (SYMBICORT) 160-4.5 MCG/ACT AERO    Symbicort 160 mcg-4.5 mcg/actuation HFA aerosol inhaler   Inhale 2 puffs twice a day by inhalation route. CALCIUM CARBONATE 600 MG TABS TABLET    Take 600 mg by mouth    CHOLECALCIFEROL (VITAMIN D3) 95094 UNITS CAPS    Take 50,000 Units by mouth    CITALOPRAM (CELEXA) 20 MG TABLET    citalopram 20 mg tablet   Take 1 tablet every day by oral route.     GLYCOPYRROLATE (Tomy Yosvany) 15.6 MCG CAPS    Inhale 1 capsule into the lungs    LIDOCAINE VISCOUS HCL (XYLOCAINE) 2 % SOLN SOLUTION        MIDODRINE (PROAMATINE) 10 MG TABLET    Take 1 tablet by mouth 3 times daily (with meals)    NICOTINE (NICODERM CQ) 21 MG/24HR    Place 1 patch onto the skin daily    OXYGEN    Inhale 2 L/min into the lungs    VITAMIN B-1 (THIAMINE) 100 MG TABLET    Take 1 tablet by mouth daily    VITAMIN D (ERGOCALCIFEROL) 16186 UNITS CAPS CAPSULE    cholecalciferol (vitamin D3) 50,000 unit capsule   Take 1 capsule every week by oral route. ALLERGIES     No known allergies    FAMILY HISTORY       Family History   Problem Relation Age of Onset    Colon Cancer Mother     High Blood Pressure Brother     Diabetes Brother           SOCIAL HISTORY       Social History     Socioeconomic History    Marital status:      Spouse name: None    Number of children: None    Years of education: None    Highest education level: None   Occupational History    None   Tobacco Use    Smoking status: Current Every Day Smoker    Smokeless tobacco: Never Used   Substance and Sexual Activity    Alcohol use: Not Currently    Drug use: Never    Sexual activity: None   Other Topics Concern    None   Social History Narrative    None     Social Determinants of Health     Financial Resource Strain:     Difficulty of Paying Living Expenses: Not on file   Food Insecurity:     Worried About Running Out of Food in the Last Year: Not on file    Trisha of Food in the Last Year: Not on file   Transportation Needs:     Lack of Transportation (Medical): Not on file    Lack of Transportation (Non-Medical):  Not on file   Physical Activity:     Days of Exercise per Week: Not on file    Minutes of Exercise per Session: Not on file   Stress:     Feeling of Stress : Not on file   Social Connections:     Frequency of Communication with Friends and Family: Not on file    Frequency of Social Gatherings with Friends and Family: Not on file    Attends Mandaeism Services: Not on file    Active Member of Clubs or Organizations: Not on file    Attends Club or Organization Meetings: Not on file    Marital Status: Not on file   Intimate Partner Violence:     Fear of Current or Ex-Partner: Not on file    Emotionally Abused: Not on file    Physically Abused: Not on file    Sexually Abused: Not on file   Housing Stability:     Unable to Pay for Housing in the Last Year: Not on file    Number of [place] in the Last Year: Not on file    Unstable Housing in the Last Year: Not on file       SCREENINGS             PHYSICAL EXAM    (up to 7 for level 4, 8 or more for level 5)     ED Triage Vitals   BP Temp Temp src Pulse Resp SpO2 Height Weight   -- -- -- -- -- -- -- --       Physical Exam  Vitals and nursing note reviewed. Constitutional:       General: She is not in acute distress. Appearance: She is well-developed. She is not toxic-appearing or diaphoretic. Interventions: Face mask in place. HENT:      Head: Normocephalic and atraumatic. Eyes:      General: No scleral icterus. Right eye: No discharge. Left eye: No discharge. Pupils: Pupils are equal, round, and reactive to light. Cardiovascular:      Rate and Rhythm: Normal rate and regular rhythm. Pulmonary:      Effort: Tachypnea and accessory muscle usage present. No respiratory distress. Breath sounds: No stridor. Comments: Diffusely coarse breath sounds  Abdominal:      General: There is no distension. Musculoskeletal:         General: No deformity. Normal range of motion. Cervical back: Normal range of motion. Skin:     General: Skin is warm and dry. Neurological:      Mental Status: She is lethargic. GCS: GCS eye subscore is 3. GCS verbal subscore is 5. GCS motor subscore is 6. Cranial Nerves: No cranial nerve deficit. Motor: No abnormal muscle tone.       Comments: Patient awake and alert and responsive but somewhat somnolent   Psychiatric:         Behavior: Behavior normal.         Thought Content: Thought content normal.         Judgment: Judgment normal.         DIAGNOSTIC RESULTS     EKG: All EKG's are interpreted by the Emergency Department Physician who either signs or Co-signs this chart in the absence of a cardiologist.        RADIOLOGY:   Non-plain film images such as CT, Ultrasound and MRI are read by the radiologist. Plainradiographic images are visualized and preliminarily interpreted by the emergency physician with the below findings:        Interpretation per the Radiologist below, if available at the time of this note:    XR CHEST PORTABLE   Final Result   1. Significant worsening of pneumonia throughout the right lung.    Signed by Dr Tena Severance            ED BEDSIDE ULTRASOUND:   Performed by ED Physician - none    LABS:  Labs Reviewed   CBC WITH AUTO DIFFERENTIAL - Abnormal; Notable for the following components:       Result Value    WBC 16.9 (*)     RBC 3.33 (*)     Hemoglobin 10.3 (*)     Hematocrit 35.2 (*)     .7 (*)     MCHC 29.3 (*)     RDW 17.9 (*)     Neutrophils % 95.2 (*)     Lymphocytes % 2.2 (*)     Neutrophils Absolute 16.1 (*)     Lymphocytes Absolute 0.4 (*)     All other components within normal limits   COMPREHENSIVE METABOLIC PANEL W/ REFLEX TO MG FOR LOW K - Abnormal; Notable for the following components:    CO2 32 (*)     Glucose 137 (*)     CREATININE 0.2 (*)     Total Protein 6.4 (*)     Albumin 3.3 (*)     All other components within normal limits   BLOOD GAS, ARTERIAL - Abnormal; Notable for the following components:    pH, Arterial 7.330 (*)     pCO2, Arterial 71.0 (*)     pO2, Arterial 77.0 (*)     HCO3, Arterial 37.4 (*)     Base Excess, Arterial 9.4 (*)     Hemoglobin, Art, Extended 10.5 (*)     All other components within normal limits    Narrative:     Driss Fine MD ER, 12/09/2021 16:09, by CROAT   COVID-Delvin, RAPID   POTASSIUM, WHOLE BLOOD       All other labs were within normal range or not returned as of this dictation. Medications   fentaNYL (SUBLIMAZE) injection 12.5 mcg (12.5 mcg IntraVENous Given 12/9/21 1934)   ipratropium-albuterol (DUONEB) nebulizer solution 1 ampule (1 ampule Inhalation Given 12/9/21 2055)       EMERGENCY DEPARTMENT COURSE and DIFFERENTIALDIAGNOSIS/MDM:   Vitals:    Vitals:    12/09/21 2030 12/09/21 2055 12/09/21 2100 12/09/21 2130   BP: 90/62  (!) 83/56 (!) 89/58   Pulse: 112  98 98   Resp: 26 16 21 21   Temp:       TempSrc:       SpO2: 94% 93% 93% 94%       MDM    ED Course as of 12/09/21 2229   Thu Dec 09, 2021   1549 EKG shows sinus rhythm with a rate of 121. Diffuse T wave flattening. Normal intervals [RYANN]   1654 pCO2, Arterial(!!): 71.0 [RYANN]   1654 pO2, Arterial(!): 77.0 [RYANN]   1727 Patient on BiPAP and oxygen saturation around 90 this time [RYANN]      ED Course User Index  [RYANN] Gilford Hopping., MD     Patient remains hypoxic on arrival here on nonrebreather. BiPAP initiated. Oxygen saturation on BiPAP 88 to 90%. Patient appears generally ill and is lethargic here. Unable to de-escalate from BiPAP    Based on the evaluation and work-up here patient is felt to require further monitoring, work-up, or treatment that is available in the emergency department. Case was discussed with hospitalist who agrees for observation or admission for further management. Treatment and stabilization as necessary were provided in the emergency department prior to transfer of care to the medicine service. CONSULTS:  IP CONSULT TO PALLIATIVE CARE    PROCEDURES:  Unless otherwise notedbelow, none     Procedures      FINAL IMPRESSION     1. Acute on chronic respiratory failure with hypoxia and hypercapnia ([facility]          DISPOSITION/PLAN   DISPOSITION Admitted 12/09/2021 09:51:20 PM      PATIENT REFERRED TO:  No follow-up provider specified.     DISCHARGE MEDICATIONS:  New Prescriptions    No medications on file

## 2021-12-09 NOTE — CARE COORDINATION
[facility] Transitions Initial Follow Up Call    Call within 2 business days of discharge: Yes    Patient: Radha Cates Patient : 1955   MRN: [id number]    Discharge Date: 21 RARS: Readmission Risk Score: 19.8 ( )      Last Discharge [facility]       Complaint Diagnosis Description Type Department Provider    21 Altered Mental Status; Respiratory Distress Altered mental status, unspecified altered mental status type . .. ED to Hosp-Admission (Discharged) (ADMITTED) L PREET Rabago MD; Sheri Parks . .. Spoke with: N/A    Facility: [address] [place] [address]    Non-face-to-face services provided:  Reviewed encounter information for continuity of care prior to follow up Care Transitions phone call - chart notes, consults, progress notes, test results, med list, appointments, AVS, other information. Care Transitions 24 Hour Call    Care Transitions Interventions       Attempted to make contact with patient/caregiver for an initial transitions of care follow up call post discharge without success. Unable to leave a message regarding intent of call and call back information. As this repeated attempt to make contact was unsuccessful, will disengage at this time. Any previously scheduled hospital follow up appointments noted below.       Follow Up  Future Appointments   Date Time Provider Ingrid Luis   2021 11:00 AM MD Jaime MendiolaP-[place]   3/7/2022 10:00 AM [facility] CT RM 1 [facility] CT [facility] Rad   3/15/2022  2:15 PM MD MALU Antonio P-[place]       Layla Prader, RN

## 2021-12-10 NOTE — PROGRESS NOTES
Pharmacy Renal Adjustment    Vi Aye West is a 74 y.o. female. Pharmacy has renally adjusted medications per protocol. Recent Labs     12/07/21  0259 12/09/21  1601   BUN 23 17       Recent Labs     12/07/21  0259 12/09/21  1601   CREATININE 0.2* 0.2*       CrCl cannot be calculated (Unknown ideal weight.).     Height:   Ht Readings from Last 1 Encounters:   12/03/21 5' 5\" (1.651 m)     Weight:  Wt Readings from Last 1 Encounters:   12/07/21 99 lb (44.9 kg)     Plan: Adjust the following medications based on renal function:           Levofloxacin to 750 mg IV every 24 hours    Electronically signed by Celsa Tran [facility] on 12/10/2021 at 1:04 AM

## 2021-12-10 NOTE — PROGRESS NOTES
Following change in patient condition, staff contacted family members Leyla Dennismansi and Yessica Weiss (sons), and Jose Villar (brother) to update and establish patient wishes for code status and emergency management. Patient stated that she \"has had some strokes and can't always think clearly\" and thus wanted to have her sons help in the decision making process. After discussion between her sons, it was ultimately decided that pt is to be a DNR-CC with no intention of intubating or chest compressions if patient were to decline. Family is agreeable with this decision and the patient understands and appreciates their assistance. Patient condition is improved from earlier respiratory decline. Will continue to monitor.

## 2021-12-10 NOTE — PROGRESS NOTES
Consult noted. Will place palliative care eval and team will follow. Patient well known to Palliative service. Thank you for allowing us to participate in the care of this patient.     Donnie Hall PA-C

## 2021-12-10 NOTE — TELEPHONE ENCOUNTER
[facility] Consult  Bed 410  Vi Probus  Dx: Non-small cell lung barry Gonzales [facility]  Nurse Nahun Garcia  [phone]

## 2021-12-10 NOTE — PROGRESS NOTES
Respiratory therapist entered patient room to provide breathing treatments and noted pt oxygen had dropped to 67% on 6L NC. Increased oxygen to 15L high flow nasal cannula, and 15 L nonrebreather. Patient O2 very slowly nargis to 88%. Physician came to bedside to assess.

## 2021-12-10 NOTE — ED NOTES
Pt resting comfortably with no distress noted, resp even and unlabored, skin warm and dry. Pt has no complaints at this time. Pt being held in CDU, due to no availability in unit. Due to staffing shortages, pt has no dedicated nurse at this time. Clinical house was made aware, but adequate staff was unable to be provided. Pt has no complaints at this time, but encouraged use call light if needed.      Jeovany Soriano RN  12/10/21 7437

## 2021-12-10 NOTE — CARE COORDINATION
Patient has pending admission with [facility]. Will follow. Please advise when patient discharges. WILL NEED RESUMPTION OF CARE ORDERS TO RESUME HH SERVICES WHEN PATIENT DISCHARGES. Thank you. [address] [place] [address] [phone]. [phone].     Regi Lobo RN, Home Care Liaison  [phone] P  Electronically signed by Regi Lobo on 12/10/2021 at 8:37 AM
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
## 2021-12-10 NOTE — CARE COORDINATION
Initial CM Assessment    Initial Assessment Completed at bedside with:    [x]   Patient  []   Family/Caregiver/Guardian   []   Other:      Patient Contact Information:  [address] [place]  [phone] (home)   Above information verified? [x]   Yes  []   No    ADLS:    Pt reports independent, states she feels weak right now   Support System:    one son resides with her along with daughter in law   Plan to return to current housing:   [x]   Yes  []   No    Transportation plan for Discharge:  Son     Do you have any unmet social needs that would keep you from returning home safely:  []   Yes  [x]   No              Unmet Social Needs Notes:       Had [address] prior to admission:    [x]   Yes  []   No    Oxygen Company:   [employer] - 2L continuous     Has a pulse oximetry unit at home:   []   Yes  [x]   No    Informed of need to bring portable home O2 tank to hospital on day of DISCHARGE:    [x]   Yes  []   No    Name of person committed to bringing portable tank at discharge:   son    DME Provider:       Currently ACTIVE with [address]:    [x]   Yes  []   No  []   Interested at discharge  [facility]:  [facility]     Current PCP:  DOMINICK Ibarra NP  PCP verified? [x]   Yes  []   No    Have you been vaccinated for COVID-19 (SARS-CoV-2)? []   Yes  [x]   No                   If so, when? Which :  []   Pfizer-BioNTech  []   Moderna  []   Qasim Bourne  []   Other:       Ph[facility]:    [facility]  [phone] [employer] [phone]  [address]  Phone: [phone] Fax: [phone]    [facility] [address] [facility], [address] [place] [address] [phone] [employer] [phone] [address] [address] [address]  Phone: [phone] Fax: [phone]    Prefer to use Meds to Bed? []   Yes  [x]   No  Potential assistance purchasing medications?      [x] Yes  []   No    Active with HD/PD prior to admission:           []   Yes  [x]   No  HD Center:       Financial:  Payor: Cristiana Manriquez / Plan: MEDICARE PART A AND B / Product Type: *No Product type* /     Pre-Cert required for SNF:   []   Yes  [x]   No    Patient Deficits:  []   Yes   [x]   No    If yes:  []   Confusion/Memory  []   Visual  []   Motor/Sensory         []   Right arm         []   Right leg         []   Left arm         []   Left leg  []   Language/Speech         []   Aphasia         []   Dysarthria         []   Swallow         [place] Coma Scale  Eye Opening: Spontaneous  Best Verbal Response: Oriented  Best Motor Response: Obeys commands  [place] Coma Scale Score: 15    Patient Deficit Notes: Additional CM/SW Notes:   Readmission assessment completed, SW following.    Pt has home o2 supplied by [address] [facility] (formerly [address] [place] [address])   [facility] [address]   [place] [address] [place] and/or her family were provided with choice of provider:  [x]   Yes   []   No      Patient Admission Status:   Inpatient [101]    [address]

## 2021-12-10 NOTE — H&P
(NICODERM CQ) 21 MG/24HR Place 1 patch onto the skin daily 12/8/21   Karin Estrella, MD   midodrine (PROAMATINE) 10 MG tablet Take 1 tablet by mouth 3 times daily (with meals) 12/7/21   Karin Estrella MD   vitamin B-1 (THIAMINE) 100 MG tablet Take 1 tablet by mouth daily 12/7/21   Karin Estrella, MD   Cholecalciferol (VITAMIN D3) 54389 units CAPS Take 50,000 Units by mouth    Historical Provider, MD   Glycopyrrolate (SEEBRI NEOHALER) 15.6 MCG CAPS Inhale 1 capsule into the lungs 5/23/19   Historical Provider, MD   lidocaine viscous hcl (XYLOCAINE) 2 % SOLN solution  8/7/19   Historical Provider, MD   vitamin D (ERGOCALCIFEROL) 15947 units CAPS capsule cholecalciferol (vitamin D3) 50,000 unit capsule   Take 1 capsule every week by oral route. Historical Provider, MD   OXYGEN Inhale 2 L/min into the lungs    Historical Provider, MD   budesonide-formoterol (SYMBICORT) 160-4.5 MCG/ACT AERO Symbicort 160 mcg-4.5 mcg/actuation HFA aerosol inhaler   Inhale 2 puffs twice a day by inhalation route. Historical Provider, MD   albuterol sulfate HFA (PROAIR HFA) 108 (90 Base) MCG/ACT inhaler ProAir HFA 90 mcg/actuation aerosol inhaler   Inhale 2 puffs every 4 hours by inhalation route as needed. Historical Provider, MD   aspirin EC 81 MG EC tablet Take 81 mg by mouth    Historical Provider, MD   calcium carbonate 600 MG TABS tablet Take 600 mg by mouth    Historical Provider, MD   citalopram (CELEXA) 20 MG tablet citalopram 20 mg tablet   Take 1 tablet every day by oral route. Historical Provider, MD       Allergies:    No known allergies    Social History:    The patient currently lives at home  Tobacco:   reports that she has been smoking. She has never used smokeless tobacco.  Alcohol:   reports previous alcohol use.   Illicit Drugs: Denies     Family History:      Problem Relation Age of Onset    Colon Cancer Mother     High Blood Pressure Brother     Diabetes Brother        Review of Systems:   Review of Systems Constitutional: Positive for appetite change and fatigue. Negative for chills, diaphoresis and fever. HENT: Negative for congestion, ear pain, sinus pain, sore throat and trouble swallowing. Eyes: Negative for visual disturbance. Respiratory: Positive for cough and shortness of breath. Negative for wheezing. Cardiovascular: Negative for chest pain, palpitations and leg swelling. Gastrointestinal: Negative for abdominal distention, abdominal pain, blood in stool, constipation, diarrhea, nausea and vomiting. Endocrine: Negative for cold intolerance and heat intolerance. Genitourinary: Negative for difficulty urinating, flank pain, frequency and urgency. Musculoskeletal: Negative for arthralgias and myalgias. Neurological: Negative for dizziness, syncope, weakness, light-headedness, numbness and headaches. Hematological: Does not bruise/bleed easily. Psychiatric/Behavioral: Negative for agitation, confusion and dysphoric mood. 14 point review of systems is negative except as specifically addressed above. Physical Examination:  BP 85/63   Pulse 106   Temp 98.1 °F (36.7 °C) (Oral)   Resp 27   SpO2 91%   Physical Exam  Constitutional:       General: She is not in acute distress. Appearance: Normal appearance. She is cachectic. She is ill-appearing (Chronically). She is not toxic-appearing or diaphoretic. Comments: Bipap   HENT:      Head: Normocephalic and atraumatic. Right Ear: External ear normal.      Left Ear: External ear normal.      Nose: Nose normal. No congestion or rhinorrhea. Mouth/Throat:      Mouth: Mucous membranes are moist.      Pharynx: Oropharynx is clear. Eyes:      General: No scleral icterus. Extraocular Movements: Extraocular movements intact. Conjunctiva/sclera: Conjunctivae normal.   Cardiovascular:      Rate and Rhythm: Normal rate and regular rhythm. Pulses: Normal pulses. Heart sounds: Normal heart sounds.  No murmur heard.  No friction rub. No gallop. Pulmonary:      Effort: Pulmonary effort is normal. No respiratory distress. Breath sounds: Rhonchi (Throughout lung fields) present. No wheezing or rales. Abdominal:      General: Abdomen is flat. Bowel sounds are normal. There is no distension. Palpations: Abdomen is soft. Tenderness: There is no abdominal tenderness. Musculoskeletal:         General: No swelling. Normal range of motion. Cervical back: Normal range of motion and neck supple. Right lower leg: No edema. Left lower leg: No edema. Skin:     General: Skin is warm and dry. Coloration: Skin is pale. Skin is not jaundiced. Findings: No erythema, lesion or rash. Neurological:      General: No focal deficit present. Mental Status: She is oriented to person, place, and time and easily aroused. Mental status is at baseline. Cranial Nerves: No cranial nerve deficit. Sensory: No sensory deficit. Motor: No weakness. Psychiatric:         Mood and Affect: Mood normal.         Behavior: Behavior normal.         Thought Content:  Thought content normal.         Judgment: Judgment normal.          Diagnostic Data:  CBC:  Recent Labs     12/07/21  0259 12/09/21  1601   WBC 6.6 16.9*   HGB 9.0* 10.3*   HCT 31.4* 35.2*    197     BMP:  Recent Labs     12/07/21  0259 12/09/21  1601 12/09/21  1608    142  --    K 3.8 3.7 3.4    102  --    CO2 32* 32*  --    BUN 23 17  --    CREATININE 0.2* 0.2*  --    CALCIUM 8.7* 9.0  --      Recent Labs     12/07/21  0259 12/09/21  1601   AST 14 13   ALT 17 14   BILITOT 0.3 0.4   ALKPHOS 80 94     ABGs:  Lab Results   Component Value Date    PHART 7.330 12/09/2021    PO2ART 77.0 12/09/2021    OQR5UST 71.0 12/09/2021     Urinalysis:  Lab Results   Component Value Date    NITRU Negative 11/15/2021    WBCUA 3 11/15/2021    BACTERIA NEGATIVE 11/15/2021    RBCUA 299 11/15/2021    BLOODU LARGE 11/15/2021    SPECGRAV 1.026 11/15/2021    GLUCOSEU Negative 11/15/2021     ABG:  Recent Labs     12/09/21  1608   PHART 7.330*   QAZ3WLU 71.0*   PO2ART 77.0*   VSU2SKF 37.4*   BEART 9.4*   HGBAE 10.5*   B3MEOGBW 93.1   CARBOXHGBART 2.6       XR CHEST PORTABLE    Result Date: 12/9/2021  XR CHEST PORTABLE 12/9/2021 5:27 PM History: Short of breath. Portable chest x-ray. Comparison is made with a portable chest x-ray from November 29, 2021. Chronic interstitial lung disease. Worsening pneumonia throughout the right lung. Mildly increased interstitial infiltrate at the left lower lobe. Heart size is stable. Unchanged right chest wall port. No pneumothorax. 1. Significant worsening of pneumonia throughout the right lung.  Signed by Dr Kvng Nunez      Assessment/Plan:  Principal Problem:    Acute on chronic respiratory failure ([address]   -Admit to PCU- tele DNR   -Bipap to keep SPO2 >90%   -Steroids   -Levaquin 500 mg IV Q24 hours due to recent admission   -Mayra   -VTE prophylaxis   -Home inhalers   -Vitals per unit routine   -I&O's   -Regular diet   -PT/Ot consult   -Continuous pulse ox    Active Problems:    Malignant neoplasm of right main bronchus [facility])   -Noted   -Consult Dr. Taylor [facility] patient   -Noted   -Palliative care consulted     VTE Prophylaxis-Charo    Signed:  DOMINICK Wilson,

## 2021-12-11 PROBLEM — F17.210 DEPENDENCE ON NICOTINE FROM CIGARETTES: Status: ACTIVE | Noted: 2021-01-01

## 2021-12-11 PROBLEM — J96.21 ACUTE ON CHRONIC RESPIRATORY FAILURE WITH HYPOXIA AND HYPERCAPNIA (HCC): Status: ACTIVE | Noted: 2021-01-01

## 2021-12-11 PROBLEM — Z71.6 TOBACCO ABUSE COUNSELING: Status: ACTIVE | Noted: 2021-01-01

## 2021-12-11 PROBLEM — J96.22 ACUTE ON CHRONIC RESPIRATORY FAILURE WITH HYPOXIA AND HYPERCAPNIA (HCC): Status: ACTIVE | Noted: 2021-01-01

## 2021-12-11 PROBLEM — Z91.199 PERSONAL HISTORY OF NONCOMPLIANCE WITH MEDICAL TREATMENT AND REGIMEN: Status: ACTIVE | Noted: 2021-01-01

## 2021-12-11 PROBLEM — E87.6 HYPOKALEMIA: Status: ACTIVE | Noted: 2021-01-01

## 2021-12-11 PROBLEM — E87.0 HYPERNATREMIA: Status: ACTIVE | Noted: 2021-01-01

## 2021-12-11 PROBLEM — J18.9 PNEUMONIA INVOLVING RIGHT LUNG: Status: ACTIVE | Noted: 2021-01-01
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
## 2021-12-11 NOTE — PLAN OF CARE
Problem: Falls - Risk of:  Goal: Will remain free from falls  Description: Will remain free from falls  12/11/2021 0303 by Howard Hinds LPN  Outcome: Ongoing  12/11/2021 0303 by Howard Hinds LPN  Outcome: Ongoing  Goal: Absence of physical injury  Description: Absence of physical injury  12/11/2021 0303 by Howard Hinds LPN  Outcome: Ongoing  12/11/2021 0303 by Howard Hinds LPN  Outcome: Ongoing     Problem: Skin Integrity:  Goal: Will show no infection signs and symptoms  Description: Will show no infection signs and symptoms  12/11/2021 0303 by Howard Hinds LPN  Outcome: Ongoing  12/11/2021 0303 by Howard Hinds LPN  Outcome: Ongoing  Goal: Absence of new skin breakdown  Description: Absence of new skin breakdown  12/11/2021 0303 by Howard Hinds LPN  Outcome: Ongoing  12/11/2021 0303 by Howard Hinds LPN  Outcome: Ongoing     Problem: Fluid Volume:  Goal: Ability to achieve a balanced intake and output will improve  Description: Ability to achieve a balanced intake and output will improve  12/11/2021 0303 by Howard Hinds LPN  Outcome: Ongoing  12/11/2021 0303 by Howard Hinds LPN  Outcome: Ongoing     Problem: Physical Regulation:  Goal: Ability to maintain clinical measurements within normal limits will improve  Description: Ability to maintain clinical measurements within normal limits will improve  12/11/2021 0303 by Howard Hinds LPN  Outcome: Ongoing  12/11/2021 0303 by Howard Hinds LPN  Outcome: Ongoing  Goal: Will show no signs and symptoms of electrolyte imbalance  Description: Will show no signs and symptoms of electrolyte imbalance  12/11/2021 0303 by Howard Hinds LPN  Outcome: Ongoing  12/11/2021 0303 by Howard Hinds LPN  Outcome: Ongoing     Problem: Pain:  Goal: Pain level will decrease  Description: Pain level will decrease  Outcome: Ongoing  Goal: Control of acute pain  Description: Control of acute pain  Outcome:

## 2021-12-11 NOTE — CONSULTS
MEDICAL ONCOLOGY CONSULTATION    Pt Name: Jeaneth Murguia  MRN: [id number]  YOB: 1955  Date of evaluation: 12/11/2021    REASON FOR CONSULTATION: History of lung cancer  REQUESTING PHYSICIAN: Hospitalist    History Obtained From:    patient, electronic medical record    HISTORY OF PRESENT ILLNESS:  The patient is a pleasant 74years old female who has several medical morbidities. She has been followed by me for a history of stage IIIb non-small cell lung cancer, squamous cell subtype. She was initially diagnosed in March 2019. She also has a diagnosis of severe COPD on home O2. She continues to smoke. She received chemoradiation treatment for her lung cancer followed by 1 year of immunotherapy completed in February 2021. She has had several hospitalizations. She was hospitalized November 2021 with acute stroke. She is now hospitalized after she had a bowel failure at home and had no oxygen. She had significant decline in her breathing and therefore called [facility] and came to the ER. She was found to have neutrophil leukocytosis. Chest x-ray showed significant worsening of pneumonia throughout the right lung. She was admitted for further supportive care. I am being consulted for continued of care for her history of lung cancer. Diagnosis:  · NSCLC, SCC March 2019   · [id number], stage IIIIB  · COPD      Treatment summary. · 6/10/2019 8/20/2019-carboplatin/Taxol weekly with concurrent radiation   · 12/4/201902/25/2021 durvalumab x1 year completed     Interval history  Patient is a very pleasant 74years old female who has a diagnosis of stage IIIb non-small cell lung cancer, squamous cell subtype. She received initial treatment with carboplatin/Taxol weekly followed by concurrent radiation. She completed concurrent chemoradiation and is currently status post completion of durvalumab February 2021. In addition, she has severe COPD.   She has Symbicort inhalers at home but is not using them.  Unfortunately, she continues to smoke. She had a CT scan of the chest for surveillance.        Cancer history  Ms Solange Batres was first seen by me on 5/3/2019 for further workup of a lung mass. She has complains of chest wall pain. Further workup revealed a lung mass. · 4/9/2019CT of the chest with contrast showed a malignant mass in the right upper lobe measures 6.5 x 2.8 x 5.1 cm. The mass invades and destroys the right lateral cortex and lateral aspect of the T4 vertebral body. Mediastinal adenopathy. Specifically, 1.9 cm pretracheal lymph node. 2.1 cm precarinal lymph node. 1.9 cm subcarina lymph node. Right hilar adenopathy. · 5/3/2019she was first seen by me. · 5/15/2019bone scan and CT abdomen pelvis was unremarkable metastatic disease. · 5/16/2019CT-guided biopsy consistent non-small cell lung cancer, squamous cell carcinoma subtype. · 5/29/2019PET scan showed intense and metabolic activity associated with pleural-based right paraspinal mass within the right upper lobe. It invades into the adjacent thoracic vertebral body. Right hilar, subcarina and pretracheal lymphadenopathy. No evidence of muscle skeletal or intra-abdominal disease. · 6/7/2019recommended chemoradiation with carboplatin/Taxol weekly with concurrent radiation. · 10/14/2019 CT chest abdomen pelvis showed a cavitary lesion measuring 3.3 x 4.6 x 3.4 cm which appears to be decreased in size. Underlying lung emphysema. There is a new notable sclerosis within the right lateral aspect of T4 and T5 vertebral bodies at the site of the posterior mediastinal invasion by the right lung malignancy. CT of the abdomen pelvis showed tiny hypodense in the liver which are too small to characterize but similar to prior exam.  Attention to follow-up. Stable nodule in the left adrenal gland too small to characterize. · 12/4/2019 started on treatment durvalumab every 2 weeks to complete 1 year.   · 2/19/2020CT chest abdomen pelvis showed slight decrease in size of the right upper lobe cavitary lesion. The lesion now measures 2.6 x 4.5 x 3.9 cm (previously 3.3 x 4.6 x 3.4 cm). Subtle cortical erosion of the right side of the centrum at T4/T5. No evidence of metastatic disease in the abdomen. Stable left adrenal nodule. · 4/29/2020 Xr Chest Standard A cavitation or a necrotic mass in the right upper lobe. This was noted in the previous CT scan of the chest dated 2/19/2020. An area of consolidation in the right upper lobe may represent inflammatory or infectious process. Ill-defined nodule in the left lower lobe may represent a nipple shadow or a pulmonary nodule. This could be further evaluated. Chronic inflammatory changes in the remaining lungs bilaterally. · 6/26/2020 Ct Chest W Contrast There is a pleural-based cavitary lesion within the right upper lobe posteriorly and medially. When measuring the lesion in the same manner as the previous study I do not feel there has been any significant progression from a previous CT of 2/19/2020. There is some increased consolidation within the medial right upper lobe likely representing post therapy change. There is a new small nodule more inferiorly within the right upper lobe as described above. This may also represent consolidation post therapy but as it is a more isolated finding I feel it could potentially represent an early recurrence. This would warrant follow-up on subsequent imaging. These findings are superimposed upon changes of centrilobular emphysema. There is stable scarring within the right posterior lung base. There is bronchial wall thickening within both lungs suggesting chronic bronchitis. Coronary calcifications are present. Borderline enlargement of the pulmonary arteries suggesting pulmonary arterial hypertension. Mild constipation within the upper abdomen. Ct Abdomen Pelvis W Iv Contrast   No evidence of metastatic disease in the abdomen or pelvis.   Stable biliary dilatation status post cholecystectomy which may represent reservoir phenomenon. Moderate to large volume stool in the colon, correlate for constipation. · 7/1/2020 discussed at length the CAT scans with the patient and radiology. No clear-cut evidence of disease progression. Therefore will continue treatment. · 9/16/20 CT Chest: Redemonstration of a cavitary lesion in the posterior right upper lobe with surrounding posttreatment changes, not significantly changed from the most recent comparison exam. New small noncalcified right lower lobe nodule for which attention on follow-up is recommended. Emphysema and pulmonary fibrosis. Pulmonary arterial hypertension. Atherosclerosis of the aorta and coronary arteries. · 10/29/20 MRI brain:  No acute intracranial abnormality. No evidence of intracranial neoplastic/metastatic disease. Chronic white matter ischemic changes. Chronic maxillary and ethmoid sinusitis. · 2/25/2021completion of 1 year of treatment with durvalumab. · 4/7/21 Ct Chest W Contrast  No significant interval change is seen. Posttreatment changes are noted in the right lung. 2 areas of soft tissue nodularity in the right upper lobe remains stable. There has been resolution of a right lower lobe area of nodular density. Right upper lung zone consolidation and cavitation posteriorly also appears stable. No pathologically enlarged lymph nodes. Emphysema and pulmonary fibrosis. Atheromatous disease of the thoracic aorta and coronary arteries. Dilated pulmonary arteries. 5. Prior cholecystectomy in the upper abdomen with dilated biliary tree likely related to reservoir effect. · 4/27/2021reviewed CT chest that showed no significant interval change. Stable disease. · 8/23/21 CT CHEST W CONTRAST  Stable chest CT. Right upper lobe changes are stable, mostly treatment related. This includes an area of juxtapleural consolidation with cavitation.  Narrowing of the right upper lobe bronchus is also seen. These findings are not significantly changed. No measurable mass identified. There are no new or enlarging pulmonary nodules. Underlying advanced lung emphysema. Evidence for pulmonary hypertension, likely related to the underlying diffuse lung disease. Advanced coronary atheromatous calcification. · 9/28/2021I reviewed results of CT chest.  No evidence of disease progression. Continue clinical/image surveillance.     Past Medical History:    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     COPD (chronic obstructive pulmonary disease) ([address]     Depression     Emphysema (subcutaneous) (surgical) resulting from a procedure     History of lung biopsy 05/16/2019    Malignant neoplasm of right main bronchus ([facility] [address] 03/2019    NSCLC, SCC [id number] stage IIIb    Palliative care patient 11/16/2021    Syncope     TIA (transient ischemic attack)        Past Surgical History:    Past Surgical History:   Procedure Laterality Date    APPENDECTOMY      BACK SURGERY      times two    BLADDER SUSPENSION      CHOLECYSTECTOMY      HYSTERECTOMY      INCONTINENCE SURGERY         Social History:    Marital status:  Smoking status:  ETOH status:  Resides:    Family History:   Family History   Problem Relation Age of Onset    Colon Cancer Mother     High Blood Pressure Brother     Diabetes Brother        Current Hospital Medications:    Current Facility-Administered Medications   Medication Dose Route Frequency Provider Last Rate Last Admin    citalopram (CELEXA) tablet 20 mg  20 mg Oral Daily Belle Wright APRN        thiamine mononitrate tablet 100 mg  100 mg Oral Daily Belle Wright APRN        budesonide (PULMICORT) nebulizer suspension 500 mcg  0.5 mg Nebulization BID York Fisherman, APRN   500 mcg at 12/11/21 0720    Arformoterol Tartrate (BROVANA) nebulizer solution 15 mcg  15 mcg Nebulization BID York Fisherman, APRN   15 mcg at 12/11/21 0720    levoFLOXacin (LEVAQUIN) 750 MG/150ML infusion 750 mg  750 mg IntraVENous Q24H Qing Penelope, APRN   Stopped at 12/11/21 0253    [Held by provider] morphine (MS CONTIN) extended release tablet 15 mg  15 mg Oral 2 times per day Paresh Bello MD        oxyCODONE-acetaminophen [facility])  MG per tablet 1 tablet  1 tablet Oral Q6H PRN Paresh Bello MD   1 tablet at 12/10/21 2033    potassium chloride (KLOR-CON M) extended release tablet 40 mEq  40 mEq Oral PRN Paresh Bello MD        Or    potassium bicarb-citric acid (EFFER-K) effervescent tablet 40 mEq  40 mEq Oral PRN Paresh Bello MD   40 mEq at 12/10/21 1830    Or    potassium chloride 10 mEq/100 mL IVPB (Peripheral Line)  10 mEq IntraVENous PRN Paresh Bello MD        apixaban [facility]) tablet 5 mg  5 mg Oral BID Qing Penelope, APRN   5 mg at 12/10/21 2033    aspirin EC tablet 81 mg  81 mg Oral Daily [place] Penelope, APRN   81 mg at 12/10/21 1107    atorvastatin (LIPITOR) tablet 40 mg  40 mg Oral Nightly Qing Penelope, APRN   40 mg at 12/10/21 2033    calcium carbonate tablet 600 mg  600 mg Oral Daily [place] Penelope, APRN        vitamin D (ERGOCALCIFEROL) capsule 50,000 Units  50,000 Units Oral Weekly Qing Penelope, APRN        midodrine (PROAMATINE) tablet 10 mg  10 mg Oral TID WC Qing Penelope, APRN   10 mg at 12/10/21 1830    sodium chloride flush 0.9 % injection 5-40 mL  5-40 mL IntraVENous 2 times per day Qing Penelope, APRN   10 mL at 12/10/21 2032    sodium chloride flush 0.9 % injection 5-40 mL  5-40 mL IntraVENous PRN Qing Penelope, APRN        0.9 % sodium chloride infusion  25 mL IntraVENous PRN [place] Penelope, APRN        ondansetron (ZOFRAN-ODT) disintegrating tablet 4 mg  4 mg Oral Q8H PRN Qing Penelope, APRN        Or    ondansetron [facility]) injection 4 mg  4 mg IntraVENous Q6H PRN Qing Negrete, APRN        polyethylene glycol [facility]) packet 17 g  17 g Oral Daily PRN Cleves Hick, APRN        acetaminophen (TYLENOL) tablet 650 mg  650 mg Oral Q6H PRN Zeferino Hick, APRN   650 mg at 12/10/21 0056    Or    acetaminophen (TYLENOL) suppository 650 mg  650 mg Rectal Q6H PRN Cleves Hick, APRN        ipratropium-albuterol (DUONEB) nebulizer solution 1 ampule  1 ampule Inhalation Q4H [place] Cleves Hick, APRN   1 ampule at 12/11/21 0719    methylPREDNISolone sodium (SOLU-MEDROL) injection 40 mg  40 mg IntraVENous Q6H Cleves Hick, APRN   40 mg at 12/11/21 0135    Followed by   Pawan Poon ON 12/12/2021] predniSONE (DELTASONE) tablet 40 mg  40 mg Oral Daily Zeferino Hick, APRN           Allergies:    Allergies   Allergen Reactions    No Known Allergies          Subjective   REVIEW OF SYSTEMS:   CONSTITUTIONAL: no fever, no night sweats, no fatigue;  HEENT: no blurring of vision, no double vision, no hearing difficulty, no tinnitus, no ulceration, no epistaxis;  LUNGS: no cough, no hemoptysis, no wheeze,  no shortness of breath;  CARDIOVASCULAR: no palpitation, no chest pain, no shortness of breath;  GI: no abdominal pain, no nausea, no vomiting, no diarrhea, no constipation;  MARIA VICTORIA: no dysuria, no hematuria, no frequency or urgency, no nephrolithiasis;  MUSCULOSKELETAL: no joint pain, no swelling, no stiffness;  ENDOCRINE: no polyuria, no polydipsia, no cold or heat intolerance;  HEMATOLOGY: no easy bruising or bleeding, no history of clotting disorder;  DERMATOLOGY: no skin rash, no eczema, no pruritus;  PSYCHIATRY: no depression, no anxiety  NEUROLOGY: no syncope, no seizures, no numbness or tingling of hands, no numbness or tingling of feet, no paresis;    Objective   BP 97/73   Pulse 98   Temp 96.8 °F (36 °C)   Resp 18   SpO2 92%     PHYSICAL EXAM:  CONSTITUTIONAL: Alert, appropriate, no acute distress  EYES: Non icteric, EOM intact, pupils equal round   ENT: Mucus membranes moist,external inspection of ears and nose are normal  NECK: Supple, no masses. No palpable thyroid mass  CHEST/LUNGS: CTA bilaterally, normal respiratory effort   CARDIOVASCULAR: RRR, no murmurs. No lower extremity edema  ABDOMEN: soft non-tender, active bowel sounds, no HSM. No palpable masses  EXTREMITIES: warm, full ROM in all 4 extremities, no focal weakness. SKIN: warm, dry with no rashes or lesions  LYMPH: No cervical, clavicular, axillary, or inguinal lymphadenopathy  NEUROLOGIC: follows commands, non focal   PSYCH: mood and affect appropriate.  Alert and oriented to time, place, person        LABORATORY RESULTS REVIEWED/ANALYZED BY ME:  Recent Labs     12/11/21  0327 12/10/21  0432 12/09/21  1601   WBC 12.0* 12.4* 16.9*   HGB 8.2* 9.7* 10.3*   HCT 27.8* 33.4* 35.2*   .9* 106.4* 105.7*    168 197       Lab Results   Component Value Date     (H) 12/11/2021    K 3.9 12/11/2021     12/11/2021    CO2 34 (H) 12/11/2021    BUN 24 (H) 12/11/2021    CREATININE 0.2 (L) 12/11/2021    GLUCOSE 132 (H) 12/11/2021    CALCIUM 9.6 12/11/2021    PROT 6.4 (L) 12/09/2021    LABALBU 3.3 (L) 12/09/2021    BILITOT 0.4 12/09/2021    ALKPHOS 94 12/09/2021    AST 13 12/09/2021    ALT 14 12/09/2021    LABGLOM >60 12/11/2021    GFRAA >59 12/11/2021    AGRATIO 1.4 05/01/2020    GLOB 3.5 02/25/2021       Lab Results   Component Value Date    INR 1.13 11/29/2021    INR 1.18 11/12/2021    INR 0.96 05/16/2019    PROTIME 14.7 (H) 11/29/2021    PROTIME 15.2 (H) 11/12/2021    PROTIME 12.2 05/16/2019       RADIOLOGY STUDIES REPORT/REVIEWED AND INTERPRETED BY ME:  ECHO Complete 2D W Doppler W Color    Result Date: 11/13/2021  Transthoracic Echocardiography Report (TTE)  Demographics   Patient Name   Tapan Arena M   Date of Study           11/13/2021   MRN            [id number]        Gender                  Female   Date of Birth  1955    Room Number             [id number]   Age            74 year(s)   Height:        65 inches     Referring Physician     [facility] MARIA VICTORIA   Weight:        109 pounds    Sonographer   BSA:           1.53 m^2      Interpreting Physician  Leda Apple MD   BMI:           18.14 kg/m^2  Procedure Type of Study   TTE procedure:ECHO 2D W/DOPPLER/COLOR/CONTRAST. Study Location: Portable Technical Quality: Limited visualization due to poor acoustical window. Patient Status: Inpatient Contrast Medium: Definity. Amount - 4 ml HR: 105 bpm BP: 99/57 mmHg  Conclusions   Summary  Normal size left atrium. bubble study negative  No evidence of pleural effusion. No evidence of significant pericardial effusion is noted. Structurally normal mitral valve with normal leaflet mobility. No evidence  of mitral valve stenosis or mild mitral regurgitation. aortic valve thickened with calcification. no as. ar  Tricuspid valve is structurally normal.  mild tr  pasp 40 mm hg       CT HEAD WO CONTRAST    Result Date: 11/12/2021  CT HEAD WO CONTRAST 11/12/2021 11:25 AM HISTORY: Code stroke COMPARISON: None DOSE LENGTH PRODUCT: 961 mGy cm TECHNIQUE: Helical tomographic images of the brain were obtained without the use of intravenous contrast. Automated exposure control was also utilized to decrease patient radiation dose. FINDINGS: Exam is limited by motion artifact. There is no evidence of evolving large vascular territory infarct. Underlying chronic small vessel ischemic change. No visualized intra-axial or extra-axial hemorrhage. No mass lesion is identified. Normal size and configuration of the ventricular system. The basal cisterns are symmetric. Posterior fossa structures are unremarkable. The included orbits and their contents are unremarkable. Chronic right maxillary sinus disease. The visualized osseous structures and overlying soft tissues of the skull and face are unremarkable. 1. No acute intracranial process. Signed by Dr Naz Thompson    XR CHEST PORTABLE    Result Date: 12/9/2021  XR CHEST PORTABLE 12/9/2021 5:27 PM History: Short of breath. infusion catheter in place via right-sided approach. There is a cavitary lesion within the right upper lobe. There is volume loss within the right lung with a pleurodiaphragmatic adhesion within the right lung base and elevation of the right hemidiaphragm. Predominantly interstitial infiltrate within the left lung has shown interval improvement although this is accentuated by the differences in technique. There are emphysematous changes of the lungs. 1.. Underlying emphysema. There is volume loss on the right with a cavitary lesion in the right upper lobe and a pleurodiaphragmatic adhesion in the right base. 2. Predominantly interstitial process within the left lung either representing an interstitial pneumonia or edema shows interval improvement. 3. No interval line changes. Signed by Dr Mamadou Ibanez    Result Date: 11/22/2021  XR CHEST PORTABLE 11/22/2021 8:24 AM HISTORY:   Respiratory distress  Single view. COMPARISONS:  11/18/2021 and 11/15/2021 FINDINGS: Endotracheal tube, NG tube and right-sided port catheter again identified. Right lung volume loss with diaphragm elevation observed with thickening along the minor fissure with diffuse interstitial prominence. Mild patchy airspace opacities and interstitial prominence identified bilaterally, likely unchanged. 1. Stable radiographic appearance the chest bilateral infiltrative opacities. Signed by Dr Willi Rios    XR CHEST PORTABLE    Result Date: 11/18/2021  Examination. XR CHEST PORTABLE 11/18/2021 4:35 AM History: The patient on a vent. A single frontal portable semiupright view of the chest is compared with the previous study dated 11/15/2021. The lungs are hyperinflated, left more than the right. There is elevation of the right diaphragm with evidence of pleural diaphragmatic adhesions. There is an area of discoid atelectasis in the right midlung. There is a small right basal pleural effusion.  These have moderately improved pneumonia. 2. Treatment-related changes in the right suprahilar region with scarring and volume loss. Signed by Dr Kilo Alvarado    Vascular carotid duplex bilateral    Result Date: 11/14/2021  Vascular Carotid Procedure  Demographics   Patient Name      Monroe Daivs  Age                     74   Patient Number    [id number]       Gender                  Female   Visit Number      [id number]    Interpreting Physician  Tracy Aragon MD   Date of Birth     1955   Referring Physician     Oswaldo Cantrell   Accession Number  [id number]   [address]  Procedure Type of Study:   Cerebral:Carotid, VL CAROTID BILATERAL. Indications for Study:CVA and Weakness, right sided. Risk Factors   - Current - Every day. Impression   There is mixed plaque visualized in the bilateral internal carotid  arteries. There is less than 50% stenosis in the right internal carotid artery. There is no stenosis of the left internal carotid artery. There is normal antegrade flow in the bilateral vertebral arteries. CTA NECK W CONTRAST    Result Date: 11/12/2021  EXAM: CT NECK ANGIOGRAM CT HEAD ANGIOGRAM on  11/12/2021 12:54 PM COMPARISON:  CT chest dated August 23, 2021. HISTORY:  74years-old Female. Code stroke TECHNIQUE: Multiple CT images were obtained of the of the head and neck after the administration of IV contrast. 3D MIP reformats were generated  and were sent to PACS for interpretation. Grading of carotid artery stenosis is performed by NASCET criteria. FINDINGS: CT Neck Angiogram: There is a conventional aortic arch with patent origins of the brachiocephalic trunk, left common carotid and left subclavian arteries. The bilateral common carotid arteries and subclavian arteries are well-opacified. There is atherosclerotic disease of the bilateral carotid bifurcations, left greater than than right, with essentially 0% stenosis.  The bilateral internal carotid arteries are otherwise well opacified throughout. The bilateral vertebral arteries are well-opacified throughout. Additional findings: Emphysema. New left upper lobe opacities posteriorly, most concerning for infection. Additional right upper lobe opacity is also concerning for infection. Centrally necrotic right upper and lower lobe posterior mass, incompletely visualized but appears grossly stable when compared to prior examination. Small bilateral pleural effusions. CT Head Angiogram: The right internal carotid artery demonstrates normal course and caliber without evidence of flow limiting stenosis or occlusion. The major branch vessels to the right anterior and middle cerebral arteries are seen without evidence of stenosis or occlusion. There is no evidence of aneurysm or vascular malformation. Some atherosclerotic disease in the carotid siphon. The left internal carotid artery demonstrates normal course and caliber without evidence of flow limiting stenosis or occlusion. The major branch vessels to the left anterior and middle cerebral arteries are seen without evidence of stenosis or occlusion. There is no evidence of aneurysm or vascular malformation. Some atherosclerotic disease in the carotid siphon. Evaluation of the posterior circulation demonstrates normal caliber of the vertebral arteries, basilar artery, and major branch vessels of the posterior cerebral arteries bilaterally without evidence of flow limiting stenosis or occlusion. There is no evidence of aneurysm or vascular malformation. CT Angiography Of The Neck With Intravenous Contrast 1. No evidence of high-grade arterial stenosis or underlying dissection. 2. Bilateral upper lobe new opacities are concerning for infection. CT Angiography Of The Head With Intravenous Contrast 1. No evidence of acute thrombotic occlusion, high-grade arterial stenosis, or focal cerebral aneurysm. Signed by Dr Dionna Hawley    Result Date: 11/22/2021  Examination.  CTA PULMONARY W CONTRAST 11/22/2021 2:24 PM History: Respiratory distress. DLP: 363 mGycm. The CT angiography of the chest is performed after intravenous contrast enhancement. The images are acquired in axial plane and subsequent reconstruction in coronal and sagittal planes. There is no previous similar study for comparison. The correlation is made with CT scan of the chest dated 8/23/2021. There are extensive motion and respiratory artifacts which lowers the diagnostic yield of the study. There is good opacification of a dilated main pulmonary artery and right and left pulmonary arteries. There are no filling defects in the visualized opacified pulmonary arterial bed. Atheromatous changes of thoracic aorta. No aneurysmal dilatation. Atheromatous changes of coronary arteries. Significant dilatation of the main pulmonary artery and right and left pulmonary artery representing pulmonary arterial hypertension which is similar to the previous study. The RV/LV ratio is 44/28 which is abnormal suggesting right heart strain. There are extensive chronic emphysematous minutes lung changes. There is small right-sided and moderate left-sided pleural effusion which was not seen in the previous study. A cavitating lesion in the right upper lobe posteriorly is similar to the previous study. There are new nodular infiltrative changes in the left upper lobe posterior segment and in the right lower lobe posterior segment which was not seen in the previous study. There is persistent moderate diffuse narrowing of the right mainstem bronchus with some secretions/mucous debris which was not noted in the previous study. There is a mucous plugging in the right lower lobe posterior segmental bronchi. Similar changes to a lesser extent are seen in the left lower lobe. There is consolidation/atelectasis in the left lower lobe posterior segment.  There is an area of atelectasis involving the right upper lobe anterior segment which was not seen in the previous study. An endotracheal tube is seen in an optimal position. Nasogastric tube is seen in place. The soft tissues of the neck are not optimally visualized are evaluated due to extensive artifacts. There is no evidence of mediastinal or hilar lymphadenopathy. The limited visualized liver and spleen are unremarkable. The gallbladder is surgically absent. The pancreas and kidneys are incompletely included and not evaluated evaluated. The images reviewed in bone window show chronic degenerative changes of the thoracic and visualized lumbar spine. No focal bony lesion or bone abnormality. A right subclavian approach [employer] system is seen with distal end in the distal superior vena cava. No evidence of pulmonary embolism. Pulmonary arterial hypertension. Evidence of right heart strain. Extensive chronic emphysematous lung changes with superimposed acute infiltrate as detailed above. Bilateral pleural effusion which was not seen in the previous study. Complete atelectasis of the right upper lobe anterior segment which was not seen in the previous study. Persistent cavitating lesion in the right upper lobe posterior segment. Endotracheal tube and nasogastric tubes in place. A right subclavian approach [employer] system in place. Signed by Dr Austin Breeding Date: 11/12/2021  EXAM: CT NECK ANGIOGRAM CT HEAD ANGIOGRAM on  11/12/2021 12:54 PM COMPARISON:  CT chest dated August 23, 2021. HISTORY:  74years-old Female. Code stroke TECHNIQUE: Multiple CT images were obtained of the of the head and neck after the administration of IV contrast. 3D MIP reformats were generated  and were sent to PACS for interpretation. Grading of carotid artery stenosis is performed by NASCET criteria. FINDINGS: CT Neck Angiogram: There is a conventional aortic arch with patent origins of the brachiocephalic trunk, left common carotid and left subclavian arteries.  The bilateral common carotid arteries and subclavian arteries are well-opacified. There is atherosclerotic disease of the bilateral carotid bifurcations, left greater than than right, with essentially 0% stenosis. The bilateral internal carotid arteries are otherwise well opacified throughout. The bilateral vertebral arteries are well-opacified throughout. Additional findings: Emphysema. New left upper lobe opacities posteriorly, most concerning for infection. Additional right upper lobe opacity is also concerning for infection. Centrally necrotic right upper and lower lobe posterior mass, incompletely visualized but appears grossly stable when compared to prior examination. Small bilateral pleural effusions. CT Head Angiogram: The right internal carotid artery demonstrates normal course and caliber without evidence of flow limiting stenosis or occlusion. The major branch vessels to the right anterior and middle cerebral arteries are seen without evidence of stenosis or occlusion. There is no evidence of aneurysm or vascular malformation. Some atherosclerotic disease in the carotid siphon. The left internal carotid artery demonstrates normal course and caliber without evidence of flow limiting stenosis or occlusion. The major branch vessels to the left anterior and middle cerebral arteries are seen without evidence of stenosis or occlusion. There is no evidence of aneurysm or vascular malformation. Some atherosclerotic disease in the carotid siphon. Evaluation of the posterior circulation demonstrates normal caliber of the vertebral arteries, basilar artery, and major branch vessels of the posterior cerebral arteries bilaterally without evidence of flow limiting stenosis or occlusion. There is no evidence of aneurysm or vascular malformation. CT Angiography Of The Neck With Intravenous Contrast 1. No evidence of high-grade arterial stenosis or underlying dissection. 2. Bilateral upper lobe new opacities are concerning for infection.  CT Angiography Of The Head With Intravenous Contrast 1. No evidence of acute thrombotic occlusion, high-grade arterial stenosis, or focal cerebral aneurysm. Signed by Dr Autumn Abdi    [facility]    Result Date: 11/15/2021  MRI BRAIN W WO CONTRAST 11/15/2021 5:28 PM History: Stroke symptoms. Pre and postcontrast MR imaging of the brain. Comparison is made with CT imaging performed earlier today at 9:40 AM. Comparison is made with brain MRI from October 29, 2020. Based on diffusion-weighted imaging there are acute ischemic changes within the left parietal lobe and right frontal lobe. No hemorrhage or mass effect. Normal ventricle size. Patchy small vessel disease. Sphenoid, ethmoid, and right maxillary sinusitis changes. Patchy mastoid fluid. 1. Patchy acute ischemic changes within both cerebral hemispheres. 2. The larger ischemic focus is in the left parietal region and involves an area measuring approximately 30 x 17 mm. 3. No hemorrhage or mass effect. 4. Sinusitis changes in mastoid fluid. Signed by Dr Sofi Cervantes    My interpretation: Significant consolidation involving the right lower/upper lobes. This is consistent with pneumonia. ASSESSMENT:  #Respiratory failuresecondary to COPD/pneumonia  Consider broadening antibiotics spectrum    #Macrocytic anemia  Hemoglobin 8.2/    #Recent history of strokecurrently on Eliquis  #Stage III lung cancerstatus post chemoradiation followed by 1 year of durvalumab. #Poor prognosis-due to history of lung cancer, frailty,  diffuse #COPD      PLAN:  Continue current supportive care  Anemia profile for tomorrow  Continue antibiotics-consider more broad-spectrum antibiotics  I have seen, examined and reviewed this patient medication list, appropriate labs and imaging studies. I reviewed relevant medical records and others physicians notes. I discussed the plans of care with the patient. I answered all the questions to the patients satisfaction.  I have also reviewed the chief complaint (CC) and part of the history (History of Present Illness (HPI), Past Family Social History [facility]), or Review of Systems (ROS) and made changes when appropriated.        (Please note that portions of this note were completed with a voice recognition program. Efforts were made to edit the dictations but occasionally words are mis-transcribed.)      Frida Zurita MD    12/11/21  7:49 AM

## 2021-12-12 PROBLEM — E87.6 HYPOKALEMIA: Status: RESOLVED | Noted: 2021-01-01 | Resolved: 2021-01-01

## 2021-12-12 PROBLEM — D72.829 LEUKOCYTOSIS: Status: RESOLVED | Noted: 2021-01-01 | Resolved: 2021-01-01

## 2021-12-12 PROBLEM — E87.0 HYPERNATREMIA: Status: RESOLVED | Noted: 2021-01-01 | Resolved: 2021-01-01

## 2021-12-12 PROBLEM — E83.39 HYPOPHOSPHATEMIA: Status: ACTIVE | Noted: 2021-01-01

## 2021-12-12 PROBLEM — D72.829 LEUKOCYTOSIS: Status: ACTIVE | Noted: 2021-01-01

## 2021-12-12 NOTE — PROGRESS NOTES
Message sent to  staff at hematology/oncology clinic:    Please schedule Ms. Law a televisit with  in 4 weeks.   Please contact her with the appointment information

## 2021-12-12 NOTE — PROGRESS NOTES
Patient name: Marta Masterson  Patient : 2021  8:06 AM  ROOM 410    Portions of this note have been copied forward, however, changed to reflect the most current clinical status of this patient. Subjective: Feeling better this morning. Shortness of air significantly improved. HISTORY OF PRESENT ILLNESS:   The patient is a pleasant 74years old female who has several medical morbidities. She has been followed by Karen Abarca for a history of stage IIIb non-small cell lung cancer, squamous cell subtype. She was initially diagnosed in 2019. She also has a diagnosis of severe COPD on home O2. She continues to smoke. She received chemoradiation treatment for her lung cancer followed by 1 year of immunotherapy completed in 2021. She has had several hospitalizations. She was hospitalized 2021 with acute stroke. She is now hospitalized after she had a bowel failure at home and had no oxygen. She had significant decline in her breathing and therefore called [facility] and came to the ER. She was found to have neutrophil leukocytosis. Chest x-ray showed significant worsening of pneumonia throughout the right lung. She was admitted for further supportive care. I am being consulted for continued of care for her history of lung cancer. Diagnosis:  · NSCLC, SCC 2019   · [id number], stage IIIIB  · COPD      Treatment summary. · 6/10/2019 2019-carboplatin/Taxol weekly with concurrent radiation   ·  durvalumab x1 year completed     Interval history  Patient is a very pleasant 74years old female who has a diagnosis of stage IIIb non-small cell lung cancer, squamous cell subtype. She received initial treatment with carboplatin/Taxol weekly followed by concurrent radiation. She completed concurrent chemoradiation and is currently status post completion of durvalumab 2021. In addition, she has severe COPD.   She has Symbicort inhalers at home but is not using them. Unfortunately, she continues to smoke. She had a CT scan of the chest for surveillance. Cancer history  Ms Lorene Lynn was first seen by me on 5/3/2019 for further workup of a lung mass. She has complains of chest wall pain. Further workup revealed a lung mass. · 4/9/2019CT of the chest with contrast showed a malignant mass in the right upper lobe measures 6.5 x 2.8 x 5.1 cm. The mass invades and destroys the right lateral cortex and lateral aspect of the T4 vertebral body. Mediastinal adenopathy. Specifically, 1.9 cm pretracheal lymph node. 2.1 cm precarinal lymph node. 1.9 cm subcarina lymph node. Right hilar adenopathy. · 5/3/2019shbethel was first seen by me. · 5/15/2019bone scan and CT abdomen pelvis was unremarkable metastatic disease. · 5/16/2019CT-guided biopsy consistent non-small cell lung cancer, squamous cell carcinoma subtype. · 5/29/2019PET scan showed intense and metabolic activity associated with pleural-based right paraspinal mass within the right upper lobe. It invades into the adjacent thoracic vertebral body. Right hilar, subcarina and pretracheal lymphadenopathy. No evidence of muscle skeletal or intra-abdominal disease. · 6/7/2019recommended chemoradiation with carboplatin/Taxol weekly with concurrent radiation. · 10/14/2019 CT chest abdomen pelvis showed a cavitary lesion measuring 3.3 x 4.6 x 3.4 cm which appears to be decreased in size. Underlying lung emphysema. There is a new notable sclerosis within the right lateral aspect of T4 and T5 vertebral bodies at the site of the posterior mediastinal invasion by the right lung malignancy. CT of the abdomen pelvis showed tiny hypodense in the liver which are too small to characterize but similar to prior exam.  Attention to follow-up. Stable nodule in the left adrenal gland too small to characterize.   · 12/4/2019 started on treatment durvalumab every 2 weeks to complete 1 year.  · 2/19/2020CT chest abdomen pelvis showed slight decrease in size of the right upper lobe cavitary lesion. The lesion now measures 2.6 x 4.5 x 3.9 cm (previously 3.3 x 4.6 x 3.4 cm). Subtle cortical erosion of the right side of the centrum at T4/T5. No evidence of metastatic disease in the abdomen. Stable left adrenal nodule. · 4/29/2020 Xr Chest Standard A cavitation or a necrotic mass in the right upper lobe. This was noted in the previous CT scan of the chest dated 2/19/2020. An area of consolidation in the right upper lobe may represent inflammatory or infectious process. Ill-defined nodule in the left lower lobe may represent a nipple shadow or a pulmonary nodule. This could be further evaluated. Chronic inflammatory changes in the remaining lungs bilaterally. · 6/26/2020 Ct Chest W Contrast There is a pleural-based cavitary lesion within the right upper lobe posteriorly and medially. When measuring the lesion in the same manner as the previous study I do not feel there has been any significant progression from a previous CT of 2/19/2020. There is some increased consolidation within the medial right upper lobe likely representing post therapy change. There is a new small nodule more inferiorly within the right upper lobe as described above. This may also represent consolidation post therapy but as it is a more isolated finding I feel it could potentially represent an early recurrence. This would warrant follow-up on subsequent imaging. These findings are superimposed upon changes of centrilobular emphysema. There is stable scarring within the right posterior lung base. There is bronchial wall thickening within both lungs suggesting chronic bronchitis. Coronary calcifications are present. Borderline enlargement of the pulmonary arteries suggesting pulmonary arterial hypertension. Mild constipation within the upper abdomen.  Ct Abdomen Pelvis W Iv Contrast   No evidence of metastatic disease in the abdomen or pelvis. Stable biliary dilatation status post cholecystectomy which may represent reservoir phenomenon. Moderate to large volume stool in the colon, correlate for constipation. · 7/1/2020 discussed at length the CAT scans with the patient and radiology. No clear-cut evidence of disease progression. Therefore will continue treatment. · 9/16/20 CT Chest: Redemonstration of a cavitary lesion in the posterior right upper lobe with surrounding posttreatment changes, not significantly changed from the most recent comparison exam. New small noncalcified right lower lobe nodule for which attention on follow-up is recommended. Emphysema and pulmonary fibrosis. Pulmonary arterial hypertension. Atherosclerosis of the aorta and coronary arteries. · 10/29/20 MRI brain:  No acute intracranial abnormality. No evidence of intracranial neoplastic/metastatic disease. Chronic white matter ischemic changes. Chronic maxillary and ethmoid sinusitis. · 2/25/2021completion of 1 year of treatment with durvalumab. · 4/7/21 Ct Chest W Contrast  No significant interval change is seen. Posttreatment changes are noted in the right lung. 2 areas of soft tissue nodularity in the right upper lobe remains stable. There has been resolution of a right lower lobe area of nodular density. Right upper lung zone consolidation and cavitation posteriorly also appears stable. No pathologically enlarged lymph nodes. Emphysema and pulmonary fibrosis. Atheromatous disease of the thoracic aorta and coronary arteries. Dilated pulmonary arteries. 5. Prior cholecystectomy in the upper abdomen with dilated biliary tree likely related to reservoir effect. · 4/27/2021reviewed CT chest that showed no significant interval change. Stable disease. · 8/23/21 CT CHEST W CONTRAST  Stable chest CT. Right upper lobe changes are stable, mostly treatment related. This includes an area of juxtapleural consolidation with cavitation.  Narrowing of the right upper lobe bronchus is also seen. These findings are not significantly changed. No measurable mass identified. There are no new or enlarging pulmonary nodules. Underlying advanced lung emphysema. Evidence for pulmonary hypertension, likely related to the underlying diffuse lung disease. Advanced coronary atheromatous calcification. · 9/28/2021I reviewed results of CT chest.  No evidence of disease progression. Continue clinical/image surveillance. · 11/22/2021she was hospitalized at [facility] with acute on chronic respiratory failure. CT chest pulmonary protocol  showed No evidence of pulmonary embolism. Pulmonary arterial hypertension. Evidence of right heart strain. Extensive chronic emphysematous lung changes with superimposed acute infiltrate as detailed above. Bilateral pleural effusion which was not seen in the previous study. Complete atelectasis of the right upper lobe anterior segment which was not seen in the previous study.  Persistent cavitating lesion in the right upper lobe posterior segmen     Current Pain Assessment  Pain Assessment  Pain Assessment: 0-10  Pain Level: 0  Patient's Stated Pain Goal: No pain  Pain Type: Chronic pain  Pain Location: Back  Pain Orientation: Lower  Pain Radiating Towards: na  Pain Descriptors: Aching  Pain Frequency: Intermittent  Pain Onset: Gradual  Clinical Progression: Not changed  Functional Pain Assessment: Activities are not prevented  Non-Pharmaceutical Pain Intervention(s): Rest, Repositioned  Response to Pain Intervention: Asleep with RR greater than 10  Multiple Pain Sites: No    OBJECTIVE:  VITALS: /74   Pulse 83   Temp 97.8 °F (36.6 °C) (Temporal)   Resp 18   SpO2 96%   I&O:   Intake/Output Summary (Last 24 hours) at 12/12/2021 0806  Last data filed at 12/12/2021 0356  Gross per 24 hour   Intake 1304.04 ml   Output    Net 1304.04 ml     PHYSICAL EXAM:  CONSTITUTIONAL: Alert, appropriate, no acute distress  EYES: Non icteric, EOM intact, pupils equal round   ENT: Mucus membranes moist,external inspection of ears and nose are normal  NECK: Supple, no masses. No palpable thyroid mass  CHEST/LUNGS: CTA bilaterally, normal respiratory effort   CARDIOVASCULAR: RRR, no murmurs. No lower extremity edema  ABDOMEN: soft non-tender, active bowel sounds, no HSM. No palpable masses  EXTREMITIES: warm, full ROM in all 4 extremities, no focal weakness. SKIN: warm, dry with no rashes or lesions  LYMPH: No cervical, clavicular, axillary, or inguinal lymphadenopathy  NEUROLOGIC: follows commands, non focal   PSYCH: mood and affect appropriate. Alert and oriented to time, place, person    BMP:   Recent Labs     12/11/21 0327 12/12/21 0443   * 144   K 3.9 4.3    104   CO2 34* 34*   BUN 24* 22   CREATININE 0.2* 0.2*   GLUCOSE 132* 113*   CALCIUM 9.6 9.2     Recent Labs     12/12/21  0443 12/11/21  0327 12/10/21  0432   WBC 8.5 12.0* 12.4*   HGB 8.6* 8.2* 9.7*   HCT 29.3* 27.8* 33.4*   .6* 104.9* 106.4*    130 168     CMP:    Recent Labs     12/09/21  1601 12/09/21  1608 12/11/21 0327 12/12/21 0443      < > 146* 144   K 3.7   < > 3.9 4.3      < > 103 104   CO2 32*   < > 34* 34*   BUN 17   < > 24* 22   CREATININE 0.2*   < > 0.2* 0.2*   GFRAA >59   < > >59 >59   LABGLOM >60   < > >60 >60   GLUCOSE 137*   < > 132* 113*   PROT 6.4*  --   --   --    LABALBU 3.3*  --   --   --    CALCIUM 9.0   < > 9.6 9.2   BILITOT 0.4  --   --   --    ALKPHOS 94  --   --   --    AST 13  --   --   --    ALT 14  --   --   --     < > = values in this interval not displayed. 30Day lookback of cultures:    Blood Culture Recent:   Recent Labs     11/12/21  1225   BC No growth after 5 days of incubation.      Gram Stain Recent:   Recent Labs     11/29/21  1240   LABGRAM Few WBC's (Polymorphonuclear)  No Epithelial Cells seen  Few Gram positive rods  Rare Gram negative rods       Resp Culture Recent:   Recent Labs     11/29/21  1240 CULTRESP Moderate growth  Moderate growth  No further workup         Organism Recent :   Recent Labs     11/29/21  1240   ORG Pseudomonas aeruginosa*  Corynebacterium striatum*          Radiology:   ECHO Complete 2D W Doppler W Color    Result Date: 11/13/2021  Transthoracic Echocardiography Report (TTE)  Demographics   Patient Name   Carlitos Roberts   Date of Study           11/13/2021   MRN            [id number]        Gender                  Female   Date of Birth  1955    Room Number             [id number]   Age            74 year(s)   Height:        65 inches     Referring Physician     Alana Smith   Weight:        109 pounds    Sonographer   BSA:           1.53 m^2      Interpreting Physician  Josephine Kowalski MD   BMI:           18.14 kg/m^2  Procedure Type of Study   TTE procedure:ECHO 2D W/DOPPLER/COLOR/CONTRAST. Study Location: Portable Technical Quality: Limited visualization due to poor acoustical window. Patient Status: Inpatient Contrast Medium: Definity. Amount - 4 ml HR: 105 bpm BP: 99/57 mmHg  Conclusions   Summary  Normal size left atrium. bubble study negative  No evidence of pleural effusion. No evidence of significant pericardial effusion is noted. Structurally normal mitral valve with normal leaflet mobility. No evidence  of mitral valve stenosis or mild mitral regurgitation. aortic valve thickened with calcification. no as. ar  Tricuspid valve is structurally normal.  mild tr  pasp 40 mm hg   Signature   ----------------------------------------------------------------  Electronically signed by Josephine Kowalski MD(Interpreting  physician) on 11/14/2021 11:18 AM  ----------------------------------------------------------------   Findings   Mitral Valve  Structurally normal mitral valve with normal leaflet mobility. No evidence  of mitral valve stenosis or mild mitral regurgitation. Aortic Valve  aortic valve thickened with calcification.  no as. ar   Tricuspid Valve  Tricuspid valve is structurally normal.  mild tr  pasp 40 mm hg   Left Atrium  Normal size left atrium. bubble study negative   Left Ventricle  Normal left ventricular size with preserved LV function and an estimated  ejection fraction of approximately 55-60%. No evidence of left ventricular mass or thrombus noted. Right Atrium  Normal right atrial dimension with no evidence of thrombus or mass noted. Right Ventricle  Normal right ventricular size with preserved RV function. Pericardial Effusion  No evidence of significant pericardial effusion is noted. Pleural Effusion  No evidence of pleural effusion. M-Mode Measurements (cm)   LVIDd: 4.47 cm                         LVIDs: 3.24 cm  IVSd: 0.88 cm  LVPWd: 0.78 cm                         AO Root Dimension: 1.2 cm  Rt. Vent. Dimension: 3.21 cm           LA: 3 cm                                         LVOT: 2 cm  Doppler Measurements:   AV Peak Velocity:104 cm/s          MV Peak E-Wave: 77.1 cm/s  AV Peak Gradient: 4.33 mmHg        MV Peak A-Wave: 85.9 cm/s                                     MV E/A Ratio: 0.9 %  TR Velocity:309 cm/s               MV Peak Gradient: 2.38 mmHg  TR Gradient:38.19 mmHg  Estimated RAP:5 mmHg  RVSP:43 mmHg      CT HEAD WO CONTRAST    Result Date: 11/12/2021  CT HEAD WO CONTRAST 11/12/2021 11:25 AM HISTORY: Code stroke COMPARISON: None DOSE LENGTH PRODUCT: 961 mGy cm TECHNIQUE: Helical tomographic images of the brain were obtained without the use of intravenous contrast. Automated exposure control was also utilized to decrease patient radiation dose. FINDINGS: Exam is limited by motion artifact. There is no evidence of evolving large vascular territory infarct. Underlying chronic small vessel ischemic change. No visualized intra-axial or extra-axial hemorrhage. No mass lesion is identified. Normal size and configuration of the ventricular system. The basal cisterns are symmetric. Posterior fossa structures are unremarkable.  The included orbits and their contents are unremarkable. Chronic right maxillary sinus disease. The visualized osseous structures and overlying soft tissues of the skull and face are unremarkable. 1. No acute intracranial process. Signed by Dr Torsten Pickering    XR CHEST PORTABLE    Result Date: 12/9/2021  XR CHEST PORTABLE 12/9/2021 5:27 PM History: Short of breath. Portable chest x-ray. Comparison is made with a portable chest x-ray from November 29, 2021. Chronic interstitial lung disease. Worsening pneumonia throughout the right lung. Mildly increased interstitial infiltrate at the left lower lobe. Heart size is stable. Unchanged right chest wall port. No pneumothorax. 1. Significant worsening of pneumonia throughout the right lung. Signed by Dr Yevgeniy Obando    XR CHEST PORTABLE    Result Date: 11/29/2021  Frontal portable upright radiograph of the chest 11/29/2021 4:28 AM History: Shortness of breath Comparison: Chest x-ray dated November 28, 2021. Findings: Lines and tubes are stable in position. Significant increase in the right pleural effusion and associated opacities. The left lung is unchanged. The cardiomediastinal silhouette and pulmonary vascularity are unchanged. No acute osseous or soft tissue abnormality is noted. Impression: 1. Significant increase in the right pleural effusion and associated opacities. . Signed by Dr Scottie Chinchilla    XR CHEST PORTABLE    Result Date: 11/28/2021  XR CHEST PORTABLE 11/28/2021 3:30 AM HISTORY:   Respiratory distress  Single view. COMPARISONS:  11/24/2021 and 11/22/2021 FINDINGS: Endotracheal tube, NG tube and right-sided port again identified. There is slight improvement in bilateral lung infiltrates with less consolidation. Persistent patchy interstitial airspace opacities noted. The linear consolidation along the minor fissure no longer observed. The heart is normal in size. The bony structures are intact.     1. Mild Improvement in the radiograph appearance of the chest with decreasing bilateral infiltrates. Signed by Dr Mile Israel    Result Date: 11/24/2021  EXAMINATION: Chest one view 11/24/2020 HISTORY: Acute hypoxemic respiratory failure. FINDINGS: Today's exam is compared to previous study of 2 days earlier. An NG tube and endotracheal tube remain in place as well as a dual lumen infusion catheter in place via right-sided approach. There is a cavitary lesion within the right upper lobe. There is volume loss within the right lung with a pleurodiaphragmatic adhesion within the right lung base and elevation of the right hemidiaphragm. Predominantly interstitial infiltrate within the left lung has shown interval improvement although this is accentuated by the differences in technique. There are emphysematous changes of the lungs. 1.. Underlying emphysema. There is volume loss on the right with a cavitary lesion in the right upper lobe and a pleurodiaphragmatic adhesion in the right base. 2. Predominantly interstitial process within the left lung either representing an interstitial pneumonia or edema shows interval improvement. 3. No interval line changes. Signed by Dr Janet Roper    Result Date: 11/22/2021  XR CHEST PORTABLE 11/22/2021 8:24 AM HISTORY:   Respiratory distress  Single view. COMPARISONS:  11/18/2021 and 11/15/2021 FINDINGS: Endotracheal tube, NG tube and right-sided port catheter again identified. Right lung volume loss with diaphragm elevation observed with thickening along the minor fissure with diffuse interstitial prominence. Mild patchy airspace opacities and interstitial prominence identified bilaterally, likely unchanged. 1. Stable radiographic appearance the chest bilateral infiltrative opacities. Signed by Dr Kristei Gomes    XR CHEST PORTABLE    Result Date: 11/18/2021  Examination. XR CHEST PORTABLE 11/18/2021 4:35 AM History: The patient on a vent.  A single frontal portable semiupright view of the chest is compared with the previous study dated 11/15/2021. The lungs are hyperinflated, left more than the right. There is elevation of the right diaphragm with evidence of pleural diaphragmatic adhesions. There is an area of discoid atelectasis in the right midlung. There is a small right basal pleural effusion. These have moderately improved since the previous study. There is bilateral coarse interstitial infiltrate which is similar to the previous study. The endotracheal tube and nasogastric tubes are in stable and unchanged position. A right subclavian approach MediPort system is seen in place. No acute bony abnormality. Moderate improvement, predominantly in the right lung, as detailed above. Signed by Dr Ken Flores    Result Date: 11/15/2021  XR CHEST PORTABLE 11/15/2021 3:42 PM History: Respiratory distress. Tube placement. Endotracheal tube and nasogastric tube present. The nasogastric tube is in good position extending at least to the mid stomach. The endotracheal tube is adequately positioned though low-lying being approximately 8 mm from the cayla. There is right greater than left bibasilar infiltrate. Diffuse underlying interstitial lung disease has the appearance of mild edema. A right subclavian port is in good position. 1. Right greater than left bibasilar infiltrate compatible with pneumonia. 2. Interstitial prominence compatible with underlying edema. 3. Well-positioned nasogastric tube. 4. Endotracheal tube present. The tip is within 1 cm above the cayla. Position is adequate though optimal positioning would be to pull this back 3 cm. Signed by Dr Angélica Ridley    XR CHEST PORTABLE    Result Date: 11/12/2021  XR CHEST PORTABLE 11/12/2021 11:36 AM HISTORY: Code stroke  Technique: Single AP view of the chest COMPARISONS: Chest exam dated 4/29/2020 FINDINGS: Reidentified right suprahilar consolidation with volume loss.  New developing consolidations in the left upper and left lower lobes with air bronchograms. No obvious pleural effusion. Underlying lung emphysema. Heart size is stable. The pulmonary vasculature are nondilated. Right chest wall [id number]. Spinal scoliotic curvature. 1. New multifocal consolidation in the left upper and left lower lobes, appearance concerning for new multifocal pneumonia. 2. Treatment-related changes in the right suprahilar region with scarring and volume loss. Signed by Dr Carmie Skiff    Vascular carotid duplex bilateral        Result Date: 11/12/2021  EXAM: CT NECK ANGIOGRAM CT HEAD ANGIOGRAM on  11/12/2021 12:54 PM COMPARISON:  CT chest dated August 23, 2021. HISTORY:  74years-old Female. Code stroke TECHNIQUE: Multiple CT images were obtained of the of the head and neck after the administration of IV contrast. 3D MIP reformats were generated  and were sent to PACS for interpretation. Grading of carotid artery stenosis is performed by NASCET criteria. FINDINGS: CT Neck Angiogram: There is a conventional aortic arch with patent origins of the brachiocephalic trunk, left common carotid and left subclavian arteries. The bilateral common carotid arteries and subclavian arteries are well-opacified. There is atherosclerotic disease of the bilateral carotid bifurcations, left greater than than right, with essentially 0% stenosis. The bilateral internal carotid arteries are otherwise well opacified throughout. The bilateral vertebral arteries are well-opacified throughout. Additional findings: Emphysema. New left upper lobe opacities posteriorly, most concerning for infection. Additional right upper lobe opacity is also concerning for infection. Centrally necrotic right upper and lower lobe posterior mass, incompletely visualized but appears grossly stable when compared to prior examination. Small bilateral pleural effusions.  CT Head Angiogram: The right internal carotid artery demonstrates normal course and caliber without evidence of flow limiting stenosis or occlusion. The major branch vessels to the right anterior and middle cerebral arteries are seen without evidence of stenosis or occlusion. There is no evidence of aneurysm or vascular malformation. Some atherosclerotic disease in the carotid siphon. The left internal carotid artery demonstrates normal course and caliber without evidence of flow limiting stenosis or occlusion. The major branch vessels to the left anterior and middle cerebral arteries are seen without evidence of stenosis or occlusion. There is no evidence of aneurysm or vascular malformation. Some atherosclerotic disease in the carotid siphon. Evaluation of the posterior circulation demonstrates normal caliber of the vertebral arteries, basilar artery, and major branch vessels of the posterior cerebral arteries bilaterally without evidence of flow limiting stenosis or occlusion. There is no evidence of aneurysm or vascular malformation. CT Angiography Of The Neck With Intravenous Contrast 1. No evidence of high-grade arterial stenosis or underlying dissection. 2. Bilateral upper lobe new opacities are concerning for infection. CT Angiography Of The Head With Intravenous Contrast 1. No evidence of acute thrombotic occlusion, high-grade arterial stenosis, or focal cerebral aneurysm. Signed by Dr Sharon Ta    Result Date: 11/22/2021  Examination. CTA PULMONARY W CONTRAST 11/22/2021 2:24 PM History: Respiratory distress. DLP: 363 mGycm. The CT angiography of the chest is performed after intravenous contrast enhancement. The images are acquired in axial plane and subsequent reconstruction in coronal and sagittal planes. There is no previous similar study for comparison. The correlation is made with CT scan of the chest dated 8/23/2021. There are extensive motion and respiratory artifacts which lowers the diagnostic yield of the study.  There is good opacification of a dilated main pulmonary artery and right and left pulmonary arteries. There are no filling defects in the visualized opacified pulmonary arterial bed. Atheromatous changes of thoracic aorta. No aneurysmal dilatation. Atheromatous changes of coronary arteries. Significant dilatation of the main pulmonary artery and right and left pulmonary artery representing pulmonary arterial hypertension which is similar to the previous study. The RV/LV ratio is 44/28 which is abnormal suggesting right heart strain. There are extensive chronic emphysematous minutes lung changes. There is small right-sided and moderate left-sided pleural effusion which was not seen in the previous study. A cavitating lesion in the right upper lobe posteriorly is similar to the previous study. There are new nodular infiltrative changes in the left upper lobe posterior segment and in the right lower lobe posterior segment which was not seen in the previous study. There is persistent moderate diffuse narrowing of the right mainstem bronchus with some secretions/mucous debris which was not noted in the previous study. There is a mucous plugging in the right lower lobe posterior segmental bronchi. Similar changes to a lesser extent are seen in the left lower lobe. There is consolidation/atelectasis in the left lower lobe posterior segment. There is an area of atelectasis involving the right upper lobe anterior segment which was not seen in the previous study. An endotracheal tube is seen in an optimal position. Nasogastric tube is seen in place. The soft tissues of the neck are not optimally visualized are evaluated due to extensive artifacts. There is no evidence of mediastinal or hilar lymphadenopathy. The limited visualized liver and spleen are unremarkable. The gallbladder is surgically absent. The pancreas and kidneys are incompletely included and not evaluated evaluated.  The images reviewed in bone window show chronic degenerative changes of the thoracic and visualized lumbar spine. No focal bony lesion or bone abnormality. A right subclavian approach MediPort system is seen with distal end in the distal superior vena cava. No evidence of pulmonary embolism. Pulmonary arterial hypertension. Evidence of right heart strain. Extensive chronic emphysematous lung changes with superimposed acute infiltrate as detailed above. Bilateral pleural effusion which was not seen in the previous study. Complete atelectasis of the right upper lobe anterior segment which was not seen in the previous study. Persistent cavitating lesion in the right upper lobe posterior segment. Endotracheal tube and nasogastric tubes in place. A right subclavian approach MediPort system in place. Signed by Dr Lockett Asa Date: 11/12/2021  EXAM: CT NECK ANGIOGRAM CT HEAD ANGIOGRAM on  11/12/2021 12:54 PM COMPARISON:  CT chest dated August 23, 2021. HISTORY:  74years-old Female. Code stroke TECHNIQUE: Multiple CT images were obtained of the of the head and neck after the administration of IV contrast. 3D MIP reformats were generated  and were sent to PACS for interpretation. Grading of carotid artery stenosis is performed by NASCET criteria. FINDINGS: CT Neck Angiogram: There is a conventional aortic arch with patent origins of the brachiocephalic trunk, left common carotid and left subclavian arteries. The bilateral common carotid arteries and subclavian arteries are well-opacified. There is atherosclerotic disease of the bilateral carotid bifurcations, left greater than than right, with essentially 0% stenosis. The bilateral internal carotid arteries are otherwise well opacified throughout. The bilateral vertebral arteries are well-opacified throughout. Additional findings: Emphysema. New left upper lobe opacities posteriorly, most concerning for infection. Additional right upper lobe opacity is also concerning for infection.  Centrally necrotic right upper and lower lobe posterior mass, incompletely visualized but appears grossly stable when compared to prior examination. Small bilateral pleural effusions. CT Head Angiogram: The right internal carotid artery demonstrates normal course and caliber without evidence of flow limiting stenosis or occlusion. The major branch vessels to the right anterior and middle cerebral arteries are seen without evidence of stenosis or occlusion. There is no evidence of aneurysm or vascular malformation. Some atherosclerotic disease in the carotid siphon. The left internal carotid artery demonstrates normal course and caliber without evidence of flow limiting stenosis or occlusion. The major branch vessels to the left anterior and middle cerebral arteries are seen without evidence of stenosis or occlusion. There is no evidence of aneurysm or vascular malformation. Some atherosclerotic disease in the carotid siphon. Evaluation of the posterior circulation demonstrates normal caliber of the vertebral arteries, basilar artery, and major branch vessels of the posterior cerebral arteries bilaterally without evidence of flow limiting stenosis or occlusion. There is no evidence of aneurysm or vascular malformation. CT Angiography Of The Neck With Intravenous Contrast 1. No evidence of high-grade arterial stenosis or underlying dissection. 2. Bilateral upper lobe new opacities are concerning for infection. CT Angiography Of The Head With Intravenous Contrast 1. No evidence of acute thrombotic occlusion, high-grade arterial stenosis, or focal cerebral aneurysm. Signed by Dr Karen Yoder    MRI Issac Gash    Result Date: 11/15/2021  MRI BRAIN W WO CONTRAST 11/15/2021 5:28 PM History: Stroke symptoms. Pre and postcontrast MR imaging of the brain. Comparison is made with CT imaging performed earlier today at 9:40 AM. Comparison is made with brain MRI from October 29, 2020.  Based on diffusion-weighted imaging there are acute ischemic changes within the left parietal lobe and right frontal lobe. No hemorrhage or mass effect. Normal ventricle size. Patchy small vessel disease. Sphenoid, ethmoid, and right maxillary sinusitis changes. Patchy mastoid fluid. 1. Patchy acute ischemic changes within both cerebral hemispheres. 2. The larger ischemic focus is in the left parietal region and involves an area measuring approximately 30 x 17 mm. 3. No hemorrhage or mass effect. 4. Sinusitis changes in mastoid fluid.  Signed by Dr Mikala Melendez    Medications  Current Facility-Administered Medications   Medication Dose Route Frequency Provider Last Rate Last Admin    folic acid (FOLVITE) tablet 1 mg  1 mg Oral Daily Avery Carlson MD        cyanocobalamin injection 1,000 mcg  1,000 mcg IntraMUSCular Once Avery Carlson MD        dextrose 5 % with KCl 20 mEq infusion   IntraVENous Continuous Avery Carlson  mL/hr at 12/11/21 1808 New Bag at 12/11/21 1808    citalopram (CELEXA) tablet 20 mg  20 mg Oral Daily Mando Cram, APRN   20 mg at 12/11/21 1031    thiamine mononitrate tablet 100 mg  100 mg Oral Daily [place] Cram, APRN   100 mg at 12/11/21 1031    budesonide (PULMICORT) nebulizer suspension 500 mcg  0.5 mg Nebulization BID [place] Cram, APRN   500 mcg at 12/12/21 0615    Arformoterol Tartrate (BROVANA) nebulizer solution 15 mcg  15 mcg Nebulization BID Mando Cram, APRN   15 mcg at 12/12/21 0615    levoFLOXacin (LEVAQUIN) 750 MG/150ML infusion 750 mg  750 mg IntraVENous Q24H Mando Cram, APRN   Stopped at 12/12/21 0248    [Held by provider] morphine (MS CONTIN) extended release tablet 15 mg  15 mg Oral 2 times per day Morgan Phalen, MD        oxyCODONE-acetaminophen (PERCOCET)  MG per tablet 1 tablet  1 tablet Oral Q6H PRN Morgan Phalen, MD   1 tablet at 12/12/21 0006    potassium chloride (KLOR-CON M) extended release tablet 40 mEq  40 mEq Oral PRN Morgan Phalen, MD        Or    potassium bicarb-citric acid (EFFER-K) effervescent tablet 40 mEq  40 mEq Oral PRN Arlin Patel MD   40 mEq at 12/10/21 1830    Or    potassium chloride 10 mEq/100 mL IVPB (Peripheral Line)  10 mEq IntraVENous PRN Arlin Patel MD        apixaban [facility]) tablet 5 mg  5 mg Oral BID Katiuskaelodia Hoyos, APRN   5 mg at 12/11/21 2130    aspirin EC tablet 81 mg  81 mg Oral Daily Katiuska Romain, APRN   81 mg at 12/11/21 1031    atorvastatin (LIPITOR) tablet 40 mg  40 mg Oral Nightly Katiuska Romain, APRN   40 mg at 12/11/21 2130    calcium carbonate tablet 600 mg  600 mg Oral Daily Katiuska Hoyos, APRN        vitamin D (ERGOCALCIFEROL) capsule 50,000 Units  50,000 Units Oral Weekly Katiuska Hernandezph, APRN        midodrine (PROAMATINE) tablet 10 mg  10 mg Oral TID WC Katiuska Romain, APRN   10 mg at 12/11/21 1809    sodium chloride flush 0.9 % injection 5-40 mL  5-40 mL IntraVENous 2 times per day Katiuska Romain, APRN   10 mL at 12/11/21 1031    sodium chloride flush 0.9 % injection 5-40 mL  5-40 mL IntraVENous PRN Katiuska Romain, APRN        0.9 % sodium chloride infusion  25 mL IntraVENous PRN Katiuska Romain, APRN        ondansetron (ZOFRAN-ODT) disintegrating tablet 4 mg  4 mg Oral Q8H PRN Katiuska Romain, APRN        Or    ondansetron [facility]) injection 4 mg  4 mg IntraVENous Q6H PRN Katiuska Romain, APRN        polyethylene glycol (GLYCOLAX) packet 17 g  17 g Oral Daily PRN Katiuska Romain, APRN        acetaminophen (TYLENOL) tablet 650 mg  650 mg Oral Q6H PRN Katiuska Romain, APRN   650 mg at 12/10/21 0056    Or    acetaminophen (TYLENOL) suppository 650 mg  650 mg Rectal Q6H PRN Katiuska Romain, APRN        ipratropium-albuterol (DUONEB) nebulizer solution 1 ampule  1 ampule Inhalation Q4H WA Katiuska Hernandezph, APRN   1 ampule at 12/12/21 0615    predniSONE (DELTASONE) tablet 40 mg  40 mg Oral Daily DOMINICK Malave           Allergies  No known this time. We will follow-up with the patient in clinic.   Loren Matthew MD

## 2021-12-12 NOTE — PLAN OF CARE
Problem: Falls - Risk of:  Goal: Will remain free from falls  Description: Will remain free from falls  Outcome: Ongoing  Goal: Absence of physical injury  Description: Absence of physical injury  Outcome: Ongoing     Problem: Skin Integrity:  Goal: Will show no infection signs and symptoms  Description: Will show no infection signs and symptoms  Outcome: Ongoing  Goal: Absence of new skin breakdown  Description: Absence of new skin breakdown  Outcome: Ongoing     Problem: Fluid Volume:  Goal: Ability to achieve a balanced intake and output will improve  Description: Ability to achieve a balanced intake and output will improve  Outcome: Ongoing     Problem: Physical Regulation:  Goal: Ability to maintain clinical measurements within normal limits will improve  Description: Ability to maintain clinical measurements within normal limits will improve  Outcome: Ongoing  Goal: Will show no signs and symptoms of electrolyte imbalance  Description: Will show no signs and symptoms of electrolyte imbalance  Outcome: Ongoing     Problem: Pain:  Goal: Pain level will decrease  Description: Pain level will decrease  Outcome: Ongoing  Goal: Control of acute pain  Description: Control of acute pain  Outcome: Ongoing  Goal: Control of chronic pain  Description: Control of chronic pain  Outcome: Ongoing
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
## 2021-12-12 NOTE — PROGRESS NOTES
Patient has been recently hospitalized at [facility].  Arrange follow-up telephone visit in 4 weeks.

## 2021-12-13 NOTE — CARE COORDINATION
[facility] Transitions Initial Follow Up Call    Call within 2 business days of discharge: Yes    Patient: Donetta Koyanagi Patient : 1955   MRN: [id number]  Reason for Admission: Respiratory Failure  Discharge Date: 21 RARS: Readmission Risk Score: 26.4 ( )      Last Discharge [facility]       Complaint Diagnosis Description Type Department Provider    21 Respiratory Distress Acute on chronic respiratory failure with hypoxia and hypercapnia ([address] ED to Hosp-Admission (Discharged) (ADMITTED) [facility] ONC Avery No MD; Keagan Singletary. .. Spoke with: daughter in law    Facility:[facility]    Non-face-to-face services provided:  Reviewed encounter information for continuity of care prior to follow up Care Transitions phone call - chart notes, consults, progress notes, test results, med list, appointments, AVS, other information. Care Transitions 24 Hour Call    Care Transitions Interventions       Placed a call to the number listed for patient for an initial follow up call for Care Transitions Coordination following discharge from the hospital. Spoke with patient's daughter in law regarding hospitalization, discharge and status thus far. She reported that she and her  live with patient and they are both under quarantine because of COVID 19, so when patient was discharged, she went to stay with her brother. She does not know the number for where she is, but she will try to get in touch with her and have her call me. Gave her my contact information and generalized message regarding purpose of call. Will await a call back or call tomorrow if I have not heard from her.        Follow Up  Future Appointments   Date Time Provider Ingrid Luis   2021 11:00 AM MD Zeenat Griffin Pulm [facility]   1/3/2022 11:00 AM MD MALU Emmanuel PAD [facility]   3/7/2022 10:00 AM [facility] CT RM 1 [facility] CT [facility] Rad   3/15/2022  2:15 PM [facility]       Humberto Lopez RN

## 2021-12-14 NOTE — CARE COORDINATION
[facility] Transitions Initial Follow Up Call    Call within 2 business days of discharge: Yes    Patient: Aury Cates Patient : 1955   MRN: [id number]  Discharge Date: 21 RARS: Readmission Risk Score: 26.4 ( )      Last Discharge [facility]       Complaint Diagnosis Description Type Department Provider    21 Respiratory Distress Acute on chronic respiratory failure with hypoxia and hypercapnia ([address] ED to Hosp-Admission (Discharged) (ADMITTED) [facility] ONC Avery Philippe MD; Michelle Donaldson. .. Spoke with: N/A    Facility: [address] [place] [address]    Non-face-to-face services provided:  Reviewed encounter information for continuity of care prior to follow up Care Transitions phone call - chart notes, consults, progress notes, test results, med list, appointments, AVS, other information. Care Transitions 24 Hour Call    Care Transitions Interventions       Attempted to make contact with patient/caregiver for an initial transitions of care follow up call post discharge without success. Unable to leave a message regarding intent of call and call back information. Call was forwarded to an unidentifiable voice messaging system (mobile voice mail or telephone answering machine). No return call from yesterday's attempt to contact patient. As this repeated attempt to make contact was unsuccessful, will disengage at this time. Any previously scheduled hospital follow up appointments noted below.       Follow Up  Future Appointments   Date Time Provider Ingrid Luis   2021 11:00 AM MD Rosi Olverah Pulm [facility]   1/3/2022 11:00 AM Firman Sever, MD N PAD HEMONC [facility]   3/7/2022 10:00 AM [facility] CT RM 1 [facility] L[facility] CT [facility] Rad   3/15/2022  2:15 PM Firman Sever, MD N NEDA [facility]       Mik Bach, ELIS

## 2021-12-17 NOTE — TELEPHONE ENCOUNTER
Patients daughter called and stated patient was taking pain medication every 6 hours and patient was continuing to have pain. She stated that patient could only last about 4 hours and then she was in pain again. Spoke with Isaias Armstrong stated patient could take Percocet 10 every 4 hours. I explained this to the patients daughter and she expressed understanding.  Electronically signed by Austin Contreras RN on 12/17/2021 at 4:15 PM

## 2021-12-29 PROBLEM — R06.09 DOE (DYSPNEA ON EXERTION): Status: ACTIVE | Noted: 2021-01-01

## 2021-12-29 PROBLEM — J96.11 CHRONIC RESPIRATORY FAILURE WITH HYPOXIA (HCC): Status: ACTIVE | Noted: 2021-01-01

## 2021-12-29 NOTE — PROGRESS NOTES
Pulmonary and Sleep Medicine    Aidee Cates (:  1955) is a 74 y.o. female,Established patient, here for evaluation of the following chief complaint(s):  New Patient ( 2 Week hospital follow up; Acute chronic respiratory failure with hypoxia and hypercapnia)      Referring physician:  No referring provider defined for this encounter. ASSESSMENT/PLAN:  1. Chronic respiratory failure with hypoxia (HCC)  2. Centrilobular emphysema ([address]  -     Full PFT Study With Bronchodilator; Future  -     albuterol sulfate HFA (VENTOLIN HFA) 108 (90 Base) MCG/ACT inhaler; Inhale 2 puffs into the lungs 4 times daily as needed for Wheezing, Disp-18 g, R-5Normal  3. WHITEHEAD (dyspnea on exertion)  4. Primary squamous cell carcinoma of right lung ([address]  5. Wheezing  -     Full PFT Study With Bronchodilator; Future        Continue current management with her oxygen. Continue current management with the Symbicort. We will give her an albuterol inhaler for as needed use. She had been using nebulized albuterol 4 times a day. We instructed her that the nebulized albuterol is only as needed. Get pulmonary function testing. Jesus Vidal MD, [facility], [facility]    Return in about 3 months (around 3/29/2022). SUBJECTIVE/OBJECTIVE:  The patient is here for follow-up after hospital discharge. She was seen in December during her hospitalization and admission. At that time she was found to have possible endobronchial obstruction. She had a bronchoscopy done by myself in the intensive care unit while intubated. There was no evidence of endobronchial lesion obstruction. She is at her baseline. She is using her oxygen. She does use inhaled bronchodilators. She has not smoked ever since her hospitalization. Prior to Visit Medications    Medication Sig Taking? Authorizing Provider   oxyCODONE-acetaminophen (PERCOCET)  MG per tablet Take 1 tablet by mouth every 4 hours as needed for Pain for up to 30 days.  Intended supply: 30 days Yes Yee Pacheco MD   folic acid (FOLVITE) 1 MG tablet Take 1 tablet by mouth daily Yes Avery Carlson MD   methylPREDNISolone (MEDROL DOSEPACK) 4 MG tablet Take by mouth. Yes Avery Carlson MD   apixaban (ELIQUIS) 5 MG TABS tablet Take 1 tablet by mouth 2 times daily Yes Cathy Pendleton MD   atorvastatin (LIPITOR) 40 MG tablet Take 1 tablet by mouth nightly Yes Cathy Pendleton MD   nicotine (NICODERM CQ) 21 MG/24HR Place 1 patch onto the skin daily Yes Cathy Pendleton MD   midodrine (PROAMATINE) 10 MG tablet Take 1 tablet by mouth 3 times daily (with meals) Yes Cathy Pendleton MD   vitamin B-1 (THIAMINE) 100 MG tablet Take 1 tablet by mouth daily Yes Cathy Pendleton MD   Cholecalciferol (VITAMIN D3) 82258 units CAPS Take 50,000 Units by mouth Yes Historical Provider, MD   Glycopyrrolate (SEEBRI NEOHALER) 15.6 MCG CAPS Inhale 1 capsule into the lungs Yes Historical Provider, MD   lidocaine viscous hcl (XYLOCAINE) 2 % SOLN solution  Yes Historical Provider, MD   vitamin D (ERGOCALCIFEROL) 07350 units CAPS capsule cholecalciferol (vitamin D3) 50,000 unit capsule   Take 1 capsule every week by oral route. Yes Historical Provider, MD   OXYGEN Inhale 2 L/min into the lungs Yes Historical Provider, MD   budesonide-formoterol (SYMBICORT) 160-4.5 MCG/ACT AERO Symbicort 160 mcg-4.5 mcg/actuation HFA aerosol inhaler   Inhale 2 puffs twice a day by inhalation route. Yes Historical Provider, MD   albuterol sulfate HFA (PROAIR HFA) 108 (90 Base) MCG/ACT inhaler ProAir HFA 90 mcg/actuation aerosol inhaler   Inhale 2 puffs every 4 hours by inhalation route as needed. Yes Historical Provider, MD   aspirin EC 81 MG EC tablet Take 81 mg by mouth Yes Historical Provider, MD   calcium carbonate 600 MG TABS tablet Take 600 mg by mouth Yes Historical Provider, MD   citalopram (CELEXA) 20 MG tablet citalopram 20 mg tablet   Take 1 tablet every day by oral route.  Yes Historical Provider, MD        Review of Systems   Constitutional: Negative for activity change, appetite change, chills, diaphoresis and fatigue. HENT: Negative for congestion, dental problem, drooling, ear discharge, postnasal drip and rhinorrhea. Eyes: Negative for visual disturbance. Respiratory: Positive for shortness of breath and wheezing. Negative for apnea, cough and chest tightness. Gastrointestinal: Negative for abdominal distention, abdominal pain and anal bleeding. Endocrine: Negative for cold intolerance, heat intolerance and polydipsia. Genitourinary: Negative for difficulty urinating, dysuria, enuresis and flank pain. Musculoskeletal: Negative for arthralgias, back pain and gait problem. Allergic/Immunologic: Negative for environmental allergies. Neurological: Negative for dizziness, facial asymmetry, light-headedness and headaches. Vitals:    12/29/21 1122   BP: 98/68   Pulse: 87   Temp: 97.9 °F (36.6 °C)   SpO2: 95%     Physical Exam  Vitals reviewed. Constitutional:       Appearance: Normal appearance. Comments: Weak. HENT:      Head: Normocephalic and atraumatic. Nose: Nose normal.   Eyes:      Extraocular Movements: Extraocular movements intact. Conjunctiva/sclera: Conjunctivae normal.   Cardiovascular:      Rate and Rhythm: Normal rate and regular rhythm. Heart sounds: No murmur heard. No friction rub. Pulmonary:      Effort: Pulmonary effort is normal. No respiratory distress. Breath sounds: Normal breath sounds. No stridor. No wheezing, rhonchi or rales. Abdominal:      General: There is no distension. Palpations: There is no mass. Tenderness: There is no abdominal tenderness. There is no guarding or rebound. Musculoskeletal:      Cervical back: Normal range of motion and neck supple. Neurological:      Mental Status: She is alert and oriented to person, place, and time. This note was generated used a voice recognition software.  Errors in voice recognition may have occurred. An electronic signature was used to authenticate this note.     --Makenzie Pickering MD

## 2022-01-01 ENCOUNTER — TELEPHONE (OUTPATIENT)
Dept: PULMONOLOGY | Age: 67
End: 2022-01-01

## 2022-01-01 ENCOUNTER — TELEPHONE (OUTPATIENT)
Dept: RESPIRATORY THERAPY | Age: 67
End: 2022-01-01

## 2022-01-01 ENCOUNTER — APPOINTMENT (OUTPATIENT)
Dept: CT IMAGING | Age: 67
DRG: 189 | End: 2022-01-01
Payer: MEDICARE

## 2022-01-01 ENCOUNTER — OFFICE VISIT (OUTPATIENT)
Dept: HEMATOLOGY | Age: 67
End: 2022-01-01
Payer: MEDICARE

## 2022-01-01 ENCOUNTER — CARE COORDINATION (OUTPATIENT)
Dept: CASE MANAGEMENT | Age: 67
End: 2022-01-01

## 2022-01-01 ENCOUNTER — APPOINTMENT (OUTPATIENT)
Dept: GENERAL RADIOLOGY | Age: 67
DRG: 189 | End: 2022-01-01
Payer: MEDICARE

## 2022-01-01 ENCOUNTER — HOSPITAL ENCOUNTER (OUTPATIENT)
Dept: CT IMAGING | Age: 67
Discharge: HOME | End: 2022-03-07
Payer: MEDICARE

## 2022-01-01 ENCOUNTER — TELEPHONE (OUTPATIENT)
Dept: HEMATOLOGY | Age: 67
End: 2022-01-01

## 2022-01-01 ENCOUNTER — HOSPITAL ENCOUNTER (INPATIENT)
Age: 67
LOS: 17 days | Discharge: HOME | DRG: 177 | End: 2022-02-28
Attending: EMERGENCY MEDICINE | Admitting: STUDENT IN AN ORGANIZED HEALTH CARE EDUCATION/TRAINING PROGRAM
Payer: MEDICARE

## 2022-01-01 ENCOUNTER — HOSPITAL ENCOUNTER (INPATIENT)
Age: 67
LOS: 3 days | Discharge: HOSPICE | DRG: 189 | End: 2022-07-27
Attending: EMERGENCY MEDICINE | Admitting: HOSPITALIST
Payer: MEDICARE

## 2022-01-01 ENCOUNTER — APPOINTMENT (OUTPATIENT)
Dept: GENERAL RADIOLOGY | Age: 67
DRG: 177 | End: 2022-01-01
Payer: MEDICARE

## 2022-01-01 ENCOUNTER — APPOINTMENT (OUTPATIENT)
Dept: GENERAL RADIOLOGY | Age: 67
End: 2022-01-01
Payer: MEDICARE

## 2022-01-01 ENCOUNTER — HOSPITAL ENCOUNTER (EMERGENCY)
Age: 67
Discharge: HOME | End: 2022-01-27
Attending: EMERGENCY MEDICINE
Payer: MEDICARE

## 2022-01-01 VITALS
HEIGHT: 65 IN | HEART RATE: 92 BPM | WEIGHT: 110 LBS | RESPIRATION RATE: 20 BRPM | OXYGEN SATURATION: 92 % | DIASTOLIC BLOOD PRESSURE: 73 MMHG | SYSTOLIC BLOOD PRESSURE: 132 MMHG | BODY MASS INDEX: 18.33 KG/M2 | TEMPERATURE: 97.9 F

## 2022-01-01 VITALS
BODY MASS INDEX: 16.9 KG/M2 | HEART RATE: 91 BPM | DIASTOLIC BLOOD PRESSURE: 61 MMHG | WEIGHT: 101.44 LBS | TEMPERATURE: 97 F | SYSTOLIC BLOOD PRESSURE: 93 MMHG | RESPIRATION RATE: 20 BRPM | HEIGHT: 65 IN | OXYGEN SATURATION: 96 %

## 2022-01-01 VITALS
BODY MASS INDEX: 18.64 KG/M2 | DIASTOLIC BLOOD PRESSURE: 58 MMHG | SYSTOLIC BLOOD PRESSURE: 96 MMHG | HEART RATE: 76 BPM | WEIGHT: 112 LBS | OXYGEN SATURATION: 90 %

## 2022-01-01 VITALS
HEART RATE: 84 BPM | BODY MASS INDEX: 21.17 KG/M2 | DIASTOLIC BLOOD PRESSURE: 64 MMHG | OXYGEN SATURATION: 97 % | WEIGHT: 124 LBS | SYSTOLIC BLOOD PRESSURE: 102 MMHG | HEIGHT: 64 IN

## 2022-01-01 VITALS
RESPIRATION RATE: 31 BRPM | OXYGEN SATURATION: 80 % | HEIGHT: 64 IN | BODY MASS INDEX: 22.53 KG/M2 | HEART RATE: 104 BPM | SYSTOLIC BLOOD PRESSURE: 97 MMHG | TEMPERATURE: 96.7 F | WEIGHT: 132 LBS | DIASTOLIC BLOOD PRESSURE: 50 MMHG

## 2022-01-01 DIAGNOSIS — J96.22 ACUTE ON CHRONIC RESPIRATORY FAILURE WITH HYPOXIA AND HYPERCAPNIA (HCC): Primary | ICD-10-CM

## 2022-01-01 DIAGNOSIS — C34.11 MALIGNANT NEOPLASM OF UPPER LOBE OF RIGHT LUNG (HCC): Primary | ICD-10-CM

## 2022-01-01 DIAGNOSIS — J44.1 COPD WITH ACUTE EXACERBATION (HCC): ICD-10-CM

## 2022-01-01 DIAGNOSIS — G89.3 CANCER RELATED PAIN: ICD-10-CM

## 2022-01-01 DIAGNOSIS — J43.9 PULMONARY EMPHYSEMA, UNSPECIFIED EMPHYSEMA TYPE (HCC): ICD-10-CM

## 2022-01-01 DIAGNOSIS — Z85.118 ENCOUNTER FOR FOLLOW-UP SURVEILLANCE OF LUNG CANCER: ICD-10-CM

## 2022-01-01 DIAGNOSIS — J96.01 ACUTE HYPOXEMIC RESPIRATORY FAILURE (HCC): Primary | ICD-10-CM

## 2022-01-01 DIAGNOSIS — C34.01 MALIGNANT NEOPLASM OF RIGHT MAIN BRONCHUS (HCC): ICD-10-CM

## 2022-01-01 DIAGNOSIS — J18.9 PNEUMONIA OF LOWER LOBE DUE TO INFECTIOUS ORGANISM, UNSPECIFIED LATERALITY: Primary | ICD-10-CM

## 2022-01-01 DIAGNOSIS — Z08 ENCOUNTER FOR FOLLOW-UP SURVEILLANCE OF LUNG CANCER: ICD-10-CM

## 2022-01-01 DIAGNOSIS — U07.1 COVID-19 VIRUS INFECTION: ICD-10-CM

## 2022-01-01 DIAGNOSIS — Z71.89 CARE PLAN DISCUSSED WITH PATIENT: ICD-10-CM

## 2022-01-01 DIAGNOSIS — J18.9 PNEUMONIA OF LEFT LUNG DUE TO INFECTIOUS ORGANISM, UNSPECIFIED PART OF LUNG: ICD-10-CM

## 2022-01-01 DIAGNOSIS — J96.11 CHRONIC RESPIRATORY FAILURE WITH HYPOXIA (HCC): ICD-10-CM

## 2022-01-01 DIAGNOSIS — G89.3 CANCER RELATED PAIN: Primary | ICD-10-CM

## 2022-01-01 DIAGNOSIS — J90 BILATERAL PLEURAL EFFUSION: ICD-10-CM

## 2022-01-01 DIAGNOSIS — J44.1 COPD EXACERBATION (HCC): ICD-10-CM

## 2022-01-01 DIAGNOSIS — J96.21 ACUTE ON CHRONIC RESPIRATORY FAILURE WITH HYPOXIA AND HYPERCAPNIA (HCC): Primary | ICD-10-CM

## 2022-01-01 DIAGNOSIS — J44.9 CHRONIC OBSTRUCTIVE PULMONARY DISEASE, UNSPECIFIED COPD TYPE (HCC): ICD-10-CM

## 2022-01-01 LAB
ADENOVIRUS BY PCR: NOT DETECTED
ALBUMIN SERPL-MCNC: 3 G/DL (ref 3.5–5.2)
ALBUMIN SERPL-MCNC: 3 G/DL (ref 3.5–5.2)
ALBUMIN SERPL-MCNC: 3.1 G/DL (ref 3.5–5.2)
ALBUMIN SERPL-MCNC: 3.2 G/DL (ref 3.5–5.2)
ALBUMIN SERPL-MCNC: 3.3 G/DL (ref 3.5–5.2)
ALBUMIN SERPL-MCNC: 3.4 G/DL (ref 3.5–5.2)
ALBUMIN SERPL-MCNC: 3.6 G/DL (ref 3.5–5.2)
ALLENS TEST: ABNORMAL
ALP BLD-CCNC: 104 U/L (ref 35–104)
ALP BLD-CCNC: 116 U/L (ref 35–104)
ALP BLD-CCNC: 119 U/L (ref 35–104)
ALP BLD-CCNC: 128 U/L (ref 35–104)
ALP BLD-CCNC: 136 U/L (ref 35–104)
ALP BLD-CCNC: 74 U/L (ref 35–104)
ALP BLD-CCNC: 74 U/L (ref 35–104)
ALP BLD-CCNC: 77 U/L (ref 35–104)
ALP BLD-CCNC: 78 U/L (ref 35–104)
ALP BLD-CCNC: 79 U/L (ref 35–104)
ALP BLD-CCNC: 81 U/L (ref 35–104)
ALP BLD-CCNC: 82 U/L (ref 35–104)
ALP BLD-CCNC: 83 U/L (ref 35–104)
ALP BLD-CCNC: 86 U/L (ref 35–104)
ALP BLD-CCNC: 89 U/L (ref 35–104)
ALP BLD-CCNC: 91 U/L (ref 35–104)
ALP BLD-CCNC: 92 U/L (ref 35–104)
ALT SERPL-CCNC: 13 U/L (ref 5–33)
ALT SERPL-CCNC: 15 U/L (ref 5–33)
ALT SERPL-CCNC: 16 U/L (ref 5–33)
ALT SERPL-CCNC: 17 U/L (ref 5–33)
ALT SERPL-CCNC: 20 U/L (ref 5–33)
ALT SERPL-CCNC: 21 U/L (ref 5–33)
ALT SERPL-CCNC: 21 U/L (ref 5–33)
ALT SERPL-CCNC: 22 U/L (ref 5–33)
ALT SERPL-CCNC: 22 U/L (ref 5–33)
ALT SERPL-CCNC: 24 U/L (ref 5–33)
ALT SERPL-CCNC: 25 U/L (ref 5–33)
ALT SERPL-CCNC: 25 U/L (ref 5–33)
ALT SERPL-CCNC: 27 U/L (ref 5–33)
ALT SERPL-CCNC: 7 U/L (ref 5–33)
ALT SERPL-CCNC: 7 U/L (ref 5–33)
ALT SERPL-CCNC: 8 U/L (ref 5–33)
ALT SERPL-CCNC: 8 U/L (ref 5–33)
ANION GAP SERPL CALCULATED.3IONS-SCNC: 10 MMOL/L (ref 7–19)
ANION GAP SERPL CALCULATED.3IONS-SCNC: 10 MMOL/L (ref 7–19)
ANION GAP SERPL CALCULATED.3IONS-SCNC: 11 MMOL/L (ref 7–19)
ANION GAP SERPL CALCULATED.3IONS-SCNC: 11 MMOL/L (ref 7–19)
ANION GAP SERPL CALCULATED.3IONS-SCNC: 12 MMOL/L (ref 7–19)
ANION GAP SERPL CALCULATED.3IONS-SCNC: 12 MMOL/L (ref 7–19)
ANION GAP SERPL CALCULATED.3IONS-SCNC: 5 MMOL/L (ref 7–19)
ANION GAP SERPL CALCULATED.3IONS-SCNC: 6 MMOL/L (ref 7–19)
ANION GAP SERPL CALCULATED.3IONS-SCNC: 6 MMOL/L (ref 7–19)
ANION GAP SERPL CALCULATED.3IONS-SCNC: 7 MMOL/L (ref 7–19)
ANION GAP SERPL CALCULATED.3IONS-SCNC: 8 MMOL/L (ref 7–19)
ANION GAP SERPL CALCULATED.3IONS-SCNC: 9 MMOL/L (ref 7–19)
ANISOCYTOSIS: ABNORMAL
APTT: 34.1 SEC (ref 26–36.2)
APTT: 39.9 SEC (ref 26–36.2)
AST SERPL-CCNC: 10 U/L (ref 5–32)
AST SERPL-CCNC: 10 U/L (ref 5–32)
AST SERPL-CCNC: 11 U/L (ref 5–32)
AST SERPL-CCNC: 11 U/L (ref 5–32)
AST SERPL-CCNC: 12 U/L (ref 5–32)
AST SERPL-CCNC: 13 U/L (ref 5–32)
AST SERPL-CCNC: 14 U/L (ref 5–32)
AST SERPL-CCNC: 15 U/L (ref 5–32)
AST SERPL-CCNC: 17 U/L (ref 5–32)
AST SERPL-CCNC: 20 U/L (ref 5–32)
ATYPICAL LYMPHOCYTE RELATIVE PERCENT: 1 % (ref 0–8)
BANDED NEUTROPHILS RELATIVE PERCENT: 1 % (ref 0–5)
BASE EXCESS ARTERIAL: 10.2 MMOL/L (ref -2–2)
BASE EXCESS ARTERIAL: 10.7 MMOL/L (ref -2–2)
BASE EXCESS ARTERIAL: 14.9 MMOL/L (ref -2–2)
BASE EXCESS ARTERIAL: 7.4 MMOL/L (ref -2–2)
BASOPHILIC STIPPLING: ABNORMAL
BASOPHILIC STIPPLING: ABNORMAL
BASOPHILS ABSOLUTE: 0 K/UL (ref 0–0.2)
BASOPHILS RELATIVE PERCENT: 0 % (ref 0–1)
BASOPHILS RELATIVE PERCENT: 0.1 % (ref 0–1)
BASOPHILS RELATIVE PERCENT: 0.2 % (ref 0–1)
BASOPHILS RELATIVE PERCENT: 0.3 % (ref 0–1)
BASOPHILS RELATIVE PERCENT: 0.3 % (ref 0–1)
BILIRUB SERPL-MCNC: 0.3 MG/DL (ref 0.2–1.2)
BILIRUB SERPL-MCNC: 0.4 MG/DL (ref 0.2–1.2)
BILIRUB SERPL-MCNC: <0.2 MG/DL (ref 0.2–1.2)
BORDETELLA PARAPERTUSSIS BY PCR: NOT DETECTED
BORDETELLA PERTUSSIS BY PCR: NOT DETECTED
BUN BLDV-MCNC: 12 MG/DL (ref 8–23)
BUN BLDV-MCNC: 13 MG/DL (ref 8–23)
BUN BLDV-MCNC: 13 MG/DL (ref 8–23)
BUN BLDV-MCNC: 14 MG/DL (ref 8–23)
BUN BLDV-MCNC: 15 MG/DL (ref 8–23)
BUN BLDV-MCNC: 17 MG/DL (ref 8–23)
BUN BLDV-MCNC: 17 MG/DL (ref 8–23)
BUN BLDV-MCNC: 19 MG/DL (ref 8–23)
BUN BLDV-MCNC: 19 MG/DL (ref 8–23)
BUN BLDV-MCNC: 20 MG/DL (ref 8–23)
BUN BLDV-MCNC: 20 MG/DL (ref 8–23)
BUN BLDV-MCNC: 21 MG/DL (ref 8–23)
BUN BLDV-MCNC: 23 MG/DL (ref 8–23)
BUN BLDV-MCNC: 27 MG/DL (ref 8–23)
BUN BLDV-MCNC: 27 MG/DL (ref 8–23)
BUN BLDV-MCNC: 29 MG/DL (ref 8–23)
BUN BLDV-MCNC: 31 MG/DL (ref 8–23)
BUN BLDV-MCNC: 32 MG/DL (ref 8–23)
C-REACTIVE PROTEIN: 0.5 MG/DL (ref 0–0.5)
C-REACTIVE PROTEIN: 0.62 MG/DL (ref 0–0.5)
C-REACTIVE PROTEIN: 0.78 MG/DL (ref 0–0.5)
C-REACTIVE PROTEIN: 1.11 MG/DL (ref 0–0.5)
C-REACTIVE PROTEIN: 1.11 MG/DL (ref 0–0.5)
C-REACTIVE PROTEIN: 2.09 MG/DL (ref 0–0.5)
C-REACTIVE PROTEIN: 2.44 MG/DL (ref 0–0.5)
C-REACTIVE PROTEIN: 3.96 MG/DL (ref 0–0.5)
C-REACTIVE PROTEIN: <0.3 MG/DL (ref 0–0.5)
C-REACTIVE PROTEIN: <0.3 MG/DL (ref 0–0.5)
CALCIUM SERPL-MCNC: 8.6 MG/DL (ref 8.8–10.2)
CALCIUM SERPL-MCNC: 8.7 MG/DL (ref 8.8–10.2)
CALCIUM SERPL-MCNC: 8.8 MG/DL (ref 8.8–10.2)
CALCIUM SERPL-MCNC: 8.9 MG/DL (ref 8.8–10.2)
CALCIUM SERPL-MCNC: 9 MG/DL (ref 8.8–10.2)
CALCIUM SERPL-MCNC: 9.1 MG/DL (ref 8.8–10.2)
CALCIUM SERPL-MCNC: 9.2 MG/DL (ref 8.8–10.2)
CALCIUM SERPL-MCNC: 9.2 MG/DL (ref 8.8–10.2)
CALCIUM SERPL-MCNC: 9.3 MG/DL (ref 8.8–10.2)
CALCIUM SERPL-MCNC: 9.3 MG/DL (ref 8.8–10.2)
CALCIUM SERPL-MCNC: 9.4 MG/DL (ref 8.8–10.2)
CALCIUM SERPL-MCNC: 9.4 MG/DL (ref 8.8–10.2)
CARBOXYHEMOGLOBIN ARTERIAL: 1.9 % (ref 0–5)
CARBOXYHEMOGLOBIN ARTERIAL: 1.9 % (ref 0–5)
CARBOXYHEMOGLOBIN ARTERIAL: 2.4 % (ref 0–5)
CARBOXYHEMOGLOBIN ARTERIAL: 2.5 % (ref 0–5)
CHLAMYDOPHILIA PNEUMONIAE BY PCR: NOT DETECTED
CHLORIDE BLD-SCNC: 100 MMOL/L (ref 98–111)
CHLORIDE BLD-SCNC: 101 MMOL/L (ref 98–111)
CHLORIDE BLD-SCNC: 101 MMOL/L (ref 98–111)
CHLORIDE BLD-SCNC: 102 MMOL/L (ref 98–111)
CHLORIDE BLD-SCNC: 103 MMOL/L (ref 98–111)
CHLORIDE BLD-SCNC: 104 MMOL/L (ref 98–111)
CHLORIDE BLD-SCNC: 104 MMOL/L (ref 98–111)
CHLORIDE BLD-SCNC: 95 MMOL/L (ref 98–111)
CHLORIDE BLD-SCNC: 96 MMOL/L (ref 98–111)
CHLORIDE BLD-SCNC: 97 MMOL/L (ref 98–111)
CHLORIDE BLD-SCNC: 97 MMOL/L (ref 98–111)
CHLORIDE BLD-SCNC: 98 MMOL/L (ref 98–111)
CHLORIDE BLD-SCNC: 99 MMOL/L (ref 98–111)
CO2: 28 MMOL/L (ref 22–29)
CO2: 29 MMOL/L (ref 22–29)
CO2: 30 MMOL/L (ref 22–29)
CO2: 30 MMOL/L (ref 22–29)
CO2: 31 MMOL/L (ref 22–29)
CO2: 31 MMOL/L (ref 22–29)
CO2: 32 MMOL/L (ref 22–29)
CO2: 33 MMOL/L (ref 22–29)
CO2: 34 MMOL/L (ref 22–29)
CO2: 35 MMOL/L (ref 22–29)
CO2: 38 MMOL/L (ref 22–29)
CO2: 39 MMOL/L (ref 22–29)
CO2: 39 MMOL/L (ref 22–29)
CO2: 41 MMOL/L (ref 22–29)
CORONAVIRUS 229E BY PCR: NOT DETECTED
CORONAVIRUS HKU1 BY PCR: NOT DETECTED
CORONAVIRUS NL63 BY PCR: NOT DETECTED
CORONAVIRUS OC43 BY PCR: NOT DETECTED
CREAT SERPL-MCNC: 0.2 MG/DL (ref 0.5–0.9)
CREAT SERPL-MCNC: 0.3 MG/DL (ref 0.5–0.9)
CREAT SERPL-MCNC: 0.4 MG/DL (ref 0.5–0.9)
CREAT SERPL-MCNC: 0.5 MG/DL (ref 0.5–0.9)
D DIMER: 0.3 UG/ML FEU (ref 0–0.48)
D DIMER: 0.4 UG/ML FEU (ref 0–0.48)
D DIMER: 0.49 UG/ML FEU (ref 0–0.48)
D DIMER: 0.56 UG/ML FEU (ref 0–0.48)
D DIMER: 1.41 UG/ML FEU (ref 0–0.48)
D DIMER: 1.63 UG/ML FEU (ref 0–0.48)
D DIMER: 1.84 UG/ML FEU (ref 0–0.48)
D DIMER: 1.84 UG/ML FEU (ref 0–0.48)
D DIMER: 2.09 UG/ML FEU (ref 0–0.48)
D DIMER: <0.27 UG/ML FEU (ref 0–0.48)
EKG P AXIS: 64 DEGREES
EKG P AXIS: 67 DEGREES
EKG P-R INTERVAL: 154 MS
EKG P-R INTERVAL: 154 MS
EKG Q-T INTERVAL: 366 MS
EKG Q-T INTERVAL: 402 MS
EKG QRS DURATION: 92 MS
EKG QRS DURATION: 96 MS
EKG QTC CALCULATION (BAZETT): 413 MS
EKG QTC CALCULATION (BAZETT): 459 MS
EKG T AXIS: 72 DEGREES
EKG T AXIS: 75 DEGREES
EOSINOPHILS ABSOLUTE: 0 K/UL (ref 0–0.6)
EOSINOPHILS ABSOLUTE: 0.1 K/UL (ref 0–0.6)
EOSINOPHILS RELATIVE PERCENT: 0 % (ref 0–5)
EOSINOPHILS RELATIVE PERCENT: 0.1 % (ref 0–5)
EOSINOPHILS RELATIVE PERCENT: 0.2 % (ref 0–5)
EOSINOPHILS RELATIVE PERCENT: 0.3 % (ref 0–5)
EOSINOPHILS RELATIVE PERCENT: 0.3 % (ref 0–5)
EOSINOPHILS RELATIVE PERCENT: 0.4 % (ref 0–5)
EOSINOPHILS RELATIVE PERCENT: 0.4 % (ref 0–5)
EOSINOPHILS RELATIVE PERCENT: 0.5 % (ref 0–5)
EOSINOPHILS RELATIVE PERCENT: 0.7 % (ref 0–5)
FERRITIN: 156.9 NG/ML (ref 13–150)
FERRITIN: 24.9 NG/ML (ref 13–150)
FIBRINOGEN: 496 MG/DL (ref 238–505)
GFR AFRICAN AMERICAN: >59
GFR NON-AFRICAN AMERICAN: >60
GLUCOSE BLD-MCNC: 103 MG/DL (ref 74–109)
GLUCOSE BLD-MCNC: 107 MG/DL (ref 74–109)
GLUCOSE BLD-MCNC: 110 MG/DL (ref 74–109)
GLUCOSE BLD-MCNC: 112 MG/DL (ref 74–109)
GLUCOSE BLD-MCNC: 118 MG/DL (ref 74–109)
GLUCOSE BLD-MCNC: 119 MG/DL (ref 74–109)
GLUCOSE BLD-MCNC: 120 MG/DL (ref 70–99)
GLUCOSE BLD-MCNC: 125 MG/DL (ref 74–109)
GLUCOSE BLD-MCNC: 128 MG/DL (ref 74–109)
GLUCOSE BLD-MCNC: 134 MG/DL (ref 74–109)
GLUCOSE BLD-MCNC: 135 MG/DL (ref 74–109)
GLUCOSE BLD-MCNC: 138 MG/DL (ref 74–109)
GLUCOSE BLD-MCNC: 142 MG/DL (ref 74–109)
GLUCOSE BLD-MCNC: 145 MG/DL (ref 74–109)
GLUCOSE BLD-MCNC: 155 MG/DL (ref 74–109)
GLUCOSE BLD-MCNC: 158 MG/DL (ref 70–99)
GLUCOSE BLD-MCNC: 159 MG/DL (ref 74–109)
GLUCOSE BLD-MCNC: 163 MG/DL (ref 74–109)
GLUCOSE BLD-MCNC: 174 MG/DL (ref 74–109)
GLUCOSE BLD-MCNC: 89 MG/DL (ref 74–109)
GLUCOSE BLD-MCNC: 91 MG/DL (ref 74–109)
GLUCOSE BLD-MCNC: 92 MG/DL (ref 74–109)
GLUCOSE BLD-MCNC: 94 MG/DL (ref 74–109)
GLUCOSE BLD-MCNC: 94 MG/DL (ref 74–109)
GLUCOSE BLD-MCNC: 98 MG/DL (ref 74–109)
HAPTOGLOBIN: 334 MG/DL (ref 30–200)
HCO3 ARTERIAL: 34.5 MMOL/L (ref 22–26)
HCO3 ARTERIAL: 37.3 MMOL/L (ref 22–26)
HCO3 ARTERIAL: 37.9 MMOL/L (ref 22–26)
HCO3 ARTERIAL: 42.6 MMOL/L (ref 22–26)
HCT VFR BLD CALC: 31.9 % (ref 37–47)
HCT VFR BLD CALC: 32.3 % (ref 37–47)
HCT VFR BLD CALC: 32.6 % (ref 37–47)
HCT VFR BLD CALC: 33.1 % (ref 37–47)
HCT VFR BLD CALC: 33.3 % (ref 37–47)
HCT VFR BLD CALC: 33.9 % (ref 37–47)
HCT VFR BLD CALC: 33.9 % (ref 37–47)
HCT VFR BLD CALC: 34.8 % (ref 37–47)
HCT VFR BLD CALC: 34.9 % (ref 37–47)
HCT VFR BLD CALC: 35 % (ref 37–47)
HCT VFR BLD CALC: 35.3 % (ref 37–47)
HCT VFR BLD CALC: 35.3 % (ref 37–47)
HCT VFR BLD CALC: 35.4 % (ref 37–47)
HCT VFR BLD CALC: 35.5 % (ref 37–47)
HCT VFR BLD CALC: 35.6 % (ref 37–47)
HCT VFR BLD CALC: 36.2 % (ref 37–47)
HCT VFR BLD CALC: 36.3 % (ref 37–47)
HCT VFR BLD CALC: 36.4 % (ref 37–47)
HCT VFR BLD CALC: 36.4 % (ref 37–47)
HCT VFR BLD CALC: 37.4 % (ref 37–47)
HCT VFR BLD CALC: 37.9 % (ref 37–47)
HCT VFR BLD CALC: 38 % (ref 37–47)
HCT VFR BLD CALC: 38.6 % (ref 37–47)
HEMOGLOBIN, ART, EXTENDED: 10.1 G/DL (ref 12–16)
HEMOGLOBIN, ART, EXTENDED: 10.8 G/DL (ref 12–16)
HEMOGLOBIN, ART, EXTENDED: 11 G/DL (ref 12–16)
HEMOGLOBIN, ART, EXTENDED: 11.1 G/DL (ref 12–16)
HEMOGLOBIN: 10 G/DL (ref 12–16)
HEMOGLOBIN: 10.1 G/DL (ref 12–16)
HEMOGLOBIN: 10.3 G/DL (ref 12–16)
HEMOGLOBIN: 10.4 G/DL (ref 12–16)
HEMOGLOBIN: 10.5 G/DL (ref 12–16)
HEMOGLOBIN: 10.6 G/DL (ref 12–16)
HEMOGLOBIN: 10.9 G/DL (ref 12–16)
HEMOGLOBIN: 11 G/DL (ref 12–16)
HEMOGLOBIN: 11 G/DL (ref 12–16)
HEMOGLOBIN: 9.4 G/DL (ref 12–16)
HEMOGLOBIN: 9.6 G/DL (ref 12–16)
HEMOGLOBIN: 9.8 G/DL (ref 12–16)
HEMOGLOBIN: 9.8 G/DL (ref 12–16)
HEMOGLOBIN: 9.9 G/DL (ref 12–16)
HEMOGLOBIN: 9.9 G/DL (ref 12–16)
HUMAN METAPNEUMOVIRUS BY PCR: NOT DETECTED
HUMAN RHINOVIRUS/ENTEROVIRUS BY PCR: NOT DETECTED
HYPOCHROMIA: ABNORMAL
IMMATURE GRANULOCYTES #: 0 K/UL
IMMATURE GRANULOCYTES #: 0.1 K/UL
IMMATURE GRANULOCYTES #: 0.2 K/UL
INFLUENZA A BY PCR: NOT DETECTED
INFLUENZA B BY PCR: NOT DETECTED
INR BLD: 1.08 (ref 0.88–1.18)
INR BLD: 1.09 (ref 0.88–1.18)
IRON SATURATION: 8 % (ref 14–50)
IRON: 28 UG/DL (ref 37–145)
LACTATE DEHYDROGENASE: 210 U/L (ref 91–215)
LV EF: 60 %
LVEF MODALITY: NORMAL
LYMPHOCYTES ABSOLUTE: 0.3 K/UL (ref 1.1–4.5)
LYMPHOCYTES ABSOLUTE: 0.4 K/UL (ref 1.1–4.5)
LYMPHOCYTES ABSOLUTE: 0.5 K/UL (ref 1.1–4.5)
LYMPHOCYTES ABSOLUTE: 0.6 K/UL (ref 1.1–4.5)
LYMPHOCYTES ABSOLUTE: 0.8 K/UL (ref 1.1–4.5)
LYMPHOCYTES ABSOLUTE: 0.9 K/UL (ref 1.1–4.5)
LYMPHOCYTES ABSOLUTE: 1 K/UL (ref 1.1–4.5)
LYMPHOCYTES ABSOLUTE: 1.1 K/UL (ref 1.1–4.5)
LYMPHOCYTES ABSOLUTE: 1.3 K/UL (ref 1.1–4.5)
LYMPHOCYTES ABSOLUTE: 1.3 K/UL (ref 1.1–4.5)
LYMPHOCYTES RELATIVE PERCENT: 10 % (ref 20–40)
LYMPHOCYTES RELATIVE PERCENT: 10.5 % (ref 20–40)
LYMPHOCYTES RELATIVE PERCENT: 10.8 % (ref 20–40)
LYMPHOCYTES RELATIVE PERCENT: 10.9 % (ref 20–40)
LYMPHOCYTES RELATIVE PERCENT: 11.9 % (ref 20–40)
LYMPHOCYTES RELATIVE PERCENT: 17.4 % (ref 20–40)
LYMPHOCYTES RELATIVE PERCENT: 17.6 % (ref 20–40)
LYMPHOCYTES RELATIVE PERCENT: 3.1 % (ref 20–40)
LYMPHOCYTES RELATIVE PERCENT: 3.5 % (ref 20–40)
LYMPHOCYTES RELATIVE PERCENT: 4 % (ref 20–40)
LYMPHOCYTES RELATIVE PERCENT: 4.3 % (ref 20–40)
LYMPHOCYTES RELATIVE PERCENT: 4.8 % (ref 20–40)
LYMPHOCYTES RELATIVE PERCENT: 6.7 % (ref 20–40)
LYMPHOCYTES RELATIVE PERCENT: 6.8 % (ref 20–40)
LYMPHOCYTES RELATIVE PERCENT: 7.2 % (ref 20–40)
LYMPHOCYTES RELATIVE PERCENT: 7.5 % (ref 20–40)
LYMPHOCYTES RELATIVE PERCENT: 7.8 % (ref 20–40)
LYMPHOCYTES RELATIVE PERCENT: 7.9 % (ref 20–40)
LYMPHOCYTES RELATIVE PERCENT: 8 % (ref 20–40)
LYMPHOCYTES RELATIVE PERCENT: 8.7 % (ref 20–40)
LYMPHOCYTES RELATIVE PERCENT: 9.3 % (ref 20–40)
LYMPHOCYTES RELATIVE PERCENT: 9.5 % (ref 20–40)
MACROCYTES: ABNORMAL
MAGNESIUM: 2 MG/DL (ref 1.6–2.4)
MAGNESIUM: 2.1 MG/DL (ref 1.6–2.4)
MAGNESIUM: 2.2 MG/DL (ref 1.6–2.4)
MCH RBC QN AUTO: 26.8 PG (ref 27–31)
MCH RBC QN AUTO: 26.8 PG (ref 27–31)
MCH RBC QN AUTO: 27 PG (ref 27–31)
MCH RBC QN AUTO: 27.4 PG (ref 27–31)
MCH RBC QN AUTO: 28.3 PG (ref 27–31)
MCH RBC QN AUTO: 28.5 PG (ref 27–31)
MCH RBC QN AUTO: 28.8 PG (ref 27–31)
MCH RBC QN AUTO: 28.8 PG (ref 27–31)
MCH RBC QN AUTO: 28.9 PG (ref 27–31)
MCH RBC QN AUTO: 29 PG (ref 27–31)
MCH RBC QN AUTO: 29.1 PG (ref 27–31)
MCH RBC QN AUTO: 29.2 PG (ref 27–31)
MCH RBC QN AUTO: 29.2 PG (ref 27–31)
MCH RBC QN AUTO: 29.3 PG (ref 27–31)
MCH RBC QN AUTO: 29.3 PG (ref 27–31)
MCH RBC QN AUTO: 30.2 PG (ref 27–31)
MCHC RBC AUTO-ENTMCNC: 27.3 G/DL (ref 33–37)
MCHC RBC AUTO-ENTMCNC: 27.7 G/DL (ref 33–37)
MCHC RBC AUTO-ENTMCNC: 28.1 G/DL (ref 33–37)
MCHC RBC AUTO-ENTMCNC: 28.1 G/DL (ref 33–37)
MCHC RBC AUTO-ENTMCNC: 28.5 G/DL (ref 33–37)
MCHC RBC AUTO-ENTMCNC: 28.8 G/DL (ref 33–37)
MCHC RBC AUTO-ENTMCNC: 28.9 G/DL (ref 33–37)
MCHC RBC AUTO-ENTMCNC: 29.1 G/DL (ref 33–37)
MCHC RBC AUTO-ENTMCNC: 29.1 G/DL (ref 33–37)
MCHC RBC AUTO-ENTMCNC: 29.2 G/DL (ref 33–37)
MCHC RBC AUTO-ENTMCNC: 29.3 G/DL (ref 33–37)
MCHC RBC AUTO-ENTMCNC: 29.4 G/DL (ref 33–37)
MCHC RBC AUTO-ENTMCNC: 29.6 G/DL (ref 33–37)
MCHC RBC AUTO-ENTMCNC: 29.7 G/DL (ref 33–37)
MCHC RBC AUTO-ENTMCNC: 30 G/DL (ref 33–37)
MCHC RBC AUTO-ENTMCNC: 30 G/DL (ref 33–37)
MCHC RBC AUTO-ENTMCNC: 30.1 G/DL (ref 33–37)
MCHC RBC AUTO-ENTMCNC: 30.5 G/DL (ref 33–37)
MCV RBC AUTO: 100.3 FL (ref 81–99)
MCV RBC AUTO: 100.3 FL (ref 81–99)
MCV RBC AUTO: 101.3 FL (ref 81–99)
MCV RBC AUTO: 102.5 FL (ref 81–99)
MCV RBC AUTO: 102.7 FL (ref 81–99)
MCV RBC AUTO: 104.4 FL (ref 81–99)
MCV RBC AUTO: 94.3 FL (ref 81–99)
MCV RBC AUTO: 95.1 FL (ref 81–99)
MCV RBC AUTO: 95.6 FL (ref 81–99)
MCV RBC AUTO: 95.9 FL (ref 81–99)
MCV RBC AUTO: 96.6 FL (ref 81–99)
MCV RBC AUTO: 97 FL (ref 81–99)
MCV RBC AUTO: 97 FL (ref 81–99)
MCV RBC AUTO: 97.3 FL (ref 81–99)
MCV RBC AUTO: 97.5 FL (ref 81–99)
MCV RBC AUTO: 98.2 FL (ref 81–99)
MCV RBC AUTO: 98.5 FL (ref 81–99)
MCV RBC AUTO: 98.8 FL (ref 81–99)
MCV RBC AUTO: 98.9 FL (ref 81–99)
MCV RBC AUTO: 98.9 FL (ref 81–99)
MCV RBC AUTO: 99.2 FL (ref 81–99)
MCV RBC AUTO: 99.5 FL (ref 81–99)
METHEMOGLOBIN ARTERIAL: 0.5 %
METHEMOGLOBIN ARTERIAL: 0.7 %
METHEMOGLOBIN ARTERIAL: 1 %
METHEMOGLOBIN ARTERIAL: 1.2 %
MODE: ABNORMAL
MONOCYTES ABSOLUTE: 0 K/UL (ref 0–0.9)
MONOCYTES ABSOLUTE: 0.2 K/UL (ref 0–0.9)
MONOCYTES ABSOLUTE: 0.2 K/UL (ref 0–0.9)
MONOCYTES ABSOLUTE: 0.3 K/UL (ref 0–0.9)
MONOCYTES ABSOLUTE: 0.4 K/UL (ref 0–0.9)
MONOCYTES ABSOLUTE: 0.4 K/UL (ref 0–0.9)
MONOCYTES ABSOLUTE: 0.5 K/UL (ref 0–0.9)
MONOCYTES ABSOLUTE: 0.6 K/UL (ref 0–0.9)
MONOCYTES ABSOLUTE: 0.6 K/UL (ref 0–0.9)
MONOCYTES ABSOLUTE: 0.7 K/UL (ref 0–0.9)
MONOCYTES ABSOLUTE: 0.8 K/UL (ref 0–0.9)
MONOCYTES ABSOLUTE: 0.9 K/UL (ref 0–0.9)
MONOCYTES ABSOLUTE: 0.9 K/UL (ref 0–0.9)
MONOCYTES RELATIVE PERCENT: 1.3 % (ref 0–10)
MONOCYTES RELATIVE PERCENT: 10.6 % (ref 0–10)
MONOCYTES RELATIVE PERCENT: 10.8 % (ref 0–10)
MONOCYTES RELATIVE PERCENT: 2.8 % (ref 0–10)
MONOCYTES RELATIVE PERCENT: 2.8 % (ref 0–10)
MONOCYTES RELATIVE PERCENT: 3 % (ref 0–10)
MONOCYTES RELATIVE PERCENT: 3 % (ref 0–10)
MONOCYTES RELATIVE PERCENT: 4.6 % (ref 0–10)
MONOCYTES RELATIVE PERCENT: 4.7 % (ref 0–10)
MONOCYTES RELATIVE PERCENT: 5 % (ref 0–10)
MONOCYTES RELATIVE PERCENT: 5 % (ref 0–10)
MONOCYTES RELATIVE PERCENT: 5.4 % (ref 0–10)
MONOCYTES RELATIVE PERCENT: 5.6 % (ref 0–10)
MONOCYTES RELATIVE PERCENT: 5.6 % (ref 0–10)
MONOCYTES RELATIVE PERCENT: 6.1 % (ref 0–10)
MONOCYTES RELATIVE PERCENT: 6.5 % (ref 0–10)
MONOCYTES RELATIVE PERCENT: 6.5 % (ref 0–10)
MONOCYTES RELATIVE PERCENT: 6.9 % (ref 0–10)
MONOCYTES RELATIVE PERCENT: 8.3 % (ref 0–10)
MONOCYTES RELATIVE PERCENT: 9 % (ref 0–10)
MYCOPLASMA PNEUMONIAE BY PCR: NOT DETECTED
NEUTROPHILS ABSOLUTE: 10 K/UL (ref 1.5–7.5)
NEUTROPHILS ABSOLUTE: 10.5 K/UL (ref 1.5–7.5)
NEUTROPHILS ABSOLUTE: 10.5 K/UL (ref 1.5–7.5)
NEUTROPHILS ABSOLUTE: 12.3 K/UL (ref 1.5–7.5)
NEUTROPHILS ABSOLUTE: 12.4 K/UL (ref 1.5–7.5)
NEUTROPHILS ABSOLUTE: 2.7 K/UL (ref 1.5–7.5)
NEUTROPHILS ABSOLUTE: 3.7 K/UL (ref 1.5–7.5)
NEUTROPHILS ABSOLUTE: 4.7 K/UL (ref 1.5–7.5)
NEUTROPHILS ABSOLUTE: 4.9 K/UL (ref 1.5–7.5)
NEUTROPHILS ABSOLUTE: 5 K/UL (ref 1.5–7.5)
NEUTROPHILS ABSOLUTE: 5.5 K/UL (ref 1.5–7.5)
NEUTROPHILS ABSOLUTE: 6.3 K/UL (ref 1.5–7.5)
NEUTROPHILS ABSOLUTE: 6.5 K/UL (ref 1.5–7.5)
NEUTROPHILS ABSOLUTE: 6.8 K/UL (ref 1.5–7.5)
NEUTROPHILS ABSOLUTE: 6.8 K/UL (ref 1.5–7.5)
NEUTROPHILS ABSOLUTE: 7.1 K/UL (ref 1.5–7.5)
NEUTROPHILS ABSOLUTE: 7.9 K/UL (ref 1.5–7.5)
NEUTROPHILS ABSOLUTE: 8.1 K/UL (ref 1.5–7.5)
NEUTROPHILS ABSOLUTE: 8.2 K/UL (ref 1.5–7.5)
NEUTROPHILS ABSOLUTE: 8.4 K/UL (ref 1.5–7.5)
NEUTROPHILS ABSOLUTE: 8.9 K/UL (ref 1.5–7.5)
NEUTROPHILS ABSOLUTE: 9.6 K/UL (ref 1.5–7.5)
NEUTROPHILS RELATIVE PERCENT: 71 % (ref 50–65)
NEUTROPHILS RELATIVE PERCENT: 74.4 % (ref 50–65)
NEUTROPHILS RELATIVE PERCENT: 78.2 % (ref 50–65)
NEUTROPHILS RELATIVE PERCENT: 78.2 % (ref 50–65)
NEUTROPHILS RELATIVE PERCENT: 80.7 % (ref 50–65)
NEUTROPHILS RELATIVE PERCENT: 82.5 % (ref 50–65)
NEUTROPHILS RELATIVE PERCENT: 82.9 % (ref 50–65)
NEUTROPHILS RELATIVE PERCENT: 84.5 % (ref 50–65)
NEUTROPHILS RELATIVE PERCENT: 85 % (ref 50–65)
NEUTROPHILS RELATIVE PERCENT: 85.9 % (ref 50–65)
NEUTROPHILS RELATIVE PERCENT: 86.1 % (ref 50–65)
NEUTROPHILS RELATIVE PERCENT: 86.4 % (ref 50–65)
NEUTROPHILS RELATIVE PERCENT: 86.9 % (ref 50–65)
NEUTROPHILS RELATIVE PERCENT: 87.1 % (ref 50–65)
NEUTROPHILS RELATIVE PERCENT: 87.2 % (ref 50–65)
NEUTROPHILS RELATIVE PERCENT: 87.2 % (ref 50–65)
NEUTROPHILS RELATIVE PERCENT: 87.8 % (ref 50–65)
NEUTROPHILS RELATIVE PERCENT: 89.8 % (ref 50–65)
NEUTROPHILS RELATIVE PERCENT: 90.4 % (ref 50–65)
NEUTROPHILS RELATIVE PERCENT: 91 % (ref 50–65)
NEUTROPHILS RELATIVE PERCENT: 93.3 % (ref 50–65)
NEUTROPHILS RELATIVE PERCENT: 93.4 % (ref 50–65)
O2 CONTENT ARTERIAL: 13.4 ML/DL
O2 CONTENT ARTERIAL: 14 ML/DL
O2 CONTENT ARTERIAL: 14 ML/DL
O2 CONTENT ARTERIAL: 8.5 ML/DL
O2 DELIVERY DEVICE: ABNORMAL
O2 SAT, ARTERIAL: 59.9 %
O2 SAT, ARTERIAL: 86.4 %
O2 SAT, ARTERIAL: 89.2 %
O2 SAT, ARTERIAL: 91.9 %
O2 THERAPY: ABNORMAL
OVALOCYTES: ABNORMAL
OXYGEN FLOW: 10
PARAINFLUENZA VIRUS 1 BY PCR: NOT DETECTED
PARAINFLUENZA VIRUS 2 BY PCR: NOT DETECTED
PARAINFLUENZA VIRUS 3 BY PCR: NOT DETECTED
PARAINFLUENZA VIRUS 4 BY PCR: NOT DETECTED
PCO2 ARTERIAL: 61 MMHG (ref 35–45)
PCO2 ARTERIAL: 63 MMHG (ref 35–45)
PCO2 ARTERIAL: 64 MMHG (ref 35–45)
PCO2 ARTERIAL: 72 MMHG (ref 35–45)
PDW BLD-RTO: 14.4 % (ref 11.5–14.5)
PDW BLD-RTO: 14.4 % (ref 11.5–14.5)
PDW BLD-RTO: 14.5 % (ref 11.5–14.5)
PDW BLD-RTO: 14.6 % (ref 11.5–14.5)
PDW BLD-RTO: 14.7 % (ref 11.5–14.5)
PDW BLD-RTO: 14.8 % (ref 11.5–14.5)
PDW BLD-RTO: 14.9 % (ref 11.5–14.5)
PDW BLD-RTO: 15 % (ref 11.5–14.5)
PDW BLD-RTO: 15.1 % (ref 11.5–14.5)
PDW BLD-RTO: 15.2 % (ref 11.5–14.5)
PDW BLD-RTO: 15.4 % (ref 11.5–14.5)
PDW BLD-RTO: 15.6 % (ref 11.5–14.5)
PERFORMED ON: ABNORMAL
PERFORMED ON: ABNORMAL
PH ARTERIAL: 7.36 (ref 7.35–7.45)
PH ARTERIAL: 7.38 (ref 7.35–7.45)
PHOSPHORUS: 3.2 MG/DL (ref 2.5–4.5)
PHOSPHORUS: 3.4 MG/DL (ref 2.5–4.5)
PLATELET # BLD: 120 K/UL (ref 130–400)
PLATELET # BLD: 129 K/UL (ref 130–400)
PLATELET # BLD: 149 K/UL (ref 130–400)
PLATELET # BLD: 161 K/UL (ref 130–400)
PLATELET # BLD: 173 K/UL (ref 130–400)
PLATELET # BLD: 179 K/UL (ref 130–400)
PLATELET # BLD: 182 K/UL (ref 130–400)
PLATELET # BLD: 184 K/UL (ref 130–400)
PLATELET # BLD: 191 K/UL (ref 130–400)
PLATELET # BLD: 195 K/UL (ref 130–400)
PLATELET # BLD: 215 K/UL (ref 130–400)
PLATELET # BLD: 221 K/UL (ref 130–400)
PLATELET # BLD: 221 K/UL (ref 130–400)
PLATELET # BLD: 232 K/UL (ref 130–400)
PLATELET # BLD: 233 K/UL (ref 130–400)
PLATELET # BLD: 236 K/UL (ref 130–400)
PLATELET # BLD: 241 K/UL (ref 130–400)
PLATELET # BLD: 244 K/UL (ref 130–400)
PLATELET # BLD: 247 K/UL (ref 130–400)
PLATELET # BLD: 249 K/UL (ref 130–400)
PLATELET SLIDE REVIEW: ADEQUATE
PMV BLD AUTO: 10.8 FL (ref 9.4–12.3)
PMV BLD AUTO: 11.6 FL (ref 9.4–12.3)
PMV BLD AUTO: 11.7 FL (ref 9.4–12.3)
PMV BLD AUTO: 11.7 FL (ref 9.4–12.3)
PMV BLD AUTO: 11.9 FL (ref 9.4–12.3)
PMV BLD AUTO: 12.1 FL (ref 9.4–12.3)
PMV BLD AUTO: 12.2 FL (ref 9.4–12.3)
PMV BLD AUTO: 12.2 FL (ref 9.4–12.3)
PMV BLD AUTO: 12.4 FL (ref 9.4–12.3)
PMV BLD AUTO: 12.4 FL (ref 9.4–12.3)
PMV BLD AUTO: 12.6 FL (ref 9.4–12.3)
PMV BLD AUTO: 12.6 FL (ref 9.4–12.3)
PMV BLD AUTO: 12.8 FL (ref 9.4–12.3)
PMV BLD AUTO: 13.4 FL (ref 9.4–12.3)
PO2 ARTERIAL: 34 MMHG (ref 80–100)
PO2 ARTERIAL: 56 MMHG (ref 80–100)
PO2 ARTERIAL: 60 MMHG (ref 80–100)
PO2 ARTERIAL: 70 MMHG (ref 80–100)
POIKILOCYTES: ABNORMAL
POIKILOCYTES: ABNORMAL
POLYCHROMASIA: ABNORMAL
POTASSIUM REFLEX MAGNESIUM: 3.7 MMOL/L (ref 3.5–5)
POTASSIUM REFLEX MAGNESIUM: 3.7 MMOL/L (ref 3.5–5)
POTASSIUM SERPL-SCNC: 3.8 MMOL/L (ref 3.5–5)
POTASSIUM SERPL-SCNC: 4 MMOL/L (ref 3.5–5)
POTASSIUM SERPL-SCNC: 4 MMOL/L (ref 3.5–5)
POTASSIUM SERPL-SCNC: 4.1 MMOL/L (ref 3.5–5)
POTASSIUM SERPL-SCNC: 4.2 MMOL/L (ref 3.5–5)
POTASSIUM SERPL-SCNC: 4.2 MMOL/L (ref 3.5–5)
POTASSIUM SERPL-SCNC: 4.3 MMOL/L (ref 3.5–5)
POTASSIUM SERPL-SCNC: 4.3 MMOL/L (ref 3.5–5)
POTASSIUM SERPL-SCNC: 4.4 MMOL/L (ref 3.5–5)
POTASSIUM SERPL-SCNC: 4.5 MMOL/L (ref 3.5–5)
POTASSIUM SERPL-SCNC: 4.5 MMOL/L (ref 3.5–5)
POTASSIUM SERPL-SCNC: 4.6 MMOL/L (ref 3.5–5)
POTASSIUM SERPL-SCNC: 4.6 MMOL/L (ref 3.5–5)
POTASSIUM SERPL-SCNC: 4.7 MMOL/L (ref 3.5–5)
POTASSIUM SERPL-SCNC: 4.7 MMOL/L (ref 3.5–5)
POTASSIUM SERPL-SCNC: 4.8 MMOL/L (ref 3.5–5)
POTASSIUM, WHOLE BLOOD: 3.3
POTASSIUM, WHOLE BLOOD: 3.7
POTASSIUM, WHOLE BLOOD: 3.8
POTASSIUM, WHOLE BLOOD: 4
PRO-BNP: 278 PG/ML (ref 0–900)
PRO-BNP: 444 PG/ML (ref 0–900)
PROCALCITONIN: 0.07 NG/ML (ref 0–0.09)
PROTHROMBIN TIME: 14.2 SEC (ref 12–14.6)
PROTHROMBIN TIME: 14.3 SEC (ref 12–14.6)
RBC # BLD: 3.28 M/UL (ref 4.2–5.4)
RBC # BLD: 3.31 M/UL (ref 4.2–5.4)
RBC # BLD: 3.33 M/UL (ref 4.2–5.4)
RBC # BLD: 3.37 M/UL (ref 4.2–5.4)
RBC # BLD: 3.43 M/UL (ref 4.2–5.4)
RBC # BLD: 3.51 M/UL (ref 4.2–5.4)
RBC # BLD: 3.53 M/UL (ref 4.2–5.4)
RBC # BLD: 3.53 M/UL (ref 4.2–5.4)
RBC # BLD: 3.55 M/UL (ref 4.2–5.4)
RBC # BLD: 3.59 M/UL (ref 4.2–5.4)
RBC # BLD: 3.62 M/UL (ref 4.2–5.4)
RBC # BLD: 3.63 M/UL (ref 4.2–5.4)
RBC # BLD: 3.64 M/UL (ref 4.2–5.4)
RBC # BLD: 3.64 M/UL (ref 4.2–5.4)
RBC # BLD: 3.65 M/UL (ref 4.2–5.4)
RBC # BLD: 3.65 M/UL (ref 4.2–5.4)
RBC # BLD: 3.66 M/UL (ref 4.2–5.4)
RBC # BLD: 3.68 M/UL (ref 4.2–5.4)
RBC # BLD: 3.74 M/UL (ref 4.2–5.4)
RBC # BLD: 3.76 M/UL (ref 4.2–5.4)
RESPIRATORY SYNCYTIAL VIRUS BY PCR: NOT DETECTED
RETICULOCYTE ABSOLUTE COUNT: 0.09 M/UL (ref 0.03–0.12)
RETICULOCYTE COUNT PCT: 2.34 % (ref 0.5–1.5)
SAMPLE SOURCE: ABNORMAL
SARS-COV-2, NAAT: DETECTED
SARS-COV-2, NAAT: NOT DETECTED
SARS-COV-2, NAAT: NOT DETECTED
SARS-COV-2, PCR: NOT DETECTED
SODIUM BLD-SCNC: 137 MMOL/L (ref 136–145)
SODIUM BLD-SCNC: 137 MMOL/L (ref 136–145)
SODIUM BLD-SCNC: 138 MMOL/L (ref 136–145)
SODIUM BLD-SCNC: 139 MMOL/L (ref 136–145)
SODIUM BLD-SCNC: 139 MMOL/L (ref 136–145)
SODIUM BLD-SCNC: 140 MMOL/L (ref 136–145)
SODIUM BLD-SCNC: 141 MMOL/L (ref 136–145)
SODIUM BLD-SCNC: 142 MMOL/L (ref 136–145)
SODIUM BLD-SCNC: 142 MMOL/L (ref 136–145)
SODIUM BLD-SCNC: 143 MMOL/L (ref 136–145)
SODIUM BLD-SCNC: 143 MMOL/L (ref 136–145)
SODIUM BLD-SCNC: 144 MMOL/L (ref 136–145)
SODIUM BLD-SCNC: 145 MMOL/L (ref 136–145)
SODIUM BLD-SCNC: 146 MMOL/L (ref 136–145)
STOMATOCYTES: ABNORMAL
TOTAL IRON BINDING CAPACITY: 333 UG/DL (ref 250–400)
TOTAL PROTEIN: 5.2 G/DL (ref 6.6–8.7)
TOTAL PROTEIN: 5.3 G/DL (ref 6.6–8.7)
TOTAL PROTEIN: 5.3 G/DL (ref 6.6–8.7)
TOTAL PROTEIN: 5.5 G/DL (ref 6.6–8.7)
TOTAL PROTEIN: 5.5 G/DL (ref 6.6–8.7)
TOTAL PROTEIN: 5.6 G/DL (ref 6.6–8.7)
TOTAL PROTEIN: 5.7 G/DL (ref 6.6–8.7)
TOTAL PROTEIN: 5.8 G/DL (ref 6.6–8.7)
TOTAL PROTEIN: 5.9 G/DL (ref 6.6–8.7)
TOTAL PROTEIN: 5.9 G/DL (ref 6.6–8.7)
TOTAL PROTEIN: 6 G/DL (ref 6.6–8.7)
TOTAL PROTEIN: 6.1 G/DL (ref 6.6–8.7)
TOTAL PROTEIN: 6.1 G/DL (ref 6.6–8.7)
TOTAL PROTEIN: 6.2 G/DL (ref 6.6–8.7)
TOTAL PROTEIN: 6.3 G/DL (ref 6.6–8.7)
TOTAL PROTEIN: 6.6 G/DL (ref 6.6–8.7)
TOTAL PROTEIN: 6.6 G/DL (ref 6.6–8.7)
TOTAL PROTEIN: 6.7 G/DL (ref 6.6–8.7)
TROPONIN: <0.01 NG/ML (ref 0–0.03)
TROPONIN: <0.01 NG/ML (ref 0–0.03)
TSH SERPL DL<=0.05 MIU/L-ACNC: 0.3 UIU/ML (ref 0.27–4.2)
VITAMIN B-12: 400 PG/ML (ref 211–946)
VITAMIN D 25-HYDROXY: 48.5 NG/ML
WBC # BLD: 11 K/UL (ref 4.8–10.8)
WBC # BLD: 11.1 K/UL (ref 4.8–10.8)
WBC # BLD: 12.1 K/UL (ref 4.8–10.8)
WBC # BLD: 12.4 K/UL (ref 4.8–10.8)
WBC # BLD: 12.4 K/UL (ref 4.8–10.8)
WBC # BLD: 14.2 K/UL (ref 4.8–10.8)
WBC # BLD: 14.3 K/UL (ref 4.8–10.8)
WBC # BLD: 3.1 K/UL (ref 4.8–10.8)
WBC # BLD: 5.3 K/UL (ref 4.8–10.8)
WBC # BLD: 5.4 K/UL (ref 4.8–10.8)
WBC # BLD: 5.7 K/UL (ref 4.8–10.8)
WBC # BLD: 5.7 K/UL (ref 4.8–10.8)
WBC # BLD: 7.2 K/UL (ref 4.8–10.8)
WBC # BLD: 7.3 K/UL (ref 4.8–10.8)
WBC # BLD: 7.4 K/UL (ref 4.8–10.8)
WBC # BLD: 7.6 K/UL (ref 4.8–10.8)
WBC # BLD: 8.7 K/UL (ref 4.8–10.8)
WBC # BLD: 8.7 K/UL (ref 4.8–10.8)
WBC # BLD: 8.8 K/UL (ref 4.8–10.8)
WBC # BLD: 8.9 K/UL (ref 4.8–10.8)
WBC # BLD: 9 K/UL (ref 4.8–10.8)
WBC # BLD: 9.8 K/UL (ref 4.8–10.8)

## 2022-01-01 PROCEDURE — 6360000002 HC RX W HCPCS: Performed by: HOSPITALIST

## 2022-01-01 PROCEDURE — 2060000000 HC ICU INTERMEDIATE R&B

## 2022-01-01 PROCEDURE — 1210000000 HC MED SURG R&B

## 2022-01-01 PROCEDURE — 2700000000 HC OXYGEN THERAPY PER DAY

## 2022-01-01 PROCEDURE — 6360000002 HC RX W HCPCS: Performed by: STUDENT IN AN ORGANIZED HEALTH CARE EDUCATION/TRAINING PROGRAM

## 2022-01-01 PROCEDURE — 36415 COLL VENOUS BLD VENIPUNCTURE: CPT

## 2022-01-01 PROCEDURE — 85025 COMPLETE CBC W/AUTO DIFF WBC: CPT

## 2022-01-01 PROCEDURE — 80053 COMPREHEN METABOLIC PANEL: CPT

## 2022-01-01 PROCEDURE — 6370000000 HC RX 637 (ALT 250 FOR IP): Performed by: STUDENT IN AN ORGANIZED HEALTH CARE EDUCATION/TRAINING PROGRAM

## 2022-01-01 PROCEDURE — 94640 AIRWAY INHALATION TREATMENT: CPT

## 2022-01-01 PROCEDURE — 36600 WITHDRAWAL OF ARTERIAL BLOOD: CPT

## 2022-01-01 PROCEDURE — 71045 X-RAY EXAM CHEST 1 VIEW: CPT

## 2022-01-01 PROCEDURE — 6370000000 HC RX 637 (ALT 250 FOR IP): Performed by: HOSPITALIST

## 2022-01-01 PROCEDURE — 85379 FIBRIN DEGRADATION QUANT: CPT

## 2022-01-01 PROCEDURE — 83615 LACTATE (LD) (LDH) ENZYME: CPT

## 2022-01-01 PROCEDURE — 96374 THER/PROPH/DIAG INJ IV PUSH: CPT

## 2022-01-01 PROCEDURE — 6370000000 HC RX 637 (ALT 250 FOR IP): Performed by: INTERNAL MEDICINE

## 2022-01-01 PROCEDURE — 86140 C-REACTIVE PROTEIN: CPT

## 2022-01-01 PROCEDURE — 84145 PROCALCITONIN (PCT): CPT

## 2022-01-01 PROCEDURE — 93005 ELECTROCARDIOGRAM TRACING: CPT | Performed by: EMERGENCY MEDICINE

## 2022-01-01 PROCEDURE — 87635 SARS-COV-2 COVID-19 AMP PRB: CPT

## 2022-01-01 PROCEDURE — 71260 CT THORAX DX C+: CPT

## 2022-01-01 PROCEDURE — 6370000000 HC RX 637 (ALT 250 FOR IP): Performed by: EMERGENCY MEDICINE

## 2022-01-01 PROCEDURE — XW0DXM6 INTRODUCTION OF BARICITINIB INTO MOUTH AND PHARYNX, EXTERNAL APPROACH, NEW TECHNOLOGY GROUP 6: ICD-10-PCS | Performed by: STUDENT IN AN ORGANIZED HEALTH CARE EDUCATION/TRAINING PROGRAM

## 2022-01-01 PROCEDURE — 71275 CT ANGIOGRAPHY CHEST: CPT | Performed by: RADIOLOGY

## 2022-01-01 PROCEDURE — 94761 N-INVAS EAR/PLS OXIMETRY MLT: CPT

## 2022-01-01 PROCEDURE — 84132 ASSAY OF SERUM POTASSIUM: CPT

## 2022-01-01 PROCEDURE — 82607 VITAMIN B-12: CPT

## 2022-01-01 PROCEDURE — 71045 X-RAY EXAM CHEST 1 VIEW: CPT | Performed by: RADIOLOGY

## 2022-01-01 PROCEDURE — 83880 ASSAY OF NATRIURETIC PEPTIDE: CPT

## 2022-01-01 PROCEDURE — 3E0333Z INTRODUCTION OF ANTI-INFLAMMATORY INTO PERIPHERAL VEIN, PERCUTANEOUS APPROACH: ICD-10-PCS | Performed by: STUDENT IN AN ORGANIZED HEALTH CARE EDUCATION/TRAINING PROGRAM

## 2022-01-01 PROCEDURE — 97165 OT EVAL LOW COMPLEX 30 MIN: CPT

## 2022-01-01 PROCEDURE — 2580000003 HC RX 258: Performed by: STUDENT IN AN ORGANIZED HEALTH CARE EDUCATION/TRAINING PROGRAM

## 2022-01-01 PROCEDURE — 97530 THERAPEUTIC ACTIVITIES: CPT

## 2022-01-01 PROCEDURE — 83735 ASSAY OF MAGNESIUM: CPT

## 2022-01-01 PROCEDURE — 85384 FIBRINOGEN ACTIVITY: CPT

## 2022-01-01 PROCEDURE — 84484 ASSAY OF TROPONIN QUANT: CPT

## 2022-01-01 PROCEDURE — 2580000003 HC RX 258: Performed by: EMERGENCY MEDICINE

## 2022-01-01 PROCEDURE — 71275 CT ANGIOGRAPHY CHEST: CPT

## 2022-01-01 PROCEDURE — 99223 1ST HOSP IP/OBS HIGH 75: CPT | Performed by: INTERNAL MEDICINE

## 2022-01-01 PROCEDURE — 82803 BLOOD GASES ANY COMBINATION: CPT

## 2022-01-01 PROCEDURE — 6360000002 HC RX W HCPCS: Performed by: INTERNAL MEDICINE

## 2022-01-01 PROCEDURE — 99213 OFFICE O/P EST LOW 20 MIN: CPT | Performed by: INTERNAL MEDICINE

## 2022-01-01 PROCEDURE — 6360000002 HC RX W HCPCS: Performed by: EMERGENCY MEDICINE

## 2022-01-01 PROCEDURE — 85730 THROMBOPLASTIN TIME PARTIAL: CPT

## 2022-01-01 PROCEDURE — 82947 ASSAY GLUCOSE BLOOD QUANT: CPT

## 2022-01-01 PROCEDURE — 99223 1ST HOSP IP/OBS HIGH 75: CPT | Performed by: PHYSICIAN ASSISTANT

## 2022-01-01 PROCEDURE — 84100 ASSAY OF PHOSPHORUS: CPT

## 2022-01-01 PROCEDURE — 2580000003 HC RX 258: Performed by: HOSPITALIST

## 2022-01-01 PROCEDURE — 97161 PT EVAL LOW COMPLEX 20 MIN: CPT

## 2022-01-01 PROCEDURE — 85610 PROTHROMBIN TIME: CPT

## 2022-01-01 PROCEDURE — 6360000004 HC RX CONTRAST MEDICATION: Performed by: EMERGENCY MEDICINE

## 2022-01-01 PROCEDURE — 80048 BASIC METABOLIC PNL TOTAL CA: CPT

## 2022-01-01 PROCEDURE — 51702 INSERT TEMP BLADDER CATH: CPT

## 2022-01-01 PROCEDURE — P9047 ALBUMIN (HUMAN), 25%, 50ML: HCPCS | Performed by: HOSPITALIST

## 2022-01-01 PROCEDURE — 82728 ASSAY OF FERRITIN: CPT

## 2022-01-01 PROCEDURE — 6360000004 HC RX CONTRAST MEDICATION: Performed by: INTERNAL MEDICINE

## 2022-01-01 PROCEDURE — 2580000003 HC RX 258: Performed by: INTERNAL MEDICINE

## 2022-01-01 PROCEDURE — 94660 CPAP INITIATION&MGMT: CPT

## 2022-01-01 PROCEDURE — C8929 TTE W OR WO FOL WCON,DOPPLER: HCPCS

## 2022-01-01 PROCEDURE — 0202U NFCT DS 22 TRGT SARS-COV-2: CPT

## 2022-01-01 PROCEDURE — 99285 EMERGENCY DEPT VISIT HI MDM: CPT

## 2022-01-01 PROCEDURE — 99222 1ST HOSP IP/OBS MODERATE 55: CPT | Performed by: INTERNAL MEDICINE

## 2022-01-01 PROCEDURE — 87040 BLOOD CULTURE FOR BACTERIA: CPT

## 2022-01-01 PROCEDURE — 93010 ELECTROCARDIOGRAM REPORT: CPT | Performed by: INTERNAL MEDICINE

## 2022-01-01 PROCEDURE — 97535 SELF CARE MNGMENT TRAINING: CPT

## 2022-01-01 PROCEDURE — 2000000000 HC ICU R&B

## 2022-01-01 PROCEDURE — 83010 ASSAY OF HAPTOGLOBIN QUANT: CPT

## 2022-01-01 PROCEDURE — 82306 VITAMIN D 25 HYDROXY: CPT

## 2022-01-01 PROCEDURE — 96365 THER/PROPH/DIAG IV INF INIT: CPT

## 2022-01-01 PROCEDURE — 99233 SBSQ HOSP IP/OBS HIGH 50: CPT | Performed by: INTERNAL MEDICINE

## 2022-01-01 PROCEDURE — 85045 AUTOMATED RETICULOCYTE COUNT: CPT

## 2022-01-01 PROCEDURE — 83540 ASSAY OF IRON: CPT

## 2022-01-01 PROCEDURE — 96375 TX/PRO/DX INJ NEW DRUG ADDON: CPT

## 2022-01-01 PROCEDURE — 97116 GAIT TRAINING THERAPY: CPT

## 2022-01-01 PROCEDURE — 99232 SBSQ HOSP IP/OBS MODERATE 35: CPT | Performed by: INTERNAL MEDICINE

## 2022-01-01 PROCEDURE — 76604 US EXAM CHEST: CPT | Performed by: INTERNAL MEDICINE

## 2022-01-01 PROCEDURE — 1123F ACP DISCUSS/DSCN MKR DOCD: CPT | Performed by: INTERNAL MEDICINE

## 2022-01-01 PROCEDURE — 83550 IRON BINDING TEST: CPT

## 2022-01-01 PROCEDURE — 99291 CRITICAL CARE FIRST HOUR: CPT | Performed by: INTERNAL MEDICINE

## 2022-01-01 PROCEDURE — 84443 ASSAY THYROID STIM HORMONE: CPT

## 2022-01-01 PROCEDURE — 99283 EMERGENCY DEPT VISIT LOW MDM: CPT

## 2022-01-01 RX ORDER — BUDESONIDE 0.5 MG/2ML
0.5 INHALANT ORAL 2 TIMES DAILY
Status: DISCONTINUED | OUTPATIENT
Start: 2022-01-01 | End: 2022-01-01 | Stop reason: HOSPADM

## 2022-01-01 RX ORDER — SODIUM CHLORIDE 9 MG/ML
INJECTION, SOLUTION INTRAVENOUS PRN
Status: DISCONTINUED | OUTPATIENT
Start: 2022-01-01 | End: 2022-01-01 | Stop reason: HOSPADM

## 2022-01-01 RX ORDER — FOLIC ACID 1 MG/1
1 TABLET ORAL DAILY
Status: DISCONTINUED | OUTPATIENT
Start: 2022-01-01 | End: 2022-01-01 | Stop reason: HOSPADM

## 2022-01-01 RX ORDER — LORAZEPAM 2 MG/ML
1 INJECTION INTRAMUSCULAR EVERY 4 HOURS PRN
Status: DISCONTINUED | OUTPATIENT
Start: 2022-01-01 | End: 2022-01-01 | Stop reason: HOSPADM

## 2022-01-01 RX ORDER — SODIUM CHLORIDE 9 MG/ML
INJECTION, SOLUTION INTRAVENOUS CONTINUOUS
Status: DISCONTINUED | OUTPATIENT
Start: 2022-01-01 | End: 2022-01-01

## 2022-01-01 RX ORDER — ALBUTEROL SULFATE 90 UG/1
2 AEROSOL, METERED RESPIRATORY (INHALATION) 4 TIMES DAILY
Status: DISCONTINUED | OUTPATIENT
Start: 2022-01-01 | End: 2022-01-01 | Stop reason: HOSPADM

## 2022-01-01 RX ORDER — OXYCODONE AND ACETAMINOPHEN 10; 325 MG/1; MG/1
1 TABLET ORAL EVERY 4 HOURS PRN
Qty: 180 TABLET | Refills: 0 | Status: CANCELLED | OUTPATIENT
Start: 2022-01-01 | End: 2022-01-01

## 2022-01-01 RX ORDER — ERGOCALCIFEROL 1.25 MG/1
50000 CAPSULE ORAL WEEKLY
Status: DISCONTINUED | OUTPATIENT
Start: 2022-01-01 | End: 2022-01-01 | Stop reason: HOSPADM

## 2022-01-01 RX ORDER — MECOBALAMIN 5000 MCG
5 TABLET,DISINTEGRATING ORAL NIGHTLY
Status: DISCONTINUED | OUTPATIENT
Start: 2022-01-01 | End: 2022-01-01 | Stop reason: HOSPADM

## 2022-01-01 RX ORDER — FUROSEMIDE 10 MG/ML
INJECTION INTRAMUSCULAR; INTRAVENOUS
Status: DISCONTINUED
Start: 2022-01-01 | End: 2022-01-01 | Stop reason: HOSPADM

## 2022-01-01 RX ORDER — DOXYCYCLINE HYCLATE 100 MG
100 TABLET ORAL 2 TIMES DAILY
Qty: 20 TABLET | Refills: 0 | Status: SHIPPED | OUTPATIENT
Start: 2022-01-01 | End: 2022-01-01

## 2022-01-01 RX ORDER — OXYCODONE AND ACETAMINOPHEN 10; 325 MG/1; MG/1
1 TABLET ORAL EVERY 4 HOURS PRN
Qty: 180 TABLET | Refills: 0 | Status: SHIPPED | OUTPATIENT
Start: 2022-01-01 | End: 2022-01-01 | Stop reason: SDUPTHER

## 2022-01-01 RX ORDER — MAGNESIUM SULFATE IN WATER 40 MG/ML
2000 INJECTION, SOLUTION INTRAVENOUS PRN
Status: DISCONTINUED | OUTPATIENT
Start: 2022-01-01 | End: 2022-01-01 | Stop reason: HOSPADM

## 2022-01-01 RX ORDER — MECOBALAMIN 5000 MCG
5 TABLET,DISINTEGRATING ORAL NIGHTLY PRN
Status: DISCONTINUED | OUTPATIENT
Start: 2022-01-01 | End: 2022-01-01 | Stop reason: HOSPADM

## 2022-01-01 RX ORDER — ONDANSETRON 2 MG/ML
4 INJECTION INTRAMUSCULAR; INTRAVENOUS EVERY 6 HOURS PRN
Status: CANCELLED | OUTPATIENT
Start: 2022-01-01

## 2022-01-01 RX ORDER — ASPIRIN 81 MG/1
81 TABLET ORAL DAILY
Status: DISCONTINUED | OUTPATIENT
Start: 2022-01-01 | End: 2022-01-01 | Stop reason: HOSPADM

## 2022-01-01 RX ORDER — SODIUM CHLORIDE, SODIUM LACTATE, POTASSIUM CHLORIDE, CALCIUM CHLORIDE 600; 310; 30; 20 MG/100ML; MG/100ML; MG/100ML; MG/100ML
INJECTION, SOLUTION INTRAVENOUS CONTINUOUS
Status: DISCONTINUED | OUTPATIENT
Start: 2022-01-01 | End: 2022-01-01

## 2022-01-01 RX ORDER — SODIUM CHLORIDE 0.9 % (FLUSH) 0.9 %
5-40 SYRINGE (ML) INJECTION EVERY 12 HOURS SCHEDULED
Status: DISCONTINUED | OUTPATIENT
Start: 2022-01-01 | End: 2022-01-01 | Stop reason: HOSPADM

## 2022-01-01 RX ORDER — ZINC SULFATE 50(220)MG
50 CAPSULE ORAL DAILY
Status: DISCONTINUED | OUTPATIENT
Start: 2022-01-01 | End: 2022-01-01 | Stop reason: HOSPADM

## 2022-01-01 RX ORDER — ATORVASTATIN CALCIUM 40 MG/1
40 TABLET, FILM COATED ORAL NIGHTLY
Status: CANCELLED | OUTPATIENT
Start: 2022-01-01

## 2022-01-01 RX ORDER — MIDODRINE HYDROCHLORIDE 10 MG/1
10 TABLET ORAL
Status: CANCELLED | OUTPATIENT
Start: 2022-01-01

## 2022-01-01 RX ORDER — MORPHINE SULFATE 2 MG/ML
2 INJECTION, SOLUTION INTRAMUSCULAR; INTRAVENOUS
Status: DISCONTINUED | OUTPATIENT
Start: 2022-01-01 | End: 2022-01-01 | Stop reason: HOSPADM

## 2022-01-01 RX ORDER — ERGOCALCIFEROL 1.25 MG/1
50000 CAPSULE ORAL WEEKLY
Status: CANCELLED | OUTPATIENT
Start: 2022-08-01

## 2022-01-01 RX ORDER — DEXAMETHASONE SODIUM PHOSPHATE 10 MG/ML
6 INJECTION, SOLUTION INTRAMUSCULAR; INTRAVENOUS ONCE
Status: DISCONTINUED | OUTPATIENT
Start: 2022-01-01 | End: 2022-01-01

## 2022-01-01 RX ORDER — NICOTINE 21 MG/24HR
1 PATCH, TRANSDERMAL 24 HOURS TRANSDERMAL DAILY
Status: CANCELLED | OUTPATIENT
Start: 2022-07-28

## 2022-01-01 RX ORDER — PREDNISONE 20 MG/1
20 TABLET ORAL DAILY
Qty: 5 TABLET | Refills: 0 | Status: SHIPPED | OUTPATIENT
Start: 2022-01-01 | End: 2022-01-01

## 2022-01-01 RX ORDER — ATORVASTATIN CALCIUM 40 MG/1
40 TABLET, FILM COATED ORAL NIGHTLY
Status: DISCONTINUED | OUTPATIENT
Start: 2022-01-01 | End: 2022-01-01 | Stop reason: HOSPADM

## 2022-01-01 RX ORDER — MECOBALAMIN 5000 MCG
5 TABLET,DISINTEGRATING ORAL NIGHTLY PRN
Status: CANCELLED | OUTPATIENT
Start: 2022-01-01

## 2022-01-01 RX ORDER — ALBUTEROL SULFATE 2.5 MG/3ML
2.5 SOLUTION RESPIRATORY (INHALATION)
Status: CANCELLED | OUTPATIENT
Start: 2022-01-01

## 2022-01-01 RX ORDER — GUAIFENESIN/DEXTROMETHORPHAN 100-10MG/5
5 SYRUP ORAL EVERY 4 HOURS PRN
Status: DISCONTINUED | OUTPATIENT
Start: 2022-01-01 | End: 2022-01-01 | Stop reason: HOSPADM

## 2022-01-01 RX ORDER — ASCORBIC ACID 500 MG
500 TABLET ORAL 3 TIMES DAILY
Status: DISCONTINUED | OUTPATIENT
Start: 2022-01-01 | End: 2022-01-01 | Stop reason: HOSPADM

## 2022-01-01 RX ORDER — CITALOPRAM 20 MG/1
20 TABLET ORAL DAILY
Status: DISCONTINUED | OUTPATIENT
Start: 2022-01-01 | End: 2022-01-01 | Stop reason: HOSPADM

## 2022-01-01 RX ORDER — GAUZE BANDAGE 2" X 2"
100 BANDAGE TOPICAL DAILY
Status: CANCELLED | OUTPATIENT
Start: 2022-07-28

## 2022-01-01 RX ORDER — FOLIC ACID 1 MG/1
1 TABLET ORAL DAILY
Status: CANCELLED | OUTPATIENT
Start: 2022-07-28

## 2022-01-01 RX ORDER — OXYCODONE AND ACETAMINOPHEN 10; 325 MG/1; MG/1
1 TABLET ORAL EVERY 6 HOURS PRN
Qty: 120 TABLET | Refills: 0 | Status: SHIPPED | OUTPATIENT
Start: 2022-01-01 | End: 2022-01-01

## 2022-01-01 RX ORDER — BUDESONIDE AND FORMOTEROL FUMARATE DIHYDRATE 160; 4.5 UG/1; UG/1
2 AEROSOL RESPIRATORY (INHALATION) 2 TIMES DAILY
Status: DISCONTINUED | OUTPATIENT
Start: 2022-01-01 | End: 2022-01-01 | Stop reason: HOSPADM

## 2022-01-01 RX ORDER — POTASSIUM CHLORIDE 7.45 MG/ML
10 INJECTION INTRAVENOUS PRN
Status: CANCELLED | OUTPATIENT
Start: 2022-01-01

## 2022-01-01 RX ORDER — ALBUTEROL SULFATE 2.5 MG/3ML
2.5 SOLUTION RESPIRATORY (INHALATION)
Status: DISCONTINUED | OUTPATIENT
Start: 2022-01-01 | End: 2022-01-01 | Stop reason: HOSPADM

## 2022-01-01 RX ORDER — POLYETHYLENE GLYCOL 3350 17 G/17G
17 POWDER, FOR SOLUTION ORAL DAILY PRN
Status: DISCONTINUED | OUTPATIENT
Start: 2022-01-01 | End: 2022-01-01 | Stop reason: HOSPADM

## 2022-01-01 RX ORDER — SODIUM CHLORIDE 0.9 % (FLUSH) 0.9 %
5-40 SYRINGE (ML) INJECTION EVERY 12 HOURS SCHEDULED
Status: CANCELLED | OUTPATIENT
Start: 2022-01-01

## 2022-01-01 RX ORDER — DEXAMETHASONE 2 MG/1
TABLET ORAL
Qty: 42 TABLET | Refills: 0 | Status: SHIPPED | OUTPATIENT
Start: 2022-01-01 | End: 2022-01-01

## 2022-01-01 RX ORDER — FUROSEMIDE 10 MG/ML
40 INJECTION INTRAMUSCULAR; INTRAVENOUS ONCE
Status: COMPLETED | OUTPATIENT
Start: 2022-01-01 | End: 2022-01-01

## 2022-01-01 RX ORDER — ONDANSETRON 2 MG/ML
4 INJECTION INTRAMUSCULAR; INTRAVENOUS EVERY 6 HOURS PRN
Status: DISCONTINUED | OUTPATIENT
Start: 2022-01-01 | End: 2022-01-01 | Stop reason: HOSPADM

## 2022-01-01 RX ORDER — CALCIUM CARBONATE 200(500)MG
500 TABLET,CHEWABLE ORAL 3 TIMES DAILY PRN
Status: DISCONTINUED | OUTPATIENT
Start: 2022-01-01 | End: 2022-01-01 | Stop reason: HOSPADM

## 2022-01-01 RX ORDER — NICOTINE 21 MG/24HR
1 PATCH, TRANSDERMAL 24 HOURS TRANSDERMAL DAILY
Status: DISCONTINUED | OUTPATIENT
Start: 2022-01-01 | End: 2022-01-01 | Stop reason: HOSPADM

## 2022-01-01 RX ORDER — METHYLPREDNISOLONE SODIUM SUCCINATE 125 MG/2ML
125 INJECTION, POWDER, LYOPHILIZED, FOR SOLUTION INTRAMUSCULAR; INTRAVENOUS ONCE
Status: COMPLETED | OUTPATIENT
Start: 2022-01-01 | End: 2022-01-01

## 2022-01-01 RX ORDER — SODIUM CHLORIDE 0.9 % (FLUSH) 0.9 %
5-40 SYRINGE (ML) INJECTION PRN
Status: DISCONTINUED | OUTPATIENT
Start: 2022-01-01 | End: 2022-01-01 | Stop reason: HOSPADM

## 2022-01-01 RX ORDER — GAUZE BANDAGE 2" X 2"
100 BANDAGE TOPICAL DAILY
Status: DISCONTINUED | OUTPATIENT
Start: 2022-01-01 | End: 2022-01-01 | Stop reason: HOSPADM

## 2022-01-01 RX ORDER — IPRATROPIUM BROMIDE AND ALBUTEROL SULFATE 2.5; .5 MG/3ML; MG/3ML
SOLUTION RESPIRATORY (INHALATION)
Status: DISCONTINUED
Start: 2022-01-01 | End: 2022-01-01 | Stop reason: HOSPADM

## 2022-01-01 RX ORDER — NALOXONE HYDROCHLORIDE 0.4 MG/ML
0.4 INJECTION, SOLUTION INTRAMUSCULAR; INTRAVENOUS; SUBCUTANEOUS PRN
Status: DISCONTINUED | OUTPATIENT
Start: 2022-01-01 | End: 2022-01-01 | Stop reason: HOSPADM

## 2022-01-01 RX ORDER — MORPHINE SULFATE 2 MG/ML
2 INJECTION, SOLUTION INTRAMUSCULAR; INTRAVENOUS
Status: CANCELLED | OUTPATIENT
Start: 2022-01-01

## 2022-01-01 RX ORDER — MAGNESIUM SULFATE IN WATER 40 MG/ML
2000 INJECTION, SOLUTION INTRAVENOUS PRN
Status: CANCELLED | OUTPATIENT
Start: 2022-01-01

## 2022-01-01 RX ORDER — ACETAMINOPHEN 650 MG/1
650 SUPPOSITORY RECTAL EVERY 6 HOURS PRN
Status: DISCONTINUED | OUTPATIENT
Start: 2022-01-01 | End: 2022-01-01 | Stop reason: HOSPADM

## 2022-01-01 RX ORDER — CALCIUM CARBONATE 200(500)MG
500 TABLET,CHEWABLE ORAL 3 TIMES DAILY PRN
Status: CANCELLED | OUTPATIENT
Start: 2022-01-01

## 2022-01-01 RX ORDER — SODIUM CHLORIDE 9 MG/ML
INJECTION, SOLUTION INTRAVENOUS PRN
Status: CANCELLED | OUTPATIENT
Start: 2022-01-01

## 2022-01-01 RX ORDER — MIDODRINE HYDROCHLORIDE 10 MG/1
10 TABLET ORAL
Status: DISCONTINUED | OUTPATIENT
Start: 2022-01-01 | End: 2022-01-01 | Stop reason: HOSPADM

## 2022-01-01 RX ORDER — ARFORMOTEROL TARTRATE 15 UG/2ML
15 SOLUTION RESPIRATORY (INHALATION) 2 TIMES DAILY
Status: CANCELLED | OUTPATIENT
Start: 2022-01-01

## 2022-01-01 RX ORDER — OXYCODONE HYDROCHLORIDE 5 MG/1
10 TABLET ORAL EVERY 4 HOURS PRN
Status: DISPENSED | OUTPATIENT
Start: 2022-01-01 | End: 2022-01-01

## 2022-01-01 RX ORDER — ACETAMINOPHEN 650 MG/1
650 SUPPOSITORY RECTAL EVERY 6 HOURS PRN
Status: CANCELLED | OUTPATIENT
Start: 2022-01-01

## 2022-01-01 RX ORDER — CITALOPRAM 20 MG/1
20 TABLET ORAL DAILY
Status: CANCELLED | OUTPATIENT
Start: 2022-07-28

## 2022-01-01 RX ORDER — ONDANSETRON 4 MG/1
4 TABLET, ORALLY DISINTEGRATING ORAL EVERY 8 HOURS PRN
Status: DISCONTINUED | OUTPATIENT
Start: 2022-01-01 | End: 2022-01-01 | Stop reason: HOSPADM

## 2022-01-01 RX ORDER — MORPHINE SULFATE 2 MG/ML
2 INJECTION, SOLUTION INTRAMUSCULAR; INTRAVENOUS EVERY 4 HOURS PRN
Status: DISCONTINUED | OUTPATIENT
Start: 2022-01-01 | End: 2022-01-01 | Stop reason: HOSPADM

## 2022-01-01 RX ORDER — OXYCODONE AND ACETAMINOPHEN 10; 325 MG/1; MG/1
1 TABLET ORAL EVERY 6 HOURS PRN
Status: DISCONTINUED | OUTPATIENT
Start: 2022-01-01 | End: 2022-01-01 | Stop reason: HOSPADM

## 2022-01-01 RX ORDER — ATORVASTATIN CALCIUM 20 MG/1
40 TABLET, FILM COATED ORAL NIGHTLY
Status: DISCONTINUED | OUTPATIENT
Start: 2022-01-01 | End: 2022-01-01 | Stop reason: HOSPADM

## 2022-01-01 RX ORDER — ALPRAZOLAM 0.25 MG/1
0.25 TABLET ORAL 4 TIMES DAILY PRN
Status: DISCONTINUED | OUTPATIENT
Start: 2022-01-01 | End: 2022-01-01 | Stop reason: HOSPADM

## 2022-01-01 RX ORDER — ARFORMOTEROL TARTRATE 15 UG/2ML
15 SOLUTION RESPIRATORY (INHALATION) 2 TIMES DAILY
Status: DISCONTINUED | OUTPATIENT
Start: 2022-01-01 | End: 2022-01-01 | Stop reason: HOSPADM

## 2022-01-01 RX ORDER — SODIUM CHLORIDE, SODIUM LACTATE, POTASSIUM CHLORIDE, AND CALCIUM CHLORIDE .6; .31; .03; .02 G/100ML; G/100ML; G/100ML; G/100ML
500 INJECTION, SOLUTION INTRAVENOUS ONCE
Status: DISCONTINUED | OUTPATIENT
Start: 2022-01-01 | End: 2022-01-01

## 2022-01-01 RX ORDER — OXYCODONE HYDROCHLORIDE 5 MG/1
10 TABLET ORAL EVERY 4 HOURS PRN
Status: CANCELLED | OUTPATIENT
Start: 2022-01-01

## 2022-01-01 RX ORDER — ONDANSETRON 4 MG/1
4 TABLET, ORALLY DISINTEGRATING ORAL EVERY 8 HOURS PRN
Status: CANCELLED | OUTPATIENT
Start: 2022-01-01

## 2022-01-01 RX ORDER — POLYETHYLENE GLYCOL 3350 17 G/17G
17 POWDER, FOR SOLUTION ORAL DAILY PRN
Status: CANCELLED | OUTPATIENT
Start: 2022-01-01

## 2022-01-01 RX ORDER — IPRATROPIUM BROMIDE AND ALBUTEROL SULFATE 2.5; .5 MG/3ML; MG/3ML
1 SOLUTION RESPIRATORY (INHALATION) ONCE
Status: COMPLETED | OUTPATIENT
Start: 2022-01-01 | End: 2022-01-01

## 2022-01-01 RX ORDER — POTASSIUM CHLORIDE 7.45 MG/ML
10 INJECTION INTRAVENOUS PRN
Status: DISCONTINUED | OUTPATIENT
Start: 2022-01-01 | End: 2022-01-01 | Stop reason: HOSPADM

## 2022-01-01 RX ORDER — OXYCODONE HYDROCHLORIDE 5 MG/1
10 TABLET ORAL EVERY 4 HOURS PRN
Status: DISCONTINUED | OUTPATIENT
Start: 2022-01-01 | End: 2022-01-01 | Stop reason: HOSPADM

## 2022-01-01 RX ORDER — DEXAMETHASONE SODIUM PHOSPHATE 10 MG/ML
10 INJECTION, SOLUTION INTRAMUSCULAR; INTRAVENOUS EVERY 12 HOURS
Status: COMPLETED | OUTPATIENT
Start: 2022-01-01 | End: 2022-01-01

## 2022-01-01 RX ORDER — ACETAMINOPHEN 325 MG/1
650 TABLET ORAL EVERY 6 HOURS PRN
Status: CANCELLED | OUTPATIENT
Start: 2022-01-01

## 2022-01-01 RX ORDER — NALOXONE HYDROCHLORIDE 0.4 MG/ML
0.4 INJECTION, SOLUTION INTRAMUSCULAR; INTRAVENOUS; SUBCUTANEOUS PRN
Status: CANCELLED | OUTPATIENT
Start: 2022-01-01

## 2022-01-01 RX ORDER — OXYCODONE AND ACETAMINOPHEN 10; 325 MG/1; MG/1
1 TABLET ORAL EVERY 4 HOURS PRN
Qty: 180 TABLET | Refills: 0 | Status: SHIPPED | OUTPATIENT
Start: 2022-01-01 | End: 2022-08-18

## 2022-01-01 RX ORDER — ALBUMIN (HUMAN) 12.5 G/50ML
25 SOLUTION INTRAVENOUS ONCE
Status: COMPLETED | OUTPATIENT
Start: 2022-01-01 | End: 2022-01-01

## 2022-01-01 RX ORDER — DEXAMETHASONE SODIUM PHOSPHATE 10 MG/ML
10 INJECTION, SOLUTION INTRAMUSCULAR; INTRAVENOUS EVERY 24 HOURS
Status: COMPLETED | OUTPATIENT
Start: 2022-01-01 | End: 2022-01-01

## 2022-01-01 RX ORDER — POTASSIUM CHLORIDE 29.8 MG/ML
20 INJECTION INTRAVENOUS PRN
Status: DISCONTINUED | OUTPATIENT
Start: 2022-01-01 | End: 2022-01-01

## 2022-01-01 RX ORDER — CEFDINIR 300 MG/1
300 CAPSULE ORAL 2 TIMES DAILY
Qty: 20 CAPSULE | Refills: 0 | Status: SHIPPED | OUTPATIENT
Start: 2022-01-01 | End: 2022-01-01

## 2022-01-01 RX ORDER — BUDESONIDE 0.5 MG/2ML
0.5 INHALANT ORAL 2 TIMES DAILY
Status: CANCELLED | OUTPATIENT
Start: 2022-01-01

## 2022-01-01 RX ORDER — SODIUM CHLORIDE 9 MG/ML
INJECTION, SOLUTION INTRAVENOUS ONCE
Status: COMPLETED | OUTPATIENT
Start: 2022-01-01 | End: 2022-01-01

## 2022-01-01 RX ORDER — SODIUM CHLORIDE 0.9 % (FLUSH) 0.9 %
5-40 SYRINGE (ML) INJECTION PRN
Status: CANCELLED | OUTPATIENT
Start: 2022-01-01

## 2022-01-01 RX ORDER — ACETAMINOPHEN 325 MG/1
650 TABLET ORAL EVERY 6 HOURS PRN
Status: DISCONTINUED | OUTPATIENT
Start: 2022-01-01 | End: 2022-01-01 | Stop reason: HOSPADM

## 2022-01-01 RX ADMIN — ASPIRIN 81 MG: 81 TABLET, COATED ORAL at 09:00

## 2022-01-01 RX ADMIN — Medication 5 MG: at 19:43

## 2022-01-01 RX ADMIN — OXYCODONE HYDROCHLORIDE AND ACETAMINOPHEN 500 MG: 500 TABLET ORAL at 19:49

## 2022-01-01 RX ADMIN — CITALOPRAM HYDROBROMIDE 20 MG: 20 TABLET ORAL at 10:00

## 2022-01-01 RX ADMIN — ALBUTEROL SULFATE 2 PUFF: 90 AEROSOL, METERED RESPIRATORY (INHALATION) at 17:29

## 2022-01-01 RX ADMIN — FOLIC ACID 1 MG: 1 TABLET ORAL at 21:14

## 2022-01-01 RX ADMIN — MORPHINE SULFATE 2 MG: 2 INJECTION, SOLUTION INTRAMUSCULAR; INTRAVENOUS at 09:50

## 2022-01-01 RX ADMIN — THIAMINE HCL TAB 100 MG 100 MG: 100 TAB at 08:24

## 2022-01-01 RX ADMIN — FOLIC ACID 1 MG: 1 TABLET ORAL at 08:39

## 2022-01-01 RX ADMIN — BARICITINIB 4 MG: 2 TABLET, FILM COATED ORAL at 21:07

## 2022-01-01 RX ADMIN — MORPHINE SULFATE 2 MG: 2 INJECTION, SOLUTION INTRAMUSCULAR; INTRAVENOUS at 20:49

## 2022-01-01 RX ADMIN — ATORVASTATIN CALCIUM 40 MG: 40 TABLET, FILM COATED ORAL at 20:46

## 2022-01-01 RX ADMIN — FOLIC ACID 1 MG: 1 TABLET ORAL at 09:00

## 2022-01-01 RX ADMIN — SODIUM CHLORIDE, PRESERVATIVE FREE 10 ML: 5 INJECTION INTRAVENOUS at 19:24

## 2022-01-01 RX ADMIN — MIDODRINE HYDROCHLORIDE 10 MG: 10 TABLET ORAL at 16:36

## 2022-01-01 RX ADMIN — Medication 5 MG: at 20:48

## 2022-01-01 RX ADMIN — OXYCODONE AND ACETAMINOPHEN 1 TABLET: 10; 325 TABLET ORAL at 05:23

## 2022-01-01 RX ADMIN — OXYCODONE AND ACETAMINOPHEN 1 TABLET: 10; 325 TABLET ORAL at 18:32

## 2022-01-01 RX ADMIN — ACETAMINOPHEN 325MG 650 MG: 325 TABLET ORAL at 08:48

## 2022-01-01 RX ADMIN — ALPRAZOLAM 0.25 MG: 0.25 TABLET ORAL at 14:16

## 2022-01-01 RX ADMIN — ASPIRIN 81 MG: 81 TABLET, COATED ORAL at 08:45

## 2022-01-01 RX ADMIN — OXYCODONE HYDROCHLORIDE AND ACETAMINOPHEN 500 MG: 500 TABLET ORAL at 14:19

## 2022-01-01 RX ADMIN — THIAMINE HCL TAB 100 MG 100 MG: 100 TAB at 08:45

## 2022-01-01 RX ADMIN — OXYCODONE HYDROCHLORIDE AND ACETAMINOPHEN 500 MG: 500 TABLET ORAL at 10:16

## 2022-01-01 RX ADMIN — BUDESONIDE AND FORMOTEROL FUMARATE DIHYDRATE 2 PUFF: 160; 4.5 AEROSOL RESPIRATORY (INHALATION) at 21:00

## 2022-01-01 RX ADMIN — BUDESONIDE 500 MCG: 0.5 SUSPENSION RESPIRATORY (INHALATION) at 06:36

## 2022-01-01 RX ADMIN — MORPHINE SULFATE 2 MG: 2 INJECTION, SOLUTION INTRAMUSCULAR; INTRAVENOUS at 16:36

## 2022-01-01 RX ADMIN — MORPHINE SULFATE 2 MG: 2 INJECTION, SOLUTION INTRAMUSCULAR; INTRAVENOUS at 06:36

## 2022-01-01 RX ADMIN — BARICITINIB 4 MG: 2 TABLET, FILM COATED ORAL at 20:29

## 2022-01-01 RX ADMIN — ALBUTEROL SULFATE 2 PUFF: 90 AEROSOL, METERED RESPIRATORY (INHALATION) at 20:00

## 2022-01-01 RX ADMIN — CITALOPRAM HYDROBROMIDE 20 MG: 20 TABLET ORAL at 09:00

## 2022-01-01 RX ADMIN — OXYCODONE HYDROCHLORIDE AND ACETAMINOPHEN 500 MG: 500 TABLET ORAL at 09:03

## 2022-01-01 RX ADMIN — CITALOPRAM HYDROBROMIDE 20 MG: 20 TABLET ORAL at 10:04

## 2022-01-01 RX ADMIN — BUDESONIDE AND FORMOTEROL FUMARATE DIHYDRATE 2 PUFF: 160; 4.5 AEROSOL RESPIRATORY (INHALATION) at 22:42

## 2022-01-01 RX ADMIN — MORPHINE SULFATE 2 MG: 2 INJECTION, SOLUTION INTRAMUSCULAR; INTRAVENOUS at 07:53

## 2022-01-01 RX ADMIN — DEXAMETHASONE SODIUM PHOSPHATE 10 MG: 10 INJECTION INTRAMUSCULAR; INTRAVENOUS at 20:49

## 2022-01-01 RX ADMIN — MORPHINE SULFATE 2 MG: 2 INJECTION, SOLUTION INTRAMUSCULAR; INTRAVENOUS at 16:41

## 2022-01-01 RX ADMIN — FOLIC ACID 1 MG: 1 TABLET ORAL at 10:16

## 2022-01-01 RX ADMIN — ALBUTEROL SULFATE 2 PUFF: 90 AEROSOL, METERED RESPIRATORY (INHALATION) at 21:00

## 2022-01-01 RX ADMIN — ASPIRIN 81 MG: 81 TABLET, COATED ORAL at 10:56

## 2022-01-01 RX ADMIN — WATER 1000 MG: 1 INJECTION INTRAMUSCULAR; INTRAVENOUS; SUBCUTANEOUS at 18:59

## 2022-01-01 RX ADMIN — FUROSEMIDE 40 MG: 10 INJECTION, SOLUTION INTRAMUSCULAR; INTRAVENOUS at 15:23

## 2022-01-01 RX ADMIN — OXYCODONE AND ACETAMINOPHEN 1 TABLET: 10; 325 TABLET ORAL at 11:29

## 2022-01-01 RX ADMIN — Medication 5 MG: at 20:51

## 2022-01-01 RX ADMIN — MORPHINE SULFATE 2 MG: 2 INJECTION, SOLUTION INTRAMUSCULAR; INTRAVENOUS at 09:04

## 2022-01-01 RX ADMIN — MIDODRINE HYDROCHLORIDE 10 MG: 10 TABLET ORAL at 10:04

## 2022-01-01 RX ADMIN — ALBUTEROL SULFATE 2 PUFF: 90 AEROSOL, METERED RESPIRATORY (INHALATION) at 08:00

## 2022-01-01 RX ADMIN — OXYCODONE AND ACETAMINOPHEN 1 TABLET: 10; 325 TABLET ORAL at 10:25

## 2022-01-01 RX ADMIN — ATORVASTATIN CALCIUM 40 MG: 20 TABLET, FILM COATED ORAL at 19:58

## 2022-01-01 RX ADMIN — ALBUTEROL SULFATE 2 PUFF: 90 AEROSOL, METERED RESPIRATORY (INHALATION) at 13:00

## 2022-01-01 RX ADMIN — MIDODRINE HYDROCHLORIDE 10 MG: 10 TABLET ORAL at 13:19

## 2022-01-01 RX ADMIN — ALBUTEROL SULFATE 2 PUFF: 90 AEROSOL, METERED RESPIRATORY (INHALATION) at 21:13

## 2022-01-01 RX ADMIN — OXYCODONE AND ACETAMINOPHEN 1 TABLET: 10; 325 TABLET ORAL at 21:26

## 2022-01-01 RX ADMIN — THIAMINE HCL TAB 100 MG 100 MG: 100 TAB at 21:26

## 2022-01-01 RX ADMIN — APIXABAN 5 MG: 5 TABLET, FILM COATED ORAL at 20:11

## 2022-01-01 RX ADMIN — ZINC SULFATE 220 MG (50 MG) CAPSULE 50 MG: CAPSULE at 10:08

## 2022-01-01 RX ADMIN — ALBUTEROL SULFATE 2 PUFF: 90 AEROSOL, METERED RESPIRATORY (INHALATION) at 20:06

## 2022-01-01 RX ADMIN — ALBUTEROL SULFATE 2 PUFF: 90 AEROSOL, METERED RESPIRATORY (INHALATION) at 21:07

## 2022-01-01 RX ADMIN — OXYCODONE AND ACETAMINOPHEN 1 TABLET: 10; 325 TABLET ORAL at 14:00

## 2022-01-01 RX ADMIN — BUDESONIDE AND FORMOTEROL FUMARATE DIHYDRATE 2 PUFF: 160; 4.5 AEROSOL RESPIRATORY (INHALATION) at 08:29

## 2022-01-01 RX ADMIN — Medication 5 MG: at 19:49

## 2022-01-01 RX ADMIN — MORPHINE SULFATE 2 MG: 2 INJECTION, SOLUTION INTRAMUSCULAR; INTRAVENOUS at 05:48

## 2022-01-01 RX ADMIN — MORPHINE SULFATE 2 MG: 2 INJECTION, SOLUTION INTRAMUSCULAR; INTRAVENOUS at 20:06

## 2022-01-01 RX ADMIN — BUDESONIDE AND FORMOTEROL FUMARATE DIHYDRATE 2 PUFF: 160; 4.5 AEROSOL RESPIRATORY (INHALATION) at 08:25

## 2022-01-01 RX ADMIN — ZINC SULFATE 220 MG (50 MG) CAPSULE 50 MG: CAPSULE at 08:29

## 2022-01-01 RX ADMIN — OXYCODONE HYDROCHLORIDE AND ACETAMINOPHEN 500 MG: 500 TABLET ORAL at 10:04

## 2022-01-01 RX ADMIN — IPRATROPIUM BROMIDE AND ALBUTEROL SULFATE 1 AMPULE: .5; 2.5 SOLUTION RESPIRATORY (INHALATION) at 18:13

## 2022-01-01 RX ADMIN — BARICITINIB 4 MG: 2 TABLET, FILM COATED ORAL at 17:13

## 2022-01-01 RX ADMIN — MIDODRINE HYDROCHLORIDE 10 MG: 10 TABLET ORAL at 16:42

## 2022-01-01 RX ADMIN — BUDESONIDE 500 MCG: 0.5 SUSPENSION RESPIRATORY (INHALATION) at 06:20

## 2022-01-01 RX ADMIN — ALBUTEROL SULFATE 2 PUFF: 90 AEROSOL, METERED RESPIRATORY (INHALATION) at 19:48

## 2022-01-01 RX ADMIN — MIDODRINE HYDROCHLORIDE 10 MG: 10 TABLET ORAL at 12:32

## 2022-01-01 RX ADMIN — OXYCODONE AND ACETAMINOPHEN 1 TABLET: 10; 325 TABLET ORAL at 04:30

## 2022-01-01 RX ADMIN — APIXABAN 5 MG: 5 TABLET, FILM COATED ORAL at 08:04

## 2022-01-01 RX ADMIN — BUDESONIDE AND FORMOTEROL FUMARATE DIHYDRATE 2 PUFF: 160; 4.5 AEROSOL RESPIRATORY (INHALATION) at 09:03

## 2022-01-01 RX ADMIN — OXYCODONE 10 MG: 5 TABLET ORAL at 03:05

## 2022-01-01 RX ADMIN — OXYCODONE HYDROCHLORIDE AND ACETAMINOPHEN 500 MG: 500 TABLET ORAL at 14:59

## 2022-01-01 RX ADMIN — APIXABAN 5 MG: 5 TABLET, FILM COATED ORAL at 21:14

## 2022-01-01 RX ADMIN — DEXAMETHASONE SODIUM PHOSPHATE 10 MG: 10 INJECTION INTRAMUSCULAR; INTRAVENOUS at 08:04

## 2022-01-01 RX ADMIN — CITALOPRAM HYDROBROMIDE 20 MG: 20 TABLET ORAL at 08:40

## 2022-01-01 RX ADMIN — OXYCODONE AND ACETAMINOPHEN 1 TABLET: 10; 325 TABLET ORAL at 09:04

## 2022-01-01 RX ADMIN — BUDESONIDE AND FORMOTEROL FUMARATE DIHYDRATE 2 PUFF: 160; 4.5 AEROSOL RESPIRATORY (INHALATION) at 19:48

## 2022-01-01 RX ADMIN — DEXAMETHASONE SODIUM PHOSPHATE 10 MG: 10 INJECTION INTRAMUSCULAR; INTRAVENOUS at 09:03

## 2022-01-01 RX ADMIN — OXYCODONE AND ACETAMINOPHEN 1 TABLET: 10; 325 TABLET ORAL at 19:49

## 2022-01-01 RX ADMIN — ASPIRIN 81 MG: 81 TABLET, COATED ORAL at 09:39

## 2022-01-01 RX ADMIN — SODIUM CHLORIDE: 9 INJECTION, SOLUTION INTRAVENOUS at 14:53

## 2022-01-01 RX ADMIN — MORPHINE SULFATE 2 MG: 2 INJECTION, SOLUTION INTRAMUSCULAR; INTRAVENOUS at 09:26

## 2022-01-01 RX ADMIN — Medication 5 MG: at 19:59

## 2022-01-01 RX ADMIN — SODIUM CHLORIDE, POTASSIUM CHLORIDE, SODIUM LACTATE AND CALCIUM CHLORIDE: 600; 310; 30; 20 INJECTION, SOLUTION INTRAVENOUS at 12:35

## 2022-01-01 RX ADMIN — ALBUTEROL SULFATE 2 PUFF: 90 AEROSOL, METERED RESPIRATORY (INHALATION) at 17:46

## 2022-01-01 RX ADMIN — OXYCODONE HYDROCHLORIDE AND ACETAMINOPHEN 500 MG: 500 TABLET ORAL at 20:51

## 2022-01-01 RX ADMIN — APIXABAN 5 MG: 5 TABLET, FILM COATED ORAL at 19:17

## 2022-01-01 RX ADMIN — APIXABAN 5 MG: 5 TABLET, FILM COATED ORAL at 08:05

## 2022-01-01 RX ADMIN — BUDESONIDE AND FORMOTEROL FUMARATE DIHYDRATE 2 PUFF: 160; 4.5 AEROSOL RESPIRATORY (INHALATION) at 20:00

## 2022-01-01 RX ADMIN — OXYCODONE AND ACETAMINOPHEN 1 TABLET: 10; 325 TABLET ORAL at 12:39

## 2022-01-01 RX ADMIN — DEXAMETHASONE 6 MG: 4 TABLET ORAL at 08:55

## 2022-01-01 RX ADMIN — GUAIFENESIN SYRUP AND DEXTROMETHORPHAN 5 ML: 100; 10 SYRUP ORAL at 12:04

## 2022-01-01 RX ADMIN — ALBUTEROL SULFATE 2 PUFF: 90 AEROSOL, METERED RESPIRATORY (INHALATION) at 11:56

## 2022-01-01 RX ADMIN — ASPIRIN 81 MG: 81 TABLET, COATED ORAL at 08:04

## 2022-01-01 RX ADMIN — OXYCODONE AND ACETAMINOPHEN 1 TABLET: 10; 325 TABLET ORAL at 20:29

## 2022-01-01 RX ADMIN — APIXABAN 5 MG: 5 TABLET, FILM COATED ORAL at 21:42

## 2022-01-01 RX ADMIN — ALBUTEROL SULFATE 2 PUFF: 90 AEROSOL, METERED RESPIRATORY (INHALATION) at 09:03

## 2022-01-01 RX ADMIN — ALBUTEROL SULFATE 2 PUFF: 90 AEROSOL, METERED RESPIRATORY (INHALATION) at 08:05

## 2022-01-01 RX ADMIN — OXYCODONE AND ACETAMINOPHEN 1 TABLET: 10; 325 TABLET ORAL at 21:49

## 2022-01-01 RX ADMIN — OXYCODONE HYDROCHLORIDE AND ACETAMINOPHEN 500 MG: 500 TABLET ORAL at 14:00

## 2022-01-01 RX ADMIN — WATER 1000 MG: 1 INJECTION INTRAMUSCULAR; INTRAVENOUS; SUBCUTANEOUS at 17:23

## 2022-01-01 RX ADMIN — APIXABAN 5 MG: 5 TABLET, FILM COATED ORAL at 20:50

## 2022-01-01 RX ADMIN — OXYCODONE AND ACETAMINOPHEN 1 TABLET: 10; 325 TABLET ORAL at 03:19

## 2022-01-01 RX ADMIN — OXYCODONE 10 MG: 5 TABLET ORAL at 17:30

## 2022-01-01 RX ADMIN — ALBUTEROL SULFATE 2 PUFF: 90 AEROSOL, METERED RESPIRATORY (INHALATION) at 12:39

## 2022-01-01 RX ADMIN — AZITHROMYCIN MONOHYDRATE 250 MG: 500 INJECTION, POWDER, LYOPHILIZED, FOR SOLUTION INTRAVENOUS at 17:25

## 2022-01-01 RX ADMIN — MORPHINE SULFATE 2 MG: 2 INJECTION, SOLUTION INTRAMUSCULAR; INTRAVENOUS at 08:04

## 2022-01-01 RX ADMIN — MIDODRINE HYDROCHLORIDE 10 MG: 10 TABLET ORAL at 12:39

## 2022-01-01 RX ADMIN — THIAMINE HCL TAB 100 MG 100 MG: 100 TAB at 10:09

## 2022-01-01 RX ADMIN — OXYCODONE AND ACETAMINOPHEN 1 TABLET: 10; 325 TABLET ORAL at 17:29

## 2022-01-01 RX ADMIN — BUDESONIDE 500 MCG: 0.5 SUSPENSION RESPIRATORY (INHALATION) at 21:55

## 2022-01-01 RX ADMIN — ATORVASTATIN CALCIUM 40 MG: 20 TABLET, FILM COATED ORAL at 20:59

## 2022-01-01 RX ADMIN — CITALOPRAM HYDROBROMIDE 20 MG: 20 TABLET ORAL at 10:56

## 2022-01-01 RX ADMIN — ATORVASTATIN CALCIUM 40 MG: 20 TABLET, FILM COATED ORAL at 21:14

## 2022-01-01 RX ADMIN — THIAMINE HCL TAB 100 MG 100 MG: 100 TAB at 08:55

## 2022-01-01 RX ADMIN — MIDODRINE HYDROCHLORIDE 10 MG: 10 TABLET ORAL at 10:17

## 2022-01-01 RX ADMIN — ZINC SULFATE 220 MG (50 MG) CAPSULE 50 MG: CAPSULE at 08:40

## 2022-01-01 RX ADMIN — MORPHINE SULFATE 2 MG: 2 INJECTION, SOLUTION INTRAMUSCULAR; INTRAVENOUS at 10:21

## 2022-01-01 RX ADMIN — ALPRAZOLAM 0.25 MG: 0.25 TABLET ORAL at 20:59

## 2022-01-01 RX ADMIN — BUDESONIDE AND FORMOTEROL FUMARATE DIHYDRATE 2 PUFF: 160; 4.5 AEROSOL RESPIRATORY (INHALATION) at 21:01

## 2022-01-01 RX ADMIN — SODIUM CHLORIDE, PRESERVATIVE FREE 10 ML: 5 INJECTION INTRAVENOUS at 20:01

## 2022-01-01 RX ADMIN — SODIUM CHLORIDE: 9 INJECTION, SOLUTION INTRAVENOUS at 09:43

## 2022-01-01 RX ADMIN — ERGOCALCIFEROL 50000 UNITS: 1.25 CAPSULE ORAL at 09:39

## 2022-01-01 RX ADMIN — OXYCODONE HYDROCHLORIDE AND ACETAMINOPHEN 500 MG: 500 TABLET ORAL at 19:43

## 2022-01-01 RX ADMIN — ALBUTEROL SULFATE 2 PUFF: 90 AEROSOL, METERED RESPIRATORY (INHALATION) at 08:36

## 2022-01-01 RX ADMIN — Medication 5 MG: at 20:14

## 2022-01-01 RX ADMIN — MIDODRINE HYDROCHLORIDE 10 MG: 10 TABLET ORAL at 12:19

## 2022-01-01 RX ADMIN — OXYCODONE AND ACETAMINOPHEN 1 TABLET: 10; 325 TABLET ORAL at 12:01

## 2022-01-01 RX ADMIN — OXYCODONE HYDROCHLORIDE AND ACETAMINOPHEN 500 MG: 500 TABLET ORAL at 13:05

## 2022-01-01 RX ADMIN — MIDODRINE HYDROCHLORIDE 10 MG: 10 TABLET ORAL at 08:05

## 2022-01-01 RX ADMIN — MIDODRINE HYDROCHLORIDE 10 MG: 10 TABLET ORAL at 10:10

## 2022-01-01 RX ADMIN — MIDODRINE HYDROCHLORIDE 10 MG: 10 TABLET ORAL at 08:29

## 2022-01-01 RX ADMIN — ALBUTEROL SULFATE 2 PUFF: 90 AEROSOL, METERED RESPIRATORY (INHALATION) at 16:59

## 2022-01-01 RX ADMIN — OXYCODONE AND ACETAMINOPHEN 1 TABLET: 10; 325 TABLET ORAL at 20:59

## 2022-01-01 RX ADMIN — ALPRAZOLAM 0.25 MG: 0.25 TABLET ORAL at 17:46

## 2022-01-01 RX ADMIN — DEXAMETHASONE SODIUM PHOSPHATE 10 MG: 10 INJECTION INTRAMUSCULAR; INTRAVENOUS at 09:59

## 2022-01-01 RX ADMIN — CITALOPRAM HYDROBROMIDE 20 MG: 20 TABLET ORAL at 09:39

## 2022-01-01 RX ADMIN — MIDODRINE HYDROCHLORIDE 10 MG: 10 TABLET ORAL at 13:05

## 2022-01-01 RX ADMIN — THIAMINE HCL TAB 100 MG 100 MG: 100 TAB at 09:39

## 2022-01-01 RX ADMIN — ATORVASTATIN CALCIUM 40 MG: 20 TABLET, FILM COATED ORAL at 20:14

## 2022-01-01 RX ADMIN — ATORVASTATIN CALCIUM 40 MG: 20 TABLET, FILM COATED ORAL at 20:06

## 2022-01-01 RX ADMIN — ALPRAZOLAM 0.25 MG: 0.25 TABLET ORAL at 19:49

## 2022-01-01 RX ADMIN — DEXAMETHASONE SODIUM PHOSPHATE 10 MG: 10 INJECTION INTRAMUSCULAR; INTRAVENOUS at 20:13

## 2022-01-01 RX ADMIN — ATORVASTATIN CALCIUM 40 MG: 20 TABLET, FILM COATED ORAL at 19:49

## 2022-01-01 RX ADMIN — DEXAMETHASONE SODIUM PHOSPHATE 10 MG: 10 INJECTION INTRAMUSCULAR; INTRAVENOUS at 10:15

## 2022-01-01 RX ADMIN — OXYCODONE 10 MG: 5 TABLET ORAL at 09:03

## 2022-01-01 RX ADMIN — MORPHINE SULFATE 2 MG: 2 INJECTION, SOLUTION INTRAMUSCULAR; INTRAVENOUS at 12:46

## 2022-01-01 RX ADMIN — ALBUTEROL SULFATE 2 PUFF: 90 AEROSOL, METERED RESPIRATORY (INHALATION) at 09:27

## 2022-01-01 RX ADMIN — MIDODRINE HYDROCHLORIDE 10 MG: 10 TABLET ORAL at 10:56

## 2022-01-01 RX ADMIN — OXYCODONE 10 MG: 5 TABLET ORAL at 06:41

## 2022-01-01 RX ADMIN — OXYCODONE HYDROCHLORIDE AND ACETAMINOPHEN 500 MG: 500 TABLET ORAL at 14:41

## 2022-01-01 RX ADMIN — ARFORMOTEROL TARTRATE 15 MCG: 15 SOLUTION RESPIRATORY (INHALATION) at 06:20

## 2022-01-01 RX ADMIN — GUAIFENESIN SYRUP AND DEXTROMETHORPHAN 5 ML: 100; 10 SYRUP ORAL at 00:41

## 2022-01-01 RX ADMIN — BUDESONIDE AND FORMOTEROL FUMARATE DIHYDRATE 2 PUFF: 160; 4.5 AEROSOL RESPIRATORY (INHALATION) at 20:06

## 2022-01-01 RX ADMIN — GUAIFENESIN SYRUP AND DEXTROMETHORPHAN 5 ML: 100; 10 SYRUP ORAL at 09:04

## 2022-01-01 RX ADMIN — MIDODRINE HYDROCHLORIDE 10 MG: 10 TABLET ORAL at 17:18

## 2022-01-01 RX ADMIN — BUDESONIDE AND FORMOTEROL FUMARATE DIHYDRATE 2 PUFF: 160; 4.5 AEROSOL RESPIRATORY (INHALATION) at 08:36

## 2022-01-01 RX ADMIN — CITALOPRAM HYDROBROMIDE 20 MG: 20 TABLET ORAL at 08:29

## 2022-01-01 RX ADMIN — ALBUTEROL SULFATE 2 PUFF: 90 AEROSOL, METERED RESPIRATORY (INHALATION) at 19:18

## 2022-01-01 RX ADMIN — OXYCODONE HYDROCHLORIDE AND ACETAMINOPHEN 500 MG: 500 TABLET ORAL at 15:40

## 2022-01-01 RX ADMIN — BUDESONIDE AND FORMOTEROL FUMARATE DIHYDRATE 2 PUFF: 160; 4.5 AEROSOL RESPIRATORY (INHALATION) at 20:16

## 2022-01-01 RX ADMIN — FOLIC ACID 1 MG: 1 TABLET ORAL at 10:08

## 2022-01-01 RX ADMIN — DEXAMETHASONE SODIUM PHOSPHATE 10 MG: 10 INJECTION INTRAMUSCULAR; INTRAVENOUS at 08:30

## 2022-01-01 RX ADMIN — ALPRAZOLAM 0.25 MG: 0.25 TABLET ORAL at 20:14

## 2022-01-01 RX ADMIN — ALBUTEROL SULFATE 2 PUFF: 90 AEROSOL, METERED RESPIRATORY (INHALATION) at 08:40

## 2022-01-01 RX ADMIN — BARICITINIB 4 MG: 2 TABLET, FILM COATED ORAL at 17:46

## 2022-01-01 RX ADMIN — ALBUTEROL SULFATE 2.5 MG: 2.5 SOLUTION RESPIRATORY (INHALATION) at 03:12

## 2022-01-01 RX ADMIN — BUDESONIDE AND FORMOTEROL FUMARATE DIHYDRATE 2 PUFF: 160; 4.5 AEROSOL RESPIRATORY (INHALATION) at 10:10

## 2022-01-01 RX ADMIN — DEXAMETHASONE SODIUM PHOSPHATE 10 MG: 10 INJECTION INTRAMUSCULAR; INTRAVENOUS at 08:45

## 2022-01-01 RX ADMIN — ATORVASTATIN CALCIUM 40 MG: 40 TABLET, FILM COATED ORAL at 19:24

## 2022-01-01 RX ADMIN — ALBUTEROL SULFATE 2 PUFF: 90 AEROSOL, METERED RESPIRATORY (INHALATION) at 09:39

## 2022-01-01 RX ADMIN — SODIUM CHLORIDE, PRESERVATIVE FREE 10 ML: 5 INJECTION INTRAVENOUS at 12:16

## 2022-01-01 RX ADMIN — ALBUTEROL SULFATE 2 PUFF: 90 AEROSOL, METERED RESPIRATORY (INHALATION) at 16:22

## 2022-01-01 RX ADMIN — ASPIRIN 81 MG: 81 TABLET, COATED ORAL at 09:03

## 2022-01-01 RX ADMIN — OXYCODONE AND ACETAMINOPHEN 1 TABLET: 10; 325 TABLET ORAL at 05:24

## 2022-01-01 RX ADMIN — ATORVASTATIN CALCIUM 40 MG: 20 TABLET, FILM COATED ORAL at 19:53

## 2022-01-01 RX ADMIN — MORPHINE SULFATE 2 MG: 2 INJECTION, SOLUTION INTRAMUSCULAR; INTRAVENOUS at 04:50

## 2022-01-01 RX ADMIN — Medication 5 MG: at 20:46

## 2022-01-01 RX ADMIN — ALBUTEROL SULFATE 2 PUFF: 90 AEROSOL, METERED RESPIRATORY (INHALATION) at 19:53

## 2022-01-01 RX ADMIN — APIXABAN 5 MG: 5 TABLET, FILM COATED ORAL at 19:53

## 2022-01-01 RX ADMIN — OXYCODONE AND ACETAMINOPHEN 1 TABLET: 10; 325 TABLET ORAL at 17:31

## 2022-01-01 RX ADMIN — BUDESONIDE AND FORMOTEROL FUMARATE DIHYDRATE 2 PUFF: 160; 4.5 AEROSOL RESPIRATORY (INHALATION) at 08:46

## 2022-01-01 RX ADMIN — MORPHINE SULFATE 2 MG: 2 INJECTION, SOLUTION INTRAMUSCULAR; INTRAVENOUS at 13:53

## 2022-01-01 RX ADMIN — ALBUTEROL SULFATE 2 PUFF: 90 AEROSOL, METERED RESPIRATORY (INHALATION) at 20:15

## 2022-01-01 RX ADMIN — ZINC SULFATE 220 MG (50 MG) CAPSULE 50 MG: CAPSULE at 08:45

## 2022-01-01 RX ADMIN — OXYCODONE AND ACETAMINOPHEN 1 TABLET: 10; 325 TABLET ORAL at 21:15

## 2022-01-01 RX ADMIN — DEXAMETHASONE SODIUM PHOSPHATE 10 MG: 10 INJECTION INTRAMUSCULAR; INTRAVENOUS at 21:07

## 2022-01-01 RX ADMIN — OXYCODONE 10 MG: 5 TABLET ORAL at 02:59

## 2022-01-01 RX ADMIN — ALBUTEROL SULFATE 2 PUFF: 90 AEROSOL, METERED RESPIRATORY (INHALATION) at 22:42

## 2022-01-01 RX ADMIN — MIDODRINE HYDROCHLORIDE 10 MG: 10 TABLET ORAL at 12:16

## 2022-01-01 RX ADMIN — CITALOPRAM HYDROBROMIDE 20 MG: 20 TABLET ORAL at 09:04

## 2022-01-01 RX ADMIN — FOLIC ACID 1 MG: 1 TABLET ORAL at 09:03

## 2022-01-01 RX ADMIN — FOLIC ACID 1 MG: 1 TABLET ORAL at 09:04

## 2022-01-01 RX ADMIN — OXYCODONE AND ACETAMINOPHEN 1 TABLET: 10; 325 TABLET ORAL at 12:00

## 2022-01-01 RX ADMIN — BUDESONIDE AND FORMOTEROL FUMARATE DIHYDRATE 2 PUFF: 160; 4.5 AEROSOL RESPIRATORY (INHALATION) at 19:44

## 2022-01-01 RX ADMIN — ALBUTEROL SULFATE 2 PUFF: 90 AEROSOL, METERED RESPIRATORY (INHALATION) at 16:40

## 2022-01-01 RX ADMIN — OXYCODONE 10 MG: 5 TABLET ORAL at 19:01

## 2022-01-01 RX ADMIN — ALBUTEROL SULFATE 2 PUFF: 90 AEROSOL, METERED RESPIRATORY (INHALATION) at 16:48

## 2022-01-01 RX ADMIN — OXYCODONE AND ACETAMINOPHEN 1 TABLET: 10; 325 TABLET ORAL at 19:18

## 2022-01-01 RX ADMIN — CITALOPRAM HYDROBROMIDE 20 MG: 20 TABLET ORAL at 08:27

## 2022-01-01 RX ADMIN — BUDESONIDE AND FORMOTEROL FUMARATE DIHYDRATE 2 PUFF: 160; 4.5 AEROSOL RESPIRATORY (INHALATION) at 19:53

## 2022-01-01 RX ADMIN — GUAIFENESIN SYRUP AND DEXTROMETHORPHAN 5 ML: 100; 10 SYRUP ORAL at 11:43

## 2022-01-01 RX ADMIN — MIDODRINE HYDROCHLORIDE 10 MG: 10 TABLET ORAL at 11:57

## 2022-01-01 RX ADMIN — MIDODRINE HYDROCHLORIDE 10 MG: 10 TABLET ORAL at 09:41

## 2022-01-01 RX ADMIN — OXYCODONE HYDROCHLORIDE AND ACETAMINOPHEN 500 MG: 500 TABLET ORAL at 08:29

## 2022-01-01 RX ADMIN — ATORVASTATIN CALCIUM 40 MG: 20 TABLET, FILM COATED ORAL at 20:29

## 2022-01-01 RX ADMIN — MIDODRINE HYDROCHLORIDE 10 MG: 10 TABLET ORAL at 09:00

## 2022-01-01 RX ADMIN — CEFTRIAXONE 1000 MG: 1 INJECTION, POWDER, FOR SOLUTION INTRAMUSCULAR; INTRAVENOUS at 18:43

## 2022-01-01 RX ADMIN — ALPRAZOLAM 0.25 MG: 0.25 TABLET ORAL at 21:49

## 2022-01-01 RX ADMIN — ASPIRIN 81 MG: 81 TABLET, COATED ORAL at 09:26

## 2022-01-01 RX ADMIN — OXYCODONE AND ACETAMINOPHEN 1 TABLET: 10; 325 TABLET ORAL at 19:58

## 2022-01-01 RX ADMIN — MIDODRINE HYDROCHLORIDE 10 MG: 10 TABLET ORAL at 12:52

## 2022-01-01 RX ADMIN — BUDESONIDE AND FORMOTEROL FUMARATE DIHYDRATE 2 PUFF: 160; 4.5 AEROSOL RESPIRATORY (INHALATION) at 09:39

## 2022-01-01 RX ADMIN — BUDESONIDE 500 MCG: 0.5 SUSPENSION RESPIRATORY (INHALATION) at 19:29

## 2022-01-01 RX ADMIN — ATORVASTATIN CALCIUM 40 MG: 20 TABLET, FILM COATED ORAL at 19:17

## 2022-01-01 RX ADMIN — ERGOCALCIFEROL 50000 UNITS: 1.25 CAPSULE ORAL at 08:40

## 2022-01-01 RX ADMIN — BUDESONIDE AND FORMOTEROL FUMARATE DIHYDRATE 2 PUFF: 160; 4.5 AEROSOL RESPIRATORY (INHALATION) at 08:04

## 2022-01-01 RX ADMIN — ALPRAZOLAM 0.25 MG: 0.25 TABLET ORAL at 20:10

## 2022-01-01 RX ADMIN — ALBUTEROL SULFATE 2 PUFF: 90 AEROSOL, METERED RESPIRATORY (INHALATION) at 08:29

## 2022-01-01 RX ADMIN — MIDODRINE HYDROCHLORIDE 10 MG: 10 TABLET ORAL at 16:29

## 2022-01-01 RX ADMIN — OXYCODONE AND ACETAMINOPHEN 1 TABLET: 10; 325 TABLET ORAL at 05:52

## 2022-01-01 RX ADMIN — ALBUTEROL SULFATE 2 PUFF: 90 AEROSOL, METERED RESPIRATORY (INHALATION) at 13:19

## 2022-01-01 RX ADMIN — ALBUTEROL SULFATE 2 PUFF: 90 AEROSOL, METERED RESPIRATORY (INHALATION) at 17:31

## 2022-01-01 RX ADMIN — BUDESONIDE AND FORMOTEROL FUMARATE DIHYDRATE 2 PUFF: 160; 4.5 AEROSOL RESPIRATORY (INHALATION) at 08:56

## 2022-01-01 RX ADMIN — MORPHINE SULFATE 2 MG: 2 INJECTION, SOLUTION INTRAMUSCULAR; INTRAVENOUS at 00:54

## 2022-01-01 RX ADMIN — MIDODRINE HYDROCHLORIDE 10 MG: 10 TABLET ORAL at 13:46

## 2022-01-01 RX ADMIN — OXYCODONE HYDROCHLORIDE AND ACETAMINOPHEN 500 MG: 500 TABLET ORAL at 10:56

## 2022-01-01 RX ADMIN — MORPHINE SULFATE 2 MG: 2 INJECTION, SOLUTION INTRAMUSCULAR; INTRAVENOUS at 15:36

## 2022-01-01 RX ADMIN — OXYCODONE HYDROCHLORIDE AND ACETAMINOPHEN 500 MG: 500 TABLET ORAL at 08:27

## 2022-01-01 RX ADMIN — MORPHINE SULFATE 2 MG: 2 INJECTION, SOLUTION INTRAMUSCULAR; INTRAVENOUS at 14:19

## 2022-01-01 RX ADMIN — ASPIRIN 81 MG: 81 TABLET, COATED ORAL at 08:23

## 2022-01-01 RX ADMIN — FOLIC ACID 1 MG: 1 TABLET ORAL at 09:59

## 2022-01-01 RX ADMIN — MORPHINE SULFATE 2 MG: 2 INJECTION, SOLUTION INTRAMUSCULAR; INTRAVENOUS at 12:45

## 2022-01-01 RX ADMIN — OXYCODONE HYDROCHLORIDE AND ACETAMINOPHEN 500 MG: 500 TABLET ORAL at 21:42

## 2022-01-01 RX ADMIN — ASPIRIN 81 MG: 81 TABLET, COATED ORAL at 08:55

## 2022-01-01 RX ADMIN — OXYCODONE HYDROCHLORIDE AND ACETAMINOPHEN 500 MG: 500 TABLET ORAL at 19:59

## 2022-01-01 RX ADMIN — OXYCODONE AND ACETAMINOPHEN 1 TABLET: 10; 325 TABLET ORAL at 19:54

## 2022-01-01 RX ADMIN — MORPHINE SULFATE 2 MG: 2 INJECTION, SOLUTION INTRAMUSCULAR; INTRAVENOUS at 18:36

## 2022-01-01 RX ADMIN — FOLIC ACID 1 MG: 1 TABLET ORAL at 10:04

## 2022-01-01 RX ADMIN — ALBUTEROL SULFATE 2 PUFF: 90 AEROSOL, METERED RESPIRATORY (INHALATION) at 21:41

## 2022-01-01 RX ADMIN — APIXABAN 5 MG: 5 TABLET, FILM COATED ORAL at 09:39

## 2022-01-01 RX ADMIN — THIAMINE HCL TAB 100 MG 100 MG: 100 TAB at 08:40

## 2022-01-01 RX ADMIN — ALBUTEROL SULFATE 2 PUFF: 90 AEROSOL, METERED RESPIRATORY (INHALATION) at 12:05

## 2022-01-01 RX ADMIN — OXYCODONE HYDROCHLORIDE AND ACETAMINOPHEN 500 MG: 500 TABLET ORAL at 09:59

## 2022-01-01 RX ADMIN — OXYCODONE AND ACETAMINOPHEN 1 TABLET: 10; 325 TABLET ORAL at 17:59

## 2022-01-01 RX ADMIN — OXYCODONE 10 MG: 5 TABLET ORAL at 03:55

## 2022-01-01 RX ADMIN — ASPIRIN 81 MG: 81 TABLET, COATED ORAL at 10:10

## 2022-01-01 RX ADMIN — OXYCODONE AND ACETAMINOPHEN 1 TABLET: 10; 325 TABLET ORAL at 01:46

## 2022-01-01 RX ADMIN — BUDESONIDE AND FORMOTEROL FUMARATE DIHYDRATE 2 PUFF: 160; 4.5 AEROSOL RESPIRATORY (INHALATION) at 20:10

## 2022-01-01 RX ADMIN — THIAMINE HCL TAB 100 MG 100 MG: 100 TAB at 09:04

## 2022-01-01 RX ADMIN — OXYCODONE AND ACETAMINOPHEN 1 TABLET: 10; 325 TABLET ORAL at 17:43

## 2022-01-01 RX ADMIN — ALBUTEROL SULFATE 2 PUFF: 90 AEROSOL, METERED RESPIRATORY (INHALATION) at 10:09

## 2022-01-01 RX ADMIN — GUAIFENESIN SYRUP AND DEXTROMETHORPHAN 5 ML: 100; 10 SYRUP ORAL at 20:11

## 2022-01-01 RX ADMIN — APIXABAN 5 MG: 5 TABLET, FILM COATED ORAL at 20:06

## 2022-01-01 RX ADMIN — Medication 5 MG: at 20:29

## 2022-01-01 RX ADMIN — ALBUTEROL SULFATE 2 PUFF: 90 AEROSOL, METERED RESPIRATORY (INHALATION) at 12:48

## 2022-01-01 RX ADMIN — ARFORMOTEROL TARTRATE 15 MCG: 15 SOLUTION RESPIRATORY (INHALATION) at 07:28

## 2022-01-01 RX ADMIN — ZINC SULFATE 220 MG (50 MG) CAPSULE 50 MG: CAPSULE at 08:04

## 2022-01-01 RX ADMIN — ALBUTEROL SULFATE 2 PUFF: 90 AEROSOL, METERED RESPIRATORY (INHALATION) at 17:16

## 2022-01-01 RX ADMIN — DEXAMETHASONE SODIUM PHOSPHATE 20 MG: 10 INJECTION, SOLUTION INTRAMUSCULAR; INTRAVENOUS at 10:21

## 2022-01-01 RX ADMIN — MIDODRINE HYDROCHLORIDE 10 MG: 10 TABLET ORAL at 13:36

## 2022-01-01 RX ADMIN — APIXABAN 5 MG: 5 TABLET, FILM COATED ORAL at 08:40

## 2022-01-01 RX ADMIN — APIXABAN 5 MG: 5 TABLET, FILM COATED ORAL at 10:00

## 2022-01-01 RX ADMIN — DEXAMETHASONE SODIUM PHOSPHATE 20 MG: 10 INJECTION, SOLUTION INTRAMUSCULAR; INTRAVENOUS at 21:16

## 2022-01-01 RX ADMIN — FOLIC ACID 1 MG: 1 TABLET ORAL at 10:56

## 2022-01-01 RX ADMIN — MIDODRINE HYDROCHLORIDE 10 MG: 10 TABLET ORAL at 08:40

## 2022-01-01 RX ADMIN — THIAMINE HCL TAB 100 MG 100 MG: 100 TAB at 10:56

## 2022-01-01 RX ADMIN — ALBUTEROL SULFATE 2 PUFF: 90 AEROSOL, METERED RESPIRATORY (INHALATION) at 17:15

## 2022-01-01 RX ADMIN — AZITHROMYCIN MONOHYDRATE 250 MG: 500 INJECTION, POWDER, LYOPHILIZED, FOR SOLUTION INTRAVENOUS at 18:06

## 2022-01-01 RX ADMIN — IOPAMIDOL 75 ML: 755 INJECTION, SOLUTION INTRAVENOUS at 11:11

## 2022-01-01 RX ADMIN — OXYCODONE AND ACETAMINOPHEN 1 TABLET: 10; 325 TABLET ORAL at 15:36

## 2022-01-01 RX ADMIN — ALBUTEROL SULFATE 2 PUFF: 90 AEROSOL, METERED RESPIRATORY (INHALATION) at 13:48

## 2022-01-01 RX ADMIN — MORPHINE SULFATE 2 MG: 2 INJECTION, SOLUTION INTRAMUSCULAR; INTRAVENOUS at 19:53

## 2022-01-01 RX ADMIN — ZINC SULFATE 220 MG (50 MG) CAPSULE 50 MG: CAPSULE at 09:04

## 2022-01-01 RX ADMIN — ASPIRIN 81 MG: 81 TABLET, COATED ORAL at 08:28

## 2022-01-01 RX ADMIN — APIXABAN 5 MG: 5 TABLET, FILM COATED ORAL at 08:57

## 2022-01-01 RX ADMIN — OXYCODONE HYDROCHLORIDE AND ACETAMINOPHEN 500 MG: 500 TABLET ORAL at 13:15

## 2022-01-01 RX ADMIN — ALPRAZOLAM 0.25 MG: 0.25 TABLET ORAL at 19:59

## 2022-01-01 RX ADMIN — ALBUTEROL SULFATE 2 PUFF: 90 AEROSOL, METERED RESPIRATORY (INHALATION) at 08:59

## 2022-01-01 RX ADMIN — ALBUTEROL SULFATE 2 PUFF: 90 AEROSOL, METERED RESPIRATORY (INHALATION) at 13:46

## 2022-01-01 RX ADMIN — ALBUTEROL SULFATE 2 PUFF: 90 AEROSOL, METERED RESPIRATORY (INHALATION) at 08:46

## 2022-01-01 RX ADMIN — BARICITINIB 4 MG: 2 TABLET, FILM COATED ORAL at 21:15

## 2022-01-01 RX ADMIN — OXYCODONE 10 MG: 5 TABLET ORAL at 10:34

## 2022-01-01 RX ADMIN — BUDESONIDE AND FORMOTEROL FUMARATE DIHYDRATE 2 PUFF: 160; 4.5 AEROSOL RESPIRATORY (INHALATION) at 08:40

## 2022-01-01 RX ADMIN — FOLIC ACID 1 MG: 1 TABLET ORAL at 08:05

## 2022-01-01 RX ADMIN — MORPHINE SULFATE 2 MG: 2 INJECTION, SOLUTION INTRAMUSCULAR; INTRAVENOUS at 14:05

## 2022-01-01 RX ADMIN — BUDESONIDE AND FORMOTEROL FUMARATE DIHYDRATE 2 PUFF: 160; 4.5 AEROSOL RESPIRATORY (INHALATION) at 09:04

## 2022-01-01 RX ADMIN — CITALOPRAM HYDROBROMIDE 20 MG: 20 TABLET ORAL at 08:59

## 2022-01-01 RX ADMIN — OXYCODONE HYDROCHLORIDE AND ACETAMINOPHEN 500 MG: 500 TABLET ORAL at 08:04

## 2022-01-01 RX ADMIN — BARICITINIB 4 MG: 2 TABLET, FILM COATED ORAL at 19:17

## 2022-01-01 RX ADMIN — OXYCODONE HYDROCHLORIDE AND ACETAMINOPHEN 500 MG: 500 TABLET ORAL at 20:11

## 2022-01-01 RX ADMIN — THIAMINE HCL TAB 100 MG 100 MG: 100 TAB at 09:00

## 2022-01-01 RX ADMIN — MIDODRINE HYDROCHLORIDE 10 MG: 10 TABLET ORAL at 18:04

## 2022-01-01 RX ADMIN — MIDODRINE HYDROCHLORIDE 10 MG: 10 TABLET ORAL at 16:48

## 2022-01-01 RX ADMIN — DEXAMETHASONE SODIUM PHOSPHATE 10 MG: 10 INJECTION INTRAMUSCULAR; INTRAVENOUS at 09:04

## 2022-01-01 RX ADMIN — MIDODRINE HYDROCHLORIDE 10 MG: 10 TABLET ORAL at 16:59

## 2022-01-01 RX ADMIN — ALBUTEROL SULFATE 2 PUFF: 90 AEROSOL, METERED RESPIRATORY (INHALATION) at 13:05

## 2022-01-01 RX ADMIN — APIXABAN 5 MG: 5 TABLET, FILM COATED ORAL at 19:47

## 2022-01-01 RX ADMIN — OXYCODONE HYDROCHLORIDE AND ACETAMINOPHEN 500 MG: 500 TABLET ORAL at 09:04

## 2022-01-01 RX ADMIN — ALBUTEROL SULFATE 2 PUFF: 90 AEROSOL, METERED RESPIRATORY (INHALATION) at 09:00

## 2022-01-01 RX ADMIN — OXYCODONE HYDROCHLORIDE AND ACETAMINOPHEN 500 MG: 500 TABLET ORAL at 21:07

## 2022-01-01 RX ADMIN — ATORVASTATIN CALCIUM 40 MG: 20 TABLET, FILM COATED ORAL at 21:49

## 2022-01-01 RX ADMIN — ZINC SULFATE 220 MG (50 MG) CAPSULE 50 MG: CAPSULE at 10:04

## 2022-01-01 RX ADMIN — BUDESONIDE AND FORMOTEROL FUMARATE DIHYDRATE 2 PUFF: 160; 4.5 AEROSOL RESPIRATORY (INHALATION) at 08:00

## 2022-01-01 RX ADMIN — MORPHINE SULFATE 2 MG: 2 INJECTION, SOLUTION INTRAMUSCULAR; INTRAVENOUS at 04:10

## 2022-01-01 RX ADMIN — METHYLPREDNISOLONE SODIUM SUCCINATE 125 MG: 125 INJECTION, POWDER, FOR SOLUTION INTRAMUSCULAR; INTRAVENOUS at 17:41

## 2022-01-01 RX ADMIN — MORPHINE SULFATE 2 MG: 2 INJECTION, SOLUTION INTRAMUSCULAR; INTRAVENOUS at 10:32

## 2022-01-01 RX ADMIN — THIAMINE HCL TAB 100 MG 100 MG: 100 TAB at 08:29

## 2022-01-01 RX ADMIN — MIDODRINE HYDROCHLORIDE 10 MG: 10 TABLET ORAL at 17:43

## 2022-01-01 RX ADMIN — DEXAMETHASONE SODIUM PHOSPHATE 10 MG: 10 INJECTION INTRAMUSCULAR; INTRAVENOUS at 09:00

## 2022-01-01 RX ADMIN — MIDODRINE HYDROCHLORIDE 10 MG: 10 TABLET ORAL at 09:26

## 2022-01-01 RX ADMIN — MIDODRINE HYDROCHLORIDE 10 MG: 10 TABLET ORAL at 14:41

## 2022-01-01 RX ADMIN — PERFLUTREN 1.5 ML: 6.52 INJECTION, SUSPENSION INTRAVENOUS at 16:28

## 2022-01-01 RX ADMIN — CITALOPRAM HYDROBROMIDE 20 MG: 20 TABLET ORAL at 09:26

## 2022-01-01 RX ADMIN — APIXABAN 5 MG: 5 TABLET, FILM COATED ORAL at 08:28

## 2022-01-01 RX ADMIN — SODIUM CHLORIDE, PRESERVATIVE FREE 10 ML: 5 INJECTION INTRAVENOUS at 08:05

## 2022-01-01 RX ADMIN — ALPRAZOLAM 0.25 MG: 0.25 TABLET ORAL at 12:04

## 2022-01-01 RX ADMIN — MIDODRINE HYDROCHLORIDE 10 MG: 10 TABLET ORAL at 10:09

## 2022-01-01 RX ADMIN — ATORVASTATIN CALCIUM 40 MG: 20 TABLET, FILM COATED ORAL at 19:43

## 2022-01-01 RX ADMIN — ERGOCALCIFEROL 50000 UNITS: 1.25 CAPSULE ORAL at 09:04

## 2022-01-01 RX ADMIN — MORPHINE SULFATE 2 MG: 2 INJECTION, SOLUTION INTRAMUSCULAR; INTRAVENOUS at 16:16

## 2022-01-01 RX ADMIN — MIDODRINE HYDROCHLORIDE 10 MG: 10 TABLET ORAL at 16:41

## 2022-01-01 RX ADMIN — ALPRAZOLAM 0.25 MG: 0.25 TABLET ORAL at 20:50

## 2022-01-01 RX ADMIN — OXYCODONE AND ACETAMINOPHEN 1 TABLET: 10; 325 TABLET ORAL at 11:43

## 2022-01-01 RX ADMIN — MORPHINE SULFATE 2 MG: 2 INJECTION, SOLUTION INTRAMUSCULAR; INTRAVENOUS at 07:58

## 2022-01-01 RX ADMIN — ALBUTEROL SULFATE 2 PUFF: 90 AEROSOL, METERED RESPIRATORY (INHALATION) at 08:25

## 2022-01-01 RX ADMIN — FUROSEMIDE 40 MG: 10 INJECTION, SOLUTION INTRAMUSCULAR; INTRAVENOUS at 03:51

## 2022-01-01 RX ADMIN — OXYCODONE HYDROCHLORIDE AND ACETAMINOPHEN 500 MG: 500 TABLET ORAL at 09:00

## 2022-01-01 RX ADMIN — ZINC SULFATE 220 MG (50 MG) CAPSULE 50 MG: CAPSULE at 09:00

## 2022-01-01 RX ADMIN — APIXABAN 5 MG: 5 TABLET, FILM COATED ORAL at 08:45

## 2022-01-01 RX ADMIN — APIXABAN 5 MG: 5 TABLET, FILM COATED ORAL at 21:07

## 2022-01-01 RX ADMIN — ASPIRIN 81 MG: 81 TABLET, COATED ORAL at 10:17

## 2022-01-01 RX ADMIN — APIXABAN 5 MG: 5 TABLET, FILM COATED ORAL at 09:26

## 2022-01-01 RX ADMIN — DEXAMETHASONE SODIUM PHOSPHATE 20 MG: 10 INJECTION, SOLUTION INTRAMUSCULAR; INTRAVENOUS at 15:41

## 2022-01-01 RX ADMIN — OXYCODONE AND ACETAMINOPHEN 1 TABLET: 10; 325 TABLET ORAL at 10:54

## 2022-01-01 RX ADMIN — Medication 5 MG: at 19:47

## 2022-01-01 RX ADMIN — MORPHINE SULFATE 2 MG: 2 INJECTION, SOLUTION INTRAMUSCULAR; INTRAVENOUS at 02:46

## 2022-01-01 RX ADMIN — Medication 5 MG: at 20:10

## 2022-01-01 RX ADMIN — BARICITINIB 4 MG: 2 TABLET, FILM COATED ORAL at 20:59

## 2022-01-01 RX ADMIN — OXYCODONE HYDROCHLORIDE AND ACETAMINOPHEN 500 MG: 500 TABLET ORAL at 14:09

## 2022-01-01 RX ADMIN — APIXABAN 5 MG: 5 TABLET, FILM COATED ORAL at 09:06

## 2022-01-01 RX ADMIN — MIDODRINE HYDROCHLORIDE 10 MG: 10 TABLET ORAL at 08:27

## 2022-01-01 RX ADMIN — BUDESONIDE AND FORMOTEROL FUMARATE DIHYDRATE 2 PUFF: 160; 4.5 AEROSOL RESPIRATORY (INHALATION) at 21:41

## 2022-01-01 RX ADMIN — OXYCODONE AND ACETAMINOPHEN 1 TABLET: 10; 325 TABLET ORAL at 13:19

## 2022-01-01 RX ADMIN — ALBUTEROL SULFATE 2 PUFF: 90 AEROSOL, METERED RESPIRATORY (INHALATION) at 19:43

## 2022-01-01 RX ADMIN — MIDODRINE HYDROCHLORIDE 10 MG: 10 TABLET ORAL at 12:01

## 2022-01-01 RX ADMIN — BUDESONIDE AND FORMOTEROL FUMARATE DIHYDRATE 2 PUFF: 160; 4.5 AEROSOL RESPIRATORY (INHALATION) at 20:50

## 2022-01-01 RX ADMIN — MIDODRINE HYDROCHLORIDE 10 MG: 10 TABLET ORAL at 16:24

## 2022-01-01 RX ADMIN — BARICITINIB 4 MG: 2 TABLET, FILM COATED ORAL at 20:14

## 2022-01-01 RX ADMIN — THIAMINE HCL TAB 100 MG 100 MG: 100 TAB at 10:04

## 2022-01-01 RX ADMIN — APIXABAN 5 MG: 5 TABLET, FILM COATED ORAL at 08:35

## 2022-01-01 RX ADMIN — MIDODRINE HYDROCHLORIDE 10 MG: 10 TABLET ORAL at 08:55

## 2022-01-01 RX ADMIN — OXYCODONE HYDROCHLORIDE AND ACETAMINOPHEN 500 MG: 500 TABLET ORAL at 10:08

## 2022-01-01 RX ADMIN — OXYCODONE HYDROCHLORIDE AND ACETAMINOPHEN 500 MG: 500 TABLET ORAL at 19:17

## 2022-01-01 RX ADMIN — MIDODRINE HYDROCHLORIDE 10 MG: 10 TABLET ORAL at 21:15

## 2022-01-01 RX ADMIN — ALPRAZOLAM 0.25 MG: 0.25 TABLET ORAL at 11:46

## 2022-01-01 RX ADMIN — MORPHINE SULFATE 2 MG: 2 INJECTION, SOLUTION INTRAMUSCULAR; INTRAVENOUS at 09:49

## 2022-01-01 RX ADMIN — ALPRAZOLAM 0.25 MG: 0.25 TABLET ORAL at 21:11

## 2022-01-01 RX ADMIN — APIXABAN 5 MG: 5 TABLET, FILM COATED ORAL at 20:14

## 2022-01-01 RX ADMIN — ZINC SULFATE 220 MG (50 MG) CAPSULE 50 MG: CAPSULE at 09:26

## 2022-01-01 RX ADMIN — ERGOCALCIFEROL 50000 UNITS: 1.25 CAPSULE ORAL at 08:05

## 2022-01-01 RX ADMIN — ZINC SULFATE 220 MG (50 MG) CAPSULE 50 MG: CAPSULE at 08:55

## 2022-01-01 RX ADMIN — MIDODRINE HYDROCHLORIDE 10 MG: 10 TABLET ORAL at 12:30

## 2022-01-01 RX ADMIN — OXYCODONE AND ACETAMINOPHEN 1 TABLET: 10; 325 TABLET ORAL at 02:34

## 2022-01-01 RX ADMIN — THIAMINE HCL TAB 100 MG 100 MG: 100 TAB at 09:26

## 2022-01-01 RX ADMIN — DEXAMETHASONE SODIUM PHOSPHATE 10 MG: 10 INJECTION INTRAMUSCULAR; INTRAVENOUS at 19:44

## 2022-01-01 RX ADMIN — OXYCODONE 10 MG: 5 TABLET ORAL at 22:40

## 2022-01-01 RX ADMIN — ATORVASTATIN CALCIUM 40 MG: 20 TABLET, FILM COATED ORAL at 19:47

## 2022-01-01 RX ADMIN — ALBUTEROL SULFATE 2 PUFF: 90 AEROSOL, METERED RESPIRATORY (INHALATION) at 16:35

## 2022-01-01 RX ADMIN — MORPHINE SULFATE 2 MG: 2 INJECTION, SOLUTION INTRAMUSCULAR; INTRAVENOUS at 00:23

## 2022-01-01 RX ADMIN — OXYCODONE HYDROCHLORIDE AND ACETAMINOPHEN 500 MG: 500 TABLET ORAL at 15:36

## 2022-01-01 RX ADMIN — ALPRAZOLAM 0.25 MG: 0.25 TABLET ORAL at 12:19

## 2022-01-01 RX ADMIN — BARICITINIB 4 MG: 2 TABLET, FILM COATED ORAL at 20:10

## 2022-01-01 RX ADMIN — CITALOPRAM HYDROBROMIDE 20 MG: 20 TABLET ORAL at 08:24

## 2022-01-01 RX ADMIN — OXYCODONE AND ACETAMINOPHEN 1 TABLET: 10; 325 TABLET ORAL at 13:15

## 2022-01-01 RX ADMIN — APIXABAN 5 MG: 5 TABLET, FILM COATED ORAL at 10:56

## 2022-01-01 RX ADMIN — ASPIRIN 81 MG: 81 TABLET, COATED ORAL at 09:51

## 2022-01-01 RX ADMIN — OXYCODONE HYDROCHLORIDE AND ACETAMINOPHEN 500 MG: 500 TABLET ORAL at 13:43

## 2022-01-01 RX ADMIN — FOLIC ACID 1 MG: 1 TABLET ORAL at 08:55

## 2022-01-01 RX ADMIN — OXYCODONE HYDROCHLORIDE AND ACETAMINOPHEN 500 MG: 500 TABLET ORAL at 09:26

## 2022-01-01 RX ADMIN — ALBUTEROL SULFATE 2 PUFF: 90 AEROSOL, METERED RESPIRATORY (INHALATION) at 09:38

## 2022-01-01 RX ADMIN — CITALOPRAM HYDROBROMIDE 20 MG: 20 TABLET ORAL at 08:04

## 2022-01-01 RX ADMIN — OXYCODONE 10 MG: 5 TABLET ORAL at 14:00

## 2022-01-01 RX ADMIN — DEXAMETHASONE SODIUM PHOSPHATE 10 MG: 10 INJECTION INTRAMUSCULAR; INTRAVENOUS at 08:40

## 2022-01-01 RX ADMIN — MORPHINE SULFATE 2 MG: 2 INJECTION, SOLUTION INTRAMUSCULAR; INTRAVENOUS at 03:41

## 2022-01-01 RX ADMIN — BARICITINIB 4 MG: 2 TABLET, FILM COATED ORAL at 17:51

## 2022-01-01 RX ADMIN — FOLIC ACID 1 MG: 1 TABLET ORAL at 08:28

## 2022-01-01 RX ADMIN — MORPHINE SULFATE 2 MG: 2 INJECTION, SOLUTION INTRAMUSCULAR; INTRAVENOUS at 14:44

## 2022-01-01 RX ADMIN — MIDODRINE HYDROCHLORIDE 10 MG: 10 TABLET ORAL at 17:46

## 2022-01-01 RX ADMIN — FOLIC ACID 1 MG: 1 TABLET ORAL at 08:04

## 2022-01-01 RX ADMIN — CITALOPRAM HYDROBROMIDE 20 MG: 20 TABLET ORAL at 10:09

## 2022-01-01 RX ADMIN — DEXAMETHASONE SODIUM PHOSPHATE 10 MG: 10 INJECTION INTRAMUSCULAR; INTRAVENOUS at 19:53

## 2022-01-01 RX ADMIN — OXYCODONE AND ACETAMINOPHEN 1 TABLET: 10; 325 TABLET ORAL at 11:56

## 2022-01-01 RX ADMIN — MORPHINE SULFATE 2 MG: 2 INJECTION, SOLUTION INTRAMUSCULAR; INTRAVENOUS at 00:07

## 2022-01-01 RX ADMIN — OXYCODONE HYDROCHLORIDE AND ACETAMINOPHEN 500 MG: 500 TABLET ORAL at 08:56

## 2022-01-01 RX ADMIN — ATORVASTATIN CALCIUM 40 MG: 20 TABLET, FILM COATED ORAL at 20:50

## 2022-01-01 RX ADMIN — Medication 5 MG: at 21:42

## 2022-01-01 RX ADMIN — APIXABAN 5 MG: 5 TABLET, FILM COATED ORAL at 10:04

## 2022-01-01 RX ADMIN — MIDODRINE HYDROCHLORIDE 10 MG: 10 TABLET ORAL at 17:31

## 2022-01-01 RX ADMIN — FOLIC ACID 1 MG: 1 TABLET ORAL at 08:45

## 2022-01-01 RX ADMIN — OXYCODONE HYDROCHLORIDE AND ACETAMINOPHEN 500 MG: 500 TABLET ORAL at 14:40

## 2022-01-01 RX ADMIN — FOLIC ACID 1 MG: 1 TABLET ORAL at 08:23

## 2022-01-01 RX ADMIN — BUDESONIDE AND FORMOTEROL FUMARATE DIHYDRATE 2 PUFF: 160; 4.5 AEROSOL RESPIRATORY (INHALATION) at 21:07

## 2022-01-01 RX ADMIN — ARFORMOTEROL TARTRATE 15 MCG: 15 SOLUTION RESPIRATORY (INHALATION) at 19:29

## 2022-01-01 RX ADMIN — ARFORMOTEROL TARTRATE 15 MCG: 15 SOLUTION RESPIRATORY (INHALATION) at 18:39

## 2022-01-01 RX ADMIN — MIDODRINE HYDROCHLORIDE 10 MG: 10 TABLET ORAL at 17:29

## 2022-01-01 RX ADMIN — ASPIRIN 81 MG: 81 TABLET, COATED ORAL at 08:40

## 2022-01-01 RX ADMIN — ARFORMOTEROL TARTRATE 15 MCG: 15 SOLUTION RESPIRATORY (INHALATION) at 06:36

## 2022-01-01 RX ADMIN — APIXABAN 5 MG: 5 TABLET, FILM COATED ORAL at 20:01

## 2022-01-01 RX ADMIN — OXYCODONE HYDROCHLORIDE AND ACETAMINOPHEN 500 MG: 500 TABLET ORAL at 20:29

## 2022-01-01 RX ADMIN — OXYCODONE AND ACETAMINOPHEN 1 TABLET: 10; 325 TABLET ORAL at 20:11

## 2022-01-01 RX ADMIN — BUDESONIDE AND FORMOTEROL FUMARATE DIHYDRATE 2 PUFF: 160; 4.5 AEROSOL RESPIRATORY (INHALATION) at 09:27

## 2022-01-01 RX ADMIN — OXYCODONE HYDROCHLORIDE AND ACETAMINOPHEN 500 MG: 500 TABLET ORAL at 12:52

## 2022-01-01 RX ADMIN — OXYCODONE AND ACETAMINOPHEN 1 TABLET: 10; 325 TABLET ORAL at 08:55

## 2022-01-01 RX ADMIN — OXYCODONE 10 MG: 5 TABLET ORAL at 22:07

## 2022-01-01 RX ADMIN — ALBUTEROL SULFATE 2 PUFF: 90 AEROSOL, METERED RESPIRATORY (INHALATION) at 12:53

## 2022-01-01 RX ADMIN — ZINC SULFATE 220 MG (50 MG) CAPSULE 50 MG: CAPSULE at 08:27

## 2022-01-01 RX ADMIN — OXYCODONE HYDROCHLORIDE AND ACETAMINOPHEN 500 MG: 500 TABLET ORAL at 13:36

## 2022-01-01 RX ADMIN — MIDODRINE HYDROCHLORIDE 10 MG: 10 TABLET ORAL at 08:04

## 2022-01-01 RX ADMIN — OXYCODONE HYDROCHLORIDE AND ACETAMINOPHEN 500 MG: 500 TABLET ORAL at 08:23

## 2022-01-01 RX ADMIN — ALBUTEROL SULFATE 2 PUFF: 90 AEROSOL, METERED RESPIRATORY (INHALATION) at 12:34

## 2022-01-01 RX ADMIN — THIAMINE HCL TAB 100 MG 100 MG: 100 TAB at 08:05

## 2022-01-01 RX ADMIN — GUAIFENESIN SYRUP AND DEXTROMETHORPHAN 5 ML: 100; 10 SYRUP ORAL at 20:14

## 2022-01-01 RX ADMIN — OXYCODONE 10 MG: 5 TABLET ORAL at 20:46

## 2022-01-01 RX ADMIN — CITALOPRAM HYDROBROMIDE 20 MG: 20 TABLET ORAL at 08:45

## 2022-01-01 RX ADMIN — DEXAMETHASONE 6 MG: 4 TABLET ORAL at 10:03

## 2022-01-01 RX ADMIN — APIXABAN 5 MG: 5 TABLET, FILM COATED ORAL at 20:48

## 2022-01-01 RX ADMIN — DEXAMETHASONE SODIUM PHOSPHATE 10 MG: 10 INJECTION INTRAMUSCULAR; INTRAVENOUS at 08:25

## 2022-01-01 RX ADMIN — APIXABAN 5 MG: 5 TABLET, FILM COATED ORAL at 20:47

## 2022-01-01 RX ADMIN — ALBUTEROL SULFATE 2 PUFF: 90 AEROSOL, METERED RESPIRATORY (INHALATION) at 19:49

## 2022-01-01 RX ADMIN — ZINC SULFATE 220 MG (50 MG) CAPSULE 50 MG: CAPSULE at 09:51

## 2022-01-01 RX ADMIN — BARICITINIB 4 MG: 2 TABLET, FILM COATED ORAL at 19:49

## 2022-01-01 RX ADMIN — ZINC SULFATE 220 MG (50 MG) CAPSULE 50 MG: CAPSULE at 15:40

## 2022-01-01 RX ADMIN — OXYCODONE AND ACETAMINOPHEN 1 TABLET: 10; 325 TABLET ORAL at 19:43

## 2022-01-01 RX ADMIN — MORPHINE SULFATE 2 MG: 2 INJECTION, SOLUTION INTRAMUSCULAR; INTRAVENOUS at 12:48

## 2022-01-01 RX ADMIN — MORPHINE SULFATE 2 MG: 2 INJECTION, SOLUTION INTRAMUSCULAR; INTRAVENOUS at 18:16

## 2022-01-01 RX ADMIN — Medication 5 MG: at 20:59

## 2022-01-01 RX ADMIN — OXYCODONE HYDROCHLORIDE AND ACETAMINOPHEN 500 MG: 500 TABLET ORAL at 21:49

## 2022-01-01 RX ADMIN — BUDESONIDE AND FORMOTEROL FUMARATE DIHYDRATE 2 PUFF: 160; 4.5 AEROSOL RESPIRATORY (INHALATION) at 19:49

## 2022-01-01 RX ADMIN — APIXABAN 5 MG: 5 TABLET, FILM COATED ORAL at 20:32

## 2022-01-01 RX ADMIN — BUDESONIDE AND FORMOTEROL FUMARATE DIHYDRATE 2 PUFF: 160; 4.5 AEROSOL RESPIRATORY (INHALATION) at 08:59

## 2022-01-01 RX ADMIN — OXYCODONE HYDROCHLORIDE AND ACETAMINOPHEN 500 MG: 500 TABLET ORAL at 19:53

## 2022-01-01 RX ADMIN — OXYCODONE AND ACETAMINOPHEN 1 TABLET: 10; 325 TABLET ORAL at 01:08

## 2022-01-01 RX ADMIN — OXYCODONE AND ACETAMINOPHEN 1 TABLET: 10; 325 TABLET ORAL at 05:17

## 2022-01-01 RX ADMIN — WATER 1000 MG: 1 INJECTION INTRAMUSCULAR; INTRAVENOUS; SUBCUTANEOUS at 18:04

## 2022-01-01 RX ADMIN — ZINC SULFATE 220 MG (50 MG) CAPSULE 50 MG: CAPSULE at 10:16

## 2022-01-01 RX ADMIN — Medication 5 MG: at 19:53

## 2022-01-01 RX ADMIN — BUDESONIDE AND FORMOTEROL FUMARATE DIHYDRATE 2 PUFF: 160; 4.5 AEROSOL RESPIRATORY (INHALATION) at 21:13

## 2022-01-01 RX ADMIN — OXYCODONE AND ACETAMINOPHEN 1 TABLET: 10; 325 TABLET ORAL at 08:29

## 2022-01-01 RX ADMIN — MORPHINE SULFATE 2 MG: 2 INJECTION, SOLUTION INTRAMUSCULAR; INTRAVENOUS at 08:11

## 2022-01-01 RX ADMIN — ALBUTEROL SULFATE 2 PUFF: 90 AEROSOL, METERED RESPIRATORY (INHALATION) at 08:55

## 2022-01-01 RX ADMIN — IOPAMIDOL 70 ML: 755 INJECTION, SOLUTION INTRAVENOUS at 15:28

## 2022-01-01 RX ADMIN — ALBUTEROL SULFATE 2 PUFF: 90 AEROSOL, METERED RESPIRATORY (INHALATION) at 20:50

## 2022-01-01 RX ADMIN — APIXABAN 5 MG: 5 TABLET, FILM COATED ORAL at 09:04

## 2022-01-01 RX ADMIN — DEXAMETHASONE SODIUM PHOSPHATE 10 MG: 10 INJECTION INTRAMUSCULAR; INTRAVENOUS at 20:59

## 2022-01-01 RX ADMIN — ALBUTEROL SULFATE 2 PUFF: 90 AEROSOL, METERED RESPIRATORY (INHALATION) at 17:13

## 2022-01-01 RX ADMIN — ARFORMOTEROL TARTRATE 15 MCG: 15 SOLUTION RESPIRATORY (INHALATION) at 21:55

## 2022-01-01 RX ADMIN — OXYCODONE AND ACETAMINOPHEN 1 TABLET: 10; 325 TABLET ORAL at 13:59

## 2022-01-01 RX ADMIN — GUAIFENESIN SYRUP AND DEXTROMETHORPHAN 5 ML: 100; 10 SYRUP ORAL at 19:49

## 2022-01-01 RX ADMIN — OXYCODONE AND ACETAMINOPHEN 1 TABLET: 10; 325 TABLET ORAL at 05:38

## 2022-01-01 RX ADMIN — OXYCODONE AND ACETAMINOPHEN 1 TABLET: 10; 325 TABLET ORAL at 03:37

## 2022-01-01 RX ADMIN — DEXAMETHASONE SODIUM PHOSPHATE 10 MG: 10 INJECTION INTRAMUSCULAR; INTRAVENOUS at 10:56

## 2022-01-01 RX ADMIN — OXYCODONE HYDROCHLORIDE AND ACETAMINOPHEN 500 MG: 500 TABLET ORAL at 20:14

## 2022-01-01 RX ADMIN — MIDODRINE HYDROCHLORIDE 10 MG: 10 TABLET ORAL at 11:43

## 2022-01-01 RX ADMIN — ASPIRIN 81 MG: 81 TABLET, COATED ORAL at 21:15

## 2022-01-01 RX ADMIN — THIAMINE HCL TAB 100 MG 100 MG: 100 TAB at 10:17

## 2022-01-01 RX ADMIN — ALBUTEROL SULFATE 2 PUFF: 90 AEROSOL, METERED RESPIRATORY (INHALATION) at 16:42

## 2022-01-01 RX ADMIN — OXYCODONE AND ACETAMINOPHEN 1 TABLET: 10; 325 TABLET ORAL at 13:05

## 2022-01-01 RX ADMIN — Medication 5 MG: at 19:17

## 2022-01-01 RX ADMIN — ATORVASTATIN CALCIUM 40 MG: 20 TABLET, FILM COATED ORAL at 20:11

## 2022-01-01 RX ADMIN — ZINC SULFATE 220 MG (50 MG) CAPSULE 50 MG: CAPSULE at 10:56

## 2022-01-01 RX ADMIN — MORPHINE SULFATE 2 MG: 2 INJECTION, SOLUTION INTRAMUSCULAR; INTRAVENOUS at 15:04

## 2022-01-01 RX ADMIN — MIDODRINE HYDROCHLORIDE 10 MG: 10 TABLET ORAL at 16:44

## 2022-01-01 RX ADMIN — OXYCODONE HYDROCHLORIDE AND ACETAMINOPHEN 500 MG: 500 TABLET ORAL at 13:46

## 2022-01-01 RX ADMIN — MORPHINE SULFATE 2 MG: 2 INJECTION, SOLUTION INTRAMUSCULAR; INTRAVENOUS at 08:40

## 2022-01-01 RX ADMIN — APIXABAN 5 MG: 5 TABLET, FILM COATED ORAL at 22:00

## 2022-01-01 RX ADMIN — OXYCODONE AND ACETAMINOPHEN 1 TABLET: 10; 325 TABLET ORAL at 11:42

## 2022-01-01 RX ADMIN — THIAMINE HCL TAB 100 MG 100 MG: 100 TAB at 09:59

## 2022-01-01 RX ADMIN — MIDODRINE HYDROCHLORIDE 10 MG: 10 TABLET ORAL at 09:04

## 2022-01-01 RX ADMIN — MIDODRINE HYDROCHLORIDE 10 MG: 10 TABLET ORAL at 12:47

## 2022-01-01 RX ADMIN — ATORVASTATIN CALCIUM 40 MG: 20 TABLET, FILM COATED ORAL at 21:42

## 2022-01-01 RX ADMIN — APIXABAN 5 MG: 5 TABLET, FILM COATED ORAL at 20:59

## 2022-01-01 RX ADMIN — BUDESONIDE 500 MCG: 0.5 SUSPENSION RESPIRATORY (INHALATION) at 07:28

## 2022-01-01 RX ADMIN — ALBUTEROL SULFATE 2 PUFF: 90 AEROSOL, METERED RESPIRATORY (INHALATION) at 20:10

## 2022-01-01 RX ADMIN — MIDODRINE HYDROCHLORIDE 10 MG: 10 TABLET ORAL at 12:46

## 2022-01-01 RX ADMIN — MIDODRINE HYDROCHLORIDE 10 MG: 10 TABLET ORAL at 17:22

## 2022-01-01 RX ADMIN — FOLIC ACID 1 MG: 1 TABLET ORAL at 09:26

## 2022-01-01 RX ADMIN — OXYCODONE AND ACETAMINOPHEN 1 TABLET: 10; 325 TABLET ORAL at 08:23

## 2022-01-01 RX ADMIN — THIAMINE HCL TAB 100 MG 100 MG: 100 TAB at 08:04

## 2022-01-01 RX ADMIN — MIDODRINE HYDROCHLORIDE 10 MG: 10 TABLET ORAL at 17:50

## 2022-01-01 RX ADMIN — ALBUTEROL SULFATE 2 PUFF: 90 AEROSOL, METERED RESPIRATORY (INHALATION) at 11:46

## 2022-01-01 RX ADMIN — GUAIFENESIN SYRUP AND DEXTROMETHORPHAN 5 ML: 100; 10 SYRUP ORAL at 19:17

## 2022-01-01 RX ADMIN — ALBUTEROL SULFATE 2 PUFF: 90 AEROSOL, METERED RESPIRATORY (INHALATION) at 16:29

## 2022-01-01 RX ADMIN — Medication 5 MG: at 21:07

## 2022-01-01 RX ADMIN — Medication 5 MG: at 21:49

## 2022-01-01 RX ADMIN — OXYCODONE HYDROCHLORIDE AND ACETAMINOPHEN 500 MG: 500 TABLET ORAL at 08:40

## 2022-01-01 RX ADMIN — ALBUTEROL SULFATE 2 PUFF: 90 AEROSOL, METERED RESPIRATORY (INHALATION) at 12:40

## 2022-01-01 RX ADMIN — ALBUTEROL SULFATE 2 PUFF: 90 AEROSOL, METERED RESPIRATORY (INHALATION) at 20:31

## 2022-01-01 RX ADMIN — MORPHINE SULFATE 2 MG: 2 INJECTION, SOLUTION INTRAMUSCULAR; INTRAVENOUS at 21:07

## 2022-01-01 RX ADMIN — BUDESONIDE 500 MCG: 0.5 SUSPENSION RESPIRATORY (INHALATION) at 18:39

## 2022-01-01 RX ADMIN — ALBUTEROL SULFATE 2 PUFF: 90 AEROSOL, METERED RESPIRATORY (INHALATION) at 10:23

## 2022-01-01 RX ADMIN — ALBUTEROL SULFATE 2 PUFF: 90 AEROSOL, METERED RESPIRATORY (INHALATION) at 15:41

## 2022-01-01 RX ADMIN — OXYCODONE HYDROCHLORIDE AND ACETAMINOPHEN 500 MG: 500 TABLET ORAL at 14:16

## 2022-01-01 RX ADMIN — OXYCODONE AND ACETAMINOPHEN 1 TABLET: 10; 325 TABLET ORAL at 04:53

## 2022-01-01 RX ADMIN — MIDODRINE HYDROCHLORIDE 10 MG: 10 TABLET ORAL at 09:03

## 2022-01-01 RX ADMIN — OXYCODONE HYDROCHLORIDE AND ACETAMINOPHEN 500 MG: 500 TABLET ORAL at 19:47

## 2022-01-01 RX ADMIN — MORPHINE SULFATE 2 MG: 2 INJECTION, SOLUTION INTRAMUSCULAR; INTRAVENOUS at 01:30

## 2022-01-01 RX ADMIN — APIXABAN 5 MG: 5 TABLET, FILM COATED ORAL at 10:17

## 2022-01-01 RX ADMIN — ALBUTEROL SULFATE 2 PUFF: 90 AEROSOL, METERED RESPIRATORY (INHALATION) at 21:01

## 2022-01-01 RX ADMIN — FOLIC ACID 1 MG: 1 TABLET ORAL at 09:39

## 2022-01-01 RX ADMIN — Medication 5 MG: at 20:06

## 2022-01-01 RX ADMIN — ASPIRIN 81 MG: 81 TABLET, COATED ORAL at 08:29

## 2022-01-01 RX ADMIN — DEXAMETHASONE SODIUM PHOSPHATE 10 MG: 10 INJECTION INTRAMUSCULAR; INTRAVENOUS at 20:29

## 2022-01-01 RX ADMIN — OXYCODONE AND ACETAMINOPHEN 1 TABLET: 10; 325 TABLET ORAL at 10:55

## 2022-01-01 RX ADMIN — MIDODRINE HYDROCHLORIDE 10 MG: 10 TABLET ORAL at 12:25

## 2022-01-01 RX ADMIN — BUDESONIDE AND FORMOTEROL FUMARATE DIHYDRATE 2 PUFF: 160; 4.5 AEROSOL RESPIRATORY (INHALATION) at 19:18

## 2022-01-01 RX ADMIN — MIDODRINE HYDROCHLORIDE 10 MG: 10 TABLET ORAL at 11:29

## 2022-01-01 RX ADMIN — DEXAMETHASONE SODIUM PHOSPHATE 10 MG: 10 INJECTION INTRAMUSCULAR; INTRAVENOUS at 21:00

## 2022-01-01 RX ADMIN — ZINC SULFATE 220 MG (50 MG) CAPSULE 50 MG: CAPSULE at 08:23

## 2022-01-01 RX ADMIN — LORAZEPAM 1 MG: 2 INJECTION INTRAMUSCULAR; INTRAVENOUS at 16:29

## 2022-01-01 RX ADMIN — ALBUMIN (HUMAN) 25 G: 0.25 INJECTION, SOLUTION INTRAVENOUS at 04:41

## 2022-01-01 RX ADMIN — MIDODRINE HYDROCHLORIDE 10 MG: 10 TABLET ORAL at 17:13

## 2022-01-01 RX ADMIN — OXYCODONE HYDROCHLORIDE AND ACETAMINOPHEN 500 MG: 500 TABLET ORAL at 20:07

## 2022-01-01 RX ADMIN — DEXAMETHASONE SODIUM PHOSPHATE 10 MG: 10 INJECTION INTRAMUSCULAR; INTRAVENOUS at 09:26

## 2022-01-01 RX ADMIN — ATORVASTATIN CALCIUM 40 MG: 40 TABLET, FILM COATED ORAL at 20:01

## 2022-01-01 RX ADMIN — MORPHINE SULFATE 2 MG: 2 INJECTION, SOLUTION INTRAMUSCULAR; INTRAVENOUS at 20:48

## 2022-01-01 RX ADMIN — MORPHINE SULFATE 2 MG: 2 INJECTION, SOLUTION INTRAMUSCULAR; INTRAVENOUS at 23:33

## 2022-01-01 RX ADMIN — BARICITINIB 4 MG: 2 TABLET, FILM COATED ORAL at 19:58

## 2022-01-01 RX ADMIN — FOLIC ACID 1 MG: 1 TABLET ORAL at 08:29

## 2022-01-01 RX ADMIN — GUAIFENESIN SYRUP AND DEXTROMETHORPHAN 5 ML: 100; 10 SYRUP ORAL at 16:24

## 2022-01-01 RX ADMIN — OXYCODONE 10 MG: 5 TABLET ORAL at 14:44

## 2022-01-01 RX ADMIN — APIXABAN 5 MG: 5 TABLET, FILM COATED ORAL at 10:32

## 2022-01-01 RX ADMIN — OXYCODONE HYDROCHLORIDE AND ACETAMINOPHEN 500 MG: 500 TABLET ORAL at 21:00

## 2022-01-01 RX ADMIN — ALBUTEROL SULFATE 2 PUFF: 90 AEROSOL, METERED RESPIRATORY (INHALATION) at 13:31

## 2022-01-01 RX ADMIN — ATORVASTATIN CALCIUM 40 MG: 20 TABLET, FILM COATED ORAL at 21:07

## 2022-01-01 RX ADMIN — OXYCODONE AND ACETAMINOPHEN 1 TABLET: 10; 325 TABLET ORAL at 14:59

## 2022-01-01 RX ADMIN — APIXABAN 5 MG: 5 TABLET, FILM COATED ORAL at 19:51

## 2022-01-01 RX ADMIN — OXYCODONE AND ACETAMINOPHEN 1 TABLET: 10; 325 TABLET ORAL at 03:32

## 2022-01-01 RX ADMIN — DEXAMETHASONE SODIUM PHOSPHATE 10 MG: 10 INJECTION INTRAMUSCULAR; INTRAVENOUS at 08:23

## 2022-01-01 RX ADMIN — CITALOPRAM HYDROBROMIDE 20 MG: 20 TABLET ORAL at 08:05

## 2022-01-01 RX ADMIN — OXYCODONE HYDROCHLORIDE AND ACETAMINOPHEN 500 MG: 500 TABLET ORAL at 20:48

## 2022-01-01 RX ADMIN — OXYCODONE AND ACETAMINOPHEN 1 TABLET: 10; 325 TABLET ORAL at 06:32

## 2022-01-01 RX ADMIN — ALPRAZOLAM 0.25 MG: 0.25 TABLET ORAL at 19:20

## 2022-01-01 RX ADMIN — MORPHINE SULFATE 2 MG: 2 INJECTION, SOLUTION INTRAMUSCULAR; INTRAVENOUS at 18:35

## 2022-01-01 RX ADMIN — OXYCODONE HYDROCHLORIDE AND ACETAMINOPHEN 500 MG: 500 TABLET ORAL at 12:39

## 2022-01-01 RX ADMIN — MIDODRINE HYDROCHLORIDE 10 MG: 10 TABLET ORAL at 20:46

## 2022-01-01 RX ADMIN — ALBUTEROL SULFATE 2 PUFF: 90 AEROSOL, METERED RESPIRATORY (INHALATION) at 12:32

## 2022-01-01 RX ADMIN — OXYCODONE AND ACETAMINOPHEN 1 TABLET: 10; 325 TABLET ORAL at 05:56

## 2022-01-01 RX ADMIN — ALBUTEROL SULFATE 2 PUFF: 90 AEROSOL, METERED RESPIRATORY (INHALATION) at 11:32

## 2022-01-01 RX ADMIN — CITALOPRAM HYDROBROMIDE 20 MG: 20 TABLET ORAL at 08:55

## 2022-01-01 RX ADMIN — ASPIRIN 81 MG: 81 TABLET, COATED ORAL at 10:05

## 2022-01-01 RX ADMIN — BUDESONIDE AND FORMOTEROL FUMARATE DIHYDRATE 2 PUFF: 160; 4.5 AEROSOL RESPIRATORY (INHALATION) at 20:31

## 2022-01-01 RX ADMIN — APIXABAN 5 MG: 5 TABLET, FILM COATED ORAL at 08:24

## 2022-01-01 RX ADMIN — ALPRAZOLAM 0.25 MG: 0.25 TABLET ORAL at 10:55

## 2022-01-01 RX ADMIN — MIDODRINE HYDROCHLORIDE 10 MG: 10 TABLET ORAL at 08:45

## 2022-01-01 RX ADMIN — APIXABAN 5 MG: 5 TABLET, FILM COATED ORAL at 19:59

## 2022-01-01 RX ADMIN — CITALOPRAM HYDROBROMIDE 20 MG: 20 TABLET ORAL at 10:16

## 2022-01-01 RX ADMIN — MIDODRINE HYDROCHLORIDE 10 MG: 10 TABLET ORAL at 08:23

## 2022-01-01 RX ADMIN — OXYCODONE HYDROCHLORIDE AND ACETAMINOPHEN 500 MG: 500 TABLET ORAL at 08:45

## 2022-01-01 RX ADMIN — BARICITINIB 4 MG: 2 TABLET, FILM COATED ORAL at 21:08

## 2022-01-01 RX ADMIN — ASPIRIN 81 MG: 81 TABLET, COATED ORAL at 09:04

## 2022-01-01 RX ADMIN — BUDESONIDE AND FORMOTEROL FUMARATE DIHYDRATE 2 PUFF: 160; 4.5 AEROSOL RESPIRATORY (INHALATION) at 10:23

## 2022-01-01 RX ADMIN — APIXABAN 5 MG: 5 TABLET, FILM COATED ORAL at 10:09

## 2022-01-01 RX ADMIN — AZITHROMYCIN MONOHYDRATE 500 MG: 500 INJECTION, POWDER, LYOPHILIZED, FOR SOLUTION INTRAVENOUS at 19:00

## 2022-01-01 RX ADMIN — ATORVASTATIN CALCIUM 40 MG: 20 TABLET, FILM COATED ORAL at 20:48

## 2022-01-01 RX ADMIN — MORPHINE SULFATE 2 MG: 2 INJECTION, SOLUTION INTRAMUSCULAR; INTRAVENOUS at 20:13

## 2022-01-01 ASSESSMENT — PAIN SCALES - GENERAL
PAINLEVEL_OUTOF10: 8
PAINLEVEL_OUTOF10: 9
PAINLEVEL_OUTOF10: 0
PAINLEVEL_OUTOF10: 8
PAINLEVEL_OUTOF10: 8
PAINLEVEL_OUTOF10: 9
PAINLEVEL_OUTOF10: 5
PAINLEVEL_OUTOF10: 8
PAINLEVEL_OUTOF10: 9
PAINLEVEL_OUTOF10: 7
PAINLEVEL_OUTOF10: 0
PAINLEVEL_OUTOF10: 4
PAINLEVEL_OUTOF10: 9
PAINLEVEL_OUTOF10: 3
PAINLEVEL_OUTOF10: 8
PAINLEVEL_OUTOF10: 5
PAINLEVEL_OUTOF10: 9
PAINLEVEL_OUTOF10: 3
PAINLEVEL_OUTOF10: 7
PAINLEVEL_OUTOF10: 10
PAINLEVEL_OUTOF10: 9
PAINLEVEL_OUTOF10: 7
PAINLEVEL_OUTOF10: 9
PAINLEVEL_OUTOF10: 8
PAINLEVEL_OUTOF10: 8
PAINLEVEL_OUTOF10: 4
PAINLEVEL_OUTOF10: 9
PAINLEVEL_OUTOF10: 9
PAINLEVEL_OUTOF10: 8
PAINLEVEL_OUTOF10: 9
PAINLEVEL_OUTOF10: 8
PAINLEVEL_OUTOF10: 9
PAINLEVEL_OUTOF10: 8
PAINLEVEL_OUTOF10: 8
PAINLEVEL_OUTOF10: 6
PAINLEVEL_OUTOF10: 0
PAINLEVEL_OUTOF10: 0
PAINLEVEL_OUTOF10: 3
PAINLEVEL_OUTOF10: 4
PAINLEVEL_OUTOF10: 9
PAINLEVEL_OUTOF10: 0
PAINLEVEL_OUTOF10: 9
PAINLEVEL_OUTOF10: 8
PAINLEVEL_OUTOF10: 9
PAINLEVEL_OUTOF10: 9
PAINLEVEL_OUTOF10: 8
PAINLEVEL_OUTOF10: 9
PAINLEVEL_OUTOF10: 9
PAINLEVEL_OUTOF10: 8
PAINLEVEL_OUTOF10: 9
PAINLEVEL_OUTOF10: 5
PAINLEVEL_OUTOF10: 7
PAINLEVEL_OUTOF10: 9
PAINLEVEL_OUTOF10: 8
PAINLEVEL_OUTOF10: 8
PAINLEVEL_OUTOF10: 9
PAINLEVEL_OUTOF10: 3
PAINLEVEL_OUTOF10: 8
PAINLEVEL_OUTOF10: 10
PAINLEVEL_OUTOF10: 8
PAINLEVEL_OUTOF10: 9
PAINLEVEL_OUTOF10: 9
PAINLEVEL_OUTOF10: 0
PAINLEVEL_OUTOF10: 10
PAINLEVEL_OUTOF10: 9
PAINLEVEL_OUTOF10: 0
PAINLEVEL_OUTOF10: 8
PAINLEVEL_OUTOF10: 9
PAINLEVEL_OUTOF10: 9
PAINLEVEL_OUTOF10: 5
PAINLEVEL_OUTOF10: 9
PAINLEVEL_OUTOF10: 10
PAINLEVEL_OUTOF10: 8
PAINLEVEL_OUTOF10: 7
PAINLEVEL_OUTOF10: 0
PAINLEVEL_OUTOF10: 6
PAINLEVEL_OUTOF10: 0
PAINLEVEL_OUTOF10: 9
PAINLEVEL_OUTOF10: 9
PAINLEVEL_OUTOF10: 8
PAINLEVEL_OUTOF10: 0
PAINLEVEL_OUTOF10: 9
PAINLEVEL_OUTOF10: 4
PAINLEVEL_OUTOF10: 9
PAINLEVEL_OUTOF10: 8
PAINLEVEL_OUTOF10: 7
PAINLEVEL_OUTOF10: 9
PAINLEVEL_OUTOF10: 8
PAINLEVEL_OUTOF10: 7
PAINLEVEL_OUTOF10: 8
PAINLEVEL_OUTOF10: 7
PAINLEVEL_OUTOF10: 0
PAINLEVEL_OUTOF10: 9
PAINLEVEL_OUTOF10: 5
PAINLEVEL_OUTOF10: 9
PAINLEVEL_OUTOF10: 9
PAINLEVEL_OUTOF10: 4
PAINLEVEL_OUTOF10: 9
PAINLEVEL_OUTOF10: 8
PAINLEVEL_OUTOF10: 9
PAINLEVEL_OUTOF10: 0
PAINLEVEL_OUTOF10: 4
PAINLEVEL_OUTOF10: 9
PAINLEVEL_OUTOF10: 0
PAINLEVEL_OUTOF10: 9
PAINLEVEL_OUTOF10: 8
PAINLEVEL_OUTOF10: 7
PAINLEVEL_OUTOF10: 8
PAINLEVEL_OUTOF10: 9
PAINLEVEL_OUTOF10: 7
PAINLEVEL_OUTOF10: 8
PAINLEVEL_OUTOF10: 9
PAINLEVEL_OUTOF10: 9
PAINLEVEL_OUTOF10: 4
PAINLEVEL_OUTOF10: 9
PAINLEVEL_OUTOF10: 3
PAINLEVEL_OUTOF10: 4
PAINLEVEL_OUTOF10: 8

## 2022-01-01 ASSESSMENT — PAIN DESCRIPTION - PAIN TYPE
TYPE: CHRONIC PAIN
TYPE: ACUTE PAIN;CHRONIC PAIN
TYPE: CHRONIC PAIN
TYPE: ACUTE PAIN;CHRONIC PAIN
TYPE: CHRONIC PAIN

## 2022-01-01 ASSESSMENT — PAIN DESCRIPTION - FREQUENCY
FREQUENCY: CONTINUOUS

## 2022-01-01 ASSESSMENT — PAIN DESCRIPTION - LOCATION
LOCATION: CHEST
LOCATION: GENERALIZED
LOCATION: BACK
LOCATION: GENERALIZED
LOCATION: BACK
LOCATION: GENERALIZED
LOCATION: BACK
LOCATION: HEAD
LOCATION: BACK
LOCATION: GENERALIZED
LOCATION: BACK
LOCATION: HEAD;BACK
LOCATION: BACK;CHEST;HEAD
LOCATION: HEAD;BACK

## 2022-01-01 ASSESSMENT — ENCOUNTER SYMPTOMS
NAUSEA: 0
SHORTNESS OF BREATH: 1
VOMITING: 0
GASTROINTESTINAL NEGATIVE: 1
DIARRHEA: 0
EYE PAIN: 0
COUGH: 1
VOICE CHANGE: 0
NAUSEA: 0
RHINORRHEA: 0
VOICE CHANGE: 0
SHORTNESS OF BREATH: 1
COLOR CHANGE: 0
EYE REDNESS: 0
COUGH: 1
VOMITING: 0
SHORTNESS OF BREATH: 1
COUGH: 1
ABDOMINAL PAIN: 0
VOMITING: 0
DIARRHEA: 0
DIARRHEA: 0
COUGH: 1
SORE THROAT: 0
SHORTNESS OF BREATH: 1
RHINORRHEA: 0
BACK PAIN: 0
CONSTIPATION: 0
ABDOMINAL PAIN: 0
BACK PAIN: 0
RHINORRHEA: 0

## 2022-01-01 ASSESSMENT — PAIN SCALES - WONG BAKER
WONGBAKER_NUMERICALRESPONSE: 2
WONGBAKER_NUMERICALRESPONSE: 2

## 2022-01-01 ASSESSMENT — PAIN DESCRIPTION - ORIENTATION
ORIENTATION: LOWER
ORIENTATION: OTHER (COMMENT)
ORIENTATION: OTHER (COMMENT)
ORIENTATION: LOWER;POSTERIOR
ORIENTATION: OTHER (COMMENT)
ORIENTATION: LOWER
ORIENTATION: OTHER (COMMENT);LOWER
ORIENTATION: LOWER
ORIENTATION: MID
ORIENTATION: LOWER
ORIENTATION: LEFT;RIGHT
ORIENTATION: LOWER
ORIENTATION: LOWER

## 2022-01-01 ASSESSMENT — PAIN DESCRIPTION - DESCRIPTORS
DESCRIPTORS: BURNING;DISCOMFORT
DESCRIPTORS: ACHING;DISCOMFORT
DESCRIPTORS: ACHING;DISCOMFORT
DESCRIPTORS: ACHING
DESCRIPTORS: ACHING;DISCOMFORT
DESCRIPTORS: ACHING
DESCRIPTORS: ACHING;DISCOMFORT
DESCRIPTORS: ACHING
DESCRIPTORS: HEADACHE;ACHING
DESCRIPTORS: ACHING
DESCRIPTORS: HEADACHE
DESCRIPTORS: ACHING
DESCRIPTORS: HEADACHE;ACHING
DESCRIPTORS: ACHING
DESCRIPTORS: ACHING;DISCOMFORT
DESCRIPTORS: ACHING
DESCRIPTORS: ACHING
DESCRIPTORS: ACHING;SHARP

## 2022-01-01 ASSESSMENT — PAIN DESCRIPTION - ONSET
ONSET: ON-GOING

## 2022-01-01 ASSESSMENT — PAIN - FUNCTIONAL ASSESSMENT
PAIN_FUNCTIONAL_ASSESSMENT: ACTIVITIES ARE NOT PREVENTED
PAIN_FUNCTIONAL_ASSESSMENT: PREVENTS OR INTERFERES SOME ACTIVE ACTIVITIES AND ADLS
PAIN_FUNCTIONAL_ASSESSMENT: PREVENTS OR INTERFERES SOME ACTIVE ACTIVITIES AND ADLS
PAIN_FUNCTIONAL_ASSESSMENT: ACTIVITIES ARE NOT PREVENTED
PAIN_FUNCTIONAL_ASSESSMENT: PREVENTS OR INTERFERES SOME ACTIVE ACTIVITIES AND ADLS
PAIN_FUNCTIONAL_ASSESSMENT: ACTIVITIES ARE NOT PREVENTED
PAIN_FUNCTIONAL_ASSESSMENT: PREVENTS OR INTERFERES SOME ACTIVE ACTIVITIES AND ADLS
PAIN_FUNCTIONAL_ASSESSMENT: PREVENTS OR INTERFERES WITH MANY ACTIVE NOT PASSIVE ACTIVITIES
PAIN_FUNCTIONAL_ASSESSMENT: ACTIVITIES ARE NOT PREVENTED

## 2022-01-01 ASSESSMENT — PAIN DESCRIPTION - PROGRESSION
CLINICAL_PROGRESSION: NOT CHANGED

## 2022-01-12 NOTE — TELEPHONE ENCOUNTER
Spoke with Bradfordallie Fozia wife. She asked that we call him back tomorrow around 430 PM. Will attempt to contact him then.

## 2022-01-14 NOTE — TELEPHONE ENCOUNTER
[facility]. Faxed today to [phone].

## 2022-01-27 NOTE — PROGRESS NOTES
Results for Aleks Rios (MRN [id number]) as of 1/27/2022 17:24   Ref.  Range 1/27/2022 17:22   Hemoglobin, Art, Extended Latest Ref Range: 12.0 - 16.0 g/dL 10.8 (L)   pH, Arterial Latest Ref Range: 7.350 - 7.450  7.380   pCO2, Arterial Latest Ref Range: 35.0 - 45.0 mmHg 64.0 (H)   pO2, Arterial Latest Ref Range: 80.0 - 100.0 mmHg 70.0 (L)   HCO3, Arterial Latest Ref Range: 22.0 - 26.0 mmol/L 37.9 (H)   Base Excess, Arterial Latest Ref Range: -2.0 - 2.0 mmol/L 10.7 (H)   O2 Sat, Arterial Latest Ref Range: >92 % 91.9   O2 Content, Arterial Latest Ref Range: Not Established mL/dL 14.0   Methemoglobin, Arterial Latest Ref Range: <1.5 % 1.2   Carboxyhgb, Arterial Latest Ref Range: 0.0 - 5.0 % 2.5   Pt on 3 lpm nc, RB

## 2022-01-27 NOTE — ED PROVIDER NOTES
[facility] EMERGENCY DEPT  eMERGENCY dEPARTMENT eNCOUnter      Pt Name: Emory Barnett  MRN: [id number]  [place] 1955  Date of evaluation: 1/27/2022  Provider: Geraldine Chester MD    [address] [place] [address]       Chief Complaint   Patient presents with    Shortness of Breath     O2 sat 85 on 3L at home         HISTORY OF PRESENT ILLNESS   (Location/Symptom, Timing/Onset,Context/Setting, Quality, Duration, Modifying Factors, Severity)  Note limiting factors. Emoyr Barnett is a 74 y.o. female who presents to the emergency department for shortness of breath. Patient states that she has had at least a week of increasing dyspnea and took antibiotics (unsure of name)  last week which seemed to somewhat improve her symptoms and was diagnosed with pneumonia as an outpatient. Has had increased dyspnea over the past several days again. Has known COPD and wears 3 L of oxygen at baseline. Saturation was 85% on her home 3 L and is currently 90% here. Denies any chest pain or leg swelling. No history of heart failure. Has neb machine at home been using intermittently. HPI    NursingNotes were reviewed. REVIEW OF SYSTEMS    (2-9 systems for level 4, 10 or more for level 5)     Review of Systems   Constitutional: Positive for fatigue. Negative for chills and fever. HENT: Negative for rhinorrhea and sore throat. Respiratory: Positive for cough and shortness of breath. Cardiovascular: Negative for chest pain and leg swelling. Gastrointestinal: Negative for abdominal pain, diarrhea, nausea and vomiting. Genitourinary: Negative for dysuria, frequency and urgency. Musculoskeletal: Negative for back pain and neck pain. Neurological: Negative for dizziness and headaches. All other systems reviewed and are negative.            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     COPD (chronic obstructive pulmonary disease) (HCC)     Depression  Emphysema (subcutaneous) (surgical) resulting from a procedure     History of lung biopsy 05/16/2019    Malignant neoplasm of right main bronchus ([facility] 03/2019    NSCLC, SCC [id number] stage IIIb    Palliative care patient 11/16/2021    Syncope     TIA (transient ischemic attack)          SURGICAL HISTORY       Past Surgical History:   Procedure Laterality Date    APPENDECTOMY      BACK SURGERY      times two    BLADDER SUSPENSION      CHOLECYSTECTOMY      HYSTERECTOMY      INCONTINENCE SURGERY           CURRENT MEDICATIONS     Discharge Medication List as of 1/27/2022  6:47 PM      CONTINUE these medications which have NOT CHANGED    Details   oxyCODONE-acetaminophen (PERCOCET)  MG per tablet Take 1 tablet by mouth every 4 hours as needed for Pain for up to 30 days.  Intended supply: 30 days, Disp-180 tablet, R-0Normal      !! albuterol sulfate HFA (VENTOLIN HFA) 108 (90 Base) MCG/ACT inhaler Inhale 2 puffs into the lungs 4 times daily as needed for Wheezing, Disp-18 g, [id number]      folic acid (FOLVITE) 1 MG tablet Take 1 tablet by mouth daily, Disp-30 tablet, R-0Normal      methylPREDNISolone (MEDROL DOSEPACK) 4 MG tablet Take by mouth., Disp-1 kit, R-0Normal      apixaban (ELIQUIS) 5 MG TABS tablet Take 1 tablet by mouth 2 times daily, Disp-60 tablet, R-2Normal      atorvastatin (LIPITOR) 40 MG tablet Take 1 tablet by mouth nightly, Disp-30 tablet, R-3Normal      nicotine (NICODERM CQ) 21 MG/24HR Place 1 patch onto the skin daily, Disp-30 patch, R-3Normal      midodrine (PROAMATINE) 10 MG tablet Take 1 tablet by mouth 3 times daily (with meals), Disp-90 tablet, R-2Normal      vitamin B-1 (THIAMINE) 100 MG tablet Take 1 tablet by mouth daily, Disp-30 tablet, R-3Normal      Cholecalciferol (VITAMIN D3) 57813 units CAPS Take 50,000 Units by mouthHistorical Med      Glycopyrrolate (SEEBRI NEOHALER) 15.6 MCG CAPS Inhale 1 capsule into the lungsHistorical Med      lidocaine viscous hcl (XYLOCAINE) 2 % SOLN solution Historical Med      vitamin D (ERGOCALCIFEROL) 64843 units CAPS capsule cholecalciferol (vitamin D3) 50,000 unit capsule   Take 1 capsule every week by oral route. Historical Med      OXYGEN Inhale 2 L/min into the lungsHistorical Med      budesonide-formoterol (SYMBICORT) 160-4.5 MCG/ACT AERO Symbicort 160 mcg-4.5 mcg/actuation HFA aerosol inhaler   Inhale 2 puffs twice a day by inhalation route. Historical Med      !! albuterol sulfate HFA (PROAIR HFA) 108 (90 Base) MCG/ACT inhaler ProAir HFA 90 mcg/actuation aerosol inhaler   Inhale 2 puffs every 4 hours by inhalation route as needed. Historical Med      aspirin EC 81 MG EC tablet Take 81 mg by mouthHistorical Med      calcium carbonate 600 MG TABS tablet Take 600 mg by mouthHistorical Med      citalopram (CELEXA) 20 MG tablet citalopram 20 mg tablet   Take 1 tablet every day by oral route. Historical Med       !! - Potential duplicate medications found. Please discuss with provider. ALLERGIES     No known allergies    FAMILY HISTORY       Family History   Problem Relation Age of Onset    Colon Cancer Mother     High Blood Pressure Brother     Diabetes Brother           SOCIAL HISTORY       Social History     Socioeconomic History    Marital status:       Spouse name: None    Number of children: None    Years of education: None    Highest education level: None   Occupational History    None   Tobacco Use    Smoking status: Former Smoker     Packs/day: 2.00     Years: 54.00     Pack years: 108.00     Types: Cigarettes     Quit date: 2021     Years since quittin.2    Smokeless tobacco: Never Used    Tobacco comment: stopped 1 month ago   Substance and Sexual Activity    Alcohol use: Not Currently    Drug use: Never    Sexual activity: None   Other Topics Concern    None   Social History Narrative    None     Social Determinants of Health     Financial Resource Strain:     Difficulty of Paying Living Expenses: Not on file   Food Insecurity:     Worried About [address] in the Last Year: Not on file    Trisha of Food in the Last Year: Not on file   Transportation Needs:     Lack of Transportation (Medical): Not on file    Lack of Transportation (Non-Medical): Not on file   Physical Activity:     Days of Exercise per Week: Not on file    Minutes of Exercise per Session: Not on file   Stress:     Feeling of Stress : Not on file   Social Connections:     Frequency of Communication with Friends and Family: Not on file    Frequency of Social Gatherings with Friends and Family: Not on file    Attends Orthodox Services: Not on file    Active Member of [address] [place] [address] or Organizations: Not on file    Attends Club or Organization Meetings: Not on file    Marital Status: Not on file   Intimate Partner Violence:     Fear of Current or Ex-Partner: Not on file    Emotionally Abused: Not on file    Physically Abused: Not on file    Sexually Abused: Not on file   Housing Stability:     Unable to Pay for Housing in the Last Year: Not on file    Number of [place] in the Last Year: Not on file    Unstable Housing in the Last Year: Not on file       SCREENINGS             PHYSICAL EXAM    (up to 7 for level 4, 8 or more for level 5)     ED Triage Vitals [01/27/22 1458]   BP Temp Temp Source Pulse Resp SpO2 Height Weight   (!) 112/56 97.9 °F (36.6 °C) Temporal 91 19 90 % 5' 5\" (1.651 m) 110 lb (49.9 kg)       Physical Exam  Vitals and nursing note reviewed. Constitutional:       General: She is not in acute distress. Appearance: She is well-developed. She is ill-appearing. She is not diaphoretic. HENT:      Head: Normocephalic and atraumatic. Right Ear: External ear normal.      Left Ear: External ear normal.      Mouth/Throat:      Mouth: Mucous membranes are moist.   Eyes:      Conjunctiva/sclera: Conjunctivae normal.   Neck:      Trachea: No tracheal deviation.    Cardiovascular:      Rate and Rhythm: Normal rate and regular rhythm. Heart sounds: Normal heart sounds. No murmur heard. Pulmonary:      Effort: Pulmonary effort is normal. No respiratory distress. Breath sounds: Examination of the right-middle field reveals wheezing and rhonchi. Examination of the left-middle field reveals wheezing and rhonchi. Examination of the right-lower field reveals wheezing. Examination of the left-lower field reveals wheezing. Wheezing and rhonchi present. No rales. Abdominal:      Palpations: Abdomen is soft. There is no mass. Tenderness: There is no abdominal tenderness. Musculoskeletal:         General: Normal range of motion. Cervical back: Normal range of motion. Right lower leg: No edema. Left lower leg: No edema. Skin:     General: Skin is warm and dry. Neurological:      Mental Status: She is alert and oriented to person, place, and time. GCS: GCS eye subscore is 4. GCS verbal subscore is 5. GCS motor subscore is 6. DIAGNOSTIC RESULTS     EKG: All EKG's areinterpreted by the Emergency Department Physician who either signs or Co-signs this chart in the absence of a cardiologist.    87 normal sinus rhythm no obvious ST changes nondiagnostic EKG    RADIOLOGY:  Non-plain film images such as CT, Ultrasound and MRI are read by the radiologist. Plain radiographic images are visualized and preliminarily interpreted bythe emergency physician with the below findings:        XR CHEST PORTABLE   Final Result   Impression:   1. Decreased but not fully resolved right lung zone opacities. 2.  Left lower lung zone opacities, may reflect atelectasis versus   infection. Slightly increased when compared to prior chest x-ray.    Signed by Dr Eileen Xiong:  Labs Reviewed   CBC WITH AUTO DIFFERENTIAL - Abnormal; Notable for the following components:       Result Value    RBC 3.64 (*)     Hemoglobin 11.0 (*)     .4 (*)     MCHC 28.9 (*)     RDW 15.4 (*) Neutrophils % 82.9 (*)     Lymphocytes % 9.3 (*)     Neutrophils Absolute 8.1 (*)     Lymphocytes Absolute 0.9 (*)     Anisocytosis 1+ (*)     Hypochromia 2+ (*)     Ovalocytes 1+ (*)     Stomatocytes 1+ (*)     All other components within normal limits   COMPREHENSIVE METABOLIC PANEL - Abnormal; Notable for the following components:    CO2 35 (*)     Anion Gap 6 (*)     CREATININE 0.2 (*)     Albumin 3.4 (*)     All other components within normal limits   BLOOD GAS, ARTERIAL - Abnormal; Notable for the following components:    pCO2, Arterial 64.0 (*)     pO2, Arterial 70.0 (*)     HCO3, Arterial 37.9 (*)     Base Excess, Arterial 10.7 (*)     Hemoglobin, Art, Extended 10.8 (*)     All other components within normal limits   COVID-19, RAPID   BRAIN NATRIURETIC PEPTIDE   TROPONIN   PROTIME-INR   APTT   POTASSIUM, WHOLE BLOOD   POTASSIUM, WHOLE BLOOD       All other labs were within normal range or not returned as of this dictation. EMERGENCY DEPARTMENT COURSE and DIFFERENTIAL DIAGNOSIS/MDM:   Vitals:    Vitals:    01/27/22 1458 01/27/22 1815 01/27/22 1845   BP: (!) 112/56  132/73   Pulse: 91  92   Resp: 19  20   Temp: 97.9 °F (36.6 °C)     TempSrc: Temporal     SpO2: 90% 91% 92%   Weight: 110 lb (49.9 kg)     Height: 5' 5\" (1.651 m)         MDM    Pt not in distress but has coarse breath sounds bilaterally. Right sided infiltrate has improved ? new left small infiltrate. Pt maintaining sat of 90 on home 3L which she tells me is her baseline. I offered her admission given she has already done outpt treatment but she wants to go home. Explained to her likely may ultimately require admission. Will dc on steroids and antibx. Instructed next few days do nebs q 4-6 hrs, understands return precautions    CONSULTS:  None    PROCEDURES:  Unless otherwise noted below, none     Procedures    FINAL IMPRESSION      1. Pneumonia of lower lobe due to infectious organism, unspecified laterality    2.  COPD exacerbation ([facility]

## 2022-02-02 NOTE — TELEPHONE ENCOUNTER
Spoke with EC, Case Cates. Explained to him that the patient's appointment needed to be rescheduled due to weather. He stated he wasn't aware of this appointment but understood needing to reschedule. Rescheduled appointment for 3/22/2022 @ 3 PM. EC stated his understanding.

## 2022-02-11 PROBLEM — U07.1 ACUTE HYPOXEMIC RESPIRATORY FAILURE DUE TO COVID-19 (HCC): Status: ACTIVE | Noted: 2022-01-01

## 2022-02-11 PROBLEM — J96.01 ACUTE HYPOXEMIC RESPIRATORY FAILURE DUE TO COVID-19 (HCC): Status: ACTIVE | Noted: 2022-01-01

## 2022-02-11 PROBLEM — J12.82 PNEUMONIA DUE TO COVID-19 VIRUS: Status: ACTIVE | Noted: 2022-01-01

## 2022-02-11 PROBLEM — U07.1 PNEUMONIA DUE TO COVID-19 VIRUS: Status: ACTIVE | Noted: 2022-01-01

## 2022-02-11 NOTE — ED PROVIDER NOTES
[facility] EMERGENCY DEPT  EMERGENCY DEPARTMENT ENCOUNTER      Pt Name: Leah Adams  MRN: [id number]  Desmondgfchaim 1955  Date of evaluation: 2/11/2022  Provider: Ani Damon MD    CHIEF COMPLAINT       Chief Complaint   Patient presents with    Shortness of Breath         HISTORY OF PRESENT ILLNESS   (Location/Symptom, Timing/Onset,Context/Setting, Quality, Duration, Modifying Factors, Severity)  Note limiting factors. Leah Adams is a 74 y.o. female who presents to the emergency department with complaint of shortness of breath has been worsening over the last several days gradually. Patient has history of advanced COPD with 4 L nasal cannula oxygen requirement at home at baseline. States even with her supplemental oxygen her oxygen saturation has been dropping down to the 60s at home and occasionally. Has had increased cough. Patient was recently treated with antibiotics and steroids for pneumonia and COPD exacerbation but completed the course of those several days ago. Has not had any recent fevers. No known sick contacts recently. HPI    NursingNotes were reviewed. REVIEW OF SYSTEMS    (2-9 systems for level 4, 10 or more for level 5)     Review of Systems   Constitutional: Positive for fatigue. Negative for fever. HENT: Negative for congestion, rhinorrhea and voice change. Eyes: Negative for pain and redness. Respiratory: Positive for cough and shortness of breath. Cardiovascular: Negative for chest pain. Gastrointestinal: Negative for abdominal pain, diarrhea and vomiting. Endocrine: Negative. Genitourinary: Negative. Musculoskeletal: Negative for arthralgias and gait problem. Skin: Negative for rash and wound. Neurological: Positive for weakness. Negative for headaches. Hematological: Negative. Psychiatric/Behavioral: Negative. All other systems reviewed and are negative.       A complete review of systems was performed and is negative except as noted above in the HPI.       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     COPD (chronic obstructive pulmonary disease) (HCC)     Depression     Emphysema (subcutaneous) (surgical) resulting from a procedure     History of lung biopsy 05/16/2019    Malignant neoplasm of right main bronchus ([address] 03/2019    NSCLC, SCC [id number] stage IIIb    Palliative care patient 11/16/2021    Syncope     TIA (transient ischemic attack)          SURGICAL HISTORY       Past Surgical History:   Procedure Laterality Date    APPENDECTOMY      BACK SURGERY      times two    BLADDER SUSPENSION      CHOLECYSTECTOMY      HYSTERECTOMY      INCONTINENCE SURGERY           CURRENT MEDICATIONS       Previous Medications    ALBUTEROL SULFATE HFA (PROAIR HFA) 108 (90 BASE) MCG/ACT INHALER    ProAir HFA 90 mcg/actuation aerosol inhaler   Inhale 2 puffs every 4 hours by inhalation route as needed. ALBUTEROL SULFATE HFA (VENTOLIN HFA) 108 (90 BASE) MCG/ACT INHALER    Inhale 2 puffs into the lungs 4 times daily as needed for Wheezing    APIXABAN (ELIQUIS) 5 MG TABS TABLET    Take 1 tablet by mouth 2 times daily    ASPIRIN EC 81 MG EC TABLET    Take 81 mg by mouth    ATORVASTATIN (LIPITOR) 40 MG TABLET    Take 1 tablet by mouth nightly    BUDESONIDE-FORMOTEROL (SYMBICORT) 160-4.5 MCG/ACT AERO    Symbicort 160 mcg-4.5 mcg/actuation HFA aerosol inhaler   Inhale 2 puffs twice a day by inhalation route. CALCIUM CARBONATE 600 MG TABS TABLET    Take 600 mg by mouth    CHOLECALCIFEROL (VITAMIN D3) 69812 UNITS CAPS    Take 50,000 Units by mouth    CITALOPRAM (CELEXA) 20 MG TABLET    citalopram 20 mg tablet   Take 1 tablet every day by oral route.     FOLIC ACID (FOLVITE) 1 MG TABLET    Take 1 tablet by mouth daily    GLYCOPYRROLATE (SEEBRI NEOHALER) 15.6 MCG CAPS    Inhale 1 capsule into the lungs    LIDOCAINE VISCOUS HCL (XYLOCAINE) 2 % SOLN SOLUTION METHYLPREDNISOLONE (MEDROL DOSEPACK) 4 MG TABLET    Take by mouth. MIDODRINE (PROAMATINE) 10 MG TABLET    Take 1 tablet by mouth 3 times daily (with meals)    NICOTINE (NICODERM CQ) 21 MG/24HR    Place 1 patch onto the skin daily    OXYCODONE-ACETAMINOPHEN (PERCOCET)  MG PER TABLET    Take 1 tablet by mouth every 6 hours as needed for Pain for up to 30 days. Intended supply: 30 days    OXYGEN    Inhale 2 L/min into the lungs    VITAMIN B-1 (THIAMINE) 100 MG TABLET    Take 1 tablet by mouth daily    VITAMIN D (ERGOCALCIFEROL) 75537 UNITS CAPS CAPSULE    cholecalciferol (vitamin D3) 50,000 unit capsule   Take 1 capsule every week by oral route. ALLERGIES     No known allergies    FAMILY HISTORY       Family History   Problem Relation Age of Onset    Colon Cancer Mother     High Blood Pressure Brother     Diabetes Brother           SOCIAL HISTORY       Social History     Socioeconomic History    Marital status:      Spouse name: None    Number of children: None    Years of education: None    Highest education level: None   Occupational History    None   Tobacco Use    Smoking status: Former Smoker     Packs/day: 2.00     Years: 54.00     Pack years: 108.00     Types: Cigarettes     Quit date: 2021     Years since quittin.2    Smokeless tobacco: Never Used    Tobacco comment: stopped 1 month ago   Substance and Sexual Activity    Alcohol use: Not Currently    Drug use: Never    Sexual activity: None   Other Topics Concern    None   Social History Narrative    None     Social Determinants of Health     Financial Resource Strain:     Difficulty of Paying Living Expenses: Not on file   Food Insecurity:     Worried About Running Out of Food in the Last Year: Not on file    Trisha of Food in the Last Year: Not on file   Transportation Needs:     Lack of Transportation (Medical): Not on file    Lack of Transportation (Non-Medical):  Not on file   Physical Activity:     Days of Exercise per Week: Not on file    Minutes of Exercise per Session: Not on file   Stress:     Feeling of Stress : Not on file   Social Connections:     Frequency of Communication with Friends and Family: Not on file    Frequency of Social Gatherings with Friends and Family: Not on file    Attends Yazidism Services: Not on file    Active Member of [address] [place] [address] [employer] or Organizations: Not on file    Attends Club or Organization Meetings: Not on file    Marital Status: Not on file   Intimate Partner Violence:     Fear of Current or Ex-Partner: Not on file    Emotionally Abused: Not on file    Physically Abused: Not on file    Sexually Abused: Not on file   Housing Stability:     Unable to Pay for Housing in the Last Year: Not on file    Number of [place] in the Last Year: Not on file    Unstable Housing in the Last Year: Not on file       SCREENINGS    [place] Coma Scale  Eye Opening: Spontaneous  Best Verbal Response: Oriented  Best Motor Response: Obeys commands  [place] Coma Scale Score: 15        PHYSICAL EXAM    (up to 7 for level 4, 8 or more for level 5)     ED Triage Vitals [02/11/22 1457]   BP Temp Temp Source Pulse Resp SpO2 Height Weight   (!) 91/37 97.8 °F (36.6 °C) Oral 101 18 (!) 69 % -- --       Physical Exam  Vitals and nursing note reviewed. Constitutional:       General: She is in acute distress. Appearance: She is well-developed. She is ill-appearing. She is not toxic-appearing or diaphoretic. HENT:      Head: Normocephalic and atraumatic. Eyes:      General: No scleral icterus. Right eye: No discharge. Left eye: No discharge. Pupils: Pupils are equal, round, and reactive to light. Cardiovascular:      Rate and Rhythm: Normal rate and regular rhythm. Pulmonary:      Effort: Tachypnea and accessory muscle usage present. Breath sounds: Decreased air movement present. No stridor. Abdominal:      General: There is no distension.    Musculoskeletal: General: No deformity. Normal range of motion. Cervical back: Normal range of motion. Skin:     General: Skin is warm and dry. Neurological:      Mental Status: She is alert and oriented to person, place, and time. GCS: GCS eye subscore is 4. GCS verbal subscore is 5. GCS motor subscore is 6. Cranial Nerves: No cranial nerve deficit. Motor: No abnormal muscle tone. Psychiatric:         Behavior: Behavior normal.         Thought Content: Thought content normal.         Judgment: Judgment normal.         DIAGNOSTIC RESULTS     EKG: All EKG's are interpreted by the Emergency Department Physician who either signs or Co-signs this chart in the absence of a cardiologist.    *  RADIOLOGY:   Non-plain film images such as CT, Ultrasound and MRI are read by the radiologist. Lynann Rafael images are visualized and preliminarily interpreted by the emergency physician with the below findings:      Interpretation per the Radiologist below, if available at the time of this note:    XR CHEST PORTABLE   Final Result   No significant change in persistent bilateral airspace opacities   compared to January 27.    Signed by Dr Mallory Clemens            ED BEDSIDE ULTRASOUND:   Performed by ED Physician - none    LABS:  Labs Reviewed   COVID-19, RAPID - Abnormal; Notable for the following components:       Result Value    SARS-CoV-2, NAAT DETECTED (*)     All other components within normal limits    Narrative:     [facility] Smack tel. ,  Microbiology results called to and read back by eddie adhikari, 02/11/2022  15:52, by LORE   BLOOD GAS, ARTERIAL - Abnormal; Notable for the following components:    pCO2, Arterial 61.0 (*)     pO2, Arterial 56.0 (*)     HCO3, Arterial 34.5 (*)     Base Excess, Arterial 7.4 (*)     Hemoglobin, Art, Extended 11.0 (*)     O2 Sat, Arterial 86.4 (*)     All other components within normal limits    Narrative:     CALL  Veliz  ALEKSANDER tel. ,  Kam ignacio rn er, 02/11/2022 15:24, by CHASITY   CBC WITH AUTO DIFFERENTIAL - Abnormal; Notable for the following components:    RBC 3.76 (*)     Hemoglobin 11.0 (*)     .7 (*)     MCHC 28.5 (*)     RDW 15.2 (*)     Platelets 605 (*)     MPV 13.4 (*)     Neutrophils % 85.9 (*)     Lymphocytes % 8.7 (*)     Lymphocytes Absolute 0.6 (*)     Macrocytes 1+ (*)     Hypochromia 1+ (*)     All other components within normal limits   COMPREHENSIVE METABOLIC PANEL W/ REFLEX TO MG FOR LOW K - Abnormal; Notable for the following components:    CO2 30 (*)     Glucose 145 (*)     CREATININE 0.4 (*)     Albumin 3.4 (*)     Alkaline Phosphatase 116 (*)     All other components within normal limits   APTT - Abnormal; Notable for the following components:    aPTT 39.9 (*)     All other components within normal limits   D-DIMER, QUANTITATIVE - Abnormal; Notable for the following components:    D-Dimer, Quant 1.63 (*)     All other components within normal limits   POTASSIUM, WHOLE BLOOD    Narrative:     CALL  [facility] tel. ,  Maria Luisa ignacio rn er, 02/11/2022 15:24, by CHASITY   PROTIME-INR   FIBRINOGEN   PROCALCITONIN   URINE RT REFLEX TO CULTURE   C-REACTIVE PROTEIN   LACTATE DEHYDROGENASE   FERRITIN   VITAMIN D 25 HYDROXY       All other labs were within normal range or not returned as of this dictation.     Medications   dexamethasone (DECADRON) 20 mg in sodium chloride 0.9 % IVPB (has no administration in time range)   morphine (PF) injection 2 mg (has no administration in time range)   LORazepam (ATIVAN) injection 1 mg (has no administration in time range)       EMERGENCY DEPARTMENT COURSE and DIFFERENTIALDIAGNOSIS/MDM:   Vitals:    Vitals:    02/11/22 1457 02/11/22 1600   BP: (!) 91/37 117/70   Pulse: 101 94   Resp: 18 25   Temp: 97.8 °F (36.6 °C)    TempSrc: Oral    SpO2: (!) 69% 96%       MDM    ED Course as of 02/11/22 1704   Fri Feb 11, 2022   1554 SARS-CoV-2, NAAT(!!): DETECTED [RYANN]   1618 Oxygen saturation in the 60s on nasal cannula oxygen on arrival here. Oxygen saturation has improved to the low 90s on nonrebreather. Patient positive for COVID-19. Unclear at this point now long symptoms have been present of COVID-19. She has had issues for several weeks off and on so could be just a few days into Covid illness or could be 2 weeks into illness [RYANN]      ED Course User Index  [RYANN] Severiano Buster., MD     Discussed with patient and she is adamant that she does not want to be intubated or put on a ventilator of her respiratory status were to worsen. Based on the evaluation and work-up here patient is felt to require further monitoring, work-up, or treatment that is available in the emergency department. Case was discussed with hospitalist who agrees for observation or admission for further management. Treatment and stabilization as necessary were provided in the emergency department prior to transfer of care to the medicine service. CONSULTS:  IP CONSULT TO PALLIATIVE CARE  IP CONSULT TO PHARMACY    PROCEDURES:  Unless otherwise notedbelow, none     Procedures      FINAL IMPRESSION     1. Acute on chronic respiratory failure with hypoxia and hypercapnia (HCC)    2. COPD with acute exacerbation ([facility] [address]    3. COVID-19 virus infection          DISPOSITION/PLAN   DISPOSITION Admitted 02/11/2022 04:31:42 PM      PATIENT REFERRED TO:  No follow-up provider specified.     DISCHARGE MEDICATIONS:  New Prescriptions    No medications on file          (Please note that portions of this note were completed with a voice recognition program.  Efforts were made to edit the dictations butoccasionally words are mis-transcribed.)    Severiano Buster, MD (electronically signed)  AttendingEmergency Physician          Severiano Buster., MD  02/11/22 9367

## 2022-02-11 NOTE — H&P
[id number] [facility]      Hospitalist - History & Physical      PCP: DOMINICK Hardin NP    Date of Admission: 2/11/2022    Date of Service: 2/11/2022    Chief Complaint:  Shortness of breath    History Of Present Illness: The patient is a 74 y.o. female who presented to [facility] ER with PMH COPD, home oxygen 4L NC, former smoker  complaining of shortness of breath. Patient states that she has been  Short of breath ongoing for the past few days and has been worsening. She states that she has been watching her oxygen saturation and it has been dropping down to the 60s at home. Endorses productive cough, generalized weaknes and fatigue. Denies fever, chills, nausea, vomiting, and chest pain. Patient was recently treated with antibiotics and steroids for pneumonia and COPD exacerbation but completed the course of those several days ago. Work up in [address] bilateral airspace opacities. Procalcitonin 0.07, CRP 3.96, D-Dimer 1.63, and ABG pH 7.36, pCO2 61, pO2 56 HCO3 34.5. Confirmed code status DNR. Patient is to be admitted to hospitalist service due to acute hypoxemic respiratory failure due to Covid-19. Past Medical History:        Diagnosis Date    Anxiety     Asthma     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     COPD (chronic obstructive pulmonary disease) (HCC)     Depression     Emphysema (subcutaneous) (surgical) resulting from a procedure     History of lung biopsy 05/16/2019    Malignant neoplasm of right main bronchus ([facility] [address] 03/2019    NSCLC, SCC [id number] stage IIIb    Palliative care patient 11/16/2021    Syncope     TIA (transient ischemic attack)        Past Surgical History:        Procedure Laterality Date    APPENDECTOMY      BACK SURGERY      times two    BLADDER SUSPENSION      CHOLECYSTECTOMY      HYSTERECTOMY      INCONTINENCE SURGERY         Home Medications:  Prior to Admission medications    Medication Sig Start Date End Date Taking?  Authorizing Provider oxyCODONE-acetaminophen (PERCOCET)  MG per tablet Take 1 tablet by mouth every 6 hours as needed for Pain for up to 30 days. Intended supply: 30 days 2/3/22 3/5/22  Baldomero Winchester MD   albuterol sulfate HFA (VENTOLIN HFA) 108 (90 Base) MCG/ACT inhaler Inhale 2 puffs into the lungs 4 times daily as needed for Wheezing 12/29/21   Niki Melgar MD   folic acid (FOLVITE) 1 MG tablet Take 1 tablet by mouth daily 12/12/21 1/11/22  Avery Carlson MD   methylPREDNISolone (MEDROL DOSEPACK) 4 MG tablet Take by mouth. 12/12/21   Avery Carlson MD   apixaban (ELIQUIS) 5 MG TABS tablet Take 1 tablet by mouth 2 times daily 12/7/21   David Jones MD   atorvastatin (LIPITOR) 40 MG tablet Take 1 tablet by mouth nightly 12/7/21   David Jones MD   nicotine (NICODERM CQ) 21 MG/24HR Place 1 patch onto the skin daily 12/8/21   David Jones MD   midodrine (PROAMATINE) 10 MG tablet Take 1 tablet by mouth 3 times daily (with meals) 12/7/21   David Jones MD   vitamin B-1 (THIAMINE) 100 MG tablet Take 1 tablet by mouth daily 12/7/21   David Jones MD   Cholecalciferol (VITAMIN D3) 55720 units CAPS Take 50,000 Units by mouth    Historical Provider, MD   Glycopyrrolate (SEEBRI NEOHALER) 15.6 MCG CAPS Inhale 1 capsule into the lungs 5/23/19   Historical Provider, MD   lidocaine viscous hcl (XYLOCAINE) 2 % SOLN solution  8/7/19   Historical Provider, MD   vitamin D (ERGOCALCIFEROL) 57974 units CAPS capsule cholecalciferol (vitamin D3) 50,000 unit capsule   Take 1 capsule every week by oral route. Historical Provider, MD   OXYGEN Inhale 2 L/min into the lungs    Historical Provider, MD   budesonide-formoterol (SYMBICORT) 160-4.5 MCG/ACT AERO Symbicort 160 mcg-4.5 mcg/actuation HFA aerosol inhaler   Inhale 2 puffs twice a day by inhalation route.     Historical Provider, MD   albuterol sulfate HFA (PROAIR HFA) 108 (90 Base) MCG/ACT inhaler ProAir HFA 90 mcg/actuation aerosol inhaler   Inhale 2 puffs every 4 hours by inhalation route as needed. Historical Provider, MD   aspirin EC 81 MG EC tablet Take 81 mg by mouth    Historical Provider, MD   calcium carbonate 600 MG TABS tablet Take 600 mg by mouth    Historical Provider, MD   citalopram (CELEXA) 20 MG tablet citalopram 20 mg tablet   Take 1 tablet every day by oral route. Historical Provider, MD       Allergies:    No known allergies    Social History:    The patient currently lives home  Tobacco:   reports that she quit smoking about 3 months ago. Her smoking use included cigarettes. She has a 108.00 pack-year smoking history. She has never used smokeless tobacco.  Alcohol:   reports previous alcohol use. Illicit Drugs: denies    Family History:      Problem Relation Age of Onset    Colon Cancer Mother     High Blood Pressure Brother     Diabetes Brother        Review of Systems:   Review of Systems   Constitutional: Positive for activity change, appetite change and fatigue. HENT: Positive for congestion. Respiratory: Positive for cough and shortness of breath. Cardiovascular: Negative. Gastrointestinal: Negative. Genitourinary: Negative. Musculoskeletal: Positive for myalgias. Neurological: Positive for weakness. 14 point review of systems is negative except as specifically addressed above. Physical Examination:  /70   Pulse 94   Temp 97.8 °F (36.6 °C) (Oral)   Resp 25   SpO2 96%   Physical Exam  Constitutional:       General: She is not in acute distress. Appearance: She is ill-appearing. HENT:      Mouth/Throat:      Mouth: Mucous membranes are dry. Pharynx: Oropharynx is clear. Eyes:      Extraocular Movements: Extraocular movements intact. Conjunctiva/sclera: Conjunctivae normal.      Pupils: Pupils are equal, round, and reactive to light. Cardiovascular:      Rate and Rhythm: Regular rhythm. Tachycardia present. Pulses: Normal pulses. Heart sounds: Normal heart sounds.  No murmur heard.      Pulmonary:      Effort: Tachypnea and accessory muscle usage present. No respiratory distress. Breath sounds: Rhonchi present. Chest:      Chest wall: No tenderness. Abdominal:      General: Bowel sounds are normal. There is no distension. Palpations: Abdomen is soft. Tenderness: There is no abdominal tenderness. There is no guarding or rebound. Musculoskeletal:         General: No swelling. Normal range of motion. Cervical back: Normal range of motion and neck supple. No rigidity or tenderness. Right lower leg: No edema. Left lower leg: No edema. Skin:     General: Skin is warm and dry. Capillary Refill: Capillary refill takes less than 2 seconds. Coloration: Skin is pale. Neurological:      General: No focal deficit present. Mental Status: She is alert and oriented to person, place, and time. Cranial Nerves: No cranial nerve deficit. Motor: Weakness present. Psychiatric:         Mood and Affect: Mood is anxious.          Behavior: Behavior normal.          Diagnostic Data:  CBC:  Recent Labs     02/11/22  1506   WBC 7.3   HGB 11.0*   HCT 38.6   *     BMP:  Recent Labs     02/11/22  1506 02/11/22  1523     --    K 3.7 3.7   CL 98  --    CO2 30*  --    BUN 17  --    CREATININE 0.4*  --    CALCIUM 9.0  --      Recent Labs     02/11/22  1506   AST 20   ALT 16   BILITOT <0.2   ALKPHOS 116*     Coag Panel:   Recent Labs     02/11/22  1506   INR 1.09   PROTIME 14.3   APTT 39.9*     ABGs:  Lab Results   Component Value Date    PHART 7.360 02/11/2022    PO2ART 56.0 02/11/2022    TXH1ZIC 61.0 02/11/2022     Urinalysis:  Lab Results   Component Value Date    NITRU Negative 11/15/2021    WBCUA 3 11/15/2021    BACTERIA NEGATIVE 11/15/2021    RBCUA 299 11/15/2021    BLOODU LARGE 11/15/2021    SPECGRAV 1.026 11/15/2021    GLUCOSEU Negative 11/15/2021     ABG:  Recent Labs     02/11/22  1523   PHART 7.360   MZK0HQT 61.0*   PO2ART 56.0* VTN6PAD 34.5*   BEART 7.4*   HGBAE 11.0*   Y7YSEWVA 86.4*   CARBOXHGBART 2.4       XR CHEST PORTABLE    Result Date: 2/11/2022  EXAM: XR CHEST PORTABLE INDICATION: Cough, shortness of air COMPARISON: 1/27/2022 FINDINGS: Cardiac silhouette is normal in size. RIGHT chest wall port catheter tip overlying the SVC. No significant change in LEFT mid and lower lung zone airspace opacity. RIGHT hilar and upper lobe opacities are similar to the prior study. No large pleural effusion or evidence of pneumothorax. No acute osseous finding. Lumbar spine postoperative change is noted. No significant change in persistent bilateral airspace opacities compared to January 27.  Signed by Dr Rolf Mora    Assessment/Plan:    Acute hypoxemic respiratory failure due to COVID-19 [facility])  - As needed pain medication   -As needed anxiety medication    - Decadron IV daily    - Robitussin as needed   - Bronchodilators   - Incentive spirometry   - Encourage deep breathing and cough   - Supplemental oxygen as needed   - Droplet plus isolation   -Monitor on telemetry    -Consultation to palliative care     Malignant neoplasm of upper lobe of right lung/Primary squamous cell carcinoma of right lung    -noted      DVT prophylactic: Eliquis    Signed:  DOMINICK Anthony - CNP, 2/11/2022 4:32 PM

## 2022-02-11 NOTE — PROGRESS NOTES
Results for Dolores Toure (MRN [id number]) as of 2/11/2022 15:25   Ref.  Range 2/11/2022 15:23   Hemoglobin, Art, Extended Latest Ref Range: 12.0 - 16.0 g/dL 11.0 (L)   pH, Arterial Latest Ref Range: 7.350 - 7.450  7.360   pCO2, Arterial Latest Ref Range: 35.0 - 45.0 mmHg 61.0 (H)   pO2, Arterial Latest Ref Range: 80.0 - 100.0 mmHg 56.0 (L)   HCO3, Arterial Latest Ref Range: 22.0 - 26.0 mmol/L 34.5 (H)   Base Excess, Arterial Latest Ref Range: -2.0 - 2.0 mmol/L 7.4 (H)   O2 Sat, Arterial Latest Ref Range: >92 % 86.4 (LL)   O2 Content, Arterial Latest Ref Range: Not Established mL/dL 13.4   Methemoglobin, Arterial Latest Ref Range: <1.5 % 0.7   Carboxyhgb, Arterial Latest Ref Range: 0.0 - 5.0 % 2.4   Pt on NRB, RR, AT+

## 2022-02-12 NOTE — PROGRESS NOTES
[place], [place], [address]    DEPARTMENT OF HOSPITALIST MEDICINE      PROGRESS  NOTE:    NOTE: Portions of this note have been copied forward, however, changed to reflect the most current clinical status of this patient. Patient:  Wil Hampton  YOB: 1955  Date of Service: 2/12/2022  MRN: [id number]   Acct: [de-identified]   Primary Care Physician: DOMINICK Coreas NP  Advance Directive: [facility]  Admit Date: 2/11/2022       Hospital Day: 1      CHIEF COMPLAINT:  Chief Complaint   Patient presents with    Shortness of Breath       SUBJECTIVE:  Patient seen and evaluated at bedside today. She feels weak and tired. She is requiring high flow oxygen. [address]  COURSE:    02/12/2022:  Patient requiring high flow oxygen via nonrebreather mask while maintaining her O2 sats around 90s. Patient started on gentle IV hydration with for hypotension. Will reevaluate patient later on with ABGs and respiratory evaluation if patient continues to require high flow oxygen with low O2 sats. 02/11/2022: History Of Present Illness: The patient is a 74 y.o. female who presented to [facility] ER with PMH COPD, home oxygen 4L NC, former smoker  complaining of shortness of breath. Patient states that she has been  Short of breath ongoing for the past few days and has been worsening. She states that she has been watching her oxygen saturation and it has been dropping down to the 60s at home. Endorses productive cough, generalized weaknes and fatigue. Denies fever, chills, nausea, vomiting, and chest pain.   Patient was recently treated with antibiotics and steroids for pneumonia and COPD exacerbation but completed the course of those several days ago. Work up in [address] bilateral airspace opacities. Procalcitonin 0.07, CRP 3.96, D-Dimer 1.63, and ABG pH 7.36, pCO2 61, pO2 56 HCO3 34.5. Confirmed code status DNR.   Patient is to be admitted to hospitalist service due to acute hypoxemic respiratory failure due to Covid-19. Past Medical History:      REVIEW  OF  SYSTEMS:    Constitutional:  No fevers, chills, nausea, vomiting, + tiredness and fatigue   Lungs:   No hemoptysis, pleurisy, +cough, +SOB   Heart:  No chest pressure with exertion, palpitations,    Abdomen:   No new masses, no bright red blood per rectum   Extremities:  + difficulty ambulation due to weakness, no new lesions   Neurologic: No new motor or sensory changes     14 point review of systems addressed with patient which is essentially negative except as specifically addressed above:    PAST MEDICAL HISTORY:  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     [facility]) [facility] [address]     Depression     Emphysema (subcutaneous) (surgical) resulting from a procedure     History of lung biopsy 05/16/2019    Malignant neoplasm of right main bronchus (HCC) 03/2019    NSCLC, SCC [id number] stage IIIb    Palliative care patient 11/16/2021    Syncope     TIA (transient ischemic attack)          PAST SURGICAL HISTORY:  Past Surgical History:   Procedure Laterality Date    APPENDECTOMY      BACK SURGERY      times two    BLADDER SUSPENSION      CHOLECYSTECTOMY      HYSTERECTOMY      INCONTINENCE SURGERY          FAMILY HISTORY:  Family History   Problem Relation Age of Onset    Colon Cancer Mother     High Blood Pressure Brother     Diabetes Brother            OBJECTIVE:  /74   Pulse 93   Temp 97.8 °F (36.6 °C) (Temporal)   Resp 18   Ht 5' 5\" (1.651 m)   Wt 101 lb 7 oz (46 kg)   SpO2 (!) 88%   BMI 16.88 kg/m²   No intake/output data recorded.     PHYSICAL  EXAMINATION:    DONAL:  Awake, alert, patient appears tired and fatigued   Head/Eyes:  Normocephalic, atraumatic, EOMI and PERRLA bilaterally   Respiratory:   Bilateral decreased air entry in both lung fields, coarse bilateral breath sounds, scattered bilateral rhonchi Cardiovascular:  Regular rate and rhythm, S1+S2+0, no murmurs/rubs   Abdomen:   Soft, non-tender, bowel sounds +ve, no organomegaly   Extremities: Moves all, decreased range of motion, no edema   Neurologic: Awake, alert, cranial nerves II-XII intact, no focal neurological deficits, sensory system intact   Psychiatric: Normal mood, non-suicidal       CURRENT MEDICATIONS:  Scheduled:   melatonin  5 mg Oral Nightly    vitamin C  500 mg Oral TID    zinc sulfate  50 mg Oral Daily    dexamethasone  20 mg IntraVENous Daily    albuterol sulfate HFA  2 puff Inhalation 4x daily    apixaban  5 mg Oral BID    aspirin EC  81 mg Oral Daily    atorvastatin  40 mg Oral Nightly    budesonide-formoterol  2 puff Inhalation BID    vitamin D  50,000 Units Oral Weekly    citalopram  20 mg Oral Daily    folic acid  1 mg Oral Daily    midodrine  10 mg Oral TID WC    nicotine  1 patch TransDERmal Daily    vitamin B-1  100 mg Oral Daily    baricitinib  4 mg Oral Daily        PRN:  guaiFENesin-dextromethorphan, 5 mL, Q4H PRN  oxyCODONE-acetaminophen, 1 tablet, Q6H PRN  morphine, 2 mg, Q4H PRN  LORazepam, 1 mg, Q4H PRN        Infusions:   sodium chloride 75 mL/hr at 02/12/22 0943       Laboratory Data:  Recent Labs     02/11/22  1506 02/12/22  0422   WBC 7.3 3.1*   HGB 11.0* 10.5*   * 120*     Recent Labs     02/11/22  1506 02/11/22  1523 02/12/22  0422     --  140   K 3.7 3.7 4.1   CL 98  --  100   CO2 30*  --  32*   BUN 17  --  13   CREATININE 0.4*  --  0.3*   GLUCOSE 145*  --  138*     Recent Labs     02/11/22  1506 02/12/22  0422   AST 20 17   ALT 16 13   BILITOT <0.2 <0.2   ALKPHOS 116* 104     Troponin T: No results for input(s): TROPONINI in the last 72 hours. Pro-BNP: No results for input(s): BNP in the last 72 hours.   INR:   Recent Labs     02/11/22  1506   INR 1.09     UA:No results for input(s): NITRITE, COLORU, PHUR, LABCAST, WBCUA, RBCUA, MUCUS, TRICHOMONAS, YEAST, BACTERIA, CLARITYU, SPECGRAV, LEUKOCYTESUR, UROBILINOGEN, BILIRUBINUR, BLOODU, GLUCOSEU, AMORPHOUS in the last 72 hours. Invalid input(s): Mel Abbott  A1C: No results for input(s): LABA1C in the last 72 hours. ABG:  Recent Labs     02/11/22  1523   PHART 7.360   [id number] 61.0*   PO2ART 56.0*   QUF1QLF 34.5*   BEART 7.4*   HGBAE 11.0*   R0BWVVEC 86.4*   CARBOXHGBART 2.4         Imaging:    XR CHEST PORTABLE    Result Date: 2/11/2022  No significant change in persistent bilateral airspace opacities compared to January 27. Signed by Dr Svetlana Celaya:    Principal Problem:    Acute hypoxemic respiratory failure due to COVID-19 [facility])  Active Problems:    Pneumonia due to COVID-19 virus    Palliative care patient    Severe malnutrition ([address]    Centrilobular emphysema ([facility] [address]    Malignant neoplasm of upper lobe of right lung ([facility] [address]    Primary squamous cell carcinoma of right lung (HCC)    Acute on chronic respiratory failure with hypoxia and hypercapnia (HCC)    Personal history of noncompliance with medical treatment and regimen  Resolved Problems:    * No resolved hospital problems. *      Admit patient to inpatient COVID-19 unit under full inpatient status  Droplet plus precautions and contact precautions ordered  Patient started on oxygen via nasal cannula to keep O2 sats between 92% and 94%  Patient started on IV dexamethasone 20 mg daily . .. Day # 2/10  Lovenox 30/40 units subcu twice daily ordered as per protocol  Patient started on adjuvant meds in the form of vitamin C, vitamin D, zinc, robitussin and melatonin  Patient started on albuterol and Symbicort inhalers  Pharmacy consult given for administration of antiviral COVID-19 antibiotics as per [facility]'s health care protocol  Patient tarted on oral Olumiant . ..  Day #2/14  Would consider pulmonary/ID evaluation for further treatment recommendations regarding COVID-19 treatment as needed  PT/OT consult given for evaluation and treatment  Case management consult given for DC planning    Patient started on gentle IV hydration for hypotension  Continue with oral midodrine for hypotension as well  Patient is DNR      Repeat labs in a.m. Electrolyte replacement as per protocol. Patient will be monitored very closely on the floor. Further recommendations as per the hospital course. Discharge Plan: To be determined as per the hospital course      Patient  is on DVT prophylaxis  Current medications reviewed  Lab work reviewed  Radiology/Chest x-ray films reviewed  Treatment recommendations from suspecialities reviewed, appreciated and agreed with  Discussed with the nurse and addressed all questions/concerns  Discussed with Patient and/or Family at the bedside in detail . .. they understand and agree with the management plan. Avery Carlson MD  2/12/2022 2:57 PM      DISCLAIMER: This note was created with electronic voice recognition which does have occasional errors. If you have any questions regarding the content within the note please do not hesitate to contact me. .. Thanks.

## 2022-02-12 NOTE — PLAN OF CARE
Nutrition Problem #1: Inadequate energy intake,In context of chronic illness  Intervention: Food and/or Nutrient Delivery: Continue Current Diet,Start Oral Nutrition Supplement  Nutritional Goals: PO >/= 50% meals and ONS. Wt stable. Maintain skin integrity.

## 2022-02-12 NOTE — PROGRESS NOTES
Aidee Cates arrived to room # 428. Presented with: COVID  Mental Status: Patient is oriented and alert. Vitals:    02/11/22 1815   BP: 126/69   Pulse: 93   Resp: 22   Temp: 97.6 °F (36.4 °C)   SpO2: 90%     Patient safety contract and falls prevention contract reviewed with patient Yes. Oriented Patient to room. Call light within reach. Yes.   Needs, issues or concerns expressed at this time: no.      Electronically signed by Jose Luis Bejarano RN on 2/11/2022 at 6:29 PM

## 2022-02-12 NOTE — PROGRESS NOTES
Results for Gia Silva (MRN [id number]) as of 2/12/2022 15:00   Ref. Range 2/12/2022 14:59   Hemoglobin, Art, Extended Latest Ref Range: 12.0 - 16.0 g/dL 11.1 (L)   pH, Arterial Latest Ref Range: 7.350 - 7.450  7.380   pCO2, Arterial Latest Ref Range: 35.0 - 45.0 mmHg 63.0 (H)   pO2, Arterial Latest Ref Range: 80.0 - 100.0 mmHg 60.0 (L)   HCO3, Arterial Latest Ref Range: 22.0 - 26.0 mmol/L 37.3 (H)   Base Excess, Arterial Latest Ref Range: -2.0 - 2.0 mmol/L 10.2 (H)   O2 Sat, Arterial Latest Ref Range: >92 % 89.2 (L)   O2 Content, Arterial Latest Ref Range: Not Established mL/dL 14.0   Methemoglobin, Arterial Latest Ref Range: <1.5 % 1.0   Carboxyhgb, Arterial Latest Ref Range: 0.0 - 5.0 % 1.9     Patient on 15 l/m high-flow cannula plus 100% NRB mask. ABG drawn from [address].

## 2022-02-12 NOTE — PROGRESS NOTES
Care Progression Rounds Note  Date: 11/10/2021  Time: 1:53 PM     Patient Name: Eulalio Gregg     Medical Record Number: [id number]   YOB: 1945  Sex: Male      Room/Bed: 0202    Admitting Diagnosis: Weakness [R53.1]  Elevated serum creatinine [R79.89]  Urinary tract infection associated with catheterization of urinary tract, unspecified indwelling urinary catheter type, initial encounter (CMS/[facility]) [T83.511A, N39.0]   Admit Date/Time: 10/31/2021 12:15 PM    Primary Diagnosis: Bacteremia due to Klebsiella pneumoniae  Principal Problem: Bacteremia due to Klebsiella pneumoniae    GMLOS: pending  Anticipated Discharge Date: 11/10/2021    AM-PAC:  Mobility Score: 19    Discharge Planning:  Current Living Arrangements: home  Anticipated Discharge Disposition: skilled nursing facility  Type of Skilled Nursing Care Services: OT,PT    Barriers to Discharge:  Barriers to Discharge: Facility availability  Comment: needs facility accepting covid patients and iv antibx    Participants:  ,physician,nursing,social work/services   Comprehensive Nutrition Assessment    Type and Reason for Visit:  Positive Nutrition Screen    Nutrition Recommendations/Plan: Start Ensure Compact with all meals. Nutrition Assessment:  Pt severely malnourished on admit AEB poor PO intake and BMI. At risk for further compromise d/t COVID in the setting of chronic pulmonary disease. Will monitor nutrition progression and POC. Will initiate ONS. Malnutrition Assessment:  Malnutrition Status: At risk for malnutrition (Comment) (Suspect severe malnutrition; however, limited data available and barriers to obtaining information at this time due to COVID isolation)    Context:  Chronic Illness     Findings of the 6 clinical characteristics of malnutrition:    Estimated Daily Nutrient Needs:  Energy (kcal):  3009-1439; Weight Used for Energy Requirements:  Current     Protein (g):  92; Weight Used for Protein Requirements:  Current (2g/kg; COPD, underweight, COVID)        Fluid (ml/day):  3978-8296; Method Used for Fluid Requirements:  ml/Kg (25-30 mL/kg)      Nutrition Related Findings:  Unable to assess d/t COVID isolation. RN flowsheets reviewed. Wounds:  None (Bruising noted per RN flowsheet)       Current Nutrition Therapies:    ADULT DIET;  Regular    Anthropometric Measures:  · Height: 5' 5\" (165.1 cm)  · Current Body Weight: 101 lb 7 oz (46 kg)   · Admission Body Weight: 101 lb 7 oz (46 kg)    · Usual Body Weight:       · Ideal Body Weight: 125 lbs; % Ideal Body Weight 81.2 %   · BMI: 16.9  · BMI Categories: Underweight (BMI less than 22) age over 72       Nutrition Diagnosis:   · Inadequate energy intake,In context of chronic illness related to psychological cause or life stress,impaired respiratory function,other (comment),catabolic illness (pulmonary cachexia suspected, hx of cancer) as evidenced by intake 26-50%,poor intake prior to admission,BMI      Nutrition Interventions:   Food and/or Nutrient Delivery:  Continue Current Diet,Start Oral Nutrition Supplement  Nutrition Education/Counseling:  Education not indicated   Coordination of Nutrition Care:  Continue to monitor while inpatient    Goals:  PO >/= 50% meals and ONS. Wt stable. Maintain skin integrity. Nutrition Monitoring and Evaluation:   Behavioral-Environmental Outcomes:      Food/Nutrient Intake Outcomes:  Food and Nutrient Intake  Physical Signs/Symptoms Outcomes:  Skin,Weight     Discharge Planning:     Too soon to determine     Electronically signed by Johnnie Carreno MS, RD, LD on 2/12/22 at 12:02 PM CST    Contact: [phone]

## 2022-02-13 NOTE — PROGRESS NOTES
[place], [place], [address]    DEPARTMENT OF HOSPITALIST MEDICINE      PROGRESS  NOTE:    NOTE: Portions of this note have been copied forward, however, changed to reflect the most current clinical status of this patient. Patient:  Tate Lomax  YOB: 1955  Date of Service: 2/13/2022  MRN: [id number]   Acct: [de-identified]   Primary Care Physician: DOMINICK Kendrick NP  Advance Directive: [facility]  Admit Date: 2/11/2022       Hospital Day: 2      CHIEF COMPLAINT:  Chief Complaint   Patient presents with    Shortness of Breath       SUBJECTIVE:  Patient is feeling a little better today. Still on high flow oxygen. She gets anxious at times! [address]  COURSE:    02/13/2022:  Patient still requiring high flow 15 L oxygen to keep O2 sats in 90s. CRP is slowly downtrending. Patient started on low-dose Xanax for anxiety. 02/12/2022:  Patient requiring high flow oxygen via nonrebreather mask while maintaining her O2 sats around 90s. Patient started on gentle IV hydration with for hypotension. Will reevaluate patient later on with ABGs and respiratory evaluation if patient continues to require high flow oxygen with low O2 sats. 02/11/2022: History Of Present Illness: The patient is a 74 y.o. female who presented to [facility] ER with PMH COPD, home oxygen 4L NC, former smoker  complaining of shortness of breath. Patient states that she has been  Short of breath ongoing for the past few days and has been worsening. She states that she has been watching her oxygen saturation and it has been dropping down to the 60s at home. Endorses productive cough, generalized weaknes and fatigue. Denies fever, chills, nausea, vomiting, and chest pain.   Patient was recently treated with antibiotics and steroids for pneumonia and COPD exacerbation but completed the course of those several days ago. Work up in [address] bilateral airspace opacities.  Procalcitonin 0.07, CRP 3.96, D-Dimer 1.63, and ABG pH 7.36, pCO2 61, pO2 56 HCO3 34.5. Confirmed code status DNR. Patient is to be admitted to hospitalist service due to acute hypoxemic respiratory failure due to Covid-19. Past Medical History:      REVIEW  OF  SYSTEMS:    Constitutional:  No fevers, chills, nausea, vomiting, + tiredness and fatigue   Lungs:   No hemoptysis, pleurisy, +cough, +SOB   Heart:  No chest pressure with exertion, palpitations,    Abdomen:   No new masses, no bright red blood per rectum   Extremities:  + difficulty ambulation due to weakness, no new lesions   Neurologic: No new motor or sensory changes     14 point review of systems addressed with patient which is essentially negative except as specifically addressed above:    PAST MEDICAL HISTORY:  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     [facility] obstructive [facility] disease) ([facility] [address]     Depression     Emphysema (subcutaneous) (surgical) resulting from a procedure     History of lung biopsy 05/16/2019    Malignant neoplasm of right main bronchus ([facility] [address] 03/2019    NSCLC, SCC [id number] stage IIIb    Palliative care patient 11/16/2021    Syncope     TIA (transient ischemic attack)          PAST SURGICAL HISTORY:  Past Surgical History:   Procedure Laterality Date    APPENDECTOMY      BACK SURGERY      times two    BLADDER SUSPENSION      CHOLECYSTECTOMY      HYSTERECTOMY      INCONTINENCE SURGERY          FAMILY HISTORY:  Family History   Problem Relation Age of Onset    Colon Cancer Mother     High Blood Pressure Brother     Diabetes Brother            OBJECTIVE:  /88   Pulse 92   Temp 97 °F (36.1 °C) (Temporal)   Resp 20   Ht 5' 5\" (1.651 m)   Wt 101 lb 7 oz (46 kg)   SpO2 91%   BMI 16.88 kg/m²   No intake/output data recorded.     PHYSICAL  EXAMINATION:    DONAL:  Awake, alert, patient appears tired and fatigued   Head/Eyes:  Normocephalic, atraumatic, EOMI and PERRLA bilaterally   Respiratory:   Bilateral decreased air entry in both lung fields, coarse bilateral breath sounds, scattered bilateral rhonchi    Cardiovascular:  Regular rate and rhythm, S1+S2+0, no murmurs/rubs   Abdomen:   Soft, non-tender, bowel sounds +ve, no organomegaly   Extremities: Moves all, decreased range of motion, no edema   Neurologic: Awake, alert, cranial nerves II-XII intact, no focal neurological deficits, sensory system intact   Psychiatric: Normal mood, non-suicidal       CURRENT MEDICATIONS:  Scheduled:   dexamethasone  10 mg IntraVENous Q12H    melatonin  5 mg Oral Nightly    vitamin C  500 mg Oral TID    zinc sulfate  50 mg Oral Daily    albuterol sulfate HFA  2 puff Inhalation 4x daily    apixaban  5 mg Oral BID    aspirin EC  81 mg Oral Daily    atorvastatin  40 mg Oral Nightly    budesonide-formoterol  2 puff Inhalation BID    vitamin D  50,000 Units Oral Weekly    citalopram  20 mg Oral Daily    folic acid  1 mg Oral Daily    midodrine  10 mg Oral TID WC    nicotine  1 patch TransDERmal Daily    vitamin B-1  100 mg Oral Daily    baricitinib  4 mg Oral Daily        PRN:  ALPRAZolam, 0.25 mg, 4x Daily PRN  guaiFENesin-dextromethorphan, 5 mL, Q4H PRN  oxyCODONE-acetaminophen, 1 tablet, Q6H PRN  morphine, 2 mg, Q4H PRN  LORazepam, 1 mg, Q4H PRN        Infusions:      Laboratory Data:  Recent Labs     02/11/22  1506 02/12/22  0422 02/13/22  0340   WBC 7.3 3.1* 5.7   HGB 11.0* 10.5* 10.9*   * 120* 149     Recent Labs     02/11/22  1506 02/11/22  1523 02/12/22  0422 02/12/22  1459 02/13/22  0340     --  140  --  145   K 3.7   < > 4.1 3.8 4.2   CL 98  --  100  --  103   CO2 30*  --  32*  --  31*   BUN 17  --  13  --  23   CREATININE 0.4*  --  0.3*  --  0.3*   GLUCOSE 145*  --  138*  --  174*    < > = values in this interval not displayed.      Recent Labs     02/11/22  1506 02/12/22  0422   AST 20 17   ALT 16 13   BILITOT <0.2 <0.2   ALKPHOS 116* 104     Troponin T: No results for input(s): TROPONINI in the last 72 hours. Pro-BNP: No results for input(s): BNP in the last 72 hours. INR:   Recent Labs     02/11/22  1506   INR 1.09     UA:No results for input(s): NITRITE, COLORU, PHUR, LABCAST, WBCUA, RBCUA, MUCUS, TRICHOMONAS, YEAST, BACTERIA, CLARITYU, SPECGRAV, LEUKOCYTESUR, UROBILINOGEN, BILIRUBINUR, BLOODU, GLUCOSEU, AMORPHOUS in the last 72 hours. Invalid input(s): [place]avide Rhina  A1C: No results for input(s): LABA1C in the last 72 hours. ABG:  Recent Labs     02/12/22  1459   PHART 7.380   [id number] 63.0*   PO2ART 60.0*   EVR9NVO 37.3*   BEART 10.2*   HGBAE 11.1*   [id number] 89.2*   CARBOXHGBART 1.9         Imaging:    XR CHEST PORTABLE    Result Date: 2/11/2022  No significant change in persistent bilateral airspace opacities compared to January 27. Signed by Dr Dylon Capps:    Principal Problem:    Acute hypoxemic respiratory failure due to COVID-19 [facility])  Active Problems:    Pneumonia due to COVID-19 virus    Palliative care patient    Severe malnutrition ([address]    Centrilobular emphysema ([facility] [address]    Malignant neoplasm of upper lobe of right lung ([facility] [address]    Primary squamous cell carcinoma of right lung (HCC)    Acute on chronic respiratory failure with hypoxia and hypercapnia (HCC)    Personal history of noncompliance with medical treatment and regimen  Resolved Problems:    * No resolved hospital problems. *      Admit patient to inpatient COVID-19 unit under full inpatient status  Droplet plus precautions and contact precautions ordered  Patient started on oxygen via nasal cannula to keep O2 sats between 92% and 94%  IV dexamethasone switched to 10 mg IV twice daily. ..  Day # 3/10  Lovenox 30/40 units subcu twice daily ordered as per protocol  Patient started on adjuvant meds in the form of vitamin C, vitamin D, zinc, robitussin and melatonin  Patient started on albuterol and Symbicort inhalers  Pharmacy consult given for administration of antiviral COVID-19 antibiotics as per [facility]'s health care protocol  Patient started on oral Olumiant . .. Day #3/14  Would consider pulmonary/ID evaluation for further treatment recommendations regarding COVID-19 treatment as needed  PT/OT consult given for evaluation and treatment  Case management consult given for DC planning    Patient started on gentle IV hydration for hypotension  Continue with oral midodrine for hypotension as well  Patient is DNR      Repeat labs in a.m. Electrolyte replacement as per protocol. Patient will be monitored very closely on the floor. Further recommendations as per the hospital course. Discharge Plan: To be determined as per the hospital course      Patient  is on DVT prophylaxis  Current medications reviewed  Lab work reviewed  Radiology/Chest x-ray films reviewed  Treatment recommendations from suspecialities reviewed, appreciated and agreed with  Discussed with the nurse and addressed all questions/concerns  Discussed with Patient and/or Family at the bedside in detail . .. they understand and agree with the management plan. Avery Carlson MD  2/13/2022 2:35 PM      DISCLAIMER: This note was created with electronic voice recognition which does have occasional errors. If you have any questions regarding the content within the note please do not hesitate to contact me. .. Thanks.

## 2022-02-14 NOTE — PROGRESS NOTES
[place]bara, [facility], [address]    DEPARTMENT OF HOSPITALIST MEDICINE      PROGRESS  NOTE:    NOTE: Portions of this note have been copied forward, however, changed to reflect the most current clinical status of this patient. Patient:  Elver Mariee  YOB: 1955  Date of Service: 2/14/2022  MRN: [id number]   Acct: [de-identified]   Primary Care Physician: ODMINICK Kumar NP  Advance Directive: [facility]  Admit Date: 2/11/2022       Hospital Day: 3      CHIEF COMPLAINT:  Chief Complaint   Patient presents with    Shortness of Breath       SUBJECTIVE:  Patient seen and evaluated at bedside. Still on full oxygen. Anxiety is under control. I sang happy birthday for her in the room! [address]  COURSE:    02/14/2022:  Patient is still on 15 L Oxygen via high flow nasal cannula. LTAC transfer initiated by case management. 02/13/2022:  Patient still requiring high flow 15 L oxygen to keep O2 sats in 90s. CRP is slowly downtrending. Patient started on low-dose Xanax for anxiety. 02/12/2022:  Patient requiring high flow oxygen via nonrebreather mask while maintaining her O2 sats around 90s. Patient started on gentle IV hydration with for hypotension. Will reevaluate patient later on with ABGs and respiratory evaluation if patient continues to require high flow oxygen with low O2 sats. 02/11/2022: History Of Present Illness: The patient is a 74 y.o. female who presented to [facility] ER with PMH COPD, home oxygen 4L NC, former smoker  complaining of shortness of breath. Patient states that she has been  Short of breath ongoing for the past few days and has been worsening. She states that she has been watching her oxygen saturation and it has been dropping down to the 60s at home. Endorses productive cough, generalized weaknes and fatigue.  Denies fever, chills, nausea, vomiting, and chest pain.   Patient was recently treated with antibiotics and steroids for pneumonia and COPD exacerbation but completed the course of those several days ago. Work up in [address] bilateral airspace opacities. Procalcitonin 0.07, CRP 3.96, D-Dimer 1.63, and ABG pH 7.36, pCO2 61, pO2 56 HCO3 34.5. Confirmed code status DNR. Patient is to be admitted to hospitalist service due to acute hypoxemic respiratory failure due to Covid-19. Past Medical History:      REVIEW  OF  SYSTEMS:    Constitutional:  No fevers, chills, nausea, vomiting, + tiredness and fatigue   Lungs:   No hemoptysis, pleurisy, +cough, +SOB   Heart:  No chest pressure with exertion, palpitations,    Abdomen:   No new masses, no bright red blood per rectum   Extremities:  + difficulty ambulation due to weakness, no new lesions   Neurologic: No new motor or sensory changes     14 point review of systems addressed with patient which is essentially negative except as specifically addressed above:    PAST MEDICAL HISTORY:  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     COPD (chronic obstructive pulmonary disease) ([address]     Depression     Emphysema (subcutaneous) (surgical) resulting from a procedure     History of lung biopsy 05/16/2019    Malignant neoplasm of right main bronchus ([address] 03/2019    NSCLC, SCC [id number] stage IIIb    Palliative care patient 11/16/2021    Syncope     TIA (transient ischemic attack)          PAST SURGICAL HISTORY:  Past Surgical History:   Procedure Laterality Date    APPENDECTOMY      BACK SURGERY      times two    BLADDER SUSPENSION      CHOLECYSTECTOMY      HYSTERECTOMY      INCONTINENCE SURGERY          FAMILY HISTORY:  Family History   Problem Relation Age of Onset    Colon Cancer Mother     High Blood Pressure Brother     Diabetes Brother            OBJECTIVE:  /83   Pulse 97   Temp 97.2 °F (36.2 °C) (Temporal)   Resp 18   Ht 5' 5\" (1.651 m)   Wt 101 lb 7 oz (46 kg)   SpO2 93%   BMI 16.88 kg/m²   I/O this shift:   In: 240 [P.O.:240]  Out: -     PHYSICAL  EXAMINATION:    DONAL:  Awake, alert, patient appears tired and fatigued   Head/Eyes:  Normocephalic, atraumatic, EOMI and PERRLA bilaterally   Respiratory:   Bilateral decreased air entry in both lung fields, coarse bilateral breath sounds, scattered bilateral rhonchi    Cardiovascular:  Regular rate and rhythm, S1+S2+0, no murmurs/rubs   Abdomen:   Soft, non-tender, bowel sounds +ve, no organomegaly   Extremities: Moves all, decreased range of motion, no edema   Neurologic: Awake, alert, cranial nerves II-XII intact, no focal neurological deficits, sensory system intact   Psychiatric: Normal mood, non-suicidal       CURRENT MEDICATIONS:  Scheduled:   dexamethasone  10 mg IntraVENous Q12H    melatonin  5 mg Oral Nightly    vitamin C  500 mg Oral TID    zinc sulfate  50 mg Oral Daily    albuterol sulfate HFA  2 puff Inhalation 4x daily    apixaban  5 mg Oral BID    aspirin EC  81 mg Oral Daily    atorvastatin  40 mg Oral Nightly    budesonide-formoterol  2 puff Inhalation BID    vitamin D  50,000 Units Oral Weekly    citalopram  20 mg Oral Daily    folic acid  1 mg Oral Daily    midodrine  10 mg Oral TID WC    nicotine  1 patch TransDERmal Daily    vitamin B-1  100 mg Oral Daily    baricitinib  4 mg Oral Daily        PRN:  ALPRAZolam, 0.25 mg, 4x Daily PRN  guaiFENesin-dextromethorphan, 5 mL, Q4H PRN  oxyCODONE-acetaminophen, 1 tablet, Q6H PRN  morphine, 2 mg, Q4H PRN  LORazepam, 1 mg, Q4H PRN        Infusions:      Laboratory Data:  Recent Labs     02/12/22 0422 02/13/22 0340   WBC 3.1* 5.7   HGB 10.5* 10.9*   * 149     Recent Labs     02/12/22  0422 02/12/22  1459 02/13/22  0340 02/14/22  0953     --  145 142   K 4.1 3.8 4.2 4.1     --  103 100   CO2 32*  --  31* 32*   BUN 13  --  23 27*   CREATININE 0.3*  --  0.3* 0.2*   GLUCOSE 138*  --  174* 163*     Recent Labs     02/12/22 0422   AST 17   ALT 13   BILITOT <0.2   ALKPHOS 104     Troponin T: No results for input(s): TROPONINI in the last 72 hours. Pro-BNP: No results for input(s): BNP in the last 72 hours. INR:   No results for input(s): INR in the last 72 hours. UA:No results for input(s): NITRITE, COLORU, PHUR, LABCAST, WBCUA, RBCUA, MUCUS, TRICHOMONAS, YEAST, BACTERIA, CLARITYU, SPECGRAV, LEUKOCYTESUR, UROBILINOGEN, BILIRUBINUR, BLOODU, GLUCOSEU, AMORPHOUS in the last 72 hours. Invalid input(s): [place]  A1C: No results for input(s): LABA1C in the last 72 hours. ABG:  Recent Labs     02/12/22  1459   PHART 7.380   VIQ7OUE 63.0*   PO2ART 60.0*   QZG1QMT 37.3*   BEART 10.2*   HGBAE 11.1*   U1ATXJII 89.2*   CARBOXHGBART 1.9         Imaging:    XR CHEST PORTABLE    Result Date: 2/11/2022  No significant change in persistent bilateral airspace opacities compared to January 27. Signed by Dr Lockhart Halo:    Principal Problem:    Acute hypoxemic respiratory failure due to COVID-19 [facility])  Active Problems:    Pneumonia due to COVID-19 virus    Palliative care patient    Severe malnutrition ([address]    Centrilobular emphysema ([facility]    Malignant neoplasm of upper lobe of right lung ([facility]    Primary squamous cell carcinoma of right lung (HCC)    Acute on chronic respiratory failure with hypoxia and hypercapnia (HCC)    Personal history of noncompliance with medical treatment and regimen  Resolved Problems:    * No resolved hospital problems. *      Admit patient to inpatient COVID-19 unit under full inpatient status  Droplet plus precautions and contact precautions ordered  Patient started on oxygen via nasal cannula to keep O2 sats between 92% and 94%  IV dexamethasone switched to 10 mg IV twice daily. ..  Day # 4/10  Lovenox 30/40 units subcu twice daily ordered as per protocol  Patient started on adjuvant meds in the form of vitamin C, vitamin D, zinc, robitussin and melatonin  Patient started on albuterol and Symbicort inhalers  Pharmacy consult given for administration of

## 2022-02-14 NOTE — CARE COORDINATION
Initial CM Assessment    Initial Assessment Completed at bedside with:    []   Patient  [x]   Family/Caregiver/Guardian - son    []   Other:      Patient Contact Information:  [address] [place]  [phone] (home)   Above information verified? [x]   Yes  []   No     ADLS:   Independent   Support System:   [address] Members  Plan to return to current housing:   [x]   Yes  []   No    Transportation plan for Discharge:  Son     Do you have any unmet social needs that would keep you from returning home safely:  []   Yes  [x]   No              Unmet Social Needs Notes:       Had [address] [facility] prior to admission:    [x]   Yes  []   No    Oxygen Company:    [employer]     Has a pulse oximetry unit at home:   [x]   Yes  []   No    Informed of need to bring portable home O2 tank to hospital on day of DISCHARGE:    [x]   Yes  []   No    Currently ACTIVE with [address]:    []   Yes  [x]   No  []   Interested at discharge  [facility]:      Current PCP:  DOMINICK Munzo NP  PCP verified? [x]   Yes  []   No    Have you been vaccinated for COVID-19 (SARS-CoV-2)? []   Yes  [x]   No                   If so, when? Which :  []   Pfizer-BioNTech  []   Moderna  []   Durel Pastures  []   Other:       Pharmacy:    Jessika Knutson  [phone] [facility] [phone]  [facility]  [facility]  Phone: [phone] Fax: [phone]    [facility] [address] [facility], [address] [place] [address] [phone] Hurman Klinefelter [phone]  [address] [address] [address]  Phone: [phone] Fax: [phone]    Prefer to use Meds to Bed? []   Yes  [x]   No  Potential assistance purchasing medications?      []   Yes  [x]   No    Active with HD/PD prior to admission:           []   Yes  [x]   No  HD Center:       Financial:  Payor: Gladys Butts / Plan: [place] ESSENTIAL/PLUS / Product Type: *No Product type* /     Pre-Cert required for SNF:   [x]   Yes  []   No    Patient Deficits:  []   Yes   [x]   No    If yes:  []   Confusion/Memory  []   Visual  []   Motor/Sensory         []   Right arm         []   Right leg         []   Left arm         []   Left leg  []   Language/Speech         []   Aphasia         []   Dysarthria         []   Swallow         Speculator Coma Scale  Eye Opening: Spontaneous  Best Verbal Response: Oriented  Best Motor Response: Obeys commands  Kurtis Coma Scale Score: 15    Patient Deficit Notes:        Additional CM/SW Notes:       Vi and/or her family were provided with choice of provider:  [x]   Yes   []   No      Patient Admission Status:   Inpatient [101]    [address] [facility] Management

## 2022-02-14 NOTE — PLAN OF CARE
Problem: Airway Clearance - Ineffective  Goal: Achieve or maintain patent airway  2/14/2022 0326 by Aleta Najjar, RN  Outcome: Ongoing  2/13/2022 1438 by Aislinn Velasquez RN  Outcome: Ongoing     Problem: Gas Exchange - Impaired  Goal: Absence of hypoxia  2/14/2022 0326 by Aleta Najjar, RN  Outcome: Ongoing  2/13/2022 1438 by Aislinn Velasquez RN  Outcome: Ongoing  Goal: Promote optimal lung function  2/14/2022 0326 by Aleta Najjar, RN  Outcome: Ongoing  2/13/2022 1438 by Aislinn Velasquez RN  Outcome: Ongoing     Problem: Breathing Pattern - Ineffective  Goal: Ability to achieve and maintain a regular respiratory rate  2/14/2022 0326 by Aleta Najjar, RN  Outcome: Ongoing  2/13/2022 1438 by Aislinn Velasquez RN  Outcome: Ongoing     Problem:  Body Temperature -  Risk of, Imbalanced  Goal: Ability to maintain a body temperature within defined limits  2/14/2022 0326 by Aleta Najjar, RN  Outcome: Ongoing  2/13/2022 1438 by Aislinn Velasquez RN  Outcome: Ongoing  Goal: Will regain or maintain usual level of consciousness  2/14/2022 0326 by Aleta Najjar, RN  Outcome: Ongoing  2/13/2022 1438 by Aislinn Velasquez RN  Outcome: Ongoing  Goal: Complications related to the disease process, condition or treatment will be avoided or minimized  2/14/2022 0326 by Aleta Najjar, RN  Outcome: Ongoing  2/13/2022 1438 by Aislinn Velasquez RN  Outcome: Ongoing     Problem: Isolation Precautions - Risk of Spread of Infection  Goal: Prevent transmission of infection  2/14/2022 0326 by Aleta Najjar, RN  Outcome: Ongoing  2/13/2022 1438 by Aislinn Velasquez RN  Outcome: Ongoing     Problem: Nutrition Deficits  Goal: Optimize nutritional status  2/14/2022 0326 by Aleta Najjar, RN  Outcome: Ongoing  2/13/2022 1438 by Aislinn Velasquez RN  Outcome: Ongoing     Problem: Risk for Fluid Volume Deficit  Goal: Maintain normal heart rhythm  2/14/2022 0326 by Leonor Najjar, RN  Outcome: Ongoing  2/13/2022 1438 by Aislinn Velasquez, RN  Outcome: Ongoing  Goal: Maintain absence of muscle cramping  2/14/2022 0326 by Mary Castro RN  Outcome: Ongoing  2/13/2022 1438 by Marta Mariee RN  Outcome: Ongoing  Goal: Maintain normal serum potassium, sodium, calcium, phosphorus, and pH  2/14/2022 0326 by Mary Castro RN  Outcome: Ongoing  2/13/2022 1438 by Marta Mariee RN  Outcome: Ongoing     Problem: Loneliness or Risk for Loneliness  Goal: Demonstrate positive use of time alone when socialization is not possible  2/14/2022 0326 by Mary Castro RN  Outcome: Ongoing  2/13/2022 1438 by Marta Mariee RN  Outcome: Ongoing     Problem: Fatigue  Goal: Verbalize increase energy and improved vitality  2/14/2022 0326 by Mary Castro RN  Outcome: Ongoing  2/13/2022 1438 by Marta Mariee RN  Outcome: Ongoing     Problem: Patient Education: Go to Patient Education Activity  Goal: Patient/Family Education  2/14/2022 0326 by Mary Castro RN  Outcome: Ongoing  2/13/2022 1438 by Marta Mariee RN  Outcome: Ongoing     Problem: Falls - Risk of:  Goal: Will remain free from falls  Description: Will remain free from falls  2/14/2022 0326 by Mary Castro RN  Outcome: Ongoing  2/13/2022 1438 by Marta Mariee RN  Outcome: Ongoing  Goal: Absence of physical injury  Description: Absence of physical injury  2/14/2022 0326 by Mary Castro RN  Outcome: Ongoing  2/13/2022 1438 by Marta Mariee RN  Outcome: Ongoing     Problem: Nutrition  Goal: Optimal nutrition therapy  2/14/2022 0326 by Mary Castro RN  Outcome: Ongoing  2/13/2022 1438 by Marta Mariee RN  Outcome: Ongoing     Problem: Pain:  Description: Pain management should include both nonpharmacologic and pharmacologic interventions.   Goal: Pain level will decrease  Description: Pain level will decrease  2/14/2022 0326 by Mary Castro RN  Outcome: Ongoing  2/13/2022 1438 by Marta Mariee RN  Outcome: Ongoing  Goal: Control of acute pain  Description: Control of acute pain  2/14/2022 0326 by Barry Estrada RN  Outcome: Ongoing  2/13/2022 1438 by Sweta Rodriguez RN  Outcome: Ongoing  Goal: Control of chronic pain  Description: Control of chronic pain  2/14/2022 0326 by Barry Estrada RN  Outcome: Ongoing  2/13/2022 1438 by Sweta Rodriguez RN  Outcome: Ongoing

## 2022-02-14 NOTE — PROGRESS NOTES
Palliative Care/Spiritual Care: Spoke with pt's son Wallene Klinefelter to initiate palliative care. Pt is known to palliative care and has multiple co morbidities. She recently had several hospitalizations for pneumonia and only tested positive for Covid. Son says he thinks she had Covid prior to getting a positive. Pt has COPD, emphysema, a lung mass and bronchial cancer. Pt's son says the cancer is in remission. Advance Directives: Pt's son, Wallene Klinefelter, says the pt does not have an AD/LW but is currently a DNR. Pt's son says they do not want her to be kept alive on a machine but are considering a ventilator short term for Covid. This  informed the son if he and his brother come to that decision after discussing it to please notify pt's nurse about code status change. SEE ACP NOTE. Pain/other symptoms: Pt has constant pain. Pt's son says pt has constant pain in her back after a back surgery. Social/Spiritual: Pt is Christianity.         Pt/family discussion r/t goals: Pt's son says pt's has two sons and he and his family live with her in her home to help care for her. Prior to the pneumonia, pt was able to ambulate without assistance and cared for her daily needs at home. Pt performed daily living skills to care for herself. Pt's son's goal is for pt to be able to return home. Provided spiritual care with support, nurtured hope, and prayer. Pt's son expressed gratitude for spiritual care.     Electronically signed by Hanna Persaud on 2/14/2022 at 3:29 PM

## 2022-02-14 NOTE — PROGRESS NOTES
Consult received. Will place Palliative care evaluation and Palliative RN/ will initiate discussions. We will follow along and assist as needed. Thank you for allowing us to participate in the care of this patient.     Arianna Hua PA-C

## 2022-02-14 NOTE — ACP (ADVANCE CARE PLANNING)
Advance Care Planning     Advance Care Planning Activator (Inpatient)  Conversation Note      Date of ACP Conversation: 2/14/2022     Conversation Conducted with: Patient's son Ml Travis    ACP Activator: Janett Tubbs      Current Designated Health Care Decision Maker:     Primary Decision Maker: Tiffanie Matias - Child - [phone]    Primary Decision Maker: Anu Rosas - Child - [phone]    Supplemental (Other) Decision Maker: Meliton Dominguez - Brother/Sister - [phone]      Care Preferences    Ventilation: \"If you were in your present state of health and suddenly became very ill and were unable to breathe on your own, what would your preference be about the use of a ventilator (breathing machine) if it were available to you? \"      Would the patient desire the use of ventilator (breathing machine)?: No    \"If your health worsens and it becomes clear that your chance of recovery is unlikely, what would your preference be about the use of a ventilator (breathing machine) if it were available to you? \"     Would the patient desire the use of ventilator (breathing machine)?: No      Resuscitation  \"CPR works best to restart the heart when there is a sudden event, like a heart attack, in someone who is otherwise healthy. Unfortunately, CPR does not typically restart the heart for people who have serious health conditions or who are very sick. \"    \"In the event your heart stopped as a result of an underlying serious health condition, would you want attempts to be made to restart your heart (answer \"yes\" for attempt to resuscitate) or would you prefer a natural death (answer \"no\" for do not attempt to resuscitate)? \" No       [] Yes   [x] No   Educated Patient / Marcosmunt Ivania regarding differences between Advance Directives and portable DNR orders.       Conversation Outcomes:  [x] ACP discussion completed      Electronically signed by Janett Tubbs on 2/14/2022 at 3:30 PM

## 2022-02-15 NOTE — PLAN OF CARE
Nutrition Problem #1: Inadequate energy intake,In context of chronic illness  Intervention: Food and/or Nutrient Delivery: Continue Current Diet,Continue Oral Nutrition Supplement  Nutritional Goals: PO >/= 50% meals and ONS. Wt stable. Maintain skin integrity.     Problem: Nutrition  Goal: Optimal nutrition therapy  2/15/2022 1419 by Camille Bond, MS, RD, LD  Outcome: Ongoing  2/15/2022 1301 by Alma Christiansen RN  Outcome: Ongoing

## 2022-02-15 NOTE — PROGRESS NOTES
Nutrition Assessment     Type and Reason for Visit: Reassess    Nutrition Assessment:  Pt remains at risk for further nutrition compromise AEB fair po intake of meals (most 50% or <). Wt loss of 8%, 9 lbs, X 1 month- significant. Continue current POC. Malnutrition Assessment:  Malnutrition Status: Severe malnutrition    Estimated Daily Nutrient Needs:  Energy (kcal): 8927-0824; Weight Used for Energy Requirements:  Current     Protein (g): 92; Weight Used for Protein Requirements:  Current (2g/kg; COPD, underweight, COVID)        Fluid (ml/day): 9843-8834; Weight Used for Fluid Requirements:  ml/Kg (25-30 mL/kg)      Current Nutrition Therapies:    ADULT DIET; Regular  ADULT ORAL NUTRITION SUPPLEMENT; Breakfast, Lunch, Dinner; Standard 4 oz Oral Supplement    Anthropometric Measures:  · Height: 5' 5\" (165.1 cm)  · Current Body Wt: 101 lb 7 oz (46 kg)   · BMI: 16.9    Nutrition Interventions:   Food and/or Nutrient Delivery:  Continue Current Diet,Continue Oral Nutrition Supplement   Coordination of Nutrition Care:  Continue to monitor while inpatient    Goals:  PO >/= 50% meals and ONS. Wt stable. Maintain skin integrity. Nutrition Monitoring and Evaluation:   Food/Nutrient Intake Outcomes:  Food and Nutrient Intake  Physical Signs/Symptoms Outcomes:  Skin,Weight     Discharge Planning:     Too soon to determine     Electronically signed by Tu Herrera MS, RD, LD on 2/15/22 at 2:17 PM CST    Contact: [phone]

## 2022-02-16 NOTE — PROGRESS NOTES
[place], [place], [address]    DEPARTMENT OF HOSPITALIST MEDICINE      PROGRESS  NOTE:    NOTE: Portions of this note have been copied forward, however, changed to reflect the most current clinical status of this patient. Patient:  Emory Barnett  YOB: 1955  Date of Service: 2/15/2022  MRN: [id number]   Acct: [de-identified]   Primary Care Physician: DOMINICK Higuera NP  Advance Directive: [facility]  Admit Date: 2/11/2022       Hospital Day: 4      CHIEF COMPLAINT:  Chief Complaint   Patient presents with    Shortness of Breath       SUBJECTIVE:  Patient is slowly improving. No major complaints! [address]  COURSE:    02/15/2022:  He is at 13 L oxygen via high flow nasal cannula which is slowly being weaned off. Local LTAC facility beds are full!    02/14/2022:  Patient is still on 15 L Oxygen via high flow nasal cannula. LTAC transfer initiated by case management. 02/13/2022:  Patient still requiring high flow 15 L oxygen to keep O2 sats in 90s. CRP is slowly downtrending. Patient started on low-dose Xanax for anxiety. 02/12/2022:  Patient requiring high flow oxygen via nonrebreather mask while maintaining her O2 sats around 90s. Patient started on gentle IV hydration with for hypotension. Will reevaluate patient later on with ABGs and respiratory evaluation if patient continues to require high flow oxygen with low O2 sats. 02/11/2022: History Of Present Illness: The patient is a 74 y.o. female who presented to [facility] ER with PMH COPD, home oxygen 4L NC, former smoker  complaining of shortness of breath. Patient states that she has been  Short of breath ongoing for the past few days and has been worsening. She states that she has been watching her oxygen saturation and it has been dropping down to the 60s at home. Endorses productive cough, generalized weaknes and fatigue.  Denies fever, chills, nausea, vomiting, and chest pain.   Patient was recently treated with antibiotics and steroids for pneumonia and COPD exacerbation but completed the course of those several days ago. Work up in [address] bilateral airspace opacities. Procalcitonin 0.07, CRP 3.96, D-Dimer 1.63, and ABG pH 7.36, pCO2 61, pO2 56 HCO3 34.5. Confirmed code status DNR. Patient is to be admitted to hospitalist service due to acute hypoxemic respiratory failure due to Covid-19.    Past Medical History:      REVIEW  OF  SYSTEMS:    Constitutional:  No fevers, chills, nausea, vomiting, + tiredness and fatigue   Lungs:   No hemoptysis, pleurisy, +cough, +SOB   Heart:  No chest pressure with exertion, palpitations,    Abdomen:   No new masses, no bright red blood per rectum   Extremities:  + difficulty ambulation due to weakness, no new lesions   Neurologic: No new motor or sensory changes     14 point review of systems addressed with patient which is essentially negative except as specifically addressed above:    PAST MEDICAL HISTORY:  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     COPD (chronic obstructive pulmonary disease) ([address]     Depression     Emphysema (subcutaneous) (surgical) resulting from a procedure     History of lung biopsy 05/16/2019    Malignant neoplasm of right main bronchus ([address] 03/2019    NSCLC, SCC [id number] stage IIIb    Palliative care patient 11/16/2021    Syncope     TIA (transient ischemic attack)          PAST SURGICAL HISTORY:  Past Surgical History:   Procedure Laterality Date    APPENDECTOMY      BACK SURGERY      times two    BLADDER SUSPENSION      CHOLECYSTECTOMY      HYSTERECTOMY      INCONTINENCE SURGERY          FAMILY HISTORY:  Family History   Problem Relation Age of Onset    Colon Cancer Mother     High Blood Pressure Brother     Diabetes Brother            OBJECTIVE:  /67   Pulse 107   Temp 98.4 °F (36.9 °C) (Temporal)   Resp 16   Ht 5' 5\" (1.651 m)   Wt 101 lb 7 oz (46 Troponin T: No results for input(s): TROPONINI in the last 72 hours. Pro-BNP: No results for input(s): BNP in the last 72 hours. INR:   No results for input(s): INR in the last 72 hours. UA:No results for input(s): NITRITE, COLORU, PHUR, LABCAST, WBCUA, RBCUA, MUCUS, TRICHOMONAS, YEAST, BACTERIA, CLARITYU, SPECGRAV, LEUKOCYTESUR, UROBILINOGEN, BILIRUBINUR, BLOODU, GLUCOSEU, AMORPHOUS in the last 72 hours. Invalid input(s): Reginald Homans  A1C: No results for input(s): LABA1C in the last 72 hours. ABG:  No results for input(s): PHART, ECL3LEP, PO2ART, SOO2NFE, BEART, HGBAE, V5EYGCRG, CARBOXHGBART in the last 72 hours. Imaging:    XR CHEST PORTABLE    Result Date: 2/11/2022  No significant change in persistent bilateral airspace opacities compared to January 27. Signed by Dr Pa Mcrae:    Principal Problem:    Acute hypoxemic respiratory failure due to COVID-19 [facility])  Active Problems:    Pneumonia due to COVID-19 virus    Palliative care patient    Severe malnutrition ([address]    Centrilobular emphysema ([facility]    Malignant neoplasm of upper lobe of right lung ([facility]    Primary squamous cell carcinoma of right lung (HCC)    Acute on chronic respiratory failure with hypoxia and hypercapnia (HCC)    Personal history of noncompliance with medical treatment and regimen  Resolved Problems:    * No resolved hospital problems. *      Admit patient to inpatient COVID-19 unit under full inpatient status  Droplet plus precautions and contact precautions ordered  Patient started on oxygen via nasal cannula to keep O2 sats between 92% and 94%  IV dexamethasone switched to 10 mg IV twice daily. ..  Day # 5/10  Lovenox 30/40 units subcu twice daily ordered as per protocol  Patient started on adjuvant meds in the form of vitamin C, vitamin D, zinc, robitussin and melatonin  Patient started on albuterol and Symbicort inhalers  Pharmacy consult given for administration of antiviral COVID-19 antibiotics as per [facility]'s health care protocol  Patient started on oral Olumiant . .. Day #5/14  Would consider pulmonary/ID evaluation for further treatment recommendations regarding COVID-19 treatment as needed  PT/OT consult given for evaluation and treatment  Advised case management to communicate with patient regarding initiating LTAC transfer    Patient started on gentle IV hydration for hypotension  Continue with oral midodrine for hypotension as well  Patient is DNR      Repeat labs in a.m. Electrolyte replacement as per protocol. Patient will be monitored very closely on the floor. Further recommendations as per the hospital course. Discharge Plan: DC planning to LTAC once arrangements are made      Patient  is on DVT prophylaxis  Current medications reviewed  Lab work reviewed  Radiology/Chest x-ray films reviewed  Treatment recommendations from suspecialities reviewed, appreciated and agreed with  Discussed with the nurse and addressed all questions/concerns  Discussed with Patient and/or Family at the bedside in detail . .. they understand and agree with the management plan. Avery Carlson MD  2/15/2022 6:11 PM      DISCLAIMER: This note was created with electronic voice recognition which does have occasional errors. If you have any questions regarding the content within the note please do not hesitate to contact me. .. Thanks.

## 2022-02-17 NOTE — CARE COORDINATION
Met with patient to discuss option of sending a referral to [facility]. She states she will be agreeable to send referral if her sons, Igor West and/or Howie Brown, were agreeable. [employer]. His wife answered and stated he was sleeping and would ask him to return call. Called Howie Brown. He is agreeable to send referral.    Referral sent to [facility] 2/17/2022. Requires pre-cert.   [address] [place] [address] [phone]   [phone]  Electronically signed by Katina Moody RN on 2/17/2022 at 12:14 PM

## 2022-02-17 NOTE — PROGRESS NOTES
[place], [place], [address]    DEPARTMENT OF HOSPITALIST MEDICINE      PROGRESS  NOTE:    NOTE: Portions of this note have been copied forward, however, changed to reflect the most current clinical status of this patient. Patient:  Logan Suarez  YOB: 1955  Date of Service: 2/16/2022  MRN: [id number]   Acct: [de-identified]   Primary Care Physician: DOMINICK Fajardo NP  Advance Directive: [facility]  Admit Date: 2/11/2022       Hospital Day: 5      CHIEF COMPLAINT:  Chief Complaint   Patient presents with    Shortness of Breath       SUBJECTIVE:  Patient lying in bed during my morning rounds. She wants to go home! [address]  COURSE:    02/16/2022:  Patient is currently on 13 to 15 L oxygen via high flow nasal cannula. Spoke with the patient in detail and expressed that she could safely be discharged home if she is down to 5 L/min oxygen via nasal cannula or lower. Patient verbalized understanding and is motivated to be positive and be weaned off of flow oxygen. 02/15/2022:  He is at 13 L oxygen via high flow nasal cannula which is slowly being weaned off. Local LTAC facility beds are full!    02/14/2022:  Patient is still on 15 L Oxygen via high flow nasal cannula. LTAC transfer initiated by case management. 02/13/2022:  Patient still requiring high flow 15 L oxygen to keep O2 sats in 90s. CRP is slowly downtrending. Patient started on low-dose Xanax for anxiety. 02/12/2022:  Patient requiring high flow oxygen via nonrebreather mask while maintaining her O2 sats around 90s. Patient started on gentle IV hydration with for hypotension. Will reevaluate patient later on with ABGs and respiratory evaluation if patient continues to require high flow oxygen with low O2 sats. 02/11/2022: History Of Present Illness: The patient is a 74 y.o. female who presented to [facility] ER with PMH COPD, home oxygen 4L NC, former smoker  complaining of shortness of breath. Patient states that she has been  Short of breath ongoing for the past few days and has been worsening. She states that she has been watching her oxygen saturation and it has been dropping down to the 60s at home. Endorses productive cough, generalized weaknes and fatigue. Denies fever, chills, nausea, vomiting, and chest pain.   Patient was recently treated with antibiotics and steroids for pneumonia and COPD exacerbation but completed the course of those several days ago. Work up in [address] bilateral airspace opacities. Procalcitonin 0.07, CRP 3.96, D-Dimer 1.63, and ABG pH 7.36, pCO2 61, pO2 56 HCO3 34.5. Confirmed code status DNR. Patient is to be admitted to hospitalist service due to acute hypoxemic respiratory failure due to Covid-19.    Past Medical History:      REVIEW  OF  SYSTEMS:    Constitutional:  No fevers, chills, nausea, vomiting, + tiredness and fatigue   Lungs:   No hemoptysis, pleurisy, +cough, +SOB   Heart:  No chest pressure with exertion, palpitations,    Abdomen:   No new masses, no bright red blood per rectum   Extremities:  + difficulty ambulation due to weakness, no new lesions   Neurologic: No new motor or sensory changes     14 point review of systems addressed with patient which is essentially negative except as specifically addressed above:    PAST MEDICAL HISTORY:  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     COPD (chronic obstructive pulmonary disease) ([address]     Depression     Emphysema (subcutaneous) (surgical) resulting from a procedure     History of lung biopsy 05/16/2019    Malignant neoplasm of right main bronchus ([address] 03/2019    NSCLC, SCC [id number] stage IIIb    Palliative care patient 11/16/2021    Syncope     TIA (transient ischemic attack)          PAST SURGICAL HISTORY:  Past Surgical History:   Procedure Laterality Date    APPENDECTOMY      BACK SURGERY      times two    BLADDER SUSPENSION  CHOLECYSTECTOMY      HYSTERECTOMY      INCONTINENCE SURGERY          FAMILY HISTORY:  Family History   Problem Relation Age of Onset    Colon Cancer Mother     High Blood Pressure Brother     Diabetes Brother            OBJECTIVE:  /68   Pulse 112   Temp 97 °F (36.1 °C)   Resp 18   Ht 5' 5\" (1.651 m)   Wt 101 lb 7 oz (46 kg)   SpO2 93%   BMI 16.88 kg/m²   No intake/output data recorded.     PHYSICAL  EXAMINATION:    DONAL:  Awake, alert, patient appears tired and fatigued   Head/Eyes:  Normocephalic, atraumatic, EOMI and PERRLA bilaterally   Respiratory:   Bilateral decreased air entry in both lung fields, coarse bilateral breath sounds, scattered bilateral rhonchi    Cardiovascular:  Regular rate and rhythm, S1+S2+0, no murmurs/rubs   Abdomen:   Soft, non-tender, bowel sounds +ve, no organomegaly   Extremities: Moves all, decreased range of motion, no edema   Neurologic: Awake, alert, cranial nerves II-XII intact, no focal neurological deficits, sensory system intact   Psychiatric: Normal mood, non-suicidal       CURRENT MEDICATIONS:  Scheduled:   dexamethasone  10 mg IntraVENous Q12H    melatonin  5 mg Oral Nightly    vitamin C  500 mg Oral TID    zinc sulfate  50 mg Oral Daily    albuterol sulfate HFA  2 puff Inhalation 4x daily    apixaban  5 mg Oral BID    aspirin EC  81 mg Oral Daily    atorvastatin  40 mg Oral Nightly    budesonide-formoterol  2 puff Inhalation BID    vitamin D  50,000 Units Oral Weekly    citalopram  20 mg Oral Daily    folic acid  1 mg Oral Daily    midodrine  10 mg Oral TID WC    nicotine  1 patch TransDERmal Daily    vitamin B-1  100 mg Oral Daily    baricitinib  4 mg Oral Daily        PRN:  ALPRAZolam, 0.25 mg, 4x Daily PRN  guaiFENesin-dextromethorphan, 5 mL, Q4H PRN  oxyCODONE-acetaminophen, 1 tablet, Q6H PRN  morphine, 2 mg, Q4H PRN  LORazepam, 1 mg, Q4H PRN        Infusions:      Laboratory Data:  Recent Labs     02/15/22  0605 02/16/22  0410   WBC 5.7 5.4   HGB 10.3* 10.3*    191     Recent Labs     02/14/22  0953 02/15/22  0605 02/16/22  0410    144 143   K 4.1 4.2 4.3    102 101   CO2 32* 35* 33*   BUN 27* 29* 29*   CREATININE 0.2* 0.2* 0.2*   GLUCOSE 163* 125* 134*     Recent Labs     02/15/22  0605 02/16/22  0410   AST 14 14   ALT 15 17   BILITOT 0.4 0.3   ALKPHOS 91 89     Troponin T: No results for input(s): TROPONINI in the last 72 hours. Pro-BNP: No results for input(s): BNP in the last 72 hours. INR:   No results for input(s): INR in the last 72 hours. UA:No results for input(s): NITRITE, COLORU, PHUR, LABCAST, WBCUA, RBCUA, MUCUS, TRICHOMONAS, YEAST, BACTERIA, CLARITYU, SPECGRAV, LEUKOCYTESUR, UROBILINOGEN, BILIRUBINUR, BLOODU, GLUCOSEU, AMORPHOUS in the last 72 hours. Invalid input(s): [address]  A1C: No results for input(s): LABA1C in the last 72 hours. ABG:  No results for input(s): PHART, KDZ3UUN, PO2ART, YGB3NUN, BEART, HGBAE, B1QMONXT, CARBOXHGBART in the last 72 hours. Imaging:    XR CHEST PORTABLE    Result Date: 2/11/2022  No significant change in persistent bilateral airspace opacities compared to January 27. Signed by Dr Ruba De La O:    Principal Problem:    Acute hypoxemic respiratory failure due to COVID-19 [facility])  Active Problems:    Pneumonia due to COVID-19 virus    Palliative care patient    Severe malnutrition ([facility]    Centrilobular emphysema ([facility]    Malignant neoplasm of upper lobe of right lung ([facility]    Primary squamous cell carcinoma of right lung (HCC)    Acute on chronic respiratory failure with hypoxia and hypercapnia (HCC)    Personal history of noncompliance with medical treatment and regimen  Resolved Problems:    * No resolved hospital problems.  *      Admit patient to inpatient COVID-19 unit under full inpatient status  Droplet plus precautions and contact precautions ordered  Patient started on oxygen via nasal cannula to keep O2 sats between 92% and 94%  IV dexamethasone switched to 10 mg IV twice daily. .. Day # 6/10  Lovenox 30/40 units subcu twice daily ordered as per protocol  Patient started on adjuvant meds in the form of vitamin C, vitamin D, zinc, robitussin and melatonin  Patient started on albuterol and Symbicort inhalers  Pharmacy consult given for administration of antiviral COVID-19 antibiotics as per [facility]'s health care protocol  Patient started on oral Olumiant . .. Day #6/14  Monitor D-dimer and CRP levels closely  PT/OT consult given for evaluation and treatment  Advised case management to communicate with patient regarding initiating LTAC transfer    Patient started on gentle IV hydration for hypotension  Continue with oral midodrine for hypotension as well  Patient is DNR      Repeat labs in a.m. Electrolyte replacement as per protocol. Patient will be monitored very closely on the floor. Further recommendations as per the hospital course. Discharge Plan: DC planning to LTAC/SNF/home with home health based on bed availability and weaning off of high flow oxygen    Patient  is on DVT prophylaxis  Current medications reviewed  Lab work reviewed  Radiology/Chest x-ray films reviewed  Treatment recommendations from suspecialities reviewed, appreciated and agreed with  Discussed with the nurse and addressed all questions/concerns  Discussed with Patient and/or Family at the bedside in detail . .. they understand and agree with the management plan. Steven Harry MD  2/16/2022 6:07 PM      DISCLAIMER: This note was created with electronic voice recognition which does have occasional errors. If you have any questions regarding the content within the note please do not hesitate to contact me. .. Thanks.

## 2022-02-17 NOTE — PROGRESS NOTES
[place], [place], [address]    DEPARTMENT OF HOSPITALIST MEDICINE      PROGRESS  NOTE:    NOTE: Portions of this note have been copied forward, however, changed to reflect the most current clinical status of this patient. Patient:  James Giron  YOB: 1955  Date of Service: 2/17/2022  MRN: [id number]   Acct: [de-identified]   Primary Care Physician: DOMINICK Singh NP  Advance Directive: [facility]  Admit Date: 2/11/2022       Hospital Day: 6      CHIEF COMPLAINT:  Chief Complaint   Patient presents with    Shortness of Breath       SUBJECTIVE:  Patient lying in bed during my morning rounds. Still requiring high flow oxygen. [address]  COURSE:    02/17/2022:  Patient still on high flow oxygen 30 to 50 L/min via high flow nasal cannula. Continue with efforts to wean patient off of high flow oxygen. Case management working on placement to LTAC.    02/16/2022:  Patient is currently on 13 to 15 L oxygen via high flow nasal cannula. Spoke with the patient in detail and expressed that she could safely be discharged home if she is down to 5 L/min oxygen via nasal cannula or lower. Patient verbalized understanding and is motivated to be positive and be weaned off of flow oxygen. 02/15/2022:  He is at 13 L oxygen via high flow nasal cannula which is slowly being weaned off. Local LTAC facility beds are full!    02/14/2022:  Patient is still on 15 L Oxygen via high flow nasal cannula. LTAC transfer initiated by case management. 02/13/2022:  Patient still requiring high flow 15 L oxygen to keep O2 sats in 90s. CRP is slowly downtrending. Patient started on low-dose Xanax for anxiety. 02/12/2022:  Patient requiring high flow oxygen via nonrebreather mask while maintaining her O2 sats around 90s. Patient started on gentle IV hydration with for hypotension.   Will reevaluate patient later on with ABGs and respiratory evaluation if patient continues to require high flow oxygen with low O2 sats. 02/11/2022: History Of Present Illness: The patient is a 74 y.o. female who presented to [facility] ER with PMH COPD, home oxygen 4L NC, former smoker  complaining of shortness of breath. Patient states that she has been  Short of breath ongoing for the past few days and has been worsening. She states that she has been watching her oxygen saturation and it has been dropping down to the 60s at home. Endorses productive cough, generalized weaknes and fatigue. Denies fever, chills, nausea, vomiting, and chest pain.   Patient was recently treated with antibiotics and steroids for pneumonia and COPD exacerbation but completed the course of those several days ago. Work up in [address] bilateral airspace opacities. Procalcitonin 0.07, CRP 3.96, D-Dimer 1.63, and ABG pH 7.36, pCO2 61, pO2 56 HCO3 34.5. Confirmed code status DNR. Patient is to be admitted to hospitalist service due to acute hypoxemic respiratory failure due to Covid-19.    Past Medical History:      REVIEW  OF  SYSTEMS:    Constitutional:  No fevers, chills, nausea, vomiting, + tiredness and fatigue   Lungs:   No hemoptysis, pleurisy, +cough, +SOB   Heart:  No chest pressure with exertion, palpitations,    Abdomen:   No new masses, no bright red blood per rectum   Extremities:  + difficulty ambulation due to weakness, no new lesions   Neurologic: No new motor or sensory changes     14 point review of systems addressed with patient which is essentially negative except as specifically addressed above:    PAST MEDICAL HISTORY:  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     COPD (chronic obstructive pulmonary disease) ([address]     Depression     Emphysema (subcutaneous) (surgical) resulting from a procedure     History of lung biopsy 05/16/2019    Malignant neoplasm of right main bronchus ([address] 03/2019    NSCLC, SCC [id number] stage IIIb    Palliative care patient 11/16/2021    Syncope     TIA (transient ischemic attack)          PAST SURGICAL HISTORY:  Past Surgical History:   Procedure Laterality Date    APPENDECTOMY      BACK SURGERY      times two    BLADDER SUSPENSION      CHOLECYSTECTOMY      HYSTERECTOMY      INCONTINENCE SURGERY          FAMILY HISTORY:  Family History   Problem Relation Age of Onset    Colon Cancer Mother     High Blood Pressure Brother     Diabetes Brother            OBJECTIVE:  /82   Pulse 88   Temp 97.5 °F (36.4 °C) (Temporal)   Resp 20   Ht 5' 5\" (1.651 m)   Wt 101 lb 7 oz (46 kg)   SpO2 90%   BMI 16.88 kg/m²   No intake/output data recorded.     PHYSICAL  EXAMINATION:    DONAL:  Awake, alert, patient appears tired and fatigued   Head/Eyes:  Normocephalic, atraumatic, EOMI and PERRLA bilaterally   Respiratory:   Bilateral decreased air entry in both lung fields, coarse bilateral breath sounds, scattered bilateral rhonchi    Cardiovascular:  Regular rate and rhythm, S1+S2+0, no murmurs/rubs   Abdomen:   Soft, non-tender, bowel sounds +ve, no organomegaly   Extremities: Moves all, decreased range of motion, no edema   Neurologic: Awake, alert, cranial nerves II-XII intact, no focal neurological deficits, sensory system intact   Psychiatric: Normal mood, non-suicidal       CURRENT MEDICATIONS:  Scheduled:   dexamethasone  10 mg IntraVENous Q12H    melatonin  5 mg Oral Nightly    vitamin C  500 mg Oral TID    zinc sulfate  50 mg Oral Daily    albuterol sulfate HFA  2 puff Inhalation 4x daily    apixaban  5 mg Oral BID    aspirin EC  81 mg Oral Daily    atorvastatin  40 mg Oral Nightly    budesonide-formoterol  2 puff Inhalation BID    vitamin D  50,000 Units Oral Weekly    citalopram  20 mg Oral Daily    folic acid  1 mg Oral Daily    midodrine  10 mg Oral TID WC    nicotine  1 patch TransDERmal Daily    vitamin B-1  100 mg Oral Daily    baricitinib  4 mg Oral Daily        PRN:  ALPRAZolam, 0.25 mg, 4x Daily PRN  guaiFENesin-dextromethorphan, 5 mL, Q4H PRN  oxyCODONE-acetaminophen, 1 tablet, Q6H PRN  morphine, 2 mg, Q4H PRN  LORazepam, 1 mg, Q4H PRN        Infusions:      Laboratory Data:  Recent Labs     02/15/22  0605 02/16/22 0410 02/17/22  0648   WBC 5.7 5.4 8.8   HGB 10.3* 10.3* 10.6*    191 221     Recent Labs     02/15/22  0605 02/16/22  0410 02/17/22  0648    143 142   K 4.2 4.3 4.3    101 100   CO2 35* 33* 33*   BUN 29* 29* 29*   CREATININE 0.2* 0.2* 0.2*   GLUCOSE 125* 134* 118*     Recent Labs     02/15/22  0605 02/16/22  0410 02/17/22  0648   AST 14 14 15   ALT 15 17 20   BILITOT 0.4 0.3 0.4   ALKPHOS 91 89 86     Troponin T: No results for input(s): TROPONINI in the last 72 hours. Pro-BNP: No results for input(s): BNP in the last 72 hours. INR:   No results for input(s): INR in the last 72 hours. UA:No results for input(s): NITRITE, COLORU, PHUR, LABCAST, WBCUA, RBCUA, MUCUS, TRICHOMONAS, YEAST, BACTERIA, CLARITYU, SPECGRAV, LEUKOCYTESUR, UROBILINOGEN, BILIRUBINUR, BLOODU, GLUCOSEU, AMORPHOUS in the last 72 hours. Invalid input(s): Genesis Johnson  A1C: No results for input(s): LABA1C in the last 72 hours. ABG:  No results for input(s): PHART, SBL0XIW, PO2ART, VIG9UFI, BEART, HGBAE, N9QBVIDZ, CARBOXHGBART in the last 72 hours. Imaging:    XR CHEST PORTABLE    Result Date: 2/11/2022  No significant change in persistent bilateral airspace opacities compared to January 27.  Signed by Dr Kristi Rubio:    Principal Problem:    Acute hypoxemic respiratory failure due to COVID-19 [facility])  Active Problems:    Pneumonia due to COVID-19 virus    Palliative care patient    Severe malnutrition ([address]    Centrilobular emphysema ([address]    Malignant neoplasm of upper lobe of right lung ([address]    Primary squamous cell carcinoma of right lung (HCC)    Acute on chronic respiratory failure with hypoxia and hypercapnia (HCC)    Personal history of noncompliance with medical treatment and regimen  Resolved Problems:    * No resolved hospital problems. *      Admit patient to inpatient COVID-19 unit under full inpatient status  Droplet plus precautions and contact precautions ordered  Patient started on oxygen via nasal cannula to keep O2 sats between 92% and 94%  IV dexamethasone switched to 10 mg IV twice daily. .. Day # 7/10  Lovenox 30/40 units subcu twice daily ordered as per protocol  Patient started on adjuvant meds in the form of vitamin C, vitamin D, zinc, robitussin and melatonin  Patient started on albuterol and Symbicort inhalers  Pharmacy consult given for administration of antiviral COVID-19 antibiotics as per [facility]'s health care protocol  Patient started on oral Olumiant . .. Day #7/14  Monitor D-dimer and CRP levels closely  PT/OT consult given for evaluation and treatment  Advised case management to communicate with patient regarding initiating LTAC transfer    Patient started on gentle IV hydration for hypotension  Continue with oral midodrine for hypotension as well  Patient is DNR      Repeat labs in a.m. Electrolyte replacement as per protocol. Patient will be monitored very closely on the floor. Further recommendations as per the hospital course. Discharge Plan: DC planning to LTAC/SNF/home with home health based on bed availability and weaning off of high flow oxygen    Patient  is on DVT prophylaxis  Current medications reviewed  Lab work reviewed  Radiology/Chest x-ray films reviewed  Treatment recommendations from suspecialities reviewed, appreciated and agreed with  Discussed with the nurse and addressed all questions/concerns  Discussed with Patient and/or Family at the bedside in detail . .. they understand and agree with the management plan. Avery Carlson MD  2/17/2022 1:38 PM      DISCLAIMER: This note was created with electronic voice recognition which does have occasional errors.   If you have any questions regarding the content within the note please do not hesitate to contact me. .. Thanks.

## 2022-02-18 NOTE — PLAN OF CARE
Problem: Airway Clearance - Ineffective  Goal: Achieve or maintain patent airway  2/18/2022 0954 by Oma Capps RN  Outcome: Ongoing  2/18/2022 0557 by Samantha Vitale RN  Outcome: Ongoing     Problem: Gas Exchange - Impaired  Goal: Absence of hypoxia  2/18/2022 0954 by Oma Capps RN  Outcome: Ongoing  2/18/2022 0557 by Samantha Vitale RN  Outcome: Ongoing  Goal: Promote optimal lung function  2/18/2022 0954 by Oma Capps RN  Outcome: Ongoing  2/18/2022 0557 by Samantha Vitale RN  Outcome: Ongoing     Problem: Breathing Pattern - Ineffective  Goal: Ability to achieve and maintain a regular respiratory rate  2/18/2022 0954 by Oma Capps RN  Outcome: Ongoing  2/18/2022 0557 by Samantha Vitale RN  Outcome: Ongoing     Problem:  Body Temperature -  Risk of, Imbalanced  Goal: Ability to maintain a body temperature within defined limits  2/18/2022 0954 by Oma Capps RN  Outcome: Ongoing  2/18/2022 0557 by Samantha Vitale RN  Outcome: Ongoing  Goal: Will regain or maintain usual level of consciousness  2/18/2022 0954 by Oma Capps RN  Outcome: Ongoing  2/18/2022 0557 by Samantha Vitale RN  Outcome: Ongoing  Goal: Complications related to the disease process, condition or treatment will be avoided or minimized  2/18/2022 0954 by Oma Capps RN  Outcome: Ongoing  2/18/2022 0557 by Samantha Vitale RN  Outcome: Ongoing     Problem: Isolation Precautions - Risk of Spread of Infection  Goal: Prevent transmission of infection  2/18/2022 0954 by Oma Capps RN  Outcome: Ongoing  2/18/2022 0557 by Samantha Vitale RN  Outcome: Ongoing     Problem: Nutrition Deficits  Goal: Optimize nutritional status  2/18/2022 0954 by Oma Capps RN  Outcome: Ongoing  2/18/2022 0557 by Samantha Vitale RN  Outcome: Ongoing     Problem: Risk for Fluid Volume Deficit  Goal: Maintain normal heart rhythm  2/18/2022 0954 by Oma Capps RN  Outcome: Ongoing  2/18/2022 1430 by Jesica Mendoza RN  Outcome: Ongoing  Goal: Maintain absence of muscle cramping  2/18/2022 0954 by Sunil Guardado RN  Outcome: Ongoing  2/18/2022 0557 by Jesica Mendoza RN  Outcome: Ongoing  Goal: Maintain normal serum potassium, sodium, calcium, phosphorus, and pH  2/18/2022 0954 by Sunil Guardado RN  Outcome: Ongoing  2/18/2022 0557 by Jesica Mendoza RN  Outcome: Ongoing     Problem: Loneliness or Risk for Loneliness  Goal: Demonstrate positive use of time alone when socialization is not possible  2/18/2022 0954 by Sunil Guardado RN  Outcome: Ongoing  2/18/2022 0557 by Jesica Mendoza RN  Outcome: Ongoing     Problem: Fatigue  Goal: Verbalize increase energy and improved vitality  2/18/2022 0954 by Sunil Guardado RN  Outcome: Ongoing  2/18/2022 0557 by Jesica Mendoza RN  Outcome: Ongoing     Problem: Patient Education: Go to Patient Education Activity  Goal: Patient/Family Education  2/18/2022 8529 by Sunil Guardado RN  Outcome: Ongoing  2/18/2022 0557 by Jesica Mendoza RN  Outcome: Ongoing     Problem: Falls - Risk of:  Goal: Will remain free from falls  Description: Will remain free from falls  2/18/2022 0954 by Sunil Guardado RN  Outcome: Ongoing  2/18/2022 0557 by Jesica Mendoza RN  Outcome: Ongoing  Goal: Absence of physical injury  Description: Absence of physical injury  2/18/2022 0954 by Sunil Guardado RN  Outcome: Ongoing  2/18/2022 0557 by Jesica Mendoza RN  Outcome: Ongoing     Problem: Nutrition  Goal: Optimal nutrition therapy  2/18/2022 0954 by Sunil Guardado RN  Outcome: Ongoing  2/18/2022 0557 by Jesica Mendoza RN  Outcome: Ongoing     Problem: Pain:  Goal: Pain level will decrease  Description: Pain level will decrease  2/18/2022 0954 by Sunil Guardado RN  Outcome: Ongoing  2/18/2022 0557 by Jesica Mendoza RN  Outcome: Ongoing  Goal: Control of acute pain  Description: Control of acute pain  2/18/2022 0954 by Sunil Guardado RN  Outcome: Ongoing  2/18/2022 0557 by Moses Hernandez RN  Outcome: Ongoing  Goal: Control of chronic pain  Description: Control of chronic pain  2/18/2022 0954 by Chacorta Piña RN  Outcome: Ongoing  2/18/2022 0557 by Moses Hernandez RN  Outcome: Ongoing

## 2022-02-18 NOTE — PLAN OF CARE
Nutrition Problem #1: Inadequate energy intake,In context of chronic illness  Intervention: Food and/or Nutrient Delivery: Continue Current Diet,Continue Oral Nutrition Supplement  Nutritional Goals: PO >/= 50% meals and ONS. Wt stable. Maintain skin integrity.     Problem: Nutrition  Goal: Optimal nutrition therapy  2/18/2022 1045 by Ramin Bernal MS, RD, LD  Outcome: Ongoing  2/18/2022 0954 by Taylor Jaramillo RN  Outcome: Ongoing  2/18/2022 0557 by Amy Dixon RN  Outcome: Ongoing

## 2022-02-18 NOTE — PROGRESS NOTES
Nutrition Assessment     Type and Reason for Visit: Reassess    Nutrition Assessment:  Pt improving nutritionally AEB improved po intakes (more meals >50%) and per RN report, pt taking ONS well. Continue current POC. Malnutrition Assessment:  Malnutrition Status: Severe malnutrition    Nutrition Related Findings: HFNC      Current Nutrition Therapies:    ADULT DIET; Regular  ADULT ORAL NUTRITION SUPPLEMENT; Breakfast, Lunch, Dinner; Standard 4 oz Oral Supplement    Anthropometric Measures:  · Height: 5' 5\" (165.1 cm)  · Current Body Wt: 101 lb 7 oz (46 kg)   · BMI: 16.9    Nutrition Interventions:   Food and/or Nutrient Delivery:  Continue Current Diet,Continue Oral Nutrition Supplement   Coordination of Nutrition Care:  Continue to monitor while inpatient    Goals:  PO >/= 50% meals and ONS. Wt stable. Maintain skin integrity.        Nutrition Monitoring and Evaluation:   Food/Nutrient Intake Outcomes:  Food and Nutrient Intake  Physical Signs/Symptoms Outcomes:  Biochemical Data,Skin,Weight     Discharge Planning:    Continue Oral Nutrition Supplement     Electronically signed by Dagmar Hartley MS, RD, LD on 2/18/22 at 10:44 AM CST    Contact: [phone]

## 2022-02-18 NOTE — PROGRESS NOTES
Patient:  Carmen Cunningham  YOB: 1955  Date of Service: 2/18/2022  MRN: [id number]   Acct: [de-identified]   Primary Care Physician: DOMINICK Munoz NP  Advance Directive: [facility]  Admit Date: 2/11/2022       Hospital Day: 7        CHIEF COMPLAINT:  Chief Complaint   Patient presents with    Shortness of Breath         SUBJECTIVE:    No acute events overnight. Patient feels a little better today, no longer requiring NRB, but remains on high flow nasal cannula supplemental oxygen. Denies any worsening shortness of breath, chest pain, palpitations fevers chills, nausea vomiting or diarrhea. She remains with severe fatigue. CUMULATIVE  HOSPITAL  COURSE:    The patient is a 74 y.o. female who presented to [facility] ER with PMH COPD, home oxygen 4L NC, former smoker  who presented with progressive worsening dyspnea from her baseline over several days and noted to have significant hypoxia with SPO2 down to 60s. Tested positive for Covid with chest x-ray showing bilateral infiltrates, admitted with Covid pneumonia and COPD exacerbation treated with dexamethasone, baricitinib and bronchodilators. Low suspicion for secondary bacterial pneumonia with negative procalcitonin. Patient initially required nonrebreather, subsequently requiring high flow oxygen over hospital course.           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     COPD (chronic obstructive pulmonary disease) (HCC)     Depression     Emphysema (subcutaneous) (surgical) resulting from a procedure     History of lung biopsy 05/16/2019    Malignant neoplasm of right main bronchus ([address] 03/2019    NSCLC, SCC [id number] stage IIIb    Palliative care patient 11/16/2021    Syncope     TIA (transient ischemic attack)          PAST SURGICAL HISTORY:  Past Surgical History:   Procedure Laterality Date    APPENDECTOMY      BACK SURGERY      times two    BLADDER SUSPENSION      CHOLECYSTECTOMY      HYSTERECTOMY      INCONTINENCE SURGERY          FAMILY HISTORY:  Family History   Problem Relation Age of Onset    Colon Cancer Mother     High Blood Pressure Brother     Diabetes Brother            OBJECTIVE:  /76   Pulse 79   Temp 96.8 °F (36 °C)   Resp 16   Ht 5' 5\" (1.651 m)   Wt 101 lb 7 oz (46 kg)   SpO2 93%   BMI 16.88 kg/m²   I/O this shift:  In: 240 [P.O.:240]  Out: -     PHYSICAL  EXAMINATION:    General: Chronically ill-appearing, no acute distress  Psych: Normal mood and affect  HEENT: NC/AT, no scleral icterus  Neck: Trachea midline  Cardiovascular: Normal rate, regular rhythm  Respiratory: Normal respiratory effort at rest, no intercostal retractions, no use of accessory muscles  Abdomen: Nondistended  Neurologic: Alert, conversant, follows commands  Extremities: No clubbing or cyanosis  Skin: No observed lesions or rashes      CURRENT MEDICATIONS:  Scheduled:   dexamethasone  10 mg IntraVENous Q12H    melatonin  5 mg Oral Nightly    vitamin C  500 mg Oral TID    zinc sulfate  50 mg Oral Daily    albuterol sulfate HFA  2 puff Inhalation 4x daily    apixaban  5 mg Oral BID    aspirin EC  81 mg Oral Daily    atorvastatin  40 mg Oral Nightly    budesonide-formoterol  2 puff Inhalation BID    vitamin D  50,000 Units Oral Weekly    citalopram  20 mg Oral Daily    folic acid  1 mg Oral Daily    midodrine  10 mg Oral TID WC    nicotine  1 patch TransDERmal Daily    vitamin B-1  100 mg Oral Daily    baricitinib  4 mg Oral Daily        PRN:  ALPRAZolam, 0.25 mg, 4x Daily PRN  guaiFENesin-dextromethorphan, 5 mL, Q4H PRN  oxyCODONE-acetaminophen, 1 tablet, Q6H PRN  morphine, 2 mg, Q4H PRN  LORazepam, 1 mg, Q4H PRN        Infusions:      Laboratory Data:  Recent Labs     02/16/22  0410 02/17/22  0648 02/18/22  0211   WBC 5.4 8.8 7.6 HGB 10.3* 10.6* 10.4*    221 232     Recent Labs     02/16/22  0410 02/17/22  0648 02/18/22  0211    142 144   K 4.3 4.3 4.4    100 104   CO2 33* 33* 32*   BUN 29* 29* 29*   CREATININE 0.2* 0.2* 0.2*   GLUCOSE 134* 118* 159*     Recent Labs     02/16/22  0410 02/17/22  0648 02/18/22  0211   AST 14 15 14   ALT 17 20 22   BILITOT 0.3 0.4 0.3   ALKPHOS 89 86 83     Troponin T: No results for input(s): TROPONINI in the last 72 hours. Pro-BNP: No results for input(s): BNP in the last 72 hours. INR:   No results for input(s): INR in the last 72 hours. UA:No results for input(s): NITRITE, COLORU, PHUR, LABCAST, WBCUA, RBCUA, MUCUS, TRICHOMONAS, YEAST, BACTERIA, CLARITYU, SPECGRAV, LEUKOCYTESUR, UROBILINOGEN, BILIRUBINUR, BLOODU, GLUCOSEU, AMORPHOUS in the last 72 hours. Invalid input(s): Aleatha Brush  A1C: No results for input(s): LABA1C in the last 72 hours. ABG:  No results for input(s): PHART, BID4AEA, PO2ART, LKN2CVG, BEART, HGBAE, P3WSLDEI, CARBOXHGBART in the last 72 hours. Imaging:    XR CHEST PORTABLE    Result Date: 2/11/2022  No significant change in persistent bilateral airspace opacities compared to January 27. Signed by Dr Jair Sky:    Principal Problem:    Acute hypoxemic respiratory failure due to COVID-19 [facility])  Active Problems:    Pneumonia due to COVID-19 virus    Palliative care patient    Severe malnutrition ([facility]    Centrilobular emphysema ([facility]    Malignant neoplasm of upper lobe of right lung ([facility]    Primary squamous cell carcinoma of right lung (HCC)    Acute on chronic respiratory failure with hypoxia and hypercapnia (HCC)    Personal history of noncompliance with medical treatment and regimen  Resolved Problems:    * No resolved hospital problems.  *      Acute on chronic hypoxemic respiratory failure due to COVID-19 pneumonia and COPD exacerbation  As needed supplemental O2 as needed to maintain adequate gas exchange, goal SPO2 88-92%  Wean O2 as able  Pulmonary toileting, trial self proning as able  Continue dexamethasone  Continue baricitinib  Continue bronchodilators  Anticoagulation  Monitor labs      Malnutrition  Nutritional support    Debility  PT/OT    DVT prophylaxis Eliquis    Disposition: TBD.   Considering [id number] Becca Caldwell MD  2/18/2022 2:53 PM

## 2022-02-18 NOTE — PLAN OF CARE
Problem: Airway Clearance - Ineffective  Goal: Achieve or maintain patent airway  Outcome: Ongoing     Problem: Gas Exchange - Impaired  Goal: Absence of hypoxia  Outcome: Ongoing  Goal: Promote optimal lung function  Outcome: Ongoing     Problem: Breathing Pattern - Ineffective  Goal: Ability to achieve and maintain a regular respiratory rate  Outcome: Ongoing     Problem: Body Temperature -  Risk of, Imbalanced  Goal: Ability to maintain a body temperature within defined limits  Outcome: Ongoing  Goal: Will regain or maintain usual level of consciousness  Outcome: Ongoing  Goal: Complications related to the disease process, condition or treatment will be avoided or minimized  Outcome: Ongoing     Problem: Isolation Precautions - Risk of Spread of Infection  Goal: Prevent transmission of infection  Outcome: Ongoing     Problem: Nutrition Deficits  Goal: Optimize nutritional status  Outcome: Ongoing     Problem: Risk for Fluid Volume Deficit  Goal: Maintain normal heart rhythm  Outcome: Ongoing  Goal: Maintain absence of muscle cramping  Outcome: Ongoing  Goal: Maintain normal serum potassium, sodium, calcium, phosphorus, and pH  Outcome: Ongoing     Problem: Loneliness or Risk for Loneliness  Goal: Demonstrate positive use of time alone when socialization is not possible  Outcome: Ongoing     Problem: Fatigue  Goal: Verbalize increase energy and improved vitality  Outcome: Ongoing     Problem: Patient Education: Go to Patient Education Activity  Goal: Patient/Family Education  Outcome: Ongoing     Problem: Falls - Risk of:  Goal: Will remain free from falls  Description: Will remain free from falls  Outcome: Ongoing  Goal: Absence of physical injury  Description: Absence of physical injury  Outcome: Ongoing     Problem: Nutrition  Goal: Optimal nutrition therapy  Outcome: Ongoing     Problem: Pain:  Description: Pain management should include both nonpharmacologic and pharmacologic interventions.   Goal: Pain level will decrease  Description: Pain level will decrease  Outcome: Ongoing  Goal: Control of acute pain  Description: Control of acute pain  Outcome: Ongoing  Goal: Control of chronic pain  Description: Control of chronic pain  Outcome: Ongoing

## 2022-02-19 NOTE — PROGRESS NOTES
Patient:  Delonte Gallagher  YOB: 1955  Date of Service: 2/19/2022  MRN: [id number]   Acct: [de-identified]   Primary Care Physician: DOMINICK Jaffe NP  Advance Directive: [facility]  Admit Date: 2/11/2022       Hospital Day: 8        CHIEF COMPLAINT:  Chief Complaint   Patient presents with    Shortness of Breath         SUBJECTIVE:    No acute events overnight. Denies any worsening dyspnea. No fevers or chills overnight. Remains on high flow oxygen via nasal cannula. Has significant O2 desaturation with minimal movement. CUMULATIVE  HOSPITAL  COURSE:    The patient is a 74 y.o. female who presented to [facility] ER with PMH COPD, home oxygen 4L NC, former smoker  who presented with progressive worsening dyspnea from her baseline over several days and noted to have significant hypoxia with SPO2 down to 60s. Tested positive for Covid with chest x-ray showing bilateral infiltrates, admitted with Covid pneumonia and COPD exacerbation treated with dexamethasone, baricitinib and bronchodilators. Low suspicion for secondary bacterial pneumonia with negative procalcitonin. Patient initially required nonrebreather, subsequently requiring high flow oxygen over hospital course.           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     COPD (chronic obstructive pulmonary disease) (HCC)     Depression     Emphysema (subcutaneous) (surgical) resulting from a procedure     History of lung biopsy 05/16/2019    Malignant neoplasm of right main bronchus ([facility] [place] [address] 03/2019    NSCLC, SCC [id number] stage IIIb    Palliative care patient 11/16/2021    Syncope     TIA (transient ischemic attack)          PAST SURGICAL HISTORY:  Past Surgical History:   Procedure Laterality Date    APPENDECTOMY      BACK SURGERY      times two    BLADDER SUSPENSION      CHOLECYSTECTOMY      HYSTERECTOMY      INCONTINENCE SURGERY          FAMILY HISTORY:  Family History   Problem Relation Age of Onset    Colon Cancer Mother     High Blood Pressure Brother     Diabetes Brother            OBJECTIVE:  /75   Pulse 88   Temp 97 °F (36.1 °C) (Temporal)   Resp 20   Ht 5' 5\" (1.651 m)   Wt 101 lb 7 oz (46 kg)   SpO2 91%   BMI 16.88 kg/m²   No intake/output data recorded.     PHYSICAL  EXAMINATION:    General: Chronically ill-appearing, no acute distress  Psych: Normal mood and affect  HEENT: NC/AT, no scleral icterus  Neck: Trachea midline  Cardiovascular: Normal rate, regular rhythm  Respiratory: Normal respiratory effort at rest, no intercostal retractions, no use of accessory muscles  Abdomen: Nondistended  Neurologic: Alert, conversant, follows commands  Extremities: No clubbing or cyanosis  Skin: No observed lesions or rashes      CURRENT MEDICATIONS:  Scheduled:   dexamethasone  10 mg IntraVENous Q12H    melatonin  5 mg Oral Nightly    vitamin C  500 mg Oral TID    zinc sulfate  50 mg Oral Daily    albuterol sulfate HFA  2 puff Inhalation 4x daily    apixaban  5 mg Oral BID    aspirin EC  81 mg Oral Daily    atorvastatin  40 mg Oral Nightly    budesonide-formoterol  2 puff Inhalation BID    vitamin D  50,000 Units Oral Weekly    citalopram  20 mg Oral Daily    folic acid  1 mg Oral Daily    midodrine  10 mg Oral TID WC    nicotine  1 patch TransDERmal Daily    vitamin B-1  100 mg Oral Daily    baricitinib  4 mg Oral Daily        PRN:  ALPRAZolam, 0.25 mg, 4x Daily PRN  guaiFENesin-dextromethorphan, 5 mL, Q4H PRN  oxyCODONE-acetaminophen, 1 tablet, Q6H PRN  morphine, 2 mg, Q4H PRN  LORazepam, 1 mg, Q4H PRN        Infusions:      Laboratory Data:  Recent Labs     02/17/22  0648 02/18/22  0211 02/19/22  0435   WBC 8.8 7.6 7.2   HGB 10.6* 10.4* 10.6*    232 233     Recent Labs     02/17/22  0648 02/18/22  0211 02/19/22  0435  144 140   K 4.3 4.4 4.4    104 100   CO2 33* 32* 33*   BUN 29* 29* 31*   CREATININE 0.2* 0.2* 0.3*   GLUCOSE 118* 159* 135*     Recent Labs     02/17/22  0648 02/18/22  0211 02/19/22  0435   AST 15 14 14   ALT 20 22 24   BILITOT 0.4 0.3 0.4   ALKPHOS 86 83 81     Troponin T: No results for input(s): TROPONINI in the last 72 hours. Pro-BNP: No results for input(s): BNP in the last 72 hours. INR:   No results for input(s): INR in the last 72 hours. UA:No results for input(s): NITRITE, COLORU, PHUR, LABCAST, WBCUA, RBCUA, MUCUS, TRICHOMONAS, YEAST, BACTERIA, CLARITYU, SPECGRAV, LEUKOCYTESUR, UROBILINOGEN, BILIRUBINUR, BLOODU, GLUCOSEU, AMORPHOUS in the last 72 hours. Invalid input(s): [place]  A1C: No results for input(s): LABA1C in the last 72 hours. ABG:  No results for input(s): PHART, GRO5VRA, PO2ART, BDZ3VCB, BEART, HGBAE, Y2JXIZNU, CARBOXHGBART in the last 72 hours. Imaging:    XR CHEST PORTABLE    Result Date: 2/11/2022  No significant change in persistent bilateral airspace opacities compared to January 27. Signed by Dr Shannan Rodriguez:    Principal Problem:    Acute hypoxemic respiratory failure due to COVID-19 [facility])  Active Problems:    Pneumonia due to COVID-19 virus    Palliative care patient    Severe malnutrition ([address]    Centrilobular emphysema ([facility] [address]    Malignant neoplasm of upper lobe of right lung ([facility] [address]    Primary squamous cell carcinoma of right lung (HCC)    Acute on chronic respiratory failure with hypoxia and hypercapnia (HCC)    Personal history of noncompliance with medical treatment and regimen  Resolved Problems:    * No resolved hospital problems.  *      Acute on chronic hypoxemic respiratory failure due to COVID-19 pneumonia and COPD exacerbation  High flow supplemental oxygen as needed  Goal SPO2 88-92%  Wean O2 as able  Pulmonary toileting  Trial prone positioning if patient can tolerate  Continue dexamethasone  Continue baricitinib  Continue bronchodilators  Anticoagulation  Monitor labs    Malnutrition  Nutritional support    Debility  PT/OT    DVT prophylaxis Eliquis    Disposition: TBD.   Considering [id number] Paulette  2/19/2022 3:17 PM

## 2022-02-20 NOTE — PROGRESS NOTES
Procedure Laterality Date    APPENDECTOMY      BACK SURGERY      times two    BLADDER SUSPENSION      CHOLECYSTECTOMY      HYSTERECTOMY      INCONTINENCE SURGERY          FAMILY HISTORY:  Family History   Problem Relation Age of Onset    Colon Cancer Mother     High Blood Pressure Brother     Diabetes Brother            OBJECTIVE:  BP 97/61   Pulse 90   Temp 96.4 °F (35.8 °C) (Temporal)   Resp 20   Ht 5' 5\" (1.651 m)   Wt 101 lb 7 oz (46 kg)   SpO2 92%   BMI 16.88 kg/m²   No intake/output data recorded.     PHYSICAL  EXAMINATION:    General: Chronically ill-appearing, no acute distress  Psych: Normal mood and affect  HEENT: NC/AT, no scleral icterus  Neck: Trachea midline  Cardiovascular: Normal rate, regular rhythm  Respiratory: Normal respiratory effort at rest, no intercostal retractions, no use of accessory muscles  Abdomen: Nondistended  Neurologic: Alert, conversant, follows commands  Extremities: No clubbing or cyanosis  Skin: No observed lesions or rashes      CURRENT MEDICATIONS:  Scheduled:   [START ON 2/21/2022] dexamethasone  10 mg IntraVENous Q24H    dexamethasone  10 mg IntraVENous Q12H    melatonin  5 mg Oral Nightly    vitamin C  500 mg Oral TID    zinc sulfate  50 mg Oral Daily    albuterol sulfate HFA  2 puff Inhalation 4x daily    apixaban  5 mg Oral BID    aspirin EC  81 mg Oral Daily    atorvastatin  40 mg Oral Nightly    budesonide-formoterol  2 puff Inhalation BID    vitamin D  50,000 Units Oral Weekly    citalopram  20 mg Oral Daily    folic acid  1 mg Oral Daily    midodrine  10 mg Oral TID WC    nicotine  1 patch TransDERmal Daily    vitamin B-1  100 mg Oral Daily    baricitinib  4 mg Oral Daily        PRN:  ALPRAZolam, 0.25 mg, 4x Daily PRN  guaiFENesin-dextromethorphan, 5 mL, Q4H PRN  oxyCODONE-acetaminophen, 1 tablet, Q6H PRN  morphine, 2 mg, Q4H PRN  LORazepam, 1 mg, Q4H PRN        Infusions:      Laboratory Data:  Recent Labs     02/18/22  0788 02/19/22 0435 02/20/22 0453   WBC 7.6 7.2 8.9   HGB 10.4* 10.6* 10.3*    233 244     Recent Labs     02/18/22 0211 02/19/22 0435 02/20/22 0453    140 141   K 4.4 4.4 4.6    100 100   CO2 32* 33* 29   BUN 29* 31* 23   CREATININE 0.2* 0.3* 0.5   GLUCOSE 159* 135* 119*     Recent Labs     02/18/22 0211 02/19/22 0435 02/20/22 0453   AST 14 14 14   ALT 22 24 25   BILITOT 0.3 0.4 0.4   ALKPHOS 83 81 78     Troponin T: No results for input(s): TROPONINI in the last 72 hours. Pro-BNP: No results for input(s): BNP in the last 72 hours. INR:   No results for input(s): INR in the last 72 hours. UA:No results for input(s): NITRITE, COLORU, PHUR, LABCAST, WBCUA, RBCUA, MUCUS, TRICHOMONAS, YEAST, BACTERIA, CLARITYU, SPECGRAV, LEUKOCYTESUR, UROBILINOGEN, BILIRUBINUR, BLOODU, GLUCOSEU, AMORPHOUS in the last 72 hours. Invalid input(s): Darliss Knock  A1C: No results for input(s): LABA1C in the last 72 hours. ABG:  No results for input(s): PHART, OFX2VSZ, PO2ART, TZJ3KLM, BEART, HGBAE, I9PYAFIC, CARBOXHGBART in the last 72 hours. Imaging:    XR CHEST PORTABLE    Result Date: 2/11/2022  No significant change in persistent bilateral airspace opacities compared to January 27. Signed by Dr Becky Sunshine:    Principal Problem:    Acute hypoxemic respiratory failure due to COVID-19 [facility])  Active Problems:    Pneumonia due to COVID-19 virus    Palliative care patient    Severe malnutrition ([facility] [address]    Centrilobular emphysema ([facility] [address]    Malignant neoplasm of upper lobe of right lung ([facility] [address]    Primary squamous cell carcinoma of right lung (HCC)    Acute on chronic respiratory failure with hypoxia and hypercapnia (HCC)    Personal history of noncompliance with medical treatment and regimen  Resolved Problems:    * No resolved hospital problems.  *      Acute on chronic hypoxemic respiratory failure due to COVID-19 pneumonia and COPD exacerbation  High flow supplemental oxygen as needed  Goal SPO2 88-92%  Wean O2 as able  Pulmonary toileting  Continue dexamethasone, will taper steroids  Continue baricitinib  Continue bronchodilators  Anticoagulation  Monitor labs    Malnutrition  Nutritional support    Debility  PT/OT    DVT prophylaxis Eliquis    Disposition: TBD.   Considering Aleks Roth MD  2/20/2022 3:08 PM

## 2022-02-21 NOTE — PROGRESS NOTES
Patient:  Murlene Barthel  YOB: 1955  Date of Service: 2/21/2022  MRN: [id number]   Acct: [de-identified]   Primary Care Physician: DOMINICK Kwon NP  Advance Directive: [facility]  Admit Date: 2/11/2022       Hospital Day: 8        CHIEF COMPLAINT:  Chief Complaint   Patient presents with    Shortness of Breath         SUBJECTIVE:    No acute events overnight. Supplemental O2 has been weaned slightly down to 13 L, remaining on high flow. Denies any worsening dyspnea. She does have intermittent dry cough. No fevers or chills. CUMULATIVE  HOSPITAL  COURSE:    The patient is a 74 y.o. female who presented to [facility] ER with PMH COPD, home oxygen 4L NC, former smoker  who presented with progressive worsening dyspnea from her baseline over several days and noted to have significant hypoxia with SPO2 down to 60s. Tested positive for Covid with chest x-ray showing bilateral infiltrates, admitted with Covid pneumonia and COPD exacerbation treated with dexamethasone, baricitinib and bronchodilators. Low suspicion for secondary bacterial pneumonia with negative procalcitonin. Patient initially required nonrebreather, subsequently requiring high flow oxygen over hospital course.           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     COPD (chronic obstructive pulmonary disease) (HCC)     Depression     Emphysema (subcutaneous) (surgical) resulting from a procedure     History of lung biopsy 05/16/2019    Malignant neoplasm of right main bronchus ([facility] [address] 03/2019    NSCLC, SCC [id number] stage IIIb    Palliative care patient 11/16/2021    Syncope     TIA (transient ischemic attack)          PAST SURGICAL HISTORY:  Past Surgical History:   Procedure Laterality Date    APPENDECTOMY      BACK SURGERY      times two    BLADDER SUSPENSION      CHOLECYSTECTOMY      HYSTERECTOMY      INCONTINENCE SURGERY          FAMILY HISTORY:  Family History   Problem Relation Age of Onset    Colon Cancer Mother     High Blood Pressure Brother     Diabetes Brother            OBJECTIVE:  BP 96/61   Pulse 88   Temp 98.6 °F (37 °C) (Temporal)   Resp 20   Ht 5' 5\" (1.651 m)   Wt 101 lb 7 oz (46 kg)   SpO2 92%   BMI 16.88 kg/m²   I/O this shift:  In: 600 [P.O.:600]  Out: 600 [Urine:600]    PHYSICAL  EXAMINATION:    General: Chronically ill-appearing, no acute distress  Psych: Normal mood and affect  HEENT: NC/AT, no scleral icterus  Neck: Trachea midline  Cardiovascular: Normal rate, regular rhythm  Respiratory: Normal respiratory effort, no intercostal retractions, no use of accessory muscles  Abdomen: Nondistended  Neurologic: Alert, conversant, follows commands  Extremities: No clubbing or cyanosis  Skin: No observed lesions or rashes      CURRENT MEDICATIONS:  Scheduled:   dexamethasone  10 mg IntraVENous Q24H    melatonin  5 mg Oral Nightly    vitamin C  500 mg Oral TID    zinc sulfate  50 mg Oral Daily    albuterol sulfate HFA  2 puff Inhalation 4x daily    apixaban  5 mg Oral BID    aspirin EC  81 mg Oral Daily    atorvastatin  40 mg Oral Nightly    budesonide-formoterol  2 puff Inhalation BID    vitamin D  50,000 Units Oral Weekly    citalopram  20 mg Oral Daily    folic acid  1 mg Oral Daily    midodrine  10 mg Oral TID WC    nicotine  1 patch TransDERmal Daily    vitamin B-1  100 mg Oral Daily    baricitinib  4 mg Oral Daily        PRN:  ALPRAZolam, 0.25 mg, 4x Daily PRN  guaiFENesin-dextromethorphan, 5 mL, Q4H PRN  oxyCODONE-acetaminophen, 1 tablet, Q6H PRN  morphine, 2 mg, Q4H PRN  LORazepam, 1 mg, Q4H PRN        Infusions:      Laboratory Data:  Recent Labs     02/19/22  0435 02/20/22  0453 02/21/22  0234   WBC 7.2 8.9 9.0   HGB 10.6* 10.3* 10.6*    244 236     Recent Labs     02/19/22 0435 02/20/22  0453 02/21/22  0234    141 140   K 4.4 4.6 4.4    100 101   CO2 33* 29 28   BUN 31* 23 27*   CREATININE 0.3* 0.5 0.2*   GLUCOSE 135* 119* 155*     Recent Labs     02/19/22  0435 02/20/22  0453 02/21/22  0234   AST 14 14 12   ALT 24 25 25   BILITOT 0.4 0.4 0.4   ALKPHOS 81 78 82     Troponin T: No results for input(s): TROPONINI in the last 72 hours. Pro-BNP: No results for input(s): BNP in the last 72 hours. INR:   No results for input(s): INR in the last 72 hours. UA:No results for input(s): NITRITE, COLORU, PHUR, LABCAST, WBCUA, RBCUA, MUCUS, TRICHOMONAS, YEAST, BACTERIA, CLARITYU, SPECGRAV, LEUKOCYTESUR, UROBILINOGEN, BILIRUBINUR, BLOODU, GLUCOSEU, AMORPHOUS in the last 72 hours. Invalid input(s): Darliss Knock  A1C: No results for input(s): LABA1C in the last 72 hours. ABG:  No results for input(s): PHART, NQC3VTK, PO2ART, OZS0QNW, BEART, HGBAE, A0JWZQNK, CARBOXHGBART in the last 72 hours. Imaging:    XR CHEST PORTABLE    Result Date: 2/11/2022  No significant change in persistent bilateral airspace opacities compared to January 27. Signed by Dr Becky Sunshine:    Principal Problem:    Acute hypoxemic respiratory failure due to COVID-19 [facility])  Active Problems:    Pneumonia due to COVID-19 virus    Palliative care patient    Severe malnutrition ([address]    Centrilobular emphysema ([facility] [address]    Malignant neoplasm of upper lobe of right lung ([facility] [address]    Primary squamous cell carcinoma of right lung (HCC)    Acute on chronic respiratory failure with hypoxia and hypercapnia (HCC)    Personal history of noncompliance with medical treatment and regimen  Resolved Problems:    * No resolved hospital problems.  *      Acute on chronic hypoxemic respiratory failure due to COVID-19 pneumonia and COPD exacerbation  High flow supplemental oxygen as needed  Goal SPO2 88-92%  Continue weaning O2 as tolerated  Pulmonary toileting  Continue dexamethasone with taper  Continue baricitinib  Continue bronchodilators  Anticoagulation  Monitor labs    Malnutrition  Nutritional support    Debility  PT/OT    DVT prophylaxis Eliquis    Disposition: TBD.  Referred to [facility] however no bed available      Jyothi Torres MD  2/21/2022 4:05 PM

## 2022-02-22 NOTE — PROGRESS NOTES
Patient:  Pan Larios  YOB: 1955  Date of Service: 2/22/2022  MRN: [id number]   Acct: [de-identified]   Primary Care Physician: DOMINICK Willis NP  Advance Directive: [facility]  Admit Date: 2/11/2022       Hospital Day: 6        CHIEF COMPLAINT:  Chief Complaint   Patient presents with    Shortness of Breath         SUBJECTIVE:    No acute events overnight. Supplemental oxygen has been weaned down to 11 L. Continues to have a cough however no worsening dyspnea. CUMULATIVE  HOSPITAL  COURSE:    The patient is a 74 y.o. female who presented to [facility] ER with PMH COPD, home oxygen 4L NC, former smoker  who presented with progressive worsening dyspnea from her baseline over several days and noted to have significant hypoxia with SPO2 down to 60s. Tested positive for Covid with chest x-ray showing bilateral infiltrates, admitted with Covid pneumonia and COPD exacerbation treated with dexamethasone, baricitinib and bronchodilators. Low suspicion for secondary bacterial pneumonia with negative procalcitonin. Patient initially required nonrebreather, subsequently requiring high flow oxygen over hospital course.           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     COPD (chronic obstructive pulmonary disease) (HCC)     Depression     Emphysema (subcutaneous) (surgical) resulting from a procedure     History of lung biopsy 05/16/2019    Malignant neoplasm of right main bronchus ([address] 03/2019    NSCLC, SCC [id number] stage IIIb    Palliative care patient 11/16/2021    Syncope     TIA (transient ischemic attack)          PAST SURGICAL HISTORY:  Past Surgical History:   Procedure Laterality Date    APPENDECTOMY      BACK SURGERY      times two    BLADDER SUSPENSION      CHOLECYSTECTOMY      HYSTERECTOMY  INCONTINENCE SURGERY          FAMILY HISTORY:  Family History   Problem Relation Age of Onset    Colon Cancer Mother     High Blood Pressure Brother     Diabetes Brother            OBJECTIVE:  /67   Pulse 90   Temp 96.7 °F (35.9 °C) (Temporal)   Resp 17   Ht 5' 5\" (1.651 m)   Wt 101 lb 7 oz (46 kg)   SpO2 96%   BMI 16.88 kg/m²   I/O this shift:  In: 340 [P.O.:340]  Out: -     PHYSICAL  EXAMINATION:    General: Chronically ill-appearing, no acute distress  Psych: Normal mood and affect  HEENT: NC/AT, no scleral icterus  Neck: Trachea midline  Cardiovascular: Normal rate, regular rhythm  Respiratory: Normal respiratory effort, no intercostal retractions, no use of accessory muscles  Abdomen: Nondistended  Neurologic: Alert, conversant, follows commands  Extremities: No clubbing or cyanosis  Skin: No observed lesions or rashes      CURRENT MEDICATIONS:  Scheduled:   dexamethasone  10 mg IntraVENous Q24H    melatonin  5 mg Oral Nightly    vitamin C  500 mg Oral TID    zinc sulfate  50 mg Oral Daily    albuterol sulfate HFA  2 puff Inhalation 4x daily    apixaban  5 mg Oral BID    aspirin EC  81 mg Oral Daily    atorvastatin  40 mg Oral Nightly    budesonide-formoterol  2 puff Inhalation BID    vitamin D  50,000 Units Oral Weekly    citalopram  20 mg Oral Daily    folic acid  1 mg Oral Daily    midodrine  10 mg Oral TID WC    nicotine  1 patch TransDERmal Daily    vitamin B-1  100 mg Oral Daily    baricitinib  4 mg Oral Daily        PRN:  ALPRAZolam, 0.25 mg, 4x Daily PRN  guaiFENesin-dextromethorphan, 5 mL, Q4H PRN  oxyCODONE-acetaminophen, 1 tablet, Q6H PRN  morphine, 2 mg, Q4H PRN  LORazepam, 1 mg, Q4H PRN        Infusions:      Laboratory Data:  Recent Labs     02/20/22  0453 02/21/22  0234 02/22/22  0421   WBC 8.9 9.0 11.0*   HGB 10.3* 10.6* 10.6*    236 247     Recent Labs     02/20/22  0453 02/21/22  0234 02/22/22  0421    140 138   K 4.6 4.4 4.5    101 100 CO2 29 28 31*   BUN 23 27* 32*   CREATININE 0.5 0.2* 0.2*   GLUCOSE 119* 155* 94     Recent Labs     02/20/22  0453 02/21/22  0234 02/22/22  0421   AST 14 12 13   ALT 25 25 27   BILITOT 0.4 0.4 0.4   ALKPHOS 78 82 78     Troponin T: No results for input(s): TROPONINI in the last 72 hours. Pro-BNP: No results for input(s): BNP in the last 72 hours. INR:   No results for input(s): INR in the last 72 hours. UA:No results for input(s): NITRITE, COLORU, PHUR, LABCAST, WBCUA, RBCUA, MUCUS, TRICHOMONAS, YEAST, BACTERIA, CLARITYU, SPECGRAV, LEUKOCYTESUR, UROBILINOGEN, BILIRUBINUR, BLOODU, GLUCOSEU, AMORPHOUS in the last 72 hours. Invalid input(s): Ardine Dear  A1C: No results for input(s): LABA1C in the last 72 hours. ABG:  No results for input(s): PHART, PHS1RXP, PO2ART, SWQ9ZKZ, BEART, HGBAE, Z1FXWWIW, CARBOXHGBART in the last 72 hours. Imaging:    XR CHEST PORTABLE    Result Date: 2/11/2022  No significant change in persistent bilateral airspace opacities compared to January 27. Signed by Dr Lia Newman:    Principal Problem:    Acute hypoxemic respiratory failure due to COVID-19 [facility])  Active Problems:    Pneumonia due to COVID-19 virus    Palliative care patient    Severe malnutrition ([facility]    Centrilobular emphysema ([facility]    Malignant neoplasm of upper lobe of right lung ([facility]    Primary squamous cell carcinoma of right lung (HCC)    Acute on chronic respiratory failure with hypoxia and hypercapnia (HCC)    Personal history of noncompliance with medical treatment and regimen  Resolved Problems:    * No resolved hospital problems.  *      Acute on chronic hypoxemic respiratory failure due to COVID-19 pneumonia and COPD exacerbation  High flow supplemental oxygen as needed  Goal SPO2 88-92%  Continue weaning O2 as tolerated  Pulmonary toileting  Continue dexamethasone with taper  Continue baricitinib  Continue bronchodilators  Anticoagulation  Monitor labs    Malnutrition  Nutritional support    Debility  PT/OT    DVT prophylaxis Eliquis    Disposition: TBD.  Referred to [facility] however no bed available      Jaylin Wayne MD  2/22/2022 5:06 PM

## 2022-02-23 NOTE — PROGRESS NOTES
Continuous  Pain Onset: On-going  Clinical Progression: Not changed  Functional Pain Assessment: Activities are not prevented  Non-Pharmaceutical Pain Intervention(s): Repositioned; Ambulation/Increased Activity  Response to Pain Intervention: Patient Satisfied  Vital Signs  Patient Currently in Pain: Yes  Social/Functional History  Social/Functional History  Lives With: Son  Type of Home: House  ADL Assistance: Independent  Ambulation Assistance: Independent  Transfer Assistance: Independent       Objective   Vision: Within Functional Limits  Hearing: Within functional limits    Orientation  Overall Orientation Status: Within Normal Limits  Observation/Palpation  Observation: SITTING UP TALL IN BED EATING LUNCH. NOTED SP02 TO BE 96% WHEN PULSE OX READING PROPERLY     ADL  Feeding: Independent  Grooming: Independent  UE Bathing: Supervision  LE Bathing: Minimal assistance  UE Dressing: Supervision  LE Dressing: Minimal assistance  Toileting: Supervision  Additional Comments: Edil due to needing extra restbreaks           Transfers  Stand Step Transfers: Stand by assistance  Sit to stand: Supervision  Transfer Comments: CGA to move around room but SBA for BSC tfers     Cognition  Overall Cognitive Status: WNL                 LUE AROM (degrees)  LUE AROM : WFL  RUE AROM (degrees)  RUE AROM : WFL  LUE Strength  Gross LUE Strength: WFL  RUE Strength  Gross RUE Strength: WFL                   Plan   Plan  Times per week: 3-5x/week  Times per day: Daily    G-Code     OutComes Score                                                  AM-PAC Score             Goals  Short term goals  Time Frame for Short term goals: 1 week  Short term goal 1: Modified I with toilet tfers  Short term goal 2: Modified I with LB dsg without fatigue  Long term goals  Long term goal 1: Return to PLOF       Therapy Time   Individual Concurrent Group Co-treatment   Time In           Time Out           Minutes                   Michael Parson OT

## 2022-02-23 NOTE — PROGRESS NOTES
Patient:  Sarmad Espinal  YOB: 1955  Date of Service: 2/23/2022  MRN: [id number]   Acct: [de-identified]   Primary Care Physician: DOMINICK Rondon NP  Advance Directive: [facility]  Admit Date: 2/11/2022       Hospital Day: 15        CHIEF COMPLAINT:  Chief Complaint   Patient presents with    Shortness of Breath         SUBJECTIVE:    No acute events overnight. Patient remains on high flow supplemental oxygen. Denies any worsening dyspnea. Cough has improved some. CUMULATIVE  HOSPITAL  COURSE:    The patient is a 74 y.o. female who presented to [facility] ER with PMH COPD, home oxygen 4L NC, former smoker  who presented with progressive worsening dyspnea from her baseline over several days and noted to have significant hypoxia with SPO2 down to 60s. Tested positive for Covid with chest x-ray showing bilateral infiltrates, admitted with Covid pneumonia and COPD exacerbation treated with dexamethasone, baricitinib and bronchodilators. Low suspicion for secondary bacterial pneumonia with negative procalcitonin. Patient initially required nonrebreather, subsequently requiring high flow oxygen over hospital course.           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     COPD (chronic obstructive pulmonary disease) (HCC)     Depression     Emphysema (subcutaneous) (surgical) resulting from a procedure     History of lung biopsy 05/16/2019    Malignant neoplasm of right main bronchus ([facility] 03/2019    NSCLC, SCC [id number] stage IIIb    Palliative care patient 11/16/2021    Syncope     TIA (transient ischemic attack)          PAST SURGICAL HISTORY:  Past Surgical History:   Procedure Laterality Date    APPENDECTOMY      BACK SURGERY      times two    BLADDER SUSPENSION      CHOLECYSTECTOMY      HYSTERECTOMY  INCONTINENCE SURGERY          FAMILY HISTORY:  Family History   Problem Relation Age of Onset    Colon Cancer Mother     High Blood Pressure Brother     Diabetes Brother            OBJECTIVE:  BP 97/67   Pulse 104   Temp 98 °F (36.7 °C) (Temporal)   Resp 16   Ht 5' 5\" (1.651 m)   Wt 101 lb 7 oz (46 kg)   SpO2 95%   BMI 16.88 kg/m²   No intake/output data recorded.     PHYSICAL  EXAMINATION:    General: Chronically ill-appearing, no acute distress  Psych: Normal mood and affect  HEENT: NC/AT, no scleral icterus  Neck: Trachea midline  Cardiovascular: Normal rate, regular rhythm  Respiratory: Normal respiratory effort, no intercostal retractions, no use of accessory muscles  Abdomen: Nondistended  Neurologic: Alert, conversant, follows commands  Extremities: No clubbing or cyanosis  Skin: No observed lesions or rashes      CURRENT MEDICATIONS:  Scheduled:   dexamethasone  10 mg IntraVENous Q24H    melatonin  5 mg Oral Nightly    vitamin C  500 mg Oral TID    zinc sulfate  50 mg Oral Daily    albuterol sulfate HFA  2 puff Inhalation 4x daily    apixaban  5 mg Oral BID    aspirin EC  81 mg Oral Daily    atorvastatin  40 mg Oral Nightly    budesonide-formoterol  2 puff Inhalation BID    vitamin D  50,000 Units Oral Weekly    citalopram  20 mg Oral Daily    folic acid  1 mg Oral Daily    midodrine  10 mg Oral TID WC    nicotine  1 patch TransDERmal Daily    vitamin B-1  100 mg Oral Daily    baricitinib  4 mg Oral Daily        PRN:  ALPRAZolam, 0.25 mg, 4x Daily PRN  guaiFENesin-dextromethorphan, 5 mL, Q4H PRN  oxyCODONE-acetaminophen, 1 tablet, Q6H PRN  morphine, 2 mg, Q4H PRN  LORazepam, 1 mg, Q4H PRN        Infusions:      Laboratory Data:  Recent Labs     02/21/22  0234 02/22/22  0421 02/23/22  0550   WBC 9.0 11.0* 12.4*   HGB 10.6* 10.6* 9.8*    247 232     Recent Labs     02/21/22  0234 02/22/22  0421 02/23/22  0550    138 141   K 4.4 4.5 4.1    100 99   CO2 28 31* 30* BUN 27* 32* 23   CREATININE 0.2* 0.2* 0.2*   GLUCOSE 155* 94 89     Recent Labs     02/21/22  0234 02/22/22  0421 02/23/22  0550   AST 12 13 11   ALT 25 27 24   BILITOT 0.4 0.4 0.3   ALKPHOS 82 78 74     Troponin T: No results for input(s): TROPONINI in the last 72 hours. Pro-BNP: No results for input(s): BNP in the last 72 hours. INR:   No results for input(s): INR in the last 72 hours. UA:No results for input(s): NITRITE, COLORU, PHUR, LABCAST, WBCUA, RBCUA, MUCUS, TRICHOMONAS, YEAST, BACTERIA, CLARITYU, SPECGRAV, LEUKOCYTESUR, UROBILINOGEN, BILIRUBINUR, BLOODU, GLUCOSEU, AMORPHOUS in the last 72 hours. Invalid input(s): Quillian Bonnie  A1C: No results for input(s): LABA1C in the last 72 hours. ABG:  No results for input(s): PHART, XFH2RLQ, PO2ART, SGQ3EQV, BEART, HGBAE, J0WZVOBB, CARBOXHGBART in the last 72 hours. Imaging:    XR CHEST PORTABLE    Result Date: 2/11/2022  No significant change in persistent bilateral airspace opacities compared to January 27. Signed by Dr Cisneros Come:    Principal Problem:    Acute hypoxemic respiratory failure due to COVID-19 [facility])  Active Problems:    Pneumonia due to COVID-19 virus    Palliative care patient    Severe malnutrition ([facility]    Centrilobular emphysema ([facility]    Malignant neoplasm of upper lobe of right lung ([facility]    Primary squamous cell carcinoma of right lung (HCC)    Acute on chronic respiratory failure with hypoxia and hypercapnia (HCC)    Personal history of noncompliance with medical treatment and regimen  Resolved Problems:    * No resolved hospital problems.  *      Acute on chronic hypoxemic respiratory failure due to COVID-19 pneumonia and COPD exacerbation  Continue supplemental O2 as needed to maintain adequate gas exchange, remains on high flow O2  Goal SPO2 88-92%  Continue weaning O2 as tolerated  Pulmonary toileting  Continue dexamethasone with taper  Continue baricitinib  Continue bronchodilators  Anticoagulation  Monitor labs    Malnutrition  Nutritional support    Debility  PT/OT    DVT prophylaxis Eliquis    Disposition: TBD.  Referred to [facility], pre-CERT initiated today      Mikal Hall MD  2/23/2022 2:10 PM

## 2022-02-23 NOTE — CARE COORDINATION
Per Lazarus Model, pre-cert initiated for LTAC today, 2/23/2022. [address] [place] [address] [phone]  F [phone]  Electronically signed by Lela Pastor RN on 2/23/2022 at 10:37 AM    Patient's insurance requesting PT/OT information for pre-cert. PT/OT Evals ordered. Montse Otto, with PT, to request prioritization of Evals.   Electronically signed by Lela Pastor RN on 2/23/2022 at 12:43 PM

## 2022-02-23 NOTE — PROGRESS NOTES
Nutrition Assessment     Type and Reason for Visit: Reassess    Nutrition Assessment:  Pt continues to improve nutritionally AEB most meals >75% since previous assessment. No new weight available Continue ONS and current diet. Malnutrition Assessment:  Malnutrition Status: Severe malnutrition    Nutrition Related Findings: HFNC      Current Nutrition Therapies:    ADULT DIET; Regular  ADULT ORAL NUTRITION SUPPLEMENT; Breakfast, Lunch, Dinner; Standard 4 oz Oral Supplement    Anthropometric Measures:  · Height: 5' 5\" (165.1 cm)  · Current Body Wt: 101 lb 7 oz (46 kg)   · BMI: 16.9    Nutrition Interventions:   Food and/or Nutrient Delivery:  Continue Current Diet,Continue Oral Nutrition Supplement   Coordination of Nutrition Care:  Continue to monitor while inpatient    Goals:  PO >/= 50% meals and ONS. Wt stable. Maintain skin integrity.        Nutrition Monitoring and Evaluation:   Food/Nutrient Intake Outcomes:  Food and Nutrient Intake,Supplement Intake  Physical Signs/Symptoms Outcomes:  Biochemical Data,Skin,Weight     Discharge Planning:    Continue Oral Nutrition Supplement     Electronically signed by Ngozi Galvan MS, RD, LD on 2/23/22 at 9:55 AM CST    Contact: [phone]

## 2022-02-23 NOTE — PROGRESS NOTES
Physical Therapy    Facility/Department: [facility] ONCOLOGY UNIT  Initial Assessment    NAME: Aidee Cates  : 1955  MRN: [id number]    Date of Service: 2022    Discharge Recommendations:  Continue to assess pending progress,Patient would benefit from continued therapy after discharge   PT Equipment Recommendations  Other: ASSESSING    Assessment   Body structures, Functions, Activity limitations: Decreased functional mobility ; Decreased endurance  Assessment: pt WOULD BENEFIT FROM SKILED PT TO PROGRESS HER GENERAL STRENGTH AND MOBILITY  Prognosis: Good  Decision Making: Low Complexity  PT Education: Goals;PT Role;Plan of Care;General Safety;Gait Training;Transfer Training;Functional Mobility Training  REQUIRES PT FOLLOW UP: Yes  Activity Tolerance  Activity Tolerance: Patient Tolerated treatment well       Patient Diagnosis(es): The primary encounter diagnosis was Acute on chronic respiratory failure with hypoxia and hypercapnia ([address] Diagnoses of COPD with acute exacerbation ([address] and COVID-19 virus infection were also pertinent to this visit. has a past medical history of Anxiety, Asthma, 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, COPD (chronic obstructive pulmonary disease) ([address] Depression, Emphysema (subcutaneous) (surgical) resulting from a procedure, History of lung biopsy, Malignant neoplasm of right main bronchus [facility]), Palliative care patient, Syncope, and TIA (transient ischemic attack). has a past surgical history that includes Appendectomy; back surgery; Hysterectomy; bladder suspension; Cholecystectomy; and Incontinence surgery. Restrictions     Vision/Hearing  Vision: Within Functional Limits  Hearing: Within functional limits     Subjective  General  Patient assessed for rehabilitation services?: Yes  Diagnosis: COVID, RESP FAILURE.  HX OF COPD AND WEARS O2 AT HOME  Follows Commands: Within Functional Limits  Subjective  Subjective: pT STATES SHE HAS BEEN ALLOWED TO BE IND WITH TF TO THE BS  AND REPORTS NO DIZZINESS OR DIFFICULTY WITH THIS  Pain Screening  Patient Currently in Pain: Yes  Pain Assessment  Pain Assessment: Faces  Sales-Baker Pain Rating: Hurts a little bit  Patient's Stated Pain Goal: No pain  Pain Type: Chronic pain  Pain Location: Back  Pain Descriptors: Aching;Discomfort  Pain Frequency: Continuous  Pain Onset: On-going  Clinical Progression: Not changed  Functional Pain Assessment: Activities are not prevented  Non-Pharmaceutical Pain Intervention(s): Repositioned; Ambulation/Increased Activity  Response to Pain Intervention: Patient Satisfied  Vital Signs  Patient Currently in Pain: Yes  Pre Treatment Pain Screening  Intervention List: Patient able to continue with treatment    Orientation  Orientation  Overall Orientation Status: Within Functional Limits  Social/Functional History  Social/Functional History  Lives With: Son  Type of Home: House  ADL Assistance: Independent  Ambulation Assistance: Independent  Transfer Assistance: Independent  Cognition        Objective     Observation/Palpation  Observation: SITTING UP TALL IN BED EATING LUNCH. NOTED SP02 TO BE 96% WHEN PULSE OX READING PROPERLY    AROM RLE (degrees)  RLE AROM: WFL  AROM LLE (degrees)  LLE AROM : WFL  Strength RLE  Strength RLE: WFL  Strength LLE  Strength LLE: WFL        Bed mobility  Supine to Sit: Independent  Sit to Supine: Independent  Transfers  Sit to Stand: Supervision  Stand to sit: Supervision  Bed to Chair: Supervision  Stand Pivot Transfers: Supervision  Ambulation  Ambulation?: Yes  Ambulation 1  Surface: level tile  Device: No Device  Other Apparatus: O2  Assistance: Contact guard assistance  Quality of Gait: NOTED LE'S TO BE \"SHAKY\" AND Pt WAS GUARDED BUT NO LOB  Gait Deviations: Slow Marisel;Decreased step length;Decreased step height  Distance: 15 FT X 2  Comments: REPORTED ONLY MILD SOA     Balance  Posture: Fair  Sitting - Static: Good  Sitting - Dynamic: Good  Standing - Static: Fair;+  Standing - Dynamic: Fair;+        Plan   Plan  Times per week: 4-5  Plan weeks: 2  Current Treatment Recommendations: Strengthening,Balance Training,Functional Mobility Training,Transfer Training,Gait Training,Patient/Caregiver Education & Training,Safety Education & Training  Plan Comment: 02  Safety Devices  Type of devices: Call light within reach,Left in bed,Gait belt         Goals  Short term goals  Time Frame for Short term goals: 2 WKS  Short term goal 1:  FT WITH AD AS INDICATED, SBA       Therapy Time   Individual Concurrent Group Co-treatment   Time In           Time Out           Minutes                   Malu Collins PT     Electronically signed by Malu Collins PT on 2/23/2022 at 1:58 PM

## 2022-02-23 NOTE — PROGRESS NOTES
Visited with pt to provide spiritual care. Pt says she is some better. This  provided spiritual care with sustaining presence, nurtured hope, and prayer. Pt expressed gratitude for spiritual care.     Electronically signed by Hanna Persaud on 2/23/2022 at 12:39 PM

## 2022-02-24 NOTE — PROGRESS NOTES
Patient's sister Traci Zamarripa called to check on her. Traci Zamarripa not listed in emergency contacts. Pt gave this nurse verbal permission to provide update.  Electronically signed by Komal Ortiz RN on 2/23/2022 at 6:42 PM

## 2022-02-24 NOTE — PLAN OF CARE
Problem: Airway Clearance - Ineffective  Goal: Achieve or maintain patent airway  Outcome: Ongoing     Problem: Gas Exchange - Impaired  Goal: Absence of hypoxia  Outcome: Ongoing  Goal: Promote optimal lung function  Outcome: Ongoing     Problem: Breathing Pattern - Ineffective  Goal: Ability to achieve and maintain a regular respiratory rate  Outcome: Ongoing     Problem:  Body Temperature -  Risk of, Imbalanced  Goal: Ability to maintain a body temperature within defined limits  Outcome: Ongoing  Goal: Will regain or maintain usual level of consciousness  Outcome: Ongoing  Goal: Complications related to the disease process, condition or treatment will be avoided or minimized  Outcome: Ongoing     Problem: Isolation Precautions - Risk of Spread of Infection  Goal: Prevent transmission of infection  Outcome: Ongoing     Problem: Nutrition Deficits  Goal: Optimize nutritional status  Outcome: Ongoing

## 2022-02-24 NOTE — PROGRESS NOTES
O2 wean attempted this AM.  Upon assessment patient was on 8 liters high flow via NC SATs fluctuating between 92-93 . Turned O2 down to 6 liters within 10 seconds O2 decreased to 84%. Titrated to 7 liters O2 remained below 89%. Currently back at 8 liters and SATS maintaining at 92%. All testing was done with patient in fowlers position in bed. Ambulated patient to chair at bedside on current rate of 8 liters and patients SATS dropped to 86%. SATS returned to 92% after approximately 30 seconds. This nurse did not attempt to titrate O2 with patient ambulation or exertion.

## 2022-02-24 NOTE — PROGRESS NOTES
Patient:  Shelley Oglesby  YOB: 1955  Date of Service: 2/24/2022  MRN: [id number]   Acct: [de-identified]   Primary Care Physician: DOMINICK Romero NP  Advance Directive: [facility]  Admit Date: 2/11/2022       Hospital Day: 15        CHIEF COMPLAINT:  Chief Complaint   Patient presents with    Shortness of Breath         SUBJECTIVE:    No acute events overnight. O2 weaned down to 8L. Still with desaturation on minimal exertion. Denies any worsening dyspnea. Cough improved. CUMULATIVE  HOSPITAL  COURSE:    The patient is a 74 y.o. female who presented to [facility] ER with PMH COPD, home oxygen 4L NC, former smoker  who presented with progressive worsening dyspnea from her baseline over several days and noted to have significant hypoxia with SPO2 down to 60s. Tested positive for Covid with chest x-ray showing bilateral infiltrates, admitted with Covid pneumonia and COPD exacerbation treated with dexamethasone, baricitinib and bronchodilators. Low suspicion for secondary bacterial pneumonia with negative procalcitonin. Patient initially required nonrebreather, subsequently requiring high flow oxygen over hospital course.           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     COPD (chronic obstructive pulmonary disease) (HCC)     Depression     Emphysema (subcutaneous) (surgical) resulting from a procedure     History of lung biopsy 05/16/2019    Malignant neoplasm of right main bronchus ([address] 03/2019    NSCLC, SCC [id number] stage IIIb    Palliative care patient 11/16/2021    Syncope     TIA (transient ischemic attack)          PAST SURGICAL HISTORY:  Past Surgical History:   Procedure Laterality Date    APPENDECTOMY      BACK SURGERY      times two    BLADDER SUSPENSION      CHOLECYSTECTOMY      HYSTERECTOMY      INCONTINENCE SURGERY          FAMILY HISTORY:  Family History   Problem Relation Age of Onset    Colon Cancer Mother     High Blood Pressure Brother     Diabetes Brother            OBJECTIVE:  /67   Pulse 75   Temp 97.9 °F (36.6 °C)   Resp 18   Ht 5' 5\" (1.651 m)   Wt 101 lb 7 oz (46 kg)   SpO2 93%   BMI 16.88 kg/m²   No intake/output data recorded.     PHYSICAL  EXAMINATION:    General: Chronically ill-appearing, no acute distress  Psych: Normal mood and affect  HEENT: NC/AT, no scleral icterus  Neck: Trachea midline  Cardiovascular: Normal rate, regular rhythm  Respiratory: Normal respiratory effort, no intercostal retractions, no use of accessory muscles  Abdomen: Nondistended  Neurologic: Alert, conversant, follows commands  Extremities: No clubbing or cyanosis  Skin: No observed lesions or rashes      CURRENT MEDICATIONS:  Scheduled:   dexamethasone  10 mg IntraVENous Q24H    melatonin  5 mg Oral Nightly    vitamin C  500 mg Oral TID    zinc sulfate  50 mg Oral Daily    albuterol sulfate HFA  2 puff Inhalation 4x daily    apixaban  5 mg Oral BID    aspirin EC  81 mg Oral Daily    atorvastatin  40 mg Oral Nightly    budesonide-formoterol  2 puff Inhalation BID    vitamin D  50,000 Units Oral Weekly    citalopram  20 mg Oral Daily    folic acid  1 mg Oral Daily    midodrine  10 mg Oral TID WC    nicotine  1 patch TransDERmal Daily    vitamin B-1  100 mg Oral Daily    baricitinib  4 mg Oral Daily        PRN:  ALPRAZolam, 0.25 mg, 4x Daily PRN  guaiFENesin-dextromethorphan, 5 mL, Q4H PRN  oxyCODONE-acetaminophen, 1 tablet, Q6H PRN  morphine, 2 mg, Q4H PRN  LORazepam, 1 mg, Q4H PRN        Infusions:      Laboratory Data:  Recent Labs     02/22/22  0421 02/23/22  0550 02/24/22  0420   WBC 11.0* 12.4* 12.4*   HGB 10.6* 9.8* 9.6*    232 232     Recent Labs     02/22/22  0421 02/23/22  0550 02/24/22  0420    141 140   K 4.5 4.1 4.6    99 96*   CO2 31* 30* 35*   BUN 32* 23 20   CREATININE 0.2* 0.2* 0.2*   GLUCOSE 94 89 94     Recent Labs     02/22/22  0421 02/23/22  0550 02/24/22 0420   AST 13 11 12   ALT 27 24 24   BILITOT 0.4 0.3 <0.2   ALKPHOS 78 74 79     Troponin T: No results for input(s): TROPONINI in the last 72 hours. Pro-BNP: No results for input(s): BNP in the last 72 hours. INR:   No results for input(s): INR in the last 72 hours. UA:No results for input(s): NITRITE, COLORU, PHUR, LABCAST, WBCUA, RBCUA, MUCUS, TRICHOMONAS, YEAST, BACTERIA, CLARITYU, SPECGRAV, LEUKOCYTESUR, UROBILINOGEN, BILIRUBINUR, BLOODU, GLUCOSEU, AMORPHOUS in the last 72 hours. Invalid input(s): Myles Meeter  A1C: No results for input(s): LABA1C in the last 72 hours. ABG:  No results for input(s): PHART, DTO2MNU, PO2ART, WUC8QSR, BEART, HGBAE, A2LSOHZU, CARBOXHGBART in the last 72 hours. Imaging:    XR CHEST PORTABLE    Result Date: 2/11/2022  No significant change in persistent bilateral airspace opacities compared to January 27. Signed by Dr Dev Vazquez:    Principal Problem:    Acute hypoxemic respiratory failure due to COVID-19 [facility])  Active Problems:    Pneumonia due to COVID-19 virus    Palliative care patient    Severe malnutrition ([facility] [address]    Centrilobular emphysema ([facility] [address]    Malignant neoplasm of upper lobe of right lung ([facility] [address]    Primary squamous cell carcinoma of right lung (HCC)    Acute on chronic respiratory failure with hypoxia and hypercapnia (HCC)    Personal history of noncompliance with medical treatment and regimen  Resolved Problems:    * No resolved hospital problems.  *      Acute on chronic hypoxemic respiratory failure due to COVID-19 pneumonia and COPD exacerbation  Continue supplemental O2 as needed to maintain adequate gas exchange, remains on high flow O2  Goal SPO2 88-92%  Continue weaning O2 as tolerated  Pulmonary toileting  Continue dexamethasone, slow steroid taper  Continue baricitinib  Continue bronchodilators  Anticoagulation  Monitor labs    Malnutrition  Nutritional support    Debility  PT/OT    DVT prophylaxis Eliquis    Disposition: TBD.  Referred to [facility], pre-CERT initiated       Fidelina Iraheta MD  2/24/2022 12:43 PM

## 2022-02-24 NOTE — PROGRESS NOTES
Occupational Therapy     02/24/22 [address]   Hearing   Hearing [facility] [address]   General   Chart Reviewed Yes   Patient assessed for rehabilitation services? Yes   Additional Pertinent Hx COPD   Response to previous treatment Patient with no complaints from previous session   Family / Caregiver Present No   Diagnosis Hypoxemic respiratory failure   Subjective   Subjective Pt in bed upon arrival for therapy. Pt agreeable to participate. Pain Assessment   Patient Currently in Pain No   Oxygen Therapy   SpO2 92 %   O2 Device High flow nasal cannula   O2 Flow Rate (L/min) 8 L/min   Patient Observation   Observations O2 dropped to high-mid 80's with mobility. Orientation   Overall Orientation Status WNL   ADL   Feeding Independent   Grooming Independent   UE Bathing Supervision   LE Bathing Minimal assistance   UE Dressing Supervision   LE Dressing Minimal assistance   Toileting Supervision   Additional Comments Edil for stability and SOB, requiring increased rest breaks. Balance   Sitting Balance Independent   Standing Balance Contact guard assistance   Functional Mobility   Functional - Mobility Device No device   Activity Other  (15 ft in room )   Assist Level Contact guard assistance   Functional Mobility Comments HH assist prn    Bed mobility   Supine to Sit Independent   Sit to Supine Independent   Transfers   Sit to stand Stand by assistance   Stand to sit Stand by assistance   Assessment   Performance deficits / Impairments Decreased endurance;Decreased functional mobility ; Decreased ADL status; Decreased balance   Assessment Will progress as tolerated   Treatment Diagnosis Hypoxemic respiratory failure   Prognosis Good   REQUIRES OT FOLLOW UP Yes   Treatment Initiated  Tx focused on sitting up EOB and functional mobility in room.     Discharge Recommendations Patient would benefit from continued therapy after discharge   Activity Tolerance   Activity Tolerance Patient limited by fatigue;Patient Tolerated treatment well   Safety Devices   Safety Devices in place Yes   Plan   Times per week 3-5x/week   Times per day Daily   Electronically signed by KARAN Fernandez on 2/24/2022 at 5:00 PM

## 2022-02-24 NOTE — PROGRESS NOTES
Physical Therapy  Name: Woodrow Winter  MRN:  [id number]  Date of service:  2/24/2022 02/24/22 1052   Subjective   Subjective Pt agreed to therapy. Pain Screening   Patient Currently in Pain No   Bed Mobility   Supine to Sit Independent   Sit to Supine Independent   Transfers   Sit to Stand Supervision   Stand to sit Supervision   Ambulation   Ambulation? Yes   Ambulation 1   Surface level tile   Device No Device   Other Apparatus O2   Assistance Contact guard assistance   Quality of Gait slow, slightly guarded pace   Gait Deviations Slow Marisel;Decreased step length;Decreased step height   Distance 30'   Exercises   Hip Flexion 10   Knee Long Arc Quad 10   Short term goals   Time Frame for Short term goals 2 WKS   Short term goal 1  FT WITH AD AS INDICATED, SBA   Conditions Requiring Skilled Therapeutic Intervention   Body structures, Functions, Activity limitations Decreased functional mobility ; Decreased endurance   Assessment Pt able to amb around room, no LOB. Pt rested sitting EOB after amb, did not report SOA. Pt able to perform exercises with good ROM and control. Pt up to chair with all needs in reach. Activity Tolerance   Activity Tolerance Patient Tolerated treatment well   Safety Devices   Type of devices Call light within reach; Left in chair         Electronically signed by Mk Wagner PTA on 2/24/2022 at 11:00 AM

## 2022-02-25 NOTE — PLAN OF CARE
Problem: Airway Clearance - Ineffective  Goal: Achieve or maintain patent airway  2/24/2022 2057 by Leo Tillman RN  Outcome: Ongoing  2/24/2022 1046 by Clark Marino RN  Outcome: Ongoing     Problem: Gas Exchange - Impaired  Goal: Absence of hypoxia  2/24/2022 2057 by Leo Tillman RN  Outcome: Ongoing  2/24/2022 1046 by Clark Marino RN  Outcome: Ongoing  Goal: Promote optimal lung function  2/24/2022 2057 by Leo Tillman RN  Outcome: Ongoing  2/24/2022 1046 by Clark Marino RN  Outcome: Ongoing     Problem: Breathing Pattern - Ineffective  Goal: Ability to achieve and maintain a regular respiratory rate  2/24/2022 2057 by Leo Tillman RN  Outcome: Ongoing  2/24/2022 1046 by Clark Marino RN  Outcome: Ongoing     Problem:  Body Temperature -  Risk of, Imbalanced  Goal: Ability to maintain a body temperature within defined limits  2/24/2022 2057 by Leo Tillman RN  Outcome: Ongoing  2/24/2022 1046 by Clark Marino RN  Outcome: Ongoing  Goal: Will regain or maintain usual level of consciousness  2/24/2022 2057 by Leo Tillman RN  Outcome: Ongoing  2/24/2022 1046 by Clark Marino RN  Outcome: Ongoing  Goal: Complications related to the disease process, condition or treatment will be avoided or minimized  2/24/2022 2057 by Leo Tillman RN  Outcome: Ongoing  2/24/2022 1046 by Clark Marino RN  Outcome: Ongoing     Problem: Isolation Precautions - Risk of Spread of Infection  Goal: Prevent transmission of infection  2/24/2022 2057 by Leo Tillman RN  Outcome: Ongoing  2/24/2022 1046 by Clark Marino RN  Outcome: Ongoing

## 2022-02-25 NOTE — CARE COORDINATION
Received notification from [facility] that pre-cert has been denied by patient's insurance. MD has opportunity to participate in [id number]. Patient currently weaned to 5L oxygen and she uses 3L at baseline. MD informed of denial.  He plans to not participate in Plak-mt-[id number] and continue to wean patient's oxygen.   [facility] [phone]  F [phone]  Electronically signed by Katina Lugo RN on 2/25/2022 at 12:38 PM

## 2022-02-25 NOTE — CARE COORDINATION
Received call from ELIS Lim CM for [facility] requesting call. Called back ([phone]), left message with contact information for further information needed.    Electronically signed by Arsh Gonzalez on 2/25/2022 at 1:48 PM

## 2022-02-25 NOTE — PROGRESS NOTES
Patient:  Mitchell Batista  YOB: 1955  Date of Service: 2/25/2022  MRN: [id number]   Acct: [de-identified]   Primary Care Physician: DOMINICK Fitch NP  Advance Directive: [facility]  Admit Date: 2/11/2022       Hospital Day: 15        CHIEF COMPLAINT:  Chief Complaint   Patient presents with    Shortness of Breath         SUBJECTIVE:    No acute events overnight. Resting comfortably with no complaints. No worsening dyspnea or cough. Supplemental oxygen has been weaned further to 5 L nasal cannula. CUMULATIVE  HOSPITAL  COURSE:    The patient is a 74 y.o. female who presented to [facility] ER with PMH COPD, home oxygen 4L NC, former smoker  who presented with progressive worsening dyspnea from her baseline over several days and noted to have significant hypoxia with SPO2 down to 60s. Tested positive for Covid with chest x-ray showing bilateral infiltrates, admitted with Covid pneumonia and COPD exacerbation treated with dexamethasone, baricitinib and bronchodilators. Low suspicion for secondary bacterial pneumonia with negative procalcitonin. Patient initially required nonrebreather, subsequently requiring high flow oxygen over hospital course. Completed 14 days of baricitinib while inpatient and steroids slowly tapered with decreasing O2 requirement and clinical improvement.            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     COPD (chronic obstructive pulmonary disease) (HCC)     Depression     Emphysema (subcutaneous) (surgical) resulting from a procedure     History of lung biopsy 05/16/2019    Malignant neoplasm of right main bronchus ([facility] [address] 03/2019    NSCLC, SCC [id number] stage IIIb    Palliative care patient 11/16/2021    Syncope     TIA (transient ischemic attack)          PAST SURGICAL  232 241     Recent Labs     02/23/22  0550 02/24/22  0420 02/25/22  0305    140 141   K 4.1 4.6 4.7   CL 99 96* 99   CO2 30* 35* 35*   BUN 23 20 19   CREATININE 0.2* 0.2* 0.3*   GLUCOSE 89 94 91     Recent Labs     02/23/22  0550 02/24/22  0420 02/25/22  0305   AST 11 12 12   ALT 24 24 24   BILITOT 0.3 <0.2 <0.2   ALKPHOS 74 79 74     Troponin T: No results for input(s): TROPONINI in the last 72 hours. Pro-BNP: No results for input(s): BNP in the last 72 hours. INR:   No results for input(s): INR in the last 72 hours. UA:No results for input(s): NITRITE, COLORU, PHUR, LABCAST, WBCUA, RBCUA, MUCUS, TRICHOMONAS, YEAST, BACTERIA, CLARITYU, SPECGRAV, LEUKOCYTESUR, UROBILINOGEN, BILIRUBINUR, BLOODU, GLUCOSEU, AMORPHOUS in the last 72 hours. Invalid input(s): Steve Gustafson  A1C: No results for input(s): LABA1C in the last 72 hours. ABG:  No results for input(s): PHART, XFL5DWC, PO2ART, EBR8NZW, BEART, HGBAE, B9ODVEMJ, CARBOXHGBART in the last 72 hours. Imaging:    XR CHEST PORTABLE    Result Date: 2/11/2022  No significant change in persistent bilateral airspace opacities compared to January 27. Signed by Dr Ron Lacy:    Principal Problem:    Acute hypoxemic respiratory failure due to COVID-19 [facility])  Active Problems:    Pneumonia due to COVID-19 virus    Palliative care patient    Severe malnutrition ([facility] [address]    Centrilobular emphysema ([facility] [address]    Malignant neoplasm of upper lobe of right lung ([facility] [address]    Primary squamous cell carcinoma of right lung (HCC)    Acute on chronic respiratory failure with hypoxia and hypercapnia (HCC)    Personal history of noncompliance with medical treatment and regimen  Resolved Problems:    * No resolved hospital problems.  *      Acute on chronic hypoxemic respiratory failure due to COVID-19 pneumonia and COPD exacerbation  Continue supplemental O2 to maintain adequate gas exchange  Goal SPO2 88-92%  Continue weaning O2 as tolerated  Pulmonary toileting  Continue dexamethasone with plan for taper  S/p 14 day course of baricitinib  Continue bronchodilators  Anticoagulation  Monitor labs    Malnutrition  Nutritional support    Debility  PT/OT    DVT prophylaxis Eliquis    Disposition: Hopeful discharge soon pending further weaning of supplemental oxygen      Demarcus Curtis MD  2/25/2022 3:10 PM

## 2022-02-26 NOTE — PLAN OF CARE
Problem: Airway Clearance - Ineffective  Goal: Achieve or maintain patent airway  2/26/2022 1503 by Jose Luis Bejarano RN  Outcome: Ongoing  2/26/2022 1502 by Jose Luis Bejarano RN  Outcome: Ongoing     Problem: Gas Exchange - Impaired  Goal: Absence of hypoxia  2/26/2022 1503 by Jose Luis Bejarano RN  Outcome: Ongoing  2/26/2022 1502 by Jose Luis Bejarano RN  Outcome: Ongoing  Goal: Promote optimal lung function  2/26/2022 1503 by Jose Luis Bejarano RN  Outcome: Ongoing  2/26/2022 1502 by Jose Luis Bejarano RN  Outcome: Ongoing     Problem: Breathing Pattern - Ineffective  Goal: Ability to achieve and maintain a regular respiratory rate  2/26/2022 1503 by Jose Luis Bejarano RN  Outcome: Ongoing  2/26/2022 1502 by Jose Luis Bejarano RN  Outcome: Ongoing     Problem:  Body Temperature -  Risk of, Imbalanced  Goal: Ability to maintain a body temperature within defined limits  2/26/2022 1503 by Jose Luis Bejarano RN  Outcome: Ongoing  2/26/2022 1502 by Jose Luis Bejarano RN  Outcome: Ongoing  Goal: Will regain or maintain usual level of consciousness  2/26/2022 1503 by Jose Luis Bejarano RN  Outcome: Ongoing  2/26/2022 1502 by Jose Luis Bejarano RN  Outcome: Ongoing  Goal: Complications related to the disease process, condition or treatment will be avoided or minimized  2/26/2022 1503 by Jose Luis Bejarano RN  Outcome: Ongoing  2/26/2022 1502 by Jose Luis Bejarano RN  Outcome: Ongoing     Problem: Isolation Precautions - Risk of Spread of Infection  Goal: Prevent transmission of infection  2/26/2022 1503 by Jose Luis Bejarano RN  Outcome: Ongoing  2/26/2022 1502 by Jose Luis Bejarano RN  Outcome: Ongoing     Problem: Nutrition Deficits  Goal: Optimize nutritional status  2/26/2022 1503 by Jose Luis Bejarano RN  Outcome: Ongoing  2/26/2022 1502 by Jose Luis Bejarano RN  Outcome: Ongoing     Problem: Risk for Fluid Volume Deficit  Goal: Maintain normal heart rhythm  2/26/2022 1503 by Jose Luis Bejarano RN  Outcome: Ongoing  2/26/2022 1502 by Hayder Loo RN  Outcome: Ongoing  Goal: Maintain absence of muscle cramping  2/26/2022 1503 by Hayder Loo RN  Outcome: Ongoing  2/26/2022 1502 by Hayder Loo RN  Outcome: Ongoing  Goal: Maintain normal serum potassium, sodium, calcium, phosphorus, and pH  2/26/2022 1503 by Hayder Loo RN  Outcome: Ongoing  2/26/2022 1502 by Hayder Loo RN  Outcome: Ongoing     Problem: Loneliness or Risk for Loneliness  Goal: Demonstrate positive use of time alone when socialization is not possible  2/26/2022 1503 by Hayder Loo RN  Outcome: Ongoing  2/26/2022 1502 by Hayder Loo RN  Outcome: Ongoing     Problem: Fatigue  Goal: Verbalize increase energy and improved vitality  2/26/2022 1503 by Hayder Loo RN  Outcome: Ongoing  2/26/2022 1502 by Hayder Loo RN  Outcome: Ongoing     Problem: Patient Education: Go to Patient Education Activity  Goal: Patient/Family Education  2/26/2022 1503 by Hayder Loo RN  Outcome: Ongoing  2/26/2022 1502 by Hayder Loo RN  Outcome: Ongoing     Problem: Falls - Risk of:  Goal: Will remain free from falls  Description: Will remain free from falls  2/26/2022 1503 by Hayder Loo RN  Outcome: Ongoing  2/26/2022 1502 by Hayder Loo RN  Outcome: Ongoing  Goal: Absence of physical injury  Description: Absence of physical injury  2/26/2022 1503 by Hayder Loo RN  Outcome: Ongoing  2/26/2022 1502 by Hayder Loo RN  Outcome: Ongoing     Problem: Nutrition  Goal: Optimal nutrition therapy  2/26/2022 1503 by Hayder Loo RN  Outcome: Ongoing  2/26/2022 1502 by Hayder Loo RN  Outcome: Ongoing     Problem: Pain:  Goal: Pain level will decrease  Description: Pain level will decrease  2/26/2022 1503 by Hayder Loo RN  Outcome: Ongoing  2/26/2022 1502 by Hayder Loo RN  Outcome: Ongoing  Goal: Control of acute pain  Description: Control of acute pain  2/26/2022 1503 by Mac Homans, RN  Outcome: Ongoing  2/26/2022 1502 by Mac Homans, RN  Outcome: Ongoing  Goal: Control of chronic pain  Description: Control of chronic pain  2/26/2022 1503 by Mac Homans, RN  Outcome: Ongoing  2/26/2022 1502 by Mac Homans, RN  Outcome: Ongoing

## 2022-02-26 NOTE — PROGRESS NOTES
[address] [place] [address] ROOM AIR 80% SAT AT REST  ON 3 L/M AT REST 92% SAT EXERCISED ON 6 L/M 85% SAT PATIENT ALREADY ON HOME O2

## 2022-02-26 NOTE — PROGRESS NOTES
Patient:  Emory Barnett  YOB: 1955  Date of Service: 2/26/2022  MRN: [id number]   Acct: [de-identified]   Primary Care Physician: DOMINICK Higuera NP  Advance Directive: [facility]  Admit Date: 2/11/2022       Hospital Day: 13      CHIEF COMPLAINT:  Chief Complaint   Patient presents with    Shortness of Breath         SUBJECTIVE:    No acute events overnight. Resting comfortably with no complaints. No worsening dyspnea or cough. Supplemental oxygen weaned to 4 L nasal cannula at rest, however patient still has significant O2 desaturation with movement. CUMULATIVE  HOSPITAL  COURSE:    The patient is a 74 y.o. female who presented to [facility] ER with PMH COPD, home oxygen 4L NC, former smoker  who presented with progressive worsening dyspnea from her baseline over several days and noted to have significant hypoxia with SPO2 down to 60s. Tested positive for Covid with chest x-ray showing bilateral infiltrates, admitted with Covid pneumonia and COPD exacerbation treated with dexamethasone, baricitinib and bronchodilators. Low suspicion for secondary bacterial pneumonia with negative procalcitonin. Patient initially required nonrebreather, subsequently requiring high flow oxygen over hospital course. Completed 14 days of baricitinib while inpatient and steroids slowly tapered with decreasing O2 requirement and clinical improvement.            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     COPD (chronic obstructive pulmonary disease) (HCC)     Depression     Emphysema (subcutaneous) (surgical) resulting from a procedure     History of lung biopsy 05/16/2019    Malignant neoplasm of right main bronchus ([address] 03/2019    NSCLC, SCC [id number] stage IIIb    Palliative care patient 11/16/2021    Syncope     TIA (transient ischemic attack)          PAST SURGICAL HISTORY:  Past Surgical History:   Procedure Laterality Date    APPENDECTOMY      BACK SURGERY      times two    BLADDER SUSPENSION      CHOLECYSTECTOMY      HYSTERECTOMY      INCONTINENCE SURGERY          FAMILY HISTORY:  Family History   Problem Relation Age of Onset    Colon Cancer Mother     High Blood Pressure Brother     Diabetes Brother            OBJECTIVE:  BP 97/68   Pulse 94   Temp 98.2 °F (36.8 °C) (Temporal)   Resp 18   Ht 5' 5\" (1.651 m)   Wt 101 lb 7 oz (46 kg)   SpO2 91%   BMI 16.88 kg/m²   I/O this shift:  In: 240 [P.O.:240]  Out: -     PHYSICAL  EXAMINATION:    General: Chronically ill-appearing, no acute distress  Psych: Normal mood and affect  HEENT: NC/AT, no scleral icterus  Cardiovascular: Mildly tachycardic with regular rhythm  Respiratory: Normal respiratory effort, no intercostal retractions, no use of accessory muscles  Abdomen: Nondistended  Neurologic: Alert, conversant, follows commands  Extremities: No clubbing or cyanosis  Skin: No observed lesions or rashes      CURRENT MEDICATIONS:  Scheduled:   [START ON 2/27/2022] dexamethasone  6 mg Oral Daily    melatonin  5 mg Oral Nightly    vitamin C  500 mg Oral TID    zinc sulfate  50 mg Oral Daily    albuterol sulfate HFA  2 puff Inhalation 4x daily    apixaban  5 mg Oral BID    aspirin EC  81 mg Oral Daily    atorvastatin  40 mg Oral Nightly    budesonide-formoterol  2 puff Inhalation BID    vitamin D  50,000 Units Oral Weekly    citalopram  20 mg Oral Daily    folic acid  1 mg Oral Daily    midodrine  10 mg Oral TID WC    nicotine  1 patch TransDERmal Daily    vitamin B-1  100 mg Oral Daily        Laboratory Data:  Recent Labs     02/24/22  0420 02/25/22  0305 02/26/22  0449   WBC 12.4* 12.1* 14.2*   HGB 9.6* 9.6* 9.9*    241 249     Recent Labs     02/24/22  0420 02/25/22  0305 02/26/22  0449    141 139   K 4.6 4.7 4.4   CL 96* 99 98   CO2 35* 35* 33*   BUN 20 19 19   CREATININE 0.2* 0.3* 0.3*   GLUCOSE 94 91 128*     Recent Labs     02/24/22  0420 02/25/22  0305 02/26/22  0449   AST 12 12 14   ALT 24 24 22   BILITOT <0.2 <0.2 <0.2   ALKPHOS 79 74 77     Troponin T: No results for input(s): TROPONINI in the last 72 hours. Pro-BNP: No results for input(s): BNP in the last 72 hours. INR:   No results for input(s): INR in the last 72 hours. UA:No results for input(s): NITRITE, COLORU, PHUR, LABCAST, WBCUA, RBCUA, MUCUS, TRICHOMONAS, YEAST, BACTERIA, CLARITYU, SPECGRAV, LEUKOCYTESUR, UROBILINOGEN, BILIRUBINUR, BLOODU, GLUCOSEU, AMORPHOUS in the last 72 hours. Invalid input(s): Ozie Held  A1C: No results for input(s): LABA1C in the last 72 hours. ABG:  No results for input(s): PHART, EBP0ULL, PO2ART, OFK1QYR, BEART, HGBAE, V4AKWENA, CARBOXHGBART in the last 72 hours. Imaging:    XR CHEST PORTABLE    Result Date: 2/11/2022  No significant change in persistent bilateral airspace opacities compared to January 27. Signed by Dr Niki Leger:    Principal Problem:    Acute hypoxemic respiratory failure due to COVID-19 [facility])  Active Problems:    Pneumonia due to COVID-19 virus    Palliative care patient    Severe malnutrition [address] [address]    Centrilobular emphysema ([facility] [address]    Malignant neoplasm of upper lobe of right lung ([facility] [address]    Primary squamous cell carcinoma of right lung (HCC)    Acute on chronic respiratory failure with hypoxia and hypercapnia (HCC)    Personal history of noncompliance with medical treatment and regimen  Resolved Problems:    * No resolved hospital problems.  *      Acute on chronic hypoxemic respiratory failure due to COVID-19 pneumonia and COPD exacerbation  Continue supplemental O2 to maintain adequate gas exchange  Goal SPO2 88-92%  Continue weaning O2 as tolerated  Pulmonary toileting  Decrease dexamethasone, will need steroid taper  S/p 14 day course of baricitinib  Continue bronchodilators  Anticoagulation  Monitor labs    Malnutrition  Nutritional support    Debility  PT/OT    DVT prophylaxis Eliquis    Disposition: TBD pending further weaning of O2 with home O2 assessment      Anuradha Patel MD  2/26/2022 1:56 PM

## 2022-02-27 NOTE — PROGRESS NOTES
TIA (transient ischemic attack)          PAST SURGICAL HISTORY:  Past Surgical History:   Procedure Laterality Date    APPENDECTOMY      BACK SURGERY      times two    BLADDER SUSPENSION      CHOLECYSTECTOMY      HYSTERECTOMY      INCONTINENCE SURGERY          FAMILY HISTORY:  Family History   Problem Relation Age of Onset    Colon Cancer Mother     High Blood Pressure Brother     Diabetes Brother            OBJECTIVE:  BP 93/64   Pulse 91   Temp 97.3 °F (36.3 °C) (Temporal)   Resp 16   Ht 5' 5\" (1.651 m)   Wt 101 lb 7 oz (46 kg)   SpO2 95%   BMI 16.88 kg/m²   No intake/output data recorded.     PHYSICAL  EXAMINATION:    General: Chronically ill-appearing, no acute distress  Psych: Normal mood and affect  HEENT: NC/AT, no scleral icterus  Cardiovascular: Normal rate, regular rhythm  Respiratory: Normal respiratory effort, no intercostal retractions, no use of accessory muscles  Abdomen: Nondistended  Neurologic: Alert, conversant, follows commands  Extremities: No clubbing or cyanosis  Skin: No observed lesions or rashes      CURRENT MEDICATIONS:  Scheduled:   dexamethasone  6 mg Oral Daily    melatonin  5 mg Oral Nightly    vitamin C  500 mg Oral TID    zinc sulfate  50 mg Oral Daily    albuterol sulfate HFA  2 puff Inhalation 4x daily    apixaban  5 mg Oral BID    aspirin EC  81 mg Oral Daily    atorvastatin  40 mg Oral Nightly    budesonide-formoterol  2 puff Inhalation BID    vitamin D  50,000 Units Oral Weekly    citalopram  20 mg Oral Daily    folic acid  1 mg Oral Daily    midodrine  10 mg Oral TID WC    nicotine  1 patch TransDERmal Daily    vitamin B-1  100 mg Oral Daily        Laboratory Data:  Recent Labs     02/25/22 0305 02/26/22  0449 02/27/22  0216   WBC 12.1* 14.2* 14.3*   HGB 9.6* 9.9* 9.6*    249 221     Recent Labs     02/25/22  0305 02/26/22  0449 02/27/22  0216    139 139   K 4.7 4.4 4.8   CL 99 98 97*   CO2 35* 33* 34*   BUN 19 19 21   CREATININE 0.3* 0.3* 0.3*   GLUCOSE 91 128* 110*     Recent Labs     02/25/22  0305 02/26/22  0449 02/27/22  0216   AST 12 14 13   ALT 24 22 21   BILITOT <0.2 <0.2 <0.2   ALKPHOS 74 77 78     Troponin T: No results for input(s): TROPONINI in the last 72 hours. Pro-BNP: No results for input(s): BNP in the last 72 hours. INR:   No results for input(s): INR in the last 72 hours. UA:No results for input(s): NITRITE, COLORU, PHUR, LABCAST, WBCUA, RBCUA, MUCUS, TRICHOMONAS, YEAST, BACTERIA, CLARITYU, SPECGRAV, LEUKOCYTESUR, UROBILINOGEN, BILIRUBINUR, BLOODU, GLUCOSEU, AMORPHOUS in the last 72 hours. Invalid input(s): Mel Soda  A1C: No results for input(s): LABA1C in the last 72 hours. ABG:  No results for input(s): PHART, NHF1NTS, PO2ART, IIR1VFW, BEART, HGBAE, E3KAPNPF, CARBOXHGBART in the last 72 hours. Imaging:    XR CHEST PORTABLE    Result Date: 2/11/2022  No significant change in persistent bilateral airspace opacities compared to January 27. Signed by Dr Svetlana Celaya:    Principal Problem:    Acute hypoxemic respiratory failure due to COVID-19 [facility])  Active Problems:    Pneumonia due to COVID-19 virus    Palliative care patient    Severe malnutrition [address] [address]    Centrilobular emphysema ([facility] [address]    Malignant neoplasm of upper lobe of right lung ([facility] [address]    Primary squamous cell carcinoma of right lung (HCC)    Acute on chronic respiratory failure with hypoxia and hypercapnia (HCC)    Personal history of noncompliance with medical treatment and regimen  Resolved Problems:    * No resolved hospital problems.  *      Acute on chronic hypoxemic respiratory failure due to COVID-19 pneumonia and COPD exacerbation  Continue supplemental O2 to maintain adequate gas exchange  Goal SPO2 88-92%  Continue weaning O2 as tolerated  Pulmonary toileting  Continue dexamethasone, steroid taper at discharge  S/p 14 day course of baricitinib  Continue bronchodilators  Anticoagulation  Monitor labs    Malnutrition  Nutritional support    Debility  PT/OT    DVT prophylaxis Eliquis    Disposition: TBD, possible discharge home soon pending improvement in O2 requirement with exertion      Jerome Rodriguez MD  2/27/2022 1:27 PM

## 2022-02-28 NOTE — PROGRESS NOTES
Physical Therapy   Name: Abdullahi Miranda  MRN:  [id number]  Date of service:  2/28/2022 02/28/22 1146   General   Response To Previous Treatment Patient with no complaints from previous session. Family / Caregiver Present No   Subjective   Subjective Pt agreed to therapy. States she is going to go home today. General Comment   Comments RNVeronica Current PT. Pain Screening   Patient Currently in Pain No   Intervention List Patient able to continue with treatment   Oxygen Therapy   O2 Device Nasal cannula   O2 Flow Rate (L/min) 3 L/min   Bed Mobility   Supine to Sit Independent   Sit to Supine Independent   Transfers   Sit to Stand Supervision   Stand to sit Supervision   Bed to Chair Supervision   Stand Pivot Transfers Supervision   Ambulation   Ambulation? Yes   Ambulation 1   Surface level tile   Device No Device   Other Apparatus O2   Assistance Contact guard assistance   Gait Deviations Slow Marisel;Decreased step length;Decreased step height   Distance 5'   Comments to [facility] and then to [facility]   Balance   Posture Fair   Sitting - Static Good   Sitting - Dynamic Good   Standing - Static Fair;+   Standing - Dynamic Fair;+   Short term goals   Time Frame for Short term goals 2 WKS   Short term goal 1  FT WITH AD AS INDICATED, SBA   Conditions Requiring Skilled Therapeutic Intervention   Body structures, Functions, Activity limitations Decreased functional mobility ; Decreased endurance   Assessment Pt. willing to sit up for lunch. States she is going home today. Pt. a little unsteady with amb. Would be safe to amb. with RW and staff A and family if she d/c home. Prognosis Good   Decision Making Low Complexity   REQUIRES PT FOLLOW UP Yes   Treatment Initiated  GT TRAINING, BALANCE, SAFETY   Discharge Recommendations Continue to assess pending progress; Patient would benefit from continued therapy after discharge   Activity Tolerance   Activity Tolerance Patient Tolerated treatment well   Plan   Times per week 4-5   Plan weeks 2   Current Treatment Recommendations Strengthening;Balance Training;Functional Mobility Training;Transfer Training;Gait Training;Patient/Caregiver Education & Training; Safety Education & Training   Plan Comment 02   Safety Devices   Type of devices Call light within reach; Left in chair   PT Whiteboard Notes   Therapy Whiteboard COVID RE 3-9     Electronically signed by Kristina Johnson PT on 2/28/2022 at 1:18 PM

## 2022-02-28 NOTE — DISCHARGE SUMMARY
Discharge Summary    NAME: Mitchell Batista  :  1955  MRN:  [id number]    Admit date:  2022  Discharge date:  2022    Advance Directive: DNR-CC    Primary Care Physician:  DOMINICK Fitch NP    Discharge Diagnoses:  Principal Problem:    Acute hypoxemic respiratory failure due to COVID-19 [facility])  Active Problems:    Pneumonia due to COVID-19 virus    Palliative care patient    Severe malnutrition ([facility] [address]    Centrilobular emphysema ([facility] [address]    Malignant neoplasm of upper lobe of right lung ([facility] [address]    Primary squamous cell carcinoma of right lung ([facility] [address]    Acute on chronic respiratory failure with hypoxia and hypercapnia (HCC)    Personal history of noncompliance with medical treatment and regimen  Resolved Problems:    * No resolved hospital problems. *      Significant Diagnostic Studies:   XR CHEST PORTABLE    Result Date: 2022  EXAM: XR CHEST PORTABLE INDICATION: Cough, shortness of air COMPARISON: 2022 FINDINGS: Cardiac silhouette is normal in size. RIGHT chest wall port catheter tip overlying the SVC. No significant change in LEFT mid and lower lung zone airspace opacity. RIGHT hilar and upper lobe opacities are similar to the prior study. No large pleural effusion or evidence of pneumothorax. No acute osseous finding. Lumbar spine postoperative change is noted. No significant change in persistent bilateral airspace opacities compared to . Signed by Dr Tip Jung:   CBC:   Recent Labs     22  0449 226 22  0346   WBC 14.2* 14.3* 11.1*   HGB 9.9* 9.6* 10.0*    221 215     BMP:    Recent Labs     22  0449 22  0216 22  0346    139 137   K 4.4 4.8 4.7   CL 98 97* 95*   CO2 33* 34* 33*   BUN 19 21 20   CREATININE 0.3* 0.3* 0.2*   GLUCOSE 128* 110* 92           Hospital Course: 74 y. o. female who presented to [facility] ER with PMH COPD, home oxygen 4L NC, former smoker  who presented with progressive worsening dyspnea from her baseline over several days and noted to have significant hypoxia with SPO2 down to 60s. Tested positive for Covid with chest x-ray showing bilateral infiltrates, admitted with Covid pneumonia and COPD exacerbation treated with dexamethasone, baricitinib and bronchodilators. Low suspicion for secondary bacterial pneumonia with negative procalcitonin.   Patient initially required nonrebreather, subsequently requiring high flow oxygen over hospital course. Completed 14 days of baricitinib while inpatient and steroids slowly tapered with decreasing O2 requirement and clinical improvement. Respiratory status returned to baseline. Underwent home O2 assessment and only required 4 L both at rest and with exertion. Patient stable for discharge home and prescribed dexamethasone with taper. Otherwise continue current maintenance bronchodilator regimen with close outpatient follow-up with PCP. Physical Exam:  Vital Signs: /74   Pulse 67   Temp 97.4 °F (36.3 °C) (Temporal)   Resp 14   Ht 5' 5\" (1.651 m)   Wt 101 lb 7 oz (46 kg)   SpO2 95%   BMI 16.88 kg/m²   General appearance:. Alert and Cooperative   HEENT: Normocephalic. Atraumatic  Chest: Normal effort, no intercostal retractions, no use of accessory muscles  Cardiac: Normal heart tones with regular rate and rhythm, S1, S2 normal.   Abdomen:soft, non-tender; normal bowel sounds  Extremities: No cyanosis or edema  Neurologic: No lateralizing weakness. Normal speech        Discharge Medications:         Medication List      START taking these medications    dexamethasone 2 MG tablet  Commonly known as: DECADRON  Take 3 tablets by mouth daily for 7 days, THEN 2 tablets daily for 7 days, THEN 1 tablet daily for 7 days.   Start taking on: February 27, 2022        CHANGE how you take these medications    albuterol sulfate  (90 Base) MCG/ACT inhaler  Commonly known as: Ventolin HFA  Inhale 2 puffs into the lungs 4 times daily as needed for Wheezing  What changed: Another medication with the same name was removed. Continue taking this medication, and follow the directions you see here. oxyCODONE-acetaminophen  MG per tablet  Commonly known as: Percocet  Take 1 tablet by mouth every 6 hours as needed for Pain for up to 30 days. Intended supply: 30 days  What changed:   · when to take this  · additional instructions        CONTINUE taking these medications    apixaban 5 MG Tabs tablet  Commonly known as: ELIQUIS  Take 1 tablet by mouth 2 times daily     aspirin EC 81 MG EC tablet     atorvastatin 40 MG tablet  Commonly known as: LIPITOR  Take 1 tablet by mouth nightly     calcium carbonate 600 MG Tabs tablet     citalopram 20 MG tablet  Commonly known as: CELEXA     folic acid 1 MG tablet  Commonly known as: FOLVITE  Take 1 tablet by mouth daily     midodrine 10 MG tablet  Commonly known as: PROAMATINE  Take 1 tablet by mouth 3 times daily (with meals)     nicotine 21 MG/24HR  Commonly known as: NICODERM CQ  Place 1 patch onto the skin daily     OXYGEN     Symbicort 160-4.5 MCG/ACT Aero  Generic drug: budesonide-formoterol     vitamin B-1 100 MG tablet  Commonly known as: THIAMINE  Take 1 tablet by mouth daily     vitamin D 1.25 MG (14198 UT) Caps capsule  Commonly known as: ERGOCALCIFEROL        STOP taking these medications    lidocaine viscous hcl 2 % Soln solution  Commonly known as: XYLOCAINE     methylPREDNISolone 4 MG tablet  Commonly known as: MEDROL DOSEPACK     Seebri Neohaler 15.6 MCG Caps  Generic drug: Glycopyrrolate           Where to Get Your Medications      These medications were sent to [id number], [address] [address]    Phone: [phone]   · dexamethasone 2 MG tablet         Discharge Instructions: Follow up with DOMINICK Duval NP within 1 week. Take medications as directed.   Resume activity as tolerated. Disposition: Patient is medically stable and will be discharged home. Time spent on discharge 35 minutes.      Signed:  Arabella Butcher MD  2/28/2022 11:03 AM

## 2022-02-28 NOTE — CARE COORDINATION
Checked with pt to ensure that she has portable o2 for dc today. Pt has portable tank in her room and it is full. Updated o2 order sent to [facility].    [facility]   [address] [facility]  Electronically signed by Arsh Gonzalez on 2/28/2022 at 11:18 AM

## 2022-02-28 NOTE — PROGRESS NOTES
Occupational Therapy     02/28/22 [address] [place] Matthew Dean   Hearing   Hearing [facility]   General   Chart Reviewed Yes   Patient assessed for rehabilitation services? Yes   Additional Pertinent Hx COPD   Response to previous treatment Patient with no complaints from previous session   Family / Caregiver Present No   Diagnosis Hypoxemic respiratory failure   Subjective   Subjective Pt in bed upon arrival for therapy. pt states \"I am going home today\". Pt agreeable to sit up in recliner for lunch this date. Pain Assessment   Patient Currently in Pain No   Oxygen Therapy   O2 Device Nasal cannula   O2 Flow Rate (L/min) 3 L/min   Orientation   Overall Orientation Status WNL   ADL   Feeding Independent   Grooming Independent   UE Bathing Supervision   LE Bathing Contact guard assistance;Stand by assistance   UE Dressing Supervision   LE Dressing Contact guard assistance;Stand by assistance   Toileting Supervision   Balance   Sitting Balance Independent   Standing Balance Contact guard assistance   Bed mobility   Supine to Sit Independent   Sit to Supine Independent   Transfers   Sit to stand Stand by assistance   Stand to sit Stand by assistance   Toilet Transfers   Toilet - Technique Stand step   Equipment Used Standard bedside commode   Toilet Transfer Contact guard assistance   Assessment   Performance deficits / Impairments Decreased endurance;Decreased functional mobility ; Decreased ADL status; Decreased balance   Assessment Will progress as tolerated   Treatment Diagnosis Hypoxemic respiratory failure   Prognosis Good   REQUIRES OT FOLLOW UP No   Treatment Initiated  Tx focused on LBD while sitting EOB, using BSC, and sitting up in recliner for lunch this date. Discharge Recommendations Home with assist PRN   Activity Tolerance   Activity Tolerance Patient Tolerated treatment well   Safety Devices   Safety Devices in place Yes   Type of devices Call light within reach; Chair alarm in place; Left in chair;Nurse notified   Plan   Times per week 3-5x/week   Times per day Daily   Electronically signed by KARAN Armijo on 2/28/2022 at 2:35 PM

## 2022-02-28 NOTE — PROGRESS NOTES
CLINICAL PHARMACY NOTE: MEDS TO BEDS    Total # of Prescriptions Filled: 1   The following medications were delivered to the patient:  · Dexamethasone 2 mg    Additional Documentation:    Handed script to Duy Chavez) at pharmacy window

## 2022-02-28 NOTE — PROGRESS NOTES
Physical Therapy   Name: Pan Larios  MRN:  [id number]  Date of service:  2/28/2022  Pt. just amb. with RT, desatted to 82% and is going back to bed. Will f/u at a later time.   Electronically signed by Vivian Mendez PT on 2/28/2022 at 9:16 AM

## 2022-02-28 NOTE — CARE COORDINATION
CSN                                    Req/Control # [Problem retrieving Specimen ID]                                   Order Date:  2022  [id number]                                          Patient Information      Name:  Delonte Gallagher  :  1955  Age:  69 y.o. Address:  [place] [address], [address] [facility], [address]   Zip:  [address]  PCP: DOMINICK Jaffe NP Sex:  F  SSN: [id number]  Home Phone: [phone]  Work Phone:    Patient MRN:  [id number]    Alt Patient ID:  [id number]  PCP Phone: [phone]       Authorizing Provider Information       AUTHORIZING PROVIDER: Fidelina Iraheta MD  Physician ID: [id number]  NPI:  [id number]  Site:   Address: [address], [address]  Phone: [phone]  Fax: [phone]                EQUIPMENT ORDERED  DME Order for Home Oxygen as OP [[id number]] (ORD   #:   [id number]) Priority  Routine Class  Hospital Performed        Associated Diagnosis:          Comments:    You must complete the order parameters below and add the medical necessity documentation for this DME in a separate note.     Stationary Oxygen Concentrator at 4 lpm via Nasal Cannula     Stationary Prescribed at:  Continuous     Portable Gaseous O2 System and contents at 4 lpm via Nasal Cannula     Non Applicable     Diagnosis: Acute on chronic hypoxic respiratory failure, COPD, covid pneumonia  Length of need: Lifetime            Scheduling Instructions:                                 Specimen Source             Collection Date    Collection Time    Order Status    Expected Date                  Electronically Signed By  Fidelina Iraheta MD Date  2022  11:08 AM              Responsible Party Lacy Charles-ActID   Relationship Account Type Home Phone   Connorveronica Gilbertyuliet [phone] [facility] [place], [address] [facility] P/F [phone]   Employer   Work [address] [place] [address] Information     Primary Insurance  Insurance/Subscriber ID:  [id number]  Subscriber Name:  Terrance Kraus              Relationship to Patient: SelfSigned ABN: N    Payor Name:  Lizzette Morales   Plan:  [place] [place] ESSENTIAL/PLUS   Group: [id number]  Worker's Comp Date of Injury:            Patient Information    Patient Name   Ana M Bright ([id number]) Legal Sex   Female    1955   Room Bed   0434 434-02       Patient Demographics    Address   [address] [place] [address] Phone   [phone] (Home)   [phone] (Mobile) *Preferred*     Social Security Number    [facility]        Basic Information    Date Of Birth   1955 Gender Identity   Female Race   White (non-) Ethnic Group   Non- / Non  Preferred Language   English Preferred Written Language   English     Admission Information      Current Information    Attending Provider Admitting Provider Admission Type Admission Status   MD Arabella Bautista MD Emergency Confirmed Admission          Admission Date/Time Discharge Date Hospital Service Auth/Cert Status     03:03 PM  Med/Surg [place] Unit Room/Bed    24 [facility] 4 ONCOLOGY UNIT 5618/210-77                Admission    Complaint        Payor Information             PRIMARY INSURANCE   Payor: BCBS MEDICARE Plan: [place] ESSENTIA*   Group Number: Tarun Hinojosahleen Type: INDEMNITY   Subscriber Name: Rolanda Tubbs : 1955   Subscriber ID: [id number]       Pat. Rel. to Subscriber: Self       SECONDARY INSURANCE   Payor:   Plan:     Group Number:   Insurance Type:     Subscriber Name:   Subscriber :     Subscriber ID:         Pat.  Rel. to Subscriber:                PCP and Center    Primary Care Provider [facility] [phone] [facility]     Emergency Contact(s)    Name Relation Home Work Mobile   PROBUS,JESE Child [phone]  [phone]   ALDO VAN [address] [place] [address] Drive   Gem Enriquez Brother/Sister   [phone]     Medical Problems  Comment          Hospital Problem List  Date Reviewed: 2021     ICD-10-CM Priority Class Noted POA   Acute hypoxemic respiratory failure due to COVID-19 [facility]) U07.1, J96.01   2022 Yes   Pneumonia due to COVID-19 virus U07.1, J12.82 High  2022 Yes   Palliative care patient Z51.5   2021 Yes   Severe malnutrition ([address] E43   2021 Yes   Centrilobular emphysema ([address] J43.2   2019 Yes   Malignant neoplasm of upper lobe of right lung ([address] C34.11   6/10/2019 Yes   Primary squamous cell carcinoma of right lung (HCC) C34.91   2019 Yes   Acute on chronic respiratory failure with hypoxia and hypercapnia (HCC) J96.21, J96.22   2021 Yes   Personal history of noncompliance with medical treatment and regimen Z91.19   2021 Yes     Non-Hospital Problem List  Date Reviewed: 2021     ICD-10-CM Priority Class Noted   Malignant neoplasm of right main bronchus ([address] C34.01   3/1/2019   Overview Signed 2019 10:27 AM by Ash Sotelo RN    NSCLC, SCC [id number] stage IIIb      Acute hypoxemic respiratory failure ([address] J96.01   2021   Stroke of unknown etiology ([address] I63.9   2021   Altered mental status R41.82   Unknown   Pleural effusion J90   Unknown   Atelectasis of left lung J98.11   Unknown   Dependence on nicotine from cigarettes F17.210   2021   Tobacco abuse counseling Z71.6   2021   Pneumonia involving right lung J18.9   2021   Hypophosphatemia E83.39   2021   Chronic respiratory failure with hypoxia (HCC) J96.11   2021   WHITEHEAD (dyspnea on exertion) R06.00   2021     Patient Information    Patient Name Account ID # Gender  Age   Marlin [facility] [id number] Female 1955 67 yrs       [facility] Account [de-identified]  CVG-F/O Precert Number Precert [phone] [facility] Phone   1.  BCBS MEDICARE   ANTHEM MEDIBLUE ESSENTIAL        Length of Stay    Actual Admission Date    17 days 02/11/2022

## 2022-02-28 NOTE — PROGRESS NOTES
08:50a - Home oxygen evaluation performed and results are as follows: SaO2 = 84% on R/A at rest, SaO2 = 85% on 2 lpm O2(NC) at rest, SaO2 = 87% on 3 lpm O2(NC) at rest, SaO2 = 92% on 4 lpm (NC) at rest, SaO2 = 83% on R/A while ambulating, SaO2 = 85% on 2 lpm O2(NC) while ambulating, SaO2 = 88% on 3 lpm O2(NC) while ambulating, SaO2 = 92% on 4 lpm O2(NC) while ambulating. (Recovery SaO2).  TS RRT/RCP

## 2022-03-01 NOTE — CARE COORDINATION
[facility] Transitions Initial Follow Up Call    Call within 2 business days of discharge: Yes    Patient: James Giron Patient : 1955   MRN: [id number]  Reason for Admission: Pneumonia secondary to [place] 19  Discharge Date: 22 RARS: Readmission Risk Score: 25.9 ( )      Last Discharge [facility]       Complaint Diagnosis Description Type Department Provider    22 Shortness of Breath Acute on chronic respiratory failure with hypoxia and hypercapnia ([facility] . .. ED to Hosp-Admission (Discharged) (ADMITTED) [facility] Tasha Ayers MD; Nikki Goldberg. .. Spoke with: N/A    Facility: 0 [facility]    Non-face-to-face services provided:  Reviewed encounter information for continuity of care prior to follow up Care Transitions Coordination phone call - chart notes, consults, progress notes, test results, med list, appointments, AVS, other information. Care Transitions 24 Hour Call    Care Transitions Interventions       Attempted to make contact with patient/caregiver for an initial Care Transitions Coordination follow up call post discharge from the hospital without success. Left a HIPAA compliant message regarding intent of call and call back information. CTN will follow up at another time. Any previously scheduled hospital follow up appointments noted below.           Follow Up  Future Appointments   Date Time Provider Ingrid Luis   3/7/2022 10:00 AM [facility] LMP CT RM 1 [facility] L[facility] CT [facility] Rad   3/15/2022  2:15 PM MD Michelle Elizabeth [facility]   3/22/2022  3:00 PM [facility] PFT ROOM [facility]   3/29/2022 11:45 AM MD Zeenat Briggs [facility]       Paolo Lantigua RN

## 2022-03-02 NOTE — CARE COORDINATION
[facility] Transitions Initial Follow Up Call    Call within 2 business days of discharge: Yes    Patient: Kerwin Fabry Patient : 1955   MRN: [id number]  Reason for Admission: Covid  Discharge Date: 22 RARS: Readmission Risk Score: 25.9 ( )      Last Discharge [facility]       Complaint Diagnosis Description Type Department Provider    22 Shortness of Breath Acute on chronic respiratory failure with hypoxia and hypercapnia ([address] . .. ED to Hosp-Admission (Discharged) (ADMITTED) [facility] ON[facility] Camila Case MD; Sanket Maria. .. Spoke with: N/A    Facility:[address]    Non-face-to-face services provided:  Reviewed encounter information for continuity of care prior to follow up phone call - chart notes, consults, progress notes, test results, med list, appointments, AVS, other information. Care Transitions 24 Hour Call    Care Transitions Interventions         Follow Up : Attempt #2 to make contact with Vi for an initial follow up call post discharge from the hospital without success. Unable to leave a message regarding intent of call and call back information. Will discharge from CTN services at this time due to inability to make contact.     Future Appointments   Date Time Provider Ingrid Luis   3/7/2022 10:00 AM [facility] L[facility] CT RM 1 [facility] LMP CT [facility] Rad   3/15/2022  2:15 PM MD Ron Chen [facility]   3/22/2022  3:00 PM [facility] PFT ROOM [facility] PFT [facility]   3/29/2022 11:45 AM MD Zeenat Zeng [place] [facility]-[place]       Evette Jones, RN

## 2022-03-10 NOTE — TELEPHONE ENCOUNTER
Spoke with Sergio the grand daughter patients grand daughter states patient is out of pain medication at this time. Patient has canceled or failed to come to follow up appointments with Dr. Rebecca Hernandez. I explained to patients grand daughter that patient has a follow up appt on the 15th of this month and that patient needs to follow up with MD. Patients grand daughter expressed understanding.  Electronically signed by Mike Castillo RN on 3/10/2022 at 3:08 PM

## 2022-03-11 NOTE — PROGRESS NOTES
MEDICAL ONCOLOGY PROGRESS NOTE      Vi Diedre Sever   1955  3/15/2022     Chief Complaint   Patient presents with    Follow-up     Malignant neoplasm of right main bronchus [facility])      Interval history/history of present illness:  Diagnosis:  · NSCLC, SCC March 2019   · [id number], stage IIIIB  · COPD     Treatment summary. · 6/10/2019- 8/20/2019-carboplatin/Taxol weekly with concurrent radiation   · 12/4/2019-02/25/2021 durvalumab x1 year completed    Interval history  Patient is a very pleasant 74years old female who has a diagnosis of stage IIIb non-small cell lung cancer, squamous cell subtype. She received initial treatment with carboplatin/Taxol weekly followed by concurrent radiation. She completed concurrent chemoradiation and is currently status post completion of durvalumab February 2021. In addition, she has severe COPD. She has Symbicort inhalers at home but is not using them. Unfortunately, she continues to smoke. She had a CT scan of the chest for surveillance. Cancer history  Ms Elizabeth Herring was first seen by me on 5/3/2019 for further workup of a lung mass. She has complains of chest wall pain. Further workup revealed a lung mass. · 4/9/2019-CT of the chest with contrast showed a malignant mass in the right upper lobe measures 6.5 x 2.8 x 5.1 cm. The mass invades and destroys the right lateral cortex and lateral aspect of the T4 vertebral body. Mediastinal adenopathy. Specifically, 1.9 cm pretracheal lymph node. 2.1 cm precarinal lymph node. 1.9 cm subcarina lymph node. Right hilar adenopathy. · 5/3/2019-she was first seen by me. · 5/15/2019-bone scan and CT abdomen pelvis was unremarkable metastatic disease. · 5/16/20199162-[id number] biopsy consistent non-small cell lung cancer, squamous cell carcinoma subtype. · 5/29/2019-PET scan showed intense and metabolic activity associated with pleural-based right paraspinal mass within the right upper lobe.  It invades into the adjacent thoracic vertebral body. Right hilar, subcarina and pretracheal lymphadenopathy. No evidence of muscle skeletal or intra-abdominal disease. · 6/7/2019-recommended chemoradiation with carboplatin/Taxol weekly with concurrent radiation. · 10/14/2019- CT chest abdomen pelvis showed a cavitary lesion measuring 3.3 x 4.6 x 3.4 cm which appears to be decreased in size. Underlying lung emphysema. There is a new notable sclerosis within the right lateral aspect of T4 and T5 vertebral bodies at the site of the posterior mediastinal invasion by the right lung malignancy. CT of the abdomen pelvis showed tiny hypodense in the liver which are too small to characterize but similar to prior exam.  Attention to follow-up. Stable nodule in the left adrenal gland too small to characterize. · 12/4/2019- started on treatment durvalumab every 2 weeks to complete 1 year. · 2/19/2020-CT chest abdomen pelvis showed slight decrease in size of the right upper lobe cavitary lesion. The lesion now measures 2.6 x 4.5 x 3.9 cm (previously 3.3 x 4.6 x 3.4 cm). Subtle cortical erosion of the right side of the centrum at T4/T5. No evidence of metastatic disease in the abdomen. Stable left adrenal nodule. · 4/29/2020 Xr Chest Standard A cavitation or a necrotic mass in the right upper lobe. This was noted in the previous CT scan of the chest dated 2/19/2020. An area of consolidation in the right upper lobe may represent inflammatory or infectious process. Ill-defined nodule in the left lower lobe may represent a nipple shadow or a pulmonary nodule. This could be further evaluated. Chronic inflammatory changes in the remaining lungs bilaterally. · 6/26/2020 Ct Chest W Contrast There is a pleural-based cavitary lesion within the right upper lobe posteriorly and medially. When measuring the lesion in the same manner as the previous study I do not feel there has been any significant progression from a previous CT of 2/19/2020. There is some increased consolidation within the medial right upper lobe likely representing post therapy change. There is a new small nodule more inferiorly within the right upper lobe as described above. This may also represent consolidation post therapy but as it is a more isolated finding I feel it could potentially represent an early recurrence. This would warrant follow-up on subsequent imaging. These findings are superimposed upon changes of centrilobular emphysema. There is stable scarring within the right posterior lung base. There is bronchial wall thickening within both lungs suggesting chronic bronchitis. Coronary calcifications are present. Borderline enlargement of the pulmonary arteries suggesting pulmonary arterial hypertension. Mild constipation within the upper abdomen. Ct Abdomen Pelvis W Iv Contrast   No evidence of metastatic disease in the abdomen or pelvis. Stable biliary dilatation status post cholecystectomy which may represent reservoir phenomenon. Moderate to large volume stool in the colon, correlate for constipation. · 7/1/2020- discussed at length the CAT scans with the patient and radiology. No clear-cut evidence of disease progression. Therefore will continue treatment. · 9/16/20 CT Chest: Redemonstration of a cavitary lesion in the posterior right upper lobe with surrounding posttreatment changes, not significantly changed from the most recent comparison exam. New small noncalcified right lower lobe nodule for which attention on follow-up is recommended. Emphysema and pulmonary fibrosis. Pulmonary arterial hypertension. Atherosclerosis of the aorta and coronary arteries. · 10/29/20 MRI brain:  No acute intracranial abnormality. No evidence of intracranial neoplastic/metastatic disease. Chronic white matter ischemic changes. Chronic maxillary and ethmoid sinusitis. · 2/25/2021-completion of 1 year of treatment with durvalumab.   · 4/7/21 Ct Chest W Contrast  No significant interval change is seen. Posttreatment changes are noted in the right lung. 2 areas of soft tissue nodularity in the right upper lobe remains stable. There has been resolution of a right lower lobe area of nodular density. Right upper lung zone consolidation and cavitation posteriorly also appears stable. No pathologically enlarged lymph nodes. Emphysema and pulmonary fibrosis. Atheromatous disease of the thoracic aorta and coronary arteries. Dilated pulmonary arteries. 5. Prior cholecystectomy in the upper abdomen with dilated biliary tree likely related to reservoir effect. · 4/27/2021-reviewed CT chest that showed no significant interval change. Stable disease. · 8/23/21 CT CHEST W CONTRAST  Stable chest CT. Right upper lobe changes are stable, mostly treatment related. This includes an area of juxtapleural consolidation with cavitation. Narrowing of the right upper lobe bronchus is also seen. These findings are not significantly changed. No measurable mass identified. There are no new or enlarging pulmonary nodules. Underlying advanced lung emphysema. Evidence for pulmonary hypertension, likely related to the underlying diffuse lung disease. Advanced coronary atheromatous calcification. · 9/28/2021-I reviewed results of CT chest.  No evidence of disease progression. Continue clinical/image surveillance. · 3/7/2022 CT Chest W Contrast 3/7/2022 CT Chest W Contrast The imaged portion of the neck and thyroid gland is unremarkable. Lungs: Similar appearance of the right upper lobe with persistent cavitary lesion in adjacent confluent opacity as well as air bronchograms. Emphysema. No new focal consolidation, pleural effusion or suspicious pulmonary nodule. . No measurable pneumothorax. Trace pleural effusions. The trachea and bronchial tree are similar. Heart: Normal in size and appearance. There is no pericardial effusion. Vasculature: There is aortic atherosclerosis without stenosis or aneurysm. Calcifications are seen in the coronary arteries. There is atherosclerosis in the great vessels without definite stenosis. The pulmonary arteries are enlarged, suggestive of pulmonary arterial hypertension. Lymph nodes: No enlarged axillary, hilar, or mediastinal lymph nodes. Bones and soft tissues: No acute osseous or soft tissue abnormality is seen. There are no worrisome osseous lesions. Upper abdomen: Beam hardening artifact from cholecystectomy limits evaluation of the liver. Stable examination. Chronic findings as above. Stable examination. Chronic findings as above. Past medical history:  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     COPD (chronic obstructive pulmonary disease) (HCC)     Depression     Emphysema (subcutaneous) (surgical) resulting from a procedure     History of lung biopsy 2019    Malignant neoplasm of right main bronchus ([address] 2019    NSCLC, SCC [id number] stage IIIb    Palliative care patient 2021    Syncope     TIA (transient ischemic attack)         Past surgical history:  Past Surgical History:   Procedure Laterality Date    APPENDECTOMY      BACK SURGERY      times two    BLADDER SUSPENSION      CHOLECYSTECTOMY      HYSTERECTOMY      INCONTINENCE SURGERY          Social history:  Social History     Socioeconomic History    Marital status:       Spouse name: Not on file    Number of children: Not on file    Years of education: Not on file    Highest education level: Not on file   Occupational History    Not on file   Tobacco Use    Smoking status: Former Smoker     Packs/day: 2.00     Years: 54.00     Pack years: 108.00     Types: Cigarettes     Quit date: 2021     Years since quittin.3    Smokeless tobacco: Never Used    Tobacco comment: stopped 1 month ago   Vaping Use    Vaping Use: Never used   Substance and Sexual Activity    Alcohol use: Not Currently    Drug use: Never    Sexual activity: Not on file   Other Topics Concern    Not on file   Social History Narrative    Not on file     Social Determinants of Health     Financial Resource Strain:     Difficulty of Paying Living Expenses: Not on file   Food Insecurity:     Worried About Running Out of Food in the Last Year: Not on file    Trisha of Food in the Last Year: Not on file   Transportation Needs:     Lack of Transportation (Medical): Not on file    Lack of Transportation (Non-Medical): Not on file   Physical Activity:     Days of Exercise per Week: Not on file    Minutes of Exercise per Session: Not on file   Stress:     Feeling of Stress : Not on file   Social Connections:     Frequency of Communication with Friends and Family: Not on file    Frequency of Social Gatherings with Friends and Family: Not on file    Attends Congregational Services: Not on file    Active Member of [address] [place] [address] [employer] or Organizations: Not on file    Attends Club or Organization Meetings: Not on file    Marital Status: Not on file   Intimate Partner Violence:     Fear of Current or Ex-Partner: Not on file    Emotionally Abused: Not on file    Physically Abused: Not on file    Sexually Abused: Not on file   Housing Stability:     Unable to Pay for Housing in the Last Year: Not on file    Number of [place] in the Last Year: Not on file    Unstable Housing in the Last Year: Not on file        Family history:   Family History   Problem Relation Age of Onset    Colon Cancer Mother     High Blood Pressure Brother     Diabetes Brother         Current Outpatient Medications   Medication Sig Dispense Refill    oxyCODONE-acetaminophen (PERCOCET)  MG per tablet Take 1 tablet by mouth every 4 hours as needed for Pain for up to 30 days. Intended supply: 30 days 180 tablet 0    dexamethasone (DECADRON) 2 MG tablet Take 3 tablets by mouth daily for 7 days, THEN 2 tablets daily for 7 days, THEN 1 tablet daily for 7 days.  42 tablet 0  albuterol sulfate HFA (VENTOLIN HFA) 108 (90 Base) MCG/ACT inhaler Inhale 2 puffs into the lungs 4 times daily as needed for Wheezing 18 g 5    folic acid (FOLVITE) 1 MG tablet Take 1 tablet by mouth daily 30 tablet 0    apixaban (ELIQUIS) 5 MG TABS tablet Take 1 tablet by mouth 2 times daily 60 tablet 2    atorvastatin (LIPITOR) 40 MG tablet Take 1 tablet by mouth nightly 30 tablet 3    nicotine (NICODERM CQ) 21 MG/24HR Place 1 patch onto the skin daily 30 patch 3    midodrine (PROAMATINE) 10 MG tablet Take 1 tablet by mouth 3 times daily (with meals) 90 tablet 2    vitamin B-1 (THIAMINE) 100 MG tablet Take 1 tablet by mouth daily 30 tablet 3    vitamin D (ERGOCALCIFEROL) 00890 units CAPS capsule cholecalciferol (vitamin D3) 50,000 unit capsule   Take 1 capsule every week by oral route.  OXYGEN Inhale 2 L/min into the lungs      budesonide-formoterol (SYMBICORT) 160-4.5 MCG/ACT AERO Symbicort 160 mcg-4.5 mcg/actuation HFA aerosol inhaler   Inhale 2 puffs twice a day by inhalation route.  aspirin EC 81 MG EC tablet Take 81 mg by mouth daily       calcium carbonate 600 MG TABS tablet Take 600 mg by mouth (Patient not taking: Reported on 2/11/2022)      citalopram (CELEXA) 20 MG tablet citalopram 20 mg tablet   Take 1 tablet every day by oral route. No current facility-administered medications for this visit. REVIEW OF SYSTEMS:   CONSTITUTIONAL: no fever, no night sweats, fatigue, weight gain 11 pounds.   HEENT: no blurring of vision, no double vision, no hearing difficulty, no tinnitus, no ulceration, no dysplasia, no epistaxis;   LUNGS: no cough, no hemoptysis, no wheeze, exertional shortness of breath;  CARDIOVASCULAR: no palpitation, no chest pain, exertional shortness of breath;  GI: no abdominal pain, no nausea, no vomiting, no diarrhea, no constipation;  MARIA VICTORIA: no dysuria, no hematuria, no frequency or urgency, no nephrolithiasis;  MUSCULOSKELETAL: no joint pain, no swelling, no stiffness;  ENDOCRINE: no polyuria, no polydipsia, no cold or heat intolerance;  HEMATOLOGY: no easy bruising or bleeding, no history of clotting disorder;  DERMATOLOGY: no skin rash, no eczema, no pruritus;  PSYCHIATRY: no depression, no anxiety, no panic attacks, no suicidal ideation, no homicidal ideation;  NEUROLOGY: no syncope, no seizures, no numbness or tingling of hands, no numbness or tingling of feet, no paresis;      Vitals signs:  BP (!) 96/58   Pulse 76   Wt 112 lb (50.8 kg)   SpO2 90%   BMI 18.64 kg/m²       PHYSICAL EXAM:  CONSTITUTIONAL: Alert, appropriate, no acute distress,   EYES: Non icteric, EOM intact, pupils equal round and reactive to light and accommodation. ENT: Oral mucus membranes moist, no oral pharyngeal lesions. External inspection of ears and nose are normal.    NECK: Supple, no masses. No palpable thyroid mass    CHEST/LUNGS: O2 suppl 2 L/min,CTA bilaterally, normal respiratory effort   CARDIOVASCULAR: RRR, no murmurs. No lower extremity edema   ABDOMEN: soft non-tender, active bowel sounds, no hepatosplenomegaly. No palpable masses. EXTREMITIES: warm, Full ROM of all fours extremities. No focal weakness. SKIN: warm, dry, with no rashes or lesions  LYMPH: No cervical, clavicular, axillary, or inguinal lymphadenopathy  NEUROLOGIC: follows commands, non focal.   PSYCH: mood and affect appropriate. Alert and oriented to time and place and person.     Relevant Lab findings: Reviewed by me  Lab Results   Component Value Date    WBC 11.1 (H) 02/28/2022    HGB 10.0 (L) 02/28/2022    HCT 33.3 (L) 02/28/2022    MCV 94.3 02/28/2022     02/28/2022     Lab Results   Component Value Date     02/28/2022    K 4.7 02/28/2022    CL 95 (L) 02/28/2022    CO2 33 (H) 02/28/2022    BUN 20 02/28/2022    CREATININE 0.2 (L) 02/28/2022    GLUCOSE 92 02/28/2022    CALCIUM 9.0 02/28/2022    PROT 5.3 (L) 02/28/2022    LABALBU 3.4 (L) 02/28/2022    BILITOT <0.2 02/28/2022    ALKPHOS 78 02/28/2022    AST 12 02/28/2022    ALT 21 02/28/2022    LABGLOM >60 02/28/2022    GFRAA >59 02/28/2022    AGRATIO 1.4 05/01/2020    GLOB 3.5 02/25/2021         Relevant Imaging studies:  3/7/2022 CT Chest W Contrast The imaged portion of the neck and thyroid gland is unremarkable. Lungs: Similar appearance of the right upper lobe with persistent cavitary lesion in adjacent confluent opacity as well as air bronchograms. Emphysema. No new focal consolidation, pleural effusion or suspicious pulmonary nodule. . No measurable pneumothorax. Trace pleural effusions. The trachea and bronchial tree are similar. Heart: Normal in size and appearance. There is no pericardial effusion. Vasculature: There is aortic atherosclerosis without stenosis or aneurysm. Calcifications are seen in the coronary arteries. There is atherosclerosis in the great vessels without definite stenosis. The pulmonary arteries are enlarged, suggestive of pulmonary arterial  hypertension. Lymph nodes: No enlarged axillary, hilar, or mediastinal lymph nodes. Bones and soft tissues: No acute osseous or soft tissue abnormality is  seen. There are no worrisome osseous lesions. Upper abdomen: Beam hardening artifact from cholecystectomy limits evaluation of the liver. Stable examination. Chronic findings as above. ASSESSMENT:    No orders of the defined types were placed in this encounter. Vi was seen today for follow-up. Diagnoses and all orders for this visit:    Malignant neoplasm of upper lobe of right lung (HCC)    Chronic respiratory failure with hypoxia ([address]    Encounter for follow-up surveillance of lung cancer    Cancer related pain    Care plan discussed with patient    Malignant neoplasm of right main bronchus ([address]       NSCLC-SCC stage IIIB   Bone scan, CT abdomen pelvis unremarkable metastatic disease. PET scan showed disease limited to the chest and mediastinum only. Therefore, stage IIIB.   Status post completion of chemoradiation on 8/20/2019.  10/29/2020-brain MRI was unremarkable  Completion of adjuvant durvalumab February 2021  CT chest March 2022-no evidence of disease progression. Continue imaging/clinic surveillance  -Next CT chest Sep 2022    [id number] with PCP. Urged to consider Symbicort twice daily  -F/u Pulmonary 03/29/22 @ 11:45am    Smoke cessation-she was again strongly encouraged to quit. Cancer related pain-opioid analgesics have potential for abuse and risk of fatal overdose due to respiratory depression. The use of opioid exposes individual to have the risk of addiction, abuse and misuse. Addiction can occur in individuals appropriately prescribed and at a recommended dose. Addiction also occurs if the drug is being used or abused. An individual is also at increased risk of opioid addiction if a personal or family history of substance abuse or mental illness are present. [id number] mg SR every 8 hours. Plan:  CT chest Sep 2022  RTC 3 months MD        Follow Up:     Return in about 3 months (around 6/15/2022) for Appointment with Dr. Cameron Mckeon in [employer]. Data Aman Chicas am pre charting  as Medical Assistant for Colonel Dina MD. Electronically signed by Lawrence Paz MA on 3/15/2022 at 10:06 AM CST. Waddell Sandifer, am scribing for Colonel Dina MD. Electronically signed by Jacqueline Figueroa RN on 3/15/2022 at 3:09 PM CDT. I, Dr Jn Alexandre, personally performed the services described in this documentation as scribed by Jacqueline Figueroa RN in my presence and is both accurate and complete. I have seen, examined and reviewed this patient medication list, appropriate labs and imaging studies. I reviewed relevant medical records and others physicians notes. I discussed the plans of care with the patient. I answered all the questions to the patients satisfaction.  I have also reviewed the chief complaint (CC) and part of the history (History of Present Illness (HPI), Past Family Social History [facility]), or Review of Systems (ROS) and made changes when appropriated. (Please note that portions of this note were completed with a voice recognition program. Efforts were made to edit the dictations but occasionally words are mis-transcribed. )Electronically signed by Mamie Harris MD on 3/15/2022 at 3:09 PM

## 2022-03-28 NOTE — TELEPHONE ENCOUNTER
I called and spoke with the pt's son about the missed PFT for the pt. He stated that she was in the hospital at the time and needed to reschedule. He will call office back when he is around the pt to reschedule the PFT and the appointment on 3/29. The appointment will need to be rescheduled to follow the PFT. Verbal understanding.

## 2022-05-11 NOTE — TELEPHONE ENCOUNTER
Patient called for refill on oxycodone. Prescriptions sent to [facility].  Electronically signed by Amie Ruiz RN on 5/11/2022 at 3:18 PM

## 2022-05-23 NOTE — TELEPHONE ENCOUNTER
[facility] called notifying Dereck Hooks that she wants her port taken out. Dr. Nancy Moncada is ok to remove.

## 2022-06-20 NOTE — PROGRESS NOTES
MEDICAL ONCOLOGY PROGRESS NOTE      Aidee Venegas   1955 6/21/2022     Chief Complaint   Patient presents with    Follow-up     Malignant neoplasm of upper lobe of right lung [facility])      Interval history/history of present illness:  Diagnosis:  · NSCLC, SCC March 2019   · [id number], stage IIIIB  · COPD   · Quit Nov 2021    Treatment summary. · 6/10/2019- 8/20/2019-carboplatin/Taxol weekly with concurrent radiation   · 12/4/2019-02/25/2021 durvalumab x1 year completed    Interval history  Patient is a very pleasant 69years old female who has a diagnosis of stage IIIb non-small cell lung cancer, squamous cell subtype. She received initial treatment with carboplatin/Taxol weekly followed by concurrent radiation. She completed concurrent chemoradiation and is currently status post completion of durvalumab February 2021. In addition, she has severe COPD. She has frequent hospitalizations for COPD exarcebation. She was recently at [facility] for COPD exacerbation. She had an apparent seizure episode and MRI was performed. MRI did not show any evidence of intracranial metastatic disease. She eventually recovered and was discharged. She feels pretty good today. She has been on O2 supplementation. She has quit smoking November 2021. Last CT chest March 2022 showed no evidence of recurrent disease. Cancer history  Ms Chanelle Mariee was first seen by me on 5/3/2019 for further workup of a lung mass. She has complains of chest wall pain. Further workup revealed a lung mass. · 4/9/2019-CT of the chest with contrast showed a malignant mass in the right upper lobe measures 6.5 x 2.8 x 5.1 cm. The mass invades and destroys the right lateral cortex and lateral aspect of the T4 vertebral body. Mediastinal adenopathy. Specifically, 1.9 cm pretracheal lymph node. 2.1 cm precarinal lymph node. 1.9 cm subcarina lymph node. Right hilar adenopathy. · 5/3/2019-she was first seen by me. · 5/15/2019-bone scan and CT abdomen pelvis was unremarkable metastatic disease. · 5/16/[id number]-BR-yxcxak biopsy consistent non-small cell lung cancer, squamous cell carcinoma subtype. · 5/29/2019-PET scan showed intense and metabolic activity associated with pleural-based right paraspinal mass within the right upper lobe. It invades into the adjacent thoracic vertebral body. Right hilar, subcarina and pretracheal lymphadenopathy. No evidence of muscle skeletal or intra-abdominal disease. · 6/7/2019-recommended chemoradiation with carboplatin/Taxol weekly with concurrent radiation. · 10/14/2019- CT chest abdomen pelvis showed a cavitary lesion measuring 3.3 x 4.6 x 3.4 cm which appears to be decreased in size. Underlying lung emphysema. There is a new notable sclerosis within the right lateral aspect of T4 and T5 vertebral bodies at the site of the posterior mediastinal invasion by the right lung malignancy. CT of the abdomen pelvis showed tiny hypodense in the liver which are too small to characterize but similar to prior exam.  Attention to follow-up. Stable nodule in the left adrenal gland too small to characterize. · 12/4/2019- started on treatment durvalumab every 2 weeks to complete 1 year. · 2/19/2020-CT chest abdomen pelvis showed slight decrease in size of the right upper lobe cavitary lesion. The lesion now measures 2.6 x 4.5 x 3.9 cm (previously 3.3 x 4.6 x 3.4 cm). Subtle cortical erosion of the right side of the centrum at T4/T5. No evidence of metastatic disease in the abdomen. Stable left adrenal nodule. · 4/29/2020 Xr Chest Standard A cavitation or a necrotic mass in the right upper lobe. This was noted in the previous CT scan of the chest dated 2/19/2020. An area of consolidation in the right upper lobe may represent inflammatory or infectious process. Ill-defined nodule in the left lower lobe may represent a nipple shadow or a pulmonary nodule. This could be further evaluated. Chronic inflammatory changes in the remaining lungs bilaterally. · 6/26/2020 Ct Chest W Contrast There is a pleural-based cavitary lesion within the right upper lobe posteriorly and medially. When measuring the lesion in the same manner as the previous study I do not feel there has been any significant progression from a previous CT of 2/19/2020. There is some increased consolidation within the medial right upper lobe likely representing post therapy change. There is a new small nodule more inferiorly within the right upper lobe as described above. This may also represent consolidation post therapy but as it is a more isolated finding I feel it could potentially represent an early recurrence. This would warrant follow-up on subsequent imaging. These findings are superimposed upon changes of centrilobular emphysema. There is stable scarring within the right posterior lung base. There is bronchial wall thickening within both lungs suggesting chronic bronchitis. Coronary calcifications are present. Borderline enlargement of the pulmonary arteries suggesting pulmonary arterial hypertension. Mild constipation within the upper abdomen. Ct Abdomen Pelvis W Iv Contrast   No evidence of metastatic disease in the abdomen or pelvis. Stable biliary dilatation status post cholecystectomy which may represent reservoir phenomenon. Moderate to large volume stool in the colon, correlate for constipation. · 7/1/2020- discussed at length the CAT scans with the patient and radiology. No clear-cut evidence of disease progression. Therefore will continue treatment. · 9/16/20 CT Chest: Redemonstration of a cavitary lesion in the posterior right upper lobe with surrounding posttreatment changes, not significantly changed from the most recent comparison exam. New small noncalcified right lower lobe nodule for which attention on follow-up is recommended. Emphysema and pulmonary fibrosis. Pulmonary arterial hypertension. Atherosclerosis of the aorta and coronary arteries. · 10/29/20 MRI brain:  No acute intracranial abnormality. No evidence of intracranial neoplastic/metastatic disease. Chronic white matter ischemic changes. Chronic maxillary and ethmoid sinusitis. · 2/25/2021-completion of 1 year of treatment with durvalumab. · 4/7/21 Ct Chest W Contrast  No significant interval change is seen. Posttreatment changes are noted in the right lung. 2 areas of soft tissue nodularity in the right upper lobe remains stable. There has been resolution of a right lower lobe area of nodular density. Right upper lung zone consolidation and cavitation posteriorly also appears stable. No pathologically enlarged lymph nodes. Emphysema and pulmonary fibrosis. Atheromatous disease of the thoracic aorta and coronary arteries. Dilated pulmonary arteries. 5. Prior cholecystectomy in the upper abdomen with dilated biliary tree likely related to reservoir effect. · 4/27/2021-reviewed CT chest that showed no significant interval change. Stable disease. · 8/23/21 CT CHEST W CONTRAST  Stable chest CT. Right upper lobe changes are stable, mostly treatment related. This includes an area of juxtapleural consolidation with cavitation. Narrowing of the right upper lobe bronchus is also seen. These findings are not significantly changed. No measurable mass identified. There are no new or enlarging pulmonary nodules. Underlying advanced lung emphysema. Evidence for pulmonary hypertension, likely related to the underlying diffuse lung disease. Advanced coronary atheromatous calcification. · 9/28/2021-I reviewed results of CT chest.  No evidence of disease progression. Continue clinical/image surveillance. · 3/7/2022 CT Chest W Contrast 3/7/2022 CT Chest W Contrast The imaged portion of the neck and thyroid gland is unremarkable.  Lungs: Similar appearance of the right upper lobe with persistent cavitary lesion in adjacent confluent opacity as well as air bronchograms. Emphysema. No new focal consolidation, pleural effusion or suspicious pulmonary nodule. . No measurable pneumothorax. Trace pleural effusions. The trachea and bronchial tree are similar. Heart: Normal in size and appearance. There is no pericardial effusion. Vasculature: There is aortic atherosclerosis without stenosis or aneurysm. Calcifications are seen in the coronary arteries. There is atherosclerosis in the great vessels without definite stenosis. The pulmonary arteries are enlarged, suggestive of pulmonary arterial hypertension. Lymph nodes: No enlarged axillary, hilar, or mediastinal lymph nodes. Bones and soft tissues: No acute osseous or soft tissue abnormality is seen. There are no worrisome osseous lesions. Upper abdomen: Beam hardening artifact from cholecystectomy limits evaluation of the liver. Stable examination. Chronic findings as above. Stable examination. Chronic findings as above. · 6/10/2022 CT Head WO Contrast([facility]) There is no evidence of acute intracranial hemorrhage, mass effect or midline shift. No abnormal extra-axial fluid collection. No areas of abnormal attenuation within the brain parenchyma to suggest an acute infarction. The ventricles, cisterns, and sulci are within normal limits in size and configuration. The visualized skull base structures and paranasal sinuses are unremarkable. No calvarial fracture or other lesion. · 6/11/2022 MRI Brain W WO Contrast [facility]) No MR evidence of acute intracranial pathology. No evidence of intracranial metastatic disease.   · 6/12/2022 US Chest ([facility])Small bilateral  pleural effusion are present    Past medical history:  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     COPD (chronic obstructive pulmonary disease) (HCC)     Depression     Emphysema (subcutaneous) (surgical) resulting from a procedure     History of lung biopsy 05/16/2019    Malignant neoplasm of right main bronchus (HCC) 2019    NSCLC, SCC [id number] stage IIIb    Palliative care patient 2021    Syncope     TIA (transient ischemic attack)         Past surgical history:  Past Surgical History:   Procedure Laterality Date    APPENDECTOMY      BACK SURGERY      times two    BLADDER SUSPENSION      CHOLECYSTECTOMY      HYSTERECTOMY (CERVIX STATUS UNKNOWN)      INCONTINENCE SURGERY          Social history:  Social History     Socioeconomic History    Marital status:      Spouse name: Not on file    Number of children: Not on file    Years of education: Not on file    Highest education level: Not on file   Occupational History    Not on file   Tobacco Use    Smoking status: Former Smoker     Packs/day: 2.00     Years: 54.00     Pack years: 108.00     Types: Cigarettes     Quit date: 2021     Years since quittin.6    Smokeless tobacco: Never Used    Tobacco comment: stopped 1 month ago   Vaping Use    Vaping Use: Never used   Substance and Sexual Activity    Alcohol use: Not Currently    Drug use: Never    Sexual activity: Not on file   Other Topics Concern    Not on file   Social History Narrative    Not on file     Social Determinants of Health     Financial Resource Strain:     Difficulty of Paying Living Expenses: Not on file   Food Insecurity:     Worried About [address] in the Last Year: Not on file    [address] in the Last Year: Not on file   Transportation Needs:     Lack of Transportation (Medical): Not on file    Lack of Transportation (Non-Medical):  Not on file   Physical Activity:     Days of Exercise per Week: Not on file    Minutes of Exercise per Session: Not on file   Stress:     Feeling of Stress : Not on file   Social Connections:     Frequency of Communication with Friends and Family: Not on file    Frequency of Social Gatherings with Friends and Family: Not on file    Attends Orthodoxy Services: Not on file   [address] [facility] [employer] or Organizations: Not on file    Attends Club or Organization Meetings: Not on file    Marital Status: Not on file   Intimate Partner Violence:     Fear of Current or Ex-Partner: Not on file    Emotionally Abused: Not on file    Physically Abused: Not on file    Sexually Abused: Not on file   Housing Stability:     Unable to Pay for Housing in the Last Year: Not on file    Number of [place] in the Last Year: Not on file    Unstable Housing in the Last Year: Not on file        Family history:   Family History   Problem Relation Age of Onset    Colon Cancer Mother     High Blood Pressure Brother     Diabetes Brother         Current Outpatient Medications   Medication Sig Dispense Refill    oxyCODONE-acetaminophen (PERCOCET)  MG per tablet Take 1 tablet by mouth every 4 hours as needed for Pain for up to 30 days. Intended supply: 30 days 180 tablet 0    albuterol sulfate HFA (VENTOLIN HFA) 108 (90 Base) MCG/ACT inhaler Inhale 2 puffs into the lungs 4 times daily as needed for Wheezing 18 g 5    apixaban (ELIQUIS) 5 MG TABS tablet Take 1 tablet by mouth 2 times daily 60 tablet 2    atorvastatin (LIPITOR) 40 MG tablet Take 1 tablet by mouth nightly 30 tablet 3    nicotine (NICODERM CQ) 21 MG/24HR Place 1 patch onto the skin daily 30 patch 3    midodrine (PROAMATINE) 10 MG tablet Take 1 tablet by mouth 3 times daily (with meals) 90 tablet 2    vitamin B-1 (THIAMINE) 100 MG tablet Take 1 tablet by mouth daily 30 tablet 3    vitamin D (ERGOCALCIFEROL) 74073 units CAPS capsule cholecalciferol (vitamin D3) 50,000 unit capsule   Take 1 capsule every week by oral route.  OXYGEN Inhale 2 L/min into the lungs      budesonide-formoterol (SYMBICORT) 160-4.5 MCG/ACT AERO Symbicort 160 mcg-4.5 mcg/actuation HFA aerosol inhaler   Inhale 2 puffs twice a day by inhalation route.       folic acid (FOLVITE) 1 MG tablet Take 1 tablet by mouth daily 30 tablet 0    aspirin EC 81 MG EC tablet Take 81 mg by mouth daily  (Patient not taking: Reported on 6/21/2022)      calcium carbonate 600 MG TABS tablet Take 600 mg by mouth (Patient not taking: Reported on 2/11/2022)      citalopram (CELEXA) 20 MG tablet citalopram 20 mg tablet   Take 1 tablet every day by oral route. No current facility-administered medications for this visit. REVIEW OF SYSTEMS:   CONSTITUTIONAL: no fever, no night sweats,weakness, fatigue;  HEENT: no blurring of vision, no double vision, no hearing difficulty, no tinnitus, no ulceration, no dysplasia, no epistaxis;   LUNGS:cough, no hemoptysis, no wheeze,  no shortness of breath;  CARDIOVASCULAR: no palpitation, no chest pain, no shortness of breath;  GI: no abdominal pain, no nausea, no vomiting, no diarrhea, no constipation;  MARIA VICTORIA: no dysuria, no hematuria, no frequency or urgency, no nephrolithiasis;  MUSCULOSKELETAL: back pain,no joint pain, no swelling, no stiffness;  ENDOCRINE: no polyuria, no polydipsia, no cold or heat intolerance;  HEMATOLOGY: no easy bruising or bleeding, no history of clotting disorder;  DERMATOLOGY: no skin rash, no eczema, no pruritus;  PSYCHIATRY: no depression, no anxiety, no panic attacks, no suicidal ideation, no homicidal ideation;  NEUROLOGY: no syncope, no seizures, no numbness or tingling of hands, no numbness or tingling of feet, no paresis;    Vitals signs:  /64   Pulse 84   Ht 5' 4\" (1.626 m)   Wt 124 lb (56.2 kg)   SpO2 97%   BMI 21.28 kg/m²     PHYSICAL EXAM:  CONSTITUTIONAL: Alert, appropriate, no acute distress,   EYES: Non icteric, EOM intact, pupils equal round and reactive to light and accommodation. ENT: Oral mucus membranes moist, no oral pharyngeal lesions. External inspection of ears and nose are normal.    NECK: Supple, no masses. No palpable thyroid mass    CHEST/LUNGS: O2 suppl 2 L/min,CTA bilaterally, normal respiratory effort   CARDIOVASCULAR: RRR, no murmurs.  No lower extremity edema ABDOMEN: soft non-tender, active bowel sounds, no hepatosplenomegaly. No palpable masses. EXTREMITIES: warm, Full ROM of all fours extremities. No focal weakness. SKIN: warm, dry, with no rashes or lesions  LYMPH: No cervical, clavicular, axillary, or inguinal lymphadenopathy  NEUROLOGIC: follows commands, non focal.   PSYCH: mood and affect appropriate. Alert and oriented to time and place and person. Relevant Lab findings: Reviewed by me  None    Relevant Imaging studies:  6/10/2022 CT Head WO Contrast([facility]) There is no evidence of acute intracranial hemorrhage, mass effect or midline shift. No abnormal extra-axial fluid collection. No areas of abnormal attenuation within the brain parenchyma to suggest an acute infarction. The ventricles, cisterns, and sulci are within normal limits in size and configuration. The visualized skull base structures and paranasal sinuses are unremarkable. No calvarial fracture or other lesion. 6/11/2022 MRI Brain W WO Contrast [facility]) No MR evidence of acute intracranial pathology. No evidence of intracranial metastatic disease. 6/12/2022 US Chest ([facility])Small bilateral  pleural effusion are present    ASSESSMENT:    Orders Placed This Encounter   Procedures    CT CHEST W CONTRAST     Standing Status:   Future     Standing Expiration Date:   12/21/2023     Scheduling Instructions:      sched after 9/7/22     Order Specific Question:   STAT Creatinine as needed:     Answer:   Yes     Order Specific Question:   Reason for exam:     Answer:   COMPARE TO PREVIOUS SCANS for surveillance      Vi was seen today for follow-up. Diagnoses and all orders for this visit:    Malignant neoplasm of upper lobe of right lung ([address]  -     CT CHEST W CONTRAST;  Future    Encounter for follow-up surveillance of lung cancer    Cancer related pain    Care plan discussed with patient    Chronic obstructive pulmonary disease, unspecified COPD type ([address]    Pulmonary emphysema, unspecified emphysema type ([facility] [address]       NSCLC-SCC stage IIIB   Bone scan, CT abdomen pelvis unremarkable metastatic disease. PET scan showed disease limited to the chest and mediastinum only. Therefore, stage IIIB. Status post completion of chemoradiation on 8/20/2019.  10/29/2020-brain MRI was unremarkable  Completion of adjuvant durvalumab February 2021  CT chest March 2022-no evidence of disease progression. Continue imaging/clinic surveillance  -Next CT chest Sep 2022    [id number] with PCP.   -F/u Pulmonary    Smoking cessation-  Quit 2021 Nov    Cancer related pain-opioid analgesics have potential for abuse and risk of fatal overdose due to respiratory depression. The use of opioid exposes individual to have the risk of addiction, abuse and misuse. Addiction can occur in individuals appropriately prescribed and at a recommended dose. Addiction also occurs if the drug is being used or abused. An individual is also at increased risk of opioid addiction if a personal or family history of substance abuse or mental illness are present. [id number] mg SR every 8 hours. Plan:  CT chest Sep 2022  RTC 3 months MD    Follow Up:     Return in about 12 weeks (around 9/13/2022) for Appointment with Dr. Teresa Melton in [place] after CT chest.   CT chest after 9/7/22     ITalita, am pre charting  as Medical Assistant for Juan Mathew MD. Electronically signed by Talita Adamson MA on 6/21/2022 at 5:09 PM CDT. Claudia Bond, gina scribing for Juan Mathew MD. Electronically signed by Enedelia Katz RN on 6/21/2022 at 3:02 PM CDT. I, Dr Jenise Zamudio, personally performed the services described in this documentation as scribed by Enedelia Katz RN in my presence and is both accurate and complete. I have seen, examined and reviewed this patient medication list, appropriate labs and imaging studies. I reviewed relevant medical records and others physicians notes.  I discussed the plans of care with the patient. I answered all the questions to the patients satisfaction. I have also reviewed the chief complaint (CC) and part of the history (History of Present Illness (HPI), Past Family Social History [facility]), or Review of Systems (ROS) and made changes when appropriated. (Please note that portions of this note were completed with a voice recognition program. Efforts were made to edit the dictations but occasionally words are mis-transcribed. )Electronically signed by Danny Rader MD on 6/21/2022 at 3:17 PM

## 2022-07-24 PROBLEM — J96.02 ACUTE RESPIRATORY FAILURE WITH HYPOXIA AND HYPERCARBIA (HCC): Status: ACTIVE | Noted: 2022-01-01

## 2022-07-24 PROBLEM — J96.01 ACUTE HYPOXEMIC RESPIRATORY FAILURE DUE TO COVID-19 (HCC): Status: RESOLVED | Noted: 2022-01-01 | Resolved: 2022-01-01

## 2022-07-24 PROBLEM — J96.01 ACUTE HYPOXEMIC RESPIRATORY FAILURE (HCC): Status: RESOLVED | Noted: 2021-01-01 | Resolved: 2022-01-01

## 2022-07-24 PROBLEM — J96.01 ACUTE RESPIRATORY FAILURE WITH HYPOXIA AND HYPERCARBIA (HCC): Status: ACTIVE | Noted: 2022-01-01

## 2022-07-24 PROBLEM — U07.1 ACUTE HYPOXEMIC RESPIRATORY FAILURE DUE TO COVID-19 (HCC): Status: RESOLVED | Noted: 2022-01-01 | Resolved: 2022-01-01

## 2022-07-25 NOTE — PROGRESS NOTES
Hospitalist Progress Note  [facility]     Patient: Marta Masterson  : 1955  MRN: [id number]  Code Status: DNR    Hospital Day: 1   Date of Service: 2022    Subjective:   Patient seen and examined. No current complaints. Past Medical History:   Diagnosis Date    Anxiety     Asthma     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     COPD (chronic obstructive pulmonary disease) (HCC)     Depression     Emphysema (subcutaneous) (surgical) resulting from a procedure     History of lung biopsy 2019    Malignant neoplasm of right main bronchus ([address] 2019    NSCLC, SCC [id number] stage IIIb    Palliative care patient 2021    Syncope     TIA (transient ischemic attack)        Past Surgical History:   Procedure Laterality Date    APPENDECTOMY      BACK SURGERY      times two    BLADDER SUSPENSION      CHOLECYSTECTOMY      HYSTERECTOMY (CERVIX STATUS UNKNOWN)      INCONTINENCE SURGERY         Family History   Problem Relation Age of Onset    Colon Cancer Mother     High Blood Pressure Brother     Diabetes Brother        Social History     Socioeconomic History    Marital status:       Spouse name: Not on file    Number of children: Not on file    Years of education: Not on file    Highest education level: Not on file   Occupational History    Not on file   Tobacco Use    Smoking status: Former     Packs/day: 2.00     Years: 54.00     Pack years: 108.00     Types: Cigarettes     Quit date: 2021     Years since quittin.7    Smokeless tobacco: Never    Tobacco comments:     stopped 1 month ago   Vaping Use    Vaping Use: Never used   Substance and Sexual Activity    Alcohol use: Not Currently    Drug use: Never    Sexual activity: Not on file   Other Topics Concern    Not on file   Social History Narrative    Not on file     Social Determinants of Health     Financial Resource Strain: Not on file   Food Insecurity: Not on file   Transportation Needs: Not on file   Physical Activity: Not on file   Stress: Not on file   Social Connections: Not on file   Intimate Partner Violence: Not on file   Housing Stability: Not on file       Current Facility-Administered Medications   Medication Dose Route Frequency Provider Last Rate Last Admin    oxyCODONE (ROXICODONE) immediate release tablet 10 mg  10 mg Oral Q4H PRN Claudia Gonsalez MD   10 mg at 07/25/22 1034    azithromycin (ZITHROMAX) 250 mg in dextrose 5 % 250 mL IVPB  250 mg IntraVENous Q24H Camila Arteaga MD        cefTRIAXone (ROCEPHIN) 1,000 mg in sterile water 10 mL IV syringe  1,000 mg IntraVENous Q24H Camila Arteaga MD        budesonide (PULMICORT) nebulizer suspension 500 mcg  0.5 mg Nebulization BID Camila Arteaga MD   500 mcg at 07/25/22 0636    Arformoterol Tartrate (BROVANA) nebulizer solution 15 mcg  15 mcg Nebulization BID Camila Arteaga MD   15 mcg at 07/25/22 0636    apixaban (ELIQUIS) tablet 5 mg  5 mg Oral BID Camila Arteaga MD   5 mg at 07/25/22 0805    albuterol (PROVENTIL) nebulizer solution 2.5 mg  2.5 mg Nebulization Q1H PRN Camila Arteaga MD   2.5 mg at 07/25/22 5396    atorvastatin (LIPITOR) tablet 40 mg  40 mg Oral Nightly Camila Arteaga MD   40 mg at 07/24/22 2046    citalopram (CELEXA) tablet 20 mg  20 mg Oral Daily Camila Artaega MD   20 mg at 04/64/86 9541    folic acid (FOLVITE) tablet 1 mg  1 mg Oral Daily Camila Arteaga MD   1 mg at 07/25/22 0805    midodrine (PROAMATINE) tablet 10 mg  10 mg Oral TID WC Camila Arteaga MD   10 mg at 07/25/22 0805    nicotine (NICODERM CQ) 21 MG/24HR 1 patch  1 patch TransDERmal Daily Camila Arteaga MD   1 patch at 07/25/22 0805    thiamine mononitrate tablet 100 mg  100 mg Oral Daily Camila Arteaga MD   100 mg at 07/25/22 0805    vitamin D (ERGOCALCIFEROL) capsule 50,000 Units  50,000 Units Oral Weekly Camila Arteaga MD   50,000 Units at 07/25/22 0805    sodium chloride flush 0.9 % injection 5-40 mL  5-40 mL IntraVENous 2 times per day Camila Arteaga MD   10 mL at 07/25/22 0805    sodium chloride flush 0.9 % injection 5-40 mL  5-40 mL IntraVENous PRN Camila Arteaga MD        0.9 % sodium chloride infusion   IntraVENous PRN Camila Arteaga MD        ondansetron (ZOFRAN-ODT) disintegrating tablet 4 mg  4 mg Oral Q8H PRN Camila Arteaga MD        Or    ondansetron [facility]) injection 4 mg  4 mg IntraVENous Q6H PRN Camila Arteaga MD        acetaminophen (TYLENOL) tablet 650 mg  650 mg Oral Q6H PRN Camila Arteaga MD   650 mg at 07/25/22 0848    Or    acetaminophen (TYLENOL) suppository 650 mg  650 mg Rectal Q6H PRN Camila Arteaga MD        potassium chloride 10 mEq/100 mL IVPB (Peripheral Line)  10 mEq IntraVENous PRN Camila Arteaga MD        magnesium sulfate 2000 mg in 50 mL IVPB premix  2,000 mg IntraVENous PRN Camila Arteaga MD        sodium phosphate 10 mmol in sodium chloride 0.9 % 250 mL IVPB  10 mmol IntraVENous PRN Camila Arteaga MD        Or    sodium phosphate 15 mmol in sodium chloride 0.9 % 250 mL IVPB  15 mmol IntraVENous PRN Camila Arteaga MD        Or    sodium phosphate 20 mmol in sodium chloride 0.9 % 500 mL IVPB  20 mmol IntraVENous PRN Camila Arteaga MD        polyethylene glycol (GLYCOLAX) packet 17 g  17 g Oral Daily PRN Camila Arteaga MD        melatonin disintegrating tablet 5 mg  5 mg Oral Nightly PRN Camila Arteaga MD   5 mg at 07/24/22 2046    calcium carbonate (TUMS) chewable tablet 500 mg  500 mg Oral TID PRN Camila Arteaga MD        naloxone [facility]) injection 0.4 mg  0.4 mg IntraVENous PRN Camila Arteaga MD             sodium chloride          Objective:   BP (!) 100/58   Pulse 73   Temp 96.8 °F (36 °C) (Temporal)   Resp 24   Ht 5' 4\" (1.626 m)   Wt 132 lb 6.4 oz (60.1 kg)   SpO2 94%   BMI 22.73 kg/m²     General: no acute distress  HEENT: normocephalic, atraumatic  Neck: supple, symmetrical, trachea midline   Lungs: rhonchi  Cardiovascular: s1 and s2 normal  Abdomen: soft, positive bowel sounds  Extremities: no edema or cyanosis   Neuro: aaox3, no focal deficits   Skin: normal color and texture     Recent Labs     07/24/22  1255 07/25/22  0355   WBC 7.4 5.3   RBC 3.66* 3.51*   HGB 9.9* 9.4*   HCT 36.2* 33.9*   MCV 98.9 96.6   MCH 27.0 26.8*   MCHC 27.3* 27.7*    179     Recent Labs     07/24/22  1250 07/24/22  1255 07/25/22  0355   NA  --  143 146*   K 3.3 4.0 3.7   ANIONGAP  --  5* 6*   CL  --  99 99   CO2  --  39* 41*   BUN  --  15 14   CREATININE  --  0.3* 0.3*   GLUCOSE  --  142* 107   CALCIUM  --  9.1 9.1     No results for input(s): MG, PHOS in the last 72 hours. Recent Labs     07/24/22  1255   AST 10   ALT 7   BILITOT <0.2   ALKPHOS 136*     No results for input(s): PH, PO2, PCO2, HCO3, BE, O2SAT in the last 72 hours. Recent Labs     07/24/22  1255   TROPONINI <0.01     No results for input(s): INR in the last 72 hours. No results for input(s): LACTA in the last 72 hours. Intake/Output Summary (Last 24 hours) at 7/25/2022 1159  Last data filed at 7/25/2022 1000  Gross per 24 hour   Intake 1535.58 ml   Output 3685 ml   Net -2149.42 ml       XR CHEST PORTABLE    Result Date: 7/24/2022  NO PRIOR REPORT AVAILABLE Exam: X-RAY OF THECHEST (Marked rotation seen) Clinical data:Dyspnea, respiratory distress. Technique:Single view of the chest. Prior studies: CT scan of the chest dated 03/07/22 images. Radiograph of the chest dated 02/11/22 image. Findings:Right Mediport catheter with tip in the SVC. Left greater than right pleural effusions. Ill-defined left mid lower lung infiltrate. Borderline vascularity. No acute osseous abnormality is detected. 1.  Right Mediport catheter. 2.  Borderline vascularity with left greater than right pleural effusions. 3.  Left mid lower lung infiltrate. Recommendation: Follow HRCT to confirm if clinically indicated.  Electronically Signed by Bing Main MD at 24-Jul-2022 02:58:22 PM             CTA PULMONARY W CONTRAST    Result Date: 7/24/2022  NO PRIOR REPORT AVAILABLE <Addendum Signed by Bing Main MD at findings above. Recommendation: Follow up as clinically indicated. All CT scans at this facility utilize dose modulation, iterative reconstruction, and/or weight based dosing when appropriate to reduce radiation dose to as low as reasonably achievable.           Amended by Sam Rodriguez MD at 24-Jul-2022 07:51:27 PM Electronically Signed by Sam Rodriguez MD at 24-Jul-2022 06:41:48 PM              Assessment and Plan:   Acute on chronic hypoxemic and hypercapnic respiratory failure  Currently requiring BiPAP 12/5 with 21 L  Requires 4 L at baseline  Follow blood gas  Empiric antibiotics initiated on admission  Follow cultures  Pulmonary following  Prolonged hospitalization at this facility discharged 12/2021    History of Latoya Handy Mealing following    History of bihemispheric strokes  Eliquis  Supportive care    DVT prophylaxis  Eliquis     Total critical care time: 48 minutes    Karin Estrella MD   7/25/2022 11:59 AM

## 2022-07-25 NOTE — PLAN OF CARE

## 2022-07-25 NOTE — CONSULTS
Palliative Care:    Pleasant 69 yr old resting in bed with pap in place. Alert and oriented. Talks with nurse to initiate palliative care for support, GOC, ACP discussion. [facility].  Pt presents to ed on 7/24 d/t resp distress. On EMS arrival pt O2 sats in the 50's. Pt saw her PCP 2 days prior and placed on diuretic, however, SOA continued. Pt is on home O2 continuous. Past Medical History:        Past Medical History:   Diagnosis Date    Anxiety     Asthma     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     COPD (chronic obstructive pulmonary disease) (HCC)     Depression     Emphysema (subcutaneous) (surgical) resulting from a procedure     History of lung biopsy 05/16/2019    Malignant neoplasm of right main bronchus ([address] 03/2019    NSCLC, SCC [id number] stage IIIb    Palliative care patient 11/16/2021    Syncope     TIA (transient ischemic attack)        Advance Directives:    No AD on file. Pt states her wishes are DNR. Willing to complete AD.  will follow up. Pain/Other Symptoms:   Pt states chronic back pain d/t back surgery several yrs ago. Rates pian \"9\". Nurse aware. Home medication for pain has been restarted per pt RN. Activity:   As gopi          Psychological/Spiritual:    Pt states good spiritual support. Good family support with son, daughter in law and granddtr living with pt to assist in her care. Plan:  Consults Oncology and Pulmonology,  O2 support,  neb txs, Abx tx,     Patient/family discussion r/t goals:  Pt desires to return to her home where she has good family support. Pt tells me she has been able to manage her ADL's with some assist. Uses walker PRN for amb. Pt states she has hx of CVA as of last March. No needs voiced at present time. Palliative following for support, goc.                 Electronically signed by Chiquis Dos Santos RN on 7/25/2022 at 10:37 AM

## 2022-07-25 NOTE — ED NOTES
Report given to [facility] in ICU      Shyanne Pastrana, ELIS  07/24/22 1925

## 2022-07-25 NOTE — H&P
[facility] Hospitalists      Hospitalist - History & Physical      PCP: DOMINICK Field NP    Date of Admission: 7/24/2022    Date of Service: 7/24/2022    Chief Complaint:  Respiratory distress    History Of Present Illness: The patient is a 69 y.o. female who presented to [facility] ED due to respiratory distress. Pt has history of COPD w/chronic respiratory failure on 4L of oxygen per nc at home and non-small cell lung cancer of upper lobe of right lung followed by Dr. Jany Martin. She is unable to provide much history with majority of history obtained from review of EMR. She admits to increased cough and shortness of breath however isn't really sure what happened today. She gives history of prior stroke and has had memory difficulties she tells me since. She tells me that her son called EMS today. EMS noted on arrival to her house today that she was confused with oxygen saturations in the 50's. In ED, CTA pulmonary no PE or aortic dissection. 4 cm in greatest dimension irregular air collection, uncertain if parenchymal or pleural, involving the posterior right upper lobe with surrounding parenchymal consolidation and pleural thickening. Additional left greater than right pleural effusions with dependent atelectasis/consolidation. Wbc 7.4, hgb 9.9, platelets 869H, respiratory panel pcr negative, sodium 143, potassium 4.0, creatinine 0.3/bun 15, glucose 142, pro-bnp 444, troponin negative, ABG-pH 7.38, pCO2-72, pO2 34, HCO3 42.6, o2 sat 60% on arrival to ED. Pt is admitted to hospitalist service for further evaluation and treatment.    Past Medical History:        Diagnosis Date    Anxiety     Asthma     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     COPD (chronic obstructive pulmonary disease) (HCC)     Depression     Emphysema (subcutaneous) (surgical) resulting from a procedure     History of lung biopsy 05/16/2019    Malignant neoplasm of right main bronchus [address] [address] 03/2019    NSCLC, SCC [id number] stage IIIb    Palliative care patient 11/16/2021    Syncope     TIA (transient ischemic attack)        Past Surgical History:        Procedure Laterality Date    APPENDECTOMY      BACK SURGERY      times two    BLADDER SUSPENSION      CHOLECYSTECTOMY      HYSTERECTOMY (CERVIX STATUS UNKNOWN)      INCONTINENCE SURGERY         Home Medications:  Prior to Admission medications    Medication Sig Start Date End Date Taking? Authorizing Provider   oxyCODONE-acetaminophen (PERCOCET)  MG per tablet Take 1 tablet by mouth every 4 hours as needed for Pain for up to 30 days. Intended supply: 30 days 7/19/22 8/18/22  Fatuma Almonte MD   albuterol sulfate HFA (VENTOLIN HFA) 108 (90 Base) MCG/ACT inhaler Inhale 2 puffs into the lungs 4 times daily as needed for Wheezing 12/29/21   Niki Melgar MD   folic acid (FOLVITE) 1 MG tablet Take 1 tablet by mouth daily 12/12/21 2/11/22  Avery Carlson MD   apixaban (ELIQUIS) 5 MG TABS tablet Take 1 tablet by mouth 2 times daily 12/7/21   Rosita Boyd MD   atorvastatin (LIPITOR) 40 MG tablet Take 1 tablet by mouth nightly 12/7/21   Rosita Boyd MD   nicotine (NICODERM CQ) 21 MG/24HR Place 1 patch onto the skin daily 12/8/21   Rosita Boyd MD   midodrine (PROAMATINE) 10 MG tablet Take 1 tablet by mouth 3 times daily (with meals) 12/7/21   Rosita Boyd MD   vitamin B-1 (THIAMINE) 100 MG tablet Take 1 tablet by mouth daily 12/7/21   Rosita Boyd MD   vitamin D (ERGOCALCIFEROL) 06546 units CAPS capsule cholecalciferol (vitamin D3) 50,000 unit capsule   Take 1 capsule every week by oral route. Historical Provider, MD   OXYGEN Inhale 2 L/min into the lungs    Historical Provider, MD   budesonide-formoterol (SYMBICORT) 160-4.5 MCG/ACT AERO Symbicort 160 mcg-4.5 mcg/actuation HFA aerosol inhaler   Inhale 2 puffs twice a day by inhalation route.     Historical Provider, MD   aspirin EC 81 MG EC tablet Take 81 mg by mouth daily   Patient not taking: Reported on 6/21/2022 Historical Provider, MD   calcium carbonate 600 MG TABS tablet Take 600 mg by mouth  Patient not taking: Reported on 2/11/2022    Historical Provider, MD   citalopram (CELEXA) 20 MG tablet citalopram 20 mg tablet   Take 1 tablet every day by oral route. Historical Provider, MD       Allergies:    No known allergies    Social History:    The patient currently lives at home  Tobacco:   reports that she quit smoking about 8 months ago. Her smoking use included cigarettes. She has a 108.00 pack-year smoking history. She has never used smokeless tobacco.  Alcohol:   reports that she does not currently use alcohol. Illicit Drugs: Never    Family History:      Problem Relation Age of Onset    Colon Cancer Mother     High Blood Pressure Brother     Diabetes Brother        Review of Systems:   Review of Systems   Respiratory:  Positive for cough and shortness of breath. All other systems reviewed and are negative. 14 point review of systems is negative except as specifically addressed above. Physical Examination:  /74   Pulse 96   Temp 98 °F (36.7 °C) (Axillary)   Resp 20   Ht 5' 4\" (1.626 m)   Wt 125 lb (56.7 kg)   SpO2 93%   BMI 21.46 kg/m²   Physical Exam  Vitals reviewed. Constitutional:       Comments: 69 yr old female appears in no respiratory distress with bipap in place   HENT:      Right Ear: External ear normal.      Left Ear: External ear normal.      Mouth/Throat:      Pharynx: Oropharynx is clear. Eyes:      Conjunctiva/sclera: Conjunctivae normal.   Cardiovascular:      Rate and Rhythm: Normal rate and regular rhythm. Pulmonary:      Effort: Pulmonary effort is normal.      Breath sounds: Rhonchi present. Comments: Decreased breath sounds bilateral bases  Musculoskeletal:      Cervical back: Neck supple. Right lower leg: No edema. Left lower leg: No edema. Skin:     General: Skin is warm and dry.    Neurological:      Mental Status: She is alert and oriented to person, place, and time. Diagnostic Data:  CBC:  Recent Labs     07/24/22  1255   WBC 7.4   HGB 9.9*   HCT 36.2*        BMP:  Recent Labs     07/24/22  1250 07/24/22  1255   NA  --  143   K 3.3 4.0   CL  --  99   CO2  --  39*   BUN  --  15   CREATININE  --  0.3*   CALCIUM  --  9.1     Recent Labs     07/24/22  1255   AST 10   ALT 7   BILITOT <0.2   ALKPHOS 136*       Cardiac Enzymes:   Recent Labs     07/24/22  1255   TROPONINI <0.01     ABGs:  Lab Results   Component Value Date/Time    PHART 7.380 07/24/2022 12:50 PM    PO2ART 34.0 07/24/2022 12:50 PM    RSF7TWO 72.0 07/24/2022 12:50 PM     ABG:  Recent Labs     07/24/22  1250   PHART 7.380   DNR0XKB 72.0*   PO2ART 34.0*   OGH0REQ 42.6*   BEART 14.9*   HGBAE 10.1*   D4GUMNKL 59.9*   CARBOXHGBART 1.9       XR CHEST PORTABLE    Result Date: 7/24/2022  NO PRIOR REPORT AVAILABLE Exam: X-RAY OF THECHEST (Marked rotation seen) Clinical data:Dyspnea, respiratory distress. Technique:Single view of the chest. Prior studies: CT scan of the chest dated 03/07/22 images. Radiograph of the chest dated 02/11/22 image. Findings:Right Mediport catheter with tip in the SVC. Left greater than right pleural effusions. Ill-defined left mid lower lung infiltrate. Borderline vascularity. No acute osseous abnormality is detected. 1.  Right Mediport catheter. 2.  Borderline vascularity with left greater than right pleural effusions. 3.  Left mid lower lung infiltrate. Recommendation: Follow HRCT to confirm if clinically indicated. Electronically Signed by Marshall Fairbanks MD at 24-Jul-2022 02:58:22 PM             CTA PULMONARY W CONTRAST    Result Date: 7/24/2022  NO PRIOR REPORT AVAILABLE <Addendum Signed by Marshall Fairbanks MD at 24-Jul-2022 07:51:27 PM Please note: When compared to prior exam dated 3/7/2022, the left pleural fluid and associated parenchymal disease appears new.   The right sided posterior upper lobe air collection was present on prior exam but appears slightly larger with slight increase in adjacent parenchymal disease and pleural fluid compared to prior exam. Addendum End> Exam: CTA OF THE CHEST WITH CONTRAST Clinical data: Dyspnea. Technique: Axial CT angiography images through the lungs were acquired with contrast and imaged using soft tissue and lung algorithms. Coronal, sagittal, and 3D volume reconstructions were performed. Reformatted/3D-MPR images were performed. Radiation dose: CTDIvol =30.19 mGy, DLP =425 mGy x cm. Prior studies: Radiograph of the chest dated 07/24/2022. CT scan of the chest dated 03/07/2022 images. Findings: Lungs: 4 cm in greatest dimension irregular air collection involving the posterior right upper lobe with surrounding parenchymal consolidation and pleural thickening. Additional left greater than right pleural effusions with dependent atelectasis/consolidation. The airway is clear. Soft Tissues: No mediastinal, axillary or supraclavicular adenopathy isidentified. Vascular: No filling defect within the pulmonary arteries to the segmental branch level. Unremarkable aorta. Grossly unremarkable sized heart. No definite abnormality seen on 3D reformatted images. Bony structures: No acute or destructive abnormality. Moderate-severe multilevel spondylosis with S-shaped kyphoscoliosis. Upper Abdomen: Prior cholecystectomy. 1.  4 cm in greatest dimension irregular air collection, uncertain if parenchymal or pleural, involving the posterior right upper lobe with surrounding parenchymal consolidation and pleural thickening. Additional left greater than right pleural effusions with dependent atelectasis/consolidation. 2.  No evidence of pulmonary embolus or aortic dissection. 3.  Other nonacute findings above. Recommendation: Follow up as clinically indicated.  All CT scans at this facility utilize dose modulation, iterative reconstruction, and/or weight based dosing when appropriate to reduce radiation dose to as low as reasonably achievable. Amended by Caleb Abarca MD at 24-Jul-2022 07:51:27 PM Electronically Signed by Caleb Abarca MD at 24-Jul-2022 06:41:48 PM               Assessment/Plan:  Principal Problem:    Acute respiratory failure with hypoxia and hypercarbia (HCC)  Active Problems:    Primary squamous cell carcinoma of right lung (HCC)    Pleural effusion    Severe pneumonia  Resolved Problems:    * No resolved hospital problems. *     Principal Problem:    Acute respiratory failure with hypoxia and hypercarbia/PNA   -rocephin 1g iv q24hrs   -azithromycin 500mg iv q24hrs   -brovana nebulizer bid   -Bipap    -telemetry   -I's and O's   -daily weight  History of lung cancer/Pleural effusion   -consult patient's oncologist  Resolved Problems:    * No resolved hospital problems.  *  Signed:  DOMINICK Diaz CNP, 7/24/2022 9:14 PM

## 2022-07-25 NOTE — MANAGEMENT PLAN
This is the Addendum to the NP note, unable to attach at this time      SUBJECTIVE:    Dyspnea      OBJECTIVE:    BP (!) 97/55   Pulse 78   Temp 98.6 °F (37 °C) (Temporal)   Resp 21   Ht 5' 4\" (1.626 m)   Wt 125 lb (56.7 kg)   SpO2 90%   BMI 21.46 kg/m²     Physical Exam:  VS as per above  GA: NAD  Head: NC, AT  Neck: Supple, Trachea appears midline  PUL: CTA anterolaterally, No Wheeezes  nor rubs, has scattered rhonchi  COR: RRR, No M/R/G  ABD: Normal Bowel Sounds, No G/R/T  MSK: no wasting of fat or muscle stores, no pretibial edema  Skin: Warm, nondiaphoretic      ASSESSMENTS & PLANS:    Hypercapnic and Hypoxemic Respiratory failure  Rocephin 1g IV Q24h  Azithromycin 500mg   Follow up Cxx results    Chronic Medical Problems:  Continue home regimen as indicated  Any puffers will be subbed to nebulizers as able, and any oral  antihyperglycemics to an insuling regimen with POCT scheduled nad PRN as well as providion of an antihypoglycemic orders set for safety    Supportive and Prophylactic Txx:  DVT PPx: Apixaban      Case d/w EP in detail  Chart reviewed   Orders entered by NP & me with CPOE  Case d/w NP in detail  Patient seen and examined by me

## 2022-07-25 NOTE — CONSULTS
Pulmonary and Critical Care Consult Note    THE [facility]     MRN# [id number]    Acct# [[id number]]  7/25/2022   2:19 PM CDT    Referring Guerline Garvey MD      Chief Complaint: Shortness of breath    Requesting physician: Dr. Rolf Titus    Reason for consult: Acute on chronic hypoxic respiratory failure, pleural effusion      HPI: We have been consulted to see this 69y.o. year old female born on 1955. The patient is known to me from previous office visits and hospitalizations. 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 The patient is also on home oxygen. She says she quit smoking about 8 months ago. She apparently became short of breath at home. Denies any cough or sputum production. Denies any fevers. According to the EMS her oxygen saturation was in the 50% range. ABGs done in the hospital on her admission showed hypoxia. The patient is currently in the intensive care unit on BiPAP. Her oxygen saturation in upper 80s. I was asked to see her regarding the above. CT pulmonary angiogram done showed no evidence of PE however she did have a left-sided pleural effusion. Continues to be afebrile.   White cell count is normal      Past Medical History      Past Medical History:   Diagnosis Date    Anxiety     Asthma     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     COPD (chronic obstructive pulmonary disease) (HCC)     Depression     Emphysema (subcutaneous) (surgical) resulting from a procedure     History of lung biopsy 05/16/2019    Malignant neoplasm of right main bronchus ([address] 03/2019    NSCLC, SCC [id number] stage IIIb    Palliative care patient 11/16/2021    Syncope     TIA (transient ischemic attack)      SurgicalHistory  Past Surgical History:   Procedure Laterality Date    APPENDECTOMY      BACK SURGERY      times two to percussion  Abdomen: Soft nontender no organomegalies normal bowel sounds  Extremities: No clubbing cyanosis or edema  Neuro: No focal findings  Skin: Intact        Labs:  Recent Labs     07/24/22  1255 07/25/22  0355   WBC 7.4 5.3   RBC 3.66* 3.51*   HGB 9.9* 9.4*   HCT 36.2* 33.9*    179   MCV 98.9 96.6   MCH 27.0 26.8*   MCHC 27.3* 27.7*   RDW 15.6* 15.6*      Recent Labs     07/24/22  1250 07/24/22  1255 07/25/22  0355   NA  --  143 146*   K 3.3 4.0 3.7   CL  --  99 99   CO2  --  39* 41*   BUN  --  15 14   CREATININE  --  0.3* 0.3*   CALCIUM  --  9.1 9.1   GLUCOSE  --  142* 107      Recent Labs     07/24/22  1250   PHART 7.380   VVO2GMR 72.0*   PO2ART 34.0*   CWL4KHA 42.6*   V1DQDICS 59.9*   BEART 14.9*     Recent Labs     07/24/22  1255 07/25/22  0355   AST 10  --    ALT 7  --    ALKPHOS 136*  --    BILITOT <0.2  --    CALCIUM 9.1 9.1   PROBNP 444  --    TROPONINI <0.01  --      No results for input(s): BC, LABGRAM, CULTRESP, BFCX in the last 72 hours. Radiograph: XR CHEST PORTABLE    Result Date: 7/24/2022  1. Right Mediport catheter. 2.  Borderline vascularity with left greater than right pleural effusions. 3.  Left mid lower lung infiltrate. Recommendation: Follow HRCT to confirm if clinically indicated. Electronically Signed by Joseph Swanson MD at 24-Jul-2022 02:58:22 PM             CTA PULMONARY W CONTRAST    Result Date: 7/24/2022  1.  4 cm in greatest dimension irregular air collection, uncertain if parenchymal or pleural, involving the posterior right upper lobe with surrounding parenchymal consolidation and pleural thickening. Additional left greater than right pleural effusions with dependent atelectasis/consolidation. 2.  No evidence of pulmonary embolus or aortic dissection. 3.  Other nonacute findings above. Recommendation: Follow up as clinically indicated.  All CT scans at this facility utilize dose modulation, iterative reconstruction, and/or weight based dosing when appropriate to reduce radiation dose to as low as reasonably achievable. Amended by Paulo Duque MD at 24-Jul-2022 07:51:27 PM Electronically Signed by Paulo Duque MD at 24-Jul-2022 06:41:48 PM                My radiograph interpretation/independent review of imaging: Reviewed    Problem list generated by [facility] Problems             Last Modified POA    * (Principal) Acute respiratory failure with hypoxia and hypercarbia ([facility] [address] 7/24/2022 Yes    Primary squamous cell carcinoma of right lung ([facility] [address] 7/24/2022 Yes    Pleural effusion 7/24/2022 Yes    Severe pneumonia 7/24/2022 Yes          Pulmonary Assessment/plan:    Acute on chronic hypoxic respiratory failure. Continue oxygen supplementation and noninvasive ventilation as necessary to maintain adequate oxygenation. Etiology of her acute decompensation is likely secondary to left pleural effusion although her BNP is normal.  Left pleural effusion seems to be moderate. Diuresis. Avoid volume overload. Continue to monitor. Thoracentesis if no improvement. The patient is currently on direct oral anticoagulation consider stopping direct oral anticoagulation in anticipation of possible need for thoracentesis. History of lung cancer oncology following. DVT prophylaxis. Discussed with the nursing staff           Donna Arzola MD, FCCP, DAB    The above note was generated using voice recognition software. Inadvertent typographical errors in transcription may have occurred.     Electronically signed by Donna Arzola MD on 07/25/22 at 2:19 PM

## 2022-07-25 NOTE — ED PROVIDER NOTES
[facility] ICU  eMERGENCY dEPARTMENT eNCOUnter      Pt Name: Tami Zurita  MRN: [id number]  [place] 1955  Date of evaluation: 7/24/2022  Provider: Lupillo Flowers MD    CHIEF COMPLAINT       Chief Complaint   Patient presents with    Respiratory Distress     Patient arrived by EMS; placed on diuretics 2 days ago; distress started today         HISTORY OF PRESENT ILLNESS   (Location/Symptom, Timing/Onset,Context/Setting, Quality, Duration, Modifying Factors, Severity)  Note limiting factors. Tami Zurita is a 69 y.o. female who presents to the emergency department for evaluation regarding respiratory distress. Patient was recently started on diuretics a couple of days ago according to EMS. They reported that upon their arrival patient was altered and confused with oxygen saturation in the 50s. She has a longstanding history of COPD and is maintained on supplemental home oxygen therapy continuously at home. Patient was placed on CPAP in route to the hospital.  She has a prior history of non-small cell lung cancer and has completed chemoradiation with the oncology team.  Most recently a CT chest in March 2022 did not reveal any evidence of recurrent disease. Overall history is somewhat limited secondary to respiratory distress. HPI    NursingNotes were reviewed.     REVIEW OF SYSTEMS    (2-9 systems for level 4, 10 or more for level 5)     Review of Systems   Unable to perform ROS: Severe respiratory distress          PAST MEDICALHISTORY     Past Medical History:   Diagnosis Date    Anxiety     Asthma     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     COPD (chronic obstructive pulmonary disease) (HCC)     Depression     Emphysema (subcutaneous) (surgical) resulting from a procedure     History of lung biopsy 05/16/2019    Malignant neoplasm of right main bronchus ([facility] [address] 03/2019    NSCLC, SCC [id number] stage IIIb    Palliative care patient 11/16/2021    Syncope     TIA (transient ischemic attack)          SURGICAL HISTORY       Past Surgical History:   Procedure Laterality Date    APPENDECTOMY      BACK SURGERY      times two    BLADDER SUSPENSION      CHOLECYSTECTOMY      HYSTERECTOMY (CERVIX STATUS UNKNOWN)      INCONTINENCE SURGERY           CURRENT MEDICATIONS     Current Discharge Medication List        CONTINUE these medications which have NOT CHANGED    Details   oxyCODONE-acetaminophen (PERCOCET)  MG per tablet Take 1 tablet by mouth every 4 hours as needed for Pain for up to 30 days. Intended supply: 30 days  Qty: 180 tablet, Refills: 0    Comments: Reduce doses taken as pain becomes manageable  Associated Diagnoses: Cancer related pain      albuterol sulfate HFA (VENTOLIN HFA) 108 (90 Base) MCG/ACT inhaler Inhale 2 puffs into the lungs 4 times daily as needed for Wheezing  Qty: 18 g, Refills: 5    Associated Diagnoses: Centrilobular emphysema (HCC)      folic acid (FOLVITE) 1 MG tablet Take 1 tablet by mouth daily  Qty: 30 tablet, Refills: 0      apixaban (ELIQUIS) 5 MG TABS tablet Take 1 tablet by mouth 2 times daily  Qty: 60 tablet, Refills: 2      atorvastatin (LIPITOR) 40 MG tablet Take 1 tablet by mouth nightly  Qty: 30 tablet, Refills: 3      nicotine (NICODERM CQ) 21 MG/24HR Place 1 patch onto the skin daily  Qty: 30 patch, Refills: 3      midodrine (PROAMATINE) 10 MG tablet Take 1 tablet by mouth 3 times daily (with meals)  Qty: 90 tablet, Refills: 2      vitamin B-1 (THIAMINE) 100 MG tablet Take 1 tablet by mouth daily  Qty: 30 tablet, Refills: 3      vitamin D (ERGOCALCIFEROL) 41660 units CAPS capsule cholecalciferol (vitamin D3) 50,000 unit capsule   Take 1 capsule every week by oral route. OXYGEN Inhale 2 L/min into the lungs      budesonide-formoterol (SYMBICORT) 160-4.5 MCG/ACT AERO Symbicort 160 mcg-4.5 mcg/actuation HFA aerosol inhaler   Inhale 2 puffs twice a day by inhalation route.       aspirin EC 81 MG EC tablet Take 81 mg by mouth daily       calcium carbonate 600 MG TABS tablet Take no distension. Palpations: Abdomen is soft. Tenderness: There is no abdominal tenderness. There is no guarding. Musculoskeletal:      Right lower leg: No edema. Left lower leg: No edema. Skin:     Capillary Refill: Capillary refill takes less than 2 seconds. Coloration: Skin is not pale. Findings: No rash. Neurological:      Mental Status: She is alert. Comments: Patient is alert, answers basic questions and follows commands. No focal motor deficit is observed on physical examination. No obvious facial drooping noted. Psychiatric:         Behavior: Behavior is cooperative. DIAGNOSTIC RESULTS     EKG: All EKG's areinterpreted by the Emergency Department Physician who either signs or Co-signs this chart in the absence of a cardiologist.    1259: Normal sinus rhythm at a rate of 93, there is no evidence of acute ST elevation identified. QTc: 529 MS. RADIOLOGY:  Non-plain film images such as CT, Ultrasound and MRI are read by the radiologist. Plain radiographic images are visualized and preliminarily interpreted bythe emergency physician with the below findings:      CTA PULMONARY W CONTRAST   Final Result   1.  4 cm in greatest dimension irregular air collection, uncertain if parenchymal or pleural, involving the posterior right upper lobe with surrounding parenchymal consolidation and pleural thickening. Additional left greater than right pleural    effusions with dependent atelectasis/consolidation. 2.  No evidence of pulmonary embolus or aortic dissection. 3.  Other nonacute findings above. Recommendation: Follow up as clinically indicated. All CT scans at this facility utilize dose modulation, iterative reconstruction, and/or weight based dosing when appropriate to reduce radiation dose to as low as reasonably achievable.                 Amended by Evonne Black MD at 24-Jul-2022 07:51:27 PM   Electronically Signed by Evonne Black MD at 24-Jul-2022 06:41:48 PM               XR CHEST PORTABLE   Final Result   1. Right Mediport catheter. 2.  Borderline vascularity with left greater than right pleural effusions. 3.  Left mid lower lung infiltrate. Recommendation: Follow HRCT to confirm if clinically indicated. Electronically Signed by Minerva Child MD at 24-Jul-2022 02:58:22 PM                       LABS:  Labs Reviewed   BLOOD GAS, ARTERIAL - Abnormal; Notable for the following components:       Result Value    pCO2, Arterial 72.0 (*)     pO2, Arterial 34.0 (*)     HCO3, Arterial 42.6 (*)     Base Excess, Arterial 14.9 (*)     Hemoglobin, Art, Extended 10.1 (*)     O2 Sat, Arterial 59.9 (*)     All other components within normal limits    Narrative:     CALL  [facility] tel. ,  dr. Sara White , 07/24/2022 12:51, by [email or web address] - Abnormal; Notable for the following components:    CO2 39 (*)     Anion Gap 5 (*)     Glucose 142 (*)     Creatinine 0.3 (*)     Albumin 3.3 (*)     Alkaline Phosphatase 136 (*)     All other components within normal limits   CBC WITH AUTO DIFFERENTIAL - Abnormal; Notable for the following components:    RBC 3.66 (*)     Hemoglobin 9.9 (*)     Hematocrit 36.2 (*)     MCHC 27.3 (*)     RDW 15.6 (*)     MPV 12.4 (*)     Neutrophils % 74.4 (*)     Lymphocytes % 17.4 (*)     Anisocytosis 1+ (*)     Macrocytes 1+ (*)     Polychromasia 2+ (*)     Hypochromia 3+ (*)     Ovalocytes Occasional (*)     All other components within normal limits   RESPIRATORY PANEL, MOLECULAR, WITH COVID-19   CULTURE, BLOOD 1   CULTURE, BLOOD 2   BRAIN NATRIURETIC PEPTIDE   TROPONIN       All other labs were within normal range or not returned as of this dictation.     EMERGENCY DEPARTMENT COURSE and DIFFERENTIAL DIAGNOSIS/MDM:   Vitals:    Vitals:    07/24/22 1605 07/24/22 1727 07/24/22 1800 07/24/22 1900   BP: (!) 97/57 98/65 108/73 105/74   Pulse: 87 89 100 96   Resp: 14 20 30 (!) 35   Temp:       TempSrc:       SpO2: 92% 95% 91% 93%   Weight:       Height:           MDM      Reassessment    In review of patient's previous CT scan imaging it appears that the left-sided pleural effusion is new. Her BNP is normal.  There appear to be some air bronchograms noted on the CT scan and given her level of respiratory distress I have covered her for possible pneumonia of the left lung with Rocephin and azithromycin. Patient has been transitioned to BiPAP and at this time has a significant supplemental oxygen requirement of 25 L. Overall her respiratory status seems to be improved with decreased work of breathing now that she is on BiPAP. She will require inpatient admission and stabilization in our intensive care unit for further evaluation and treatment. CONSULTS:    Case was discussed with Dr. Andreina Parisi regarding inpatient admission to the hospitalist service to the intensive care unit. PROCEDURES:  Unless otherwise noted below, none     Procedures    CRITICAL CARE TIME   Total Critical Care time was 42 minutes, excluding separately reportable procedures. There was a high probability of clinically significant/life threatening deterioration in the patient's condition which required my urgent intervention. Patient required my immediate presence at the bedside. Multiple reexaminations were undertaken throughout ED course of care. Time was spent reviewing plan of care with ED nursing staff. Time is inclusive of  and clinical documentation. FINAL IMPRESSION      1. Acute hypoxemic respiratory failure (HCC)    2. Bilateral pleural effusion    3.  Pneumonia of left lung due to infectious organism, unspecified part of lung          DISPOSITION/PLAN   DISPOSITION Admitted 07/24/2022 07:11:16 PM        (Please note that portions of this note were completed with a voice recognition program.  Efforts were made to edit thedictations but occasionally words are mis-transcribed.)    Mery Bravo MD (electronically signed)  Attending Emergency Physician          Mignon Bauman MD  07/24/22 1944

## 2022-07-25 NOTE — ACP (ADVANCE CARE PLANNING)
completed ACP document and update if needed with changes in condition, patient preferences or care setting    [] This note routed to one or more involved healthcare providers        Electronically signed by Chiquis Dos Santos RN on 7/25/2022 at 11:32 AM

## 2022-07-25 NOTE — CONSULTS
MEDICAL ONCOLOGY CONSULTATION    Pt Name: Lorene Lynn  MRN: [id number]  YOB: 1955  Date of evaluation: 7/25/2022    REASON FOR CONSULTATION: Continuity of care, lung cancer  REQUESTING PHYSICIAN: Hospitalist    History Obtained From:    patient, electronic medical record    HISTORY OF PRESENT ILLNESS:  The patient is well-known to my clinic. She is a pleasant 69years old female who has a diagnosis of non-small cell lung cancer, stage III. She also has severe emphysema secondary to tobacco addiction. She quit smoking more than a year ago. She had a recent hospitalization at [facility] for lung issues. She presented the ER department on 7/25/2024 brought by EMS. She had respiratory distress. She was placed on CPAP by EMS. She was hypoxemic. She was admitted to the ICU. I was consulted for continuity of care. Chest x-ray showed left middle lower lobe lung infiltrate  7/24/2022-CT pulmonary protocol showed no evidence of pulmonary emboli. 4 cm in greatest dimension irregular air collection, uncertain if parenchymal or pleural, involving the posterior right upper lobe with surrounding parenchymal consolidation and pleural thickening. Additional left greater than right pleural effusions with dependent atelectasis/consolidation. Prior oncological history  Diagnosis:  NSCLC, [facility] March 2019  [id number], stage IIIIB  COPD   Quit Nov 2021     Treatment summary. 6/10/2019- 8/20/2019-carboplatin/Taxol weekly with concurrent radiation  12/4/2019-02/25/2021 durvalumab x1 year completed     Interval history  Patient is a very pleasant 69years old female who has a diagnosis of stage IIIb non-small cell lung cancer, squamous cell subtype. She received initial treatment with carboplatin/Taxol weekly followed by concurrent radiation. She completed concurrent chemoradiation and is currently status post completion of durvalumab February 2021. In addition, she has severe COPD.  She has frequent hospitalizations for COPD exarcebation. She was recently at [facility] for COPD exacerbation. She had an apparent seizure episode and MRI was performed. MRI did not show any evidence of intracranial metastatic disease. She eventually recovered and was discharged. She feels pretty good today. She has been on O2 supplementation. She has quit smoking November 2021. Last CT chest March 2022 showed no evidence of recurrent disease. Cancer history  Ms Karthik Burch was first seen by me on 5/3/2019 for further workup of a lung mass. She has complains of chest wall pain. Further workup revealed a lung mass. 4/9/2019-CT of the chest with contrast showed a malignant mass in the right upper lobe measures 6.5 x 2.8 x 5.1 cm. The mass invades and destroys the right lateral cortex and lateral aspect of the T4 vertebral body. Mediastinal adenopathy. Specifically, 1.9 cm pretracheal lymph node. 2.1 cm precarinal lymph node. 1.9 cm subcarina lymph node. Right hilar adenopathy. 5/3/2019-she was first seen by me.  5/15/2019-bone scan and CT abdomen pelvis was unremarkable metastatic disease. 5/16/[id number] biopsy consistent non-small cell lung cancer, squamous cell carcinoma subtype. 5/29/2019-PET scan showed intense and metabolic activity associated with pleural-based right paraspinal mass within the right upper lobe. It invades into the adjacent thoracic vertebral body. Right hilar, subcarina and pretracheal lymphadenopathy. No evidence of muscle skeletal or intra-abdominal disease. 6/7/2019-recommended chemoradiation with carboplatin/Taxol weekly with concurrent radiation. 10/14/2019- CT chest abdomen pelvis showed a cavitary lesion measuring 3.3 x 4.6 x 3.4 cm which appears to be decreased in size. Underlying lung emphysema.   There is a new notable sclerosis within the right lateral aspect of T4 and T5 vertebral bodies at the site of the posterior mediastinal invasion by the right lung malignancy. CT of the abdomen pelvis showed tiny hypodense in the liver which are too small to characterize but similar to prior exam.  Attention to follow-up. Stable nodule in the left adrenal gland too small to characterize. 12/4/2019- started on treatment durvalumab every 2 weeks to complete 1 year. 2/19/2020-CT chest abdomen pelvis showed slight decrease in size of the right upper lobe cavitary lesion. The lesion now measures 2.6 x 4.5 x 3.9 cm (previously 3.3 x 4.6 x 3.4 cm). Subtle cortical erosion of the right side of the centrum at T4/T5. No evidence of metastatic disease in the abdomen. Stable left adrenal nodule. 4/29/2020 Xr Chest Standard A cavitation or a necrotic mass in the right upper lobe. This was noted in the previous CT scan of the chest dated 2/19/2020. An area of consolidation in the right upper lobe may represent inflammatory or infectious process. Ill-defined nodule in the left lower lobe may represent a nipple shadow or a pulmonary nodule. This could be further evaluated. Chronic inflammatory changes in the remaining lungs bilaterally. 6/26/2020 Ct Chest W Contrast There is a pleural-based cavitary lesion within the right upper lobe posteriorly and medially. When measuring the lesion in the same manner as the previous study I do not feel there has been any significant progression from a previous CT of 2/19/2020. There is some increased consolidation within the medial right upper lobe likely representing post therapy change. There is a new small nodule more inferiorly within the right upper lobe as described above. This may also represent consolidation post therapy but as it is a more isolated finding I feel it could potentially represent an early recurrence. This would warrant follow-up on subsequent imaging. These findings are superimposed upon changes of centrilobular emphysema. There is stable scarring within the right posterior lung base.  There is bronchial wall thickening within both lungs suggesting chronic bronchitis. Coronary calcifications are present. Borderline enlargement of the pulmonary arteries suggesting pulmonary arterial hypertension. Mild constipation within the upper abdomen. Ct Abdomen Pelvis W Iv Contrast   No evidence of metastatic disease in the abdomen or pelvis. Stable biliary dilatation status post cholecystectomy which may represent reservoir phenomenon. Moderate to large volume stool in the colon, correlate for constipation. 7/1/2020- discussed at length the CAT scans with the patient and radiology. No clear-cut evidence of disease progression. Therefore will continue treatment. 9/16/20 CT Chest: Redemonstration of a cavitary lesion in the posterior right upper lobe with surrounding posttreatment changes, not significantly changed from the most recent comparison exam. New small noncalcified right lower lobe nodule for which attention on follow-up is recommended. Emphysema and pulmonary fibrosis. Pulmonary arterial hypertension. Atherosclerosis of the aorta and coronary arteries. 10/29/20 MRI brain:  No acute intracranial abnormality. No evidence of intracranial neoplastic/metastatic disease. Chronic white matter ischemic changes. Chronic maxillary and ethmoid sinusitis. 2/25/2021-completion of 1 year of treatment with durvalumab. 4/7/21 Ct Chest W Contrast  No significant interval change is seen. Posttreatment changes are noted in the right lung. 2 areas of soft tissue nodularity in the right upper lobe remains stable. There has been resolution of a right lower lobe area of nodular density. Right upper lung zone consolidation and cavitation posteriorly also appears stable. No pathologically enlarged lymph nodes. Emphysema and pulmonary fibrosis. Atheromatous disease of the thoracic aorta and coronary arteries. Dilated pulmonary arteries.  5. Prior cholecystectomy in the upper abdomen with dilated biliary tree likely related to reservoir effect. 4/27/2021-reviewed CT chest that showed no significant interval change. Stable disease. 8/23/21 CT CHEST W CONTRAST  Stable chest CT. Right upper lobe changes are stable, mostly treatment related. This includes an area of juxtapleural consolidation with cavitation. Narrowing of the right upper lobe bronchus is also seen. These findings are not significantly changed. No measurable mass identified. There are no new or enlarging pulmonary nodules. Underlying advanced lung emphysema. Evidence for pulmonary hypertension, likely related to the underlying diffuse lung disease. Advanced coronary atheromatous calcification. 9/28/2021-I reviewed results of CT chest.  No evidence of disease progression. Continue clinical/image surveillance. 3/7/2022 CT Chest W Contrast 3/7/2022 CT Chest W Contrast The imaged portion of the neck and thyroid gland is unremarkable. Lungs: Similar appearance of the right upper lobe with persistent cavitary lesion in adjacent confluent opacity as well as air bronchograms. Emphysema. No new focal consolidation, pleural effusion or suspicious pulmonary nodule. . No measurable pneumothorax. Trace pleural effusions. The trachea and bronchial tree are similar. Heart: Normal in size and appearance. There is no pericardial effusion. Vasculature: There is aortic atherosclerosis without stenosis or aneurysm. Calcifications are seen in the coronary arteries. There is atherosclerosis in the great vessels without definite stenosis. The pulmonary arteries are enlarged, suggestive of pulmonary arterial hypertension. Lymph nodes: No enlarged axillary, hilar, or mediastinal lymph nodes. Bones and soft tissues: No acute osseous or soft tissue abnormality is seen. There are no worrisome osseous lesions. Upper abdomen: Beam hardening artifact from cholecystectomy limits evaluation of the liver. Stable examination. Chronic findings as above. Stable examination. Chronic findings as above.   6/10/2022 CT cefTRIAXone (ROCEPHIN) 1,000 mg in sterile water 10 mL IV syringe  1,000 mg IntraVENous Q24H Ne Ford MD        budesonide (PULMICORT) nebulizer suspension 500 mcg  0.5 mg Nebulization BID Ne Ford MD   500 mcg at 07/24/22 2155    Arformoterol Tartrate (BROVANA) nebulizer solution 15 mcg  15 mcg Nebulization BID Ne Ford MD   15 mcg at 07/24/22 2155    apixaban (ELIQUIS) tablet 5 mg  5 mg Oral BID Ne Ford MD   5 mg at 07/24/22 2047    albuterol (PROVENTIL) nebulizer solution 2.5 mg  2.5 mg Nebulization Q1H PRN Ne Ford MD   2.5 mg at 07/25/22 4646    atorvastatin (LIPITOR) tablet 40 mg  40 mg Oral Nightly Ne Ford MD   40 mg at 07/24/22 2046    citalopram (CELEXA) tablet 20 mg  20 mg Oral Daily Ne Ford MD        folic acid (FOLVITE) tablet 1 mg  1 mg Oral Daily Ne Ford MD        midodrine (PROAMATINE) tablet 10 mg  10 mg Oral TID WC Ne Ford MD   10 mg at 07/24/22 2046    nicotine (NICODERM CQ) 21 MG/24HR 1 patch  1 patch TransDERmal Daily Ne Ford MD   1 patch at 07/24/22 2050    thiamine mononitrate tablet 100 mg  100 mg Oral Daily Ne Ford MD        vitamin D (ERGOCALCIFEROL) capsule 50,000 Units  50,000 Units Oral Weekly Ne Ford MD        sodium chloride flush 0.9 % injection 5-40 mL  5-40 mL IntraVENous 2 times per day Ne Ford MD        sodium chloride flush 0.9 % injection 5-40 mL  5-40 mL IntraVENous PRN Ne Ford MD        0.9 % sodium chloride infusion   IntraVENous PRN Ne Ford MD        ondansetron (ZOFRAN-ODT) disintegrating tablet 4 mg  4 mg Oral Q8H PRN Ne Ford MD        Or    ondansetron [facility]) injection 4 mg  4 mg IntraVENous Q6H PRN Ne Ford MD        acetaminophen (TYLENOL) tablet 650 mg  650 mg Oral Q6H PRN Ne Ford MD        Or    acetaminophen (TYLENOL) suppository 650 mg  650 mg Rectal Q6H PRN Ne Ford MD        potassium chloride 10 mEq/100 mL IVPB (Peripheral Line)  10 mEq IntraVENous PRN Jordin Ortiz MD        magnesium sulfate 2000 mg in 50 mL IVPB premix  2,000 mg IntraVENous PRN Jordin Ortiz MD        sodium phosphate 10 mmol in sodium chloride 0.9 % 250 mL IVPB  10 mmol IntraVENous PRN Jordin Ortiz MD        Or    sodium phosphate 15 mmol in sodium chloride 0.9 % 250 mL IVPB  15 mmol IntraVENous KELLI Ortiz MD        Or    sodium phosphate 20 mmol in sodium chloride 0.9 % 500 mL IVPB  20 mmol IntraVENous PRN Jordin Ortiz MD        polyethylene glycol (GLYCOLAX) packet 17 g  17 g Oral Daily PRN Jordin Ortiz MD        melatonin disintegrating tablet 5 mg  5 mg Oral Nightly PRN Jordin Ortiz MD   5 mg at 07/24/22 2046    calcium carbonate (TUMS) chewable tablet 500 mg  500 mg Oral TID PRMALU Ortiz MD        naloxone [facility]) injection 0.4 mg  0.4 mg IntraVENous KELLI Ortiz MD           Allergies:    Allergies   Allergen Reactions    No Known Allergies          Subjective   REVIEW OF SYSTEMS:   CONSTITUTIONAL: no fever, no night sweats, fatigue;  HEENT: no blurring of vision, no double vision, no hearing difficulty, no tinnitus, no ulceration, no epistaxis;  LUNGS: no cough, no hemoptysis, no wheeze,  shortness of breath;  CARDIOVASCULAR: no palpitation, no chest pain,  shortness of breath;  GI: no abdominal pain, no nausea, no vomiting, no diarrhea, no constipation;  MARIA VICTORIA: no dysuria, no hematuria, no frequency or urgency, no nephrolithiasis;  MUSCULOSKELETAL: no joint pain, no swelling, no stiffness;  ENDOCRINE: no polyuria, no polydipsia, no cold or heat intolerance;  HEMATOLOGY: no easy bruising or bleeding, no history of clotting disorder;  DERMATOLOGY: no skin rash, no eczema, no pruritus;  PSYCHIATRY: no depression, no anxiety  NEUROLOGY: no syncope, no seizures, no numbness or tingling of hands, no numbness or tingling of feet, no paresis;    Objective   BP (!) 110/51   Pulse 73   Temp 97 °F (36.1 °C) (Temporal)   Resp 21   Ht 5' 4\" (1.626 m) confirm if clinically indicated. Electronically Signed by Joseph Swanson MD at 24-Jul-2022 02:58:22 PM             CTA PULMONARY W CONTRAST    Result Date: 7/24/2022  1.  4 cm in greatest dimension irregular air collection, uncertain if parenchymal or pleural, involving the posterior right upper lobe with surrounding parenchymal consolidation and pleural thickening. Additional left greater than right pleural effusions with dependent atelectasis/consolidation. 2.  No evidence of pulmonary embolus or aortic dissection. 3.  Other nonacute findings above. Recommendation: Follow up as clinically indicated. All CT scans at this facility utilize dose modulation, iterative reconstruction, and/or weight based dosing when appropriate to reduce radiation dose to as low as reasonably achievable. Amended by Joseph Swanson MD at 24-Jul-2022 07:51:27 PM Electronically Signed by Joseph Swanson MD at 24-Jul-2022 06:41:48 PM                My interpretation: Interval development of left pleural effusion, interval worsening of right upper lobe consolidation with associated effusion      ASSESSMENT:  Hypercapnic respiratory failure  -Patient had recent admission at [facility]  -6/11/2022-thoracocentesis performed at [facility]: Cytology negative for malignancy  -CT scans revealed. Interval worsening compared to last CT scan.  -I have consulted pulmonary to assist with further work-up    NSCLC-SCC stage IIIB   Bone scan, CT abdomen pelvis unremarkable metastatic disease. PET scan showed disease limited to the chest and mediastinum only. Therefore, stage IIIB. Status post completion of chemoradiation on 8/20/2019.  10/29/2020-brain MRI was unremarkable  Completion of adjuvant durvalumab February 2021  CT chest March 2022-no evidence of disease progression.   Continue imaging/clinic surveillance     [id number] with PCP.  -F/u Pulmonary    PLAN:  Consult to pulmonary  Consider left thoracentesis    I have seen, examined and reviewed this patient medication list, appropriate labs and imaging studies. I reviewed relevant medical records and others physicians notes. I discussed the plans of care with the patient. I answered all the questions to the patients satisfaction. I have also reviewed the chief complaint (CC) and part of the history (History of Present Illness (HPI), Past Family Social History [facility]), or Review of Systems (ROS) and made changes when appropriated.        (Please note that portions of this note were completed with a voice recognition program. Efforts were made to edit the dictations but occasionally words are mis-transcribed.)      Kaylen Bright MD    07/25/22  6:35 AM

## 2022-07-26 NOTE — PROGRESS NOTES
Eliseo Hoover asked this  to visit with pt to assist pt in completing an AD/LW. This  provided documents and discussed the documents and the decisions to be made. Pt named her sons as her decision makers and made advance care planning decisions. Pt initialed and signed the document and this  witnessed and notarized the document. The original and copies were given to pt, and a copy goes to Francisco Kasper to be scanned into pt's EMR. Pt asked for her nurse and this  informed the nursing staff and also expressed gratitude for spiritual care.      Electronically signed by Argelia Arias on 7/26/2022 at 11:31 AM

## 2022-07-26 NOTE — PROGRESS NOTES
MEDICAL ONCOLOGY PROGRESS NOTE     Pt Name: Lawrence Cabral  MRN: [id number]  YOB: 1955  Date of evaluation: 7/26/2022    Subjective: Feels slightly better. Discussed bedside RN. Reviewed interval notes. HISTORY OF PRESENT ILLNESS:  The patient is well-known to my clinic. She is a pleasant 69years old female who has a diagnosis of non-small cell lung cancer, stage III. She also has severe emphysema secondary to tobacco addiction. She quit smoking more than a year ago. She had a recent hospitalization at [facility] for lung issues. She presented the ER department on 7/25/2024 brought by EMS. She had respiratory distress. She was placed on CPAP by EMS. She was hypoxemic. She was admitted to the ICU. I was consulted for continuity of care. Chest x-ray showed left middle lower lobe lung infiltrate  7/24/2022-CT pulmonary protocol showed no evidence of pulmonary emboli. 4 cm in greatest dimension irregular air collection, uncertain if parenchymal or pleural, involving the posterior right upper lobe with surrounding parenchymal consolidation and pleural thickening. Additional left greater than right pleural effusions with dependent atelectasis/consolidation. Prior oncological history  Diagnosis:  NSCLC, [facility] March 2019  [id number], stage IIIIB  COPD   Quit Nov 2021     Treatment summary. 6/10/2019- 8/20/2019-carboplatin/Taxol weekly with concurrent radiation  12/4/2019-02/25/2021 durvalumab x1 year completed     Interval history  Patient is a very pleasant 69years old female who has a diagnosis of stage IIIb non-small cell lung cancer, squamous cell subtype. She received initial treatment with carboplatin/Taxol weekly followed by concurrent radiation. She completed concurrent chemoradiation and is currently status post completion of durvalumab February 2021. In addition, she has severe COPD. She has frequent hospitalizations for COPD exarcebation.   She was recently at [facility] for COPD exacerbation. She had an apparent seizure episode and MRI was performed. MRI did not show any evidence of intracranial metastatic disease. She eventually recovered and was discharged. She feels pretty good today. She has been on O2 supplementation. She has quit smoking November 2021. Last CT chest March 2022 showed no evidence of recurrent disease. Cancer history  Ms Lenin Tom was first seen by me on 5/3/2019 for further workup of a lung mass. She has complains of chest wall pain. Further workup revealed a lung mass. 4/9/2019-CT of the chest with contrast showed a malignant mass in the right upper lobe measures 6.5 x 2.8 x 5.1 cm. The mass invades and destroys the right lateral cortex and lateral aspect of the T4 vertebral body. Mediastinal adenopathy. Specifically, 1.9 cm pretracheal lymph node. 2.1 cm precarinal lymph node. 1.9 cm subcarina lymph node. Right hilar adenopathy. 5/3/2019-she was first seen by me.  5/15/2019-bone scan and CT abdomen pelvis was unremarkable metastatic disease. 5/[id number] biopsy consistent non-small cell lung cancer, squamous cell carcinoma subtype. 5/29/2019-PET scan showed intense and metabolic activity associated with pleural-based right paraspinal mass within the right upper lobe. It invades into the adjacent thoracic vertebral body. Right hilar, subcarina and pretracheal lymphadenopathy. No evidence of muscle skeletal or intra-abdominal disease. 6/7/2019-recommended chemoradiation with carboplatin/Taxol weekly with concurrent radiation. 10/14/2019- CT chest abdomen pelvis showed a cavitary lesion measuring 3.3 x 4.6 x 3.4 cm which appears to be decreased in size. Underlying lung emphysema. There is a new notable sclerosis within the right lateral aspect of T4 and T5 vertebral bodies at the site of the posterior mediastinal invasion by the right lung malignancy.   CT of the abdomen pelvis showed tiny hypodense in the liver which are too small to characterize but similar to prior exam.  Attention to follow-up. Stable nodule in the left adrenal gland too small to characterize. 12/4/2019- started on treatment durvalumab every 2 weeks to complete 1 year. 2/19/2020-CT chest abdomen pelvis showed slight decrease in size of the right upper lobe cavitary lesion. The lesion now measures 2.6 x 4.5 x 3.9 cm (previously 3.3 x 4.6 x 3.4 cm). Subtle cortical erosion of the right side of the centrum at T4/T5. No evidence of metastatic disease in the abdomen. Stable left adrenal nodule. 4/29/2020 Xr Chest Standard A cavitation or a necrotic mass in the right upper lobe. This was noted in the previous CT scan of the chest dated 2/19/2020. An area of consolidation in the right upper lobe may represent inflammatory or infectious process. Ill-defined nodule in the left lower lobe may represent a nipple shadow or a pulmonary nodule. This could be further evaluated. Chronic inflammatory changes in the remaining lungs bilaterally. 6/26/2020 Ct Chest W Contrast There is a pleural-based cavitary lesion within the right upper lobe posteriorly and medially. When measuring the lesion in the same manner as the previous study I do not feel there has been any significant progression from a previous CT of 2/19/2020. There is some increased consolidation within the medial right upper lobe likely representing post therapy change. There is a new small nodule more inferiorly within the right upper lobe as described above. This may also represent consolidation post therapy but as it is a more isolated finding I feel it could potentially represent an early recurrence. This would warrant follow-up on subsequent imaging. These findings are superimposed upon changes of centrilobular emphysema. There is stable scarring within the right posterior lung base. There is bronchial wall thickening within both lungs suggesting chronic bronchitis. Coronary calcifications are present. Borderline enlargement of the pulmonary arteries suggesting pulmonary arterial hypertension. Mild constipation within the upper abdomen. Ct Abdomen Pelvis W Iv Contrast   No evidence of metastatic disease in the abdomen or pelvis. Stable biliary dilatation status post cholecystectomy which may represent reservoir phenomenon. Moderate to large volume stool in the colon, correlate for constipation. 7/1/2020- discussed at length the CAT scans with the patient and radiology. No clear-cut evidence of disease progression. Therefore will continue treatment. 9/16/20 CT Chest: Redemonstration of a cavitary lesion in the posterior right upper lobe with surrounding posttreatment changes, not significantly changed from the most recent comparison exam. New small noncalcified right lower lobe nodule for which attention on follow-up is recommended. Emphysema and pulmonary fibrosis. Pulmonary arterial hypertension. Atherosclerosis of the aorta and coronary arteries. 10/29/20 MRI brain:  No acute intracranial abnormality. No evidence of intracranial neoplastic/metastatic disease. Chronic white matter ischemic changes. Chronic maxillary and ethmoid sinusitis. 2/25/2021-completion of 1 year of treatment with durvalumab. 4/7/21 Ct Chest W Contrast  No significant interval change is seen. Posttreatment changes are noted in the right lung. 2 areas of soft tissue nodularity in the right upper lobe remains stable. There has been resolution of a right lower lobe area of nodular density. Right upper lung zone consolidation and cavitation posteriorly also appears stable. No pathologically enlarged lymph nodes. Emphysema and pulmonary fibrosis. Atheromatous disease of the thoracic aorta and coronary arteries. Dilated pulmonary arteries. 5. Prior cholecystectomy in the upper abdomen with dilated biliary tree likely related to reservoir effect. 4/27/2021-reviewed CT chest that showed no significant interval change.   Stable disease. 8/23/21 CT CHEST W CONTRAST  Stable chest CT. Right upper lobe changes are stable, mostly treatment related. This includes an area of juxtapleural consolidation with cavitation. Narrowing of the right upper lobe bronchus is also seen. These findings are not significantly changed. No measurable mass identified. There are no new or enlarging pulmonary nodules. Underlying advanced lung emphysema. Evidence for pulmonary hypertension, likely related to the underlying diffuse lung disease. Advanced coronary atheromatous calcification. 9/28/2021-I reviewed results of CT chest.  No evidence of disease progression. Continue clinical/image surveillance. 3/7/2022 CT Chest W Contrast 3/7/2022 CT Chest W Contrast The imaged portion of the neck and thyroid gland is unremarkable. Lungs: Similar appearance of the right upper lobe with persistent cavitary lesion in adjacent confluent opacity as well as air bronchograms. Emphysema. No new focal consolidation, pleural effusion or suspicious pulmonary nodule. . No measurable pneumothorax. Trace pleural effusions. The trachea and bronchial tree are similar. Heart: Normal in size and appearance. There is no pericardial effusion. Vasculature: There is aortic atherosclerosis without stenosis or aneurysm. Calcifications are seen in the coronary arteries. There is atherosclerosis in the great vessels without definite stenosis. The pulmonary arteries are enlarged, suggestive of pulmonary arterial hypertension. Lymph nodes: No enlarged axillary, hilar, or mediastinal lymph nodes. Bones and soft tissues: No acute osseous or soft tissue abnormality is seen. There are no worrisome osseous lesions. Upper abdomen: Beam hardening artifact from cholecystectomy limits evaluation of the liver. Stable examination. Chronic findings as above. Stable examination. Chronic findings as above.   6/10/2022 CT Head WO Contrast([facility]) There is no evidence of acute intracranial hemorrhage, mass effect or midline shift. No abnormal extra-axial fluid collection. No areas of abnormal attenuation within the brain parenchyma to suggest an acute infarction. The ventricles, cisterns, and sulci are within normal limits in size and configuration. The visualized skull base structures and paranasal sinuses are unremarkable. No calvarial fracture or other lesion. 6/11/2022 MRI Brain W WO Contrast [facility]) No MR evidence of acute intracranial pathology. No evidence of intracranial metastatic disease. 6/12/2022 US Chest ([facility])Small bilateral  pleural effusion are present         Past Medical History:    Past Medical History:   Diagnosis Date    Anxiety     Asthma     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     COPD (chronic obstructive pulmonary disease) (HCC)     Depression     Emphysema (subcutaneous) (surgical) resulting from a procedure     History of lung biopsy 05/16/2019    Malignant neoplasm of right main bronchus ([address] 03/2019    NSCLC, SCC [id number] stage IIIb    Palliative care patient 11/16/2021    Syncope     TIA (transient ischemic attack)        Past Surgical History:    Past Surgical History:   Procedure Laterality Date    APPENDECTOMY      BACK SURGERY      times two    BLADDER SUSPENSION      CHOLECYSTECTOMY      HYSTERECTOMY (CERVIX STATUS UNKNOWN)      INCONTINENCE SURGERY         Social History:    Smoking status: Former smoker.   Quit for about a year ago  ETOH status: No  Resides: [place], [place]    Family History:   Family History   Problem Relation Age of Onset    Colon Cancer Mother     High Blood Pressure Brother     Diabetes Brother        Current Hospital Medications:    Current Facility-Administered Medications   Medication Dose Route Frequency Provider Last Rate Last Admin    oxyCODONE (ROXICODONE) immediate release tablet 10 mg  10 mg Oral Q4H PRN Donna Spencer MD   10 mg at 07/26/22 0355    perflutren lipid microspheres (DEFINITY) injection 1.65 mg  1.5 mL IntraVENous ONCE PRN Deisy Greenfield MD   1.5 mL at 07/25/22 1628    azithromycin (ZITHROMAX) 250 mg in dextrose 5 % 250 mL IVPB  250 mg IntraVENous Q24H Min Buck MD   Stopped at 07/25/22 1906    cefTRIAXone (ROCEPHIN) 1,000 mg in sterile water 10 mL IV syringe  1,000 mg IntraVENous Q24H Min Buck MD   1,000 mg at 07/25/22 1804    budesonide (PULMICORT) nebulizer suspension 500 mcg  0.5 mg Nebulization BID Min Buck MD   500 mcg at 07/25/22 1839    Arformoterol Tartrate (BROVANA) nebulizer solution 15 mcg  15 mcg Nebulization BID Min Buck MD   15 mcg at 07/25/22 1839    apixaban (ELIQUIS) tablet 5 mg  5 mg Oral BID Min Buck MD   5 mg at 07/25/22 2001    albuterol (PROVENTIL) nebulizer solution 2.5 mg  2.5 mg Nebulization Q1H PRN Min Buck MD   2.5 mg at 07/25/22 1227    atorvastatin (LIPITOR) tablet 40 mg  40 mg Oral Nightly Min Buck MD   40 mg at 07/25/22 2001    citalopram (CELEXA) tablet 20 mg  20 mg Oral Daily Min Buck MD   20 mg at 82/73/03 4934    folic acid (FOLVITE) tablet 1 mg  1 mg Oral Daily Min Buck MD   1 mg at 07/25/22 0805    midodrine (PROAMATINE) tablet 10 mg  10 mg Oral TID WC Min Buck MD   10 mg at 07/25/22 1804    nicotine (NICODERM CQ) 21 MG/24HR 1 patch  1 patch TransDERmal Daily Min Buck MD   1 patch at 07/25/22 0805    thiamine mononitrate tablet 100 mg  100 mg Oral Daily Min Buck MD   100 mg at 07/25/22 0805    vitamin D (ERGOCALCIFEROL) capsule 50,000 Units  50,000 Units Oral Weekly Min Buck MD   50,000 Units at 07/25/22 0805    sodium chloride flush 0.9 % injection 5-40 mL  5-40 mL IntraVENous 2 times per day Min Buck MD   10 mL at 07/25/22 2001    sodium chloride flush 0.9 % injection 5-40 mL  5-40 mL IntraVENous PRN Min Buck MD        0.9 % sodium chloride infusion   IntraVENous PRN Min Buck MD        ondansetron (ZOFRAN-ODT) disintegrating tablet 4 mg  4 mg Oral Q8H PRN Min Buck MD        Or    ondansetron [facility]) injection 4 mg  4 mg IntraVENous Q6H PRN Jordin Ortiz MD        acetaminophen (TYLENOL) tablet 650 mg  650 mg Oral Q6H PRN Jordin Ortiz MD   650 mg at 07/25/22 0848    Or    acetaminophen (TYLENOL) suppository 650 mg  650 mg Rectal Q6H PRN Jordin Ortiz MD        potassium chloride 10 mEq/100 mL IVPB (Peripheral Line)  10 mEq IntraVENous PRN Jordin Ortiz MD        magnesium sulfate 2000 mg in 50 mL IVPB premix  2,000 mg IntraVENous PRN Jordin Ortiz MD        sodium phosphate 10 mmol in sodium chloride 0.9 % 250 mL IVPB  10 mmol IntraVENous PRN Jordin Ortiz MD        Or    sodium phosphate 15 mmol in sodium chloride 0.9 % 250 mL IVPB  15 mmol IntraVENous PRN Jordin Ortiz MD        Or    sodium phosphate 20 mmol in sodium chloride 0.9 % 500 mL IVPB  20 mmol IntraVENous PRN Jordin Ortiz MD        polyethylene glycol (GLYCOLAX) packet 17 g  17 g Oral Daily PRN Jordin Ortiz MD        melatonin disintegrating tablet 5 mg  5 mg Oral Nightly PRN Jordin Ortiz MD   5 mg at 07/24/22 2046    calcium carbonate (TUMS) chewable tablet 500 mg  500 mg Oral TID PRN Jordin Ortiz MD        naloxone [facility]) injection 0.4 mg  0.4 mg IntraVENous PRN Jordin Ortiz MD           Allergies: Allergies   Allergen Reactions    No Known Allergies        Objective   /73   Pulse 87   Temp 97.6 °F (36.4 °C) (Temporal)   Resp 28   Ht 5' 4\" (1.626 m)   Wt 133 lb 12.8 oz (60.7 kg)   SpO2 91%   BMI 22.97 kg/m²     PHYSICAL EXAM:  CONSTITUTIONAL: Alert, appropriate, ill-appearing  EYES: Non icteric, EOM intact, pupils equal round   ENT: Mucus membranes moist,external inspection of ears and nose are normal  NECK: Supple, no masses. No palpable thyroid mass  CHEST/LUNGS: decrease breath sounds left lower lobe  CARDIOVASCULAR: RRR, no murmurs. No lower extremity edema  ABDOMEN: soft non-tender, active bowel sounds, no HSM.   No palpable masses  EXTREMITIES: warm, full ROM in all 4 extremities, no focal weakness. SKIN: warm, dry with no rashes or lesions  LYMPH: No cervical, clavicular, axillary, or inguinal lymphadenopathy  NEUROLOGIC: follows commands, non focal       LABORATORY RESULTS REVIEWED/ANALYZED BY ME:  Recent Labs     07/26/22  0334 07/25/22  0355 07/24/22  1255   WBC 8.7 5.3 7.4   HGB 10.1* 9.4* 9.9*   HCT 35.0* 33.9* 36.2*   MCV 95.1 96.6 98.9    179 184       Lab Results   Component Value Date     07/26/2022    K 3.8 07/26/2022    CL 97 (L) 07/26/2022    CO2 38 (H) 07/26/2022    BUN 13 07/26/2022    CREATININE 0.3 (L) 07/26/2022    GLUCOSE 107 07/25/2022    CALCIUM 9.4 07/26/2022    PROT 6.1 (L) 07/26/2022    LABALBU 3.6 07/26/2022    BILITOT 0.3 07/26/2022    ALKPHOS 128 (H) 07/26/2022    AST 10 07/24/2022    ALT 7 07/24/2022    LABGLOM >60 07/26/2022    GFRAA >59 07/26/2022    AGRATIO 1.4 05/01/2020    GLOB 3.5 02/25/2021       Lab Results   Component Value Date    INR 1.09 02/11/2022    INR 1.08 01/27/2022    INR 1.13 11/29/2021    PROTIME 14.3 02/11/2022    PROTIME 14.2 01/27/2022    PROTIME 14.7 (H) 11/29/2021       RADIOLOGY STUDIES REPORT/REVIEWED AND INTERPRETED BY ME:  XR CHEST PORTABLE    Result Date: 7/24/2022  1. Right Mediport catheter. 2.  Borderline vascularity with left greater than right pleural effusions. 3.  Left mid lower lung infiltrate. Recommendation: Follow HRCT to confirm if clinically indicated. Electronically Signed by Joseph Swanson MD at 24-Jul-2022 02:58:22 PM             CTA PULMONARY W CONTRAST    Result Date: 7/24/2022  1.  4 cm in greatest dimension irregular air collection, uncertain if parenchymal or pleural, involving the posterior right upper lobe with surrounding parenchymal consolidation and pleural thickening. Additional left greater than right pleural effusions with dependent atelectasis/consolidation. 2.  No evidence of pulmonary embolus or aortic dissection. 3.  Other nonacute findings above.  Recommendation: Follow up as clinically indicated. All CT scans at this facility utilize dose modulation, iterative reconstruction, and/or weight based dosing when appropriate to reduce radiation dose to as low as reasonably achievable. Amended by Karen Barillas MD at 24-Jul-2022 07:51:27 PM Electronically Signed by Karen Barillas MD at 24-Jul-2022 06:41:48 PM                My interpretation: Interval development of left pleural effusion, interval worsening of right upper lobe consolidation with associated effusion      ASSESSMENT:  Hypercapnic respiratory failure  -Patient had recent admission at [facility]  -6/11/2022-thoracocentesis performed at [facility]: Cytology negative for malignancy  -CT scans revealed. Interval worsening compared to last CT scan. No PE.  -proBNP was normal.  -Appreciated pulmonary recommendations.  -Continue current respiratory support  -She has received antibiotics ceftriaxone/azithromycin    NSCLC-SCC stage IIIB   Bone scan, CT abdomen pelvis unremarkable metastatic disease. PET scan showed disease limited to the chest and mediastinum only. Therefore, stage IIIB. Status post completion of chemoradiation on 8/20/2019.  10/29/2020-brain MRI was unremarkable  Completion of adjuvant durvalumab February 2021  CT chest March 2022-no evidence of disease progression.   Continue imaging/clinic surveillance     [id number] with PCP.  -F/u Pulmonary    Normocytic anemia  -We will recheck iron profile, LDH, haptoglobin, TSH, B12, reticulocyte count  -Hemoglobin 10.1  -Iron profile consistent anemia of chronic disease in the past      PLAN:  Eliquis placed on hold today on 7/26/2022  Consider left thoracentesis  Iron profile, haptoglobin, LDH, reticulocyte count, stool for occult blood, B12     (Please note that portions of this note were completed with a voice recognition program. Efforts were made to edit the dictations but occasionally words are mis-transcribed.)      Merritt Hope, MD    07/26/22  6:11 AM

## 2022-07-26 NOTE — PROGRESS NOTES
Hospitalist Progress Note    Patient:  Marta Masterson  YOB: 1955  Date of Service: 7/26/2022  MRN: [id number]   Acct: [de-identified]   Primary Care Physician: DOMINICK Felipe NP  Advance Directive: DNR  Admit Date: 7/24/2022       Hospital Day: 2  Referring Provider: Damián Ocampo DO    Patient Seen, Chart, Consults, Notes, Labs, Radiology studies reviewed. Subjective:  Aidee Cates is a 69 y.o. female  whom we are following for non-small cell carcinoma of the lung, chronic hypoxemic respiratory failure, pleural effusion. She states that she is feeling better than she did on admission. Hemodynamics are stable. She is currently on BiPAP. Pulmonology and oncology are following.     Allergies:  No known allergies    Medicines:  Current Facility-Administered Medications   Medication Dose Route Frequency Provider Last Rate Last Admin    oxyCODONE (ROXICODONE) immediate release tablet 10 mg  10 mg Oral Q4H PRN Geni Bustos MD   10 mg at 07/26/22 1400    perflutren lipid microspheres (DEFINITY) injection 1.65 mg  1.5 mL IntraVENous ONCE PRN Geni Bustos MD   1.5 mL at 07/25/22 1628    azithromycin (ZITHROMAX) 250 mg in dextrose 5 % 250 mL IVPB  250 mg IntraVENous Q24H Mike Rogers MD   Stopped at 07/25/22 1906    cefTRIAXone (ROCEPHIN) 1,000 mg in sterile water 10 mL IV syringe  1,000 mg IntraVENous Q24H Mike Rogers MD   1,000 mg at 07/25/22 1804    budesonide (PULMICORT) nebulizer suspension 500 mcg  0.5 mg Nebulization BID Mike Rogers MD   500 mcg at 07/26/22 2285    Arformoterol Tartrate (BROVANA) nebulizer solution 15 mcg  15 mcg Nebulization BID Mike Rogers MD   15 mcg at 07/26/22 0308    [Held by provider] apixaban (ELIQUIS) tablet 5 mg  5 mg Oral BID Mike Rogers MD   5 mg at 07/25/22 2001    albuterol (PROVENTIL) nebulizer solution 2.5 mg  2.5 mg Nebulization Q1H PRN Mike Rogers MD   2.5 mg at 07/25/22 0312    atorvastatin (LIPITOR) tablet 40 mg  40 mg Oral Nightly Areli Humphrey MD   40 mg at 07/25/22 2001    citalopram (CELEXA) tablet 20 mg  20 mg Oral Daily Areli Humphrey MD   20 mg at 96/65/19 4741    folic acid (FOLVITE) tablet 1 mg  1 mg Oral Daily Areli Humphrey MD   1 mg at 07/26/22 0900    midodrine (PROAMATINE) tablet 10 mg  10 mg Oral TID WC Areli Humphrey MD   10 mg at 07/26/22 1216    nicotine (NICODERM CQ) 21 MG/24HR 1 patch  1 patch TransDERmal Daily Areli Humphrey MD   1 patch at 07/26/22 0901    thiamine mononitrate tablet 100 mg  100 mg Oral Daily Areli Humphrey MD   100 mg at 07/26/22 0900    vitamin D (ERGOCALCIFEROL) capsule 50,000 Units  50,000 Units Oral Weekly Areli Humphrey MD   50,000 Units at 07/25/22 0805    sodium chloride flush 0.9 % injection 5-40 mL  5-40 mL IntraVENous 2 times per day Areli Humphrey MD   10 mL at 07/26/22 1216    sodium chloride flush 0.9 % injection 5-40 mL  5-40 mL IntraVENous PRN Areli Humphrey MD        0.9 % sodium chloride infusion   IntraVENous PRN Areli Humphrey MD        ondansetron (ZOFRAN-ODT) disintegrating tablet 4 mg  4 mg Oral Q8H PRN Areli Humphrey MD        Or    ondansetron [facility]) injection 4 mg  4 mg IntraVENous Q6H PRN Areli Humphrey MD        acetaminophen (TYLENOL) tablet 650 mg  650 mg Oral Q6H PRN Areli Humphrey MD   650 mg at 07/25/22 0848    Or    acetaminophen (TYLENOL) suppository 650 mg  650 mg Rectal Q6H PRN Areli Humphrey MD        potassium chloride 10 mEq/100 mL IVPB (Peripheral Line)  10 mEq IntraVENous PRN Areli Humphrey MD        magnesium sulfate 2000 mg in 50 mL IVPB premix  2,000 mg IntraVENous PRN Areli Humphrey MD        sodium phosphate 10 mmol in sodium chloride 0.9 % 250 mL IVPB  10 mmol IntraVENous PRN Areli Humphrey MD        Or    sodium phosphate 15 mmol in sodium chloride 0.9 % 250 mL IVPB  15 mmol IntraVENous PRN Areli Humphrey MD        Or    sodium phosphate 20 mmol in sodium chloride 0.9 % 500 mL IVPB  20 mmol IntraVENous PRN Areli Humphrey MD        polyethylene glycol (GLYCOLAX) packet 17 g  17 g Oral Daily PRN Ginger Vegas MD        melatonin disintegrating tablet 5 mg  5 mg Oral Nightly PRN Ginger Vegas MD   5 mg at 22    calcium carbonate (TUMS) chewable tablet 500 mg  500 mg Oral TID PRN Ginger Vegas MD        naloxone [facility]) injection 0.4 mg  0.4 mg IntraVENous PRN Ginger Vegas MD           Past Medical History:  Past Medical History:   Diagnosis Date    Anxiety     Asthma     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     COPD (chronic obstructive pulmonary disease) ([facility] [address]     Depression     Emphysema (subcutaneous) (surgical) resulting from a procedure     History of lung biopsy 2019    Malignant neoplasm of right main bronchus ([facility] [address] 2019    NSCLC, SCC [id number] stage IIIb    Palliative care patient 2021    Syncope     TIA (transient ischemic attack)        Past Surgical History:  Past Surgical History:   Procedure Laterality Date    APPENDECTOMY      BACK SURGERY      times two    BLADDER SUSPENSION      CHOLECYSTECTOMY      HYSTERECTOMY (CERVIX STATUS UNKNOWN)      INCONTINENCE SURGERY         Family History  Family History   Problem Relation Age of Onset    Colon Cancer Mother     High Blood Pressure Brother     Diabetes Brother        Social History  Social History     Socioeconomic History    Marital status:       Spouse name: Not on file    Number of children: Not on file    Years of education: Not on file    Highest education level: Not on file   Occupational History    Not on file   Tobacco Use    Smoking status: Former     Packs/day: 2.00     Years: 54.00     Pack years: 108.00     Types: Cigarettes     Quit date: 2021     Years since quittin.7    Smokeless tobacco: Never    Tobacco comments:     stopped 1 month ago   Vaping Use    Vaping Use: Never used   Substance and Sexual Activity    Alcohol use: Not Currently    Drug use: Never    Sexual activity: Not on file   Other Topics Concern    Not on file   Social History Narrative    Not on file     Social Determinants of Health     Financial Resource Strain: Not on file   Food Insecurity: Not on file   Transportation Needs: Not on file   Physical Activity: Not on file   Stress: Not on file   Social Connections: Not on file   Intimate Partner Violence: Not on file   Housing Stability: Not on file         Review of Systems:    Review of Systems   Constitutional:  Positive for activity change, appetite change, fatigue and unexpected weight change. HENT:  Negative for rhinorrhea and voice change. Eyes:  Negative for visual disturbance. Respiratory:  Positive for cough and shortness of breath. Cardiovascular:  Negative for chest pain and leg swelling. Gastrointestinal:  Negative for constipation, diarrhea, nausea and vomiting. Endocrine: Negative for polyuria. Genitourinary:  Negative for difficulty urinating and dysuria. Musculoskeletal:  Positive for gait problem. Negative for arthralgias and back pain. Skin:  Negative for color change. Allergic/Immunologic: Negative for immunocompromised state. Neurological:  Positive for weakness. Negative for dizziness and headaches. Psychiatric/Behavioral:  Negative for confusion. Objective:  Blood pressure 113/74, pulse 92, temperature 97.1 °F (36.2 °C), temperature source Temporal, resp. rate 25, height 5' 4\" (1.626 m), weight 133 lb 12.8 oz (60.7 kg), SpO2 90 %. Intake/Output Summary (Last 24 hours) at 7/26/2022 1513  Last data filed at 7/26/2022 1200  Gross per 24 hour   Intake 300 ml   Output 2495 ml   Net -2195 ml       Physical Exam  Vitals and nursing note reviewed. Constitutional:       Appearance: She is underweight. She is ill-appearing. HENT:      Head: Normocephalic and atraumatic.       Right Ear: External ear normal.      Left Ear: External ear normal.      Nose: Nose normal.      Mouth/Throat:      Mouth: Mucous membranes are moist.   Eyes:      Conjunctiva/sclera: Conjunctivae normal. Pupils: Pupils are equal, round, and reactive to light. Cardiovascular:      Rate and Rhythm: Normal rate and regular rhythm. Heart sounds: Normal heart sounds. Pulmonary:      Effort: Pulmonary effort is normal.      Breath sounds: Rhonchi present. Abdominal:      General: Abdomen is flat. Palpations: Abdomen is soft. Musculoskeletal:         General: Normal range of motion. Cervical back: Neck supple. No rigidity. No muscular tenderness. Skin:     General: Skin is warm and dry. Neurological:      Mental Status: She is alert and oriented to person, place, and time. Psychiatric:         Mood and Affect: Mood normal.       Labs:  BMP:   Recent Labs     07/24/22 1255 07/25/22 0355 07/26/22  0334    146* 145   K 4.0 3.7 3.8   CL 99 99 97*   CO2 39* 41* 38*   BUN 15 14 13   CREATININE 0.3* 0.3* 0.3*   CALCIUM 9.1 9.1 9.4     CBC:   Recent Labs     07/24/22 1255 07/25/22 0355 07/26/22  0334   WBC 7.4 5.3 8.7   HGB 9.9* 9.4* 10.1*   HCT 36.2* 33.9* 36.4*  35.0*   MCV 98.9 96.6 95.1    179 195     LIVER PROFILE:   Recent Labs     07/24/22 1255 07/26/22  0334   AST 10 11   ALT 7 8   BILITOT <0.2 0.3   ALKPHOS 136* 128*     PT/INR: No results for input(s): PROTIME, INR in the last 72 hours. APTT: No results for input(s): APTT in the last 72 hours. BNP:  No results for input(s): BNP in the last 72 hours. Ionized Calcium:No results for input(s): IONCA in the last 72 hours. Magnesium:  Recent Labs     07/26/22  0334   MG 2.1     Phosphorus:No results for input(s): PHOS in the last 72 hours. HgbA1C: No results for input(s): LABA1C in the last 72 hours. Hepatic:   Recent Labs     07/24/22  1255 07/26/22  0334   ALKPHOS 136* 128*   ALT 7 8   AST 10 11   PROT 6.6 6.1*   BILITOT <0.2 0.3   LABALBU 3.3* 3.6     Lactic Acid: No results for input(s): LACTA in the last 72 hours. Troponin: No results for input(s): CKTOTAL, CKMB, TROPONINT in the last 72 hours.   ABGs: No results for input(s): PH, PCO2, PO2, HCO3, O2SAT in the last 72 hours. CRP:  No results for input(s): CRP in the last 72 hours. Sed Rate:  No results for input(s): SEDRATE in the last 72 hours. Cultures:   No results for input(s): CULTURE in the last 72 hours. Recent Labs     07/24/22  1845 07/24/22  1855   BC No Growth to date. Any change in status will be called. --    BLOODCULT2  --  No Growth to date. Any change in status will be called. No results for input(s): CXSURG in the last 72 hours. Radiology reports as per the Radiologist  Radiology: ECHO Complete 2D W Doppler W Color    Result Date: 7/25/2022  Transthoracic Echocardiography Report (TTE)  Demographics   Patient Name  Oracio Anne  Date of Study         07/25/2022   MRN           [id number]       Gender                Female   Date of Birth 1955   Room Number           IVQ-0008   Age           69 year(s)   Height:       64 inches    Referring Physician   Lidia Gibbs MD   Weight:       132 pounds   Sonographer           Ashlee Gonzalez   BSA:          1.64 m^2     Interpreting          He Garcia MD                             Physician   BMI:          22.66 kg/m^2  Procedure Type of Study   TTE procedure:ECHO 2D W/DOPPLER/COLOR/CONTRAST. Study Location: Portable Technical Quality: Poor visualization due to lung interface. Patient Status: Inpatient Contrast Medium: Definity. HR: 74 bpm BP: 105/55 mmHg Indications:Dyspnea/SOB. Conclusions   Summary  Left ventricle had normal chamber size and thickness  Preserved systolic function with estimated ejection fraction of 42%  Grade 1 diastolic dysfunction  No regional wall motion abnormality  Mitral annulus calcification with mild degree of mitral leaflet  thickening. Normal leaflet mobility.  Mild mitral regurgitation  Mild tricuspid insufficiency with estimated right ventricular systolic  pressure between 60 mm to 65 mm which is markedly elevated  dilated right ventricle with decreased systolic wall motion  Left fluid effusion   Signature   ----------------------------------------------------------------  Electronically signed by Leo Wyman MD(Interpreting physician)  on 07/25/2022 06:00 PM  ----------------------------------------------------------------   Findings   Mitral Valve  Mitral annulus calcification with mild degree of mitral leaflet  thickening. Normal leaflet mobility. Mild mitral regurgitation   Aortic Valve  Aortic sclerosis   Tricuspid Valve  Mild tricuspid insufficiency with estimated right ventricular systolic  pressure between 60 mm to 65 mm which is markedly elevated   Pulmonic Valve  The pulmonic valve was not well visualized . Left Atrium  Normal size left atrium. Left Ventricle  See conclusion   Right Atrium  Normal right atrial dimension with no evidence of thrombus or mass noted. Right Ventricle  dilated right ventricle with decreased systolic wall motion   Pleural Effusion  Left fluid effusion   Miscellaneous  normal IVC  2D Measurements and Calculations(cm)   LVIDd: 3.96 cm                      LVIDs: 2.59 cm  IVSd: 1.29 cm  LVPWd: 0.78 cm                      AO Root Dimension: 2.6 cm  Rt. Vent.  Dimension: 3.88 cm        LA Dimension: 2.7 cm  % Ejection Fraction: 60 %           LA Area: 10.5 cm^2  LA Volume: 18.6 ml                  LV Systolic dimension: 2.98XZ  LA Volume Index: 11 ml/m^2          LV PW diastolic: 0.21EM  LV dimension: 3.96 cm               LVOT diameter: 2 cm                                      RA Systolic pressure: 3mmHg  LVEDV: 80.1 ml                      RV Diastolic dimension: 5.15MO  LVESV:33.5 ml                       LVEDVI: 49 ml/m^2  Cardic Output (CO): 4l/min          LVESVI: 20 ml/m^2  Ascending Aorta: 2.2 cm  Doppler Measurements and Calculations   AV Peak Velocity:139 cm/s              MV Peak E-Wave: 50.6 cm/s  AV Mean Velocity:86 cm/s               MV Peak A-Wave: 78.8 cm/s  AV Peak Gradient: 7.73 mmHg            MV E/A Ratio: 0.64 %  AV Mean Gradient: 4 mmHg               MV Mean velocity: 58.4cm/s  AV Area (Continuity):2.58 cm^2         MV Peak Gradient: 1.02 mmHg  AV VTI:20.9 cm/s                       MV Mean gradient: 1 mmHg  TR Velocity:364 cm/s                   MV P1/2t: 133 msec  TR Gradient:53 mmHg                    MVA by PHT1.65 cm^2  Estimated RAP:3 mmHg  RVSP:61 mmHg   MV E' septal velocity: 5.22cm/s  MV E' lateral velocity:7.18 cm/s      XR CHEST PORTABLE    Result Date: 7/24/2022  NO PRIOR REPORT AVAILABLE Exam: X-RAY OF THECHEST (Marked rotation seen) Clinical data:Dyspnea, respiratory distress. Technique:Single view of the chest. Prior studies: CT scan of the chest dated 03/07/22 images. Radiograph of the chest dated 02/11/22 image. Findings:Right Mediport catheter with tip in the SVC. Left greater than right pleural effusions. Ill-defined left mid lower lung infiltrate. Borderline vascularity. No acute osseous abnormality is detected. 1.  Right Mediport catheter. 2.  Borderline vascularity with left greater than right pleural effusions. 3.  Left mid lower lung infiltrate. Recommendation: Follow HRCT to confirm if clinically indicated. Electronically Signed by Tamra Rudd MD at 24-Jul-2022 02:58:22 PM             CTA PULMONARY W CONTRAST    Result Date: 7/24/2022  NO PRIOR REPORT AVAILABLE <Addendum Signed by Tamra Rudd MD at 24-Jul-2022 07:51:27 PM Please note: When compared to prior exam dated 3/7/2022, the left pleural fluid and associated parenchymal disease appears new. The right sided posterior upper lobe air collection was present on prior exam but appears slightly larger with slight increase in adjacent parenchymal disease and pleural fluid compared to prior exam. Addendum End> Exam: CTA OF THE CHEST WITH CONTRAST Clinical data: Dyspnea. Technique: Axial CT angiography images through the lungs were acquired with contrast and imaged using soft tissue and lung algorithms.  Coronal, sagittal, and 3D volume reconstructions were performed. Reformatted/3D-MPR images were performed. Radiation dose: CTDIvol =30.19 mGy, DLP =425 mGy x cm. Prior studies: Radiograph of the chest dated 07/24/2022. CT scan of the chest dated 03/07/2022 images. Findings: Lungs: 4 cm in greatest dimension irregular air collection involving the posterior right upper lobe with surrounding parenchymal consolidation and pleural thickening. Additional left greater than right pleural effusions with dependent atelectasis/consolidation. The airway is clear. Soft Tissues: No mediastinal, axillary or supraclavicular adenopathy isidentified. Vascular: No filling defect within the pulmonary arteries to the segmental branch level. Unremarkable aorta. Grossly unremarkable sized heart. No definite abnormality seen on 3D reformatted images. Bony structures: No acute or destructive abnormality. Moderate-severe multilevel spondylosis with S-shaped kyphoscoliosis. Upper Abdomen: Prior cholecystectomy. 1.  4 cm in greatest dimension irregular air collection, uncertain if parenchymal or pleural, involving the posterior right upper lobe with surrounding parenchymal consolidation and pleural thickening. Additional left greater than right pleural effusions with dependent atelectasis/consolidation. 2.  No evidence of pulmonary embolus or aortic dissection. 3.  Other nonacute findings above. Recommendation: Follow up as clinically indicated. All CT scans at this facility utilize dose modulation, iterative reconstruction, and/or weight based dosing when appropriate to reduce radiation dose to as low as reasonably achievable. Amended by Ayde Rivas MD at 24-Jul-2022 07:51:27 PM Electronically Signed by Ayde Rivas MD at 24-Jul-2022 06:41:48 PM                Assessment     Acute respiratory failure with hypoxia and hypercarbia. Continue BiPAP support. Ongoing medical management. Non-small cell carcinoma of the lung.   Continue supportive care.  Oncology following. Pleural effusion. Possible thoracentesis. Please document 40 minutes of critical care time for patient assessment, chart review, discussion with staff, .        Tammie Hi,

## 2022-07-26 NOTE — PROCEDURES
Chest ultrasound was done at the bedside. The patient did have moderate pleural effusion on the left. No significant effusions on the right.

## 2022-07-26 NOTE — PROGRESS NOTES
95.1   MCH 27.0 26.8* 27.4   MCHC 27.3* 27.7* 28.9*   RDW 15.6* 15.6* 15.6*      Recent Labs     07/24/22  1250 07/24/22  1255 07/25/22  0355 07/26/22  0334   NA  --  143 146* 145   K 3.3 4.0 3.7 3.8   CL  --  99 99 97*   CO2  --  39* 41* 38*   BUN  --  15 14 13   CREATININE  --  0.3* 0.3* 0.3*   CALCIUM  --  9.1 9.1 9.4   GLUCOSE  --  142* 107 112*      Recent Labs     07/24/22  1250   PHART 7.380   RXD1FHW 72.0*   PO2ART 34.0*   YQU1FGL 42.6*   T2RMWYTS 59.9*   BEART 14.9*     Recent Labs     07/24/22  1255 07/25/22  0355 07/26/22  0334   AST 10  --  11   ALT 7  --  8   ALKPHOS 136*  --  128*   BILITOT <0.2  --  0.3   MG  --   --  2.1   CALCIUM 9.1   < > 9.4   PROBNP 444  --   --    TROPONINI <0.01  --   --    TSH  --   --  0.297    < > = values in this interval not displayed. Recent Labs     07/24/22  1845   BC No Growth to date. Any change in status will be called. Radiograph: XR CHEST PORTABLE    Result Date: 7/26/2022  Alveolar consolidation with probable effusion left base. Segmental atelectasis right upper lobe. Right access subclavian catheter with tip at the cavoatrial junction Recommendation: Follow up short term Electronically Signed by Frank Naranjo MD at 26-Jul-2022 12:43:03 PM             XR CHEST PORTABLE    Result Date: 7/24/2022  1. Right Mediport catheter. 2.  Borderline vascularity with left greater than right pleural effusions. 3.  Left mid lower lung infiltrate. Recommendation: Follow HRCT to confirm if clinically indicated. Electronically Signed by Maximilian Watson MD at 24-Jul-2022 02:58:22 PM             CTA PULMONARY W CONTRAST    Result Date: 7/24/2022  1.  4 cm in greatest dimension irregular air collection, uncertain if parenchymal or pleural, involving the posterior right upper lobe with surrounding parenchymal consolidation and pleural thickening. Additional left greater than right pleural effusions with dependent atelectasis/consolidation.   2.  No evidence of pulmonary embolus or aortic dissection. 3.  Other nonacute findings above. Recommendation: Follow up as clinically indicated. All CT scans at this facility utilize dose modulation, iterative reconstruction, and/or weight based dosing when appropriate to reduce radiation dose to as low as reasonably achievable. Amended by Lima Augustine MD at 24-Jul-2022 07:51:27 PM Electronically Signed by Lima Augustine MD at 24-Jul-2022 06:41:48 PM                My radiograph interpretation/independent review of imaging: Reviewed    Problem list generated by [facility] Problems             Last Modified POA    * (Principal) Acute respiratory failure with hypoxia and hypercarbia ([facility] [address] 7/24/2022 Yes    Primary squamous cell carcinoma of right lung ([facility] [address] 7/24/2022 Yes    Pleural effusion 7/24/2022 Yes    Severe pneumonia 7/24/2022 Yes        Pulmonary Assessment/Plan:     Acute on chronic hypoxic respiratory failure. Continue oxygen supplementation and noninvasive ventilation as necessary to maintain adequate oxygenation. Etiology of her acute decompensation is secondary to likely pleural effusion. We did perform an ultrasound of the chest at the bedside and she does have moderate effusion. Eliquis is on hold. Left-sided pleural effusion again etiology unclear. Thoracentesis when able off Eliquis. History of lung cancer oncology following. DVT prophylaxis. Discussed with the nursing staff. Critical care time 36 min. Mandi Charles MD, [facility], [facility]    The above note was generated using voice recognition software. Inadvertent typographical errors in transcription may have occurred.       Electronically signed by Mandi Charles MD on 07/26/22 at 6:50 PM

## 2022-07-27 PROBLEM — J18.9 SEVERE PNEUMONIA: Status: RESOLVED | Noted: 2021-01-01 | Resolved: 2022-01-01

## 2022-07-27 PROBLEM — C34.91 PRIMARY SQUAMOUS CELL CARCINOMA OF RIGHT LUNG (HCC): Status: RESOLVED | Noted: 2019-05-31 | Resolved: 2022-01-01

## 2022-07-27 PROBLEM — J96.21 ACUTE AND CHRONIC RESPIRATORY FAILURE WITH HYPOXIA (HCC): Status: ACTIVE | Noted: 2022-01-01

## 2022-07-27 NOTE — PROGRESS NOTES
Pt discharging from ICU and transferring into hospice care suite 3. Report called to [facility], RN.

## 2022-07-27 NOTE — DISCHARGE SUMMARY
Discharge Summary    Aidee Ness  :  1955  MRN:  [id number]    Admit date:  2022  Discharge date:  2022    Admitting Physician:  No admitting provider for patient encounter. Advance Directive: DNR    Consults: pulmonary/intensive care, hematology/oncology, palliative care and hospice. Primary Care Physician:  DOMINICK Brito - RHEA    Discharge Diagnoses:  Principal Problem:    Acute respiratory failure with hypoxia and hypercarbia (HCC)  Active Problems:    Palliative care patient    Pleural effusion  Resolved Problems:    Primary squamous cell carcinoma of right lung (HCC)    Severe pneumonia      Significant Diagnostic Studies:   No results found. CBC:   Recent Labs     22  0355 22  0334 22  0111   WBC 5.3 8.7 8.7   HGB 9.4* 10.1* 9.8*    195 182     BMP:    Recent Labs     22  0355 22  0334 22  0111   * 145 144   K 3.7 3.8 4.0   CL 99 97* 98   CO2 41* 38* 39*   BUN 14 13 17   CREATININE 0.3* 0.3* 0.2*   GLUCOSE 107 112* 103     INR: No results for input(s): INR in the last 72 hours. Lipids: No results for input(s): CHOL, HDL in the last 72 hours. Invalid input(s): LDLCALCU  ABGs:  Recent Labs     22  1250   PHART 7.380   YVH7AMM 72.0*   PO2ART 34.0*   NKF9SNQ 42.6*   BEART 14.9*   HGBAE 10.1*   Z2YISIUY 59.9*   CARBOXHGBART 1.9   02THERAPY Unknown     HgBA1c:  No results for input(s): LABA1C in the last 72 hours. Procedures: None  Hospital Course: Ms. Garrick Smith was admitted on  for acute on chronic hypoxemic and hypercapnic respiratory failure. She was admitted to ICU. Consultation was obtained with oncology and pulmonology. She was treated with BiPAP and medical management for pneumonia and underlying COPD. She had very modest improvement. Palliative care worked very closely with the patient and family. They made the decision to transfer to inpatient hospice. She was transferred on .     Physical Exam:  Vital Signs: BP (!) 97/50   Pulse (!) 104   Temp (!) 96.7 °F (35.9 °C) (Temporal)   Resp (!) 31   Ht 5' 4\" (1.626 m)   Wt 132 lb (59.9 kg)   SpO2 (!) 80%   BMI 22.66 kg/m²   General appearance:. Alert and Cooperative   HEENT: Normocephalic. Chest: clear to auscultation bilaterally without wheezes or rhonchi. Cardiac: Normal heart tones with regular rate and rhythm, S1, S2 normal. No murmurs, gallops, or rubs auscultated. Abdomen:soft, non-tender; normal bowel sounds, no masses, no organomegaly. Extremities: No clubbing or cyanosis. No peripheral edema. Peripheral pulses palpable. Neurologic: Grossly intact. Discharge Medications:         Medication List        ASK your doctor about these medications      albuterol sulfate  (90 Base) MCG/ACT inhaler  Commonly known as: Ventolin HFA  Inhale 2 puffs into the lungs 4 times daily as needed for Wheezing     apixaban 5 MG Tabs tablet  Commonly known as: ELIQUIS  Take 1 tablet by mouth 2 times daily     aspirin EC 81 MG EC tablet     atorvastatin 40 MG tablet  Commonly known as: LIPITOR  Take 1 tablet by mouth nightly     calcium carbonate 600 MG Tabs tablet     citalopram 20 MG tablet  Commonly known as: CELEXA     folic acid 1 MG tablet  Commonly known as: FOLVITE  Take 1 tablet by mouth daily     midodrine 10 MG tablet  Commonly known as: PROAMATINE  Take 1 tablet by mouth 3 times daily (with meals)     nicotine 21 MG/24HR  Commonly known as: NICODERM CQ  Place 1 patch onto the skin daily     oxyCODONE-acetaminophen  MG per tablet  Commonly known as: Percocet  Take 1 tablet by mouth every 4 hours as needed for Pain for up to 30 days.  Intended supply: 30 days     OXYGEN     Symbicort 160-4.5 MCG/ACT Aero  Generic drug: budesonide-formoterol     vitamin B-1 100 MG tablet  Commonly known as: THIAMINE  Take 1 tablet by mouth daily     vitamin D 1.25 MG (89632 UT) Caps capsule  Commonly known as: ERGOCALCIFEROL              Discharge Instructions: Follow up with DOMINICK Stout NP in 3-5 days. Take medications as directed. Resume activity as tolerated. Diet: ADULT DIET; Regular; 4 carb choices (60 gm/meal); Low Fat/Low Chol/High Fiber/2 gm Na; Low Sodium (2 gm)     Disposition: Patient is medically stable and was transferred to inpatient hospice. Time spent on discharge 40 minutes.     Signed:  Gilmar Hughes DO

## 2022-07-27 NOTE — PROGRESS NOTES
The pts pat Red signed the adm forms admitting the pt to the [facility] 75.. After neg covid test, it is ok to dc the pt, call [phone] to give report then transfer the pt to the [place] [address]. Emotional and SC support provided.

## 2022-07-27 NOTE — PROGRESS NOTES
MEDICAL ONCOLOGY PROGRESS NOTE     Pt Name: Reynaldo Esteban  MRN: [id number]  YOB: 1955  Date of evaluation: 7/27/2022  ROOM: 143    Subjective: O2 requirements have increased-on BiPAP. O2 sat sats in the low 80s. Patient in severe respiratory distress. Family at bedside. Apparently patient decided for no intubation. Consulted palliative care. Discussed bedside RN. I recommended hospice. HISTORY OF PRESENT ILLNESS:  The patient is well-known to my clinic. She is a pleasant 69years old female who has a diagnosis of non-small cell lung cancer, stage III. She also has severe emphysema secondary to tobacco addiction. She quit smoking more than a year ago. She had a recent hospitalization at [facility] for lung issues. She presented the ER department on 7/25/2024 brought by EMS. She had respiratory distress. She was placed on CPAP by EMS. She was hypoxemic. She was admitted to the ICU. I was consulted for continuity of care. Chest x-ray showed left middle lower lobe lung infiltrate  7/24/2022-CT pulmonary protocol showed no evidence of pulmonary emboli. 4 cm in greatest dimension irregular air collection, uncertain if parenchymal or pleural, involving the posterior right upper lobe with surrounding parenchymal consolidation and pleural thickening. Additional left greater than right pleural effusions with dependent atelectasis/consolidation. Prior oncological history  Diagnosis:  NSCLC, [facility] March 2019  [id number], stage IIIIB  COPD   Quit Nov 2021     Treatment summary. 6/10/2019- 8/20/2019-carboplatin/Taxol weekly with concurrent radiation  12/4/2019-02/25/2021 durvalumab x1 year completed     Interval history  Patient is a very pleasant 69years old female who has a diagnosis of stage IIIb non-small cell lung cancer, squamous cell subtype. She received initial treatment with carboplatin/Taxol weekly followed by concurrent radiation.  She completed concurrent chemoradiation and is currently status post completion of durvalumab February 2021. In addition, she has severe COPD. She has frequent hospitalizations for COPD exarcebation. She was recently at [facility] for COPD exacerbation. She had an apparent seizure episode and MRI was performed. MRI did not show any evidence of intracranial metastatic disease. She eventually recovered and was discharged. She feels pretty good today. She has been on O2 supplementation. She has quit smoking November 2021. Last CT chest March 2022 showed no evidence of recurrent disease. Cancer history  Ms Lawrence Cabral was first seen by me on 5/3/2019 for further workup of a lung mass. She has complains of chest wall pain. Further workup revealed a lung mass. 4/9/2019-CT of the chest with contrast showed a malignant mass in the right upper lobe measures 6.5 x 2.8 x 5.1 cm. The mass invades and destroys the right lateral cortex and lateral aspect of the T4 vertebral body. Mediastinal adenopathy. Specifically, 1.9 cm pretracheal lymph node. 2.1 cm precarinal lymph node. 1.9 cm subcarina lymph node. Right hilar adenopathy. 5/3/2019-she was first seen by me.  5/15/2019-bone scan and CT abdomen pelvis was unremarkable metastatic disease. 5/16/[id number]-XF-asakox biopsy consistent non-small cell lung cancer, squamous cell carcinoma subtype. 5/29/2019-PET scan showed intense and metabolic activity associated with pleural-based right paraspinal mass within the right upper lobe. It invades into the adjacent thoracic vertebral body. Right hilar, subcarina and pretracheal lymphadenopathy. No evidence of muscle skeletal or intra-abdominal disease. 6/7/2019-recommended chemoradiation with carboplatin/Taxol weekly with concurrent radiation. 10/14/2019- CT chest abdomen pelvis showed a cavitary lesion measuring 3.3 x 4.6 x 3.4 cm which appears to be decreased in size. Underlying lung emphysema.   There is a new notable sclerosis within the right lateral aspect of T4 and T5 vertebral bodies at the site of the posterior mediastinal invasion by the right lung malignancy. CT of the abdomen pelvis showed tiny hypodense in the liver which are too small to characterize but similar to prior exam.  Attention to follow-up. Stable nodule in the left adrenal gland too small to characterize. 12/4/2019- started on treatment durvalumab every 2 weeks to complete 1 year. 2/19/2020-CT chest abdomen pelvis showed slight decrease in size of the right upper lobe cavitary lesion. The lesion now measures 2.6 x 4.5 x 3.9 cm (previously 3.3 x 4.6 x 3.4 cm). Subtle cortical erosion of the right side of the centrum at T4/T5. No evidence of metastatic disease in the abdomen. Stable left adrenal nodule. 4/29/2020 Xr Chest Standard A cavitation or a necrotic mass in the right upper lobe. This was noted in the previous CT scan of the chest dated 2/19/2020. An area of consolidation in the right upper lobe may represent inflammatory or infectious process. Ill-defined nodule in the left lower lobe may represent a nipple shadow or a pulmonary nodule. This could be further evaluated. Chronic inflammatory changes in the remaining lungs bilaterally. 6/26/2020 Ct Chest W Contrast There is a pleural-based cavitary lesion within the right upper lobe posteriorly and medially. When measuring the lesion in the same manner as the previous study I do not feel there has been any significant progression from a previous CT of 2/19/2020. There is some increased consolidation within the medial right upper lobe likely representing post therapy change. There is a new small nodule more inferiorly within the right upper lobe as described above. This may also represent consolidation post therapy but as it is a more isolated finding I feel it could potentially represent an early recurrence. This would warrant follow-up on subsequent imaging.  These findings are superimposed upon changes of centrilobular emphysema. There is stable scarring within the right posterior lung base. There is bronchial wall thickening within both lungs suggesting chronic bronchitis. Coronary calcifications are present. Borderline enlargement of the pulmonary arteries suggesting pulmonary arterial hypertension. Mild constipation within the upper abdomen. Ct Abdomen Pelvis W Iv Contrast   No evidence of metastatic disease in the abdomen or pelvis. Stable biliary dilatation status post cholecystectomy which may represent reservoir phenomenon. Moderate to large volume stool in the colon, correlate for constipation. 7/1/2020- discussed at length the CAT scans with the patient and radiology. No clear-cut evidence of disease progression. Therefore will continue treatment. 9/16/20 CT Chest: Redemonstration of a cavitary lesion in the posterior right upper lobe with surrounding posttreatment changes, not significantly changed from the most recent comparison exam. New small noncalcified right lower lobe nodule for which attention on follow-up is recommended. Emphysema and pulmonary fibrosis. Pulmonary arterial hypertension. Atherosclerosis of the aorta and coronary arteries. 10/29/20 MRI brain:  No acute intracranial abnormality. No evidence of intracranial neoplastic/metastatic disease. Chronic white matter ischemic changes. Chronic maxillary and ethmoid sinusitis. 2/25/2021-completion of 1 year of treatment with durvalumab. 4/7/21 Ct Chest W Contrast  No significant interval change is seen. Posttreatment changes are noted in the right lung. 2 areas of soft tissue nodularity in the right upper lobe remains stable. There has been resolution of a right lower lobe area of nodular density. Right upper lung zone consolidation and cavitation posteriorly also appears stable. No pathologically enlarged lymph nodes. Emphysema and pulmonary fibrosis. Atheromatous disease of the thoracic aorta and coronary arteries. Dilated pulmonary arteries.  5. Prior cholecystectomy in the upper abdomen with dilated biliary tree likely related to reservoir effect. 4/27/2021-reviewed CT chest that showed no significant interval change. Stable disease. 8/23/21 CT CHEST W CONTRAST  Stable chest CT. Right upper lobe changes are stable, mostly treatment related. This includes an area of juxtapleural consolidation with cavitation. Narrowing of the right upper lobe bronchus is also seen. These findings are not significantly changed. No measurable mass identified. There are no new or enlarging pulmonary nodules. Underlying advanced lung emphysema. Evidence for pulmonary hypertension, likely related to the underlying diffuse lung disease. Advanced coronary atheromatous calcification. 9/28/2021-I reviewed results of CT chest.  No evidence of disease progression. Continue clinical/image surveillance. 3/7/2022 CT Chest W Contrast 3/7/2022 CT Chest W Contrast The imaged portion of the neck and thyroid gland is unremarkable. Lungs: Similar appearance of the right upper lobe with persistent cavitary lesion in adjacent confluent opacity as well as air bronchograms. Emphysema. No new focal consolidation, pleural effusion or suspicious pulmonary nodule. . No measurable pneumothorax. Trace pleural effusions. The trachea and bronchial tree are similar. Heart: Normal in size and appearance. There is no pericardial effusion. Vasculature: There is aortic atherosclerosis without stenosis or aneurysm. Calcifications are seen in the coronary arteries. There is atherosclerosis in the great vessels without definite stenosis. The pulmonary arteries are enlarged, suggestive of pulmonary arterial hypertension. Lymph nodes: No enlarged axillary, hilar, or mediastinal lymph nodes. Bones and soft tissues: No acute osseous or soft tissue abnormality is seen. There are no worrisome osseous lesions. Upper abdomen: Beam hardening artifact from cholecystectomy limits evaluation of the liver.  Stable examination. Chronic findings as above. Stable examination. Chronic findings as above. 6/10/2022 CT Head WO Contrast([facility]) There is no evidence of acute intracranial hemorrhage, mass effect or midline shift. No abnormal extra-axial fluid collection. No areas of abnormal attenuation within the brain parenchyma to suggest an acute infarction. The ventricles, cisterns, and sulci are within normal limits in size and configuration. The visualized skull base structures and paranasal sinuses are unremarkable. No calvarial fracture or other lesion. 6/11/2022 MRI Brain W WO Contrast [facility]) No MR evidence of acute intracranial pathology. No evidence of intracranial metastatic disease.   6/12/2022 US Chest ([facility])Small bilateral  pleural effusion are present         Current Facility-Administered Medications   Medication Dose Route Frequency Provider Last Rate Last Admin    oxyCODONE (ROXICODONE) immediate release tablet 10 mg  10 mg Oral Q4H PRN Iftikhar Bolden MD   10 mg at 07/27/22 0259    azithromycin (ZITHROMAX) 250 mg in dextrose 5 % 250 mL IVPB  250 mg IntraVENous Q24H Sylwia Piedra MD   Stopped at 07/26/22 1917    cefTRIAXone (ROCEPHIN) 1,000 mg in sterile water 10 mL IV syringe  1,000 mg IntraVENous Q24H Sylwia Piedra MD   1,000 mg at 07/26/22 1723    budesonide (PULMICORT) nebulizer suspension 500 mcg  0.5 mg Nebulization BID Sylwia Piedra MD   500 mcg at 07/27/22 3806    Arformoterol Tartrate (BROVANA) nebulizer solution 15 mcg  15 mcg Nebulization BID Sylwia Piedra MD   15 mcg at 07/27/22 9858    [Held by provider] apixaban (ELIQUIS) tablet 5 mg  5 mg Oral BID Sylwia Piedra MD   5 mg at 07/25/22 2001    albuterol (PROVENTIL) nebulizer solution 2.5 mg  2.5 mg Nebulization Q1H PRN Sylwia Piedra MD   2.5 mg at 07/25/22 0708    atorvastatin (LIPITOR) tablet 40 mg  40 mg Oral Nightly Sylwia Piedra MD   40 mg at 07/26/22 1924    citalopram (CELEXA) tablet 20 mg  20 mg Oral Daily Sylwia iPedra MD Nightly PRN Min Buck MD   5 mg at 07/24/22 2046    calcium carbonate (TUMS) chewable tablet 500 mg  500 mg Oral TID PRN Min Buck MD        naloxone [facility]) injection 0.4 mg  0.4 mg IntraVENous PRN Min Buck MD           Allergies: Allergies   Allergen Reactions    No Known Allergies        Objective   BP (!) 109/54   Pulse 100   Temp 97.7 °F (36.5 °C) (Temporal)   Resp 27   Ht 5' 4\" (1.626 m)   Wt 132 lb (59.9 kg)   SpO2 (!) 76%   BMI 22.66 kg/m²     PHYSICAL EXAM:  CONSTITUTIONAL: Wearing BiPAP-tachypneic, respiratory distress  CHEST/LUNGS: Tight breath sounds bilaterally  CARDIOVASCULAR: Tachycardia 106-regular  ABDOMEN: soft non-tender, active bowel sounds, no HSM. No palpable masses  EXTREMITIES: warm  SKIN: warm, dry with no rashes or lesions  LYMPH: No cervical, clavicular, axillary, or inguinal lymphadenopathy      LABORATORY RESULTS REVIEWED/ANALYZED BY ME:  Recent Labs     07/27/22  0111 07/26/22  0334 07/25/22  0355   WBC 8.7 8.7 5.3   HGB 9.8* 10.1* 9.4*   HCT 34.9* 36.4*  35.0* 33.9*   MCV 95.6 95.1 96.6    195 179       Lab Results   Component Value Date     07/27/2022    K 4.0 07/27/2022    CL 98 07/27/2022    CO2 39 (H) 07/27/2022    BUN 17 07/27/2022    CREATININE 0.2 (L) 07/27/2022    GLUCOSE 103 07/27/2022    CALCIUM 9.0 07/27/2022    PROT 5.8 (L) 07/27/2022    LABALBU 3.2 (L) 07/27/2022    BILITOT <0.2 07/27/2022    ALKPHOS 119 (H) 07/27/2022    AST 10 07/27/2022    ALT 7 07/27/2022    LABGLOM >60 07/27/2022    GFRAA >59 07/27/2022    AGRATIO 1.4 05/01/2020    GLOB 3.5 02/25/2021       Lab Results   Component Value Date    INR 1.09 02/11/2022    INR 1.08 01/27/2022    INR 1.13 11/29/2021    PROTIME 14.3 02/11/2022    PROTIME 14.2 01/27/2022    PROTIME 14.7 (H) 11/29/2021       RADIOLOGY STUDIES REPORT/REVIEWED AND INTERPRETED BY ME:  XR CHEST PORTABLE    Result Date: 7/24/2022  1. Right Mediport catheter.  2.  Borderline vascularity with left greater than right pleural effusions. 3.  Left mid lower lung infiltrate. Recommendation: Follow HRCT to confirm if clinically indicated. Electronically Signed by Amol Butterfield MD at 24-Jul-2022 02:58:22 PM             CTA PULMONARY W CONTRAST    Result Date: 7/24/2022  1.  4 cm in greatest dimension irregular air collection, uncertain if parenchymal or pleural, involving the posterior right upper lobe with surrounding parenchymal consolidation and pleural thickening. Additional left greater than right pleural effusions with dependent atelectasis/consolidation. 2.  No evidence of pulmonary embolus or aortic dissection. 3.  Other nonacute findings above. Recommendation: Follow up as clinically indicated. All CT scans at this facility utilize dose modulation, iterative reconstruction, and/or weight based dosing when appropriate to reduce radiation dose to as low as reasonably achievable. Amended by Amol Butterfield MD at 24-Jul-2022 07:51:27 PM Electronically Signed by Amol Butterfield MD at 24-Jul-2022 06:41:48 PM                My interpretation: Interval development of left pleural effusion, interval worsening of right upper lobe consolidation with associated effusion      ASSESSMENT:  Hypercapnic respiratory failure  -Patient had recent admission at [facility]  -6/11/2022-thoracocentesis performed at [facility]: Cytology negative for malignancy  -CT scans revealed. Interval worsening compared to last CT scan. No PE.  -proBNP was normal.  -Dr. Morgan Held following  -Continue current respiratory support    -She continues on ceftriaxone/azithromycin    She is DNR - does not want intubation    Status post Lasix 40 IV x1 this morning    Extreme respiratory distress. Recommended hospice. Discussed with family, bedside RN and palliative care. NSCLC-SCC stage IIIB   Bone scan, CT abdomen pelvis unremarkable metastatic disease. PET scan showed disease limited to the chest and mediastinum only.  Therefore, stage IIIB. Status post completion of chemoradiation on 8/20/2019.  10/29/2020-brain MRI was unremarkable  Completion of adjuvant durvalumab February 2021  CT chest March 2022-no evidence of disease progression. Continue imaging/clinic surveillance    Lab Results   Component Value Date    WBC 8.7 07/27/2022    HGB 9.8 (L) 07/27/2022    HCT 34.9 (L) 07/27/2022    MCV 95.6 07/27/2022     07/27/2022            Normocytic anemia  Serum Fe - 28  TIBC - 333  Fe sat - 8%  Ferritin - 24.9  B12 - 400  Haptoglobin - 334  Retic - absolute 0.0885  TSH - 0.297    FOBT to be collected      Hgb 9.8 today 7/27/2022-stable overall    PLAN:  Eliquis placed on hold today on 7/26/2022  Discussed palliative care, discussed with family at bedside and. Consider steroids  Consider hospice    (Please note that portions of this note were completed with a voice recognition program. Efforts were made to edit the dictations but occasionally words are mis-transcribed.)      Reji Maharaj PA-C    07/27/22  6:41 AM   Physician's attestation/substantial contribution:  I, Dr Zeferino Segal, independently performed an evaluation on [facility]. I have reviewed relevant medical information/data to include but not limited to medication list, relevant appropriate labs and imaging when applicable. I reviewed other physician's notes, ancillary services and nurses assessment. I have reviewed the above documentation completed by the Nurse Practitioner or Physician Assistant. Please see my additional contributions to the history of present illness, physical examination, and assessment/medical decision-making that reflect my findings and impressions. I have seen and examined the patient and the key elements of all parts of the encounter have been performed by me. I agree with the assessment and plan as outlined by the ARNP/PA. Subjective-patient severe respiratory distress  [id number]  Assessment/plan:  Recommended hospice. Discussed with family, bedside RN. Patient has declined intubation. Lengthy discussed with him about goals and expectations. Hospice consulted.     Mile White MD

## 2022-07-27 NOTE — CONSULTS
SURGERY         Home Medications:  Prior to Admission medications    Medication Sig Start Date End Date Taking? Authorizing Provider   oxyCODONE-acetaminophen (PERCOCET)  MG per tablet Take 1 tablet by mouth every 4 hours as needed for Pain for up to 30 days. Intended supply: 30 days 7/19/22 8/18/22  Aide Thomas MD   albuterol sulfate HFA (VENTOLIN HFA) 108 (90 Base) MCG/ACT inhaler Inhale 2 puffs into the lungs 4 times daily as needed for Wheezing 12/29/21   Niki Melgar MD   folic acid (FOLVITE) 1 MG tablet Take 1 tablet by mouth daily 12/12/21 2/11/22  Avery Carlson MD   apixaban (ELIQUIS) 5 MG TABS tablet Take 1 tablet by mouth 2 times daily 12/7/21   Subhash Petit, MD   atorvastatin (LIPITOR) 40 MG tablet Take 1 tablet by mouth nightly 12/7/21   Subhash Petit, MD   nicotine (NICODERM CQ) 21 MG/24HR Place 1 patch onto the skin daily 12/8/21   Subhash Petit, MD   midodrine (PROAMATINE) 10 MG tablet Take 1 tablet by mouth 3 times daily (with meals) 12/7/21   Subhash Petit, MD   vitamin B-1 (THIAMINE) 100 MG tablet Take 1 tablet by mouth daily 12/7/21   Subhash Petit, MD   vitamin D (ERGOCALCIFEROL) 07286 units CAPS capsule cholecalciferol (vitamin D3) 50,000 unit capsule   Take 1 capsule every week by oral route. Historical Provider, MD   OXYGEN Inhale 2 L/min into the lungs    Historical Provider, MD   budesonide-formoterol (SYMBICORT) 160-4.5 MCG/ACT AERO Symbicort 160 mcg-4.5 mcg/actuation HFA aerosol inhaler   Inhale 2 puffs twice a day by inhalation route. Historical Provider, MD   aspirin EC 81 MG EC tablet Take 81 mg by mouth daily   Patient not taking: Reported on 6/21/2022    Historical Provider, MD   calcium carbonate 600 MG TABS tablet Take 600 mg by mouth  Patient not taking: Reported on 2/11/2022    Historical Provider, MD   citalopram (CELEXA) 20 MG tablet citalopram 20 mg tablet   Take 1 tablet every day by oral route.     Historical Provider, MD       Allergies:    No known allergies    Social History:    The patient currently lives at home. Tobacco:   reports that she quit smoking about 8 months ago. Her smoking use included cigarettes. She has a 108.00 pack-year smoking history. She has never used smokeless tobacco.  Alcohol:   reports that she does not currently use alcohol. Illicit Drugs: None known     Family History:      Problem Relation Age of Onset    Colon Cancer Mother     High Blood Pressure Brother     Diabetes Brother        Review of Systems:   Unable to obtain 2/2 respiratory distress    Physical Examination:  BP (!) 97/50   Pulse (!) 104   Temp (!) 96.7 °F (35.9 °C) (Temporal)   Resp (!) 31   Ht 5' 4\" (1.626 m)   Wt 132 lb (59.9 kg)   SpO2 (!) 80%   BMI 22.66 kg/m²   General appearance: 68 yo female, acutely/chronically ill appearing, BiPAP in place  Head: Normocephalic, without obvious abnormality, atraumatic  Eyes: conjunctivae/corneas clear.  PERRL  Ears: normal external ears and nose,  Neck: no JVD, supple, symmetrical, trachea midline   Lungs: diffuse rhonchi, tachypneic w/ accessory muscle use   Heart: tachycardic, regular rhythm, no murmurs or gallops  Abdomen:soft, non-tender; non-distended, sluggish bowel sounds  Extremities:No lower extremity edema,  No erythema, no tenderness to palpation  Skin: cool, dry  Neurologic: somnolent,slightly arouses to verbal stimuli and falls immediately back asleep, unable to follow commands   Psychiatric: Unable to fully assess     Diagnostic Data:  CBC:  Recent Labs     07/25/22  0355 07/26/22  0334 07/27/22  0111   WBC 5.3 8.7 8.7   HGB 9.4* 10.1* 9.8*   HCT 33.9* 36.4*  35.0* 34.9*    195 182     BMP:  Recent Labs     07/25/22  0355 07/26/22  0334 07/27/22  0111   * 145 144   K 3.7 3.8 4.0   CL 99 97* 98   CO2 41* 38* 39*   BUN 14 13 17   CREATININE 0.3* 0.3* 0.2*   CALCIUM 9.1 9.4 9.0   PHOS  --   --  3.4     Recent Labs     07/24/22  1255 07/26/22  0334 07/27/22  0111   AST 10 11 10   ALT 7 8 7 BILITOT <0.2 0.3 <0.2   ALKPHOS 136* 128* 119*       ECHO Complete 2D W Doppler W Color    Result Date: 7/25/2022  Transthoracic Echocardiography Report (TTE)  Demographics   Patient Name  Eula Garcia  Date of Study         07/25/2022   MRN           [id number]       Gender                Female   Date of Birth 1955   Room Number           [id number]   Age           69 year(s)   Height:       64 inches    Referring Physician   Rayshawn Biggs MD   Weight:       132 pounds   Sonographer           Ashlee Lisa   BSA:          1.64 m^2     Interpreting          Juan Taylor MD                             Physician   BMI:          22.66 kg/m^2  Procedure Type of Study   TTE procedure:ECHO 2D W/DOPPLER/COLOR/CONTRAST. Study Location: Portable Technical Quality: Poor visualization due to lung interface. Patient Status: Inpatient Contrast Medium: Definity. HR: 74 bpm BP: 105/55 mmHg Indications:Dyspnea/SOB. Conclusions   Summary  Left ventricle had normal chamber size and thickness  Preserved systolic function with estimated ejection fraction of 52%  Grade 1 diastolic dysfunction  No regional wall motion abnormality  Mitral annulus calcification with mild degree of mitral leaflet  thickening. Normal leaflet mobility. Mild mitral regurgitation  Mild tricuspid insufficiency with estimated right ventricular systolic  pressure between 60 mm to 65 mm which is markedly elevated  dilated right ventricle with decreased systolic wall motion  Left fluid effusion   Signature   ----------------------------------------------------------------  Electronically signed by Juan Taylor MD(Interpreting physician)  on 07/25/2022 06:00 PM  ----------------------------------------------------------------   Findings   Mitral Valve  Mitral annulus calcification with mild degree of mitral leaflet  thickening. Normal leaflet mobility.  Mild mitral regurgitation   Aortic Valve  Aortic sclerosis   Tricuspid Valve  Mild tricuspid insufficiency with estimated right ventricular systolic  pressure between 60 mm to 65 mm which is markedly elevated   Pulmonic Valve  The pulmonic valve was not well visualized . Left Atrium  Normal size left atrium. Left Ventricle  See conclusion   Right Atrium  Normal right atrial dimension with no evidence of thrombus or mass noted. Right Ventricle  dilated right ventricle with decreased systolic wall motion   Pleural Effusion  Left fluid effusion   Miscellaneous  normal IVC  2D Measurements and Calculations(cm)   LVIDd: 3.96 cm                      LVIDs: 2.59 cm  IVSd: 1.29 cm  LVPWd: 0.78 cm                      AO Root Dimension: 2.6 cm  Rt. Vent.  Dimension: 3.88 cm        LA Dimension: 2.7 cm  % Ejection Fraction: 60 %           LA Area: 10.5 cm^2  LA Volume: 18.6 ml                  LV Systolic dimension: 9.03GD  LA Volume Index: 11 ml/m^2          LV PW diastolic: 7.09DM  LV dimension: 3.96 cm               LVOT diameter: 2 cm                                      RA Systolic pressure: 3mmHg  LVEDV: 80.1 ml                      RV Diastolic dimension: 1.16YI  LVESV:33.5 ml                       LVEDVI: 49 ml/m^2  Cardic Output (CO): 4l/min          LVESVI: 20 ml/m^2  Ascending Aorta: 2.2 cm  Doppler Measurements and Calculations   AV Peak Velocity:139 cm/s              MV Peak E-Wave: 50.6 cm/s  AV Mean Velocity:86 cm/s               MV Peak A-Wave: 78.8 cm/s  AV Peak Gradient: 7.73 mmHg            MV E/A Ratio: 0.64 %  AV Mean Gradient: 4 mmHg               MV Mean velocity: 58.4cm/s  AV Area (Continuity):2.58 cm^2         MV Peak Gradient: 1.02 mmHg  AV VTI:20.9 cm/s                       MV Mean gradient: 1 mmHg  TR Velocity:364 cm/s                   MV P1/2t: 133 msec  TR Gradient:53 mmHg                    MVA by PHT1.65 cm^2  Estimated RAP:3 mmHg  RVSP:61 mmHg   MV E' septal velocity: 5.22cm/s  MV E' lateral velocity:7.18 cm/s      XR CHEST PORTABLE    Result Date: 7/24/2022  NO PRIOR REPORT AVAILABLE Exam: X-RAY [facility] (Marked rotation seen) Clinical data:Dyspnea, respiratory distress. Technique:Single view of the chest. Prior studies: CT scan of the chest dated 03/07/22 images. Radiograph of the chest dated 02/11/22 image. Findings:Right Mediport catheter with tip in the SVC. Left greater than right pleural effusions. Ill-defined left mid lower lung infiltrate. Borderline vascularity. No acute osseous abnormality is detected. 1.  Right Mediport catheter. 2.  Borderline vascularity with left greater than right pleural effusions. 3.  Left mid lower lung infiltrate. Recommendation: Follow HRCT to confirm if clinically indicated. Electronically Signed by Sam Rodriguez MD at 24-Jul-2022 02:58:22 PM             CTA PULMONARY W CONTRAST    Result Date: 7/24/2022  NO PRIOR REPORT AVAILABLE <Addendum Signed by Sam Rodriguez MD at 24-Jul-2022 07:51:27 PM Please note: When compared to prior exam dated 3/7/2022, the left pleural fluid and associated parenchymal disease appears new. The right sided posterior upper lobe air collection was present on prior exam but appears slightly larger with slight increase in adjacent parenchymal disease and pleural fluid compared to prior exam. Addendum End> Exam: CTA OF THE CHEST WITH CONTRAST Clinical data: Dyspnea. Technique: Axial CT angiography images through the lungs were acquired with contrast and imaged using soft tissue and lung algorithms. Coronal, sagittal, and 3D volume reconstructions were performed. Reformatted/3D-MPR images were performed. Radiation dose: CTDIvol =30.19 mGy, DLP =425 mGy x cm. Prior studies: Radiograph of the chest dated 07/24/2022. CT scan of the chest dated 03/07/2022 images. Findings: Lungs: 4 cm in greatest dimension irregular air collection involving the posterior right upper lobe with surrounding parenchymal consolidation and pleural thickening. Additional left greater than right pleural effusions with dependent atelectasis/consolidation.   The airway is clear. Soft Tissues: No mediastinal, axillary or supraclavicular adenopathy isidentified. Vascular: No filling defect within the pulmonary arteries to the segmental branch level. Unremarkable aorta. Grossly unremarkable sized heart. No definite abnormality seen on 3D reformatted images. Bony structures: No acute or destructive abnormality. Moderate-severe multilevel spondylosis with S-shaped kyphoscoliosis. Upper Abdomen: Prior cholecystectomy. 1.  4 cm in greatest dimension irregular air collection, uncertain if parenchymal or pleural, involving the posterior right upper lobe with surrounding parenchymal consolidation and pleural thickening. Additional left greater than right pleural effusions with dependent atelectasis/consolidation. 2.  No evidence of pulmonary embolus or aortic dissection. 3.  Other nonacute findings above. Recommendation: Follow up as clinically indicated. All CT scans at this facility utilize dose modulation, iterative reconstruction, and/or weight based dosing when appropriate to reduce radiation dose to as low as reasonably achievable.           Amended by Fabián Grady MD at 24-Jul-2022 07:51:27 PM Electronically Signed by Fabián Grady MD at 24-Jul-2022 06:41:48 PM               Palliative Performance Scale:  [x] 20% Bed Bound  Extensive disease  Total care  Minimal intake  Drowsy/coma    Palliative Review of Advance Directives:     Surrogate Decision Maker:Yes Nancy Brown and Jaxson Cates    Durable Power of :No    Advanced Directives/Living [place]: Yes    Out of hospital medical orders in place to reflect resuscitation status (MOLST/POLST): No    Information Sharing:  Patient's awareness of illness:  [] Terminal [] Life-Threatening [] Serious [] Non life-threatening [] Not serious   [x] Not discussed    Family awareness of illness:   [x] Terminal [] Life-Threatening [] Serious [] Nonlife-threatening [] Not serious   [] Not discussed    Assessment/Plan:  Principal Problem:    Acute respiratory failure with hypoxia and hypercarbia (HCC)  Active Problems:    Palliative care patient    Pleural effusion  Resolved Problems:    Primary squamous cell carcinoma of right lung (HCC)    Severe pneumonia     Visit Summary:  Chart reviewed, patient discussed with consulting service and nursing staff. Reviewed health issues, work up and treatment plan as well as factors that lead to hospitalization. Ms. Park Gonzalez is a 69year old female who presented in respiratory distress who initially improved on BiPAP but has had significant decline overnight. I presented to her bedside where multiple family members are present including her two sons and her brother. I introduced myself, the role of inpatient palliative care and reason for consultation. Family state they were able to talk to Oncology this morning and feel hospice services would be beneficial for Ms. Cates. They give history of recurrent hospitalizations and worsening chronic respiratory failure and weakness. Currently, Vi appears distressed on BiPAP at 60 L w/ O2 in 70's. Her family wish to pursue comfort only for her at this time. We discussed inpatient hospice services to include use of medication for comfort and purely focusing on comfort and not treatment. They are all agreeable for this at this time. Hospice consult was placed. Discussed w/ Oncology, Hospitalist, RN, Hospice chaplain and Hospice Physician. At this time she is appropriate to move to [facility]. for symptom management. Recommendations:     Palliative Care-[facility] transition to inpatient hospice services.  Code status: DNR, ACP completed   Acute on chronic respiratory failure 2/2 severe COPD, recurrent pleural effusion-continue BiPAP, continue Morphine prn air hunger  NSCLC, stage III-followed by Oncology  Chronic back pain-noted   Hx of bilateral hemisphere cerebral infarcts-on Eliquis at home, Aspirin, statin at home    Thank you for consulting palliative care and allowing us to participate in the care of the patient.       CounselingTopics: Goals of care, Code Status, Disease process education, pt/family support    Time Spent Counseling > 50%:  YES                                   Total Time Spent with patient/family counseling, workup/treatment review, discussion w/ attending and consulting Physicians as well as hospice services, placement of orders/preparation of this note: 72 minutes    Electronically signed by Charmayne Chamorro, PA-C on 7/27/2022 at 9:57 AM    (Please note that portions of this note were completed with a voice recognition program.  Efforts were made to edit the dictations but occasionally words are mis-transcribed.)

## 2022-07-29 LAB
BLOOD CULTURE, ROUTINE: NORMAL
CULTURE, BLOOD 2: NORMAL

## 2022-09-09 NOTE — PROGRESS NOTES
Subjective   ORVILLE Camilo is a 64 y.o. female.     Background: Pt with mass in RUL invading  T4.  Pneumonia in 2014, aat normal 2013.  Hx nicotine dependency    Chief Complaint   Patient presents with   • Cough   • F/U PAIN IN RIGHT SIDE        History of Present Illness   She thought she had pneumonia and was having some right sided pain.  She had a percutaneous biopsy by ct scan.  She has been evaluated by Dr. Coronel. She has moderate dyspnea in chest on exertion for several months, alleviated by symbicort which makes her shake.  Albuterol helps also.  Dyspnea comes and goes, alleviated by rest.  Biopsy confirmed nonsmallcell lung cancer.    Medical/Family/Social History   has a past medical history of Arthritis, Asthma, Back pain, chronic, Cataracts, bilateral, Colon polyps, COPD (chronic obstructive pulmonary disease) (CMS/HCC), Emphysema of lung (CMS/HCC), GERD (gastroesophageal reflux disease), Glaucoma, Malignant neoplasm of right lung (CMS/HCC), Pneumonia, Syncope, and TIA (transient ischemic attack).   has a past surgical history that includes Appendectomy; Back surgery; Hysterectomy; Cholecystectomy; Incontinence surgery; and Lung biopsy (Right, 05/16/2019).  family history includes Cancer in her brother; Colon cancer in her mother; Diabetes in her brother; Hypertension in her brother.   reports that she has been smoking cigarettes.  She has a 50.00 pack-year smoking history. She has never used smokeless tobacco. She reports that she drinks alcohol. Drug use questions deferred to the physician.  No Known Allergies  Medications    Current Outpatient Medications:   •  albuterol sulfate  (90 Base) MCG/ACT inhaler, Inhale 2 puffs Every 4 (Four) Hours As Needed for Wheezing., Disp: , Rfl:   •  aspirin 81 MG EC tablet, Take 81 mg by mouth Daily., Disp: , Rfl:   •  budesonide-formoterol (SYMBICORT) 80-4.5 MCG/ACT inhaler, Inhale 2 puffs 2 (Two) Times a Day., Disp: , Rfl:   •  Cholecalciferol (VITAMIN D3)  · Likely pseudohyponatremia in setting of hyperglycemia  · Corrected Na 135 today, stable  · Optimize blood sugar control  · C/w fluid restriction  · Continue to monitor NA while admitted "32831 units capsule, Take 50,000 Units by mouth Every 7 (Seven) Days., Disp: , Rfl:   •  citalopram (CeleXA) 20 MG tablet, Take 20 mg by mouth Daily., Disp: , Rfl:   •  Morphine (MS CONTIN) 15 MG 12 hr tablet, Take 15 mg by mouth 2 (Two) Times a Day., Disp: , Rfl:   •  O2 (OXYGEN), Inhale 2 L/min 1 (One) Time., Disp: , Rfl:   •  oxyCODONE-acetaminophen (PERCOCET) 7.5-325 MG per tablet, Take 1 tablet by mouth Every 4 (Four) Hours As Needed., Disp: , Rfl:   •  tiZANidine (ZANAFLEX) 4 MG tablet, Take 4 mg by mouth Every 8 (Eight) Hours As Needed for Muscle Spasms., Disp: , Rfl:   •  Glycopyrrolate (SEEBRI NEOHALER) 15.6 MCG capsule, Place 1 capsule into inhaler and inhale 2 (Two) Times a Day., Disp: 48 capsule, Rfl: 0    Review of Systems   Constitutional: Negative for chills and fever.   HENT: Negative for trouble swallowing and voice change.    Eyes: Negative for photophobia and visual disturbance.   Respiratory: Positive for wheezing. Negative for choking and shortness of breath.    Gastrointestinal: Negative for abdominal pain, nausea, vomiting and GERD.   Genitourinary: Negative for difficulty urinating and hematuria.   Musculoskeletal: Positive for back pain. Negative for gait problem and joint swelling.   Skin: Negative for pallor and skin lesions.   Neurological: Negative for tremors, weakness and confusion.   Hematological: Negative for adenopathy. Does not bruise/bleed easily.   Psychiatric/Behavioral: The patient is not nervous/anxious.        Objective   /80   Pulse 93   Ht 165.1 cm (65\")   Wt 63.7 kg (140 lb 6.4 oz)   SpO2 (!) 85% Comment: RA  Breastfeeding? No   BMI 23.36 kg/m²   Physical Exam   Constitutional: She appears well-developed. She is active.  Non-toxic appearance. She has a sickly appearance. She does not appear ill. No distress.   HENT:   Head: Atraumatic.   Nose: Nose normal.   Eyes: Conjunctivae and EOM are normal. No scleral icterus.   Neck: Neck supple.   Cardiovascular: " Normal rate, regular rhythm, S1 normal and S2 normal.   Pulmonary/Chest: Effort normal. She has wheezes. She has rales.   Abdominal: Soft. She exhibits no distension. There is no tenderness.   Musculoskeletal: She exhibits no deformity.   Lymphadenopathy:     She has no cervical adenopathy.   Neurological: She is alert.   Skin: Skin is warm. No rash noted.   Psychiatric: She has a normal mood and affect.        -----------------------------------------------------------------------------------------------  Recent Imaging:     CT chest, outside facility.  RUL mass with tspine involvement    Assessment/Plan   Problem List Items Addressed This Visit        Respiratory    Malignant neoplasm of right lung (CMS/HCC)    Relevant Medications    Glycopyrrolate (SEEBRI NEOHALER) 15.6 MCG capsule    Lung mass - Primary    Relevant Medications    Glycopyrrolate (SEEBRI NEOHALER) 15.6 MCG capsule      Other Visit Diagnoses     Shortness of breath        Relevant Medications    Glycopyrrolate (SEEBRI NEOHALER) 15.6 MCG capsule    Antiasthmatic causing adverse effect in therapeutic use        tremor with full dose formotero.        Patient's Body mass index is 23.36 kg/m². BMI is within normal parameters. No follow-up required..      She is short of breath in setting of lung cancer.  Could be copd or bronchospasm  Trial of anticholinergic in addition to current regimen which is limited by beta agonist intolerance with tremors.  Anticipate combined modality chemoradiotherapy for lung ca.  Path reviewed: squamous.       Electronically signed by Moisés Bravo MD, 5/23/2019, 8:34 PM

## 2023-03-16 NOTE — CARE COORDINATION
Problem: Adult Inpatient Plan of Care  Goal: Plan of Care Review  Outcome: Ongoing, Progressing  Goal: Patient-Specific Goal (Individualized)  Outcome: Ongoing, Progressing  Goal: Absence of Hospital-Acquired Illness or Injury  Outcome: Ongoing, Progressing  Goal: Optimal Comfort and Wellbeing  Outcome: Ongoing, Progressing  Goal: Readiness for Transition of Care  Outcome: Ongoing, Progressing     Problem: Diabetes Comorbidity  Goal: Blood Glucose Level Within Targeted Range  Outcome: Ongoing, Progressing     Problem: Impaired Wound Healing  Goal: Optimal Wound Healing  Outcome: Ongoing, Progressing     Problem: Hypertension Comorbidity  Goal: Blood Pressure in Desired Range  Outcome: Ongoing, Progressing     Problem: Fall Injury Risk  Goal: Absence of Fall and Fall-Related Injury  Outcome: Ongoing, Progressing     Problem: Skin or Soft Tissue Infection  Goal: Absence of Infection Signs and Symptoms  Outcome: Ongoing, Progressing     Problem: Infection  Goal: Absence of Infection Signs and Symptoms  Outcome: Ongoing, Progressing      Date / Time of Evaluation:   12/3/2021    2:55 PM  Assessment Completed by:   Shellie Falcon      Patient Name:   Elana Mcmanus  MRN:   [id number]  YOB: 1955    Patient Admission Status:   Inpatient [101]    Patient Contact Information:    [address] [facility]  [phone] (home)   Telephone Information:   Mobile [phone]     Above information verified? [x]   Yes  []   No    (Best Practice:   Have patient/caregiver verify above address and phone number by stating out loud their current address and reachable phone number. Initial Assessment Completed at bedside with:      [x]   Patient  []   Family/Caregiver/Guardian   []   Other:      Current PCP:    DOMINICK Hernandez NP    PCP verified? []   Yes  []   No    Emergency Contacts:    Extended Emergency Contact Information  Primary Emergency Contact: REHANJESE  Address: [address] [place] [address] Phone: [phone]  Mobile Phone: [phone]  Relation: Child  Hearing or visual needs: None  Other needs: None  Preferred language: English   needed? No  Secondary Emergency Contact: ALDO CATES  Address: [address] [place] [address] Phone: [phone]  Mobile Phone: [phone]  Relation: Child  Hearing or visual needs: None  Other needs: None  Preferred language: English   needed? No    Advance Directives:    Does Ms. Cates have an advance directive in her electronic medical record? [x]   Yes  []   No    Code Status:   DNR-CC      Have you been vaccinated for COVID-19 (SARS-CoV-2)? []   Yes  [x]   No                   If so, when?     Which :         []   Pfizer-BioNTech  []   Moderna  []   MightyHive  []   Other:       Do you have any of the following unmet social needs that would keep you from returning home safely:    []   Yes  [x]   No Unmet Social Needs:           []   Living Situation/Housing  []   Food  []   Stroke Education   []   Utilities  []   Personal Safety  []   Financial Strain  []   Employment  []   Mental Health  []   Substance Abuse  []   Transportation Barriers    Additional Unmet Social Needs Notes:   Discussing AR prior to returning home for pt's mention of weakness staitng \"still just too wobbly\"      Financial:    Payor: MEDICARE / Plan: MEDICARE PART A AND B / Product Type: *No Product type* /     Pre-Cert required for SNF:     []   Yes  [x]   No    Have Long Term Care Insurance:      []   Yes  [x]   No      Pharmacy:    [address] [place] [address] [phone] - [address] [address]0182  Phone: [phone] Fax: [phone]    [place] [facility] [id number], [address] [phone] [facility] [phone]  [facility] [address] [facility]  [address] [place] [id number]  Phone: [phone] Fax: [phone]    Potential assistance purchasing medications? Only medicare A and B no Rx coverage  [x]   Yes  []   No      ADLS:       Support System:   Children      Current Home Environment:       Steps:       []   Yes  [x]   No    If yes, how many?     Plans to RETURN to current housing:     []   Yes  []   No    Barriers to RETURNING to current housing:  discussign JARROD herring to return home at this time    Currently ACTIVE with [address]:      []   Yes  [x]   No    [address] [place] [address]:        [facility] Provider:   MEGHNA, shower seat,     Had [facility] prior to admission:      [x]   Yes  []   No    [facility]: [phone] H:[phone]    Has a pulse oximetry unit at home:     [x]   Yes  []   No    Informed of need to bring portable home O2 tank to hospital on day of DISCHARGE:      [x]   Yes  []   No    Name of person committed to bringing portable tank at discharge:   family    Active with HD/PD prior to admission:             []   Yes  [x] No    Nephrologist:     HD Center:         Transition Plan:  intubated 11/15; pta home- alone- chdn/family support    Transportation PLAN for Discharge:  discussing AR prior to return home  Factors facilitating achievement of predicted outcomes:     Barriers to discharge:  AR referral      Patient Deficits:    [x]   Yes   []   No    If yes:    [x]   Confusion/Memory     []   Visual  []   Motor/Sensory         []   Right arm         []   Right leg         []   Left arm         []   Left leg  []   Language/Speech         []   Aphasia         []   Dysarthria         []   Swallow    NIH Stroke Scale  Interval: Hand-off/Transfer  Level of Consciousness (1a. ): Alert  LOC Questions (1b. ): Answers both correctly  LOC Commands (1c. ): Performs both tasks correctly  Best Gaze (2. ): Normal  Visual (3. ): No visual loss  Facial Palsy (4. ): Normal symmetrical movement  Motor Arm, Left (5a. ): No drift  Motor Arm, Right (5b. ): No drift  Motor Leg, Left (6a. ): No drift  Motor Leg, Right (6b. ): No drift  Limb Ataxia (7. ): Absent  Sensory (8. ): Normal  Best Language (9. ): No aphasia  Dysarthria (10. ): Normal  Extinction and Inattention (11): No abnormality  Total: 0    Fackler Coma Scale  Eye Opening: Spontaneous  Best Verbal Response: Oriented  Best Motor Response: Obeys commands  Fackler Coma Scale Score: 15    Patient Deficit Notes:   Pt having trouble recalling certain information from pta. SW asked if pt resided home alone pta and pt stated \"I don't know. . I can't remember everything I had a stroke. \"         Additional CM/SW Notes: pt lying in bed when SW entered, Pt stated need to go to the bathroom. Pt declined use of the [facility] or the restroom and requested the bedpan. Pt stated \"still to wobbly\" and concerned about going home. SW discussed AR and SNF options for therapy and pt agreeable to AR referral. LETICIA requested from RN and confirmed bed availability with Henrietta in [facility] admissions.          Vi and/or her family were provided with choice of provider:   Referral to AR before considering SNF rehab.     []   Yes   []   No        []   Stroke education booklet reviewed and given to patient/family/caregiver/guardian. All questions answered all questions answered appropriately and efficiently per family.       [address]  Care Management  Phone:   [phone]          Fax:   [phone]

## 2024-12-26 NOTE — PROGRESS NOTES
[facility]  [address] [place] [address] Drive, [address]  [place], [place]  Phone [phone]     Neurology Progress Note  2021 12:20 PM  Information:   Patient Name: Solange Batres  :   1955  Age:   74 y.o. MRN:   [id number]  Account #:  [de-identified]  Admit Date:   2021  Today:  21     ADMIT DX:   Acute respiratory failure    Subjective:     74 y.o. admitted with AMS, hypoxia, pneumonia, lung cancer history, COPD, right sided weakness, slurred speech, MRI consistent with scattered bi-hemishperic strokes, likely embolic vs watershed. CTAs negative. ECHO, CD negative. EEG with slowing, nothing epileptiform. Intubated. Interval History:   She remains intubated in the CCU. Despite sedation, she is awake and cooperative. 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     COPD (chronic obstructive pulmonary disease) (HCC)     Depression     Emphysema (subcutaneous) (surgical) resulting from a procedure     History of lung biopsy 2019    Malignant neoplasm of right main bronchus ([address] 2019    NSCLC, SCC [id number] stage IIIb    Palliative care patient 2021    Syncope     TIA (transient ischemic attack)        Past Surgical History:   Procedure Laterality Date    APPENDECTOMY      BACK SURGERY      times two    BLADDER SUSPENSION      CHOLECYSTECTOMY      HYSTERECTOMY      INCONTINENCE SURGERY         Family History   Problem Relation Age of Onset    Colon Cancer Mother     High Blood Pressure Brother     Diabetes Brother        Social History     Socioeconomic History    Marital status:       Spouse name: Not on file    Number of children: Not on file    Years of education: Not on file    Highest education level: Not on file   Occupational History    Not on file   Tobacco Use    Smoking status: Current Every Day Smoker    Smokeless tobacco: Never Used   Substance and Sexual Activity    Alcohol use: Not Currently    Drug use: Never    Sexual activity: Not on file   Other Topics Concern    Not on file   Social History Narrative    Not on file     Social Determinants of Health     Financial Resource Strain:     Difficulty of Paying Living Expenses: Not on file   Food Insecurity:     Worried About Running Out of Food in the Last Year: Not on file    Trisha of Food in the Last Year: Not on file   Transportation Needs:     Lack of Transportation (Medical): Not on file    Lack of Transportation (Non-Medical):  Not on file   Physical Activity:     Days of Exercise per Week: Not on file    Minutes of Exercise per Session: Not on file   Stress:     Feeling of Stress : Not on file   Social Connections:     Frequency of Communication with Friends and Family: Not on file    Frequency of Social Gatherings with Friends and Family: Not on file    Attends Catholic Services: Not on file    Active Member of Clubs or Organizations: Not on file    Attends Club or Organization Meetings: Not on file    Marital Status: Not on file   Intimate Partner Violence:     Fear of Current or Ex-Partner: Not on file    Emotionally Abused: Not on file    Physically Abused: Not on file    Sexually Abused: Not on file   Housing Stability:     Unable to Pay for Housing in the Last Year: Not on file    Number of Places Lived in the Last Year: Not on file    Unstable Housing in the Last Year: Not on file       Medications:   propofol 25 mcg/kg/min (11/21/21 1108)      enoxaparin  1 mg/kg SubCUTAneous BID    ipratropium-albuterol  1 ampule Inhalation 4x daily    piperacillin-tazobactam  3,375 mg IntraVENous Q8H    nicotine  1 patch TransDERmal Daily    famotidine  20 mg Oral Daily    chlorhexidine  15 mL Mouth/Throat BID    citalopram  20 mg Oral Daily    aspirin  81 mg Oral Daily    Or    aspirin  300 mg Rectal Daily    atorvastatin  40 mg Oral Nightly    thiamine  100 mg IntraVENous Daily       Diagnostic Studies:  Reviewed all labs/diagnositcs since last 24hrs:  Recent Results (from the past 24 hour(s))   CBC Auto Differential    Collection Time: 11/21/21  3:45 AM   Result Value Ref Range    WBC 8.5 4.8 - 10.8 K/uL    RBC 2.79 (L) 4.20 - 5.40 M/uL    Hemoglobin 8.7 (L) 12.0 - 16.0 g/dL    Hematocrit 29.6 (L) 37.0 - 47.0 %    .1 (H) 81.0 - 99.0 fL    MCH 31.2 (H) 27.0 - 31.0 pg    MCHC 29.4 (L) 33.0 - 37.0 g/dL    RDW 17.1 (H) 11.5 - 14.5 %    Platelets 740 844 - 231 K/uL    MPV 12.6 (H) 9.4 - 12.3 fL    Neutrophils % 84.2 (H) 50.0 - 65.0 %    Lymphocytes % 7.8 (L) 20.0 - 40.0 %    Monocytes % 6.6 0.0 - 10.0 %    Eosinophils % 0.9 0.0 - 5.0 %    Basophils % 0.0 0.0 - 1.0 %    Neutrophils Absolute 7.1 1.5 - 7.5 K/uL    Immature Granulocytes # 0.0 K/uL    Lymphocytes Absolute 0.7 (L) 1.1 - 4.5 K/uL    Monocytes Absolute 0.60 0.00 - 0.90 K/uL    Eosinophils Absolute 0.10 0.00 - 0.60 K/uL    Basophils Absolute 0.00 0.00 - 0.20 K/uL   Comprehensive Metabolic Panel    Collection Time: 11/21/21  3:45 AM   Result Value Ref Range    Sodium 144 136 - 145 mmol/L    Potassium 3.7 3.5 - 5.0 mmol/L    Chloride 100 98 - 111 mmol/L    CO2 38 (H) 22 - 29 mmol/L    Anion Gap 6 (L) 7 - 19 mmol/L    Glucose 97 74 - 109 mg/dL    BUN 12 8 - 23 mg/dL    CREATININE 0.5 0.5 - 0.9 mg/dL    GFR Non-African American >60 >60    GFR African American >59 >59    Calcium 8.3 (L) 8.8 - 10.2 mg/dL    Total Protein 4.6 (L) 6.6 - 8.7 g/dL    Albumin 2.3 (L) 3.5 - 5.2 g/dL    Total Bilirubin 0.4 0.2 - 1.2 mg/dL    Alkaline Phosphatase 58 35 - 104 U/L    ALT 14 5 - 33 U/L    AST 17 5 - 32 U/L   Magnesium    Collection Time: 11/21/21  3:45 AM   Result Value Ref Range    Magnesium 2.0 1.6 - 2.4 mg/dL       Diet:  Diet NPO  ADULT TUBE FEEDING; Orogastric; Peptide Based; Continuous; 33; No; 35; Q 1 hour; Protein; 1 Proteinex as flush    Examination:  Vitals: BP (!) 85/45   Pulse 90   Temp 98.2 °F (36.8 °C) (Temporal)   Resp 22   Ht 5' 5\" (1.651 m)   Wt 108 lb 11.2 oz (49.3 kg)   SpO2 94%   BMI 18.09 kg/m²      Intake/Output Summary (Last 24 hours) at 11/21/2021 1220  Last data filed at 11/21/2021 1000  Gross per 24 hour   Intake 2058.38 ml   Output 1935 ml   Net 123.38 ml     General appearance: She is a cachectic chronically ill appearing woman who is intubated and awake in the CCU  HEENT: Exam; heent: Head: Normal, normocephalic, atraumatic. Lungs: rhonchi bilaterally  Heart: regular rate and rhythm, S1, S2 normal, no murmur, click, rub or gallop  Abdomen: soft, non-tender; bowel sounds normal; no masses,  no organomegaly  Extremities: OA joint changes  Neurologic:  Alert. She is able to answer simple questions and follow simple commands. EOMI, PERRL, VFF. No facial weakness. Moves the left arm and both lower extremities purposefully without gross weakness. She has little right arm and hand movement. Assessment:   1. Bilateral (right frontal and left parietal) hemisphere acute cerebral infarcts  2. COPD, aspiration pneumonia  3. Acute respiratory failure. She continues to need 70% FIO2  4. Malnutrition  5. Tobacco dependence    Plan:   1. Ventilator support as necessary  2. Zosyn  3. Nebs  4. ASA/Lovenox  5. Limit sedation     Discharge planning: To be determined    Reviewed treatment plans with the patient and/or family. 25 minutes spent in face to face interaction and coordination of care.      Electronically signed by Jeronimo Giraldo MD on 11/21/2021 at 12:20 PM No